# Patient Record
Sex: MALE | Race: ASIAN | NOT HISPANIC OR LATINO | URBAN - METROPOLITAN AREA
[De-identification: names, ages, dates, MRNs, and addresses within clinical notes are randomized per-mention and may not be internally consistent; named-entity substitution may affect disease eponyms.]

---

## 2017-08-12 ENCOUNTER — INPATIENT (INPATIENT)
Facility: HOSPITAL | Age: 72
LOS: 3 days | Discharge: HOME | End: 2017-08-16
Attending: INTERNAL MEDICINE | Admitting: INTERNAL MEDICINE
Payer: MEDICAID

## 2017-08-12 VITALS
SYSTOLIC BLOOD PRESSURE: 160 MMHG | DIASTOLIC BLOOD PRESSURE: 72 MMHG | HEART RATE: 86 BPM | RESPIRATION RATE: 16 BRPM | OXYGEN SATURATION: 100 % | TEMPERATURE: 98 F

## 2017-08-12 DIAGNOSIS — R94.31 ABNORMAL ELECTROCARDIOGRAM [ECG] [EKG]: ICD-10-CM

## 2017-08-12 DIAGNOSIS — E87.1 HYPO-OSMOLALITY AND HYPONATREMIA: ICD-10-CM

## 2017-08-12 DIAGNOSIS — I10 ESSENTIAL (PRIMARY) HYPERTENSION: ICD-10-CM

## 2017-08-12 LAB
ALBUMIN SERPL ELPH-MCNC: 4.3 G/DL — SIGNIFICANT CHANGE UP (ref 3.3–5)
ALP SERPL-CCNC: 77 U/L — SIGNIFICANT CHANGE UP (ref 40–120)
ALT FLD-CCNC: 39 U/L — SIGNIFICANT CHANGE UP (ref 4–41)
APPEARANCE UR: CLEAR — SIGNIFICANT CHANGE UP
AST SERPL-CCNC: 40 U/L — SIGNIFICANT CHANGE UP (ref 4–40)
BASE EXCESS BLDV CALC-SCNC: 4.4 MMOL/L — SIGNIFICANT CHANGE UP
BASOPHILS # BLD AUTO: 0.02 K/UL — SIGNIFICANT CHANGE UP (ref 0–0.2)
BASOPHILS NFR BLD AUTO: 0.2 % — SIGNIFICANT CHANGE UP (ref 0–2)
BILIRUB SERPL-MCNC: 0.8 MG/DL — SIGNIFICANT CHANGE UP (ref 0.2–1.2)
BILIRUB UR-MCNC: NEGATIVE — SIGNIFICANT CHANGE UP
BLOOD GAS VENOUS - CREATININE: 0.96 MG/DL — SIGNIFICANT CHANGE UP (ref 0.5–1.3)
BLOOD UR QL VISUAL: NEGATIVE — SIGNIFICANT CHANGE UP
BUN SERPL-MCNC: 13 MG/DL — SIGNIFICANT CHANGE UP (ref 7–23)
BUN SERPL-MCNC: 9 MG/DL — SIGNIFICANT CHANGE UP (ref 7–23)
CALCIUM SERPL-MCNC: 9.3 MG/DL — SIGNIFICANT CHANGE UP (ref 8.4–10.5)
CALCIUM SERPL-MCNC: 9.6 MG/DL — SIGNIFICANT CHANGE UP (ref 8.4–10.5)
CHLORIDE BLDV-SCNC: 82 MMOL/L — LOW (ref 96–108)
CHLORIDE SERPL-SCNC: 82 MMOL/L — LOW (ref 98–107)
CHLORIDE SERPL-SCNC: 86 MMOL/L — LOW (ref 98–107)
CHLORIDE UR-SCNC: 19 MMOL/L — SIGNIFICANT CHANGE UP
CK MB BLD-MCNC: 14.94 NG/ML — HIGH (ref 1–6.6)
CK MB BLD-MCNC: 15.2 NG/ML — HIGH (ref 1–6.6)
CK MB BLD-MCNC: 2 — SIGNIFICANT CHANGE UP (ref 0–2.5)
CK MB BLD-MCNC: 2.3 — SIGNIFICANT CHANGE UP (ref 0–2.5)
CK SERPL-CCNC: 655 U/L — HIGH (ref 30–200)
CK SERPL-CCNC: 743 U/L — HIGH (ref 30–200)
CO2 SERPL-SCNC: 26 MMOL/L — SIGNIFICANT CHANGE UP (ref 22–31)
CO2 SERPL-SCNC: 26 MMOL/L — SIGNIFICANT CHANGE UP (ref 22–31)
COLOR SPEC: SIGNIFICANT CHANGE UP
CREAT SERPL-MCNC: 1.01 MG/DL — SIGNIFICANT CHANGE UP (ref 0.5–1.3)
CREAT SERPL-MCNC: 1.04 MG/DL — SIGNIFICANT CHANGE UP (ref 0.5–1.3)
EOSINOPHIL # BLD AUTO: 0.38 K/UL — SIGNIFICANT CHANGE UP (ref 0–0.5)
EOSINOPHIL NFR BLD AUTO: 4.3 % — SIGNIFICANT CHANGE UP (ref 0–6)
GAS PNL BLDV: 118 MMOL/L — CRITICAL LOW (ref 136–146)
GLUCOSE BLDV-MCNC: 127 — HIGH (ref 70–99)
GLUCOSE SERPL-MCNC: 117 MG/DL — HIGH (ref 70–99)
GLUCOSE SERPL-MCNC: 133 MG/DL — HIGH (ref 70–99)
GLUCOSE UR-MCNC: NEGATIVE — SIGNIFICANT CHANGE UP
HCO3 BLDV-SCNC: 27 MMOL/L — SIGNIFICANT CHANGE UP (ref 20–27)
HCT VFR BLD CALC: 37 % — LOW (ref 39–50)
HCT VFR BLDV CALC: 41.8 % — SIGNIFICANT CHANGE UP (ref 39–51)
HGB BLD-MCNC: 13.4 G/DL — SIGNIFICANT CHANGE UP (ref 13–17)
HGB BLDV-MCNC: 13.6 G/DL — SIGNIFICANT CHANGE UP (ref 13–17)
IMM GRANULOCYTES # BLD AUTO: 0.05 # — SIGNIFICANT CHANGE UP
IMM GRANULOCYTES NFR BLD AUTO: 0.6 % — SIGNIFICANT CHANGE UP (ref 0–1.5)
KETONES UR-MCNC: NEGATIVE — SIGNIFICANT CHANGE UP
LACTATE BLDV-MCNC: 1.8 MMOL/L — SIGNIFICANT CHANGE UP (ref 0.5–2)
LEUKOCYTE ESTERASE UR-ACNC: NEGATIVE — SIGNIFICANT CHANGE UP
LIDOCAIN IGE QN: 22.4 U/L — SIGNIFICANT CHANGE UP (ref 7–60)
LYMPHOCYTES # BLD AUTO: 1.04 K/UL — SIGNIFICANT CHANGE UP (ref 1–3.3)
LYMPHOCYTES # BLD AUTO: 11.6 % — LOW (ref 13–44)
MCHC RBC-ENTMCNC: 32.4 PG — SIGNIFICANT CHANGE UP (ref 27–34)
MCHC RBC-ENTMCNC: 36.2 % — HIGH (ref 32–36)
MCV RBC AUTO: 89.4 FL — SIGNIFICANT CHANGE UP (ref 80–100)
MONOCYTES # BLD AUTO: 0.64 K/UL — SIGNIFICANT CHANGE UP (ref 0–0.9)
MONOCYTES NFR BLD AUTO: 7.2 % — SIGNIFICANT CHANGE UP (ref 2–14)
MUCOUS THREADS # UR AUTO: SIGNIFICANT CHANGE UP
NEUTROPHILS # BLD AUTO: 6.81 K/UL — SIGNIFICANT CHANGE UP (ref 1.8–7.4)
NEUTROPHILS NFR BLD AUTO: 76.1 % — SIGNIFICANT CHANGE UP (ref 43–77)
NITRITE UR-MCNC: NEGATIVE — SIGNIFICANT CHANGE UP
NRBC # FLD: 0 — SIGNIFICANT CHANGE UP
OSMOLALITY UR: 129 MOSMO/KG — SIGNIFICANT CHANGE UP (ref 50–1200)
PCO2 BLDV: 44 MMHG — SIGNIFICANT CHANGE UP (ref 41–51)
PH BLDV: 7.43 PH — SIGNIFICANT CHANGE UP (ref 7.32–7.43)
PH UR: 7 — SIGNIFICANT CHANGE UP (ref 4.6–8)
PLATELET # BLD AUTO: 233 K/UL — SIGNIFICANT CHANGE UP (ref 150–400)
PMV BLD: 10.1 FL — SIGNIFICANT CHANGE UP (ref 7–13)
PO2 BLDV: 31 MMHG — LOW (ref 35–40)
POTASSIUM BLDV-SCNC: 3.4 MMOL/L — SIGNIFICANT CHANGE UP (ref 3.4–4.5)
POTASSIUM SERPL-MCNC: 3.7 MMOL/L — SIGNIFICANT CHANGE UP (ref 3.5–5.3)
POTASSIUM SERPL-MCNC: 3.8 MMOL/L — SIGNIFICANT CHANGE UP (ref 3.5–5.3)
POTASSIUM SERPL-SCNC: 3.7 MMOL/L — SIGNIFICANT CHANGE UP (ref 3.5–5.3)
POTASSIUM SERPL-SCNC: 3.8 MMOL/L — SIGNIFICANT CHANGE UP (ref 3.5–5.3)
POTASSIUM UR-SCNC: 6.8 MEQ/L — SIGNIFICANT CHANGE UP
PROT SERPL-MCNC: 7.2 G/DL — SIGNIFICANT CHANGE UP (ref 6–8.3)
PROT UR-MCNC: NEGATIVE — SIGNIFICANT CHANGE UP
RBC # BLD: 4.14 M/UL — LOW (ref 4.2–5.8)
RBC # FLD: 12.1 % — SIGNIFICANT CHANGE UP (ref 10.3–14.5)
SAO2 % BLDV: 59.2 % — LOW (ref 60–85)
SODIUM SERPL-SCNC: 122 MMOL/L — LOW (ref 135–145)
SODIUM SERPL-SCNC: 125 MMOL/L — LOW (ref 135–145)
SODIUM UR-SCNC: 23 MEQ/L — SIGNIFICANT CHANGE UP
SP GR SPEC: 1.01 — SIGNIFICANT CHANGE UP (ref 1–1.03)
TROPONIN T SERPL-MCNC: < 0.06 NG/ML — SIGNIFICANT CHANGE UP (ref 0–0.06)
TROPONIN T SERPL-MCNC: < 0.06 NG/ML — SIGNIFICANT CHANGE UP (ref 0–0.06)
URATE UR-MCNC: 7.8 MG/DL — SIGNIFICANT CHANGE UP
UROBILINOGEN FLD QL: NORMAL E.U. — SIGNIFICANT CHANGE UP (ref 0.1–0.2)
WBC # BLD: 8.94 K/UL — SIGNIFICANT CHANGE UP (ref 3.8–10.5)
WBC # FLD AUTO: 8.94 K/UL — SIGNIFICANT CHANGE UP (ref 3.8–10.5)
WBC UR QL: SIGNIFICANT CHANGE UP (ref 0–?)

## 2017-08-12 PROCEDURE — 71010: CPT | Mod: 26

## 2017-08-12 PROCEDURE — 74010: CPT | Mod: 26

## 2017-08-12 PROCEDURE — 74177 CT ABD & PELVIS W/CONTRAST: CPT | Mod: 26

## 2017-08-12 RX ORDER — METOPROLOL TARTRATE 50 MG
25 TABLET ORAL
Qty: 0 | Refills: 0 | Status: DISCONTINUED | OUTPATIENT
Start: 2017-08-12 | End: 2017-08-16

## 2017-08-12 RX ORDER — FLUTICASONE PROPIONATE 50 MCG
2 SPRAY, SUSPENSION NASAL DAILY
Qty: 0 | Refills: 0 | Status: DISCONTINUED | OUTPATIENT
Start: 2017-08-12 | End: 2017-08-16

## 2017-08-12 RX ORDER — SODIUM CHLORIDE 9 MG/ML
1000 INJECTION INTRAMUSCULAR; INTRAVENOUS; SUBCUTANEOUS ONCE
Qty: 0 | Refills: 0 | Status: COMPLETED | OUTPATIENT
Start: 2017-08-12 | End: 2017-08-12

## 2017-08-12 RX ORDER — ONDANSETRON 8 MG/1
4 TABLET, FILM COATED ORAL ONCE
Qty: 0 | Refills: 0 | Status: COMPLETED | OUTPATIENT
Start: 2017-08-12 | End: 2017-08-12

## 2017-08-12 RX ORDER — LOSARTAN POTASSIUM 100 MG/1
50 TABLET, FILM COATED ORAL DAILY
Qty: 0 | Refills: 0 | Status: DISCONTINUED | OUTPATIENT
Start: 2017-08-12 | End: 2017-08-16

## 2017-08-12 RX ORDER — ATORVASTATIN CALCIUM 80 MG/1
10 TABLET, FILM COATED ORAL AT BEDTIME
Qty: 0 | Refills: 0 | Status: DISCONTINUED | OUTPATIENT
Start: 2017-08-12 | End: 2017-08-16

## 2017-08-12 RX ORDER — SODIUM CHLORIDE 9 MG/ML
1000 INJECTION INTRAMUSCULAR; INTRAVENOUS; SUBCUTANEOUS
Qty: 0 | Refills: 0 | Status: DISCONTINUED | OUTPATIENT
Start: 2017-08-12 | End: 2017-08-13

## 2017-08-12 RX ADMIN — ONDANSETRON 4 MILLIGRAM(S): 8 TABLET, FILM COATED ORAL at 06:43

## 2017-08-12 RX ADMIN — Medication 25 MILLIGRAM(S): at 17:14

## 2017-08-12 RX ADMIN — SODIUM CHLORIDE 65 MILLILITER(S): 9 INJECTION INTRAMUSCULAR; INTRAVENOUS; SUBCUTANEOUS at 21:24

## 2017-08-12 RX ADMIN — ATORVASTATIN CALCIUM 10 MILLIGRAM(S): 80 TABLET, FILM COATED ORAL at 21:24

## 2017-08-12 RX ADMIN — SODIUM CHLORIDE 1000 MILLILITER(S): 9 INJECTION INTRAMUSCULAR; INTRAVENOUS; SUBCUTANEOUS at 06:43

## 2017-08-12 RX ADMIN — Medication 2 SPRAY(S): at 18:34

## 2017-08-12 NOTE — ED PROVIDER NOTE - PROGRESS NOTE DETAILS
MD Khan:  triage ECG performed, which is grossly abnormal:  deep TWI in V3-V6.  However, patient has no CP, SOB, back pain, or any other pain, for that matter; only c/o nausea/vomiting.  ECG does not meet STEMI criteria.  No prior for comparison Discussed with tele doc of day. Admit for mgmt of EKG abnormalities and hyponatremia. Admit to Boaz Gallegos.

## 2017-08-12 NOTE — ED ADULT NURSE NOTE - OBJECTIVE STATEMENT
pt. a&ox3, Irish speaking, refuse phone  and wants son to translate. as per son, pt. came in c/o n/v and diarrhea since yesterday. pt. had routine colonoscopy yesterday, then he got home started vomiting. pt. had approx vomited 10x (nbnb) and 2x watery stools. denies any fever nor any pain. pt. currently c/o feeling dizzy, no h/a. pt. seen by MD. labs were drawn. meds given as ordered. Gastroview given. awaits CT. will continue to monitor

## 2017-08-12 NOTE — CONSULT NOTE ADULT - SUBJECTIVE AND OBJECTIVE BOX
CHIEF COMPLAINT: nausea/vomiting    HPI:  70 yo male PMH of HTN p/w one day of nausea, vomiting, and diarrhea following a screening colonoscopy two days ago. Pt denied any chest pain, dyspnea, orthopnea, PND, or palpitations. No prior history of cardiac disease.    PAST MEDICAL & SURGICAL HISTORY:  Essential hypertension  No significant past surgical history          PREVIOUS DIAGNOSTIC TESTING:    [ ] Echocardiogram:  [ ]  Catheterization:  [ ] Stress Test:  	    MEDICATIONS:  MEDICATIONS  (STANDING): unknown      FAMILY HISTORY:denied      SOCIAL HISTORY:    [x ] Non-smoker  [ ] Smoker  [ ] Alcohol    Allergies    flour (Rash)  No Known Drug Allergies  Nuts (Rash)    Intolerances    	    REVIEW OF SYSTEMS:  CONSTITUTIONAL: No fever, weight loss, or fatigue  EYES: No eye pain, visual disturbances, or discharge  ENMT:  No difficulty hearing, tinnitus, vertigo; No sinus or throat pain  NECK: No pain or stiffness  RESPIRATORY: No cough, wheezing, chills or hemoptysis; No Shortness of Breath  CARDIOVASCULAR: No chest pain, palpitations, passing out, dizziness, or leg swelling  GASTROINTESTINAL: see HPI  GENITOURINARY: No dysuria, frequency, hematuria, or incontinence  NEUROLOGICAL: No headaches, memory loss, loss of strength, numbness, or tremors  SKIN: No itching, burning, rashes, or lesions   	    [ ] All others negative	  [ ] Unable to obtain    PHYSICAL EXAM:  T(C): 36.8 (08-12-17 @ 09:12), Max: 36.8 (08-12-17 @ 09:12)  HR: 69 (08-12-17 @ 13:22) (69 - 87)  BP: 138/62 (08-12-17 @ 13:22) (138/62 - 178/73)  RR: 16 (08-12-17 @ 13:22) (14 - 17)  SpO2: 99% (08-12-17 @ 13:22) (99% - 100%)  Wt(kg): --  I&O's Summary      Appearance: Normal	  Psychiatry: A & O x 3, Mood & affect appropriate  HEENT:   Normal oral mucosa, PERRL, EOMI	  Lymphatic: No lymphadenopathy  Cardiovascular: Normal S1 S2,RRR, No JVD, No murmurs  Respiratory: Lungs clear to auscultation	  Gastrointestinal:  diffuse mild tender to palptaion	  Skin: No rashes, No ecchymoses, No cyanosis	  Neurologic: Non-focal  Extremities: Normal range of motion, No clubbing, cyanosis or edema  Vascular: Peripheral pulses palpable 2+ bilaterally    TELEMETRY: 	    ECG:  	NSR, IRBB, antlat TWI  RADIOLOGY: CT abdomen: no perforation  OTHER: 	  	  LABS:	 	    CARDIAC MARKERS:      CKMB: 14.94 ng/mL (08-12 @ 06:40)    CKMB Relative Index: 2.3 (08-12 @ 06:40)                            13.4   8.94  )-----------( 233      ( 12 Aug 2017 06:40 )             37.0     08-12    122<L>  |  82<L>  |  13  ----------------------------<  133<H>  3.8   |  26  |  1.01    Ca    9.6      12 Aug 2017 06:40    TPro  7.2  /  Alb  4.3  /  TBili  0.8  /  DBili  x   /  AST  40  /  ALT  39  /  AlkPhos  77  08-12      proBNP:   Lipid Profile:   HgA1c:   TSH:     ASSESSMENT/PLAN:

## 2017-08-12 NOTE — ED ADULT NURSE REASSESSMENT NOTE - NS ED NURSE REASSESS COMMENT FT1
Report received from night ED RN Barbara BAILON. Patient A&Ox3 and ambulatory at baseline. Reports improvement in symptoms. Denies abdominal pain or N//V/D at present. OOB and ambulatory to bathroom. Awaiting CT results. PE and VS as noted. NAD at time. Will continue to monitor. Report received from night ED RN Barbara BAILON. Patient A&Ox3 and ambulatory at baseline. Primarily Indonesian speaking. Translation received from brother in law as per patient request. Reports improvement in symptoms. Denies abdominal pain or N//V/D at present. OOB and ambulatory to bathroom. Awaiting CT results. PE and VS as noted. NAD at time. Will continue to monitor.

## 2017-08-12 NOTE — CONSULT NOTE ADULT - ASSESSMENT
70 yo male with PMH as above presenting with nausea, vomiting, diarrhea, dehydration and hyponatremia.    -ECG reviewed, there is evidence of TWI across precordium unclear baseline. Pt has no symptoms to suggest angina or ACS.  -recommend obtaining any ECHO  -hydrate, correct hyponatremia, pain control  -repeat ECG  -GI eval

## 2017-08-12 NOTE — H&P ADULT - NSHPLABSRESULTS_GEN_ALL_CORE
13.4   8.94  )-----------( 233      ( 12 Aug 2017 06:40 )             37.0  08-12    122<L>  |  82<L>  |  13  ----------------------------<  133<H>  3.8   |  26  |  1.01    Ca    9.6      12 Aug 2017 06:40    TPro  7.2  /  Alb  4.3  /  TBili  0.8  /  DBili  x   /  AST  40  /  ALT  39  /  AlkPhos  77  08-12    CARDIAC MARKERS ( 12 Aug 2017 12:45 )  x     / < 0.06 ng/mL / 743 u/L / 15.20 ng/mL / x      CARDIAC MARKERS ( 12 Aug 2017 06:40 )  x     / < 0.06 ng/mL / 655 u/L / 14.94 ng/mL / x

## 2017-08-12 NOTE — ED PROVIDER NOTE - MEDICAL DECISION MAKING DETAILS
MD Khan:  72 yo M, c/o n/v ever since screening colonoscopy 18 hrs ago.  Zero abd TTP on exam.  Unremarkable VS.  ECG is abnormal, but the patient has no CP/SOB/back pain or abd pain.  Impression:  possible NSTEMI vs complication from colonoscopy vs gastritis.  Plan:  cardiac enzymes, abdominal xrays to evaluate for free air, basic labs, blood gas, CT abd, reassess. MD Khan:  72 yo M, c/o n/v ever since screening colonoscopy 18 hrs ago.  Zero abd TTP on exam.  Unremarkable VS.  ECG is abnormal, but the patient has no CP/SOB/back pain or abd pain.  Impression:  possible NSTEMI vs complication from colonoscopy vs gastritis.  Plan:  cardiac enzymes, abdominal xrays to evaluate for free air, basic labs, blood gas, CT abd, reassess.  Dym: 72 yo M with N/V/D since colonoscopy yesterday. No CP, SOB. No abd pain on exam. Concern for colon perf given recent colonoscopy and symptom onset. Will get labs, xray, CT, EKG. F/u and re-assess.   Nitish Ardon MD, PGY1

## 2017-08-12 NOTE — ED PROVIDER NOTE - CONSTITUTIONAL, MLM
normal... Well appearing, well nourished, awake, alert, oriented to person, place, time/situation and in mild distress (emesis on front of gown)

## 2017-08-12 NOTE — ED ADULT NURSE NOTE - CHIEF COMPLAINT QUOTE
Pt c/o nausea/vomiting & loose watery stools since yesterday.  Pt had routine Colonoscopy yesterday, took Miralax & Dulcolax as prep, began having nbnv prior to procedure, symptoms returned at home so came to ED.  Denies blood in emesis or stool. PMHx HTN.  Pt reports feeling dizzy now.  Denies abd pain. EKG obtained.

## 2017-08-12 NOTE — ED ADULT NURSE REASSESSMENT NOTE - NS ED NURSE REASSESS COMMENT FT1
Patient resting comfortably in stretcher. C/O  chest pressure earlier today but is resolved at present. Also reports resolution of abdominal pain, N/V/D. Call bell within reach and family remain at bedside. NAD at time.

## 2017-08-12 NOTE — ED ADULT TRIAGE NOTE - CHIEF COMPLAINT QUOTE
Pt c/o nausea/vomiting & loose watery stools since yesterday.  Pt had Colonoscopy per routine yesterday took Miralax & Dulcolax as prep, began having nbnv prior to procedure-  felt better so was dc'd, symptoms returned so came to ED.  Denies blood in emesis or stool. PMHx HTN.  Pt reports feeling dizzy now.  Denies abd pain. EKG obtained. Pt c/o nausea/vomiting & loose watery stools since yesterday.  Pt had routine Colonoscopy yesterday, took Miralax & Dulcolax as prep, began having nbnv prior to procedure, symptoms returned at home so came to ED.  Denies blood in emesis or stool. PMHx HTN.  Pt reports feeling dizzy now.  Denies abd pain. EKG obtained.

## 2017-08-12 NOTE — ED PROVIDER NOTE - OBJECTIVE STATEMENT
Duration ~ 16 hrs.  Intensity:  5/10.  Quality:  nausea.  Context:  screening colonoscopy by Dr. Kaelyn Pierre ([phone]) at 12pm yesterday.  Associated Sx:  no abdominal pain, no chest pain, no SOB, no back pain. Duration ~ 16 hrs.  Intensity:  5/10.  Quality:  nausea.  Context:  screening colonoscopy by Dr. Kaelyn Pierre ([phone]) at 12pm yesterday.  Associated Sx:  no abdominal pain, no chest pain, no SOB, no back pain.    Dym: 71 yr old male with hx of asthma, HTN, HLD who presents with CC of nausea and vomiting since Friday (yesterday). pt states that he had screening colonoscopy done ~2pm, and began to experience nausea and vomiting soon after, with 10-11 episodes of non bloody vomiting until presentation to ED. Also reports multiple episodes of nonbloody watery diarrhea over course of the day. Denies chest pain, SOB, abd pain, fevers, chills, headache. Denies sick contacts, recent illness, travel.   Of note: Pt has limited english, was offered  and refused, and preferred family member as .   Nitish Ardon MD, PGY1

## 2017-08-12 NOTE — ED PROVIDER NOTE - ATTENDING CONTRIBUTION TO CARE
MD Khan:  patient seen and evaluated with the resident.  I was present for key portions of the History & Physical, and I agree with the Impression & Plan.  MD Khan:  70 yo M, c/o n/v ever since screening colonoscopy 18 hrs ago.  Zero abd TTP on exam.  Unremarkable VS.  ECG is abnormal, but the patient has no CP/SOB/back pain or abd pain.  Impression:  possible NSTEMI vs complication from colonoscopy vs gastritis.  Plan:  cardiac enzymes, abdominal xrays to evaluate for free air, basic labs, blood gas, CT abd, reassess. See initial contribution to care note.

## 2017-08-12 NOTE — ED ADULT NURSE REASSESSMENT NOTE - NS ED NURSE REASSESS COMMENT FT1
received report on pt from DEZ Hutton. pt presents awake a&ox4, non-english speaking, with family at bedside for translation. denies dizziness or ha. skin warm, dry, appropriate for race. respirations even unlabored. denies cp or sob at this time. abdomen soft nontender nondistended. denies n/v/d at present. denies fever or chills. ivl site clean, dry, intact, patent. cardiac monitor in place in nsr. family at bedside. safety maintained. family at bedside. awaiting bed assignment.

## 2017-08-12 NOTE — H&P ADULT - HISTORY OF PRESENT ILLNESS
71 year old male pmh of HTN p/w nausea and vomiting. Had colonoscopy one day ago, after the scope started feeling sick, vomited more than 10 times, NBNB.  Has had pressure in chest  that happens when eats, has had this for few years. He saw a gastroenterologist for this and in past had an endoscopy at [facility]. Occasional cough. No SOB, fever, chills, abd pain, back pain, flank pain, dysuria, no recent travel or illness. 71 year old male pmh of HTN p/w nausea and vomiting. Had colonoscopy one day ago, after the scope started feeling sick, vomited more than 10 times, NBNB.  Has had pressure in chest  that happens when eats, has had this for few years. He saw a gastroenterologist for this and in past had an endoscopy at [facility]. Occasional cough. No SOB, fever, chills, abd pain, back pain, flank pain, dysuria, no recent travel or illness.     Grandson at bedside performed translation at request of patient.

## 2017-08-13 DIAGNOSIS — R11.2 NAUSEA WITH VOMITING, UNSPECIFIED: ICD-10-CM

## 2017-08-13 LAB
BUN SERPL-MCNC: 12 MG/DL — SIGNIFICANT CHANGE UP (ref 7–23)
BUN SERPL-MCNC: 12 MG/DL — SIGNIFICANT CHANGE UP (ref 7–23)
CALCIUM SERPL-MCNC: 9 MG/DL — SIGNIFICANT CHANGE UP (ref 8.4–10.5)
CALCIUM SERPL-MCNC: 9.3 MG/DL — SIGNIFICANT CHANGE UP (ref 8.4–10.5)
CHLORIDE SERPL-SCNC: 92 MMOL/L — LOW (ref 98–107)
CHLORIDE SERPL-SCNC: 92 MMOL/L — LOW (ref 98–107)
CHOLEST SERPL-MCNC: 151 MG/DL — SIGNIFICANT CHANGE UP (ref 120–199)
CO2 SERPL-SCNC: 27 MMOL/L — SIGNIFICANT CHANGE UP (ref 22–31)
CO2 SERPL-SCNC: 29 MMOL/L — SIGNIFICANT CHANGE UP (ref 22–31)
CORTIS SERPL-MCNC: 13.3 UG/DL — SIGNIFICANT CHANGE UP (ref 2.7–18.4)
CREAT SERPL-MCNC: 0.98 MG/DL — SIGNIFICANT CHANGE UP (ref 0.5–1.3)
CREAT SERPL-MCNC: 1.06 MG/DL — SIGNIFICANT CHANGE UP (ref 0.5–1.3)
GLUCOSE SERPL-MCNC: 100 MG/DL — HIGH (ref 70–99)
GLUCOSE SERPL-MCNC: 103 MG/DL — HIGH (ref 70–99)
HBA1C BLD-MCNC: 6.1 % — HIGH (ref 4–5.6)
HCT VFR BLD CALC: 35.6 % — LOW (ref 39–50)
HDLC SERPL-MCNC: 46 MG/DL — SIGNIFICANT CHANGE UP (ref 35–55)
HGB BLD-MCNC: 12.9 G/DL — LOW (ref 13–17)
LIPID PNL WITH DIRECT LDL SERPL: 96 MG/DL — SIGNIFICANT CHANGE UP
MAGNESIUM SERPL-MCNC: 2 MG/DL — SIGNIFICANT CHANGE UP (ref 1.6–2.6)
MAGNESIUM SERPL-MCNC: 2.2 MG/DL — SIGNIFICANT CHANGE UP (ref 1.6–2.6)
MCHC RBC-ENTMCNC: 33.2 PG — SIGNIFICANT CHANGE UP (ref 27–34)
MCHC RBC-ENTMCNC: 36.2 % — HIGH (ref 32–36)
MCV RBC AUTO: 91.5 FL — SIGNIFICANT CHANGE UP (ref 80–100)
NRBC # FLD: 0 — SIGNIFICANT CHANGE UP
OSMOLALITY SERPL: 265 MOSMO/KG — LOW (ref 275–295)
OSMOLALITY UR: 186 MOSMO/KG — SIGNIFICANT CHANGE UP (ref 50–1200)
PLATELET # BLD AUTO: 213 K/UL — SIGNIFICANT CHANGE UP (ref 150–400)
PMV BLD: 10.1 FL — SIGNIFICANT CHANGE UP (ref 7–13)
POTASSIUM SERPL-MCNC: 3.8 MMOL/L — SIGNIFICANT CHANGE UP (ref 3.5–5.3)
POTASSIUM SERPL-MCNC: 4 MMOL/L — SIGNIFICANT CHANGE UP (ref 3.5–5.3)
POTASSIUM SERPL-SCNC: 3.8 MMOL/L — SIGNIFICANT CHANGE UP (ref 3.5–5.3)
POTASSIUM SERPL-SCNC: 4 MMOL/L — SIGNIFICANT CHANGE UP (ref 3.5–5.3)
RBC # BLD: 3.89 M/UL — LOW (ref 4.2–5.8)
RBC # FLD: 12.4 % — SIGNIFICANT CHANGE UP (ref 10.3–14.5)
SODIUM SERPL-SCNC: 131 MMOL/L — LOW (ref 135–145)
SODIUM SERPL-SCNC: 131 MMOL/L — LOW (ref 135–145)
SODIUM UR-SCNC: 23 MEQ/L — SIGNIFICANT CHANGE UP
TRIGL SERPL-MCNC: 89 MG/DL — SIGNIFICANT CHANGE UP (ref 10–149)
TSH SERPL-MCNC: 3.86 UIU/ML — SIGNIFICANT CHANGE UP (ref 0.27–4.2)
URATE SERPL-MCNC: 3.8 MG/DL — SIGNIFICANT CHANGE UP (ref 3.4–8.8)
WBC # BLD: 7.52 K/UL — SIGNIFICANT CHANGE UP (ref 3.8–10.5)
WBC # FLD AUTO: 7.52 K/UL — SIGNIFICANT CHANGE UP (ref 3.8–10.5)

## 2017-08-13 PROCEDURE — 93010 ELECTROCARDIOGRAM REPORT: CPT

## 2017-08-13 RX ORDER — LANOLIN ALCOHOL/MO/W.PET/CERES
3 CREAM (GRAM) TOPICAL AT BEDTIME
Qty: 0 | Refills: 0 | Status: DISCONTINUED | OUTPATIENT
Start: 2017-08-13 | End: 2017-08-16

## 2017-08-13 RX ORDER — SIMETHICONE 80 MG/1
80 TABLET, CHEWABLE ORAL
Qty: 0 | Refills: 0 | Status: DISCONTINUED | OUTPATIENT
Start: 2017-08-13 | End: 2017-08-16

## 2017-08-13 RX ADMIN — Medication 25 MILLIGRAM(S): at 17:17

## 2017-08-13 RX ADMIN — Medication 25 MILLIGRAM(S): at 07:18

## 2017-08-13 RX ADMIN — LOSARTAN POTASSIUM 50 MILLIGRAM(S): 100 TABLET, FILM COATED ORAL at 07:18

## 2017-08-13 RX ADMIN — ATORVASTATIN CALCIUM 10 MILLIGRAM(S): 80 TABLET, FILM COATED ORAL at 21:29

## 2017-08-13 RX ADMIN — SIMETHICONE 80 MILLIGRAM(S): 80 TABLET, CHEWABLE ORAL at 17:17

## 2017-08-13 RX ADMIN — SIMETHICONE 80 MILLIGRAM(S): 80 TABLET, CHEWABLE ORAL at 21:33

## 2017-08-13 RX ADMIN — Medication 2 SPRAY(S): at 11:57

## 2017-08-13 NOTE — PROGRESS NOTE ADULT - SUBJECTIVE AND OBJECTIVE BOX
[facility] NEPHROLOGY- CONSULTATION NOTE    Son-in law at bedside for translation: Leonel Ali  71 year old male with HTN, HLD, GERD p/w nausea and vomiting. Had colonoscopy on  (GI: Dr Garibay) after the scope started feeling sick, vomited more than 10 times, NBNB.  Has had pressure in chest  that happens when eats, has had this for few years. Pt found to be hyponatremic to 122.  Pt denies any history of hyponatremia, diuretic use or NSAID use.   Pt denies any  SOB, fever, chills, abd pain, back pain, flank pain, dysuria, no recent travel or illness or decreased in the quantity of urine.  Pt states n/v has resolved.  Pt c/o gas      PAST MEDICAL & SURGICAL HISTORY:  Essential hypertension  No significant past surgical history    flour (Rash)  No Known Drug Allergies  Nuts (Rash)    Home Medications Reviewed  Hospital Medications:   MEDICATIONS  (STANDING):  losartan 50 milliGRAM(s) Oral daily  atorvastatin 10 milliGRAM(s) Oral at bedtime  metoprolol 25 milliGRAM(s) Oral two times a day  fluticasone propionate 50 MICROgram(s)/spray Nasal Spray 2 Spray(s) Both Nostrils daily    SOCIAL HISTORY:  Denies ETOH or drug use. Ex-Smoker   FAMILY HISTORY:  No pertinent family history in first degree relatives      REVIEW OF SYSTEMS:  Gen: mild dizziness  HEENT: no rhinorrhea  Neck: no sore throat  Cards: no chest pain  Resp: no dyspnea  GI: no nausea or vomiting or diarrhea- resolved now  : no dysuria or hematuria  Vascular: no LE edema  Derm: no rashes  Neuro: no numbness/tingling  All other review of systems is negative unless indicated above.    VITALS:  T(F): 97.8 (17 @ 07:17), Max: 98.1 (17 @ 15:11)  HR: 62 (17 @ 07:17)  BP: 145/74 (17 @ 07:17)  RR: 18 (17 @ 07:17)  SpO2: 98% (17 @ 07:17)  Wt(kg): --     @ 07:01  -   @ 07:00  --------------------------------------------------------  IN: 410 mL / OUT: 500 mL / NET: -90 mL      Height (cm): 153.67 ( @ 15:11)  Weight (kg): 68 ( @ 21:22)  BMI (kg/m2): 28.8 ( @ 21:22)  BSA (m2): 1.66 ( @ 21:22)    PHYSICAL EXAM:  Gen: NAD, calm  HEENT: MMM  Neck: no JVD  Cards: RRR, +S1/S2, no M/G/R  Resp: CTA B/L  GI: soft, NT/ND, NABS  : no CVA tenderness  Extremities: no LE edema B/L  Derm: no rashes  Neuro: non-focal, AOx3    LABS:    131 <--, 125 <--, 122 <--  131<L>  |  92<L>  |  12  ----------------------------<  103<H>  4.0   |  27  |  1.06    Ca    9.0      13 Aug 2017 04:00  Mg     2.0         TPro  7.2  /  Alb  4.3  /  TBili  0.8  /  DBili      /  AST  40  /  ALT  39  /  AlkPhos  77      Creatinine Trend: 1.06 <--, 1.04 <--, 1.01 <--                        12.9   7.52  )-----------( 213      ( 13 Aug 2017 04:00 )             35.6     Urine Studies:  Urinalysis Basic - ( 12 Aug 2017 18:45 )    Color: COLORL / Appearance: CLEAR / S.007 / pH: 7.0  Gluc: NEGATIVE / Ketone: NEGATIVE  / Bili: NEGATIVE / Urobili: NORMAL E.U.   Blood: NEGATIVE / Protein: NEGATIVE / Nitrite: NEGATIVE   Leuk Esterase: NEGATIVE / RBC:  / WBC 0-2   Sq Epi:  / Non Sq Epi:  / Bacteria:     Osmolality, Serum: 265 mosmo/kg (17 @ 04:00)    Osmolality, Random Urine: 129 mosmo/kg ( @ 18:45)  Sodium, Random Urine: 23 meq/L (08-12 @ 18:45)  Potassium, Random Urine: 6.8 meq/L (08-12 @ 18:45)  Chloride, Random Urine: 19 mmol/L (08-12 @ 18:45)    RADIOLOGY & ADDITIONAL STUDIES:

## 2017-08-13 NOTE — PROGRESS NOTE ADULT - ASSESSMENT
71 year old male pmh of HTN p/w nausea and vomiting found to have hyponatremia and EKG changes    Problem/Plan - 1:  ·  Problem: Hyponatremia.  Plan: admitted to [facility] sec to decreased PO intake  renal fu    Problem/Plan - 2:  ·  Problem: EKG abnormalities.  Plan:  cardiology recs appreciated  TTE.     Problem/Plan - 3:  ·  Problem: Essential hypertension.  Plan: continue home bp meds.   monitor

## 2017-08-13 NOTE — PROGRESS NOTE ADULT - SUBJECTIVE AND OBJECTIVE BOX
CARDIOLOGY FOLLOW UP - Dr. Gallegos    CC no chest pain or sob       PHYSICAL EXAM:  T(C): 36.6 (08-13-17 @ 07:17), Max: 36.7 (08-12-17 @ 15:11)  HR: 62 (08-13-17 @ 07:17) (62 - 83)  BP: 145/74 (08-13-17 @ 07:17) (128/65 - 158/71)  RR: 18 (08-13-17 @ 07:17) (14 - 18)  SpO2: 98% (08-13-17 @ 07:17) (94% - 100%)  Wt(kg): --  I&O's Summary    12 Aug 2017 07:01  -  13 Aug 2017 07:00  --------------------------------------------------------  IN: 410 mL / OUT: 500 mL / NET: -90 mL        Appearance: Normal	  Cardiovascular: Normal S1 S2,RRR, No JVD, No murmurs  Respiratory: Lungs clear to auscultation	  Gastrointestinal:  Soft, Non-tender, + BS	  Extremities: Normal range of motion, No clubbing, cyanosis or edema        MEDICATIONS  (STANDING):  losartan 50 milliGRAM(s) Oral daily  atorvastatin 10 milliGRAM(s) Oral at bedtime  metoprolol 25 milliGRAM(s) Oral two times a day  fluticasone propionate 50 MICROgram(s)/spray Nasal Spray 2 Spray(s) Both Nostrils daily      TELEMETRY: 	  NSR HR 62   pvc    ECG:  	  RADIOLOGY:   DIAGNOSTIC TESTING:  [ ] Echocardiogram:  [ ]  Catheterization:  [ ] Stress Test:    OTHER: 	    LABS:	 	        CKMB: 15.20 ng/mL (08-12 @ 12:45)                          12.9   7.52  )-----------( 213      ( 13 Aug 2017 04:00 )             35.6     08-13    131<L>  |  92<L>  |  12  ----------------------------<  103<H>  4.0   |  27  |  1.06    Ca    9.0      13 Aug 2017 04:00  Mg     2.0     08-13    TPro  7.2  /  Alb  4.3  /  TBili  0.8  /  DBili  x   /  AST  40  /  ALT  39  /  AlkPhos  77  08-12 CARDIOLOGY FOLLOW UP - Dr. Gallegos    CC no chest pain or sob       PHYSICAL EXAM:  T(C): 36.6 (08-13-17 @ 07:17), Max: 36.7 (08-12-17 @ 15:11)  HR: 62 (08-13-17 @ 07:17) (62 - 83)  BP: 145/74 (08-13-17 @ 07:17) (128/65 - 158/71)  RR: 18 (08-13-17 @ 07:17) (14 - 18)  SpO2: 98% (08-13-17 @ 07:17) (94% - 100%)  Wt(kg): --  I&O's Summary    12 Aug 2017 07:01  -  13 Aug 2017 07:00  --------------------------------------------------------  IN: 410 mL / OUT: 500 mL / NET: -90 mL        Appearance: Normal	  Cardiovascular: Normal S1 S2,RRR, No JVD, No murmurs  Respiratory: Lungs clear to auscultation	  Gastrointestinal:  Soft, Non-tender, + BS	  Extremities: Normal range of motion, No clubbing, cyanosis or edema        MEDICATIONS  (STANDING):  losartan 50 milliGRAM(s) Oral daily  atorvastatin 10 milliGRAM(s) Oral at bedtime  metoprolol 25 milliGRAM(s) Oral two times a day  fluticasone propionate 50 MICROgram(s)/spray Nasal Spray 2 Spray(s) Both Nostrils daily      TELEMETRY: 	  NSR HR 62   pvc    ECG:  	  RADIOLOGY:   DIAGNOSTIC TESTING:  [ ] Echocardiogram: pending   [ ]  Catheterization:  [ ] Stress Test:    OTHER: 	    LABS:	 	        CKMB: 15.20 ng/mL (08-12 @ 12:45)                          12.9   7.52  )-----------( 213      ( 13 Aug 2017 04:00 )             35.6     08-13    131<L>  |  92<L>  |  12  ----------------------------<  103<H>  4.0   |  27  |  1.06    Ca    9.0      13 Aug 2017 04:00  Mg     2.0     08-13    TPro  7.2  /  Alb  4.3  /  TBili  0.8  /  DBili  x   /  AST  40  /  ALT  39  /  AlkPhos  77  08-12

## 2017-08-13 NOTE — PROGRESS NOTE ADULT - ASSESSMENT
70 yo male with HTN admitted with nausea, vomiting, diarrhea, dehydration and hyponatremia.      1. cv stable   no chest pain or sob.  no evidence of acute ischemia/ACS   ECG reviewed, there is evidence of TWI across precordium unclear baseline   f/u ECHO  repeat EKG      2. Hyponatremia  hydrate  renal f/u     3. HTN   bp acceptable   continue with current antihtn meds     -GI eval

## 2017-08-13 NOTE — PROGRESS NOTE ADULT - SUBJECTIVE AND OBJECTIVE BOX
Patient is a 71y old  Male who presents with a chief complaint of Vomiting (12 Aug 2017 15:25)  NAD    INTERVAL HPI/OVERNIGHT EVENTS:  T(C): 36.7 (17 @ 10:00), Max: 36.7 (17 @ 15:11)  HR: 60 (17 @ 10:00) (60 - 80)  BP: 134/78 (17 @ 10:00) (128/65 - 157/70)  RR: 18 (17 @ 10:00) (16 - 18)  SpO2: 98% (17 @ 10:00) (94% - 99%)  Wt(kg): --  I&O's Summary    12 Aug 2017 07:01  -  13 Aug 2017 07:00  --------------------------------------------------------  IN: 410 mL / OUT: 500 mL / NET: -90 mL        LABS:                        12.9   7.52  )-----------( 213      ( 13 Aug 2017 04:00 )             35.6     08-13    131<L>  |  92<L>  |  12  ----------------------------<  103<H>  4.0   |  27  |  1.06    Ca    9.0      13 Aug 2017 04:00  Mg     2.0         TPro  7.2  /  Alb  4.3  /  TBili  0.8  /  DBili  x   /  AST  40  /  ALT  39  /  AlkPhos  77  0812      Urinalysis Basic - ( 12 Aug 2017 18:45 )    Color: COLORL / Appearance: CLEAR / S.007 / pH: 7.0  Gluc: NEGATIVE / Ketone: NEGATIVE  / Bili: NEGATIVE / Urobili: NORMAL E.U.   Blood: NEGATIVE / Protein: NEGATIVE / Nitrite: NEGATIVE   Leuk Esterase: NEGATIVE / RBC: x / WBC 0-2   Sq Epi: x / Non Sq Epi: x / Bacteria: x      CAPILLARY BLOOD GLUCOSE            Urinalysis Basic - ( 12 Aug 2017 18:45 )    Color: COLORL / Appearance: CLEAR / S.007 / pH: 7.0  Gluc: NEGATIVE / Ketone: NEGATIVE  / Bili: NEGATIVE / Urobili: NORMAL E.U.   Blood: NEGATIVE / Protein: NEGATIVE / Nitrite: NEGATIVE   Leuk Esterase: NEGATIVE / RBC: x / WBC 0-2   Sq Epi: x / Non Sq Epi: x / Bacteria: x        MEDICATIONS  (STANDING):  losartan 50 milliGRAM(s) Oral daily  atorvastatin 10 milliGRAM(s) Oral at bedtime  metoprolol 25 milliGRAM(s) Oral two times a day  fluticasone propionate 50 MICROgram(s)/spray Nasal Spray 2 Spray(s) Both Nostrils daily    MEDICATIONS  (PRN):  simethicone 80 milliGRAM(s) Chew four times a day PRN Gas          PHYSICAL EXAM:  GENERAL: NAD, well-groomed, well-developed  HEAD:  Atraumatic, Normocephalic  CHEST/LUNG: Clear to percussion bilaterally; No rales, rhonchi, wheezing, or rubs  HEART: Regular rate and rhythm; No murmurs, rubs, or gallops  ABDOMEN: Soft, Nontender, Nondistended; Bowel sounds present  EXTREMITIES:  2+ Peripheral Pulses, No clubbing, cyanosis, or edema  LYMPH: No lymphadenopathy noted  SKIN: No rashes or lesions    Care Discussed with Consultants/Other Providers [+ ] YES  [ ] NO

## 2017-08-14 LAB
BUN SERPL-MCNC: 13 MG/DL — SIGNIFICANT CHANGE UP (ref 7–23)
CALCIUM SERPL-MCNC: 9.2 MG/DL — SIGNIFICANT CHANGE UP (ref 8.4–10.5)
CHLORIDE SERPL-SCNC: 93 MMOL/L — LOW (ref 98–107)
CO2 SERPL-SCNC: 26 MMOL/L — SIGNIFICANT CHANGE UP (ref 22–31)
CREAT SERPL-MCNC: 1 MG/DL — SIGNIFICANT CHANGE UP (ref 0.5–1.3)
GLUCOSE SERPL-MCNC: 102 MG/DL — HIGH (ref 70–99)
HCT VFR BLD CALC: 35.6 % — LOW (ref 39–50)
HGB BLD-MCNC: 12.7 G/DL — LOW (ref 13–17)
MAGNESIUM SERPL-MCNC: 2.1 MG/DL — SIGNIFICANT CHANGE UP (ref 1.6–2.6)
MCHC RBC-ENTMCNC: 32.9 PG — SIGNIFICANT CHANGE UP (ref 27–34)
MCHC RBC-ENTMCNC: 35.7 % — SIGNIFICANT CHANGE UP (ref 32–36)
MCV RBC AUTO: 92.2 FL — SIGNIFICANT CHANGE UP (ref 80–100)
NRBC # FLD: 0 — SIGNIFICANT CHANGE UP
PLATELET # BLD AUTO: 220 K/UL — SIGNIFICANT CHANGE UP (ref 150–400)
PMV BLD: 10.2 FL — SIGNIFICANT CHANGE UP (ref 7–13)
POTASSIUM SERPL-MCNC: 3.5 MMOL/L — SIGNIFICANT CHANGE UP (ref 3.5–5.3)
POTASSIUM SERPL-SCNC: 3.5 MMOL/L — SIGNIFICANT CHANGE UP (ref 3.5–5.3)
RBC # BLD: 3.86 M/UL — LOW (ref 4.2–5.8)
RBC # FLD: 12.7 % — SIGNIFICANT CHANGE UP (ref 10.3–14.5)
SODIUM SERPL-SCNC: 130 MMOL/L — LOW (ref 135–145)
WBC # BLD: 7.82 K/UL — SIGNIFICANT CHANGE UP (ref 3.8–10.5)
WBC # FLD AUTO: 7.82 K/UL — SIGNIFICANT CHANGE UP (ref 3.8–10.5)

## 2017-08-14 PROCEDURE — 93010 ELECTROCARDIOGRAM REPORT: CPT

## 2017-08-14 RX ORDER — SODIUM CHLORIDE 9 MG/ML
1000 INJECTION INTRAMUSCULAR; INTRAVENOUS; SUBCUTANEOUS
Qty: 0 | Refills: 0 | Status: DISCONTINUED | OUTPATIENT
Start: 2017-08-14 | End: 2017-08-15

## 2017-08-14 RX ORDER — POTASSIUM CHLORIDE 20 MEQ
40 PACKET (EA) ORAL ONCE
Qty: 0 | Refills: 0 | Status: COMPLETED | OUTPATIENT
Start: 2017-08-14 | End: 2017-08-14

## 2017-08-14 RX ADMIN — SIMETHICONE 80 MILLIGRAM(S): 80 TABLET, CHEWABLE ORAL at 23:31

## 2017-08-14 RX ADMIN — LOSARTAN POTASSIUM 50 MILLIGRAM(S): 100 TABLET, FILM COATED ORAL at 05:31

## 2017-08-14 RX ADMIN — Medication 25 MILLIGRAM(S): at 05:31

## 2017-08-14 RX ADMIN — Medication 2 SPRAY(S): at 11:13

## 2017-08-14 RX ADMIN — SIMETHICONE 80 MILLIGRAM(S): 80 TABLET, CHEWABLE ORAL at 18:30

## 2017-08-14 RX ADMIN — SODIUM CHLORIDE 75 MILLILITER(S): 9 INJECTION INTRAMUSCULAR; INTRAVENOUS; SUBCUTANEOUS at 10:16

## 2017-08-14 RX ADMIN — Medication 40 MILLIEQUIVALENT(S): at 09:31

## 2017-08-14 RX ADMIN — Medication 25 MILLIGRAM(S): at 17:31

## 2017-08-14 RX ADMIN — SIMETHICONE 80 MILLIGRAM(S): 80 TABLET, CHEWABLE ORAL at 20:24

## 2017-08-14 RX ADMIN — ATORVASTATIN CALCIUM 10 MILLIGRAM(S): 80 TABLET, FILM COATED ORAL at 22:30

## 2017-08-14 RX ADMIN — SODIUM CHLORIDE 75 MILLILITER(S): 9 INJECTION INTRAMUSCULAR; INTRAVENOUS; SUBCUTANEOUS at 22:29

## 2017-08-14 NOTE — PROGRESS NOTE ADULT - ASSESSMENT
71 year old male pmh of HTN p/w nausea and vomiting found to have hyponatremia and EKG changes    Problem/Plan - 1:  ·  Problem: Hyponatremia.  Plan: admitted to [facility] sec to decreased PO intake  renal fu    Problem/Plan - 2:  ·  Problem: EKG abnormalities.  Plan:  cardiology recs appreciated  TTE.   stress test once sodium improvesa    Problem/Plan - 3:  ·  Problem: Essential hypertension.  Plan: continue home bp meds.   monitor

## 2017-08-14 NOTE — PROGRESS NOTE ADULT - ASSESSMENT
72 yo male with HTN admitted with nausea, vomiting, diarrhea, dehydration and hyponatremia.      1. cv stable   ECG reviewed,  evidence of TWI across precordium unclear baseline , unchanged /  TWI noted in lead III noted   no chest pain or sob.  no evidence of acute ischemia/ACS   repeat EKG   f/u ECHO  will likely need Stress test once Na levels improve        2. Hyponatremia  Na levels improving   hydrate  renal f/u     3. HTN   bp acceptable   continue with current antihtn meds 72 yo male with HTN admitted with nausea, vomiting, diarrhea, dehydration and hyponatremia.      1. cv stable   ECG reviewed,  evidence of TWI across precordium unclear baseline , unchanged /  TWI noted in lead III   no chest pain or sob.  no evidence of acute ischemia/ACS   repeat EKG   f/u ECHO  will likely need Stress test once Na levels improve        2. Hyponatremia  Na levels improving   hydrate  renal f/u     3. HTN   bp acceptable   continue with current antihtn meds

## 2017-08-14 NOTE — PROGRESS NOTE ADULT - SUBJECTIVE AND OBJECTIVE BOX
Patient is a 71y old  Male who presents with a chief complaint of Vomiting (12 Aug 2017 15:25)  NAD      INTERVAL HPI/OVERNIGHT EVENTS:  T(C): 36.9 (17 @ 13:10), Max: 36.9 (17 @ 13:10)  HR: 57 (17 @ 13:10) (57 - 65)  BP: 141/68 (17 @ 13:10) (141/68 - 151/70)  RR: 18 (17 @ 13:10) (18 - 18)  SpO2: 98% (17 @ 13:10) (98% - 98%)  Wt(kg): --  I&O's Summary    13 Aug 2017 07:01  -  14 Aug 2017 07:00  --------------------------------------------------------  IN: 380 mL / OUT: 0 mL / NET: 380 mL    14 Aug 2017 07:01  -  14 Aug 2017 16:48  --------------------------------------------------------  IN: 240 mL / OUT: 0 mL / NET: 240 mL        LABS:                        12.7   7.82  )-----------( 220      ( 14 Aug 2017 06:00 )             35.6     08-14    130<L>  |  93<L>  |  13  ----------------------------<  102<H>  3.5   |  26  |  1.00    Ca    9.2      14 Aug 2017 06:00  Mg     2.1     08-14        Urinalysis Basic - ( 12 Aug 2017 18:45 )    Color: COLORL / Appearance: CLEAR / S.007 / pH: 7.0  Gluc: NEGATIVE / Ketone: NEGATIVE  / Bili: NEGATIVE / Urobili: NORMAL E.U.   Blood: NEGATIVE / Protein: NEGATIVE / Nitrite: NEGATIVE   Leuk Esterase: NEGATIVE / RBC: x / WBC 0-2   Sq Epi: x / Non Sq Epi: x / Bacteria: x      CAPILLARY BLOOD GLUCOSE            Urinalysis Basic - ( 12 Aug 2017 18:45 )    Color: COLORL / Appearance: CLEAR / S.007 / pH: 7.0  Gluc: NEGATIVE / Ketone: NEGATIVE  / Bili: NEGATIVE / Urobili: NORMAL E.U.   Blood: NEGATIVE / Protein: NEGATIVE / Nitrite: NEGATIVE   Leuk Esterase: NEGATIVE / RBC: x / WBC 0-2   Sq Epi: x / Non Sq Epi: x / Bacteria: x        MEDICATIONS  (STANDING):  losartan 50 milliGRAM(s) Oral daily  atorvastatin 10 milliGRAM(s) Oral at bedtime  metoprolol 25 milliGRAM(s) Oral two times a day  fluticasone propionate 50 MICROgram(s)/spray Nasal Spray 2 Spray(s) Both Nostrils daily  sodium chloride 0.9%. 1000 milliLiter(s) (75 mL/Hr) IV Continuous <Continuous>    MEDICATIONS  (PRN):  simethicone 80 milliGRAM(s) Chew four times a day PRN Gas  melatonin 3 milliGRAM(s) Oral at bedtime PRN Insomnia          PHYSICAL EXAM:  GENERAL: NAD, well-groomed, well-developed  HEAD:  Atraumatic, Normocephalic  CHEST/LUNG: Clear to percussion bilaterally; No rales, rhonchi, wheezing, or rubs  HEART: Regular rate and rhythm; No murmurs, rubs, or gallops  ABDOMEN: Soft, Nontender, Nondistended; Bowel sounds present  EXTREMITIES:  2+ Peripheral Pulses, No clubbing, cyanosis, or edema  LYMPH: No lymphadenopathy noted  SKIN: No rashes or lesions    Care Discussed with Consultants/Other Providers [ ] YES  [ ] NO

## 2017-08-14 NOTE — PROGRESS NOTE ADULT - SUBJECTIVE AND OBJECTIVE BOX
[facility] NEPHROLOGY- PROGRESS NOTE    71 year old male with HTN, HLD, GERD p/w nausea and vomiting a/w Hyponatremia    Hospital Medications: Medications reviewed.  REVIEW OF SYSTEMS:  CONSTITUTIONAL: No fevers or chills  RESPIRATORY: No shortness of breath  CARDIOVASCULAR: No chest pain.  GASTROINTESTINAL: No nausea, vomiting, diarrhea or abdominal pain. +flatulence  VASCULAR: No bilateral lower extremity edema.     VITALS:  T(F): 97.4 (17 @ 05:30), Max: 98.1 (17 @ 13:34)  HR: 65 (17 @ 05:30)  BP: 150/71 (17 @ 05:30)  RR: 18 (17 @ 05:30)  SpO2: 98% (17 05:30)  Wt(kg): --  Height (cm): 153.67 ( 15:11)  Weight (kg): 68 ( 21:22)  BMI (kg/m2): 28.8 ( 21:22)  BSA (m2): 1.66 ( 21:22)     @ 07:01  -   @ 07:00  --------------------------------------------------------  IN: 380 mL / OUT: 0 mL / NET: 380 mL     @ 07:01  -   @ 12:15  --------------------------------------------------------  IN: 120 mL / OUT: 0 mL / NET: 120 mL      PHYSICAL EXAM:  Constitutional: NAD  Neurological: Awake and Alert  HEENT: anicteric sclera,   Respiratory: CTAB, no wheezes, rales or rhonchi  Cardiovascular: S1, S2, RRR  Gastrointestinal: BS+, soft, NT/ND  : No pineda.  Extremities: No peripheral edema    LABS:    130 <--, 131 <--, 131 <--, 125 <--, 122 <--  130<L>  |  93<L>  |  13  ----------------------------<  102<H>  3.5   |  26  |  1.00    Ca    9.2      14 Aug 2017 06:00  Mg     2.1           Creatinine Trend: 1.00 <--, 0.98 <--, 1.06 <--, 1.04 <--, 1.01 <--                        12.7   7.82  )-----------( 220      ( 14 Aug 2017 06:00 )             35.6     Urine Studies:  Urinalysis Basic - ( 12 Aug 2017 18:45 )    Color: COLORL / Appearance: CLEAR / S.007 / pH: 7.0  Gluc: NEGATIVE / Ketone: NEGATIVE  / Bili: NEGATIVE / Urobili: NORMAL E.U.   Blood: NEGATIVE / Protein: NEGATIVE / Nitrite: NEGATIVE   Leuk Esterase: NEGATIVE / RBC:  / WBC 0-2   Sq Epi:  / Non Sq Epi:  / Bacteria:       Osmolality, Random Urine: 186 mosmo/kg ( @ 17:50)  Sodium, Random Urine: 23 meq/L ( @ 17:50)  Osmolality, Random Urine: 129 mosmo/kg ( @ 18:45)  Sodium, Random Urine: 23 meq/L ( @ 18:45)  Potassium, Random Urine: 6.8 meq/L ( @ 18:45)  Chloride, Random Urine: 19 mmol/L ( @ 18:45)    RADIOLOGY & ADDITIONAL STUDIES:

## 2017-08-14 NOTE — PROGRESS NOTE ADULT - SUBJECTIVE AND OBJECTIVE BOX
CARDIOLOGY FOLLOW UP - Dr. Gallegos    CC no chest pain or sob       PHYSICAL EXAM:  T(C): 36.9 (08-14-17 @ 13:10), Max: 36.9 (08-14-17 @ 13:10)  HR: 57 (08-14-17 @ 13:10) (57 - 65)  BP: 141/68 (08-14-17 @ 13:10) (141/68 - 151/70)  RR: 18 (08-14-17 @ 13:10) (18 - 18)  SpO2: 98% (08-14-17 @ 13:10) (98% - 98%)  Wt(kg): --  I&O's Summary    13 Aug 2017 07:01  -  14 Aug 2017 07:00  --------------------------------------------------------  IN: 380 mL / OUT: 0 mL / NET: 380 mL    14 Aug 2017 07:01  -  14 Aug 2017 15:47  --------------------------------------------------------  IN: 240 mL / OUT: 0 mL / NET: 240 mL        Appearance: Normal	  Cardiovascular: Normal S1 S2,RRR, No JVD, No murmurs  Respiratory: Lungs clear to auscultation	  Gastrointestinal:  Soft, Non-tender, + BS	  Extremities: Normal range of motion, No clubbing, cyanosis or edema        MEDICATIONS  (STANDING):  losartan 50 milliGRAM(s) Oral daily  atorvastatin 10 milliGRAM(s) Oral at bedtime  metoprolol 25 milliGRAM(s) Oral two times a day  fluticasone propionate 50 MICROgram(s)/spray Nasal Spray 2 Spray(s) Both Nostrils daily  sodium chloride 0.9%. 1000 milliLiter(s) (75 mL/Hr) IV Continuous <Continuous>      TELEMETRY: 	NSR HR 55-70s     ECG:  	NSR HR 69   TWI across precordium unclear baseline , unchanged /  TWI noted in lead III noted   RADIOLOGY:   DIAGNOSTIC TESTING:  [ ] Echocardiogram: pending   [ ]  Catheterization:  [ ] Stress Test:    OTHER: 	    LABS:	 	                                12.7   7.82  )-----------( 220      ( 14 Aug 2017 06:00 )             35.6     08-14    130<L>  |  93<L>  |  13  ----------------------------<  102<H>  3.5   |  26  |  1.00    Ca    9.2      14 Aug 2017 06:00  Mg     2.1     08-14

## 2017-08-15 LAB
BUN SERPL-MCNC: 13 MG/DL — SIGNIFICANT CHANGE UP (ref 7–23)
CALCIUM SERPL-MCNC: 9.2 MG/DL — SIGNIFICANT CHANGE UP (ref 8.4–10.5)
CHLORIDE SERPL-SCNC: 97 MMOL/L — LOW (ref 98–107)
CO2 SERPL-SCNC: 22 MMOL/L — SIGNIFICANT CHANGE UP (ref 22–31)
CREAT SERPL-MCNC: 1.02 MG/DL — SIGNIFICANT CHANGE UP (ref 0.5–1.3)
GLUCOSE SERPL-MCNC: 102 MG/DL — HIGH (ref 70–99)
HCT VFR BLD CALC: 37 % — LOW (ref 39–50)
HGB BLD-MCNC: 13.2 G/DL — SIGNIFICANT CHANGE UP (ref 13–17)
MAGNESIUM SERPL-MCNC: 2 MG/DL — SIGNIFICANT CHANGE UP (ref 1.6–2.6)
MCHC RBC-ENTMCNC: 33.2 PG — SIGNIFICANT CHANGE UP (ref 27–34)
MCHC RBC-ENTMCNC: 35.7 % — SIGNIFICANT CHANGE UP (ref 32–36)
MCV RBC AUTO: 93.2 FL — SIGNIFICANT CHANGE UP (ref 80–100)
NRBC # FLD: 0 — SIGNIFICANT CHANGE UP
PLATELET # BLD AUTO: 233 K/UL — SIGNIFICANT CHANGE UP (ref 150–400)
PMV BLD: 10.1 FL — SIGNIFICANT CHANGE UP (ref 7–13)
POTASSIUM SERPL-MCNC: 4.1 MMOL/L — SIGNIFICANT CHANGE UP (ref 3.5–5.3)
POTASSIUM SERPL-SCNC: 4.1 MMOL/L — SIGNIFICANT CHANGE UP (ref 3.5–5.3)
RBC # BLD: 3.97 M/UL — LOW (ref 4.2–5.8)
RBC # FLD: 12.9 % — SIGNIFICANT CHANGE UP (ref 10.3–14.5)
SODIUM SERPL-SCNC: 133 MMOL/L — LOW (ref 135–145)
WBC # BLD: 8.36 K/UL — SIGNIFICANT CHANGE UP (ref 3.8–10.5)
WBC # FLD AUTO: 8.36 K/UL — SIGNIFICANT CHANGE UP (ref 3.8–10.5)

## 2017-08-15 RX ORDER — SENNA PLUS 8.6 MG/1
2 TABLET ORAL AT BEDTIME
Qty: 0 | Refills: 0 | Status: DISCONTINUED | OUTPATIENT
Start: 2017-08-15 | End: 2017-08-16

## 2017-08-15 RX ORDER — SODIUM CHLORIDE 9 MG/ML
1000 INJECTION INTRAMUSCULAR; INTRAVENOUS; SUBCUTANEOUS
Qty: 0 | Refills: 0 | Status: DISCONTINUED | OUTPATIENT
Start: 2017-08-15 | End: 2017-08-16

## 2017-08-15 RX ADMIN — SODIUM CHLORIDE 75 MILLILITER(S): 9 INJECTION INTRAMUSCULAR; INTRAVENOUS; SUBCUTANEOUS at 10:33

## 2017-08-15 RX ADMIN — Medication 25 MILLIGRAM(S): at 17:20

## 2017-08-15 RX ADMIN — LOSARTAN POTASSIUM 50 MILLIGRAM(S): 100 TABLET, FILM COATED ORAL at 05:25

## 2017-08-15 RX ADMIN — SENNA PLUS 2 TABLET(S): 8.6 TABLET ORAL at 21:00

## 2017-08-15 RX ADMIN — SODIUM CHLORIDE 75 MILLILITER(S): 9 INJECTION INTRAMUSCULAR; INTRAVENOUS; SUBCUTANEOUS at 05:26

## 2017-08-15 RX ADMIN — ATORVASTATIN CALCIUM 10 MILLIGRAM(S): 80 TABLET, FILM COATED ORAL at 21:00

## 2017-08-15 RX ADMIN — SIMETHICONE 80 MILLIGRAM(S): 80 TABLET, CHEWABLE ORAL at 21:00

## 2017-08-15 RX ADMIN — Medication 3 MILLIGRAM(S): at 21:52

## 2017-08-15 RX ADMIN — Medication 25 MILLIGRAM(S): at 05:25

## 2017-08-15 RX ADMIN — SIMETHICONE 80 MILLIGRAM(S): 80 TABLET, CHEWABLE ORAL at 17:20

## 2017-08-15 RX ADMIN — SIMETHICONE 80 MILLIGRAM(S): 80 TABLET, CHEWABLE ORAL at 10:38

## 2017-08-15 RX ADMIN — Medication 2 SPRAY(S): at 11:03

## 2017-08-15 NOTE — PROGRESS NOTE ADULT - SUBJECTIVE AND OBJECTIVE BOX
Patient is a 71y old  Male who presents with a chief complaint of Vomiting (12 Aug 2017 15:25)  NAD      INTERVAL HPI/OVERNIGHT EVENTS:  T(C): 36.3 (08-15-17 @ 13:45), Max: 37 (08-14-17 @ 22:28)  HR: 80 (08-15-17 @ 13:45) (62 - 80)  BP: 166/75 (08-15-17 @ 13:45) (140/78 - 166/75)  RR: 18 (08-15-17 @ 13:45) (17 - 18)  SpO2: 99% (08-15-17 @ 13:45) (98% - 99%)  Wt(kg): --  I&O's Summary    14 Aug 2017 07:01  -  15 Aug 2017 07:00  --------------------------------------------------------  IN: 615 mL / OUT: 0 mL / NET: 615 mL    15 Aug 2017 07:01  -  15 Aug 2017 16:05  --------------------------------------------------------  IN: 440 mL / OUT: 0 mL / NET: 440 mL        LABS:                        13.2   8.36  )-----------( 233      ( 15 Aug 2017 07:00 )             37.0     08-15    133<L>  |  97<L>  |  13  ----------------------------<  102<H>  4.1   |  22  |  1.02    Ca    9.2      15 Aug 2017 07:00  Mg     2.0     08-15          CAPILLARY BLOOD GLUCOSE                MEDICATIONS  (STANDING):  losartan 50 milliGRAM(s) Oral daily  atorvastatin 10 milliGRAM(s) Oral at bedtime  metoprolol 25 milliGRAM(s) Oral two times a day  fluticasone propionate 50 MICROgram(s)/spray Nasal Spray 2 Spray(s) Both Nostrils daily  sodium chloride 0.9%. 1000 milliLiter(s) (75 mL/Hr) IV Continuous <Continuous>    MEDICATIONS  (PRN):  simethicone 80 milliGRAM(s) Chew four times a day PRN Gas  melatonin 3 milliGRAM(s) Oral at bedtime PRN Insomnia          PHYSICAL EXAM:  GENERAL: NAD, well-groomed, well-developed  HEAD:  Atraumatic, Normocephalic  CHEST/LUNG: Clear to percussion bilaterally; No rales, rhonchi, wheezing, or rubs  HEART: Regular rate and rhythm; No murmurs, rubs, or gallops  ABDOMEN: Soft, Nontender, Nondistended; Bowel sounds present  EXTREMITIES:  2+ Peripheral Pulses, No clubbing, cyanosis, or edema  LYMPH: No lymphadenopathy noted  SKIN: No rashes or lesions    Care Discussed with Consultants/Other Providers [+ ] YES  [ ] NO

## 2017-08-15 NOTE — PROGRESS NOTE ADULT - SUBJECTIVE AND OBJECTIVE BOX
CC: No CP/SOB    TELEMETRY:     PHYSICAL EXAM:    T(C): 36.3 (08-15-17 @ 05:23), Max: 37 (08-14-17 @ 22:28)  HR: 64 (08-15-17 @ 05:23) (57 - 70)  BP: 154/79 (08-15-17 @ 05:23) (140/78 - 154/79)  RR: 17 (08-15-17 @ 05:23) (17 - 18)  SpO2: 99% (08-15-17 @ 05:23) (98% - 99%)  Wt(kg): --  I&O's Summary    14 Aug 2017 07:01  -  15 Aug 2017 07:00  --------------------------------------------------------  IN: 615 mL / OUT: 0 mL / NET: 615 mL    15 Aug 2017 07:01  -  15 Aug 2017 12:47  --------------------------------------------------------  IN: 240 mL / OUT: 0 mL / NET: 240 mL        Appearance: Normal	  Cardiovascular: Normal S1 S2,RRR, No JVD, No murmurs  Respiratory: Lungs clear to auscultation	  Gastrointestinal:  Soft, Non-tender, + BS	  Extremities: Normal range of motion, No clubbing, cyanosis or edema  Vascular: Peripheral pulses palpable 2+ bilaterally     LABS:	 	                          13.2   8.36  )-----------( 233      ( 15 Aug 2017 07:00 )             37.0     08-15    133<L>  |  97<L>  |  13  ----------------------------<  102<H>  4.1   |  22  |  1.02    Ca    9.2      15 Aug 2017 07:00  Mg     2.0     08-15            CARDIAC MARKERS:

## 2017-08-15 NOTE — PROGRESS NOTE ADULT - ASSESSMENT
71 year old male pmh of HTN p/w nausea and vomiting found to have hyponatremia and EKG changes    Problem/Plan - 1:  ·  Problem: Hyponatremia.  Plan: admitted to [facility] sec to decreased PO intake  renal fu    Problem/Plan - 2:  ·  Problem: EKG abnormalities.  Plan:  cardiology recs appreciated  TTE.   stress test once sodium improvesa    Problem/Plan - 3:  ·  Problem: Essential hypertension.  Plan: continue home bp meds.   monitor

## 2017-08-15 NOTE — PROGRESS NOTE ADULT - SUBJECTIVE AND OBJECTIVE BOX
[facility] NEPHROLOGY- PROGRESS NOTE    71 year old male with HTN, HLD, GERD p/w nausea and vomiting a/w Hyponatremia    Hospital Medications: Medications reviewed.  REVIEW OF SYSTEMS:  CONSTITUTIONAL: No fevers or chills  RESPIRATORY: No shortness of breath  CARDIOVASCULAR: No chest pain.  GASTROINTESTINAL: No nausea, vomiting, diarrhea or abdominal pain. +flatulence  VASCULAR: No bilateral lower extremity edema.     VITALS:  T(F): 97.4 (08-15-17 @ 13:45), Max: 98.6 (17 @ 22:28)  HR: 80 (08-15-17 @ 13:45)  BP: 166/75 (08-15-17 @ 13:45)  RR: 18 (08-15-17 @ 13:45)  SpO2: 99% (08-15-17 @ 13:45)  Wt(kg): --     @ 07:01  -  08-15 @ 07:00  --------------------------------------------------------  IN: 615 mL / OUT: 0 mL / NET: 615 mL    08-15 @ 07:01  -  08-15 @ 16:39  --------------------------------------------------------  IN: 440 mL / OUT: 0 mL / NET: 440 mL      PHYSICAL EXAM:  Constitutional: NAD  Neurological: Awake and Alert  HEENT: anicteric sclera,   Respiratory: CTAB, no wheezes, rales or rhonchi  Cardiovascular: S1, S2, RRR  Gastrointestinal: BS+, soft, NT/ND  : No pineda.  Extremities: No peripheral edema    LABS:  08-15  133 <--, 130 <--, 131 <--, 131 <--, 125 <--, 122 <--  133<L>  |  97<L>  |  13  ----------------------------<  102<H>  4.1   |  22  |  1.02    Ca    9.2      15 Aug 2017 07:00  Mg     2.0     08-15      Creatinine Trend: 1.02 <--, 1.00 <--, 0.98 <--, 1.06 <--, 1.04 <--, 1.01 <--                        13.2   8.36  )-----------( 233      ( 15 Aug 2017 07:00 )             37.0     Urine Studies:  Urinalysis Basic - ( 12 Aug 2017 18:45 )    Color: COLORL / Appearance: CLEAR / S.007 / pH: 7.0  Gluc: NEGATIVE / Ketone: NEGATIVE  / Bili: NEGATIVE / Urobili: NORMAL E.U.   Blood: NEGATIVE / Protein: NEGATIVE / Nitrite: NEGATIVE   Leuk Esterase: NEGATIVE / RBC:  / WBC 0-2   Sq Epi:  / Non Sq Epi:  / Bacteria:       Osmolality, Random Urine: 186 mosmo/kg ( @ 17:50)  Sodium, Random Urine: 23 meq/L ( @ 17:50)  Osmolality, Random Urine: 129 mosmo/kg ( @ 18:45)  Sodium, Random Urine: 23 meq/L ( @ 18:45)  Potassium, Random Urine: 6.8 meq/L ( @ 18:45)  Chloride, Random Urine: 19 mmol/L ( @ 18:45)

## 2017-08-15 NOTE — PROGRESS NOTE ADULT - ASSESSMENT
70 yo male with HTN admitted with nausea, vomiting, diarrhea, dehydration and hyponatremia.      1. cv stable   ECG reviewed,  evidence of TWI across precordium unclear baseline , unchanged /  TWI noted in lead III   no chest pain or sob.  no evidence of acute ischemia/ACS   repeat EKG   f/u ECHO  will likely need Stress test once Na levels improve        2. Hyponatremia  Na levels improving   hydrate  renal f/u     3. HTN   bp acceptable   continue with current antihtn meds

## 2017-08-16 ENCOUNTER — TRANSCRIPTION ENCOUNTER (OUTPATIENT)
Age: 72
End: 2017-08-16

## 2017-08-16 VITALS
SYSTOLIC BLOOD PRESSURE: 149 MMHG | OXYGEN SATURATION: 98 % | RESPIRATION RATE: 18 BRPM | DIASTOLIC BLOOD PRESSURE: 76 MMHG | TEMPERATURE: 98 F | HEART RATE: 60 BPM

## 2017-08-16 LAB
BUN SERPL-MCNC: 17 MG/DL — SIGNIFICANT CHANGE UP (ref 7–23)
CALCIUM SERPL-MCNC: 9.5 MG/DL — SIGNIFICANT CHANGE UP (ref 8.4–10.5)
CHLORIDE SERPL-SCNC: 101 MMOL/L — SIGNIFICANT CHANGE UP (ref 98–107)
CO2 SERPL-SCNC: 22 MMOL/L — SIGNIFICANT CHANGE UP (ref 22–31)
CREAT SERPL-MCNC: 1.08 MG/DL — SIGNIFICANT CHANGE UP (ref 0.5–1.3)
GLUCOSE SERPL-MCNC: 96 MG/DL — SIGNIFICANT CHANGE UP (ref 70–99)
HCT VFR BLD CALC: 37.4 % — LOW (ref 39–50)
HGB BLD-MCNC: 13 G/DL — SIGNIFICANT CHANGE UP (ref 13–17)
MAGNESIUM SERPL-MCNC: 2 MG/DL — SIGNIFICANT CHANGE UP (ref 1.6–2.6)
MCHC RBC-ENTMCNC: 32.3 PG — SIGNIFICANT CHANGE UP (ref 27–34)
MCHC RBC-ENTMCNC: 34.8 % — SIGNIFICANT CHANGE UP (ref 32–36)
MCV RBC AUTO: 93 FL — SIGNIFICANT CHANGE UP (ref 80–100)
NRBC # FLD: 0 — SIGNIFICANT CHANGE UP
PLATELET # BLD AUTO: 241 K/UL — SIGNIFICANT CHANGE UP (ref 150–400)
PMV BLD: 10 FL — SIGNIFICANT CHANGE UP (ref 7–13)
POTASSIUM SERPL-MCNC: 4 MMOL/L — SIGNIFICANT CHANGE UP (ref 3.5–5.3)
POTASSIUM SERPL-SCNC: 4 MMOL/L — SIGNIFICANT CHANGE UP (ref 3.5–5.3)
RBC # BLD: 4.02 M/UL — LOW (ref 4.2–5.8)
RBC # FLD: 12.9 % — SIGNIFICANT CHANGE UP (ref 10.3–14.5)
SODIUM SERPL-SCNC: 136 MMOL/L — SIGNIFICANT CHANGE UP (ref 135–145)
WBC # BLD: 8.03 K/UL — SIGNIFICANT CHANGE UP (ref 3.8–10.5)
WBC # FLD AUTO: 8.03 K/UL — SIGNIFICANT CHANGE UP (ref 3.8–10.5)

## 2017-08-16 PROCEDURE — 93306 TTE W/DOPPLER COMPLETE: CPT | Mod: 26

## 2017-08-16 RX ORDER — SIMETHICONE 80 MG/1
1 TABLET, CHEWABLE ORAL
Qty: 0 | Refills: 0 | DISCHARGE
Start: 2017-08-16

## 2017-08-16 RX ORDER — SENNA PLUS 8.6 MG/1
2 TABLET ORAL
Qty: 0 | Refills: 0 | DISCHARGE
Start: 2017-08-16

## 2017-08-16 RX ADMIN — Medication 2 SPRAY(S): at 12:05

## 2017-08-16 RX ADMIN — SIMETHICONE 80 MILLIGRAM(S): 80 TABLET, CHEWABLE ORAL at 13:00

## 2017-08-16 RX ADMIN — Medication 25 MILLIGRAM(S): at 05:35

## 2017-08-16 RX ADMIN — LOSARTAN POTASSIUM 50 MILLIGRAM(S): 100 TABLET, FILM COATED ORAL at 05:35

## 2017-08-16 NOTE — PROGRESS NOTE ADULT - SUBJECTIVE AND OBJECTIVE BOX
CC: no CP/SOB      PHYSICAL EXAM:    T(C): 36.8 (08-16-17 @ 05:32), Max: 36.9 (08-15-17 @ 20:57)  HR: 75 (08-16-17 @ 05:32) (65 - 80)  BP: 150/81 (08-16-17 @ 05:32) (150/70 - 166/81)  RR: 18 (08-16-17 @ 05:32) (18 - 18)  SpO2: 100% (08-16-17 @ 05:32) (98% - 100%)  Wt(kg): --  I&O's Summary    15 Aug 2017 07:01  -  16 Aug 2017 07:00  --------------------------------------------------------  IN: 1290 mL / OUT: 475 mL / NET: 815 mL    16 Aug 2017 07:01  -  16 Aug 2017 13:01  --------------------------------------------------------  IN: 0 mL / OUT: 500 mL / NET: -500 mL        Appearance: Normal	  Cardiovascular: Normal S1 S2,RRR, No JVD, No murmurs  Respiratory: Lungs clear to auscultation	  Gastrointestinal:  Soft, Non-tender, + BS	  Extremities: Normal range of motion, No clubbing, cyanosis or edema  Vascular: Peripheral pulses palpable 2+ bilaterally     LABS:	 	                          13.0   8.03  )-----------( 241      ( 16 Aug 2017 06:30 )             37.4     08-16    136  |  101  |  17  ----------------------------<  96  4.0   |  22  |  1.08    Ca    9.5      16 Aug 2017 06:30  Mg     2.0     08-16            CARDIAC MARKERS:

## 2017-08-16 NOTE — DISCHARGE NOTE ADULT - HOSPITAL COURSE
70 y/o male with a PMHx of HTN presents to ED with nausea and vomiting S/P colonoscopy 8/11, found to be hyponatremic with EKG changes.  + Nausea and vomiting S/P colonoscopy- now resolved, CT abdomen/pelvis normal  + Hypovolemic hyponatremia- renal following (Dr. Cancino), in the setting of nausea, vomiting and decreased PO intake, on NS at 75 cc/hr, encourage PO intake  + EKG changes- cards following (Rosy), pt without anginal symptoms, pending echo and stress test  EKG: NSR at 72 bpm, TWI V2-V6, LVH  CE x2: Trop negative, -->743  CXR: No focal consolidation  AXR: Nonobstructive bowel gas pattern.  CT abdomen/pelvis: No intraperitoneal free air.  8/12 Cards consult (Dr. Gallegos): ECG reviewed. There is evidence of TWI across precordium unclear baseline. Pt has no symptoms to suggest angina or ACS. Recommend obtaining echo. Hydrate. Correct hyponatremia. Pain control. Repeat ECG. GI eval.  8/13 Cards note: CV stable. Na improving. Follow up echo. Renal follow up.   8/13 Renal consult (Dr. Cancino): Hypovolemic hyponatremia in the setting of N/V and decreased PO intake. Urine studies reviewed. D/C IVF and encourage PO intake. Pt with mild dizziness but no overt neurological symptoms. Check BMP at 12. Monitor serum sodium. N/V resolved. CT abdomen and pelvis within normal limits.   8/13 Medicine note: Hyponatremia likely secondary to decreased PO intake. Renal follow up. EKG abnormalities. Cards recs appreciated. Echo pending.  8/14 Renal note: Hypovolemic hyponatremia in the setting of N/V and decreased PO intake. Pt with stable serum sodium with no improvement off IVF. Will restart NS at 75 cc/hr. Encourage PO intake. Monitor serum sodium. Nausea and vomiting resolved. CT abdomen and pelvis normal. Management as per primary team.   8/14 Cards note: CV stable. No chest pain or shortness of breath. Na improved. Abnormal ECG. Await echo. Stress test tomorrow if Na improved.  8/14 Medicine note: Hyponatremia likely secondary to decreased PO intake. Renal follow up. EKG abnormalities. Cardiology recs appreciated. Echo. Stress test once sodium improves. Continue home BP meds.  8/15 Cards note: Repeat EKG. Follow up echo. Will likely need stress test once Na levels improve. Hyponatremia. Na levels improving. Hydrate. Renal follow up.  8/15 Medicine note: Hyponatremia likely secondary to decreased PO intake, improving. EKG abnormalities. Pending echo and NST. Continue other medications.  8/15 Renal note: Hypovolemic hyponatremia in the setting of N/V and decreased PO intake. Pt with improving serum sodium on IVF. Continue IVF. Encourage PO intake. Monitor serum sodium. BP mildly elevated but acceptable in the setting of IVF. Will D/C IVF once hyponatremia resolves. Continue with current anti-hypertensive medications.   8/16- Echo EF 64%, Mitral annular calcification, otherwise normal mitral valve. Minimal mitral regurgitation. Normal left ventricular internal dimensions and wall thicknesses. Endocardium not well visualized; grossly normal left ventricular systolic function.  Endocardial visualization enhanced with intravenous injection of echo contrast (Definity). The right ventricle is not well visualized; grossly normal right ventricular systolic function.  8/16- As per attending, patient stable for discharge.

## 2017-08-16 NOTE — DISCHARGE NOTE ADULT - PATIENT PORTAL LINK FT
“You can access the [employer] Patient Portal, offered by [facility], by registering with the following website: [email or web address]”

## 2017-08-16 NOTE — PROGRESS NOTE ADULT - PROBLEM SELECTOR PLAN 1
Hypovolemic hyponatremia in the setting of n/v and decreased PO intake. Hypernatremia resolved with IVF. Can d/c IVF. Encourage PO intake. Monitor serum sodium.
Hypovolemic hyponatremia in the setting of n/v and decreased PO intake. Pt with improving serum sodium on IVF. c/w IVF. Encourage PO intake. Monitor serum sodium.
Hypovolemic hyponatremia in the setting of n/v and decreased PO intake. Pt with stable serum sodium with no improvement off IVF. Will restart NS @75 ml/hr. Encourage PO intake. Monitor serum sodium.
Hypovolemic hyponatremia in the setting of n/v and decreased PO intake. Urine studies reviewed. Serum sodium increased 9 meq over 24 hours on IVF. Will d/c IVF and encourage PO intake. Pt with mild dizziness but no overt neurological symptoms. Check BMP at 12. Monitor serum sodium.

## 2017-08-16 NOTE — PROGRESS NOTE ADULT - SUBJECTIVE AND OBJECTIVE BOX
[facility] NEPHROLOGY- PROGRESS NOTE    71 year old male with HTN, HLD, GERD p/w nausea and vomiting a/w Hyponatremia    Hospital Medications: Medications reviewed.  REVIEW OF SYSTEMS:  CONSTITUTIONAL: No fevers or chills  RESPIRATORY: No shortness of breath  CARDIOVASCULAR: No chest pain.  GASTROINTESTINAL: No nausea, vomiting, diarrhea or abdominal pain. +flatulence + constipation  VASCULAR: No bilateral lower extremity edema.     VITALS:  T(F): 98.2 (17 @ 05:32), Max: 98.4 (08-15-17 @ 20:57)  HR: 75 (17 @ 05:32)  BP: 150/81 (17 @ 05:32)  RR: 18 (17 @ 05:32)  SpO2: 100% (17 @ 05:32)  Wt(kg): --    08-15 @ 07:01  -   @ 07:00  --------------------------------------------------------  IN: 1290 mL / OUT: 475 mL / NET: 815 mL     @ 07:01  -   @ 11:51  --------------------------------------------------------  IN: 0 mL / OUT: 500 mL / NET: -500 mL      PHYSICAL EXAM:  Constitutional: NAD  Neurological: Awake and Alert  HEENT: anicteric sclera,   Respiratory: CTAB, no wheezes, rales or rhonchi  Cardiovascular: S1, S2, RRR  Gastrointestinal: BS+, soft, NT/ND  : No pineda.  Extremities: No peripheral edema    LABS:      136  |  101  |  17  ----------------------------<  96  4.0   |  22  |  1.08    Ca    9.5      16 Aug 2017 06:30  Mg     2.0           Creatinine Trend: 1.08 <--, 1.02 <--, 1.00 <--, 0.98 <--, 1.06 <--, 1.04 <--, 1.01 <--                        13.0   8.03  )-----------( 241      ( 16 Aug 2017 06:30 )             37.4     Urine Studies:  Urinalysis Basic - ( 12 Aug 2017 18:45 )    Color: COLORL / Appearance: CLEAR / S.007 / pH: 7.0  Gluc: NEGATIVE / Ketone: NEGATIVE  / Bili: NEGATIVE / Urobili: NORMAL E.U.   Blood: NEGATIVE / Protein: NEGATIVE / Nitrite: NEGATIVE   Leuk Esterase: NEGATIVE / RBC:  / WBC 0-2   Sq Epi:  / Non Sq Epi:  / Bacteria:       Osmolality, Random Urine: 186 mosmo/kg ( @ 17:50)  Sodium, Random Urine: 23 meq/L ( @ 17:50)  Osmolality, Random Urine: 129 mosmo/kg ( @ 18:45)  Sodium, Random Urine: 23 meq/L ( @ 18:45)  Potassium, Random Urine: 6.8 meq/L ( @ 18:45)  Chloride, Random Urine: 19 mmol/L ( @ 18:45)

## 2017-08-16 NOTE — PROGRESS NOTE ADULT - SUBJECTIVE AND OBJECTIVE BOX
Patient is a 71y old  Male who presents with a chief complaint of Vomiting (12 Aug 2017 15:25)  NAD    INTERVAL HPI/OVERNIGHT EVENTS:  T(C): 36.8 (08-16-17 @ 13:25), Max: 36.9 (08-15-17 @ 20:57)  HR: 60 (08-16-17 @ 13:25) (60 - 78)  BP: 149/76 (08-16-17 @ 13:25) (149/76 - 166/81)  RR: 18 (08-16-17 @ 13:25) (18 - 18)  SpO2: 98% (08-16-17 @ 13:25) (98% - 100%)  Wt(kg): --  I&O's Summary    15 Aug 2017 07:01  -  16 Aug 2017 07:00  --------------------------------------------------------  IN: 1290 mL / OUT: 475 mL / NET: 815 mL    16 Aug 2017 07:01  -  16 Aug 2017 14:37  --------------------------------------------------------  IN: 100 mL / OUT: 500 mL / NET: -400 mL        LABS:                        13.0   8.03  )-----------( 241      ( 16 Aug 2017 06:30 )             37.4     08-16    136  |  101  |  17  ----------------------------<  96  4.0   |  22  |  1.08    Ca    9.5      16 Aug 2017 06:30  Mg     2.0     08-16          CAPILLARY BLOOD GLUCOSE                MEDICATIONS  (STANDING):  losartan 50 milliGRAM(s) Oral daily  atorvastatin 10 milliGRAM(s) Oral at bedtime  metoprolol 25 milliGRAM(s) Oral two times a day  fluticasone propionate 50 MICROgram(s)/spray Nasal Spray 2 Spray(s) Both Nostrils daily  senna 2 Tablet(s) Oral at bedtime    MEDICATIONS  (PRN):  simethicone 80 milliGRAM(s) Chew four times a day PRN Gas  melatonin 3 milliGRAM(s) Oral at bedtime PRN Insomnia          PHYSICAL EXAM:  GENERAL: NAD, well-groomed, well-developed  HEAD:  Atraumatic, Normocephalic  CHEST/LUNG: Clear to percussion bilaterally; No rales, rhonchi, wheezing, or rubs  HEART: Regular rate and rhythm; No murmurs, rubs, or gallops  ABDOMEN: Soft, Nontender, Nondistended; Bowel sounds present  EXTREMITIES:  2+ Peripheral Pulses, No clubbing, cyanosis, or edema  LYMPH: No lymphadenopathy noted  SKIN: No rashes or lesions    Care Discussed with Consultants/Other Providers [ ++] YES  [ ] NO

## 2017-08-16 NOTE — PROGRESS NOTE ADULT - PROBLEM SELECTOR PROBLEM 3
Nausea and vomiting, intractability of vomiting not specified, unspecified vomiting type

## 2017-08-16 NOTE — PROGRESS NOTE ADULT - ASSESSMENT
71 year old male pmh of HTN p/w nausea and vomiting found to have hyponatremia and EKG changes    Problem/Plan - 1:  ·  Problem: Hyponatremia.  Plan: admitted to [facility] sec to decreased PO intake  renal fu    Problem/Plan - 2:  ·  Problem: EKG abnormalities.  Plan:  cardiology recs appreciated  TTE.   stress test as per cards    Problem/Plan - 3:  ·  Problem: Essential hypertension.  Plan: continue home bp meds.   monitor

## 2017-08-16 NOTE — DISCHARGE NOTE ADULT - CARE PLAN
Principal Discharge DX:	Hyponatremia  Goal:	Followup with PMD and take all medications prescribed.  Instructions for follow-up, activity and diet:	Followup with PMD and take all medications prescribed. Low salt, low fat, low cholesterol diet  Secondary Diagnosis:	Essential hypertension  Goal:	Followup with PMD and take all medications prescribed.  Instructions for follow-up, activity and diet:	Followup with PMD and take all medications prescribed. Low salt, low fat, low cholesterol diet  Secondary Diagnosis:	EKG abnormalities  Goal:	Followup with PMD and take all medications prescribed.  Instructions for follow-up, activity and diet:	Followup with PMD and take all medications prescribed. Low salt, low fat, low cholesterol diet

## 2017-08-16 NOTE — PROGRESS NOTE ADULT - ATTENDING COMMENTS
Patient seen and examined.  Agree with above NP note.    cv stable  no chest pain sob  na improved  abnormal ECG  await echo   stress arvin if na improved
Patient seen and examined, agree with the above assessment and plan by PEYTON Quezada.  CV stable  Na+ improving  F/U TTE  Repeat ECG  renal followup
[facility] NEPHROLOGY  Lio Yen M.D.  Fazal Grossman D.O.  Maura Golden M.D.  Fidelia Stokes, MSN, ANP-C  [phone]    [address] [place] [address]
[facility] NEPHROLOGY  Lio Yen M.D.  Fazal Grossman D.O.  Maura Golden M.D.  Fidelia Stokes, MSN, ANP-C  [phone]    [address] [place] [address]
[facility] NEPHROLOGY  Lio Yen M.D.  Fazal Grossman D.O.  Maura Golden M.D.  Fidelia Stokes, MSN, ANP-C  [phone]    [address] [place] [address]
[facility] NEPHROLOGY  Lio Yen M.D.  Fazal Grossman D.O.  Maura Golden M.D.  Fidelia Stokes, MSN, ANP-C  [phone]    [address] [place] [address]

## 2017-08-16 NOTE — DISCHARGE NOTE ADULT - MEDICATION SUMMARY - MEDICATIONS TO TAKE
I will START or STAY ON the medications listed below when I get home from the hospital:    losartan 50 mg oral tablet  -- 1 tab(s) by mouth once a day  -- Indication: For Essential hypertension    pravastatin 10 mg oral tablet  -- 1 tab(s) by mouth once a day  -- Indication: For Hyperlipidemia    Lopressor 50 mg oral tablet  -- 0.5 tab(s) by mouth 2 times a day  -- Indication: For HTN    senna oral tablet  -- 2 tab(s) by mouth once a day (at bedtime)  -- Indication: For Laxative    simethicone 80 mg oral tablet, chewable  -- 1 tab(s) by mouth 4 times a day, As needed, Gas  -- Indication: For stomach    Nasonex 50 mcg/inh nasal spray  -- 2 spray(s) into nose once a day  -- Indication: For Nasal spray

## 2017-08-16 NOTE — PROGRESS NOTE ADULT - ASSESSMENT
72 yo male with HTN admitted with nausea, vomiting, diarrhea, dehydration and hyponatremia.      1. cv stable   ECG reviewed,  evidence of TWI across precordium unclear baseline , unchanged /  TWI noted in lead III   no chest pain or sob.  no evidence of acute ischemia/ACS   ECHO normal  F/U Stress       2. Hyponatremia  Na levels improving   hydrate  renal f/u     3. HTN   bp acceptable   continue with current antihtn meds

## 2017-08-16 NOTE — PROGRESS NOTE ADULT - PROVIDER SPECIALTY LIST ADULT
Cardiology
Hospitalist
Nephrology

## 2017-09-13 ENCOUNTER — EMERGENCY (EMERGENCY)
Facility: HOSPITAL | Age: 72
LOS: 1 days | Discharge: HOME | End: 2017-09-13
Attending: EMERGENCY MEDICINE | Admitting: EMERGENCY MEDICINE
Payer: MEDICAID

## 2017-09-13 VITALS
OXYGEN SATURATION: 100 % | SYSTOLIC BLOOD PRESSURE: 157 MMHG | HEART RATE: 84 BPM | RESPIRATION RATE: 16 BRPM | TEMPERATURE: 98 F | DIASTOLIC BLOOD PRESSURE: 78 MMHG

## 2017-09-13 PROBLEM — I10 ESSENTIAL (PRIMARY) HYPERTENSION: Chronic | Status: ACTIVE | Noted: 2017-08-12

## 2017-09-13 LAB
ALBUMIN SERPL ELPH-MCNC: 4.2 G/DL — SIGNIFICANT CHANGE UP (ref 3.3–5)
ALP SERPL-CCNC: 86 U/L — SIGNIFICANT CHANGE UP (ref 40–120)
ALT FLD-CCNC: 23 U/L — SIGNIFICANT CHANGE UP (ref 4–41)
AST SERPL-CCNC: 24 U/L — SIGNIFICANT CHANGE UP (ref 4–40)
BASOPHILS # BLD AUTO: 0.03 K/UL — SIGNIFICANT CHANGE UP (ref 0–0.2)
BASOPHILS NFR BLD AUTO: 0.3 % — SIGNIFICANT CHANGE UP (ref 0–2)
BILIRUB SERPL-MCNC: 0.5 MG/DL — SIGNIFICANT CHANGE UP (ref 0.2–1.2)
BUN SERPL-MCNC: 12 MG/DL — SIGNIFICANT CHANGE UP (ref 7–23)
BUN SERPL-MCNC: 12 MG/DL — SIGNIFICANT CHANGE UP (ref 7–23)
CALCIUM SERPL-MCNC: 10 MG/DL — SIGNIFICANT CHANGE UP (ref 8.4–10.5)
CALCIUM SERPL-MCNC: 9.1 MG/DL — SIGNIFICANT CHANGE UP (ref 8.4–10.5)
CHLORIDE SERPL-SCNC: 90 MMOL/L — LOW (ref 98–107)
CHLORIDE SERPL-SCNC: 90 MMOL/L — LOW (ref 98–107)
CO2 SERPL-SCNC: 22 MMOL/L — SIGNIFICANT CHANGE UP (ref 22–31)
CO2 SERPL-SCNC: 27 MMOL/L — SIGNIFICANT CHANGE UP (ref 22–31)
CREAT SERPL-MCNC: 1.08 MG/DL — SIGNIFICANT CHANGE UP (ref 0.5–1.3)
CREAT SERPL-MCNC: 1.2 MG/DL — SIGNIFICANT CHANGE UP (ref 0.5–1.3)
EOSINOPHIL # BLD AUTO: 0.49 K/UL — SIGNIFICANT CHANGE UP (ref 0–0.5)
EOSINOPHIL NFR BLD AUTO: 4.2 % — SIGNIFICANT CHANGE UP (ref 0–6)
GLUCOSE SERPL-MCNC: 123 MG/DL — HIGH (ref 70–99)
GLUCOSE SERPL-MCNC: 165 MG/DL — HIGH (ref 70–99)
HBA1C BLD-MCNC: 6.3 % — HIGH (ref 4–5.6)
HCT VFR BLD CALC: 38.6 % — LOW (ref 39–50)
HGB BLD-MCNC: 13.5 G/DL — SIGNIFICANT CHANGE UP (ref 13–17)
IMM GRANULOCYTES # BLD AUTO: 0.05 # — SIGNIFICANT CHANGE UP
IMM GRANULOCYTES NFR BLD AUTO: 0.4 % — SIGNIFICANT CHANGE UP (ref 0–1.5)
LYMPHOCYTES # BLD AUTO: 1.11 K/UL — SIGNIFICANT CHANGE UP (ref 1–3.3)
LYMPHOCYTES # BLD AUTO: 9.6 % — LOW (ref 13–44)
MCHC RBC-ENTMCNC: 32.5 PG — SIGNIFICANT CHANGE UP (ref 27–34)
MCHC RBC-ENTMCNC: 35 % — SIGNIFICANT CHANGE UP (ref 32–36)
MCV RBC AUTO: 93 FL — SIGNIFICANT CHANGE UP (ref 80–100)
MONOCYTES # BLD AUTO: 1.3 K/UL — HIGH (ref 0–0.9)
MONOCYTES NFR BLD AUTO: 11.2 % — SIGNIFICANT CHANGE UP (ref 2–14)
NEUTROPHILS # BLD AUTO: 8.61 K/UL — HIGH (ref 1.8–7.4)
NEUTROPHILS NFR BLD AUTO: 74.3 % — SIGNIFICANT CHANGE UP (ref 43–77)
NRBC # FLD: 0 — SIGNIFICANT CHANGE UP
PLATELET # BLD AUTO: 191 K/UL — SIGNIFICANT CHANGE UP (ref 150–400)
PMV BLD: 9.7 FL — SIGNIFICANT CHANGE UP (ref 7–13)
POTASSIUM SERPL-MCNC: 3.3 MMOL/L — LOW (ref 3.5–5.3)
POTASSIUM SERPL-MCNC: 3.5 MMOL/L — SIGNIFICANT CHANGE UP (ref 3.5–5.3)
POTASSIUM SERPL-SCNC: 3.3 MMOL/L — LOW (ref 3.5–5.3)
POTASSIUM SERPL-SCNC: 3.5 MMOL/L — SIGNIFICANT CHANGE UP (ref 3.5–5.3)
PROT SERPL-MCNC: 7.8 G/DL — SIGNIFICANT CHANGE UP (ref 6–8.3)
RBC # BLD: 4.15 M/UL — LOW (ref 4.2–5.8)
RBC # FLD: 12.6 % — SIGNIFICANT CHANGE UP (ref 10.3–14.5)
SODIUM SERPL-SCNC: 125 MMOL/L — LOW (ref 135–145)
SODIUM SERPL-SCNC: 128 MMOL/L — LOW (ref 135–145)
WBC # BLD: 11.59 K/UL — HIGH (ref 3.8–10.5)
WBC # FLD AUTO: 11.59 K/UL — HIGH (ref 3.8–10.5)

## 2017-09-13 PROCEDURE — 99220: CPT

## 2017-09-13 PROCEDURE — 70487 CT MAXILLOFACIAL W/DYE: CPT | Mod: 26

## 2017-09-13 PROCEDURE — 99284 EMERGENCY DEPT VISIT MOD MDM: CPT

## 2017-09-13 RX ORDER — PANTOPRAZOLE SODIUM 20 MG/1
40 TABLET, DELAYED RELEASE ORAL
Qty: 0 | Refills: 0 | Status: DISCONTINUED | OUTPATIENT
Start: 2017-09-13 | End: 2017-09-17

## 2017-09-13 RX ORDER — ATORVASTATIN CALCIUM 80 MG/1
10 TABLET, FILM COATED ORAL AT BEDTIME
Qty: 0 | Refills: 0 | Status: DISCONTINUED | OUTPATIENT
Start: 2017-09-13 | End: 2017-09-17

## 2017-09-13 RX ORDER — ACETAMINOPHEN 500 MG
650 TABLET ORAL ONCE
Qty: 0 | Refills: 0 | Status: COMPLETED | OUTPATIENT
Start: 2017-09-13 | End: 2017-09-13

## 2017-09-13 RX ORDER — KETOROLAC TROMETHAMINE 30 MG/ML
30 SYRINGE (ML) INJECTION ONCE
Qty: 0 | Refills: 0 | Status: DISCONTINUED | OUTPATIENT
Start: 2017-09-13 | End: 2017-09-13

## 2017-09-13 RX ORDER — SODIUM CHLORIDE 9 MG/ML
1000 INJECTION INTRAMUSCULAR; INTRAVENOUS; SUBCUTANEOUS
Qty: 0 | Refills: 0 | Status: DISCONTINUED | OUTPATIENT
Start: 2017-09-13 | End: 2017-09-17

## 2017-09-13 RX ORDER — FLUTICASONE PROPIONATE 50 MCG
1 SPRAY, SUSPENSION NASAL DAILY
Qty: 0 | Refills: 0 | Status: DISCONTINUED | OUTPATIENT
Start: 2017-09-13 | End: 2017-09-17

## 2017-09-13 RX ORDER — METOPROLOL TARTRATE 50 MG
25 TABLET ORAL DAILY
Qty: 0 | Refills: 0 | Status: DISCONTINUED | OUTPATIENT
Start: 2017-09-13 | End: 2017-09-17

## 2017-09-13 RX ORDER — POTASSIUM CHLORIDE 20 MEQ
40 PACKET (EA) ORAL ONCE
Qty: 0 | Refills: 0 | Status: COMPLETED | OUTPATIENT
Start: 2017-09-13 | End: 2017-09-13

## 2017-09-13 RX ORDER — LOSARTAN POTASSIUM 100 MG/1
50 TABLET, FILM COATED ORAL DAILY
Qty: 0 | Refills: 0 | Status: DISCONTINUED | OUTPATIENT
Start: 2017-09-13 | End: 2017-09-17

## 2017-09-13 RX ORDER — ASPIRIN/CALCIUM CARB/MAGNESIUM 324 MG
81 TABLET ORAL DAILY
Qty: 0 | Refills: 0 | Status: DISCONTINUED | OUTPATIENT
Start: 2017-09-13 | End: 2017-09-17

## 2017-09-13 RX ORDER — SODIUM CHLORIDE 9 MG/ML
1000 INJECTION INTRAMUSCULAR; INTRAVENOUS; SUBCUTANEOUS ONCE
Qty: 0 | Refills: 0 | Status: COMPLETED | OUTPATIENT
Start: 2017-09-13 | End: 2017-09-13

## 2017-09-13 RX ADMIN — ATORVASTATIN CALCIUM 10 MILLIGRAM(S): 80 TABLET, FILM COATED ORAL at 21:09

## 2017-09-13 RX ADMIN — Medication 40 MILLIEQUIVALENT(S): at 18:37

## 2017-09-13 RX ADMIN — Medication 30 MILLIGRAM(S): at 13:15

## 2017-09-13 RX ADMIN — Medication 81 MILLIGRAM(S): at 18:38

## 2017-09-13 RX ADMIN — Medication 650 MILLIGRAM(S): at 15:06

## 2017-09-13 RX ADMIN — SODIUM CHLORIDE 1000 MILLILITER(S): 9 INJECTION INTRAMUSCULAR; INTRAVENOUS; SUBCUTANEOUS at 08:35

## 2017-09-13 RX ADMIN — Medication 100 MILLIGRAM(S): at 23:59

## 2017-09-13 RX ADMIN — Medication 1 SPRAY(S): at 18:38

## 2017-09-13 RX ADMIN — Medication 30 MILLIGRAM(S): at 13:52

## 2017-09-13 RX ADMIN — SODIUM CHLORIDE 150 MILLILITER(S): 9 INJECTION INTRAMUSCULAR; INTRAVENOUS; SUBCUTANEOUS at 15:16

## 2017-09-13 RX ADMIN — Medication 100 MILLIGRAM(S): at 08:35

## 2017-09-13 RX ADMIN — Medication 100 MILLIGRAM(S): at 15:15

## 2017-09-13 NOTE — ED CDU PROVIDER NOTE - MEDICAL DECISION MAKING DETAILS
Pt is a 72 y/o M PMHx HTN, GERD, HLD, anxiety p/w right facial swelling x 4 days.  --- facial cellulitis -- antibiotics, pain control, reassess Pt is a 70 y/o M PMHx HTN, GERD, HLD, anxiety p/w right facial swelling x 4 days.  --- facial cellulitis -- antibiotics, pain control, reassess, recheck BMP

## 2017-09-13 NOTE — ED PROVIDER NOTE - NS_EDPROVIDERDISPOUSERTYPE_ED_A_ED
I have personally evaluated and examined the patient. The Attending was available to me as a supervising provider if needed. I have personally evaluated and examined the patient. The Attending was available to me as a supervising provider if needed./Attending Attestation (For Attendings USE Only)... Attending Attestation (For Attendings USE Only)...

## 2017-09-13 NOTE — ED ADULT NURSE NOTE - CHIEF COMPLAINT QUOTE
Pt. arrives with swelling( bump) below right eye. Pt. c/o intermittent upper tooth pain for 2 weeks, and states he woke up this morning with bump. Pt. states to have a hx. HTN. Pt. states tooth and right sided facial bump is a 4/ 10 pain level. Pt. denies any blurry vision.  Pt. appears comfortable in triage.

## 2017-09-13 NOTE — CONSULT NOTE ADULT - ATTENDING COMMENTS
I have interviewed and examined the patient and reviewed the residents note including the history, exam, assessment, and plan.  I agree with the residents assessment and plan.    72 yo M PMH HTN/HLD with dental pain for 2 weeks (per ED appears like loose tooth w/o infection), now presents with worsening infraorbital swelling and fever. No ocular or visual complaints. Eye exam is normal. No clinical signs of orbital cellulitis. Infraorbital swelling does not involve eyelid - no preseptal cellulitis, more likely facial cellulitis from possible dental infection.  CT maxillofacial with contrast  Consider Plastics consult  Dental consult  Abx per primary team  Please reconsult if patient develops any ocular symptoms e.g. red eye, blurry vision, eye pain, pain with EOM  Patient should follow up his ophthalmologist or in the [facility] within 3-5 days of discharge, sooner if symptoms worsen or change    Jacqueline Chanel MD

## 2017-09-13 NOTE — ED PROVIDER NOTE - MEDICAL DECISION MAKING DETAILS
70 y/o M with swelling below right eye, fever. facial cellulites, abscess. CBC, CMP, Iv ns, clindamycin, maxillofacial ct.

## 2017-09-13 NOTE — ED CDU PROVIDER NOTE - ATTENDING CONTRIBUTION TO CARE
71M presents with dental pain and facial swelling.  States pain started several days ago and facial swelling worsened over the last day.  + fever. In ED, treated for facial cellulitis, CT with + dental abscess, drained by dental. On exam well appearing, nad, conjunctiva clear, EOMI, infraorbital swelling on R with erythema and induration along R cheek, + lungs clear, rrr, abd soft, no edema, 2+ pulses, speech clear, awake and alert. Plan for IV abx.

## 2017-09-13 NOTE — ED CDU PROVIDER NOTE - PROGRESS NOTE DETAILS
PA CDU Gerasimou - pt was doing well overnight, pain well controlled. no fevers, awaiting AM labs. pt will be signed out to day PA. JOAO Zamudio: Spoke with Dental, informed pt has an out pt [facility] tomorrow JOAO Zamudio: Spoke with Dental, informed pt has an out pt [facility] tomorrow, will see pt there. JOAO Zamudio: Spoke with Dental, informed pt has an out pt [facility] tomorrow. Pt admits to feeling better, PO tolerated liquid. Pt denies any complaints. On exam hrt rrr, lungs cta b/l, abd NTND, right infra orbital swelling is resolving, no right sided facial induration palpated. Will d/c home with clindamycin. Discussed plan with Dr. Norton. DISCHARGE NOTE-JAMAL NORTON MD   Patient is alert and oriented to person, place and date.  Patient is appropriate.  Patient denies any new or worsening physical complaints.  Patient feeling better and wants to go home.  Patient is awake and alert and in no acute distress.  Normocephalic/atraumatic.  Auricles are normal.  Neck supple.  Lungs CTAB, no wheeze, no rhonchi,  no rales.  Heart is regular rate and rhythm.  Back is nontender.  Moving all 4 extremities.   Neurologically grossly intact.  Affect is appropriate.  No trismus.  No pharyngeal edema.  No stridor.  Results of testing discussed with patient at bedside.   Copies of results provided to patient for discharge.  Patient understands that they develop new or worsening symptoms, to return to the ER immediately or call their PMD.  Patient's vital signs remain stable.  Stable for discharge.  Patient tolerating PO well.  D/W patient follow up with dental clinic tomorrow.   Clindamycin for d/c.  Discussed with dental who will f/u with patient tomorrow.  Patient understands return instructions if unable to tolerate PO.  Discharge to home.    -Jamal Norton MD JAMAL GARCIA, MD: ACP note I performed the initial face to face bedside interview with this patient regarding history of present illness, review of symptoms and past medical, social and family history.  I completed an independent physical examination.  The history, review of symptoms and examination was documented by myself and I attest to the accuracy of the documentation.  I have signed out the follow up of any pending tests (i.e. labs, radiological studies) to the PA.  I have discussed the patient’s plan of care and disposition with the PA.

## 2017-09-13 NOTE — ED PROVIDER NOTE - OBJECTIVE STATEMENT
70 y/o M pt with pmhx of HTN, GERD, HLD presents with c/o fever swelling upper right cheek below right eye. Pt also admits having right upper tooth pain for 2 week, started having fever 3 days ago upto 101 F, and swelling started below his right eye, in the upper cheek area 2 days ago. Pt is accompanied by daughter, she states pt took tylenol for fevers with some relief. Pt denies n/v/c, sob, cp, abdominal pain, dysuria. 70 y/o M pt with pmhx of HTN, GERD, HLD presents with c/o fever, right facial swelling. Pt also admits having right upper tooth pain for 2 week, started having fever 3 days ago upto 101 F, and swelling started below his right eye, in the upper cheek area 2 days ago. Pt is accompanied by daughter, she states pt took tylenol for fevers with some relief. Pt denies blurry vision, pain with eye movements, n/v/c, sob, cp, abdominal pain, dysuria.

## 2017-09-13 NOTE — CONSULT NOTE ADULT - SUBJECTIVE AND OBJECTIVE BOX
[facility] Ophthalmology Consult Note    HPI: 72 yo M PMH HTN/HLD with dental pain for 2 weeks (per ED appears like loose tooth w/o infection), now presents with worsening infraorbital swelling x1 day and fever x2 days. no blurry vision, no eye pain, no pain with EOM, no other ocular or visual complaints.    PMH: HTN, HLD  Home Medications:   * Patient Currently Takes Medications as of 16-Aug-2017 15:58 documented in Structured Notes  · 	simethicone 80 mg oral tablet, chewable: 1 tab(s) orally 4 times a day, As needed, Gas  · 	senna oral tablet: 2 tab(s) orally once a day (at bedtime)  · 	Lopressor 50 mg oral tablet: 0.5 tab(s) orally 2 times a day  POcHx (including surgeries/lasers/trauma):  None  Drops: None  FamHx: None  Social Hx: None  Allergies: NKDA    ROS:  General (neg), Vision (per HPI), Head and Neck (neg), Pulm (neg), CV (neg), GI (neg),  (neg), Musculoskeletal (neg), Skin/Integ (neg), Neuro (neg), Endocrine (neg), Heme (neg), All/Immuno (neg)    Mood and Affect Appropriate ( x ),  Oriented to Time, Place, and Person x 3 ( x )    Ophthalmology Exam    Visual acuity (sc): 20/20 OU  Pupils: PERRL OU, no APD  Ttono: 16 OU  Extraocular movements (EOMs): Full OU, no pain, no diplopia   Confrontational Visual Field (CVF):  Full OU  Color Plates: 12/12 OU    Pen Light Exam (PLE)  External:  OD 2+ infraorbital edema and trace erythema, soft. OS flat  Lids/Lashes/Lacrimal Ducts: Flat OU    Sclera/Conjunctiva:  W+Q OU  Cornea: Cl OU  Anterior Chamber: D+F OU  Iris:  Flat OU  Lens:  NS OU    Fundus Exam: dilated with 1% tropicamide and 2.5% phenylephrine  Approval obtained from primary team for dilation  Patient aware that pupils can remained dilated for at least 4-6 hours  Exam performed with 20D lens    Vitreous: wnl OU  Disc, cup/disc: sharp and pink, 0.4 OU  Macula:  wnl OU  Vessels:  wnl OU  Periphery: wnl OU [facility] Ophthalmology Consult Note    HPI: 70 yo M PMH HTN/HLD with dental pain for 2 weeks (per ED appears like loose tooth w/o infection), now presents with worsening infraorbital swelling x1 day and fever x2 days. no blurry vision, no eye pain, no pain with EOM, no other ocular or visual complaints.    PMH: HTN, HLD  Home Medications:   * Patient Currently Takes Medications as of 16-Aug-2017 15:58 documented in Structured Notes  · 	simethicone 80 mg oral tablet, chewable: 1 tab(s) orally 4 times a day, As needed, Gas  · 	senna oral tablet: 2 tab(s) orally once a day (at bedtime)  · 	Lopressor 50 mg oral tablet: 0.5 tab(s) orally 2 times a day  POcHx (including surgeries/lasers/trauma):  None  Drops: None  FamHx: None  Social Hx: None  Allergies: NKDA    ROS:  General (neg), Vision (per HPI), Head and Neck (neg), Pulm (neg), CV (neg), GI (neg),  (neg), Musculoskeletal (neg), Skin/Integ (neg), Neuro (neg), Endocrine (neg), Heme (neg), All/Immuno (neg)    Mood and Affect Appropriate ( x ),  Oriented to Time, Place, and Person x 3 ( x )    Ophthalmology Exam    Visual acuity (sc): 20/20 OU  Pupils: PERRL OU, no APD  Ttono: 16 OU  Extraocular movements (EOMs): Full OU, no pain, no diplopia   Confrontational Visual Field (CVF):  Full OU  Color Plates: 12/12 OU    Pen Light Exam (PLE)  External:  OD 2+ infraorbital edema and erythema, soft. OS flat  Lids/Lashes/Lacrimal Ducts: Flat OU    Sclera/Conjunctiva:  W+Q OU  Cornea: Cl OU  Anterior Chamber: D+F OU  Iris:  Flat OU  Lens:  NS OU    Fundus Exam: dilated with 1% tropicamide and 2.5% phenylephrine  Approval obtained from primary team for dilation  Patient aware that pupils can remained dilated for at least 4-6 hours  Exam performed with 20D lens    Vitreous: wnl OU  Disc, cup/disc: sharp and pink, 0.4 OU  Macula:  wnl OU  Vessels:  wnl OU  Periphery: wnl OU

## 2017-09-13 NOTE — ED PROVIDER NOTE - PROGRESS NOTE DETAILS
JOAO Zamudio: Spoke to Ophthalmology, eye involvement ruled out, recommended consult dental. JOAO Zamudio: Dental consulted and drained right upper tooth abscess, will keep pt in CDU for IV abx. Pt admits to feeling better after I & D and pain medication JOAO Zamudio: PT PMD Azalia Garcia was contacted at [phone], discussed pt assessment and plan of keeping the pt in observation overnight for IV abx and pain control,  agrees with the plan. Will fo

## 2017-09-13 NOTE — ED CDU PROVIDER NOTE - OBJECTIVE STATEMENT
Pt is a 72 y/o M PMHx HTN, GERD, HLD, anxiety p/w right facial swelling x 4 days.  Pt speaks Divehi and electing to have daughter translate.  Pt notes right maxillary dental pain for past 2 weeks followed by subjective chills and worsening right facial swelling.  Pt denies any headache, double vision, pain with eye movement, blurry vision, numbness, weakness, trauma, allergies to medications.  In ED, pt had CT scan showing "Right facial cellulitis without orbital cellulitis."  Pt had drainage performed by Dental.  Pt sent to CDU for antibiotics, observation, and pain control. Pt is a 72 y/o M PMHx HTN, GERD, HLD, anxiety p/w right facial swelling x 4 days.  Pt speaks Albanian and electing to have daughter translate.  Pt notes right maxillary dental pain for past 2 weeks followed by subjective chills and worsening right facial swelling.  Pt denies any headache, double vision, pain with eye movement, blurry vision, numbness, weakness, trauma, allergies to medications.  In ED, pt had CT scan showing "Right facial cellulitis without orbital cellulitis."  Pt had drainage performed by Dental.  Pt sent to CDU for antibiotics, observation, and pain control.  Labs notable for hyponatremia 128.  Pt with known history of hypovolemic hyponatremia in last admission which improved with normal saline.

## 2017-09-13 NOTE — PROGRESS NOTE ADULT - SUBJECTIVE AND OBJECTIVE BOX
Patient is a 71y old  Male who presents with a chief complaint of stinging pain and swelling in upper right side.    HPI: Patient complains of upper right dental stinging pain for the past 2 weeks, now presents with worsening infraorbital swelling x1 day and fever x2 days.     PAST MEDICAL & SURGICAL HISTORY:  Essential hypertension  HTN (Hypertension)  Anxiety  Hypercholesterolemia  No significant past surgical history    MEDICATIONS  (STANDING):  sodium chloride 0.9%. 1000 milliLiter(s) (150 mL/Hr) IV Continuous <Continuous>  clindamycin IVPB 600 milliGRAM(s) IV Intermittent every 8 hours  metoprolol succinate ER 25 milliGRAM(s) Oral daily  atorvastatin 10 milliGRAM(s) Oral at bedtime  multivitamin 1 Tablet(s) Oral daily  aspirin  chewable 81 milliGRAM(s) Oral daily  fluticasone propionate 50 MICROgram(s)/spray Nasal Spray 1 Spray(s) Both Nostrils daily  pantoprazole    Tablet 40 milliGRAM(s) Oral before breakfast  losartan 50 milliGRAM(s) Oral daily    Allergies: flour (Rash), Nuts (Rash)  No Known Drug Allergies    FAMILY HISTORY:  No pertinent family history in first degree relatives    Vital Signs Last 24 Hrs  T(C): 37.4 (13 Sep 2017 18:17), Max: 37.9 (13 Sep 2017 14:05)  T(F): 99.4 (13 Sep 2017 18:17), Max: 100.2 (13 Sep 2017 14:05)  HR: 81 (13 Sep 2017 18:17) (81 - 94)  BP: 119/65 (13 Sep 2017 18:17) (119/65 - 167/72)  BP(mean): --  RR: 14 (13 Sep 2017 18:17) (14 - 18)  SpO2: 98% (13 Sep 2017 18:17) (96% - 100%)    EOE:  TMJ ( -  ) clicks                    (  -  ) pops                    (  -  ) crepitus             Mandible FROM             Facial bones and MOM grossly intact             ( -  ) trismus             ( -  ) LAD             (  + ) swelling: fluctuant extending superiorly from right lower eyelid to level of zygoma inferiorly. Indurated soft tissue extending laterally to the right, from the bridge of the nose and inferiorly to the right nasal ala.             ( +  ) asymmetry: due to right sided facial swelling             ( +  ) palpation             ( -  ) SOB             (  - ) dysphagia  * green drainage on inner canthus of right eye, no drainage expressed from tearduct when fluctuant swelling palpated infraorbitally.    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 between tooth #5 and #6    Caries: caries not clinically present    LABS:                        13.5   11.59 )-----------( 191      ( 13 Sep 2017 08:15 )             38.6     09-13    125<L>  |  90<L>  |  12  ----------------------------<  165<H>  3.3<L>   |  22  |  1.08    Ca    9.1      13 Sep 2017 14:40    TPro  7.8  /  Alb  4.2  /  TBili  0.5  /  DBili  x   /  AST  24  /  ALT  23  /  AlkPhos  86  09-13    WBC Count: 11.59 K/uL <H> [3.8 - 10.5] (09-13 @ 08:15)  Platelet Count - Automated: 191 K/uL [150 - 400] (09-13 @ 08:15)    *DENTAL RADIOGRAPHS: Periapical radiograph of tooth #5-6 taken revealing periapical pathology of tooth #5. Panoramic radiograph taken revealing multiple carious and multiple missing teeth     RADIOLOGY & ADDITIONAL STUDIES: CT scan with IV contrast taken, revealing right facial cellulitis without orbital cellulitis. Dental carious disease with areas of buccal cortical dehiscence along the maxillary and mandibular dentition, and in particular along the right maxillary first premolar tooth raise the possibility for odontogenic facial cellulitis.    ASSESSMENT: 71 year old male with root fracture of tooth #5, associated periapical pathology, and right buccal vestibular swelling.    PROCEDURE:   Verbal and written consent obtained for incision and drainage of area buccal to tooth #5 and post-op instructions reviewed using  (ID # [id number]). 20% benzocaine applied to dry buccal mucosa followed by 1 carpule 2% Lidocaine with 1:100,000 Epinephrine via buccal infiltration of tooth #5. #15 Blade used to make buccal vestibular incision, blunt dissection performed with periosteal elevator and hemostat to periosteum. Hemorrhagic drainage noted. No purulence noted. Penrose drain placed and sutured in place with single vicryl suture.    RECOMMENDATIONS:  1) Antibiotics and pain management as per ED.  2) F/U in [facility] dental clinic in 2 days for suture and drain removal.  3) Dental F/U with outpatient dentist for comprehensive dental care and definitive treatment of tooth #5.   4) If any difficulty swallowing/breathing, fever occur, page dental.     Kelsie Mercer, pager # [phone] Patient is a 71y old  Male who presents with a chief complaint of stinging pain and swelling in upper right side.    HPI: Patient complains of upper right dental stinging pain for the past 2 weeks, now presents with worsening infraorbital swelling x1 day and fever x2 days.     PAST MEDICAL & SURGICAL HISTORY:  Essential hypertension  HTN (Hypertension)  Anxiety  Hypercholesterolemia  No significant past surgical history    MEDICATIONS  (STANDING):  sodium chloride 0.9%. 1000 milliLiter(s) (150 mL/Hr) IV Continuous <Continuous>  clindamycin IVPB 600 milliGRAM(s) IV Intermittent every 8 hours  metoprolol succinate ER 25 milliGRAM(s) Oral daily  atorvastatin 10 milliGRAM(s) Oral at bedtime  multivitamin 1 Tablet(s) Oral daily  aspirin  chewable 81 milliGRAM(s) Oral daily  fluticasone propionate 50 MICROgram(s)/spray Nasal Spray 1 Spray(s) Both Nostrils daily  pantoprazole    Tablet 40 milliGRAM(s) Oral before breakfast  losartan 50 milliGRAM(s) Oral daily    Allergies: flour (Rash), Nuts (Rash)  No Known Drug Allergies    FAMILY HISTORY:  No pertinent family history in first degree relatives    Vital Signs Last 24 Hrs  T(C): 37.4 (13 Sep 2017 18:17), Max: 37.9 (13 Sep 2017 14:05)  T(F): 99.4 (13 Sep 2017 18:17), Max: 100.2 (13 Sep 2017 14:05)  HR: 81 (13 Sep 2017 18:17) (81 - 94)  BP: 119/65 (13 Sep 2017 18:17) (119/65 - 167/72)  BP(mean): --  RR: 14 (13 Sep 2017 18:17) (14 - 18)  SpO2: 98% (13 Sep 2017 18:17) (96% - 100%)    EOE:  TMJ ( -  ) clicks                    (  -  ) pops                    (  -  ) crepitus             Mandible FROM             Facial bones and MOM grossly intact             ( -  ) trismus             ( -  ) LAD             (  + ) swelling: fluctuant extending superiorly from right lower eyelid to level of zygoma inferiorly. Indurated soft tissue extending laterally to the right, from the bridge of the nose and inferiorly to the right nasal ala.             ( +  ) asymmetry: due to right sided facial swelling             ( +  ) palpation             ( -  ) SOB             (  - ) dysphagia  * green drainage on inner canthus of right eye, no drainage expressed from tearduct when fluctuant swelling palpated infraorbitally.    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 between tooth #5 and #6    Caries: caries not clinically present    LABS:                        13.5   11.59 )-----------( 191      ( 13 Sep 2017 08:15 )             38.6     09-13    125<L>  |  90<L>  |  12  ----------------------------<  165<H>  3.3<L>   |  22  |  1.08    Ca    9.1      13 Sep 2017 14:40    TPro  7.8  /  Alb  4.2  /  TBili  0.5  /  DBili  x   /  AST  24  /  ALT  23  /  AlkPhos  86  09-13    WBC Count: 11.59 K/uL <H> [3.8 - 10.5] (09-13 @ 08:15)  Platelet Count - Automated: 191 K/uL [150 - 400] (09-13 @ 08:15)    *DENTAL RADIOGRAPHS: Periapical radiograph of tooth #5-6 taken revealing periapical pathology of tooth #5. Panoramic radiograph taken revealing multiple carious and multiple missing teeth, possibly retained roots in upper left quadrant, tooth #28 missing clinical crown and possible periapical pathology, tooth #31 and tooth #32 gross clinical caries affecting pulp chamber.    RADIOLOGY & ADDITIONAL STUDIES: CT scan with IV contrast taken, revealing right facial cellulitis without orbital cellulitis. Dental carious disease with areas of buccal cortical dehiscence along the maxillary and mandibular dentition, and in particular along the right maxillary first premolar tooth raise the possibility for odontogenic facial cellulitis.    ASSESSMENT: 71 year old male with root fracture of tooth #5, associated periapical pathology, and right buccal vestibular swelling.    PROCEDURE:   Verbal and written consent obtained for incision and drainage of area buccal to tooth #5 and post-op instructions reviewed using  (ID # [id number]). 20% benzocaine applied to dry buccal mucosa followed by 1 carpule 2% Lidocaine with 1:100,000 Epinephrine via buccal infiltration of tooth #5. #15 Blade used to make buccal vestibular incision, blunt dissection performed with periosteal elevator and hemostat to periosteum. Hemorrhagic drainage noted. No purulence noted. Penrose drain placed and sutured in place with two single interrupted 3.0 vicryl sutures.    RECOMMENDATIONS:  1) Antibiotics and pain management as per ED.  2) F/U in [facility] dental clinic in 2 days for suture and drain removal.  3) Dental F/U with outpatient dentist for comprehensive dental care and definitive treatment of tooth #5.   4) If any difficulty swallowing/breathing, fever occur, page dental.     Kelsie Mercer, pager # [phone]

## 2017-09-13 NOTE — CONSULT NOTE ADULT - ASSESSMENT
Assessment: 70 yo M PMH HTN/HLD with dental pain for 2 weeks (per ED appears like loose tooth w/o infection), now presents with worsening infraorbital swelling and fever. No ocular or visual complaints. Eye exam is normal. No clinical signs of orbital cellulitis. Infraorbital swelling does not involve eyelid - no preseptal cellulitis.    Plan:  CT maxillofacial with contrast  Plastics consult  Consider dental consult  Abx per primary team  Please reconsult if patient develops any ocular symptoms e.g. red eye, blurry vision, eye pain, pain with EOM    Follow-Up:  Patient should follow up his ophthalmologist or in the [facility] within 1 week of discharge  [address]  [place] [address]  [phone] Assessment: 70 yo M PMH HTN/HLD with dental pain for 2 weeks (per ED appears like loose tooth w/o infection), now presents with worsening infraorbital swelling and fever. No ocular or visual complaints. Eye exam is normal. No clinical signs of orbital cellulitis. Infraorbital swelling does not involve eyelid - no preseptal cellulitis, more likely facial cellulitis from possible dental infection.    Plan:  CT maxillofacial with contrast  Consider Plastics consult  Dental consult  Abx per primary team  Please reconsult if patient develops any ocular symptoms e.g. red eye, blurry vision, eye pain, pain with EOM    Follow-Up:  Patient should follow up his ophthalmologist or in the [facility] within 3-5 days of discharge, sooner if symptoms worsen or change  [address]  [place], [place] [id number] [phone]

## 2017-09-13 NOTE — ED PROVIDER NOTE - ATTENDING CONTRIBUTION TO CARE
71M presents with dental pain and facial swelling.  States pain started several days ago and facial swelling worsened over the last day.  + fever. On exam well appearing, nad, conjunctiva clear, EOMI, infraorbital swelling on R with erythema and induration along R cheek, + lungs clear, rrr, abd soft, no edema, 2+ pulses, speech clear, awake and alert.  CT with + dental abscess, drained by dental and cellulitis with likely preseptal cellulitis, no evidence of orbital cellulitis.  Plan for IV abx and CDU.

## 2017-09-14 VITALS
SYSTOLIC BLOOD PRESSURE: 117 MMHG | RESPIRATION RATE: 16 BRPM | OXYGEN SATURATION: 100 % | TEMPERATURE: 99 F | DIASTOLIC BLOOD PRESSURE: 61 MMHG | HEART RATE: 75 BPM

## 2017-09-14 LAB
ALBUMIN SERPL ELPH-MCNC: 2.9 G/DL — LOW (ref 3.3–5)
ALP SERPL-CCNC: 69 U/L — SIGNIFICANT CHANGE UP (ref 40–120)
ALT FLD-CCNC: 14 U/L — SIGNIFICANT CHANGE UP (ref 4–41)
AST SERPL-CCNC: 15 U/L — SIGNIFICANT CHANGE UP (ref 4–40)
BASOPHILS # BLD AUTO: 0.02 K/UL — SIGNIFICANT CHANGE UP (ref 0–0.2)
BASOPHILS NFR BLD AUTO: 0.3 % — SIGNIFICANT CHANGE UP (ref 0–2)
BILIRUB SERPL-MCNC: 0.7 MG/DL — SIGNIFICANT CHANGE UP (ref 0.2–1.2)
BUN SERPL-MCNC: 13 MG/DL — SIGNIFICANT CHANGE UP (ref 7–23)
CALCIUM SERPL-MCNC: 8.3 MG/DL — LOW (ref 8.4–10.5)
CHLORIDE SERPL-SCNC: 93 MMOL/L — LOW (ref 98–107)
CO2 SERPL-SCNC: 24 MMOL/L — SIGNIFICANT CHANGE UP (ref 22–31)
CREAT SERPL-MCNC: 1.15 MG/DL — SIGNIFICANT CHANGE UP (ref 0.5–1.3)
EOSINOPHIL # BLD AUTO: 0.43 K/UL — SIGNIFICANT CHANGE UP (ref 0–0.5)
EOSINOPHIL NFR BLD AUTO: 5.4 % — SIGNIFICANT CHANGE UP (ref 0–6)
GLUCOSE SERPL-MCNC: 99 MG/DL — SIGNIFICANT CHANGE UP (ref 70–99)
HCT VFR BLD CALC: 29.6 % — LOW (ref 39–50)
HGB BLD-MCNC: 10.5 G/DL — LOW (ref 13–17)
IMM GRANULOCYTES # BLD AUTO: 0.02 # — SIGNIFICANT CHANGE UP
IMM GRANULOCYTES NFR BLD AUTO: 0.3 % — SIGNIFICANT CHANGE UP (ref 0–1.5)
LYMPHOCYTES # BLD AUTO: 0.95 K/UL — LOW (ref 1–3.3)
LYMPHOCYTES # BLD AUTO: 12 % — LOW (ref 13–44)
MCHC RBC-ENTMCNC: 32.7 PG — SIGNIFICANT CHANGE UP (ref 27–34)
MCHC RBC-ENTMCNC: 35.5 % — SIGNIFICANT CHANGE UP (ref 32–36)
MCV RBC AUTO: 92.2 FL — SIGNIFICANT CHANGE UP (ref 80–100)
MONOCYTES # BLD AUTO: 0.84 K/UL — SIGNIFICANT CHANGE UP (ref 0–0.9)
MONOCYTES NFR BLD AUTO: 10.6 % — SIGNIFICANT CHANGE UP (ref 2–14)
NEUTROPHILS # BLD AUTO: 5.66 K/UL — SIGNIFICANT CHANGE UP (ref 1.8–7.4)
NEUTROPHILS NFR BLD AUTO: 71.4 % — SIGNIFICANT CHANGE UP (ref 43–77)
NRBC # FLD: 0 — SIGNIFICANT CHANGE UP
PLATELET # BLD AUTO: 156 K/UL — SIGNIFICANT CHANGE UP (ref 150–400)
PMV BLD: 10.2 FL — SIGNIFICANT CHANGE UP (ref 7–13)
POTASSIUM SERPL-MCNC: 3.7 MMOL/L — SIGNIFICANT CHANGE UP (ref 3.5–5.3)
POTASSIUM SERPL-SCNC: 3.7 MMOL/L — SIGNIFICANT CHANGE UP (ref 3.5–5.3)
PROT SERPL-MCNC: 5.8 G/DL — LOW (ref 6–8.3)
RBC # BLD: 3.21 M/UL — LOW (ref 4.2–5.8)
RBC # FLD: 12.9 % — SIGNIFICANT CHANGE UP (ref 10.3–14.5)
SODIUM SERPL-SCNC: 128 MMOL/L — LOW (ref 135–145)
WBC # BLD: 7.92 K/UL — SIGNIFICANT CHANGE UP (ref 3.8–10.5)
WBC # FLD AUTO: 7.92 K/UL — SIGNIFICANT CHANGE UP (ref 3.8–10.5)

## 2017-09-14 PROCEDURE — 99217: CPT

## 2017-09-14 RX ADMIN — LOSARTAN POTASSIUM 50 MILLIGRAM(S): 100 TABLET, FILM COATED ORAL at 06:31

## 2017-09-14 RX ADMIN — Medication 25 MILLIGRAM(S): at 06:31

## 2017-09-14 RX ADMIN — PANTOPRAZOLE SODIUM 40 MILLIGRAM(S): 20 TABLET, DELAYED RELEASE ORAL at 06:31

## 2017-09-14 RX ADMIN — Medication 100 MILLIGRAM(S): at 07:50

## 2017-09-25 NOTE — ED PROVIDER NOTE - RATE
Date of admit to cath lab: 9/25/2017      Date of discharge from cath lab: 9/25/2017      Principal diagnosis: PH    Discharge attending physician: Alice Tim MD    Hospital Course/Outcome of the treatment, procedures or surgery: Pt admitted for RHC.   See CVIS/cath lab procedure report in EPIC  for full report of today's procedure.    Disposition of the case (d/c disposition):  home    Discharge Medication List: see med card    Plan for follow up care, diet, activity level: F/U as scheduled. Resume low Na diet.  Activity as tolerated    Condition on discharge from Cath lab: Stable.      
73
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
## 2019-02-19 ENCOUNTER — EMERGENCY (EMERGENCY)
Facility: HOSPITAL | Age: 74
LOS: 1 days | Discharge: HOME | End: 2019-02-19
Attending: EMERGENCY MEDICINE | Admitting: EMERGENCY MEDICINE
Payer: MEDICARE

## 2019-02-19 VITALS
TEMPERATURE: 98 F | RESPIRATION RATE: 18 BRPM | DIASTOLIC BLOOD PRESSURE: 58 MMHG | HEART RATE: 86 BPM | SYSTOLIC BLOOD PRESSURE: 147 MMHG | OXYGEN SATURATION: 98 %

## 2019-02-19 VITALS
HEART RATE: 85 BPM | DIASTOLIC BLOOD PRESSURE: 85 MMHG | TEMPERATURE: 98 F | SYSTOLIC BLOOD PRESSURE: 165 MMHG | OXYGEN SATURATION: 88 % | RESPIRATION RATE: 17 BRPM

## 2019-02-19 PROBLEM — Z00.00 ENCOUNTER FOR PREVENTIVE HEALTH EXAMINATION: Status: ACTIVE | Noted: 2019-02-19

## 2019-02-19 LAB
ANION GAP SERPL CALC-SCNC: 15 MMO/L — HIGH (ref 7–14)
APPEARANCE UR: CLEAR — SIGNIFICANT CHANGE UP
BACTERIA # UR AUTO: NEGATIVE — SIGNIFICANT CHANGE UP
BASOPHILS # BLD AUTO: 0.02 K/UL — SIGNIFICANT CHANGE UP (ref 0–0.2)
BASOPHILS NFR BLD AUTO: 0.2 % — SIGNIFICANT CHANGE UP (ref 0–2)
BILIRUB UR-MCNC: NEGATIVE — SIGNIFICANT CHANGE UP
BLOOD UR QL VISUAL: HIGH
BUN SERPL-MCNC: 17 MG/DL — SIGNIFICANT CHANGE UP (ref 7–23)
CALCIUM SERPL-MCNC: 9.8 MG/DL — SIGNIFICANT CHANGE UP (ref 8.4–10.5)
CHLORIDE SERPL-SCNC: 100 MMOL/L — SIGNIFICANT CHANGE UP (ref 98–107)
CO2 SERPL-SCNC: 22 MMOL/L — SIGNIFICANT CHANGE UP (ref 22–31)
COLOR SPEC: SIGNIFICANT CHANGE UP
CREAT SERPL-MCNC: 1.17 MG/DL — SIGNIFICANT CHANGE UP (ref 0.5–1.3)
EOSINOPHIL # BLD AUTO: 0.73 K/UL — HIGH (ref 0–0.5)
EOSINOPHIL NFR BLD AUTO: 8.3 % — HIGH (ref 0–6)
GLUCOSE SERPL-MCNC: 115 MG/DL — HIGH (ref 70–99)
GLUCOSE UR-MCNC: NEGATIVE — SIGNIFICANT CHANGE UP
HCT VFR BLD CALC: 36.7 % — LOW (ref 39–50)
HGB BLD-MCNC: 12.2 G/DL — LOW (ref 13–17)
HYALINE CASTS # UR AUTO: NEGATIVE — SIGNIFICANT CHANGE UP
IMM GRANULOCYTES NFR BLD AUTO: 0.2 % — SIGNIFICANT CHANGE UP (ref 0–1.5)
KETONES UR-MCNC: NEGATIVE — SIGNIFICANT CHANGE UP
LEUKOCYTE ESTERASE UR-ACNC: NEGATIVE — SIGNIFICANT CHANGE UP
LYMPHOCYTES # BLD AUTO: 1.31 K/UL — SIGNIFICANT CHANGE UP (ref 1–3.3)
LYMPHOCYTES # BLD AUTO: 15 % — SIGNIFICANT CHANGE UP (ref 13–44)
MCHC RBC-ENTMCNC: 32.3 PG — SIGNIFICANT CHANGE UP (ref 27–34)
MCHC RBC-ENTMCNC: 33.2 % — SIGNIFICANT CHANGE UP (ref 32–36)
MCV RBC AUTO: 97.1 FL — SIGNIFICANT CHANGE UP (ref 80–100)
MONOCYTES # BLD AUTO: 0.71 K/UL — SIGNIFICANT CHANGE UP (ref 0–0.9)
MONOCYTES NFR BLD AUTO: 8.1 % — SIGNIFICANT CHANGE UP (ref 2–14)
NEUTROPHILS # BLD AUTO: 5.97 K/UL — SIGNIFICANT CHANGE UP (ref 1.8–7.4)
NEUTROPHILS NFR BLD AUTO: 68.2 % — SIGNIFICANT CHANGE UP (ref 43–77)
NITRITE UR-MCNC: NEGATIVE — SIGNIFICANT CHANGE UP
NRBC # FLD: 0 K/UL — LOW (ref 25–125)
PH UR: 6.5 — SIGNIFICANT CHANGE UP (ref 5–8)
PLATELET # BLD AUTO: 244 K/UL — SIGNIFICANT CHANGE UP (ref 150–400)
PMV BLD: 10.8 FL — SIGNIFICANT CHANGE UP (ref 7–13)
POTASSIUM SERPL-MCNC: 4 MMOL/L — SIGNIFICANT CHANGE UP (ref 3.5–5.3)
POTASSIUM SERPL-SCNC: 4 MMOL/L — SIGNIFICANT CHANGE UP (ref 3.5–5.3)
PROT UR-MCNC: 20 — SIGNIFICANT CHANGE UP
RBC # BLD: 3.78 M/UL — LOW (ref 4.2–5.8)
RBC # FLD: 13 % — SIGNIFICANT CHANGE UP (ref 10.3–14.5)
RBC CASTS # UR COMP ASSIST: >50 — HIGH (ref 0–?)
SODIUM SERPL-SCNC: 137 MMOL/L — SIGNIFICANT CHANGE UP (ref 135–145)
SP GR SPEC: 1.01 — SIGNIFICANT CHANGE UP (ref 1–1.04)
SQUAMOUS # UR AUTO: SIGNIFICANT CHANGE UP
UROBILINOGEN FLD QL: NORMAL — SIGNIFICANT CHANGE UP
WBC # BLD: 8.76 K/UL — SIGNIFICANT CHANGE UP (ref 3.8–10.5)
WBC # FLD AUTO: 8.76 K/UL — SIGNIFICANT CHANGE UP (ref 3.8–10.5)
WBC UR QL: SIGNIFICANT CHANGE UP (ref 0–?)

## 2019-02-19 PROCEDURE — 99284 EMERGENCY DEPT VISIT MOD MDM: CPT | Mod: GC

## 2019-02-19 NOTE — ED PROVIDER NOTE - ATTENDING CONTRIBUTION TO CARE
I performed a face to face bedside interview with patient regarding history of present illness, review of symptoms and past medical history. I completed an independent physical exam.  I have discussed patient's plan of care.   I agree with note as stated above, having amended the EMR as needed to reflect my findings. I have discussed the assessment and plan of care.  This includes during the time I functioned as the attending physician for this patient.  Attending Contribution to Care: agree with plan of resident. p/w hematuria, blood tinged urine. hx of known kidney disease. [place] country. foul smelling, dysuria. suprapubic discomfort. pt with no cva ttp. stable for d/c. no imaging clinically indicated secondary to uti. vss. hd stable with no fluids.

## 2019-02-19 NOTE — ED PROVIDER NOTE - PHYSICAL EXAMINATION
Gen:  NAD, alert and oriented  HEENT:  Dry MM, sclera clear  Heart:  RRR, no M/R/G  Lung:  CTA b/l, no wheeze or rales  Abd:  Mild suprapubic tenderness, nondistended, soft, no suprapubic fullness  Back:  No CVA tenderness.  Ext:  No edema  Skin:  No rash or ecchymosis  Neuro:  Nonfocal

## 2019-02-19 NOTE — ED PROVIDER NOTE - OBJECTIVE STATEMENT
73 y.o. male PMH CKD, BPH, HTN p/w foul smelling urine and blood in urine.  He recently returned from a trip to [facility], and he reports an odor to his urine a couple days ago, and today he has blood in his urine.  He also had a fever at home up to 100F.  No nausea, vomiting, no back pain.  Some suprapubic pain, but no urethral dysuria.

## 2019-02-19 NOTE — ED ADULT NURSE NOTE - NSIMPLEMENTINTERV_GEN_ALL_ED
Implemented All Universal Safety Interventions:  [place] to call system. Call bell, personal items and telephone within reach. Instruct patient to call for assistance. Room bathroom lighting operational. Non-slip footwear when patient is off stretcher. Physically safe environment: no spills, clutter or unnecessary equipment. Stretcher in lowest position, wheels locked, appropriate side rails in place.

## 2019-02-19 NOTE — ED ADULT TRIAGE NOTE - CHIEF COMPLAINT QUOTE
p/t c/o of body aches, fever, and hematuria for past 2 days, denies any chest pain denies sob, no dysuria

## 2019-02-19 NOTE — ED ADULT NURSE NOTE - OBJECTIVE STATEMENT
pt received alert and oriented x3.pmhx htn.pt c.o having one day of lower back discomfort, subjective fevers, and hematuria. neg cva tenderness. abd soft and non tender. respirations equal and unlabored. skin intact. Call bell in reach, warm blanket provided, bed in lowest position, side rails up x2,safety maintained. will continue to monitor. pt waiting for md molina. family at bedside for eval.

## 2019-02-20 RX ORDER — CIPROFLOXACIN LACTATE 400MG/40ML
1 VIAL (ML) INTRAVENOUS
Qty: 20 | Refills: 0
Start: 2019-02-20 | End: 2019-03-01

## 2019-02-20 RX ORDER — CIPROFLOXACIN LACTATE 400MG/40ML
500 VIAL (ML) INTRAVENOUS ONCE
Qty: 0 | Refills: 0 | Status: COMPLETED | OUTPATIENT
Start: 2019-02-20 | End: 2019-02-20

## 2019-02-20 RX ADMIN — Medication 500 MILLIGRAM(S): at 00:22

## 2019-02-21 LAB
BACTERIA UR CULT: SIGNIFICANT CHANGE UP
SPECIMEN SOURCE: SIGNIFICANT CHANGE UP

## 2019-07-01 ENCOUNTER — OUTPATIENT (OUTPATIENT)
Dept: OUTPATIENT SERVICES | Facility: HOSPITAL | Age: 74
LOS: 1 days | End: 2019-07-01
Payer: MEDICARE

## 2019-07-01 PROCEDURE — G9001: CPT

## 2019-07-20 ENCOUNTER — INPATIENT (INPATIENT)
Facility: HOSPITAL | Age: 74
LOS: 4 days | Discharge: HOME | End: 2019-07-25
Attending: INTERNAL MEDICINE | Admitting: INTERNAL MEDICINE
Payer: MEDICAID

## 2019-07-20 VITALS
RESPIRATION RATE: 96 BRPM | DIASTOLIC BLOOD PRESSURE: 65 MMHG | SYSTOLIC BLOOD PRESSURE: 161 MMHG | OXYGEN SATURATION: 100 % | TEMPERATURE: 99 F | HEART RATE: 89 BPM

## 2019-07-20 DIAGNOSIS — E87.1 HYPO-OSMOLALITY AND HYPONATREMIA: ICD-10-CM

## 2019-07-20 DIAGNOSIS — Z87.438 PERSONAL HISTORY OF OTHER DISEASES OF MALE GENITAL ORGANS: ICD-10-CM

## 2019-07-20 DIAGNOSIS — I10 ESSENTIAL (PRIMARY) HYPERTENSION: ICD-10-CM

## 2019-07-20 DIAGNOSIS — R31.9 HEMATURIA, UNSPECIFIED: ICD-10-CM

## 2019-07-20 LAB
ALBUMIN SERPL ELPH-MCNC: 4.3 G/DL — SIGNIFICANT CHANGE UP (ref 3.3–5)
ALP SERPL-CCNC: 95 U/L — SIGNIFICANT CHANGE UP (ref 40–120)
ALT FLD-CCNC: 16 U/L — SIGNIFICANT CHANGE UP (ref 4–41)
ANION GAP SERPL CALC-SCNC: 12 MMO/L — SIGNIFICANT CHANGE UP (ref 7–14)
APPEARANCE UR: SIGNIFICANT CHANGE UP
AST SERPL-CCNC: 22 U/L — SIGNIFICANT CHANGE UP (ref 4–40)
BACTERIA # UR AUTO: SIGNIFICANT CHANGE UP
BASE EXCESS BLDV CALC-SCNC: 3 MMOL/L — SIGNIFICANT CHANGE UP
BASOPHILS # BLD AUTO: 0.03 K/UL — SIGNIFICANT CHANGE UP (ref 0–0.2)
BASOPHILS NFR BLD AUTO: 0.3 % — SIGNIFICANT CHANGE UP (ref 0–2)
BILIRUB SERPL-MCNC: 0.5 MG/DL — SIGNIFICANT CHANGE UP (ref 0.2–1.2)
BILIRUB UR-MCNC: NEGATIVE — SIGNIFICANT CHANGE UP
BLOOD GAS VENOUS - CREATININE: 1.24 MG/DL — SIGNIFICANT CHANGE UP (ref 0.5–1.3)
BLOOD GAS VENOUS - FIO2: 21 — SIGNIFICANT CHANGE UP
BLOOD UR QL VISUAL: SIGNIFICANT CHANGE UP
BUN SERPL-MCNC: 14 MG/DL — SIGNIFICANT CHANGE UP (ref 7–23)
BUN SERPL-MCNC: 14 MG/DL — SIGNIFICANT CHANGE UP (ref 7–23)
BUN SERPL-MCNC: 15 MG/DL — SIGNIFICANT CHANGE UP (ref 7–23)
CALCIUM SERPL-MCNC: 10.1 MG/DL — SIGNIFICANT CHANGE UP (ref 8.4–10.5)
CALCIUM SERPL-MCNC: 9.6 MG/DL — SIGNIFICANT CHANGE UP (ref 8.4–10.5)
CALCIUM SERPL-MCNC: 9.7 MG/DL — SIGNIFICANT CHANGE UP (ref 8.4–10.5)
CHLORIDE BLDV-SCNC: 90 MMOL/L — LOW (ref 96–108)
CHLORIDE SERPL-SCNC: 86 MMOL/L — LOW (ref 98–107)
CHLORIDE SERPL-SCNC: 91 MMOL/L — LOW (ref 98–107)
CHLORIDE SERPL-SCNC: 92 MMOL/L — LOW (ref 98–107)
CHLORIDE UR-SCNC: 21 MMOL/L — SIGNIFICANT CHANGE UP
CO2 SERPL-SCNC: 23 MMOL/L — SIGNIFICANT CHANGE UP (ref 22–31)
CO2 SERPL-SCNC: 24 MMOL/L — SIGNIFICANT CHANGE UP (ref 22–31)
CO2 SERPL-SCNC: 25 MMOL/L — SIGNIFICANT CHANGE UP (ref 22–31)
COLOR SPEC: SIGNIFICANT CHANGE UP
CREAT SERPL-MCNC: 1.16 MG/DL — SIGNIFICANT CHANGE UP (ref 0.5–1.3)
CREAT SERPL-MCNC: 1.2 MG/DL — SIGNIFICANT CHANGE UP (ref 0.5–1.3)
CREAT SERPL-MCNC: 1.21 MG/DL — SIGNIFICANT CHANGE UP (ref 0.5–1.3)
EOSINOPHIL # BLD AUTO: 1.02 K/UL — HIGH (ref 0–0.5)
EOSINOPHIL NFR BLD AUTO: 11.6 % — HIGH (ref 0–6)
GAS PNL BLDV: 114 MMOL/L — CRITICAL LOW (ref 136–146)
GLUCOSE BLDV-MCNC: 163 MG/DL — HIGH (ref 70–99)
GLUCOSE SERPL-MCNC: 116 MG/DL — HIGH (ref 70–99)
GLUCOSE SERPL-MCNC: 165 MG/DL — HIGH (ref 70–99)
GLUCOSE SERPL-MCNC: 185 MG/DL — HIGH (ref 70–99)
GLUCOSE UR-MCNC: NEGATIVE — SIGNIFICANT CHANGE UP
HCO3 BLDV-SCNC: 27 MMOL/L — SIGNIFICANT CHANGE UP (ref 20–27)
HCT VFR BLD CALC: 32 % — LOW (ref 39–50)
HCT VFR BLDV CALC: 36.2 % — LOW (ref 39–51)
HGB BLD-MCNC: 11.3 G/DL — LOW (ref 13–17)
HGB BLDV-MCNC: 11.7 G/DL — LOW (ref 13–17)
IMM GRANULOCYTES NFR BLD AUTO: 0.5 % — SIGNIFICANT CHANGE UP (ref 0–1.5)
KETONES UR-MCNC: NEGATIVE — SIGNIFICANT CHANGE UP
LACTATE BLDV-MCNC: 1.4 MMOL/L — SIGNIFICANT CHANGE UP (ref 0.5–2)
LEUKOCYTE ESTERASE UR-ACNC: NEGATIVE — SIGNIFICANT CHANGE UP
LYMPHOCYTES # BLD AUTO: 1.48 K/UL — SIGNIFICANT CHANGE UP (ref 1–3.3)
LYMPHOCYTES # BLD AUTO: 16.9 % — SIGNIFICANT CHANGE UP (ref 13–44)
MAGNESIUM SERPL-MCNC: 1.9 MG/DL — SIGNIFICANT CHANGE UP (ref 1.6–2.6)
MAGNESIUM SERPL-MCNC: 1.9 MG/DL — SIGNIFICANT CHANGE UP (ref 1.6–2.6)
MCHC RBC-ENTMCNC: 31.7 PG — SIGNIFICANT CHANGE UP (ref 27–34)
MCHC RBC-ENTMCNC: 35.3 % — SIGNIFICANT CHANGE UP (ref 32–36)
MCV RBC AUTO: 89.6 FL — SIGNIFICANT CHANGE UP (ref 80–100)
MONOCYTES # BLD AUTO: 0.64 K/UL — SIGNIFICANT CHANGE UP (ref 0–0.9)
MONOCYTES NFR BLD AUTO: 7.3 % — SIGNIFICANT CHANGE UP (ref 2–14)
NEUTROPHILS # BLD AUTO: 5.56 K/UL — SIGNIFICANT CHANGE UP (ref 1.8–7.4)
NEUTROPHILS NFR BLD AUTO: 63.4 % — SIGNIFICANT CHANGE UP (ref 43–77)
NITRITE UR-MCNC: NEGATIVE — SIGNIFICANT CHANGE UP
NRBC # FLD: 0 K/UL — SIGNIFICANT CHANGE UP (ref 0–0)
OSMOLALITY UR: 213 MOSMO/KG — SIGNIFICANT CHANGE UP (ref 50–1200)
PCO2 BLDV: 40 MMHG — LOW (ref 41–51)
PH BLDV: 7.44 PH — HIGH (ref 7.32–7.43)
PH UR: 6.5 — SIGNIFICANT CHANGE UP (ref 5–8)
PHOSPHATE SERPL-MCNC: 2.3 MG/DL — LOW (ref 2.5–4.5)
PHOSPHATE SERPL-MCNC: 2.7 MG/DL — SIGNIFICANT CHANGE UP (ref 2.5–4.5)
PLATELET # BLD AUTO: 224 K/UL — SIGNIFICANT CHANGE UP (ref 150–400)
PMV BLD: 9.6 FL — SIGNIFICANT CHANGE UP (ref 7–13)
PO2 BLDV: 38 MMHG — SIGNIFICANT CHANGE UP (ref 35–40)
POTASSIUM BLDV-SCNC: 3.4 MMOL/L — SIGNIFICANT CHANGE UP (ref 3.4–4.5)
POTASSIUM SERPL-MCNC: 3.7 MMOL/L — SIGNIFICANT CHANGE UP (ref 3.5–5.3)
POTASSIUM SERPL-MCNC: 3.7 MMOL/L — SIGNIFICANT CHANGE UP (ref 3.5–5.3)
POTASSIUM SERPL-MCNC: 4.2 MMOL/L — SIGNIFICANT CHANGE UP (ref 3.5–5.3)
POTASSIUM SERPL-SCNC: 3.7 MMOL/L — SIGNIFICANT CHANGE UP (ref 3.5–5.3)
POTASSIUM SERPL-SCNC: 3.7 MMOL/L — SIGNIFICANT CHANGE UP (ref 3.5–5.3)
POTASSIUM SERPL-SCNC: 4.2 MMOL/L — SIGNIFICANT CHANGE UP (ref 3.5–5.3)
POTASSIUM UR-SCNC: 5.8 MMOL/L — SIGNIFICANT CHANGE UP
PROT SERPL-MCNC: 7.6 G/DL — SIGNIFICANT CHANGE UP (ref 6–8.3)
PROT UR-MCNC: 100 — HIGH
RBC # BLD: 3.57 M/UL — LOW (ref 4.2–5.8)
RBC # FLD: 12.3 % — SIGNIFICANT CHANGE UP (ref 10.3–14.5)
RBC CASTS # UR COMP ASSIST: >50 — HIGH (ref 0–?)
SAO2 % BLDV: 70.3 % — SIGNIFICANT CHANGE UP (ref 60–85)
SODIUM SERPL-SCNC: 121 MMOL/L — LOW (ref 135–145)
SODIUM SERPL-SCNC: 127 MMOL/L — LOW (ref 135–145)
SODIUM SERPL-SCNC: 129 MMOL/L — LOW (ref 135–145)
SODIUM UR-SCNC: 22 MMOL/L — SIGNIFICANT CHANGE UP
SP GR SPEC: 1.01 — SIGNIFICANT CHANGE UP (ref 1–1.04)
SQUAMOUS # UR AUTO: SIGNIFICANT CHANGE UP
UROBILINOGEN FLD QL: NORMAL — SIGNIFICANT CHANGE UP
WBC # BLD: 8.77 K/UL — SIGNIFICANT CHANGE UP (ref 3.8–10.5)
WBC # FLD AUTO: 8.77 K/UL — SIGNIFICANT CHANGE UP (ref 3.8–10.5)
WBC UR QL: SIGNIFICANT CHANGE UP (ref 0–?)

## 2019-07-20 PROCEDURE — 99223 1ST HOSP IP/OBS HIGH 75: CPT

## 2019-07-20 PROCEDURE — 71045 X-RAY EXAM CHEST 1 VIEW: CPT | Mod: 26

## 2019-07-20 RX ORDER — AMLODIPINE BESYLATE 2.5 MG/1
10 TABLET ORAL DAILY
Refills: 0 | Status: DISCONTINUED | OUTPATIENT
Start: 2019-07-20 | End: 2019-07-25

## 2019-07-20 RX ORDER — METOPROLOL TARTRATE 50 MG
25 TABLET ORAL DAILY
Refills: 0 | Status: DISCONTINUED | OUTPATIENT
Start: 2019-07-20 | End: 2019-07-25

## 2019-07-20 RX ORDER — LOSARTAN POTASSIUM 100 MG/1
100 TABLET, FILM COATED ORAL DAILY
Refills: 0 | Status: DISCONTINUED | OUTPATIENT
Start: 2019-07-20 | End: 2019-07-25

## 2019-07-20 RX ORDER — LOSARTAN POTASSIUM 100 MG/1
50 TABLET, FILM COATED ORAL DAILY
Refills: 0 | Status: DISCONTINUED | OUTPATIENT
Start: 2019-07-20 | End: 2019-07-20

## 2019-07-20 RX ORDER — ATORVASTATIN CALCIUM 80 MG/1
10 TABLET, FILM COATED ORAL AT BEDTIME
Refills: 0 | Status: DISCONTINUED | OUTPATIENT
Start: 2019-07-20 | End: 2019-07-25

## 2019-07-20 RX ORDER — TAMSULOSIN HYDROCHLORIDE 0.4 MG/1
0.4 CAPSULE ORAL AT BEDTIME
Refills: 0 | Status: DISCONTINUED | OUTPATIENT
Start: 2019-07-20 | End: 2019-07-25

## 2019-07-20 RX ORDER — SODIUM CHLORIDE 9 MG/ML
1000 INJECTION, SOLUTION INTRAVENOUS ONCE
Refills: 0 | Status: COMPLETED | OUTPATIENT
Start: 2019-07-20 | End: 2019-07-20

## 2019-07-20 RX ORDER — METOPROLOL TARTRATE 50 MG
25 TABLET ORAL
Refills: 0 | Status: DISCONTINUED | OUTPATIENT
Start: 2019-07-20 | End: 2019-07-20

## 2019-07-20 RX ADMIN — SODIUM CHLORIDE 1000 MILLILITER(S): 9 INJECTION, SOLUTION INTRAVENOUS at 17:55

## 2019-07-20 RX ADMIN — ATORVASTATIN CALCIUM 10 MILLIGRAM(S): 80 TABLET, FILM COATED ORAL at 23:31

## 2019-07-20 RX ADMIN — Medication 25 MILLIGRAM(S): at 23:31

## 2019-07-20 RX ADMIN — TAMSULOSIN HYDROCHLORIDE 0.4 MILLIGRAM(S): 0.4 CAPSULE ORAL at 23:31

## 2019-07-20 NOTE — CONSULT NOTE ADULT - SUBJECTIVE AND OBJECTIVE BOX
Dr. Conde  Office [phone]  Cell [phone]  Radha SHEPHERD  Cell [phone]    HPI:  Patient is a 73M with a recent history of unknown prostate surgery or cystoscopy 4 months ago who comes in today complaining of hematuria and intermittent dysuria and suprapubic pain along with urinary hesitancy. In the work up found to have hyponatremia, so got admitted. Offers no complain at present     Allergies:  flour (Rash)  No Known Drug Allergies  Nuts (Rash)      PAST MEDICAL & SURGICAL HISTORY:  Essential hypertension  HTN (Hypertension)  Anxiety  Hypercholesterolemia      Home Medications Reviewed    Hospital Medications:   MEDICATIONS  (STANDING):      SOCIAL HISTORY:  Denies ETOh, Smoking,     FAMILY HISTORY:  Family history of stroke      REVIEW OF SYSTEMS:  CONSTITUTIONAL: No weakness, fevers or chills  EYES/ENT: No visual changes;  No vertigo or throat pain   NECK: No pain or stiffness  RESPIRATORY: No cough, wheezing, hemoptysis; No shortness of breath  CARDIOVASCULAR: No chest pain or palpitations.  GASTROINTESTINAL: No abdominal or epigastric pain. No nausea, vomiting, or hematemesis; No diarrhea or constipation. No melena or hematochezia.  GENITOURINARY: as per HPI  NEUROLOGICAL: No numbness or weakness  SKIN: No itching, burning, rashes, or lesions   VASCULAR: No bilateral lower extremity edema.   All other review of systems is negative unless indicated above.    VITALS:  T(F): 98.7 (19 @ 20:23), Max: 98.9 (19 @ 15:53)  HR: 78 (19 @ 20:23)  BP: 165/63 (19 @ 20:23)  RR: 17 (19 @ 20:23)  SpO2: 100% (19 @ 20:23)  Wt(kg): --        PHYSICAL EXAM:  Constitutional: NAD  HEENT: anicteric sclera, oropharynx clear, MMM  Neck: No JVD  Respiratory: CTAB, no wheezes, rales or rhonchi  Cardiovascular: S1, S2, RRR  Gastrointestinal: BS+, soft, NT/ND  Extremities: No cyanosis or clubbing. No peripheral edema  Neurological: A/O x 3, no focal deficits  Psychiatric: Normal mood, normal affect  : No CVA tenderness. No pineda.   Skin: No rashes       LABS:      127<L>  |  91<L>  |  14  ----------------------------<  116<H>  3.7   |  24  |  1.16    Ca    10.1      2019 18:54  Phos  2.3       Mg     1.9         TPro  7.6  /  Alb  4.3  /  TBili  0.5  /  DBili      /  AST  22  /  ALT  16  /  AlkPhos  95      Creatinine Trend: 1.16 <--, 1.21 <--                        11.3   8.77  )-----------( 224      ( 2019 15:47 )             32.0     Urine Studies:  Urinalysis Basic - ( 2019 15:47 )    Color: PINK / Appearance: TURBID / S.006 / pH: 6.5  Gluc: NEGATIVE / Ketone: NEGATIVE  / Bili: NEGATIVE / Urobili: NORMAL   Blood: LARGE / Protein: 100 / Nitrite: NEGATIVE   Leuk Esterase: NEGATIVE / RBC: >50 / WBC 0-2   Sq Epi: OCC / Non Sq Epi:  / Bacteria: OCC      Sodium, Random Urine: 22 mmol/L ( @ 17:53)  Potassium, Random Urine: 5.8 mmol/L ( @ 17:53)  Chloride, Random Urine: 21 mmol/L ( @ 17:53)      RADIOLOGY & ADDITIONAL STUDIES:

## 2019-07-20 NOTE — H&P ADULT - NSHPREVIEWOFSYSTEMS_GEN_ALL_CORE
REVIEW OF SYSTEMS:    CONSTITUTIONAL: No weakness, fevers or chills  EYES/ENT: No visual changes;  No dysphagia  NECK: No pain or stiffness  RESPIRATORY: No cough, wheezing, hemoptysis; No shortness of breath  CARDIOVASCULAR: No chest pain or palpitations; No lower extremity edema  GASTROINTESTINAL: No abdominal or epigastric pain. No nausea, vomiting, or hematemesis; No diarrhea or constipation. No melena or hematochezia.  GENITOURINARY: +dysuria and hematuria  NEUROLOGICAL: No numbness or weakness  SKIN: No itching, burning, rashes, or lesions   All other review of systems is negative unless indicated above.

## 2019-07-20 NOTE — H&P ADULT - NSHPPHYSICALEXAM_GEN_ALL_CORE
PHYSICAL EXAM:  GENERAL: NAD, well-developed, well-nourished  HEAD:  Atraumatic, Normocephalic  EYES: EOMI, PERRLA, conjunctiva and sclera clear  NECK: Supple, No JVD  CHEST/LUNG: Clear to auscultation bilaterally; No wheezes, rales or rhonchi  HEART: Regular rate and rhythm; No murmurs, rubs, or gallops, (+)S1, S2  ABDOMEN: Soft, Nontender, Nondistended; Normal Bowel sounds   EXTREMITIES:  trace peripheral edema b/l  PSYCH: normal mood and affect  NEUROLOGY: AAOx3, CN intact /bl, strength intact b/l UE and LE. Sensation intact to light touch b/l  SKIN: No rashes or lesions

## 2019-07-20 NOTE — ED ADULT NURSE REASSESSMENT NOTE - NS ED NURSE REASSESS COMMENT FT1
received report from kaycee GARCES. Pt is a/o x 3. no complaints of chest pain, headache, nausea, dizziness, vomiting, SOB, fever, chills verbalize. Pt has 20g iv placed to left AC with no redness or swelling noted. Awaiting further orders. Will continue to monitor.

## 2019-07-20 NOTE — ED PROVIDER NOTE - PROGRESS NOTE DETAILS
Spoke with patient's physician Dr. Knapp, who noted that patient had hyponatremia of 126 on office labs 4 days ago, he is following for SIADH and has previously had success with fluid restriction.  Agrees with plan of 1L fluid administration, admission under Dr. Mccormick. Spoke with patient's physician Dr. Knapp, who noted that patient had hyponatremia of 126 on office labs 4 days ago, he is following for SIADH and has previously had success with fluid restriction.  Agrees with plan of 1L fluid administration, admission under Dr. Mccormick.  Dr Mccormick was contacted and accepted the admission. Patient received 1L LR, urine no longer showing signs of hematuria.  Patient lab results of hyponatremia discussed, patient and son-in-law are agreeable with admission.

## 2019-07-20 NOTE — CONSULT NOTE ADULT - ASSESSMENT
Patient is a 73M with a recent history of unknown prostate surgery or cystoscopy 4 months ago who comes in today complaining of hematuria and intermittent dysuria and suprapubic pain along with urinary hesitancy. In the work up found to have hyponatremia, so got admitted. Offers no complain at present     A/P:    Hyponatremia:  Has h/o hyponatremia sec to SIADH  Na level has dropped acutely  Likely as he was drinking lot of water as advised by his Urologist  Work up in the ER Urine Na is 22 suggest polydipsia  Get Urine osmolality  Na level is improving with IVF  Monitor Na level  Fluid restriction 1.2L/day    Hematuria:  Repeat UA  Urology evaluation  Proteinuria likely sec to hematuria  Monitor at present Patient is a 73M with a recent history of unknown prostate surgery or cystoscopy 4 months ago who comes in today complaining of hematuria and intermittent dysuria and suprapubic pain along with urinary hesitancy. In the work up found to have h  hyponatremia, so got admitted. 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

## 2019-07-20 NOTE — ED ADULT NURSE NOTE - OBJECTIVE STATEMENT
Patient and family at bedside reports that patient has been having hematuria and dysuria for "many months." Patient has seen PMD for same complaint multiple times, currently on augmentin for UTI. Urine appears pink-tinged, with no blood clots, clear. Patient is warm to touch, afebrile rectal 98.9. Patient's labs drawn, #20g placed into left AC. Patient pending further evaluation by MD.

## 2019-07-20 NOTE — H&P ADULT - PROBLEM SELECTOR PLAN 2
unclear, possibly related to UTI/cystitis? no current symptoms and improved with clear urine at bedside  -UA with >50rbc but no nitrites or leuks. NO white count or fever. S/p course of augmentin  -Will order renal US for now and consult pt's urology group in the AM

## 2019-07-20 NOTE — H&P ADULT - ASSESSMENT
72 y/o male with hx of hyponatremia 2/2 SIADH, BPH, HTN, HLD, chronic hematuria and recurrent UTIs who presents today with worsening hematuria and dysuria for the past day found to be hyponatremic in the ED to be admitted for further workup

## 2019-07-20 NOTE — ED PROVIDER NOTE - ATTENDING CONTRIBUTION TO CARE
Seen and examined, states dysuria, hematuria, hesitancy, has had episodic similar c/o since prostate surgery. Pt. states tx for UTI and finished abx yest. No fever/chills, no N/V, no abd/flank pains. Pt. with hx of SIADH, cared for by Dr. Potts who states his typical Na runs 135-147 but on 7/16 was 126. Today in ED Na is 121. Pt. alert, speaking clearly, coherent, normal gait. HARINDER, neck supple, crackles L base > R, heart reg, abd soft, NT to palp, no CVAT, no edema.

## 2019-07-20 NOTE — H&P ADULT - HISTORY OF PRESENT ILLNESS
This is a 74 y/o male with hx of hyponatremia 2/2 SIADH, BPH, HTN, HLD, chronic hematuria and recurrent UTIs who presents today with worsening hematuria and dysuria for the past day. Pt this year chica has had repeat issues with intermittent hematuria and dysuria that has been worked up in the outpatient setting with cystoscopyx2 (unknown results - follows with [facility]) and recently finished a course of augmentin yesterday for a UTI. He says the symptoms come and go and yesterday his urine started having more blood than usual associated with dysuria. No fevers, chills, abdominal pain, flank pain reported. No weight loss reported and appetite is reported as good. Of note he also saw his nephrologist on 7/16 and was noted to be hyponatremic to 126, etiology thought to be SIADH. He reports he still has been drinking 1 glass of water q1h at home because his urologist told him to increased his fluid intake. In the ED he was noted to be hyponatremic to 121 with no symptoms of nausea/vomiting or headache. He was given 1L bolus of LR and repeat was 127. His hematuria also resolved while in the ED. To be admitted for further workup.

## 2019-07-20 NOTE — ED PROVIDER NOTE - OBJECTIVE STATEMENT
Patient is a 73M with a recent history of unknown prostate surgery or cystoscopy 4 months ago who comes in today complaining of hematuria and intermittent dysuria and suprapubic pain along with urinary hesitancy.  He has had approximately monthly UTIs treated by his urologist most recently with Augmentin x1 week, finished the course yesterday.      Denies fever, chills, flank pain, incontinence.

## 2019-07-20 NOTE — H&P ADULT - PROBLEM SELECTOR PLAN 1
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 Appears euvolemic on exam. Urine sodium of 22. S/p 1L LR bolus by ED with improvement to 127 in 6 hours. Of note pt's Sodium 4 days ago was 126 at neprhologists office.   -will fluid restrict 1.2 L per nephrologist Dr. Knapp   -trend bmp q6h, aiming no more than additional 4-6 meq increase in Na in 24 hours  -check urine osm. nephrology following  -no symptoms of nausea/vomiting/headache, neuro checks q6h

## 2019-07-20 NOTE — H&P ADULT - NSHPLABSRESULTS_GEN_ALL_CORE
-    127<L>  |  91<L>  |  14  ----------------------------<  116<H>  3.7   |  24  |  1.16    Ca    10.1      2019 18:54  Phos  2.3       Mg     1.9         TPro  7.6  /  Alb  4.3  /  TBili  0.5  /  DBili  x   /  AST  22  /  ALT  16  /  AlkPhos  95                              11.3   8.77  )-----------( 224      ( 2019 15:47 )             32.0             LIVER FUNCTIONS - ( 2019 15:47 )  Alb: 4.3 g/dL / Pro: 7.6 g/dL / ALK PHOS: 95 u/L / ALT: 16 u/L / AST: 22 u/L / GGT: x                 Urinalysis Basic - ( 2019 15:47 )    Color: PINK / Appearance: TURBID / S.006 / pH: 6.5  Gluc: NEGATIVE / Ketone: NEGATIVE  / Bili: NEGATIVE / Urobili: NORMAL   Blood: LARGE / Protein: 100 / Nitrite: NEGATIVE   Leuk Esterase: NEGATIVE / RBC: >50 / WBC 0-2   Sq Epi: OCC / Non Sq Epi: x / Bacteria: OCC        15:47 - VBG - pH: 7.44  | pCO2: 40    | pO2: 38    | Lactate: 1.4      CXR: clear lungs    EKG - sinus rhythm, nonspecific twi

## 2019-07-20 NOTE — ED PROVIDER NOTE - NS ED ROS FT
ROS:  GENERAL: No fever, no chills, no night sweats, no weight gain or loss  EYES: no change in vision, no blurred vision  HEENT: no trouble swallowing, no trouble speaking, no congestion, no sore throat  NECK: no pain or stiffness  CV: no chest pain or palpitations  RESP: no cough, no shortness of breath  GI: no abdominal pain, no nausea, no vomiting, no diarrhea, +constipation, no BRBPR or melena  : No dysuria, no frequency  NEURO: no headache, no weakness, no dizziness +confusion recently  SKIN: no rashes  HEME: No easy bruising or bleeding  MSK: No joint pain  Allan Alejandra M.D. -Resident

## 2019-07-20 NOTE — ED PROVIDER NOTE - CLINICAL SUMMARY MEDICAL DECISION MAKING FREE TEXT BOX
Patient presented with recurrent hematuria after unclear urologic procedure 4 months ago, and intermittent dysuria and suprapubic pain s/p 1 week Augmentin completed yesterday.  Patient was found to have significant hyponatremia of 121 on initial labwork, along with bilateral wheezes and right rales on chest exam.  Obtained EKG and CXR due to concerns for new CHF in the setting of smoking history and HTN history, along with SIADH due to unknown lung pathology.  We were contacted by the patient's nephrologist Dr. Knapp who noted that the patient had a sodium of 126 in his office 4 days ago, and that this relatively quick drop in sodium is somewhat concerning, and recommended admission under Dr. Mccormick's service and he will provide treatment recommendations.

## 2019-07-20 NOTE — ED PROVIDER NOTE - PHYSICAL EXAMINATION
PHYSICAL EXAM:  Vitals: Tmax 98.9 HR 83 /56 RR 18, SpO2 100 @ room air   GENERAL: in NAD, Sitting comfortable in bed, alert and cooperative, in no acute distress.  HEAD: Normocephalic/atraumatic, no landry's sign, no periorbital ecchymosis   EYES: PERRL, EOMs intact b/l w/out deficits, vision grossly intact.  ENMT: Moist membranes, no nasal discharge, no anterior, posterior, or supraclavicular LAD; no inflammation or exudates of pharynx, dentition in poor general condition.  CHEST/LUNG: Non-labored breathing, +wheezes bilaterally +rales on right, cough with deep inspiration, no diminished breath sounds, no accessory muscle use.  HEART: RRR no murmur/gallops/rubs, normal S1 and S2 with no extra heart sounds appreciated, no cyanosis, pallor, or peripheral edema.  Capillary refill <2 seconds, no carotid bruits.  ABDOMEN:  Soft, nontender, nondistended, no guarding or rebound, no masses, no hepatosplenomegaly.  EXTREMITIES: Warm and well-perfused with no clubbing or cyanosis, no LE edema, 2+ radial pulses b/l.  No significant joint deformity or abnormality.  No varicosities.  NERVOUS SYSTEM:  CN II-XII grossly intact, A&Ox4, No motor deficits or sensory deficits to b/l UEs.  MSK: Normal tone and bulk.  Heme/LYMPH: No ecchymosis or bruising or LAD.  SKIN:  No new rashes or DTIs, skin normal color, texture, and turgor with no lesions or eruptions.  PSYCH:  Normal mood, alert and oriented, shows appropriate judgement and insight.

## 2019-07-20 NOTE — ED ADULT TRIAGE NOTE - CHIEF COMPLAINT QUOTE
Pt c/o hematuria and dysuria, pt was getting treated for uti this past week but symptoms have been persistent. Pt does not endorse any flank or abdominal pain, no n/v/d.

## 2019-07-20 NOTE — ED PROVIDER NOTE - CARE PROVIDER_API CALL
Augustin Conde)  Internal Medicine; Nephrology  [address], [address] [place] [address]  Phone: [phone]  Fax: [phone]  Follow Up Time:

## 2019-07-21 LAB
ANION GAP SERPL CALC-SCNC: 10 MMO/L — SIGNIFICANT CHANGE UP (ref 7–14)
ANION GAP SERPL CALC-SCNC: 11 MMO/L — SIGNIFICANT CHANGE UP (ref 7–14)
ANION GAP SERPL CALC-SCNC: 11 MMO/L — SIGNIFICANT CHANGE UP (ref 7–14)
BASOPHILS # BLD AUTO: 0.04 K/UL — SIGNIFICANT CHANGE UP (ref 0–0.2)
BASOPHILS NFR BLD AUTO: 0.5 % — SIGNIFICANT CHANGE UP (ref 0–2)
BUN SERPL-MCNC: 13 MG/DL — SIGNIFICANT CHANGE UP (ref 7–23)
BUN SERPL-MCNC: 13 MG/DL — SIGNIFICANT CHANGE UP (ref 7–23)
BUN SERPL-MCNC: 18 MG/DL — SIGNIFICANT CHANGE UP (ref 7–23)
CALCIUM SERPL-MCNC: 9.7 MG/DL — SIGNIFICANT CHANGE UP (ref 8.4–10.5)
CALCIUM SERPL-MCNC: 9.7 MG/DL — SIGNIFICANT CHANGE UP (ref 8.4–10.5)
CALCIUM SERPL-MCNC: 9.8 MG/DL — SIGNIFICANT CHANGE UP (ref 8.4–10.5)
CHLORIDE SERPL-SCNC: 94 MMOL/L — LOW (ref 98–107)
CHLORIDE SERPL-SCNC: 97 MMOL/L — LOW (ref 98–107)
CHLORIDE SERPL-SCNC: 98 MMOL/L — SIGNIFICANT CHANGE UP (ref 98–107)
CO2 SERPL-SCNC: 24 MMOL/L — SIGNIFICANT CHANGE UP (ref 22–31)
CREAT SERPL-MCNC: 1.06 MG/DL — SIGNIFICANT CHANGE UP (ref 0.5–1.3)
CREAT SERPL-MCNC: 1.07 MG/DL — SIGNIFICANT CHANGE UP (ref 0.5–1.3)
CREAT SERPL-MCNC: 1.17 MG/DL — SIGNIFICANT CHANGE UP (ref 0.5–1.3)
EOSINOPHIL # BLD AUTO: 1.12 K/UL — HIGH (ref 0–0.5)
EOSINOPHIL NFR BLD AUTO: 15.3 % — HIGH (ref 0–6)
GLUCOSE SERPL-MCNC: 108 MG/DL — HIGH (ref 70–99)
GLUCOSE SERPL-MCNC: 165 MG/DL — HIGH (ref 70–99)
GLUCOSE SERPL-MCNC: 95 MG/DL — SIGNIFICANT CHANGE UP (ref 70–99)
HCT VFR BLD CALC: 31.6 % — LOW (ref 39–50)
HCV AB S/CO SERPL IA: 0.16 S/CO — SIGNIFICANT CHANGE UP (ref 0–0.99)
HCV AB SERPL-IMP: SIGNIFICANT CHANGE UP
HGB BLD-MCNC: 11.3 G/DL — LOW (ref 13–17)
IMM GRANULOCYTES NFR BLD AUTO: 0.3 % — SIGNIFICANT CHANGE UP (ref 0–1.5)
LYMPHOCYTES # BLD AUTO: 1.37 K/UL — SIGNIFICANT CHANGE UP (ref 1–3.3)
LYMPHOCYTES # BLD AUTO: 18.7 % — SIGNIFICANT CHANGE UP (ref 13–44)
MAGNESIUM SERPL-MCNC: 2.1 MG/DL — SIGNIFICANT CHANGE UP (ref 1.6–2.6)
MCHC RBC-ENTMCNC: 32.1 PG — SIGNIFICANT CHANGE UP (ref 27–34)
MCHC RBC-ENTMCNC: 35.8 % — SIGNIFICANT CHANGE UP (ref 32–36)
MCV RBC AUTO: 89.8 FL — SIGNIFICANT CHANGE UP (ref 80–100)
MONOCYTES # BLD AUTO: 0.59 K/UL — SIGNIFICANT CHANGE UP (ref 0–0.9)
MONOCYTES NFR BLD AUTO: 8.1 % — SIGNIFICANT CHANGE UP (ref 2–14)
NEUTROPHILS # BLD AUTO: 4.17 K/UL — SIGNIFICANT CHANGE UP (ref 1.8–7.4)
NEUTROPHILS NFR BLD AUTO: 57.1 % — SIGNIFICANT CHANGE UP (ref 43–77)
NRBC # FLD: 0 K/UL — SIGNIFICANT CHANGE UP (ref 0–0)
PLATELET # BLD AUTO: 205 K/UL — SIGNIFICANT CHANGE UP (ref 150–400)
PMV BLD: 9.5 FL — SIGNIFICANT CHANGE UP (ref 7–13)
POTASSIUM SERPL-MCNC: 3.8 MMOL/L — SIGNIFICANT CHANGE UP (ref 3.5–5.3)
POTASSIUM SERPL-MCNC: 3.9 MMOL/L — SIGNIFICANT CHANGE UP (ref 3.5–5.3)
POTASSIUM SERPL-MCNC: 4.1 MMOL/L — SIGNIFICANT CHANGE UP (ref 3.5–5.3)
POTASSIUM SERPL-SCNC: 3.8 MMOL/L — SIGNIFICANT CHANGE UP (ref 3.5–5.3)
POTASSIUM SERPL-SCNC: 3.9 MMOL/L — SIGNIFICANT CHANGE UP (ref 3.5–5.3)
POTASSIUM SERPL-SCNC: 4.1 MMOL/L — SIGNIFICANT CHANGE UP (ref 3.5–5.3)
RBC # BLD: 3.52 M/UL — LOW (ref 4.2–5.8)
RBC # FLD: 12.4 % — SIGNIFICANT CHANGE UP (ref 10.3–14.5)
SODIUM SERPL-SCNC: 129 MMOL/L — LOW (ref 135–145)
SODIUM SERPL-SCNC: 131 MMOL/L — LOW (ref 135–145)
SODIUM SERPL-SCNC: 133 MMOL/L — LOW (ref 135–145)
WBC # BLD: 7.31 K/UL — SIGNIFICANT CHANGE UP (ref 3.8–10.5)
WBC # FLD AUTO: 7.31 K/UL — SIGNIFICANT CHANGE UP (ref 3.8–10.5)

## 2019-07-21 PROCEDURE — 76770 US EXAM ABDO BACK WALL COMP: CPT | Mod: 26

## 2019-07-21 RX ORDER — SODIUM CHLORIDE 9 MG/ML
1000 INJECTION, SOLUTION INTRAVENOUS
Refills: 0 | Status: DISCONTINUED | OUTPATIENT
Start: 2019-07-21 | End: 2019-07-21

## 2019-07-21 RX ADMIN — ATORVASTATIN CALCIUM 10 MILLIGRAM(S): 80 TABLET, FILM COATED ORAL at 21:04

## 2019-07-21 RX ADMIN — TAMSULOSIN HYDROCHLORIDE 0.4 MILLIGRAM(S): 0.4 CAPSULE ORAL at 21:04

## 2019-07-21 RX ADMIN — SODIUM CHLORIDE 50 MILLILITER(S): 9 INJECTION, SOLUTION INTRAVENOUS at 06:10

## 2019-07-21 RX ADMIN — AMLODIPINE BESYLATE 10 MILLIGRAM(S): 2.5 TABLET ORAL at 06:21

## 2019-07-21 RX ADMIN — LOSARTAN POTASSIUM 100 MILLIGRAM(S): 100 TABLET, FILM COATED ORAL at 06:21

## 2019-07-21 NOTE — PROGRESS NOTE ADULT - ASSESSMENT
74 y/o male with hx of hyponatremia 2/2 SIADH, BPH, HTN, HLD, chronic hematuria and recurrent UTIs, recently completed augmentin course of abx prior to arrival, who presents today with worsening hematuria and dysuria for the past day found to be hyponatremic

## 2019-07-21 NOTE — CONSULT NOTE ADULT - SUBJECTIVE AND OBJECTIVE BOX
HPI:  Patient is a 73y Male who presented with hyponatremia and was admitted to the medical service. Patient has endorsed a history of intermittent gross hematuria and u/a upon admission demonstrates microscopic hematuria. Patient denies dysuria, passing of clots, fevers, or urinary retention. Patient follows with [facility] and has seen Dr. Perez and Dr. Booker. Patient has previously undergone workup with this group but is unsure of what was performed. Patient is not currently displaying gross hematuria.        PAST MEDICAL & SURGICAL HISTORY:  Urinary tract infection with hematuria  History of SIADH  History of BPH  Essential hypertension  HTN (Hypertension)  Anxiety  Hypercholesterolemia  No significant past surgical history    MEDICATIONS  (STANDING):  amLODIPine   Tablet 10 milliGRAM(s) Oral daily  atorvastatin 10 milliGRAM(s) Oral at bedtime  dextrose 5%. 1000 milliLiter(s) (50 mL/Hr) IV Continuous <Continuous>  losartan 100 milliGRAM(s) Oral daily  metoprolol succinate ER 25 milliGRAM(s) Oral daily  tamsulosin 0.4 milliGRAM(s) Oral at bedtime    MEDICATIONS  (PRN):    FAMILY HISTORY:  Family history of stroke    Allergies    flour (Rash)  No Known Drug Allergies  Nuts (Rash)    Intolerances      SOCIAL HISTORY:   Tobacco hx:    REVIEW OF SYSTEMS: Pertinent positives and negatives as stated in HPI, otherwise negative    Vital signs  T(C): 36.3, Max: 37.2 ( @ 15:53)  HR: 68  BP: 133/71  SpO2: 99%    Output    UOP    Physical Exam  Gen: NAD        LABS:           @ 04:24    WBC 7.31  / Hct 31.6  / SCr 1.07      @ 22:55    WBC --    / Hct --    / SCr 1.20         133<L>  |  98  |  13  ----------------------------<  95  3.8   |  24  |  1.07    Ca    9.8      2019 04:24  Phos  2.7       Mg     2.1         TPro  7.6  /  Alb  4.3  /  TBili  0.5  /  DBili  x   /  AST  22  /  ALT  16  /  AlkPhos  95        Urinalysis Basic - ( 2019 15:47 )    Color: PINK / Appearance: TURBID / S.006 / pH: 6.5  Gluc: NEGATIVE / Ketone: NEGATIVE  / Bili: NEGATIVE / Urobili: NORMAL   Blood: LARGE / Protein: 100 / Nitrite: NEGATIVE   Leuk Esterase: NEGATIVE / RBC: >50 / WBC 0-2   Sq Epi: OCC / Non Sq Epi: x / Bacteria: OCC

## 2019-07-21 NOTE — PROGRESS NOTE ADULT - ASSESSMENT
Patient is a 73M with a recent history of unknown prostate surgery or cystoscopy 4 months ago who comes in today complaining of hematuria and intermittent dysuria and suprapubic pain along with urinary hesitancy. In the work up found to have h  hyponatremia, so got admitted. Offers no complain at present     A/P:    Hyponatremia:  Has h/o hyponatremia sec to SIADH  Repeat Urine studies suggestive of polydipsia   Monitor Na level closely   Fluid restriction 1.2L/day- pt educated    Hematuria:  Urology on board   Monitor at present    Hypophosphatemia:  Improved serum PO4  Monitor PO4 level

## 2019-07-21 NOTE — CONSULT NOTE ADULT - ASSESSMENT
Pt is a 72 yo male with intermittent gross hematuria.  - No indication for additional urological intervention/evaluation given no current hematuria and stable hemoglobin/hematocrit.  - Patient to follow up with [facility] once discharged for evaluation.  - Follow up urine culture.  - Please contact urology for additional concerns/questions

## 2019-07-22 ENCOUNTER — TRANSCRIPTION ENCOUNTER (OUTPATIENT)
Age: 74
End: 2019-07-22

## 2019-07-22 LAB
ANION GAP SERPL CALC-SCNC: 10 MMO/L — SIGNIFICANT CHANGE UP (ref 7–14)
BACTERIA UR CULT: SIGNIFICANT CHANGE UP
BUN SERPL-MCNC: 16 MG/DL — SIGNIFICANT CHANGE UP (ref 7–23)
CALCIUM SERPL-MCNC: 9.7 MG/DL — SIGNIFICANT CHANGE UP (ref 8.4–10.5)
CHLORIDE SERPL-SCNC: 100 MMOL/L — SIGNIFICANT CHANGE UP (ref 98–107)
CO2 SERPL-SCNC: 24 MMOL/L — SIGNIFICANT CHANGE UP (ref 22–31)
CREAT SERPL-MCNC: 1.14 MG/DL — SIGNIFICANT CHANGE UP (ref 0.5–1.3)
GLUCOSE SERPL-MCNC: 97 MG/DL — SIGNIFICANT CHANGE UP (ref 70–99)
HCT VFR BLD CALC: 32.9 % — LOW (ref 39–50)
HGB BLD-MCNC: 11.3 G/DL — LOW (ref 13–17)
MAGNESIUM SERPL-MCNC: 2.2 MG/DL — SIGNIFICANT CHANGE UP (ref 1.6–2.6)
MCHC RBC-ENTMCNC: 31.6 PG — SIGNIFICANT CHANGE UP (ref 27–34)
MCHC RBC-ENTMCNC: 34.3 % — SIGNIFICANT CHANGE UP (ref 32–36)
MCV RBC AUTO: 91.9 FL — SIGNIFICANT CHANGE UP (ref 80–100)
NRBC # FLD: 0 K/UL — SIGNIFICANT CHANGE UP (ref 0–0)
PHOSPHATE SERPL-MCNC: 3 MG/DL — SIGNIFICANT CHANGE UP (ref 2.5–4.5)
PLATELET # BLD AUTO: 220 K/UL — SIGNIFICANT CHANGE UP (ref 150–400)
PMV BLD: 9.7 FL — SIGNIFICANT CHANGE UP (ref 7–13)
POTASSIUM SERPL-MCNC: 4.1 MMOL/L — SIGNIFICANT CHANGE UP (ref 3.5–5.3)
POTASSIUM SERPL-SCNC: 4.1 MMOL/L — SIGNIFICANT CHANGE UP (ref 3.5–5.3)
RBC # BLD: 3.58 M/UL — LOW (ref 4.2–5.8)
RBC # FLD: 12.7 % — SIGNIFICANT CHANGE UP (ref 10.3–14.5)
SODIUM SERPL-SCNC: 134 MMOL/L — LOW (ref 135–145)
SPECIMEN SOURCE: SIGNIFICANT CHANGE UP
WBC # BLD: 8.19 K/UL — SIGNIFICANT CHANGE UP (ref 3.8–10.5)
WBC # FLD AUTO: 8.19 K/UL — SIGNIFICANT CHANGE UP (ref 3.8–10.5)

## 2019-07-22 RX ADMIN — TAMSULOSIN HYDROCHLORIDE 0.4 MILLIGRAM(S): 0.4 CAPSULE ORAL at 22:23

## 2019-07-22 RX ADMIN — LOSARTAN POTASSIUM 100 MILLIGRAM(S): 100 TABLET, FILM COATED ORAL at 05:56

## 2019-07-22 RX ADMIN — ATORVASTATIN CALCIUM 10 MILLIGRAM(S): 80 TABLET, FILM COATED ORAL at 22:23

## 2019-07-22 RX ADMIN — AMLODIPINE BESYLATE 10 MILLIGRAM(S): 2.5 TABLET ORAL at 05:56

## 2019-07-22 RX ADMIN — Medication 25 MILLIGRAM(S): at 05:56

## 2019-07-22 NOTE — DISCHARGE NOTE PROVIDER - NSDCFUADDAPPT_GEN_ALL_CORE_FT
**You have a scheduled appointment at the [facility] with Dr. Vela. Please bring your Insurance Card and ID to your appointment.    Dr. Vela on July 25 @ 3PM   [facility] [address], Entrance D Room [address]  [place], [place]  [phone] **You have a scheduled appointment at the [facility] with Dr. Vela. Please bring your Insurance Card and ID to your appointment.    Dr. Vela on July 25 @ 3PM   [address] [place] [address], Entrance D Room [address]  [place], [place]  [phone]    **Please follow up with Dr. Cabello (Hematologist/Oncologist) at her office within 1 - 2 weeks of discharge. Call [phone] to make an appointment.

## 2019-07-22 NOTE — PROGRESS NOTE ADULT - ASSESSMENT
Patient is a 73M with a recent history of unknown prostate surgery or cystoscopy 4 months ago who comes in today complaining of hematuria and intermittent dysuria and suprapubic pain along with urinary hesitancy. In the work up found to have h  hyponatremia, so got admitted. Offers no complain at present     A/P:    Hyponatremia:  Has h/o hyponatremia sec to SIADH  Repeat Urine studies suggestive of polydipsia   Monitor Na level closely   Fluid restriction 1.2L/day- pt educated  Na improving, stable from renal stand point for discharge    Hematuria:  Urology on board   Monitor at present    Hypophosphatemia:  Improved serum PO4  Monitor PO4 level

## 2019-07-22 NOTE — DISCHARGE NOTE PROVIDER - NSDCCPCAREPLAN_GEN_ALL_CORE_FT
PRINCIPAL DISCHARGE DIAGNOSIS  Diagnosis: Hyponatremia  Assessment and Plan of Treatment: Your sodium level at discharge was 133. Please maintain a fluid restriction of 1.2L per day. You have a history of SIADH and you should continue to follow up with you primary care provider and or primary nephrologist for further care and management within 1 - 2 weeks of discharge.      SECONDARY DISCHARGE DIAGNOSES  Diagnosis: Renal mass, left  Assessment and Plan of Treatment: During your hospital admission, your workup showed a new mass on your left kidney. You have a scheduled appointment with Dr. Vela (urologist) at the [facility], [address] [place] [address] on July 25th at 3PM. Please follow up for further evaluation of this mass with ureteroscopy and possible biopsy.    Diagnosis: Hematuria of undiagnosed cause  Assessment and Plan of Treatment: It is believed that your persistent bloody urine is secondary to a newly found mass on your left kidney. Please follow up for further evaluation and management.  **You have a scheduled appointment with Dr. Vela (urologist) at the [facility], [address] [place] [address] on July 25th at 3PM. PRINCIPAL DISCHARGE DIAGNOSIS  Diagnosis: Hyponatremia  Assessment and Plan of Treatment: Your sodium level at discharge was 133. Please maintain a fluid restriction of 1.2L per day. You have a history of SIADH and you should continue to follow up with you primary care provider and or primary nephrologist for further care and management within 1 - 2 weeks of discharge.      SECONDARY DISCHARGE DIAGNOSES  Diagnosis: Renal mass, left  Assessment and Plan of Treatment: During your hospital admission, your workup showed a new mass on your left kidney. You have a scheduled appointment with Dr. Vela (urologist) at the [facility], [address], [place] on July 25th at 3PM. Please follow up for further evaluation of this mass with ureteroscopy and possible biopsy.  **Please also follow up with Dr. Cabello (Hematologist/Oncologist) at her office within 1 - 2 weeks of discharge. Call [phone] to make an appointment.    Diagnosis: Hematuria of undiagnosed cause  Assessment and Plan of Treatment: It is believed that your persistent bloody urine is secondary to a newly found mass on your left kidney. Please follow up for further evaluation and management.  **You have a scheduled appointment with Dr. Vela (urologist) at [facility] [address] [facility] [place] [place] on July 25th at 3PM.    Diagnosis: Essential hypertension  Assessment and Plan of Treatment: Please continue taking your blood pressure medication regimen as directed.  Losartan was increased to 100mg once per day  Amlodipine 10mg once per day  Metoprolol 50mg half tab once per day   Monitor for any visual changes, headaches or dizziness.  Monitor blood pressure regularly.  Follow up with your PCP for further management for high blood pressure.

## 2019-07-22 NOTE — DISCHARGE NOTE PROVIDER - HOSPITAL COURSE
74 y/o male with hx of hyponatremia 2/2 SIADH, BPH, HTN, HLD, chronic hematuria and recurrent UTIs who presents today with worsening hematuria and dysuria for the past day found to be hyponatremic in the ED to be admitted for further workup        HOSPITAL COURSE    Hyponatremia.      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     -Repeat Urine studies suggestive of polydipsia     -fluid restrict 1.2 L     -no symptoms of nausea/vomiting/headache, neuro checks q6h.     -nephrology following     -sodium improved        Hematuria of undiagnosed cause.      unclear, possibly related to UTI/cystitis? no current symptoms and improved with clear urine at bedside    -UA with >50rbc but no nitrites or leuks. NO white count or fever. S/p course of augmentin    -f/u renal US:     -urology c/s -  No indication for additional urological intervention/evaluation given no current hematuria and stable hemoglobin/hematocrit. F/U as OP with [facility]    -f/u UCx: NTD        History of BPH.      -c/w flomax         Essential hypertension.      -restart home metoprolol and losartan. 72 y/o male with hx of hyponatremia 2/2 SIADH, BPH, HTN, HLD, chronic hematuria and recurrent UTIs who presents today with worsening hematuria and dysuria for the past day found to be hyponatremic in the ED to be admitted for further workup        HOSPITAL COURSE        Hyponatremia.      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     -Repeat Urine studies suggestive of polydipsia     -fluid restrict 1.2 L     -no symptoms of nausea/vomiting/headache, neuro checks q6h.     -nephrology following     -sodium improved        Hematuria of undiagnosed cause.      -Likely secondary to new renal mass    -No current symptoms and improved with clear urine at bedside    -UA with >50rbc but no nitrites or leuks. NO white count or fever. S/p course of augmentin    -Renal US: New L lower pole caliectasis with probable hemorrhagic debris. Recommend CT or MR urogram.    -Urology c/s -  No indication for additional urological intervention/evaluation given no current hematuria and stable hemoglobin/hematocrit. F/U as OP    -UCx: NTD    -CT urogram: A 2.7 cm left lower pole renal mass concerning for transitional cell carcinoma. No additional sites of disease.    - Follow up appointment at [facility] with Dr. Vela, July 25th, 3PM        R/O Transitional cell carcinoma.    - Heme/Onc consult    - CT urogram: A 2.7 cm left lower pole renal mass concerning for transitional cell carcinoma. No additional sites of disease.    - Follow up appointment at [facility] with Dr. Vela, July 25th, 3PM for ureteroscopy and possible biopsy        History of BPH.      -c/w flomax         Essential hypertension.      -Metoprolol 25mg     -Losartan increased from 50mg to 100mg    -Amlodipine 10mg         On 7/25 this case was reviewed with Dr. Mccormick, the patient is medically stable and optimized for discharge with follow up at [facility] 74 y/o male with hx of hyponatremia 2/2 SIADH, BPH, HTN, HLD, chronic hematuria and recurrent UTIs who presents today with worsening hematuria and dysuria for the past day found to be hyponatremic in the ED to be admitted for further workup        HOSPITAL COURSE        Hyponatremia.      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     -Repeat Urine studies suggestive of polydipsia     -fluid restrict 1.2 L     -no symptoms of nausea/vomiting/headache, neuro checks q6h.     -nephrology following     -sodium improved        Hematuria of undiagnosed cause.      -Likely secondary to new renal mass    -No current symptoms and improved with clear urine at bedside    -UA with >50rbc but no nitrites or leuks. NO white count or fever. S/p course of augmentin    -Renal US: New L lower pole caliectasis with probable hemorrhagic debris. Recommend CT or MR urogram.    -Urology c/s -  No indication for additional urological intervention/evaluation given no current hematuria and stable hemoglobin/hematocrit. F/U as OP    -UCx: NTD    -CT urogram: A 2.7 cm left lower pole renal mass concerning for transitional cell carcinoma. No additional sites of disease.    - Follow up appointment at [facility] with Dr. Vela, July 25th, 3PM        R/O Transitional cell carcinoma.    - Heme/Onc consult    - CT urogram: A 2.7 cm left lower pole renal mass concerning for transitional cell carcinoma. No additional sites of disease.    - CT Chest: Nonspecific 4-5 mm nodules within the right apex (series 2 images 19, 24 and 33).    - Follow up appointment at [facility] with Dr. Vela, July 25th, 3PM for ureteroscopy and possible biopsy    - Follow up outpatient with Dr Cabello        History of BPH.      -c/w flomax         Essential hypertension.      -Metoprolol 25mg     -Losartan increased from 50mg to 100mg    -Amlodipine 10mg         On 7/25 this case was reviewed with Dr. Mccormick, the patient is medically stable and optimized for discharge with follow up at [facility]

## 2019-07-22 NOTE — DISCHARGE NOTE PROVIDER - CARE PROVIDERS DIRECT ADDRESSES
,DirectAddress_Unknown,[email or web address],DirectAddress_Unknown,DirectAddress_Unknown

## 2019-07-22 NOTE — DISCHARGE NOTE PROVIDER - PROVIDER TOKENS
PROVIDER:[TOKEN:[[id number]]],PROVIDER:[TOKEN:[[id number]]],PROVIDER:[TOKEN:[[id number]]],FREE:[LAST:[Your primary care provider],PHONE:[(   )    -],FAX:[(   )    -]]

## 2019-07-22 NOTE — DISCHARGE NOTE PROVIDER - CARE PROVIDER_API CALL
Khadra Cabello)  Hematology; Internal Medicine; Medical Oncology  [address] [place] [address], [address]  [place] [address]  Phone: [phone]  Fax: [phone]  Follow Up Time:     Albertina Vela)  Urology  [address], [address]  [place] [address]  Phone: [phone]  Fax: [phone]  Follow Up Time:     Natanael Mccormick)  Medicine  [address] [place] [address]  Phone: [phone]  Fax: [phone]  Follow Up Time:     Your primary care provider,   Phone: (   )    -  Fax: (   )    -  Follow Up Time:

## 2019-07-22 NOTE — CHART NOTE - NSCHARTNOTEFT_GEN_A_CORE
Called by team to evaluate left lower pole caliectasis.  Some internal debris concerning for hemorrhage in setting of microscopic hematuria  Patient's SCr normal.  Would obtain a CT Urogram (CT Abdomen and Pelvis w and w/o contrast protocoled as CT Urogram with radiology) to evaluate the collecting system for any etiology for the hemorrhage.

## 2019-07-23 DIAGNOSIS — Z71.89 OTHER SPECIFIED COUNSELING: ICD-10-CM

## 2019-07-23 LAB
ANION GAP SERPL CALC-SCNC: 12 MMO/L — SIGNIFICANT CHANGE UP (ref 7–14)
BUN SERPL-MCNC: 15 MG/DL — SIGNIFICANT CHANGE UP (ref 7–23)
CALCIUM SERPL-MCNC: 9.9 MG/DL — SIGNIFICANT CHANGE UP (ref 8.4–10.5)
CHLORIDE SERPL-SCNC: 97 MMOL/L — LOW (ref 98–107)
CO2 SERPL-SCNC: 23 MMOL/L — SIGNIFICANT CHANGE UP (ref 22–31)
CREAT SERPL-MCNC: 1.14 MG/DL — SIGNIFICANT CHANGE UP (ref 0.5–1.3)
GLUCOSE SERPL-MCNC: 98 MG/DL — SIGNIFICANT CHANGE UP (ref 70–99)
HCT VFR BLD CALC: 35 % — LOW (ref 39–50)
HGB BLD-MCNC: 12.1 G/DL — LOW (ref 13–17)
MAGNESIUM SERPL-MCNC: 2.1 MG/DL — SIGNIFICANT CHANGE UP (ref 1.6–2.6)
MCHC RBC-ENTMCNC: 32 PG — SIGNIFICANT CHANGE UP (ref 27–34)
MCHC RBC-ENTMCNC: 34.6 % — SIGNIFICANT CHANGE UP (ref 32–36)
MCV RBC AUTO: 92.6 FL — SIGNIFICANT CHANGE UP (ref 80–100)
NRBC # FLD: 0 K/UL — SIGNIFICANT CHANGE UP (ref 0–0)
OSMOLALITY UR: 326 MOSMO/KG — SIGNIFICANT CHANGE UP (ref 50–1200)
PHOSPHATE SERPL-MCNC: 2.9 MG/DL — SIGNIFICANT CHANGE UP (ref 2.5–4.5)
PLATELET # BLD AUTO: 248 K/UL — SIGNIFICANT CHANGE UP (ref 150–400)
PMV BLD: 10 FL — SIGNIFICANT CHANGE UP (ref 7–13)
POTASSIUM SERPL-MCNC: 4.1 MMOL/L — SIGNIFICANT CHANGE UP (ref 3.5–5.3)
POTASSIUM SERPL-SCNC: 4.1 MMOL/L — SIGNIFICANT CHANGE UP (ref 3.5–5.3)
RBC # BLD: 3.78 M/UL — LOW (ref 4.2–5.8)
RBC # FLD: 13 % — SIGNIFICANT CHANGE UP (ref 10.3–14.5)
SODIUM SERPL-SCNC: 132 MMOL/L — LOW (ref 135–145)
SODIUM UR-SCNC: 89 MMOL/L — SIGNIFICANT CHANGE UP
WBC # BLD: 8.9 K/UL — SIGNIFICANT CHANGE UP (ref 3.8–10.5)
WBC # FLD AUTO: 8.9 K/UL — SIGNIFICANT CHANGE UP (ref 3.8–10.5)

## 2019-07-23 PROCEDURE — 74178 CT ABD&PLV WO CNTR FLWD CNTR: CPT | Mod: 26

## 2019-07-23 RX ADMIN — Medication 25 MILLIGRAM(S): at 06:34

## 2019-07-23 RX ADMIN — LOSARTAN POTASSIUM 100 MILLIGRAM(S): 100 TABLET, FILM COATED ORAL at 06:34

## 2019-07-23 RX ADMIN — TAMSULOSIN HYDROCHLORIDE 0.4 MILLIGRAM(S): 0.4 CAPSULE ORAL at 22:23

## 2019-07-23 RX ADMIN — AMLODIPINE BESYLATE 10 MILLIGRAM(S): 2.5 TABLET ORAL at 06:34

## 2019-07-23 RX ADMIN — ATORVASTATIN CALCIUM 10 MILLIGRAM(S): 80 TABLET, FILM COATED ORAL at 22:23

## 2019-07-23 NOTE — PROGRESS NOTE ADULT - ASSESSMENT
Patient is a 73M with a recent history of unknown prostate surgery or cystoscopy 4 months ago who comes in today complaining of hematuria and intermittent dysuria and suprapubic pain along with urinary hesitancy. In the work up found to have h  hyponatremia, so got admitted. 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 repeat urine na, osmo      Hematuria:  Urology on board   pending CT   Monitor at present    Hypophosphatemia:  Improved serum PO4  Monitor PO4 level Patient is a 73M with a recent history of unknown prostate surgery or cystoscopy 4 months ago who comes in today complaining of hematuria and intermittent dysuria and suprapubic pain along with urinary hesitancy. In the work up found to have h  hyponatremia, so got admitted. 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 repeat urine na, osmo      Hematuria:  Urology on board   pending CT   Monitor at present    Hypophosphatemia:  Improved serum PO4  Monitor PO4 level

## 2019-07-24 ENCOUNTER — RESULT REVIEW (OUTPATIENT)
Age: 74
End: 2019-07-24

## 2019-07-24 PROBLEM — N39.0 URINARY TRACT INFECTION, SITE NOT SPECIFIED: Chronic | Status: ACTIVE | Noted: 2019-07-20

## 2019-07-24 PROBLEM — Z86.39 PERSONAL HISTORY OF OTHER ENDOCRINE, NUTRITIONAL AND METABOLIC DISEASE: Chronic | Status: ACTIVE | Noted: 2019-07-20

## 2019-07-24 PROBLEM — Z87.438 PERSONAL HISTORY OF OTHER DISEASES OF MALE GENITAL ORGANS: Chronic | Status: ACTIVE | Noted: 2019-07-20

## 2019-07-24 LAB
ANION GAP SERPL CALC-SCNC: 15 MMO/L — HIGH (ref 7–14)
BUN SERPL-MCNC: 22 MG/DL — SIGNIFICANT CHANGE UP (ref 7–23)
CALCIUM SERPL-MCNC: 10.3 MG/DL — SIGNIFICANT CHANGE UP (ref 8.4–10.5)
CHLORIDE SERPL-SCNC: 97 MMOL/L — LOW (ref 98–107)
CO2 SERPL-SCNC: 21 MMOL/L — LOW (ref 22–31)
CREAT SERPL-MCNC: 1.31 MG/DL — HIGH (ref 0.5–1.3)
GLUCOSE SERPL-MCNC: 115 MG/DL — HIGH (ref 70–99)
HCT VFR BLD CALC: 35.6 % — LOW (ref 39–50)
HGB BLD-MCNC: 12.3 G/DL — LOW (ref 13–17)
MAGNESIUM SERPL-MCNC: 2.1 MG/DL — SIGNIFICANT CHANGE UP (ref 1.6–2.6)
MCHC RBC-ENTMCNC: 31.9 PG — SIGNIFICANT CHANGE UP (ref 27–34)
MCHC RBC-ENTMCNC: 34.6 % — SIGNIFICANT CHANGE UP (ref 32–36)
MCV RBC AUTO: 92.5 FL — SIGNIFICANT CHANGE UP (ref 80–100)
NRBC # FLD: 0 K/UL — SIGNIFICANT CHANGE UP (ref 0–0)
PHOSPHATE SERPL-MCNC: 3.3 MG/DL — SIGNIFICANT CHANGE UP (ref 2.5–4.5)
PLATELET # BLD AUTO: 284 K/UL — SIGNIFICANT CHANGE UP (ref 150–400)
PMV BLD: 9.7 FL — SIGNIFICANT CHANGE UP (ref 7–13)
POTASSIUM SERPL-MCNC: 4.1 MMOL/L — SIGNIFICANT CHANGE UP (ref 3.5–5.3)
POTASSIUM SERPL-SCNC: 4.1 MMOL/L — SIGNIFICANT CHANGE UP (ref 3.5–5.3)
RBC # BLD: 3.85 M/UL — LOW (ref 4.2–5.8)
RBC # FLD: 12.9 % — SIGNIFICANT CHANGE UP (ref 10.3–14.5)
SODIUM SERPL-SCNC: 133 MMOL/L — LOW (ref 135–145)
WBC # BLD: 10.78 K/UL — HIGH (ref 3.8–10.5)
WBC # FLD AUTO: 10.78 K/UL — HIGH (ref 3.8–10.5)

## 2019-07-24 PROCEDURE — 71250 CT THORAX DX C-: CPT | Mod: 26

## 2019-07-24 PROCEDURE — 88112 CYTOPATH CELL ENHANCE TECH: CPT | Mod: 26

## 2019-07-24 RX ADMIN — AMLODIPINE BESYLATE 10 MILLIGRAM(S): 2.5 TABLET ORAL at 05:57

## 2019-07-24 RX ADMIN — TAMSULOSIN HYDROCHLORIDE 0.4 MILLIGRAM(S): 0.4 CAPSULE ORAL at 21:09

## 2019-07-24 RX ADMIN — LOSARTAN POTASSIUM 100 MILLIGRAM(S): 100 TABLET, FILM COATED ORAL at 05:57

## 2019-07-24 RX ADMIN — Medication 25 MILLIGRAM(S): at 05:57

## 2019-07-24 RX ADMIN — ATORVASTATIN CALCIUM 10 MILLIGRAM(S): 80 TABLET, FILM COATED ORAL at 21:09

## 2019-07-24 NOTE — CONSULT NOTE ADULT - ASSESSMENT
74 y/o male with hx of hyponatremia 2/2 SIADH, BPH, HTN, HLD, chronic hematuria and recurrent UTIs who presents with worsening hematuria and dysuria, found to have a 2.7cm L renal mass, concerning for transitional cell ca. Pt reports intermittent hematuria still.     1. renal mass -- concerning for malignancy  --  eval noted, will need further w/u but recommended to cont with pt's primary urologist. If no distant mets, may be surgical candidate  -- urine cytology per  recs  -- check CT chest to complete initial staging  -- check LDH  -- monitor hematuria    2. hematuria -- likely related to renal mass  -- hgb adequate and only mildly decreased  -- check Fe panel given chronic hematuria  -- monitor clinically and CBC    Plan and impression d/w pt and primary team. Pt should f/u with me in office 1-2 wks after d/c as well. Will follow, [phone]

## 2019-07-24 NOTE — CHART NOTE - NSCHARTNOTEFT_GEN_A_CORE
Spoke with medical attending, Dr. Mccormick. Patient medically stable and optimized for discharge pending CT chest. Urologic findings to be follow up with at [facility], patient with scheduled appointment.    Jack Ho PA-C

## 2019-07-24 NOTE — PROGRESS NOTE ADULT - ASSESSMENT
72 y/o male with hx of hyponatremia 2/2 SIADH, BPH, HTN, HLD, chronic hematuria and recurrent UTIs, recently completed augmentin course of abx prior to arrival, who presents today with worsening hematuria and dysuria for the past day found to be hyponatremic

## 2019-07-24 NOTE — PROGRESS NOTE ADULT - ASSESSMENT
Patient is a 73M with a recent history of unknown prostate surgery or cystoscopy 4 months ago who comes in today complaining of hematuria and intermittent dysuria and suprapubic pain along with urinary hesitancy. In the work up found to have h  hyponatremia, so got admitted. 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

## 2019-07-24 NOTE — CONSULT NOTE ADULT - SUBJECTIVE AND OBJECTIVE BOX
HPI:  This is a 74 y/o male with hx of hyponatremia 2/2 SIADH, BPH, HTN, HLD, chronic hematuria and recurrent UTIs who presents with worsening hematuria and dysuria for the past day. Pt this year reporteldkim has had repeat issues with intermittent hematuria and dysuria that has been worked up in the outpatient setting with cystoscopyx2 (unknown results - follows with [facility]) and recently finished a course of augmentin for a UTI. Of note he also saw his nephrologist on 7/16 and was noted to be hyponatremic to 126, etiology thought to be SIADH. He underwent further imaging and was found to have a 2.7cm L renal mass, concerning for transitional cell ca. Pt reports intermittent hematuria still.       PAST MEDICAL & SURGICAL HISTORY:  Urinary tract infection with hematuria  History of SIADH  History of BPH  Essential hypertension  HTN (Hypertension)  Anxiety  Hypercholesterolemia  No significant past surgical history      FAMILY HISTORY:  Family history of stroke      Alochol: Denied  Smoking: Nonsmoker  Drug Use: Denied  Marital Status:         Allergies    flour (Rash)  No Known Drug Allergies  Nuts (Rash)    Intolerances        MEDICATIONS  (STANDING):  amLODIPine   Tablet 10 milliGRAM(s) Oral daily  atorvastatin 10 milliGRAM(s) Oral at bedtime  losartan 100 milliGRAM(s) Oral daily  metoprolol succinate ER 25 milliGRAM(s) Oral daily  tamsulosin 0.4 milliGRAM(s) Oral at bedtime    MEDICATIONS  (PRN):      ROS  No fever, sweats, chills  No epistaxis, HA, sore throat  No CP, SOB, cough, sputum  No n/v/d, abd pain, melena, hematochezia  No edema  No rash  No anxiety  No back pain, joint pain  No bruising  No dysuria, + hematuria    T(C): 36.4 (07-24-19 @ 12:26), Max: 36.7 (07-24-19 @ 05:23)  HR: 72 (07-24-19 @ 12:26) (72 - 76)  BP: 130/55 (07-24-19 @ 12:26) (126/59 - 130/55)  RR: 18 (07-24-19 @ 12:26) (18 - 18)  SpO2: 100% (07-24-19 @ 12:26) (99% - 100%)  Wt(kg): --    PE  NAD  Awake, alert  Anicteric, MMM  RRR  CTAB  Abd soft, NT, ND  No edema  No rash grossly  FROM                          12.3   10.78 )-----------( 284      ( 24 Jul 2019 05:44 )             35.6       07-24    133<L>  |  97<L>  |  22  ----------------------------<  115<H>  4.1   |  21<L>  |  1.31<H>    Ca    10.3      24 Jul 2019 05:44  Phos  3.3     07-24  Mg     2.1     07-24

## 2019-07-25 ENCOUNTER — TRANSCRIPTION ENCOUNTER (OUTPATIENT)
Age: 74
End: 2019-07-25

## 2019-07-25 ENCOUNTER — LABORATORY RESULT (OUTPATIENT)
Age: 74
End: 2019-07-25

## 2019-07-25 ENCOUNTER — APPOINTMENT (OUTPATIENT)
Dept: UROLOGY | Facility: CLINIC | Age: 74
End: 2019-07-25
Payer: MEDICARE

## 2019-07-25 VITALS
TEMPERATURE: 98 F | RESPIRATION RATE: 18 BRPM | SYSTOLIC BLOOD PRESSURE: 137 MMHG | DIASTOLIC BLOOD PRESSURE: 58 MMHG | OXYGEN SATURATION: 99 % | HEART RATE: 74 BPM

## 2019-07-25 VITALS
SYSTOLIC BLOOD PRESSURE: 145 MMHG | WEIGHT: 155 LBS | TEMPERATURE: 98.1 F | HEART RATE: 85 BPM | RESPIRATION RATE: 17 BRPM | BODY MASS INDEX: 25.83 KG/M2 | DIASTOLIC BLOOD PRESSURE: 71 MMHG | HEIGHT: 65 IN

## 2019-07-25 DIAGNOSIS — C68.9 MALIGNANT NEOPLASM OF URINARY ORGAN, UNSPECIFIED: ICD-10-CM

## 2019-07-25 DIAGNOSIS — Z86.79 PERSONAL HISTORY OF OTHER DISEASES OF THE CIRCULATORY SYSTEM: ICD-10-CM

## 2019-07-25 LAB
ANION GAP SERPL CALC-SCNC: 12 MMO/L — SIGNIFICANT CHANGE UP (ref 7–14)
BUN SERPL-MCNC: 19 MG/DL — SIGNIFICANT CHANGE UP (ref 7–23)
CALCIUM SERPL-MCNC: 9.8 MG/DL — SIGNIFICANT CHANGE UP (ref 8.4–10.5)
CHLORIDE SERPL-SCNC: 97 MMOL/L — LOW (ref 98–107)
CO2 SERPL-SCNC: 24 MMOL/L — SIGNIFICANT CHANGE UP (ref 22–31)
CREAT SERPL-MCNC: 1.26 MG/DL — SIGNIFICANT CHANGE UP (ref 0.5–1.3)
FERRITIN SERPL-MCNC: 90.94 NG/ML — SIGNIFICANT CHANGE UP (ref 30–400)
GLUCOSE SERPL-MCNC: 100 MG/DL — HIGH (ref 70–99)
HCT VFR BLD CALC: 33.4 % — LOW (ref 39–50)
HGB BLD-MCNC: 11.5 G/DL — LOW (ref 13–17)
IRON SATN MFR SERPL: 303 UG/DL — SIGNIFICANT CHANGE UP (ref 155–535)
IRON SATN MFR SERPL: 76 UG/DL — SIGNIFICANT CHANGE UP (ref 45–165)
LDH SERPL L TO P-CCNC: 146 U/L — SIGNIFICANT CHANGE UP (ref 135–225)
MCHC RBC-ENTMCNC: 32.3 PG — SIGNIFICANT CHANGE UP (ref 27–34)
MCHC RBC-ENTMCNC: 34.4 % — SIGNIFICANT CHANGE UP (ref 32–36)
MCV RBC AUTO: 93.8 FL — SIGNIFICANT CHANGE UP (ref 80–100)
NRBC # FLD: 0 K/UL — SIGNIFICANT CHANGE UP (ref 0–0)
PLATELET # BLD AUTO: 265 K/UL — SIGNIFICANT CHANGE UP (ref 150–400)
PMV BLD: 9.7 FL — SIGNIFICANT CHANGE UP (ref 7–13)
POTASSIUM SERPL-MCNC: 4.1 MMOL/L — SIGNIFICANT CHANGE UP (ref 3.5–5.3)
POTASSIUM SERPL-SCNC: 4.1 MMOL/L — SIGNIFICANT CHANGE UP (ref 3.5–5.3)
RBC # BLD: 3.56 M/UL — LOW (ref 4.2–5.8)
RBC # FLD: 13 % — SIGNIFICANT CHANGE UP (ref 10.3–14.5)
SODIUM SERPL-SCNC: 133 MMOL/L — LOW (ref 135–145)
UIBC SERPL-MCNC: 227.4 UG/DL — SIGNIFICANT CHANGE UP (ref 110–370)
WBC # BLD: 8.28 K/UL — SIGNIFICANT CHANGE UP (ref 3.8–10.5)
WBC # FLD AUTO: 8.28 K/UL — SIGNIFICANT CHANGE UP (ref 3.8–10.5)

## 2019-07-25 PROCEDURE — 99204 OFFICE O/P NEW MOD 45 MIN: CPT

## 2019-07-25 RX ORDER — FUROSEMIDE 20 MG/1
20 TABLET ORAL
Qty: 60 | Refills: 0 | Status: ACTIVE | COMMUNITY
Start: 2019-02-06

## 2019-07-25 RX ORDER — DOXYCYCLINE HYCLATE 100 MG/1
100 CAPSULE ORAL
Qty: 10 | Refills: 0 | Status: COMPLETED | COMMUNITY
Start: 2019-04-11

## 2019-07-25 RX ORDER — OMEPRAZOLE 40 MG/1
40 CAPSULE, DELAYED RELEASE ORAL
Qty: 30 | Refills: 0 | Status: ACTIVE | COMMUNITY
Start: 2019-03-04

## 2019-07-25 RX ORDER — CEPHALEXIN 500 MG/1
500 CAPSULE ORAL
Qty: 20 | Refills: 0 | Status: COMPLETED | COMMUNITY
Start: 2019-06-21

## 2019-07-25 RX ORDER — TAMSULOSIN HYDROCHLORIDE 0.4 MG/1
1 CAPSULE ORAL
Qty: 30 | Refills: 0
Start: 2019-07-25 | End: 2019-08-23

## 2019-07-25 RX ORDER — AMLODIPINE BESYLATE 10 MG/1
10 TABLET ORAL
Qty: 30 | Refills: 0 | Status: ACTIVE | COMMUNITY
Start: 2019-02-06

## 2019-07-25 RX ORDER — FLUTICASONE PROPIONATE 50 UG/1
50 SPRAY, METERED NASAL
Qty: 16 | Refills: 0 | Status: ACTIVE | COMMUNITY
Start: 2019-03-04

## 2019-07-25 RX ORDER — AMLODIPINE BESYLATE 2.5 MG/1
1 TABLET ORAL
Qty: 30 | Refills: 0
Start: 2019-07-25 | End: 2019-08-23

## 2019-07-25 RX ORDER — AMOXICILLIN AND CLAVULANATE POTASSIUM 875; 125 MG/1; MG/1
875-125 TABLET, COATED ORAL
Qty: 14 | Refills: 0 | Status: COMPLETED | COMMUNITY
Start: 2019-07-12

## 2019-07-25 RX ORDER — LOSARTAN POTASSIUM 100 MG/1
1 TABLET, FILM COATED ORAL
Qty: 0 | Refills: 0 | DISCHARGE

## 2019-07-25 RX ORDER — CIPROFLOXACIN HYDROCHLORIDE 500 MG/1
500 TABLET, FILM COATED ORAL
Qty: 20 | Refills: 0 | Status: COMPLETED | COMMUNITY
Start: 2019-02-20

## 2019-07-25 RX ORDER — PRAVASTATIN SODIUM 40 MG/1
40 TABLET ORAL
Qty: 30 | Refills: 0 | Status: ACTIVE | COMMUNITY
Start: 2019-03-04

## 2019-07-25 RX ORDER — LOSARTAN POTASSIUM 100 MG/1
1 TABLET, FILM COATED ORAL
Qty: 30 | Refills: 0
Start: 2019-07-25 | End: 2019-08-23

## 2019-07-25 RX ORDER — FLUTICASONE PROPIONATE AND SALMETEROL 50; 250 UG/1; UG/1
250-50 POWDER RESPIRATORY (INHALATION)
Qty: 60 | Refills: 0 | Status: ACTIVE | COMMUNITY
Start: 2019-02-06

## 2019-07-25 RX ADMIN — LOSARTAN POTASSIUM 100 MILLIGRAM(S): 100 TABLET, FILM COATED ORAL at 06:25

## 2019-07-25 RX ADMIN — AMLODIPINE BESYLATE 10 MILLIGRAM(S): 2.5 TABLET ORAL at 06:25

## 2019-07-25 RX ADMIN — Medication 25 MILLIGRAM(S): at 06:25

## 2019-07-25 NOTE — PROGRESS NOTE ADULT - PROBLEM SELECTOR PLAN 1
abnormal CT Urogram findings  very likely transitional cell carcinoma. Pt agrees to f/u with outpt urologist for additional management and biopsy for further management. Pt fully understands to f/u with urology immediately. Appointment made for pt. Info provided.  -> pt is cleared for safe discharge today
abnormal CT Urogram findings  -> F/u Urology for biopsy or alternative procedure for definitive diagnosis
improving  nephro management appreciated  c/w rx and treatment
improving  nephro management appreciated  c/w rx and treatment
persistent gross hematuria   - F/u CT Renal  - F/u Urology recs/management  ucx negative growth (pt completed outpt augmentin) and pertinent uti symptoms  d/c planning pending the above

## 2019-07-25 NOTE — DISCHARGE NOTE NURSING/CASE MANAGEMENT/SOCIAL WORK - NSDCFUADDAPPT_GEN_ALL_CORE_FT
**You have a scheduled appointment at the [facility] with Dr. Vela. Please bring your Insurance Card and ID to your appointment.    Dr. Vela on July 25 @ 3PM   [address] [place] [address], Entrance D [address]  [place], [place]  [phone]    **Please follow up with Dr. Cabello (Hematologist/Oncologist) at her office within 1 - 2 weeks of discharge. Call [phone] to make an appointment.

## 2019-07-25 NOTE — DISCHARGE NOTE NURSING/CASE MANAGEMENT/SOCIAL WORK - NSDCDPATPORTLINK_GEN_ALL_CORE
You can access the [facility], offered by [facility], by registering with the following website: [email or web address]

## 2019-07-25 NOTE — PROGRESS NOTE ADULT - SUBJECTIVE AND OBJECTIVE BOX
Patient seen and examined at bedside  No acute events noted overnight  Case discussed with medical team    HPI:  This is a 74 y/o male with hx of hyponatremia 2/2 SIADH, BPH, HTN, HLD, chronic hematuria and recurrent UTIs who presents today with worsening hematuria and dysuria for the past day. Pt this year reporteldy has had repeat issues with intermittent hematuria and dysuria that has been worked up in the outpatient setting with cystoscopyx2 (unknown results - follows with [facility]) and recently finished a course of augmentin yesterday for a UTI. He says the symptoms come and go and yesterday his urine started having more blood than usual associated with dysuria. No fevers, chills, abdominal pain, flank pain reported. No weight loss reported and appetite is reported as good. Of note he also saw his nephrologist on 7/16 and was noted to be hyponatremic to 126, etiology thought to be SIADH. He reports he still has been drinking 1 glass of water q1h at home because his urologist told him to increased his fluid intake. In the ED he was noted to be hyponatremic to 121 with no symptoms of nausea/vomiting or headache. He was given 1L bolus of LR and repeat was 127. His hematuria also resolved while in the ED. To be admitted for further workup. (20 Jul 2019 21:27)      PAST MEDICAL & SURGICAL HISTORY:  Urinary tract infection with hematuria  History of SIADH  History of BPH  Essential hypertension  HTN (Hypertension)  Anxiety  Hypercholesterolemia  No significant past surgical history      flour (Rash)  No Known Drug Allergies  Nuts (Rash)       MEDICATIONS  (STANDING):  amLODIPine   Tablet 10 milliGRAM(s) Oral daily  atorvastatin 10 milliGRAM(s) Oral at bedtime  losartan 100 milliGRAM(s) Oral daily  metoprolol succinate ER 25 milliGRAM(s) Oral daily  tamsulosin 0.4 milliGRAM(s) Oral at bedtime    MEDICATIONS  (PRN):      REVIEW OF SYSTEMS:  CONSTITUTIONAL: (+) malaise.   EYES: No acute change in vision   ENT:  No tinnitus  NECK: No stiffness  RESPIRATORY: No hemoptysis  CARDIOVASCULAR: No chest pain, palpitations, syncope  GASTROINTESTINAL: No hematemesis, diarrhea, melena, or hematochezia.  GENITOURINARY: hematuria  NEUROLOGICAL: No headaches  LYMPH Nodes: No enlarged glands  ENDOCRINE: No heat or cold intolerance	    T(C): 36.8 (07-25-19 @ 12:10), Max: 36.8 (07-25-19 @ 06:17)  HR: 74 (07-25-19 @ 12:10) (70 - 77)  BP: 137/58 (07-25-19 @ 12:10) (137/58 - 137/66)  RR: 18 (07-25-19 @ 12:10) (17 - 18)  SpO2: 99% (07-25-19 @ 12:10) (99% - 100%)    PHYSICAL EXAMINATION:   Constitutional: WD, NAD  HEENT: NC, AT  Neck:  Supple  Respiratory:  Adequate airflow b/l. Not using accessory muscles of respiration.  Cardiovascular:  S1 & S2 intact, no R/G, 2+ radial pulses b/l  Gastrointestinal: Soft, NT, ND, normoactive b.s., no organomegaly/RT/rigidity  Extremities: WWP  Neurological:  Alert and awake.  No acute focal motor deficits. Crude sensation intact.     Labs and imaging reviewed    LABS:                        11.5   8.28  )-----------( 265      ( 25 Jul 2019 06:10 )             33.4     07-25    133<L>  |  97<L>  |  19  ----------------------------<  100<H>  4.1   |  24  |  1.26    Ca    9.8      25 Jul 2019 06:10  Phos  3.3     07-24  Mg     2.1     07-24              CAPILLARY BLOOD GLUCOSE                  RADIOLOGY & ADDITIONAL STUDIES:
[facility] [place] [facility]   MD NOLAN JULIO MD ANGELA WONG, PA    TEL:  OFFICE: [phone]  DR POST CELL: [phone]  DR. MARTINEZ CELL: [phone]  LESVIA WILLOUGHBY CELL: [phone]        Patient is a 73y old  Male who presents with a chief complaint of Hematuria (21 Jul 2019 12:53)      Patient seen and examined at bedside. No chest pain/sob. c/o hematuria, no dysuria    VITALS:  T(F): 97.5 (07-22-19 @ 05:52), Max: 97.9 (07-21-19 @ 14:47)  HR: 62 (07-22-19 @ 05:52)  BP: 131/72 (07-22-19 @ 05:52)  RR: 18 (07-22-19 @ 05:52)  SpO2: 100% (07-22-19 @ 05:52)  Wt(kg): --    07-21 @ 07:01  -  07-22 @ 07:00  --------------------------------------------------------  IN: 300 mL / OUT: 0 mL / NET: 300 mL          PHYSICAL EXAM:  Constitutional: NAD  Neck: No JVD  Respiratory: CTAB, no wheezes, rales or rhonchi  Cardiovascular: S1, S2, RRR  Gastrointestinal: BS+, soft, NT/ND  Extremities: No peripheral edema    Hospital Medications:   MEDICATIONS  (STANDING):  amLODIPine   Tablet 10 milliGRAM(s) Oral daily  atorvastatin 10 milliGRAM(s) Oral at bedtime  losartan 100 milliGRAM(s) Oral daily  metoprolol succinate ER 25 milliGRAM(s) Oral daily  tamsulosin 0.4 milliGRAM(s) Oral at bedtime      LABS:  07-22    134<L>  |  100  |  16  ----------------------------<  97  4.1   |  24  |  1.14    Ca    9.7      22 Jul 2019 04:30  Phos  3.0     07-22  Mg     2.2     07-22    TPro  7.6  /  Alb  4.3  /  TBili  0.5  /  DBili      /  AST  22  /  ALT  16  /  AlkPhos  95  07-20    Creatinine Trend: 1.14 <--, 1.17 <--, 1.06 <--, 1.07 <--, 1.20 <--, 1.16 <--, 1.21 <--    Phosphorus Level, Serum: 3.0 mg/dL (07-22 @ 04:30)                              11.3   8.19  )-----------( 220      ( 22 Jul 2019 04:30 )             32.9     Urine Studies:  Urinalysis - [07-20-19 @ 15:47]      Color PINK / Appearance TURBID / SG 1.006 / pH 6.5      Gluc NEGATIVE / Ketone NEGATIVE  / Bili NEGATIVE / Urobili NORMAL       Blood LARGE / Protein 100 / Leuk Est NEGATIVE / Nitrite NEGATIVE      RBC >50 / WBC 0-2 / Hyaline  / Gran  / Sq Epi OCC / Non Sq Epi  / Bacteria OCC    Urine Sodium 22      [07-20-19 @ 17:53]  Urine Potassium 5.8      [07-20-19 @ 17:53]  Urine Chloride 21      [07-20-19 @ 17:53]  Urine Osmolality 213      [07-20-19 @ 22:45]    HbA1c 6.3      [09-13-17 @ 08:15]    HCV 0.16, Nonreactive Hepatitis C AB  S/CO Ratio                        Interpretation  < 1.00                                   Non-Reactive  1.00 - 4.99                         Weakly-Reactive  >= 5.00                                Reactive  Non-Reactive: Aperson with a non-reactive HCV antibody  result is considered uninfected.  No further action is  needed unless recent infection is suspected.  In these  cases, consider repeat testing later to detect  seroconversion..  Weakly-Reactive: HCV antibody test is abnormal, HCV RNA  Qualitative test will follow.  Reactive: HCV antibody test is abnormal, HCV RNA  Qualitative test will follow.  Note: HCV antibody testing is performed on the [employer] system.      [07-21-19 @ 04:24]      RADIOLOGY & ADDITIONAL STUDIES:
[facility] [place] [facility]   MD NOLAN JULIO MD RUORU WONG, PA    TEL:  OFFICE: [phone]  DR POST CELL: [phone]  HARSH WILLOUGHBY CELL: [phone]  DR. MARTINEZ CELL: [phone]    RENAL FOLLOW UP NOTE  --------------------------------------------------------------------------------  HPI:      Pt seen and examined at bedside.   Yovani GARCIA, chest pain     PAST HISTORY  --------------------------------------------------------------------------------  No significant changes to PMH, PSH, FHx, SHx, unless otherwise noted    ALLERGIES & MEDICATIONS  --------------------------------------------------------------------------------  Allergies    flour (Rash)  No Known Drug Allergies  Nuts (Rash)    Intolerances      Standing Inpatient Medications  amLODIPine   Tablet 10 milliGRAM(s) Oral daily  atorvastatin 10 milliGRAM(s) Oral at bedtime  dextrose 5%. 1000 milliLiter(s) IV Continuous <Continuous>  losartan 100 milliGRAM(s) Oral daily  metoprolol succinate ER 25 milliGRAM(s) Oral daily  tamsulosin 0.4 milliGRAM(s) Oral at bedtime    PRN Inpatient Medications      REVIEW OF SYSTEMS  --------------------------------------------------------------------------------  General: no fever  CVS: no chest pain  RESP: no sob, no cough  ABD: no abdominal pain  : no dysuria,  MSK: no edema     VITALS/PHYSICAL EXAM  --------------------------------------------------------------------------------  T(C): 36.3 (07-21-19 @ 06:15), Max: 37.2 (07-20-19 @ 15:53)  HR: 68 (07-21-19 @ 06:15) (68 - 89)  BP: 133/71 (07-21-19 @ 06:15) (121/70 - 167/56)  RR: 17 (07-21-19 @ 06:15) (16 - 96)  SpO2: 99% (07-21-19 @ 06:15) (99% - 100%)  Wt(kg): --  Height (cm): 167.6 (07-20-19 @ 21:27)  Weight (kg): 64.6 (07-20-19 @ 21:27)  BMI (kg/m2): 23 (07-20-19 @ 21:27)  BSA (m2): 1.73 (07-20-19 @ 21:27)      Physical Exam:  	Gen: NAD  	HEENT: MMM  	Pulm: CTA B/L  	CV: S1S2  	Abd: Soft, +BS  	Ext: No LE edema B/L                      Neuro: Awake   	Skin: Warm and Dry   	 no edwin    LABS/STUDIES  --------------------------------------------------------------------------------              11.3   7.31  >-----------<  205      [07-21-19 @ 04:24]              31.6     133  |  98  |  13  ----------------------------<  95      [07-21-19 @ 04:24]  3.8   |  24  |  1.07        Ca     9.8     [07-21-19 @ 04:24]      Mg     2.1     [07-21-19 @ 04:24]      Phos  2.7     [07-20-19 @ 22:55]    TPro  7.6  /  Alb  4.3  /  TBili  0.5  /  DBili  x   /  AST  22  /  ALT  16  /  AlkPhos  95  [07-20-19 @ 15:47]          Creatinine Trend:  SCr 1.07 [07-21 @ 04:24]  SCr 1.20 [07-20 @ 22:55]  SCr 1.16 [07-20 @ 18:54]  SCr 1.21 [07-20 @ 15:47]    Urinalysis - [07-20-19 @ 15:47]      Color PINK / Appearance TURBID / SG 1.006 / pH 6.5      Gluc NEGATIVE / Ketone NEGATIVE  / Bili NEGATIVE / Urobili NORMAL       Blood LARGE / Protein 100 / Leuk Est NEGATIVE / Nitrite NEGATIVE      RBC >50 / WBC 0-2 / Hyaline  / Gran  / Sq Epi OCC / Non Sq Epi  / Bacteria OCC    Urine Sodium 22      [07-20-19 @ 17:53]  Urine Potassium 5.8      [07-20-19 @ 17:53]  Urine Chloride 21      [07-20-19 @ 17:53]  Urine Osmolality 213      [07-20-19 @ 22:45]    HbA1c 6.3      [09-13-17 @ 08:15]
HPI:  This is a 72 y/o male with hx of hyponatremia 2/2 SIADH, BPH, HTN, HLD, chronic hematuria and recurrent UTIs who presents today with worsening hematuria and dysuria for the past day. Pt this year chica has had repeat issues with intermittent hematuria and dysuria that has been worked up in the outpatient setting with cystoscopyx2 (unknown results - follows with [facility]) and recently finished a course of augmentin yesterday for a UTI. He says the symptoms come and go and yesterday his urine started having more blood than usual associated with dysuria. No fevers, chills, abdominal pain, flank pain reported. No weight loss reported and appetite is reported as good. Of note he also saw his nephrologist on  and was noted to be hyponatremic to 126, etiology thought to be SIADH. He reports he still has been drinking 1 glass of water q1h at home because his urologist told him to increased his fluid intake. In the ED he was noted to be hyponatremic to 121 with no symptoms of nausea/vomiting or headache. He was given 1L bolus of LR and repeat was 127. His hematuria also resolved while in the ED. To be admitted for further workup. (2019 21:27)      PAST MEDICAL & SURGICAL HISTORY:  Urinary tract infection with hematuria  History of SIADH  History of BPH  Essential hypertension  HTN (Hypertension)  Anxiety  Hypercholesterolemia  No significant past surgical history      flour (Rash)  No Known Drug Allergies  Nuts (Rash)       MEDICATIONS  (STANDING):  amLODIPine   Tablet 10 milliGRAM(s) Oral daily  atorvastatin 10 milliGRAM(s) Oral at bedtime  losartan 100 milliGRAM(s) Oral daily  metoprolol succinate ER 25 milliGRAM(s) Oral daily  tamsulosin 0.4 milliGRAM(s) Oral at bedtime    MEDICATIONS  (PRN):      REVIEW OF SYSTEMS:  CONSTITUTIONAL: (+) malaise.   EYES: No acute change in vision   ENT:  No tinnitus  NECK: No stiffness  RESPIRATORY: No hemoptysis  CARDIOVASCULAR: No chest pain, palpitations, syncope  GASTROINTESTINAL: No hematemesis, diarrhea, melena, or hematochezia.  GENITOURINARY: hematuria  NEUROLOGICAL: No headaches  LYMPH Nodes: No enlarged glands  ENDOCRINE: No heat or cold intolerance	    T(C): 36.4 (19 @ 05:52), Max: 36.6 (19 @ 14:47)  HR: 62 (19 @ 05:52) (62 - 72)  BP: 131/72 (19 @ 05:52) (117/69 - 131/72)  RR: 18 (19 @ 05:52) (17 - 18)  SpO2: 100% (19 @ 05:52) (99% - 100%)    PHYSICAL EXAMINATION:   Constitutional: WD, NAD  HEENT: NC, AT  Neck:  Supple  Respiratory:  Adequate airflow b/l. Not using accessory muscles of respiration.  Cardiovascular:  S1 & S2 intact, no R/G, 2+ radial pulses b/l  Gastrointestinal: Soft, NT, ND, normoactive b.s., no organomegaly/RT/rigidity  Extremities: WWP  Neurological:  Alert and awake.  No acute focal motor deficits. Crude sensation intact.     Labs and imaging reviewed    LABS:                        11.3   8.19  )-----------( 220      ( 2019 04:30 )             32.9     07-22    134<L>  |  100  |  16  ----------------------------<  97  4.1   |  24  |  1.14    Ca    9.7      2019 04:30  Phos  3.0     07-  Mg     2.2     -    TPro  7.6  /  Alb  4.3  /  TBili  0.5  /  DBili  x   /  AST  22  /  ALT  16  /  AlkPhos  95  07-20          Urinalysis Basic - ( 2019 15:47 )    Color: PINK / Appearance: TURBID / S.006 / pH: 6.5  Gluc: NEGATIVE / Ketone: NEGATIVE  / Bili: NEGATIVE / Urobili: NORMAL   Blood: LARGE / Protein: 100 / Nitrite: NEGATIVE   Leuk Esterase: NEGATIVE / RBC: >50 / WBC 0-2   Sq Epi: OCC / Non Sq Epi: x / Bacteria: OCC      CAPILLARY BLOOD GLUCOSE            LIVER FUNCTIONS - ( 2019 15:47 )  Alb: 4.3 g/dL / Pro: 7.6 g/dL / ALK PHOS: 95 u/L / ALT: 16 u/L / AST: 22 u/L / GGT: x               RADIOLOGY & ADDITIONAL STUDIES:
[facility] [place] [facility]   MD NOLAN JULIO MD ANGELA WONG, PA    TEL:  OFFICE: [phone]  DR POST CELL: [phone]  DR. MARTINEZ CELL: [phone]  LESVIA WILLOUGHBY CELL: [phone]        Patient is a 73y old  Male who presents with a chief complaint of Gross Hematuria (23 Jul 2019 12:18)      Patient seen and examined at bedside. No chest pain/sob. persistent hematuria      VITALS:  T(F): 98.6 (07-23-19 @ 06:02), Max: 98.6 (07-23-19 @ 06:02)  HR: 81 (07-23-19 @ 06:02)  BP: 142/77 (07-23-19 @ 06:02)  RR: 18 (07-23-19 @ 06:02)  SpO2: 99% (07-23-19 @ 06:02)  Wt(kg): --        PHYSICAL EXAM:  Constitutional: NAD  Neck: No JVD  Respiratory: CTAB, no wheezes, rales or rhonchi  Cardiovascular: S1, S2, RRR  Gastrointestinal: BS+, soft, NT/ND  Extremities: No peripheral edema    Hospital Medications:   MEDICATIONS  (STANDING):  amLODIPine   Tablet 10 milliGRAM(s) Oral daily  atorvastatin 10 milliGRAM(s) Oral at bedtime  losartan 100 milliGRAM(s) Oral daily  metoprolol succinate ER 25 milliGRAM(s) Oral daily  tamsulosin 0.4 milliGRAM(s) Oral at bedtime      LABS:  07-23    132<L>  |  97<L>  |  15  ----------------------------<  98  4.1   |  23  |  1.14    Ca    9.9      23 Jul 2019 06:05  Phos  2.9     07-23  Mg     2.1     07-23      Creatinine Trend: 1.14 <--, 1.14 <--, 1.17 <--, 1.06 <--, 1.07 <--, 1.20 <--, 1.16 <--, 1.21 <--    Phosphorus Level, Serum: 2.9 mg/dL (07-23 @ 06:05)                              12.1   8.90  )-----------( 248      ( 23 Jul 2019 06:05 )             35.0     Urine Studies:  Urinalysis - [07-20-19 @ 15:47]      Color PINK / Appearance TURBID / SG 1.006 / pH 6.5      Gluc NEGATIVE / Ketone NEGATIVE  / Bili NEGATIVE / Urobili NORMAL       Blood LARGE / Protein 100 / Leuk Est NEGATIVE / Nitrite NEGATIVE      RBC >50 / WBC 0-2 / Hyaline  / Gran  / Sq Epi OCC / Non Sq Epi  / Bacteria OCC    Urine Sodium 22      [07-20-19 @ 17:53]  Urine Potassium 5.8      [07-20-19 @ 17:53]  Urine Chloride 21      [07-20-19 @ 17:53]  Urine Osmolality 213      [07-20-19 @ 22:45]    HbA1c 6.3      [09-13-17 @ 08:15]    HCV 0.16, Nonreactive Hepatitis C AB  S/CO Ratio                        Interpretation  < 1.00                                   Non-Reactive  1.00 - 4.99                         Weakly-Reactive  >= 5.00                                Reactive  Non-Reactive: Aperson with a non-reactive HCV antibody  result is considered uninfected.  No further action is  needed unless recent infection is suspected.  In these  cases, consider repeat testing later to detect  seroconversion..  Weakly-Reactive: HCV antibody test is abnormal, HCV RNA  Qualitative test will follow.  Reactive: HCV antibody test is abnormal, HCV RNA  Qualitative test will follow.  Note: HCV antibody testing is performed on the [employer]   system.      [07-21-19 @ 04:24]      RADIOLOGY & ADDITIONAL STUDIES:
[facility] [place] [facility]   MD NOLAN JULIO MD ANGELA WONG, PA    TEL:  OFFICE: [phone]  DR POST CELL: [phone]  DR. MARTINEZ CELL: [phone]  LESVIA WILLOUGHBY CELL: [phone]        Patient is a 73y old  Male who presents with a chief complaint of Hematuria (24 Jul 2019 11:41)      Patient seen and examined at bedside. No chest pain/sob    VITALS:  T(F): 97.6 (07-24-19 @ 12:26), Max: 98.1 (07-24-19 @ 05:23)  HR: 72 (07-24-19 @ 12:26)  BP: 130/55 (07-24-19 @ 12:26)  RR: 18 (07-24-19 @ 12:26)  SpO2: 100% (07-24-19 @ 12:26)  Wt(kg): --        PHYSICAL EXAM:  Constitutional: NAD  Neck: No JVD  Respiratory: CTAB, no wheezes, rales or rhonchi  Cardiovascular: S1, S2, RRR  Gastrointestinal: BS+, soft, NT/ND  Extremities: No peripheral edema    Hospital Medications:   MEDICATIONS  (STANDING):  amLODIPine   Tablet 10 milliGRAM(s) Oral daily  atorvastatin 10 milliGRAM(s) Oral at bedtime  losartan 100 milliGRAM(s) Oral daily  metoprolol succinate ER 25 milliGRAM(s) Oral daily  tamsulosin 0.4 milliGRAM(s) Oral at bedtime      LABS:  07-24    133<L>  |  97<L>  |  22  ----------------------------<  115<H>  4.1   |  21<L>  |  1.31<H>    Ca    10.3      24 Jul 2019 05:44  Phos  3.3     07-24  Mg     2.1     07-24      Creatinine Trend: 1.31 <--, 1.14 <--, 1.14 <--, 1.17 <--, 1.06 <--, 1.07 <--, 1.20 <--, 1.16 <--, 1.21 <--    Phosphorus Level, Serum: 3.3 mg/dL (07-24 @ 05:44)                              12.3   10.78 )-----------( 284      ( 24 Jul 2019 05:44 )             35.6     Urine Studies:  Urinalysis - [07-20-19 @ 15:47]      Color PINK / Appearance TURBID / SG 1.006 / pH 6.5      Gluc NEGATIVE / Ketone NEGATIVE  / Bili NEGATIVE / Urobili NORMAL       Blood LARGE / Protein 100 / Leuk Est NEGATIVE / Nitrite NEGATIVE      RBC >50 / WBC 0-2 / Hyaline  / Gran  / Sq Epi OCC / Non Sq Epi  / Bacteria OCC    Urine Sodium 89      [07-23-19 @ 19:30]  Urine Potassium 5.8      [07-20-19 @ 17:53]  Urine Chloride 21      [07-20-19 @ 17:53]  Urine Osmolality 326      [07-23-19 @ 19:30]    HbA1c 6.3      [09-13-17 @ 08:15]    HCV 0.16, Nonreactive Hepatitis C AB  S/CO Ratio                        Interpretation  < 1.00                                   Non-Reactive  1.00 - 4.99                         Weakly-Reactive  >= 5.00                                Reactive  Non-Reactive: Aperson with a non-reactive HCV antibody  result is considered uninfected.  No further action is  needed unless recent infection is suspected.  In these  cases, consider repeat testing later to detect  seroconversion..  Weakly-Reactive: HCV antibody test is abnormal, HCV RNA  Qualitative test will follow.  Reactive: HCV antibody test is abnormal, HCV RNA  Qualitative test will follow.  Note: HCV antibody testing is performed on the [employer]   system.      [07-21-19 @ 04:24]      RADIOLOGY & ADDITIONAL STUDIES:
Patient seen and examined at bedside  abnormal ct urogram  otherwise no new acute events noted overnight  Case discussed with medical team    HPI:  This is a 74 y/o male with hx of hyponatremia 2/2 SIADH, BPH, HTN, HLD, chronic hematuria and recurrent UTIs who presents today with worsening hematuria and dysuria for the past day. Pt this year reporteldy has had repeat issues with intermittent hematuria and dysuria that has been worked up in the outpatient setting with cystoscopyx2 (unknown results - follows with [facility]) and recently finished a course of augmentin yesterday for a UTI. He says the symptoms come and go and yesterday his urine started having more blood than usual associated with dysuria. No fevers, chills, abdominal pain, flank pain reported. No weight loss reported and appetite is reported as good. Of note he also saw his nephrologist on 7/16 and was noted to be hyponatremic to 126, etiology thought to be SIADH. He reports he still has been drinking 1 glass of water q1h at home because his urologist told him to increased his fluid intake. In the ED he was noted to be hyponatremic to 121 with no symptoms of nausea/vomiting or headache. He was given 1L bolus of LR and repeat was 127. His hematuria also resolved while in the ED. To be admitted for further workup. (20 Jul 2019 21:27)      PAST MEDICAL & SURGICAL HISTORY:  Urinary tract infection with hematuria  History of SIADH  History of BPH  Essential hypertension  HTN (Hypertension)  Anxiety  Hypercholesterolemia  No significant past surgical history      flour (Rash)  No Known Drug Allergies  Nuts (Rash)       MEDICATIONS  (STANDING):  amLODIPine   Tablet 10 milliGRAM(s) Oral daily  atorvastatin 10 milliGRAM(s) Oral at bedtime  losartan 100 milliGRAM(s) Oral daily  metoprolol succinate ER 25 milliGRAM(s) Oral daily  tamsulosin 0.4 milliGRAM(s) Oral at bedtime    MEDICATIONS  (PRN):      REVIEW OF SYSTEMS:  CONSTITUTIONAL: (+) malaise.   EYES: No acute change in vision   ENT:  No tinnitus  NECK: No stiffness  RESPIRATORY: No hemoptysis  CARDIOVASCULAR: No chest pain, palpitations, syncope  GASTROINTESTINAL: No hematemesis, diarrhea, melena, or hematochezia.  GENITOURINARY:hematuria  NEUROLOGICAL: No headaches  LYMPH Nodes: No enlarged glands  ENDOCRINE: No heat or cold intolerance	    T(C): 36.7 (07-24-19 @ 05:23), Max: 36.7 (07-24-19 @ 05:23)  HR: 73 (07-24-19 @ 05:23) (73 - 78)  BP: 128/66 (07-24-19 @ 05:23) (126/59 - 131/57)  RR: 18 (07-24-19 @ 05:23) (16 - 18)  SpO2: 100% (07-24-19 @ 05:23) (99% - 100%)    PHYSICAL EXAMINATION:   Constitutional: NAD  HEENT: NC, AT  Neck:  Supple  Respiratory:  Adequate airflow b/l. Not using accessory muscles of respiration.  Cardiovascular:  S1 & S2 intact, no R/G, 2+ radial pulses b/l  Gastrointestinal: Soft, NT, ND, normoactive b.s., no organomegaly/RT/rigidity  Extremities: WWP  Neurological:  Alert and awake.  No acute focal motor deficits. Crude sensation intact.     Labs and imaging reviewed    LABS:                        12.3   10.78 )-----------( 284      ( 24 Jul 2019 05:44 )             35.6     07-24    133<L>  |  97<L>  |  22  ----------------------------<  115<H>  4.1   |  21<L>  |  1.31<H>    Ca    10.3      24 Jul 2019 05:44  Phos  3.3     07-24  Mg     2.1     07-24              CAPILLARY BLOOD GLUCOSE                  RADIOLOGY & ADDITIONAL STUDIES:
Patient seen and examined at bedside  c/o hematuria  of note, pt completed augmentin abx as outpt prior to arrival     HPI:  This is a 72 y/o male with hx of hyponatremia 2/2 SIADH, BPH, HTN, HLD, chronic hematuria and recurrent UTIs who presents today with worsening hematuria and dysuria for the past day. Pt this year reporteldy has had repeat issues with intermittent hematuria and dysuria that has been worked up in the outpatient setting with cystoscopyx2 (unknown results - follows with [facility]) and recently finished a course of augmentin yesterday for a UTI. He says the symptoms come and go and yesterday his urine started having more blood than usual associated with dysuria. No fevers, chills, abdominal pain, flank pain reported. No weight loss reported and appetite is reported as good. Of note he also saw his nephrologist on  and was noted to be hyponatremic to 126, etiology thought to be SIADH. He reports he still has been drinking 1 glass of water q1h at home because his urologist told him to increased his fluid intake. In the ED he was noted to be hyponatremic to 121 with no symptoms of nausea/vomiting or headache. He was given 1L bolus of LR and repeat was 127. His hematuria also resolved while in the ED. To be admitted for further workup. (2019 21:27)      PAST MEDICAL & SURGICAL HISTORY:  Urinary tract infection with hematuria  History of SIADH  History of BPH  Essential hypertension  HTN (Hypertension)  Anxiety  Hypercholesterolemia  No significant past surgical history      flour (Rash)  No Known Drug Allergies  Nuts (Rash)       MEDICATIONS  (STANDING):  amLODIPine   Tablet 10 milliGRAM(s) Oral daily  atorvastatin 10 milliGRAM(s) Oral at bedtime  losartan 100 milliGRAM(s) Oral daily  metoprolol succinate ER 25 milliGRAM(s) Oral daily  tamsulosin 0.4 milliGRAM(s) Oral at bedtime    MEDICATIONS  (PRN):      REVIEW OF SYSTEMS:  CONSTITUTIONAL: (+) malaise.   EYES: No acute change in vision   ENT:  No tinnitus  NECK: No stiffness  RESPIRATORY: No hemoptysis  CARDIOVASCULAR: No chest pain, palpitations, syncope  GASTROINTESTINAL: No hematemesis, diarrhea, melena, or hematochezia.  GENITOURINARY: hematuria  NEUROLOGICAL: No headaches  LYMPH Nodes: No enlarged glands  ENDOCRINE: No heat or cold intolerance	    T(C): 36.3 (07-21-19 @ 06:15), Max: 37.2 (19 @ 15:53)  HR: 68 (19 @ 06:15) (68 - 89)  BP: 133/71 (19 @ 06:15) (121/70 - 167/56)  RR: 17 (19 @ 06:15) (16 - 96)  SpO2: 99% (19 @ 06:15) (99% - 100%)    PHYSICAL EXAMINATION:   Constitutional: WD, NAD  HEENT: NC, AT  Neck:  Supple  Respiratory:  Adequate airflow b/l. Not using accessory muscles of respiration.  Cardiovascular:  S1 & S2 intact, no R/G, 2+ radial pulses b/l  Gastrointestinal: Soft, NT, ND, normoactive b.s., no organomegaly/RT/rigidity  Extremities: WWP  Neurological:  Alert and awake.  No acute focal motor deficits. Crude sensation intact.     Labs and imaging reviewed    LABS:                        11.3   7.31  )-----------( 205      ( 2019 04:24 )             31.6     07-21    129<L>  |  94<L>  |  13  ----------------------------<  108<H>  3.9   |  24  |  1.06    Ca    9.7      2019 11:25  Phos  2.7     07-20  Mg     2.1     -    TPro  7.6  /  Alb  4.3  /  TBili  0.5  /  DBili  x   /  AST  22  /  ALT  16  /  AlkPhos  95  07-20          Urinalysis Basic - ( 2019 15:47 )    Color: PINK / Appearance: TURBID / S.006 / pH: 6.5  Gluc: NEGATIVE / Ketone: NEGATIVE  / Bili: NEGATIVE / Urobili: NORMAL   Blood: LARGE / Protein: 100 / Nitrite: NEGATIVE   Leuk Esterase: NEGATIVE / RBC: >50 / WBC 0-2   Sq Epi: OCC / Non Sq Epi: x / Bacteria: OCC      CAPILLARY BLOOD GLUCOSE            LIVER FUNCTIONS - ( 2019 15:47 )  Alb: 4.3 g/dL / Pro: 7.6 g/dL / ALK PHOS: 95 u/L / ALT: 16 u/L / AST: 22 u/L / GGT: x               RADIOLOGY & ADDITIONAL STUDIES:
Patient seen and examined at bedside  gross hematuria persists  Case discussed with medical team    HPI:  This is a 72 y/o male with hx of hyponatremia 2/2 SIADH, BPH, HTN, HLD, chronic hematuria and recurrent UTIs who presents today with worsening hematuria and dysuria for the past day. Pt this year reporteldy has had repeat issues with intermittent hematuria and dysuria that has been worked up in the outpatient setting with cystoscopyx2 (unknown results - follows with [facility]) and recently finished a course of augmentin yesterday for a UTI. He says the symptoms come and go and yesterday his urine started having more blood than usual associated with dysuria. No fevers, chills, abdominal pain, flank pain reported. No weight loss reported and appetite is reported as good. Of note he also saw his nephrologist on 7/16 and was noted to be hyponatremic to 126, etiology thought to be SIADH. He reports he still has been drinking 1 glass of water q1h at home because his urologist told him to increased his fluid intake. In the ED he was noted to be hyponatremic to 121 with no symptoms of nausea/vomiting or headache. He was given 1L bolus of LR and repeat was 127. His hematuria also resolved while in the ED. To be admitted for further workup. (20 Jul 2019 21:27)      PAST MEDICAL & SURGICAL HISTORY:  Urinary tract infection with hematuria  History of SIADH  History of BPH  Essential hypertension  HTN (Hypertension)  Anxiety  Hypercholesterolemia  No significant past surgical history      flour (Rash)  No Known Drug Allergies  Nuts (Rash)       MEDICATIONS  (STANDING):  amLODIPine   Tablet 10 milliGRAM(s) Oral daily  atorvastatin 10 milliGRAM(s) Oral at bedtime  losartan 100 milliGRAM(s) Oral daily  metoprolol succinate ER 25 milliGRAM(s) Oral daily  tamsulosin 0.4 milliGRAM(s) Oral at bedtime    MEDICATIONS  (PRN):      REVIEW OF SYSTEMS:  CONSTITUTIONAL: (+) malaise.   EYES: No acute change in vision   ENT:  No tinnitus  NECK: No stiffness  RESPIRATORY: No hemoptysis  CARDIOVASCULAR: No chest pain, palpitations, syncope  GASTROINTESTINAL: No hematemesis, diarrhea, melena, or hematochezia.  GENITOURINARY: hematuria  NEUROLOGICAL: No headaches  LYMPH Nodes: No enlarged glands  ENDOCRINE: No heat or cold intolerance	    T(C): 37 (07-23-19 @ 06:02), Max: 37 (07-23-19 @ 06:02)  HR: 81 (07-23-19 @ 06:02) (69 - 81)  BP: 142/77 (07-23-19 @ 06:02) (142/77 - 146/70)  RR: 18 (07-23-19 @ 06:02) (18 - 18)  SpO2: 99% (07-23-19 @ 06:02) (99% - 100%)    PHYSICAL EXAMINATION:   Constitutional: WD, NAD  HEENT: NC, AT  Neck:  Supple  Respiratory:  Adequate airflow b/l. Not using accessory muscles of respiration.  Cardiovascular:  S1 & S2 intact, no R/G, 2+ radial pulses b/l  Gastrointestinal: Soft, NT, ND, normoactive b.s., no organomegaly/RT/rigidity  Extremities: WWP  Neurological:  Alert and awake.  No acute focal motor deficits. Crude sensation intact.     Labs and imaging reviewed    LABS:                        12.1   8.90  )-----------( 248      ( 23 Jul 2019 06:05 )             35.0     07-23    132<L>  |  97<L>  |  15  ----------------------------<  98  4.1   |  23  |  1.14    Ca    9.9      23 Jul 2019 06:05  Phos  2.9     07-23  Mg     2.1     07-23              CAPILLARY BLOOD GLUCOSE                  RADIOLOGY & ADDITIONAL STUDIES:
[facility]   MD NOLAN JULIO MD ANGELA WONG, [place]    TEL:  OFFICE: [phone]  DR POST CELL: [phone]  DR. MARTINEZ CELL: [phone]  LESVIA WILLOUGHBY CELL: [phone]        Patient is a 73y old  Male who presents with a chief complaint of Hematuria (25 Jul 2019 12:32)      Patient seen and examined at bedside. No chest pain/sob    VITALS:  T(F): 98.2 (07-25-19 @ 12:10), Max: 98.2 (07-25-19 @ 06:17)  HR: 74 (07-25-19 @ 12:10)  BP: 137/58 (07-25-19 @ 12:10)  RR: 18 (07-25-19 @ 12:10)  SpO2: 99% (07-25-19 @ 12:10)  Wt(kg): --        PHYSICAL EXAM:  Constitutional: NAD  Neck: No JVD  Respiratory: CTAB, no wheezes, rales or rhonchi  Cardiovascular: S1, S2, RRR  Gastrointestinal: BS+, soft, NT/ND  Extremities: No peripheral edema    Hospital Medications:   MEDICATIONS  (STANDING):  amLODIPine   Tablet 10 milliGRAM(s) Oral daily  atorvastatin 10 milliGRAM(s) Oral at bedtime  losartan 100 milliGRAM(s) Oral daily  metoprolol succinate ER 25 milliGRAM(s) Oral daily  tamsulosin 0.4 milliGRAM(s) Oral at bedtime      LABS:  07-25    133<L>  |  97<L>  |  19  ----------------------------<  100<H>  4.1   |  24  |  1.26    Ca    9.8      25 Jul 2019 06:10  Phos  3.3     07-24  Mg     2.1     07-24      Creatinine Trend: 1.26 <--, 1.31 <--, 1.14 <--, 1.14 <--, 1.17 <--, 1.06 <--, 1.07 <--, 1.20 <--, 1.16 <--, 1.21 <--    Ferritin, Serum: 90.94 ng/mL (07-25 @ 06:10)  Iron Total, Serum: 76 ug/dL (07-25 @ 06:10)                              11.5   8.28  )-----------( 265      ( 25 Jul 2019 06:10 )             33.4     Urine Studies:  Urinalysis - [07-20-19 @ 15:47]      Color PINK / Appearance TURBID / SG 1.006 / pH 6.5      Gluc NEGATIVE / Ketone NEGATIVE  / Bili NEGATIVE / Urobili NORMAL       Blood LARGE / Protein 100 / Leuk Est NEGATIVE / Nitrite NEGATIVE      RBC >50 / WBC 0-2 / Hyaline  / Gran  / Sq Epi OCC / Non Sq Epi  / Bacteria OCC    Urine Sodium 89      [07-23-19 @ 19:30]  Urine Potassium 5.8      [07-20-19 @ 17:53]  Urine Chloride 21      [07-20-19 @ 17:53]  Urine Osmolality 326      [07-23-19 @ 19:30]    Iron 76, TIBC 303, %sat --      [07-25-19 @ 06:10]  Ferritin 90.94      [07-25-19 @ 06:10]  HbA1c 6.3      [09-13-17 @ 08:15]    HCV 0.16, Nonreactive Hepatitis C AB  S/CO Ratio                        Interpretation  < 1.00                                   Non-Reactive  1.00 - 4.99                         Weakly-Reactive  >= 5.00                                Reactive  Non-Reactive: Aperson with a non-reactive HCV antibody  result is considered uninfected.  No further action is  needed unless recent infection is suspected.  In these  cases, consider repeat testing later to detect  seroconversion..  Weakly-Reactive: HCV antibody test is abnormal, HCV RNA  Qualitative test will follow.  Reactive: HCV antibody test is abnormal, HCV RNA  Qualitative test will follow.  Note: HCV antibody testing is performed on the [employer]   system.      [07-21-19 @ 04:24]      RADIOLOGY & ADDITIONAL STUDIES:
Subjective    Seen & examined at bedside  Continues to have ? intermittent hematuria  CT scan results discussed at length.  All questions answered.    Objective    Vital signs  T(F): , Max: 98.1 (07-24-19 @ 05:23)  HR: 73 (07-24-19 @ 05:23)  BP: 128/66 (07-24-19 @ 05:23)  SpO2: 100% (07-24-19 @ 05:23)  Wt(kg): --    Output     Physical Exam  Gen NAD  Abd soft, NT, ND   No CVAT b/l    Labs  07-24 @ 05:44  WBC 10.78 / Hct 35.6  / SCr 1.31     07-23 @ 06:05  WBC 8.90  / Hct 35.0  / SCr 1.14     Urine Cx: Culture - Urine (07.20.19 @ 17:27)    Culture - Urine:   NO GROWTH AT 24 HOURS    Specimen Source: URINE MIDSTREAM    Imaging    < from: CT Abdomen and Pelvis w/wo IV Cont (07.23.19 @ 18:36) >  FINDINGS:    LOWER CHEST: Mild bibasilar reticulation may represent mild fibrosis.   Coronary calcification.    LIVER: Within normal limits.  BILE DUCTS: Normal caliber.  GALLBLADDER: Within normal limits.  SPLEEN: Within normal limits.  PANCREAS: Within normal limits.  ADRENALS: Within normal limits.  KIDNEYS/URETERS: No renal stones or hydronephrosis. Delayed left renal   nephrogram with a 2.7 x 2.4 cm mass in the left lower pole which appears   centered in a left lower pole calyx concerning for a transitional cell   carcinoma. No additional sites of urothelial disease. A few subcentimeter   hypodense bilateral renal foci are too small to characterize.    BLADDER: Within normal limits.  REPRODUCTIVE ORGANS: Prostate withinnormal limits.    BOWEL: No bowel obstruction. Appendix is normal.  PERITONEUM: No ascites.  VESSELS: Atherosclerotic changes.  RETROPERITONEUM: No lymphadenopathy.    ABDOMINAL WALL: Within normal limits.  BONES: Degenerative changes.    IMPRESSION:    A 2.7 cm left lower pole renal mass concerning for transitional cell   carcinoma. No additional sites of disease.    < end of copied text >

## 2019-07-25 NOTE — DISCHARGE NOTE NURSING/CASE MANAGEMENT/SOCIAL WORK - NSDCPEEMAIL_GEN_ALL_CORE
[facility] for Tobacco Control email [email or web address]

## 2019-07-25 NOTE — PROGRESS NOTE ADULT - PROVIDER SPECIALTY LIST ADULT
Internal Medicine
Nephrology
Urology
Internal Medicine

## 2019-07-25 NOTE — PROGRESS NOTE ADULT - PROBLEM SELECTOR PLAN 2
likely 2/2 renal mass - cancer   h/h stable
abnormal renal u/s   - F/u CT Renal  - F/u Urology recs/management  ucx negative growth (pt completed outpt augmentin)  urology consulted
failed outpt po abx (augmentin)  u/a hematuria  - F/u UCx  urology consulted
overall improving  nephro management appreciated  c/w rx and treatment
persistent gross hematuria likely 2/2 renal mass   as above  h/h stable

## 2019-07-25 NOTE — PROGRESS NOTE ADULT - PROBLEM SELECTOR PROBLEM 1
Transitional cell carcinoma
Hematuria of undiagnosed cause
Hyponatremia
Hyponatremia
R/O Transitional cell carcinoma

## 2019-07-25 NOTE — PROGRESS NOTE ADULT - PROBLEM SELECTOR PLAN 3
improving overall, outpt f/u
flomax
stable   nephro management appreciated  c/w rx and treatment

## 2019-07-25 NOTE — PROGRESS NOTE ADULT - REASON FOR ADMISSION
Hematuria
Gross Hematuria
Hematuria

## 2019-07-25 NOTE — REVIEW OF SYSTEMS
[Heartburn] : heartburn [see HPI] : see HPI [Urine Infection (bladder/kidney)] : bladder/kidney infection [Blood in urine that you can see] : blood visible in urine [Wake up at night to urinate  How many times?  ___] : wakes up to urinate [unfilled] times during the night [Negative] : Heme/Lymph

## 2019-07-25 NOTE — DISCHARGE NOTE NURSING/CASE MANAGEMENT/SOCIAL WORK - NSDCPEWEB_GEN_ALL_CORE
[place] website --- [email or web address] for Tobacco Control website --- [email or web address]

## 2019-07-26 LAB
APPEARANCE: ABNORMAL
BILIRUBIN URINE: NEGATIVE
BLOOD URINE: ABNORMAL
COLOR: YELLOW
GLUCOSE QUALITATIVE U: NEGATIVE
KETONES URINE: NEGATIVE
LEUKOCYTE ESTERASE URINE: ABNORMAL
NITRITE URINE: NEGATIVE
PH URINE: 5.5
PROTEIN URINE: NORMAL
SPECIFIC GRAVITY URINE: 1.01
UROBILINOGEN URINE: NORMAL

## 2019-07-29 LAB — URINE CYTOLOGY: NORMAL

## 2019-07-31 NOTE — PHYSICAL EXAM
[General Appearance - Well Developed] : well developed [General Appearance - Well Nourished] : well nourished [Normal Appearance] : normal appearance [Well Groomed] : well groomed [General Appearance - In No Acute Distress] : no acute distress [Oriented To Time, Place, And Person] : oriented to person, place, and time [Abdomen Soft] : soft [Abdomen Tenderness] : non-tender [Costovertebral Angle Tenderness] : no ~M costovertebral angle tenderness [Normal Station and Gait] : the gait and station were normal for the patient's age [] : no rash [Sensation] : the sensory exam was normal to light touch and pinprick

## 2019-07-31 NOTE — HISTORY OF PRESENT ILLNESS
[FreeTextEntry1] : 73  y.o gentleman with gross hematuria , hospitalized for few days,discharged today, SIADH,BPH ,  and CT AP showed renal mass left lower pole- 2.7 cm and pending work up for same. He was seeing another urologist [facility] in the past .Reviewed labs in [facility] and neg urine cx , hyponatremia. Former smoker 40 yrs hx .Voids without obstructive urinary ss Q 2 H day time and nocturia X 2, denies incontinence. He is doing well with Flomax daily. Noted multiple antibiotics on reconciling meds? indication.Dr Moya consulted and saw pt and will do needed work up for RCC.

## 2019-07-31 NOTE — ASSESSMENT
[FreeTextEntry1] : \par Impression/plan: 73  y.o gentleman accompanied with his 2 grandsons, pt  with gross hematuria , hospitalized for few days,discharged today, diagnosed with SIADH,BPH ,  and CT AP showed renal mass left lower pole - 2.7 cm appears to involve the CS. Dr Moya consulted and saw pt and will do needed work up for likely upper tract TCC.  \par  \par 1. Refer to Dr Moya def care.\par 2.Urine send for UA reflex cx and cytology- call for results.

## 2019-08-01 ENCOUNTER — OTHER (OUTPATIENT)
Age: 74
End: 2019-08-01

## 2019-08-29 ENCOUNTER — OUTPATIENT (OUTPATIENT)
Dept: OUTPATIENT SERVICES | Facility: HOSPITAL | Age: 74
LOS: 1 days | End: 2019-08-29

## 2019-08-29 VITALS
HEIGHT: 64 IN | RESPIRATION RATE: 16 BRPM | OXYGEN SATURATION: 98 % | SYSTOLIC BLOOD PRESSURE: 134 MMHG | WEIGHT: 153 LBS | TEMPERATURE: 97 F | DIASTOLIC BLOOD PRESSURE: 72 MMHG | HEART RATE: 62 BPM

## 2019-08-29 DIAGNOSIS — R31.0 GROSS HEMATURIA: ICD-10-CM

## 2019-08-29 DIAGNOSIS — I10 ESSENTIAL (PRIMARY) HYPERTENSION: ICD-10-CM

## 2019-08-29 DIAGNOSIS — Z87.09 PERSONAL HISTORY OF OTHER DISEASES OF THE RESPIRATORY SYSTEM: ICD-10-CM

## 2019-08-29 LAB
ANION GAP SERPL CALC-SCNC: 12 MMO/L — SIGNIFICANT CHANGE UP (ref 7–14)
APPEARANCE UR: SIGNIFICANT CHANGE UP
BASOPHILS # BLD AUTO: 0.03 K/UL — SIGNIFICANT CHANGE UP (ref 0–0.2)
BASOPHILS NFR BLD AUTO: 0.4 % — SIGNIFICANT CHANGE UP (ref 0–2)
BILIRUB UR-MCNC: NEGATIVE — SIGNIFICANT CHANGE UP
BLOOD UR QL VISUAL: HIGH
BUN SERPL-MCNC: 23 MG/DL — SIGNIFICANT CHANGE UP (ref 7–23)
CALCIUM SERPL-MCNC: 10 MG/DL — SIGNIFICANT CHANGE UP (ref 8.4–10.5)
CHLORIDE SERPL-SCNC: 100 MMOL/L — SIGNIFICANT CHANGE UP (ref 98–107)
CO2 SERPL-SCNC: 26 MMOL/L — SIGNIFICANT CHANGE UP (ref 22–31)
COLOR SPEC: SIGNIFICANT CHANGE UP
CREAT SERPL-MCNC: 1.35 MG/DL — HIGH (ref 0.5–1.3)
EOSINOPHIL # BLD AUTO: 1.03 K/UL — HIGH (ref 0–0.5)
EOSINOPHIL NFR BLD AUTO: 14.4 % — HIGH (ref 0–6)
GLUCOSE SERPL-MCNC: 118 MG/DL — HIGH (ref 70–99)
GLUCOSE UR-MCNC: NEGATIVE — SIGNIFICANT CHANGE UP
HCT VFR BLD CALC: 37.4 % — LOW (ref 39–50)
HGB BLD-MCNC: 11.9 G/DL — LOW (ref 13–17)
IMM GRANULOCYTES NFR BLD AUTO: 0.3 % — SIGNIFICANT CHANGE UP (ref 0–1.5)
KETONES UR-MCNC: NEGATIVE — SIGNIFICANT CHANGE UP
LEUKOCYTE ESTERASE UR-ACNC: NEGATIVE — SIGNIFICANT CHANGE UP
LYMPHOCYTES # BLD AUTO: 1.44 K/UL — SIGNIFICANT CHANGE UP (ref 1–3.3)
LYMPHOCYTES # BLD AUTO: 20.2 % — SIGNIFICANT CHANGE UP (ref 13–44)
MCHC RBC-ENTMCNC: 30.7 PG — SIGNIFICANT CHANGE UP (ref 27–34)
MCHC RBC-ENTMCNC: 31.8 % — LOW (ref 32–36)
MCV RBC AUTO: 96.6 FL — SIGNIFICANT CHANGE UP (ref 80–100)
MONOCYTES # BLD AUTO: 0.49 K/UL — SIGNIFICANT CHANGE UP (ref 0–0.9)
MONOCYTES NFR BLD AUTO: 6.9 % — SIGNIFICANT CHANGE UP (ref 2–14)
NEUTROPHILS # BLD AUTO: 4.13 K/UL — SIGNIFICANT CHANGE UP (ref 1.8–7.4)
NEUTROPHILS NFR BLD AUTO: 57.8 % — SIGNIFICANT CHANGE UP (ref 43–77)
NITRITE UR-MCNC: NEGATIVE — SIGNIFICANT CHANGE UP
NRBC # FLD: 0 K/UL — SIGNIFICANT CHANGE UP (ref 0–0)
PH UR: 7 — SIGNIFICANT CHANGE UP (ref 5–8)
PLATELET # BLD AUTO: 191 K/UL — SIGNIFICANT CHANGE UP (ref 150–400)
PMV BLD: 11.5 FL — SIGNIFICANT CHANGE UP (ref 7–13)
POTASSIUM SERPL-MCNC: 3.9 MMOL/L — SIGNIFICANT CHANGE UP (ref 3.5–5.3)
POTASSIUM SERPL-SCNC: 3.9 MMOL/L — SIGNIFICANT CHANGE UP (ref 3.5–5.3)
PROT UR-MCNC: 10 — SIGNIFICANT CHANGE UP
RBC # BLD: 3.87 M/UL — LOW (ref 4.2–5.8)
RBC # FLD: 13.6 % — SIGNIFICANT CHANGE UP (ref 10.3–14.5)
RBC CASTS # UR COMP ASSIST: >50 — HIGH (ref 0–?)
SODIUM SERPL-SCNC: 138 MMOL/L — SIGNIFICANT CHANGE UP (ref 135–145)
SP GR SPEC: 1.01 — SIGNIFICANT CHANGE UP (ref 1–1.04)
UROBILINOGEN FLD QL: NORMAL — SIGNIFICANT CHANGE UP
WBC # BLD: 7.14 K/UL — SIGNIFICANT CHANGE UP (ref 3.8–10.5)
WBC # FLD AUTO: 7.14 K/UL — SIGNIFICANT CHANGE UP (ref 3.8–10.5)
WBC UR QL: SIGNIFICANT CHANGE UP (ref 0–?)

## 2019-08-29 RX ORDER — SODIUM CHLORIDE 9 MG/ML
1000 INJECTION, SOLUTION INTRAVENOUS
Refills: 0 | Status: DISCONTINUED | OUTPATIENT
Start: 2019-09-09 | End: 2019-10-04

## 2019-08-29 RX ORDER — SODIUM CHLORIDE 9 MG/ML
3 INJECTION INTRAMUSCULAR; INTRAVENOUS; SUBCUTANEOUS EVERY 8 HOURS
Refills: 0 | Status: DISCONTINUED | OUTPATIENT
Start: 2019-09-09 | End: 2019-10-04

## 2019-08-29 RX ORDER — MOMETASONE FUROATE 50 UG/1
2 SPRAY NASAL
Qty: 0 | Refills: 0 | DISCHARGE

## 2019-08-29 RX ORDER — METOPROLOL TARTRATE 50 MG
0.5 TABLET ORAL
Qty: 0 | Refills: 0 | DISCHARGE

## 2019-08-29 NOTE — H&P PST ADULT - RS GEN PE MLT RESP DETAILS PC
respirations non-labored/breath sounds equal/clear to auscultation bilaterally/good air movement/airway patent/no wheezes/no chest wall tenderness

## 2019-08-29 NOTE — H&P PST ADULT - NSICDXPROBLEM_GEN_ALL_CORE_FT
PROBLEM DIAGNOSES  Problem: Gross hematuria  Assessment and Plan: Scheduled for Cystoscopy. Left Ureteroscopy with Possible Biopsy, Possible Ureteral Stent on 9/9/2019.  Preop instructions given, pt verbalized understanding   Pt will take his prescribed Omeprazole AM of surgery for GI prophylaxis   Pt will take Tamsulosin on day of surgery   Medical clearance requested by surgeon- copy of MC requested in PST (pt recently diagnosed with hyponatremia)     Problem: HTN (hypertension)  Assessment and Plan: Pt will take Amlodipine, Metoprolol and Losartan on day of surgery with sips of water     Problem: History of asthma  Assessment and Plan: Pt will use his Incruse and Advair inhalers on day of surgery as prescribed

## 2019-08-29 NOTE — H&P PST ADULT - NSICDXPASTMEDICALHX_GEN_ALL_CORE_FT
PAST MEDICAL HISTORY:  Anxiety     Essential hypertension     Gross hematuria     History of BPH     History of SIADH     HTN (Hypertension)     Hypercholesterolemia     Left renal mass     Urinary tract infection with hematuria PAST MEDICAL HISTORY:  Anxiety     Asthma denies recent asthma exacerbation    Essential hypertension     Gross hematuria     History of BPH     History of SIADH     HTN (Hypertension)     Hypercholesterolemia     Hyponatremia     Left renal mass     Urinary tract infection with hematuria

## 2019-08-29 NOTE — H&P PST ADULT - HISTORY OF PRESENT ILLNESS
74 y/o Malay speaking male presents to [facility] for preoperative evaluation with dx of gross hematuria. Scheduled for Cystoscopy. Left Ureteroscopy with Possible Biopsy, Possible Ureteral Stent on 9/9/2019. Pt recently discharged from hospital after presenting with gross hematuria and dysuria. CT of abdomen and pelvis showed a left renal mass. 72 y/o Romansh speaking male who understands some English presents to PST for preoperative evaluation with dx of gross hematuria. Scheduled for Cystoscopy. Left Ureteroscopy with Possible Biopsy, Possible Ureteral Stent on 9/9/2019. Pt recently discharged from hospital after presenting with gross hematuria and dysuria. CT of abdomen and pelvis showed a left renal mass. Pt offered [facility]  services but refused asking we use his family for PST interview.

## 2019-08-29 NOTE — H&P PST ADULT - NSANTHOSAYNRD_GEN_A_CORE
No. DAYA screening performed.  STOP BANG Legend: 0-2 = LOW Risk; 3-4 = INTERMEDIATE Risk; 5-8 = HIGH Risk

## 2019-08-29 NOTE — H&P PST ADULT - NEGATIVE NEUROLOGICAL SYMPTOMS
no vertigo/no difficulty walking/no loss of consciousness/no loss of sensation/no headache/no generalized seizures/no tremors/no hemiparesis/no facial palsy/no focal seizures/no syncope/no transient paralysis/no weakness/no paresthesias

## 2019-08-29 NOTE — H&P PST ADULT - NEGATIVE GENERAL SYMPTOMS
no fever/no sweating/no weight loss/no chills/no polyphagia/no polyuria/no polydipsia/no weight gain

## 2019-08-30 LAB
BACTERIA UR CULT: SIGNIFICANT CHANGE UP
SPECIMEN SOURCE: SIGNIFICANT CHANGE UP

## 2019-09-06 NOTE — ASU PATIENT PROFILE, ADULT - ADDITIONAL COMMENTS
[place] # [id number] to get the pt's permission to have his daughter translate for him

## 2019-09-06 NOTE — ASU PATIENT PROFILE, ADULT - PMH
Anxiety    Asthma  denies recent asthma exacerbation  Essential hypertension    Gross hematuria    History of BPH    History of SIADH    HTN (Hypertension)    Hypercholesterolemia    Hyponatremia    Left renal mass    Urinary tract infection with hematuria

## 2019-09-08 ENCOUNTER — RESULT REVIEW (OUTPATIENT)
Age: 74
End: 2019-09-08

## 2019-09-08 ENCOUNTER — TRANSCRIPTION ENCOUNTER (OUTPATIENT)
Age: 74
End: 2019-09-08

## 2019-09-08 NOTE — ED ADULT NURSE NOTE - TEMPLATE LIST FOR HEAD TO TOE ASSESSMENT
General
AV fistula  b/l RUE, LUE  AV fistula  right lower extremity 2 yrs  Left upper extrmity with graft 7 years ago  CABG (Coronary Artery Bypass Graft)  2007  S/P CABG x 5    S/P coronary artery stent placement    S/P hemorrhoidectomy  and rectal polypectomy -2/3/2017.  Stented coronary artery  x2 cardiac stents in 2016, one cardiac stent in 2015

## 2019-09-09 ENCOUNTER — APPOINTMENT (OUTPATIENT)
Dept: UROLOGY | Facility: HOSPITAL | Age: 74
End: 2019-09-09

## 2019-09-09 ENCOUNTER — OUTPATIENT (OUTPATIENT)
Dept: OUTPATIENT SERVICES | Facility: HOSPITAL | Age: 74
LOS: 1 days | Discharge: HOME | End: 2019-09-09
Payer: MEDICARE

## 2019-09-09 VITALS
SYSTOLIC BLOOD PRESSURE: 130 MMHG | OXYGEN SATURATION: 95 % | DIASTOLIC BLOOD PRESSURE: 61 MMHG | HEART RATE: 82 BPM | RESPIRATION RATE: 14 BRPM

## 2019-09-09 VITALS
HEART RATE: 77 BPM | OXYGEN SATURATION: 99 % | DIASTOLIC BLOOD PRESSURE: 57 MMHG | TEMPERATURE: 98 F | RESPIRATION RATE: 16 BRPM | SYSTOLIC BLOOD PRESSURE: 151 MMHG | HEIGHT: 64 IN | WEIGHT: 153 LBS

## 2019-09-09 DIAGNOSIS — R31.0 GROSS HEMATURIA: ICD-10-CM

## 2019-09-09 PROCEDURE — 74420 UROGRAPHY RTRGR +-KUB: CPT | Mod: 26

## 2019-09-09 PROCEDURE — 88112 CYTOPATH CELL ENHANCE TECH: CPT | Mod: 26

## 2019-09-09 PROCEDURE — 52332 CYSTOSCOPY AND TREATMENT: CPT | Mod: LT

## 2019-09-09 PROCEDURE — 52354 CYSTOURETERO W/BIOPSY: CPT | Mod: LT

## 2019-09-09 RX ORDER — FENTANYL CITRATE 50 UG/ML
25 INJECTION INTRAVENOUS
Refills: 0 | Status: DISCONTINUED | OUTPATIENT
Start: 2019-09-09 | End: 2019-09-09

## 2019-09-09 RX ORDER — FENTANYL CITRATE 50 UG/ML
50 INJECTION INTRAVENOUS
Refills: 0 | Status: DISCONTINUED | OUTPATIENT
Start: 2019-09-09 | End: 2019-09-09

## 2019-09-09 RX ORDER — ONDANSETRON 8 MG/1
4 TABLET, FILM COATED ORAL ONCE
Refills: 0 | Status: DISCONTINUED | OUTPATIENT
Start: 2019-09-09 | End: 2019-10-04

## 2019-09-09 RX ORDER — OXYCODONE AND ACETAMINOPHEN 5; 325 MG/1; MG/1
2 TABLET ORAL ONCE
Refills: 0 | Status: DISCONTINUED | OUTPATIENT
Start: 2019-09-09 | End: 2019-09-09

## 2019-09-09 RX ORDER — OXYCODONE AND ACETAMINOPHEN 5; 325 MG/1; MG/1
1 TABLET ORAL ONCE
Refills: 0 | Status: DISCONTINUED | OUTPATIENT
Start: 2019-09-09 | End: 2019-09-09

## 2019-09-09 RX ADMIN — FENTANYL CITRATE 25 MICROGRAM(S): 50 INJECTION INTRAVENOUS at 21:15

## 2019-09-09 RX ADMIN — SODIUM CHLORIDE 3 MILLILITER(S): 9 INJECTION INTRAMUSCULAR; INTRAVENOUS; SUBCUTANEOUS at 20:27

## 2019-09-09 RX ADMIN — FENTANYL CITRATE 25 MICROGRAM(S): 50 INJECTION INTRAVENOUS at 21:00

## 2019-09-09 NOTE — BRIEF OPERATIVE NOTE - NSICDXBRIEFPROCEDURE_GEN_ALL_CORE_FT
PROCEDURES:  Cystourethroscopy, with diagnostic ureteroscopy and ureteral stent insertion 09-Sep-2019 19:52:26  Len Chowdhury

## 2019-09-09 NOTE — ASU DISCHARGE PLAN (ADULT/PEDIATRIC) - CARE PROVIDER_API CALL
Zachery Moya)  Urology  [address] [place] [address], [place] [address]  Phone: [phone]  Fax: [phone]  Follow Up Time:

## 2019-09-09 NOTE — ASU DISCHARGE PLAN (ADULT/PEDIATRIC) - ASU DC SPECIAL INSTRUCTIONSFT
Please follow up with Dr. Moya in 2-3 weeks, call the office to confirm your appointment [phone].  You may take tylenol and motrin as needed for pain.  You have a stent in place that may cause discomfort when urinating, this is normal.  Continue to take tamsulosin at night time as this will help with the pain.

## 2019-09-09 NOTE — ASU PREOP CHECKLIST - 1.
used [facility] , [place] # [phone] to get the pt's permission to use his daughter , Jose to translate for him

## 2019-09-10 ENCOUNTER — RESULT REVIEW (OUTPATIENT)
Age: 74
End: 2019-09-10

## 2019-09-10 PROBLEM — R31.0 GROSS HEMATURIA: Chronic | Status: ACTIVE | Noted: 2019-08-29

## 2019-09-10 PROBLEM — J45.909 UNSPECIFIED ASTHMA, UNCOMPLICATED: Chronic | Status: ACTIVE | Noted: 2019-08-29

## 2019-09-10 PROBLEM — N28.89 OTHER SPECIFIED DISORDERS OF KIDNEY AND URETER: Chronic | Status: ACTIVE | Noted: 2019-08-29

## 2019-09-10 PROCEDURE — 88305 TISSUE EXAM BY PATHOLOGIST: CPT | Mod: 26

## 2019-09-11 LAB — SPECIMEN SOURCE: SIGNIFICANT CHANGE UP

## 2019-09-12 LAB — BACTERIA UR CULT: SIGNIFICANT CHANGE UP

## 2019-09-17 LAB — NON-GYNECOLOGICAL CYTOLOGY STUDY: SIGNIFICANT CHANGE UP

## 2019-09-19 ENCOUNTER — APPOINTMENT (OUTPATIENT)
Dept: UROLOGY | Facility: CLINIC | Age: 74
End: 2019-09-19
Payer: MEDICARE

## 2019-09-19 DIAGNOSIS — N28.89 OTHER SPECIFIED DISORDERS OF KIDNEY AND URETER: ICD-10-CM

## 2019-09-19 PROCEDURE — 99215 OFFICE O/P EST HI 40 MIN: CPT

## 2019-09-26 NOTE — PHYSICAL EXAM
[General Appearance - Well Developed] : well developed [General Appearance - Well Nourished] : well nourished [Normal Appearance] : normal appearance [Well Groomed] : well groomed [General Appearance - In No Acute Distress] : no acute distress [Abdomen Soft] : soft [Abdomen Tenderness] : non-tender [Costovertebral Angle Tenderness] : no ~M costovertebral angle tenderness [Urinary Bladder Findings] : the bladder was normal on palpation [Edema] : no peripheral edema [] : no respiratory distress [Respiration, Rhythm And Depth] : normal respiratory rhythm and effort [Oriented To Time, Place, And Person] : oriented to person, place, and time [Exaggerated Use Of Accessory Muscles For Inspiration] : no accessory muscle use [Mood] : the mood was normal [Affect] : the affect was normal [Not Anxious] : not anxious [Normal Station and Gait] : the gait and station were normal for the patient's age [No Focal Deficits] : no focal deficits [No Palpable Adenopathy] : no palpable adenopathy

## 2019-09-26 NOTE — ASSESSMENT
[FreeTextEntry1] : Left renal pelvis TCC.\par Images reviewed and findings discussed with the patient and his son. Recommend to proceed with minimally invasive laparoscopic radical nephroureterectomy.  Reviewed operative procedure, hospital stay and recovery time.  Risks of surgery discussed including risks of bleeding (both immediate and delayed), wound/abdominal infection, adjacent organ injury (bowel/vascular), conversion to open surgery, DVT/PE, decrease in renal function, and anesthetic complications.  Will need post op Contreras catheter x 1 week.    \par \par All questions answered.  Patient agrees to proceed as recommended.\par

## 2019-09-26 NOTE — HISTORY OF PRESENT ILLNESS
[FreeTextEntry1] : Presented with gross hematuria.  CT showed soft tissue mass in the left kidney.\par Underwent cystoscopy, left ureteroscopy.  Found large nodular/papillary tumor in the left lower pole extending into the left renal pelvis.  Biopsy confirmed TCC.  Tumor is not amenable to endoscopic resection.  Given that bulk of tumor is intraluminal, then likely not invasive into renal parenchyma.\par \par Imaging did not show regional adenopathy.\par \par Ureteral stent in place.

## 2019-10-17 ENCOUNTER — OUTPATIENT (OUTPATIENT)
Dept: OUTPATIENT SERVICES | Facility: HOSPITAL | Age: 74
LOS: 1 days | End: 2019-10-17
Payer: MEDICARE

## 2019-10-17 VITALS
HEART RATE: 74 BPM | DIASTOLIC BLOOD PRESSURE: 60 MMHG | HEIGHT: 62 IN | RESPIRATION RATE: 16 BRPM | OXYGEN SATURATION: 98 % | TEMPERATURE: 98 F | WEIGHT: 149.03 LBS | SYSTOLIC BLOOD PRESSURE: 120 MMHG

## 2019-10-17 DIAGNOSIS — C65.2 MALIGNANT NEOPLASM OF LEFT RENAL PELVIS: ICD-10-CM

## 2019-10-17 DIAGNOSIS — Z98.890 OTHER SPECIFIED POSTPROCEDURAL STATES: Chronic | ICD-10-CM

## 2019-10-17 LAB
ALBUMIN SERPL ELPH-MCNC: 4.4 G/DL — SIGNIFICANT CHANGE UP (ref 3.3–5)
ALP SERPL-CCNC: 93 U/L — SIGNIFICANT CHANGE UP (ref 40–120)
ALT FLD-CCNC: 14 U/L — SIGNIFICANT CHANGE UP (ref 4–41)
ANION GAP SERPL CALC-SCNC: 12 MMO/L — SIGNIFICANT CHANGE UP (ref 7–14)
AST SERPL-CCNC: 21 U/L — SIGNIFICANT CHANGE UP (ref 4–40)
BILIRUB SERPL-MCNC: 0.4 MG/DL — SIGNIFICANT CHANGE UP (ref 0.2–1.2)
BLD GP AB SCN SERPL QL: NEGATIVE — SIGNIFICANT CHANGE UP
BUN SERPL-MCNC: 20 MG/DL — SIGNIFICANT CHANGE UP (ref 7–23)
CALCIUM SERPL-MCNC: 10.1 MG/DL — SIGNIFICANT CHANGE UP (ref 8.4–10.5)
CHLORIDE SERPL-SCNC: 97 MMOL/L — LOW (ref 98–107)
CO2 SERPL-SCNC: 25 MMOL/L — SIGNIFICANT CHANGE UP (ref 22–31)
CREAT SERPL-MCNC: 1.42 MG/DL — HIGH (ref 0.5–1.3)
GLUCOSE SERPL-MCNC: 95 MG/DL — SIGNIFICANT CHANGE UP (ref 70–99)
HCT VFR BLD CALC: 35 % — LOW (ref 39–50)
HGB BLD-MCNC: 11.2 G/DL — LOW (ref 13–17)
MCHC RBC-ENTMCNC: 30.6 PG — SIGNIFICANT CHANGE UP (ref 27–34)
MCHC RBC-ENTMCNC: 32 % — SIGNIFICANT CHANGE UP (ref 32–36)
MCV RBC AUTO: 95.6 FL — SIGNIFICANT CHANGE UP (ref 80–100)
NRBC # FLD: 0 K/UL — SIGNIFICANT CHANGE UP (ref 0–0)
PLATELET # BLD AUTO: 241 K/UL — SIGNIFICANT CHANGE UP (ref 150–400)
PMV BLD: 10.7 FL — SIGNIFICANT CHANGE UP (ref 7–13)
POTASSIUM SERPL-MCNC: 4.2 MMOL/L — SIGNIFICANT CHANGE UP (ref 3.5–5.3)
POTASSIUM SERPL-SCNC: 4.2 MMOL/L — SIGNIFICANT CHANGE UP (ref 3.5–5.3)
PROT SERPL-MCNC: 7.5 G/DL — SIGNIFICANT CHANGE UP (ref 6–8.3)
RBC # BLD: 3.66 M/UL — LOW (ref 4.2–5.8)
RBC # FLD: 13.2 % — SIGNIFICANT CHANGE UP (ref 10.3–14.5)
RH IG SCN BLD-IMP: POSITIVE — SIGNIFICANT CHANGE UP
SODIUM SERPL-SCNC: 134 MMOL/L — LOW (ref 135–145)
WBC # BLD: 6.59 K/UL — SIGNIFICANT CHANGE UP (ref 3.8–10.5)
WBC # FLD AUTO: 6.59 K/UL — SIGNIFICANT CHANGE UP (ref 3.8–10.5)

## 2019-10-17 PROCEDURE — 93010 ELECTROCARDIOGRAM REPORT: CPT

## 2019-10-17 RX ORDER — TAMSULOSIN HYDROCHLORIDE 0.4 MG/1
1 CAPSULE ORAL
Qty: 0 | Refills: 0 | DISCHARGE

## 2019-10-17 RX ORDER — OMEPRAZOLE 10 MG/1
1 CAPSULE, DELAYED RELEASE ORAL
Qty: 0 | Refills: 0 | DISCHARGE

## 2019-10-17 RX ORDER — UMECLIDINIUM 62.5 UG/1
0 AEROSOL, POWDER ORAL
Qty: 0 | Refills: 0 | DISCHARGE

## 2019-10-17 NOTE — H&P PST ADULT - NSICDXPROBLEM_GEN_ALL_CORE_FT
PROBLEM DIAGNOSES  Problem: Malignant neoplasm of renal pelvis, left  Assessment and Plan: PROBLEM DIAGNOSES  Problem: Malignant neoplasm of renal pelvis, left  Assessment and Plan: Cystoscopy Incision of ureteral Orifice , Left laparoscopic Nephrouterectomy Retroperitinal Lymphadenectomy PROBLEM DIAGNOSES  Problem: Malignant neoplasm of renal pelvis, left  Assessment and Plan: Cystoscopy Incision of Ureteral Orifice , Left Laparoscopic Nephroureterectomy Retroperitoneal Lymphadenectomy   Pre op instructions including Hibiclens with teach back reviewed with pt and flores with assistance of [place]   [id number]  Dr Conte to provide pre op medical evaluation  EKG withmultiple  comparisons  on chart   Pt denies cp, palpitations, sob ; vss ; pt in nad  Request Dr Conte address EKG on medical evaluation

## 2019-10-17 NOTE — H&P PST ADULT - NEGATIVE NEUROLOGICAL SYMPTOMS
no headache/no hemiparesis/no facial palsy/no difficulty walking/no paresthesias/no generalized seizures/no loss of sensation/no syncope/no focal seizures/no tremors/no vertigo/no weakness/no loss of consciousness/no transient paralysis

## 2019-10-17 NOTE — H&P PST ADULT - NSICDXPASTSURGICALHX_GEN_ALL_CORE_FT
Patient Education     External Ear Infection (Adult)    External otitis (also called “swimmer’s ear”) is an infection in the ear canal. It is often caused by bacteria or fungus. It can occur a few days after water gets trapped in the ear canal (from swimming or bathing). It can also occur after cleaning too deeply in the ear canal with a cotton swab or other object. Sometimes, hair care products get into the ear canal and cause this problem.  Symptoms can include pain, fever, itching, redness, drainage, or swelling of the ear canal. Temporary hearing loss may also occur.  Home care  · Do not try to clean the ear canal. This can push pus and bacteria deeper into the canal.  · Use prescribed ear drops as directed. These help reduce swelling and fight the infection. If an ear wick was placed in the ear canal, apply drops right onto the end of the wick. The wick will draw the medication into the ear canal even if it is swollen closed.  · A cotton ball may be loosely placed in the outer ear to absorb any drainage.  · You may use acetaminophen or ibuprofen to control pain, unless another medication was prescribed. Note: If you have chronic liver or kidney disease or ever had a stomach ulcer or GI bleeding, talk to your health care provider before taking any of these medications.  · Do not allow water to get into your ear when bathing. Also, avoid swimming until the infection has cleared.  Prevention  · Keep your ears dry. This helps lower the risk of infection. Dry your ears with a towel or hair dryer after getting wet. Also, use ear plugs when swimming.  · Do not stick any objects in the ear to remove wax.  · If you feel water trapped in your ear, use ear drops right away. You can get these drops over the counter at most drugstores.  They work by removing water from the ear canal.  Follow-up care  Follow up with your health care provider in one week, or as advised.   When to seek medical advice  Call your health care  provider right away if any of these occur:  · Ear pain becomes worse or doesn’t improve after 3 days of treatment  · Redness or swelling of the outer ear occurs or gets worse  · Headache  · Painful or stiff neck  · Drowsiness or confusion  · Fever of 100.4ºF (38ºC) or higher, or as directed by your health care provider  · Seizure  © 8186-5002 [employer]. [address] [place] [address], [place] [address]. All rights reserved. This information is not intended as a substitute for professional medical care. Always follow your healthcare professional's instructions.           Patient Education     Fluid in the Middle Ear, No Infection (Adult)  Earaches can happen without an infection. This occurs when air and fluid build up behind the eardrum causing a feeling of fullness and discomfort and reduced hearing. This is called otitis media with effusion (OME) or serous otitis media. It means there is fluid in the middle ear. It is not the same as acute otitis media, which is typically from infection.  OME can happen when you have a cold if congestion blocks the passage that drains the middle ear (eustachian tube). It may also occur with nasal allergies or after a bacterial middle ear infection.    The pain/discomfort may come and go. You may hear clicking or popping sounds when you chew or swallow. You may feel that your balance is off. Or you may hear ringing in the ear.  It often takes from several weeks up to 3 months for the fluid to clear on its own. Oral pain relievers and ear drops help if there is pain. Decongestants and antihistamines sometimes help. Antibiotics don't help since there is no infection. Your doctor may prescribe a nasal spray to help reduce swelling in the nose and eustachian tube. This can allow the ear to drain.  If it doesn't improve after 3 months, surgery may be used to drain the fluid and insert a small tube in the eardrum to allow continued drainage.  Because the middle ear fluid can  become infected, it is important to watch for signs of an ear infection which may develop later. These signs include increased ear pain, fever, or drainage from the ear.  Home care  The following guidelines will help you care for yourself at home:  · You may use acetaminophen or ibuprofen to control pain, unless another medicine was prescribed. [NOTE: If you have chronic liver or kidney disease or ever had a stomach ulcer or GI bleeding, talk with your doctor before using these medicines.] (Aspirin should never be used in anyone under 18 years of age who is ill with a fever. It may cause severe liver damage.)  · While not always helpful, you may use over-the-counter decongestants such as phenylephrine or pseudoephedrine. Don't use nasal spray decongestants more than 3 days. Longer use can make congestion worse. (Prescription nasal sprays from your doctor don't typically have those restrictions.)  · Antihistamines may help if you are also having allergy symptoms.  · You may use medicines such as guaifenesin to thin mucus and promote drainage.  Follow-up care  Follow up with your doctor or as advised if you are not feeling better after 3 days.  When to seek medical care  Get prompt medical attention if any of the following occur:  · Ear pain gets worse or does not start to improve   · Fever of 100.4°F (38°C) or higher, or as directed by your health care provider  · Fluid or blood draining from the ear  · Headache or sinus pain  · Stiff neck  · Unusual drowsiness or confusion  © 0097-1399 [employer]. [address] [place] [address], [place] [address]. All rights reserved. This information is not intended as a substitute for professional medical care. Always follow your healthcare professional's instructions.            PAST SURGICAL HISTORY:  History of prostate surgery ? exact procedure 3/19    S/P cystoscopy Insertion left ureteral stent ; biopsy 8/19

## 2019-10-17 NOTE — H&P PST ADULT - MUSCULOSKELETAL
No joint pain, swelling or deformity; no limitation of movement details… detailed exam pt ambulates slowly with cane

## 2019-10-17 NOTE — H&P PST ADULT - RS GEN PE MLT RESP DETAILS PC
clear to auscultation bilaterally/respirations non-labored/no wheezes/good air movement/airway patent/no chest wall tenderness/breath sounds equal

## 2019-10-17 NOTE — H&P PST ADULT - NSICDXPASTMEDICALHX_GEN_ALL_CORE_FT
PAST MEDICAL HISTORY:  Anxiety     Asthma denies recent asthma exacerbation    Cancer of kidney     Essential hypertension     Gross hematuria     History of BPH     History of SIADH     History of stomach ulcers denies recent endoscopy    HTN (Hypertension)     Hypercholesterolemia     Hyponatremia admission x 2 last 2017    Left renal mass     Urinary tract infection with hematuria

## 2019-10-17 NOTE — H&P PST ADULT - LYMPHATIC
anterior cervical L/posterior cervical L/posterior cervical R/anterior cervical R/supraclavicular L/supraclavicular R

## 2019-10-17 NOTE — H&P PST ADULT - HISTORY OF PRESENT ILLNESS
74 y/o German speaking male . Information obtained from pt with assistance of [employer]  [id number] and Micha Hanley . Pt s/p  Cystoscopy. Left Ureteroscopy with  Biopsy,  Ureteral Stent on 9/9/2019. Pt f/u with surgeon ; pt now presents for Cystoscopy, Incision  of Ureteral Orifice  Left laparoscopic Nephroureterectomy Retroperitoneal Lymphadenectomy Pt recently discharged from hospital after presenting with gross hematuria and dysuria. CT of abdomen and pelvis showed a left renal mass. 72 y/o Kyrgyz speaking male . Information obtained from pt with assistance of [employer]  [id number] and Micha Hanley . Pt s/p  Cystoscopy. Left Ureteroscopy with  Biopsy,  Ureteral Stent on 9/9/2019. Pt f/u with surgeon ; pt now presents for Cystoscopy, Incision  of Ureteral Orifice  Left laparoscopic Nephroureterectomy Retroperitoneal Lymphadenectomy .

## 2019-10-19 LAB
BACTERIA UR CULT: SIGNIFICANT CHANGE UP
SPECIMEN SOURCE: SIGNIFICANT CHANGE UP

## 2019-10-25 RX ORDER — SODIUM CHLORIDE 9 MG/ML
3 INJECTION INTRAMUSCULAR; INTRAVENOUS; SUBCUTANEOUS EVERY 8 HOURS
Refills: 0 | Status: DISCONTINUED | OUTPATIENT
Start: 2019-10-28 | End: 2019-10-30

## 2019-10-25 RX ORDER — SODIUM CHLORIDE 9 MG/ML
1000 INJECTION, SOLUTION INTRAVENOUS
Refills: 0 | Status: DISCONTINUED | OUTPATIENT
Start: 2019-10-28 | End: 2019-10-28

## 2019-10-25 NOTE — ASU PATIENT PROFILE, ADULT - PSH
History of prostate surgery  ? exact procedure 3/19  S/P cystoscopy  Insertion left ureteral stent ; biopsy 8/19

## 2019-10-27 ENCOUNTER — TRANSCRIPTION ENCOUNTER (OUTPATIENT)
Age: 74
End: 2019-10-27

## 2019-10-28 ENCOUNTER — RESULT REVIEW (OUTPATIENT)
Age: 74
End: 2019-10-28

## 2019-10-28 ENCOUNTER — APPOINTMENT (OUTPATIENT)
Dept: UROLOGY | Facility: HOSPITAL | Age: 74
End: 2019-10-28

## 2019-10-28 ENCOUNTER — INPATIENT (INPATIENT)
Facility: HOSPITAL | Age: 74
LOS: 1 days | Discharge: HOME | End: 2019-10-30
Attending: UROLOGY | Admitting: UROLOGY
Payer: MEDICARE

## 2019-10-28 VITALS
RESPIRATION RATE: 16 BRPM | SYSTOLIC BLOOD PRESSURE: 149 MMHG | DIASTOLIC BLOOD PRESSURE: 57 MMHG | HEIGHT: 62 IN | TEMPERATURE: 98 F | OXYGEN SATURATION: 100 % | HEART RATE: 74 BPM | WEIGHT: 149.03 LBS

## 2019-10-28 DIAGNOSIS — I10 ESSENTIAL (PRIMARY) HYPERTENSION: ICD-10-CM

## 2019-10-28 DIAGNOSIS — Z98.890 OTHER SPECIFIED POSTPROCEDURAL STATES: Chronic | ICD-10-CM

## 2019-10-28 DIAGNOSIS — E78.00 PURE HYPERCHOLESTEROLEMIA, UNSPECIFIED: ICD-10-CM

## 2019-10-28 DIAGNOSIS — C65.2 MALIGNANT NEOPLASM OF LEFT RENAL PELVIS: ICD-10-CM

## 2019-10-28 DIAGNOSIS — E87.1 HYPO-OSMOLALITY AND HYPONATREMIA: ICD-10-CM

## 2019-10-28 DIAGNOSIS — J45.909 UNSPECIFIED ASTHMA, UNCOMPLICATED: ICD-10-CM

## 2019-10-28 DIAGNOSIS — Z29.9 ENCOUNTER FOR PROPHYLACTIC MEASURES, UNSPECIFIED: ICD-10-CM

## 2019-10-28 LAB
GAS PNL BLDV: 137 MMOL/L — SIGNIFICANT CHANGE UP (ref 136–146)
GLUCOSE BLDV-MCNC: 114 MG/DL — HIGH (ref 70–99)
POTASSIUM BLDV-SCNC: 3.9 MMOL/L — SIGNIFICANT CHANGE UP (ref 3.4–4.5)
RH IG SCN BLD-IMP: POSITIVE — SIGNIFICANT CHANGE UP

## 2019-10-28 PROCEDURE — 50548 LAPARO REMOVE W/URETER: CPT | Mod: LT

## 2019-10-28 PROCEDURE — 38564 REMOVAL ABDOMEN LYMPH NODES: CPT

## 2019-10-28 PROCEDURE — 88307 TISSUE EXAM BY PATHOLOGIST: CPT | Mod: 26

## 2019-10-28 PROCEDURE — 99223 1ST HOSP IP/OBS HIGH 75: CPT

## 2019-10-28 PROCEDURE — 88312 SPECIAL STAINS GROUP 1: CPT | Mod: 26

## 2019-10-28 RX ORDER — ONDANSETRON 8 MG/1
4 TABLET, FILM COATED ORAL ONCE
Refills: 0 | Status: COMPLETED | OUTPATIENT
Start: 2019-10-28 | End: 2019-10-28

## 2019-10-28 RX ORDER — DEXAMETHASONE 0.5 MG/5ML
6 ELIXIR ORAL ONCE
Refills: 0 | Status: COMPLETED | OUTPATIENT
Start: 2019-10-28 | End: 2019-10-28

## 2019-10-28 RX ORDER — METOPROLOL TARTRATE 50 MG
25 TABLET ORAL DAILY
Refills: 0 | Status: DISCONTINUED | OUTPATIENT
Start: 2019-10-28 | End: 2019-10-30

## 2019-10-28 RX ORDER — OXYCODONE HYDROCHLORIDE 5 MG/1
10 TABLET ORAL EVERY 6 HOURS
Refills: 0 | Status: DISCONTINUED | OUTPATIENT
Start: 2019-10-28 | End: 2019-10-30

## 2019-10-28 RX ORDER — ACETAMINOPHEN 500 MG
1000 TABLET ORAL ONCE
Refills: 0 | Status: COMPLETED | OUTPATIENT
Start: 2019-10-28 | End: 2019-10-28

## 2019-10-28 RX ORDER — HYDROMORPHONE HYDROCHLORIDE 2 MG/ML
0.5 INJECTION INTRAMUSCULAR; INTRAVENOUS; SUBCUTANEOUS
Refills: 0 | Status: DISCONTINUED | OUTPATIENT
Start: 2019-10-28 | End: 2019-10-30

## 2019-10-28 RX ORDER — FUROSEMIDE 40 MG
20 TABLET ORAL DAILY
Refills: 0 | Status: DISCONTINUED | OUTPATIENT
Start: 2019-10-28 | End: 2019-10-30

## 2019-10-28 RX ORDER — SODIUM CHLORIDE 9 MG/ML
1000 INJECTION, SOLUTION INTRAVENOUS
Refills: 0 | Status: DISCONTINUED | OUTPATIENT
Start: 2019-10-28 | End: 2019-10-28

## 2019-10-28 RX ORDER — SENNA PLUS 8.6 MG/1
2 TABLET ORAL AT BEDTIME
Refills: 0 | Status: DISCONTINUED | OUTPATIENT
Start: 2019-10-28 | End: 2019-10-30

## 2019-10-28 RX ORDER — ACETAMINOPHEN 500 MG
650 TABLET ORAL EVERY 6 HOURS
Refills: 0 | Status: DISCONTINUED | OUTPATIENT
Start: 2019-10-28 | End: 2019-10-30

## 2019-10-28 RX ORDER — ATORVASTATIN CALCIUM 80 MG/1
10 TABLET, FILM COATED ORAL AT BEDTIME
Refills: 0 | Status: DISCONTINUED | OUTPATIENT
Start: 2019-10-28 | End: 2019-10-30

## 2019-10-28 RX ORDER — TAMSULOSIN HYDROCHLORIDE 0.4 MG/1
0.4 CAPSULE ORAL AT BEDTIME
Refills: 0 | Status: DISCONTINUED | OUTPATIENT
Start: 2019-10-28 | End: 2019-10-30

## 2019-10-28 RX ORDER — AMLODIPINE BESYLATE 2.5 MG/1
10 TABLET ORAL DAILY
Refills: 0 | Status: DISCONTINUED | OUTPATIENT
Start: 2019-10-28 | End: 2019-10-30

## 2019-10-28 RX ORDER — ONDANSETRON 8 MG/1
4 TABLET, FILM COATED ORAL EVERY 6 HOURS
Refills: 0 | Status: DISCONTINUED | OUTPATIENT
Start: 2019-10-28 | End: 2019-10-30

## 2019-10-28 RX ORDER — OXYCODONE HYDROCHLORIDE 5 MG/1
5 TABLET ORAL EVERY 6 HOURS
Refills: 0 | Status: DISCONTINUED | OUTPATIENT
Start: 2019-10-28 | End: 2019-10-30

## 2019-10-28 RX ORDER — SODIUM CHLORIDE 9 MG/ML
1000 INJECTION INTRAMUSCULAR; INTRAVENOUS; SUBCUTANEOUS
Refills: 0 | Status: DISCONTINUED | OUTPATIENT
Start: 2019-10-28 | End: 2019-10-30

## 2019-10-28 RX ORDER — BUDESONIDE AND FORMOTEROL FUMARATE DIHYDRATE 160; 4.5 UG/1; UG/1
2 AEROSOL RESPIRATORY (INHALATION)
Refills: 0 | Status: DISCONTINUED | OUTPATIENT
Start: 2019-10-28 | End: 2019-10-30

## 2019-10-28 RX ORDER — HEPARIN SODIUM 5000 [USP'U]/ML
5000 INJECTION INTRAVENOUS; SUBCUTANEOUS EVERY 8 HOURS
Refills: 0 | Status: DISCONTINUED | OUTPATIENT
Start: 2019-10-28 | End: 2019-10-30

## 2019-10-28 RX ORDER — LOSARTAN POTASSIUM 100 MG/1
100 TABLET, FILM COATED ORAL DAILY
Refills: 0 | Status: DISCONTINUED | OUTPATIENT
Start: 2019-10-28 | End: 2019-10-30

## 2019-10-28 RX ADMIN — HEPARIN SODIUM 5000 UNIT(S): 5000 INJECTION INTRAVENOUS; SUBCUTANEOUS at 18:52

## 2019-10-28 RX ADMIN — Medication 6 MILLIGRAM(S): at 17:40

## 2019-10-28 RX ADMIN — ATORVASTATIN CALCIUM 10 MILLIGRAM(S): 80 TABLET, FILM COATED ORAL at 23:38

## 2019-10-28 RX ADMIN — TAMSULOSIN HYDROCHLORIDE 0.4 MILLIGRAM(S): 0.4 CAPSULE ORAL at 23:37

## 2019-10-28 RX ADMIN — HYDROMORPHONE HYDROCHLORIDE 0.5 MILLIGRAM(S): 2 INJECTION INTRAMUSCULAR; INTRAVENOUS; SUBCUTANEOUS at 15:55

## 2019-10-28 RX ADMIN — ONDANSETRON 4 MILLIGRAM(S): 8 TABLET, FILM COATED ORAL at 16:20

## 2019-10-28 RX ADMIN — HYDROMORPHONE HYDROCHLORIDE 0.5 MILLIGRAM(S): 2 INJECTION INTRAMUSCULAR; INTRAVENOUS; SUBCUTANEOUS at 15:13

## 2019-10-28 RX ADMIN — SODIUM CHLORIDE 125 MILLILITER(S): 9 INJECTION INTRAMUSCULAR; INTRAVENOUS; SUBCUTANEOUS at 15:00

## 2019-10-28 RX ADMIN — HYDROMORPHONE HYDROCHLORIDE 0.5 MILLIGRAM(S): 2 INJECTION INTRAMUSCULAR; INTRAVENOUS; SUBCUTANEOUS at 15:40

## 2019-10-28 RX ADMIN — ONDANSETRON 4 MILLIGRAM(S): 8 TABLET, FILM COATED ORAL at 17:15

## 2019-10-28 RX ADMIN — Medication 400 MILLIGRAM(S): at 18:25

## 2019-10-28 RX ADMIN — Medication 1000 MILLIGRAM(S): at 18:41

## 2019-10-28 RX ADMIN — HYDROMORPHONE HYDROCHLORIDE 0.5 MILLIGRAM(S): 2 INJECTION INTRAMUSCULAR; INTRAVENOUS; SUBCUTANEOUS at 15:30

## 2019-10-28 RX ADMIN — SODIUM CHLORIDE 3 MILLILITER(S): 9 INJECTION INTRAMUSCULAR; INTRAVENOUS; SUBCUTANEOUS at 22:36

## 2019-10-28 NOTE — CONSULT NOTE ADULT - PROBLEM SELECTOR RECOMMENDATION 9
S/p  Cystoscopy, Incision  of Ureteral Orifice  Left laparoscopic Nephroureterectomy and Retroperitoneal Lymphadenectomy. POD#0  -Management as per   -Pain control  -Bowel regimen  -Incentive spirometer  -OOB  -DVT prophylaxis

## 2019-10-28 NOTE — BRIEF OPERATIVE NOTE - NSICDXBRIEFPROCEDURE_GEN_ALL_CORE_FT
PROCEDURES:  Laparoscopic left nephroureterectomy 28-Oct-2019 09:30:47  Tristin Mercado  Cystoscopy 28-Oct-2019 09:30:33  Tristin Mercado

## 2019-10-28 NOTE — CONSULT NOTE ADULT - ASSESSMENT
74 y/o Bulgarian speaking male with h/o HTN, asthma, SIADH, bladder ca .admitted for Cystoscopy, Incision  of Ureteral Orifice  Left laparoscopic Nephroureterectomy and Retroperitoneal Lymphadenectomy. S/P OR, POD#0.

## 2019-10-28 NOTE — CONSULT NOTE ADULT - SUBJECTIVE AND OBJECTIVE BOX
Patient is a 74y old  Male who presents with a chief complaint of "I have cancer in left kidney " (17 Oct 2019 18:03)      HPI:  72 y/o Irish speaking male with h/o HTN, asthma, SIADH, bladder ca .admitted for Cystoscopy, Incision  of Ureteral Orifice  Left laparoscopic Nephroureterectomy and Retroperitoneal Lymphadenectomy. S/P OR, POD#0. Pt has no complaints at this time. Denies any CP or SOB.       History limited due to: [ ] Dementia  [ ] Delirium  [ ] Condition    Pain Symptoms if applicable:             	                         mild        Pain:	                          1-3	       Location:	  Modifying factors:	  Associated symptoms:	    Function: [x ] Independent  [ ] Assistance  [ ] Total care  [ ] Non-ambulatory    Allergies    chocolate (Pruritus)  flour (Rash)  No Known Drug Allergies  Nuts (Rash)  Soy (Unknown)    Intolerances        HOME MEDICATIONS: [x ] Reviewed    MEDICATIONS  (STANDING):  amLODIPine   Tablet 10 milliGRAM(s) Oral daily  atorvastatin 10 milliGRAM(s) Oral at bedtime  budesonide 160 MICROgram(s)/formoterol 4.5 MICROgram(s) Inhaler 2 Puff(s) Inhalation two times a day  furosemide    Tablet 20 milliGRAM(s) Oral daily  heparin  Injectable 5000 Unit(s) SubCutaneous every 8 hours  losartan 100 milliGRAM(s) Oral daily  metoprolol succinate ER 25 milliGRAM(s) Oral daily  multivitamin 1 Tablet(s) Oral daily  sodium chloride 0.9% lock flush 3 milliLiter(s) IV Push every 8 hours  sodium chloride 0.9%. 1000 milliLiter(s) (125 mL/Hr) IV Continuous <Continuous>  tamsulosin 0.4 milliGRAM(s) Oral at bedtime    MEDICATIONS  (PRN):  acetaminophen   Tablet .. 650 milliGRAM(s) Oral every 6 hours PRN Mild Pain (1 - 3)  HYDROmorphone  Injectable 0.5 milliGRAM(s) IV Push every 10 minutes PRN Severe Pain (7 - 10)  oxyCODONE    IR 10 milliGRAM(s) Oral every 6 hours PRN Severe Pain (7 - 10)  oxyCODONE    IR 5 milliGRAM(s) Oral every 6 hours PRN Moderate Pain (4 - 6)  senna 2 Tablet(s) Oral at bedtime PRN Constipation      PAST MEDICAL & SURGICAL HISTORY:  History of stomach ulcers: denies recent endoscopy  Cancer of kidney  Hyponatremia: admission x 2 last 2017  Asthma: denies recent asthma exacerbation  Left renal mass  Gross hematuria  Urinary tract infection with hematuria  History of SIADH  History of BPH  Essential hypertension  HTN (Hypertension)  Anxiety  Hypercholesterolemia  History of prostate surgery: ? exact procedure 3/19  S/P cystoscopy: Insertion left ureteral stent ; biopsy 8/19  [x ] Reviewed     SOCIAL HISTORY:  Residence: [ ] [facility]  [ ] SNF  [x ] Community  [ ] Substance abuse: denies  [ ] Tobacco: ex-smoker, 40 PY hx, quit 20 years ago  [ ] Alcohol use: denies    FAMILY HISTORY:  FH: lung cancer: brother  Family history of stroke  [ ] No pertinent family history in first degree relatives     REVIEW OF SYSTEMS:    CONSTITUTIONAL: No fever, weight loss, or fatigue  EYES: No eye pain, visual disturbances, or discharge  ENMT:  No difficulty hearing, tinnitus, vertigo; No sinus or throat pain  NECK: No pain or stiffness  BREASTS: No pain, masses, or nipple discharge  RESPIRATORY: No cough, wheezing, chills or hemoptysis; No shortness of breath  CARDIOVASCULAR: No chest pain, palpitations, dizziness, or leg swelling  GASTROINTESTINAL: (+) mild abdominal pain. No nausea, vomiting, or hematemesis; No diarrhea or constipation. No melena or hematochezia. No flatus post op  GENITOURINARY: (+) pineda  NEUROLOGICAL: No headaches, memory loss, loss of strength, numbness, or tremors  SKIN: No itching, burning, rashes, or lesions   LYMPH NODES: No enlarged glands  ENDOCRINE: No heat or cold intolerance; No hair loss  MUSCULOSKELETAL: No muscle or back pain  PSYCHIATRIC: No depression, anxiety, mood swings, or difficulty sleeping  HEME/LYMPH: No easy bruising, or bleeding gums  ALLERGY AND IMMUNOLOGIC: No hives or eczema    [  ] All other ROS negative  [  ] Unable to obtain due to poor mental status    Vital Signs Last 24 Hrs  T(C): 34.8 (28 Oct 2019 16:00), Max: 36.6 (28 Oct 2019 08:26)  T(F): 94.7 (28 Oct 2019 16:00), Max: 97.9 (28 Oct 2019 08:26)  HR: 63 (28 Oct 2019 16:00) (62 - 74)  BP: 117/55 (28 Oct 2019 16:00) (117/55 - 149/57)  BP(mean): 71 (28 Oct 2019 16:00) (68 - 76)  RR: 13 (28 Oct 2019 16:00) (12 - 19)  SpO2: 100% (28 Oct 2019 16:00) (97% - 100%)    PHYSICAL EXAM:    GENERAL: NAD, well-groomed, well-developed  HEAD:  Atraumatic, Normocephalic  EYES: EOMI, PERRLA, conjunctiva and sclera clear  ENMT: Moist mucous membranes  NECK: Supple, No JVD  RESPIRATORY: Clear to auscultation bilaterally; No rales, rhonchi, wheezing, or rubs  CARDIOVASCULAR: Regular rate and rhythm; No murmurs, rubs, or gallops  GASTROINTESTINAL: Soft, mildly tender, Nondistended; Bowel sounds hypoactive  GENITOURINARY: (+) pineda  EXTREMITIES:  2+ Peripheral Pulses, No clubbing, cyanosis, or edema  NERVOUS SYSTEM:  Alert & Oriented X3; Moving all 4 extremities; No gross sensory deficits  HEME/LYMPH: No lymphadenopathy noted  SKIN: No rashes or lesions; Incisions C/D/I    LABS: pre op lab reviewed.              CAPILLARY BLOOD GLUCOSE          RADIOLOGY & ADDITIONAL STUDIES:    EKG:   Personally Reviewed:  [x ] YES  NSR, (+) incomplete RBBB, (+) flapped TW at I, AVL, V3-V6    Imaging:   Personally Reviewed:  [ ] YES               Consultant(s) notes reviewed:    Care Discussed with Consultant(s)/Other Providers:    Advanced Directives: [ ] DNR  [ ] No feeding tube  [ ] MOLST in chart  [ ] MOLST completed today  [x ] Unknown

## 2019-10-28 NOTE — BRIEF OPERATIVE NOTE - ANTIBIOTIC PROTOCOL
Daily Note     Today's date: 2018  Patient name: Komal Ryan  :   MRN: [id number]  Referring provider: Sherin Pryor MD  Dx:   Encounter Diagnosis     ICD-10-CM    1  Right knee pain, unspecified chronicity [id number] [id number]               10:32- 11:15 1:1 CF     Subjective: Pt reports his right knee is feeling pretty good today but his left knee is really bothering him  Objective: See treatment diary below      Assessment: Tolerated treatment well  Pt demonstrates good passive ROM with tenderness along his patellar tendon  Pt demonstrates 50% restriction with right hamstring mobility seen with self stretching and manual therapy  Pt demonstrates good quad engagment as he has good from and minimal valgus/ varus stability with laterl side steps  Plan: Continue per plan of care  Progress treatment as tolerated  Precautions: HTN, CAD, DM    Daily Treatment Diary     Manual            Right gastroc, h/s, hip flexor str  GR LK CF          Right pat  Mobs: all planes with medial tilt GR LK  PROM CF PROM           IASTM to right patellar tendon GR LK CF                                         Exercise Diary            Bike 8' 8' 8'            VG             Slantboard gastroc str  30" x 3           Hamstring, hip flexor str  30" x 3 ea          SLR: supine, s/l   2 x10 ea          LSD 2x10 8" 3x10  4"  fatigue  6"   3 x10           PB wall slides             Prone quad str  Standing hip abd, ext in SLS with TB   YTB   2 x10 ea           Sidestepping 30'x6 YTB counter 2 laps  GTB YTB   5 laps @ bar           Slideboard lunges: backwards             Knee flexion mach               LAQ             Leg press             Slideboard lunges: lateral 3x10 3x10           Hip abduction clock 3x5 YTB No band  5x                                                                   Modalities               np
Followed protocol

## 2019-10-28 NOTE — BRIEF OPERATIVE NOTE - OPERATION/FINDINGS
cystoscopy, incision of Left ureteral orifice.  Laparoscopic nephroureterectomy left. cystoscopy, incision of Left ureteral orifice.  Laparoscopic nephroureterectomy left. LND

## 2019-10-28 NOTE — PROGRESS NOTE ADULT - ASSESSMENT
s/p L. lap nephro-U; hypothermic; nausea    Plan:  - anti- nausea meds as per PACU (maybe decadron)  - observe temp  - pain control  - monitor outputs

## 2019-10-29 PROCEDURE — 99233 SBSQ HOSP IP/OBS HIGH 50: CPT

## 2019-10-29 RX ORDER — INFLUENZA VIRUS VACCINE 15; 15; 15; 15 UG/.5ML; UG/.5ML; UG/.5ML; UG/.5ML
0.5 SUSPENSION INTRAMUSCULAR ONCE
Refills: 0 | Status: COMPLETED | OUTPATIENT
Start: 2019-10-29 | End: 2019-10-29

## 2019-10-29 RX ADMIN — AMLODIPINE BESYLATE 10 MILLIGRAM(S): 2.5 TABLET ORAL at 06:01

## 2019-10-29 RX ADMIN — Medication 10 MILLIGRAM(S): at 06:01

## 2019-10-29 RX ADMIN — LOSARTAN POTASSIUM 100 MILLIGRAM(S): 100 TABLET, FILM COATED ORAL at 06:01

## 2019-10-29 RX ADMIN — Medication 20 MILLIGRAM(S): at 06:01

## 2019-10-29 RX ADMIN — SODIUM CHLORIDE 3 MILLILITER(S): 9 INJECTION INTRAMUSCULAR; INTRAVENOUS; SUBCUTANEOUS at 13:01

## 2019-10-29 RX ADMIN — ATORVASTATIN CALCIUM 10 MILLIGRAM(S): 80 TABLET, FILM COATED ORAL at 21:18

## 2019-10-29 RX ADMIN — Medication 1 TABLET(S): at 11:13

## 2019-10-29 RX ADMIN — TAMSULOSIN HYDROCHLORIDE 0.4 MILLIGRAM(S): 0.4 CAPSULE ORAL at 21:18

## 2019-10-29 RX ADMIN — Medication 650 MILLIGRAM(S): at 11:30

## 2019-10-29 RX ADMIN — HEPARIN SODIUM 5000 UNIT(S): 5000 INJECTION INTRAVENOUS; SUBCUTANEOUS at 17:47

## 2019-10-29 RX ADMIN — ONDANSETRON 4 MILLIGRAM(S): 8 TABLET, FILM COATED ORAL at 00:33

## 2019-10-29 RX ADMIN — HEPARIN SODIUM 5000 UNIT(S): 5000 INJECTION INTRAVENOUS; SUBCUTANEOUS at 03:11

## 2019-10-29 RX ADMIN — Medication 25 MILLIGRAM(S): at 06:01

## 2019-10-29 RX ADMIN — SODIUM CHLORIDE 125 MILLILITER(S): 9 INJECTION INTRAMUSCULAR; INTRAVENOUS; SUBCUTANEOUS at 21:17

## 2019-10-29 RX ADMIN — Medication 650 MILLIGRAM(S): at 10:56

## 2019-10-29 RX ADMIN — SODIUM CHLORIDE 3 MILLILITER(S): 9 INJECTION INTRAMUSCULAR; INTRAVENOUS; SUBCUTANEOUS at 05:45

## 2019-10-29 RX ADMIN — SODIUM CHLORIDE 3 MILLILITER(S): 9 INJECTION INTRAMUSCULAR; INTRAVENOUS; SUBCUTANEOUS at 22:27

## 2019-10-29 RX ADMIN — HEPARIN SODIUM 5000 UNIT(S): 5000 INJECTION INTRAVENOUS; SUBCUTANEOUS at 11:12

## 2019-10-29 NOTE — PROGRESS NOTE ADULT - ASSESSMENT
74 y/o Greenlandic speaking male with h/o HTN, asthma, SIADH, bladder ca .admitted for Cystoscopy, Incision  of Ureteral Orifice  Left laparoscopic Nephroureterectomy and Retroperitoneal Lymphadenectomy. S/P OR, POD#1.

## 2019-10-29 NOTE — CONSULT NOTE ADULT - SUBJECTIVE AND OBJECTIVE BOX
[facility]   MD LEONELA JULIO DO MARYANNE SOURIAL, JOAO ORTIZ    TEL:  OFFICE: [phone]  DR HOWARD CELL: [phone]  DR. RAMOS CELL: [phone]  DR. AUSTIN CELL: [phone]  LESVIA WILLOUGHBY CELL: [phone]    HPI:  74 y/o M PMH HTN, CKD stage 3, BPH, hx of prostate surgery, hx of left renal mass and gross hematuria Pt s/p  Cystoscopy. Left Ureteroscopy with  Biopsy,  Ureteral Stent on 9/9/2019. Pt f/u with surgeon ; pt now presents for Cystoscopy, Incision  of Ureteral Orifice  Left laparoscopic Nephroureterectomy Retroperitoneal Lymphadenectomy .   patient follow up with dr. howard for ckd baseline ~ 1.1-1.3      Allergies:  chocolate (Pruritus)  flour (Rash)  No Known Drug Allergies  Nuts (Rash)  Soy (Unknown)      PAST MEDICAL & SURGICAL HISTORY:  History of stomach ulcers: denies recent endoscopy  Cancer of kidney  Hyponatremia: admission x 2 last 2017  Asthma: denies recent asthma exacerbation  Left renal mass  Gross hematuria  Urinary tract infection with hematuria  History of SIADH  History of BPH  Essential hypertension  HTN (Hypertension)  Anxiety  Hypercholesterolemia  History of prostate surgery: ? exact procedure 3/19  S/P cystoscopy: Insertion left ureteral stent ; biopsy 8/19      Home Medications Reviewed    Hospital Medications:   MEDICATIONS  (STANDING):  amLODIPine   Tablet 10 milliGRAM(s) Oral daily  atorvastatin 10 milliGRAM(s) Oral at bedtime  budesonide 160 MICROgram(s)/formoterol 4.5 MICROgram(s) Inhaler 2 Puff(s) Inhalation two times a day  furosemide    Tablet 20 milliGRAM(s) Oral daily  heparin  Injectable 5000 Unit(s) SubCutaneous every 8 hours  influenza   Vaccine 0.5 milliLiter(s) IntraMuscular once  losartan 100 milliGRAM(s) Oral daily  metoprolol succinate ER 25 milliGRAM(s) Oral daily  multivitamin 1 Tablet(s) Oral daily  sodium chloride 0.9% lock flush 3 milliLiter(s) IV Push every 8 hours  sodium chloride 0.9%. 1000 milliLiter(s) (125 mL/Hr) IV Continuous <Continuous>  tamsulosin 0.4 milliGRAM(s) Oral at bedtime      SOCIAL HISTORY:  Denies ETOh, Smoking,     FAMILY HISTORY:  FH: lung cancer: brother  Family history of stroke      REVIEW OF SYSTEMS:  CONSTITUTIONAL: No weakness, fevers or chills  EYES/ENT: No visual changes;  No vertigo or throat pain   NECK: No pain or stiffness  RESPIRATORY: No cough, wheezing, hemoptysis; No shortness of breath  CARDIOVASCULAR: No chest pain or palpitations.  GASTROINTESTINAL: s/p surgery, c/o tenderness  GENITOURINARY: No dysuria, frequency, foamy urine, urinary urgency, incontinence or hematuria  NEUROLOGICAL: No numbness or weakness  SKIN: No itching, burning, rashes, or lesions   VASCULAR: No bilateral lower extremity edema.   All other review of systems is negative unless indicated above.    VITALS:  T(F): 98 (10-29-19 @ 16:54), Max: 98.6 (10-29-19 @ 13:39)  HR: 90 (10-29-19 @ 16:54)  BP: 136/55 (10-29-19 @ 16:54)  RR: 16 (10-29-19 @ 16:54)  SpO2: 96% (10-29-19 @ 16:54)  Wt(kg): --    10-28 @ 07:01  -  10-29 @ 07:00  --------------------------------------------------------  IN: 1000 mL / OUT: 1960 mL / NET: -960 mL    10-29 @ 07:01  -  10-29 @ 17:13  --------------------------------------------------------  IN: 0 mL / OUT: 2210 mL / NET: -2210 mL          PHYSICAL EXAM:  Constitutional: NAD  HEENT: anicteric sclera, oropharynx clear, MMM  Neck: No JVD  Respiratory: CTAB, no wheezes, rales or rhonchi  Cardiovascular: S1, S2, RRR  Gastrointestinal: BS+, distended, tender, + drain in place  Extremities: No cyanosis or clubbing. No peripheral edema  Neurological: A/O x 3, no focal deficits  Psychiatric: Normal mood, normal affect  : + pineda.   Skin: No rashes    LABS:        Creatinine Trend:     Urine Studies:        RADIOLOGY & ADDITIONAL STUDIES:

## 2019-10-29 NOTE — CONSULT NOTE ADULT - ASSESSMENT
72 y/o Romansh speaking male with h/o HTN, asthma, SIADH, bladder ca .admitted for Cystoscopy, Incision  of Ureteral Orifice  Left laparoscopic Nephroureterectomy and Retroperitoneal Lymphadenectomy. S/P OR, POD#1.    CKD stage 3  baseline ~ 1.1-1.3  patient follow up with dr. howard last vist in april  CKD likely sec to HTN  monitor bmp  avoid nephrotoxic agents    Malignant neoplasm of renal pelvis, left.    S/P Cystoscopy, Incision  of Ureteral Orifice  Left laparoscopic Nephroureterectomy and Retroperitoneal Lymphadenectomy.   -Management as per   -Pain control       Essential hypertension.    BP well controlled.  -Cont. Metoprolol, Amlodipine, Lasix and Losartan  -Monitor BP. 74 y/o Kyrgyz speaking male with h/o HTN, asthma, SIADH, bladder ca .admitted for Cystoscopy, Incision  of Ureteral Orifice  Left laparoscopic Nephroureterectomy and Retroperitoneal Lymphadenectomy. S/P OR, POD#1.    CKD stage 3  baseline ~ 1.1-1.3  patient follow up with dr. howard last vist in april  CKD likely sec to HTN  monitor bmp  avoid nephrotoxic agents    Malignant neoplasm of renal pelvis, left.    S/P Cystoscopy, Incision  of Ureteral Orifice  Left laparoscopic Nephroureterectomy and Retroperitoneal Lymphadenectomy on 10/28.   -Management as per   -Pain control       Essential hypertension.    BP well controlled.  -Cont. Metoprolol, Amlodipine, Lasix and Losartan  -Monitor BP.

## 2019-10-30 ENCOUNTER — TRANSCRIPTION ENCOUNTER (OUTPATIENT)
Age: 74
End: 2019-10-30

## 2019-10-30 VITALS
DIASTOLIC BLOOD PRESSURE: 46 MMHG | HEART RATE: 81 BPM | OXYGEN SATURATION: 98 % | SYSTOLIC BLOOD PRESSURE: 113 MMHG | TEMPERATURE: 98 F | RESPIRATION RATE: 17 BRPM

## 2019-10-30 DIAGNOSIS — F41.9 ANXIETY DISORDER, UNSPECIFIED: ICD-10-CM

## 2019-10-30 LAB
ANION GAP SERPL CALC-SCNC: 11 MMO/L — SIGNIFICANT CHANGE UP (ref 7–14)
BUN SERPL-MCNC: 18 MG/DL — SIGNIFICANT CHANGE UP (ref 7–23)
CALCIUM SERPL-MCNC: 8.8 MG/DL — SIGNIFICANT CHANGE UP (ref 8.4–10.5)
CHLORIDE SERPL-SCNC: 102 MMOL/L — SIGNIFICANT CHANGE UP (ref 98–107)
CO2 SERPL-SCNC: 24 MMOL/L — SIGNIFICANT CHANGE UP (ref 22–31)
CREAT FLD-MCNC: 1.73 MG/DL — SIGNIFICANT CHANGE UP
CREAT SERPL-MCNC: 1.78 MG/DL — HIGH (ref 0.5–1.3)
GLUCOSE SERPL-MCNC: 104 MG/DL — HIGH (ref 70–99)
POTASSIUM SERPL-MCNC: 3.6 MMOL/L — SIGNIFICANT CHANGE UP (ref 3.5–5.3)
POTASSIUM SERPL-SCNC: 3.6 MMOL/L — SIGNIFICANT CHANGE UP (ref 3.5–5.3)
SODIUM SERPL-SCNC: 137 MMOL/L — SIGNIFICANT CHANGE UP (ref 135–145)

## 2019-10-30 PROCEDURE — 99233 SBSQ HOSP IP/OBS HIGH 50: CPT

## 2019-10-30 PROCEDURE — 99238 HOSP IP/OBS DSCHRG MGMT 30/<: CPT

## 2019-10-30 RX ORDER — ACETAMINOPHEN 500 MG
2 TABLET ORAL
Qty: 0 | Refills: 0 | DISCHARGE
Start: 2019-10-30

## 2019-10-30 RX ADMIN — HEPARIN SODIUM 5000 UNIT(S): 5000 INJECTION INTRAVENOUS; SUBCUTANEOUS at 01:59

## 2019-10-30 RX ADMIN — LOSARTAN POTASSIUM 100 MILLIGRAM(S): 100 TABLET, FILM COATED ORAL at 05:30

## 2019-10-30 RX ADMIN — Medication 20 MILLIGRAM(S): at 05:30

## 2019-10-30 RX ADMIN — BUDESONIDE AND FORMOTEROL FUMARATE DIHYDRATE 2 PUFF(S): 160; 4.5 AEROSOL RESPIRATORY (INHALATION) at 09:53

## 2019-10-30 RX ADMIN — Medication 650 MILLIGRAM(S): at 12:36

## 2019-10-30 RX ADMIN — SODIUM CHLORIDE 3 MILLILITER(S): 9 INJECTION INTRAMUSCULAR; INTRAVENOUS; SUBCUTANEOUS at 13:38

## 2019-10-30 RX ADMIN — AMLODIPINE BESYLATE 10 MILLIGRAM(S): 2.5 TABLET ORAL at 05:30

## 2019-10-30 RX ADMIN — SODIUM CHLORIDE 3 MILLILITER(S): 9 INJECTION INTRAMUSCULAR; INTRAVENOUS; SUBCUTANEOUS at 05:29

## 2019-10-30 RX ADMIN — Medication 1 TABLET(S): at 12:36

## 2019-10-30 RX ADMIN — Medication 25 MILLIGRAM(S): at 05:30

## 2019-10-30 RX ADMIN — HEPARIN SODIUM 5000 UNIT(S): 5000 INJECTION INTRAVENOUS; SUBCUTANEOUS at 09:50

## 2019-10-30 NOTE — DISCHARGE NOTE PROVIDER - NSDCCPTREATMENT_GEN_ALL_CORE_FT
PRINCIPAL PROCEDURE  Procedure: Laparoscopic left nephroureterectomy  Findings and Treatment:       SECONDARY PROCEDURE  Procedure: Cystoscopy  Findings and Treatment:

## 2019-10-30 NOTE — PROGRESS NOTE ADULT - ASSESSMENT
s/p L. lap nephro-U POD#2 doing well; passed flatus; would go home with pineda    Plan:  - reg diet  - OOB

## 2019-10-30 NOTE — PROGRESS NOTE ADULT - SUBJECTIVE AND OBJECTIVE BOX
[facility] [place] [facility]PHROLOGY [facility]ACT[facility]   MD LEONELA JULIO, DO JAIDA AUSTIN, JOAO ORTIZ    TEL:  OFFICE: [phone]  DR POST CELL: [phone]  DR. RAMOS CELL: [phone]  DR. AUSTIN CELL: [phone]  LESVIA WILLOUGHBY CELL: [phone]        Patient is a 74y old  Male who presents with a chief complaint of "I have cancer in left kidney " (17 Oct 2019 18:03)      Patient seen and examined at bedside. No chest pain/sob    VITALS:  T(F): 97.9 (10-30-19 @ 09:50), Max: 98.6 (10-29-19 @ 13:39)  HR: 81 (10-30-19 @ 09:50)  BP: 113/46 (10-30-19 @ 09:50)  RR: 17 (10-30-19 @ 09:50)  SpO2: 98% (10-30-19 @ 09:50)  Wt(kg): --    10-29 @ 07:01  -  10-30 @ 07:00  --------------------------------------------------------  IN: 0 mL / OUT: 4409 mL / NET: -4409 mL    10-30 @ 07:01  -  10-30 @ 10:03  --------------------------------------------------------  IN: 0 mL / OUT: 1360 mL / NET: -1360 mL          PHYSICAL EXAM:  Constitutional: NAD  Neck: No JVD  Respiratory: CTAB, no wheezes, rales or rhonchi  Cardiovascular: S1, S2, RRR  Gastrointestinal: BS+, soft, NT/ND  Extremities: No peripheral edema    Hospital Medications:   MEDICATIONS  (STANDING):  amLODIPine   Tablet 10 milliGRAM(s) Oral daily  atorvastatin 10 milliGRAM(s) Oral at bedtime  budesonide 160 MICROgram(s)/formoterol 4.5 MICROgram(s) Inhaler 2 Puff(s) Inhalation two times a day  furosemide    Tablet 20 milliGRAM(s) Oral daily  heparin  Injectable 5000 Unit(s) SubCutaneous every 8 hours  influenza   Vaccine 0.5 milliLiter(s) IntraMuscular once  losartan 100 milliGRAM(s) Oral daily  metoprolol succinate ER 25 milliGRAM(s) Oral daily  multivitamin 1 Tablet(s) Oral daily  sodium chloride 0.9% lock flush 3 milliLiter(s) IV Push every 8 hours  tamsulosin 0.4 milliGRAM(s) Oral at bedtime      LABS:  10-30    137  |  102  |  18  ----------------------------<  104<H>  3.6   |  24  |  1.78<H>    Ca    8.8      30 Oct 2019 06:15      Creatinine Trend: 1.78 <--            Urine Studies:      Iron 76, TIBC 303, %sat --      [07-25-19 @ 06:10]  Ferritin 90.94      [07-25-19 @ 06:10]  HbA1c 6.3      [09-13-17 @ 08:15]        RADIOLOGY & ADDITIONAL STUDIES:
POD #1  Afeb 137/53 91 97%RA  Pt has no c/o  Abd- soft NT ND; no flatus     wounds C&D  Ben 630  ARIAN 70
POD #2  Afeb 127/54 84 95%RA    Pt has no c/o  Abd- soft NT ND; + flatus     wounds C&D  Ben 1425  ARIAN 70  OOB
Patient is a 74y old  Male who presents with a chief complaint of "I have cancer in left kidney " (17 Oct 2019 18:03)      SUBJECTIVE / OVERNIGHT EVENTS: No complaints, passing flatus.     MEDICATIONS  (STANDING):  amLODIPine   Tablet 10 milliGRAM(s) Oral daily  atorvastatin 10 milliGRAM(s) Oral at bedtime  budesonide 160 MICROgram(s)/formoterol 4.5 MICROgram(s) Inhaler 2 Puff(s) Inhalation two times a day  furosemide    Tablet 20 milliGRAM(s) Oral daily  heparin  Injectable 5000 Unit(s) SubCutaneous every 8 hours  influenza   Vaccine 0.5 milliLiter(s) IntraMuscular once  losartan 100 milliGRAM(s) Oral daily  metoprolol succinate ER 25 milliGRAM(s) Oral daily  multivitamin 1 Tablet(s) Oral daily  sodium chloride 0.9% lock flush 3 milliLiter(s) IV Push every 8 hours  sodium chloride 0.9%. 1000 milliLiter(s) (125 mL/Hr) IV Continuous <Continuous>  tamsulosin 0.4 milliGRAM(s) Oral at bedtime    MEDICATIONS  (PRN):  acetaminophen   Tablet .. 650 milliGRAM(s) Oral every 6 hours PRN Mild Pain (1 - 3)  HYDROmorphone  Injectable 0.5 milliGRAM(s) IV Push every 10 minutes PRN Severe Pain (7 - 10)  ondansetron Injectable 4 milliGRAM(s) IV Push every 6 hours PRN Nausea and/or Vomiting  oxyCODONE    IR 10 milliGRAM(s) Oral every 6 hours PRN Severe Pain (7 - 10)  oxyCODONE    IR 5 milliGRAM(s) Oral every 6 hours PRN Moderate Pain (4 - 6)  senna 2 Tablet(s) Oral at bedtime PRN Constipation      Vital Signs Last 24 Hrs  T(C): 36.6 (29 Oct 2019 05:44), Max: 36.8 (28 Oct 2019 20:00)  T(F): 97.9 (29 Oct 2019 05:44), Max: 98.2 (28 Oct 2019 20:00)  HR: 94 (29 Oct 2019 05:44) (62 - 94)  BP: 138/51 (29 Oct 2019 05:44) (117/55 - 146/54)  BP(mean): 80 (28 Oct 2019 21:00) (67 - 80)  RR: 16 (29 Oct 2019 05:44) (12 - 114)  SpO2: 98% (29 Oct 2019 05:44) (93% - 100%)  CAPILLARY BLOOD GLUCOSE        I&O's Summary    28 Oct 2019 07:01  -  29 Oct 2019 07:00  --------------------------------------------------------  IN: 1000 mL / OUT: 1510 mL / NET: -510 mL        PHYSICAL EXAM:  GENERAL: NAD, well-developed  HEAD:  Atraumatic, Normocephalic  EYES: EOMI, PERRLA, conjunctiva and sclera clear  NECK: Supple, No JVD  CHEST/LUNG: Clear to auscultation bilaterally; No wheeze  HEART: Regular rate and rhythm; No murmurs, rubs, or gallops  ABDOMEN: Soft, mildly tender, Nondistended; Bowel sounds present  EXTREMITIES:  2+ Peripheral Pulses, No clubbing, cyanosis, or edema  PSYCH: AAOx3  NEUROLOGY: non-focal  SKIN: No rashes or lesions    LABS:                    RADIOLOGY & ADDITIONAL TESTS:    Imaging Personally Reviewed:    Consultant(s) Notes Reviewed:      Care Discussed with Consultants/Other Providers:
Post op check    This 73 yo M is s/p L. lap nephro-ureterectomy/LND  PMH: asthma; HTN; SIADH; chol    Pt is awake and alert  Has c/o nausea  96.7  128/53 72 95%RA  Abd- soft; appropriately tender             midline inc with mod drainage  Contreras 475  ARIAN 30
Patient is a 74y old  Male who presents with a chief complaint of "I have cancer in left kidney " (17 Oct 2019 18:03)      SUBJECTIVE / OVERNIGHT EVENTS: No complaints, passing flatus, (+) BM    MEDICATIONS  (STANDING):  amLODIPine   Tablet 10 milliGRAM(s) Oral daily  atorvastatin 10 milliGRAM(s) Oral at bedtime  budesonide 160 MICROgram(s)/formoterol 4.5 MICROgram(s) Inhaler 2 Puff(s) Inhalation two times a day  furosemide    Tablet 20 milliGRAM(s) Oral daily  heparin  Injectable 5000 Unit(s) SubCutaneous every 8 hours  influenza   Vaccine 0.5 milliLiter(s) IntraMuscular once  losartan 100 milliGRAM(s) Oral daily  metoprolol succinate ER 25 milliGRAM(s) Oral daily  multivitamin 1 Tablet(s) Oral daily  sodium chloride 0.9% lock flush 3 milliLiter(s) IV Push every 8 hours  tamsulosin 0.4 milliGRAM(s) Oral at bedtime    MEDICATIONS  (PRN):  acetaminophen   Tablet .. 650 milliGRAM(s) Oral every 6 hours PRN Mild Pain (1 - 3)  HYDROmorphone  Injectable 0.5 milliGRAM(s) IV Push every 10 minutes PRN Severe Pain (7 - 10)  ondansetron Injectable 4 milliGRAM(s) IV Push every 6 hours PRN Nausea and/or Vomiting  oxyCODONE    IR 10 milliGRAM(s) Oral every 6 hours PRN Severe Pain (7 - 10)  oxyCODONE    IR 5 milliGRAM(s) Oral every 6 hours PRN Moderate Pain (4 - 6)  senna 2 Tablet(s) Oral at bedtime PRN Constipation      Vital Signs Last 24 Hrs  T(C): 36.9 (30 Oct 2019 05:25), Max: 37 (29 Oct 2019 13:39)  T(F): 98.4 (30 Oct 2019 05:25), Max: 98.6 (29 Oct 2019 13:39)  HR: 84 (30 Oct 2019 05:25) (83 - 90)  BP: 127/54 (30 Oct 2019 05:25) (116/49 - 136/55)  BP(mean): --  RR: 18 (30 Oct 2019 05:25) (15 - 18)  SpO2: 95% (30 Oct 2019 05:25) (95% - 99%)  CAPILLARY BLOOD GLUCOSE        I&O's Summary    29 Oct 2019 07:01  -  30 Oct 2019 07:00  --------------------------------------------------------  IN: 0 mL / OUT: 4409 mL / NET: -4409 mL    30 Oct 2019 07:01  -  30 Oct 2019 09:18  --------------------------------------------------------  IN: 0 mL / OUT: 725 mL / NET: -725 mL        PHYSICAL EXAM:  GENERAL: NAD, well-developed  HEAD:  Atraumatic, Normocephalic  EYES: EOMI, PERRLA, conjunctiva and sclera clear  NECK: Supple, No JVD  CHEST/LUNG: Clear to auscultation bilaterally; No wheeze  HEART: Regular rate and rhythm; No murmurs, rubs, or gallops  ABDOMEN: Soft, Nontender, Nondistended; Bowel sounds present  : (+) pineda  EXTREMITIES:  2+ Peripheral Pulses, No clubbing, cyanosis, or edema  PSYCH: AAOx3  NEUROLOGY: non-focal  SKIN: No rashes or lesions    LABS:    10-30    137  |  102  |  18  ----------------------------<  104<H>  3.6   |  24  |  1.78<H>    Ca    8.8      30 Oct 2019 06:15                RADIOLOGY & ADDITIONAL TESTS:    Imaging Personally Reviewed:    Consultant(s) Notes Reviewed:      Care Discussed with Consultants/Other Providers:

## 2019-10-30 NOTE — DISCHARGE NOTE PROVIDER - CARE PROVIDER_API CALL
Zachery Moya)  Urology  [address] [place] [address], [place] [address]  Phone: [phone]  Fax: [phone]  Follow Up Time:

## 2019-10-30 NOTE — PROGRESS NOTE ADULT - PROBLEM SELECTOR PLAN 1
S/P Cystoscopy, Incision  of Ureteral Orifice  Left laparoscopic Nephroureterectomy and Retroperitoneal Lymphadenectomy. POD#2.  -Management as per   -Pain control  -Bowel regimen  -Incentive spirometer  -OOB and ambulation  -DVT prophylaxis
S/P Cystoscopy, Incision  of Ureteral Orifice  Left laparoscopic Nephroureterectomy and Retroperitoneal Lymphadenectomy. POD#1.  -Management as per   -Pain control  -Bowel regimen  -Incentive spirometer  -OOB and ambulation  -DVT prophylaxis

## 2019-10-30 NOTE — DISCHARGE NOTE PROVIDER - NSDCACTIVITY_GEN_ALL_CORE
Showering allowed/Walking - Indoors allowed/Walking - Outdoors allowed/Stairs allowed/No heavy lifting/straining

## 2019-10-30 NOTE — DISCHARGE NOTE NURSING/CASE MANAGEMENT/SOCIAL WORK - NSDPDISTO_GEN_ALL_CORE
Home/Pt. is afebrile and offers no complaints. In no acute distress. Abdominal scope sites open to air: clean, dry and intact. Pt is ambulating, tolerating diet well, and pineda draining adequate amounts of urine.

## 2019-10-30 NOTE — DISCHARGE NOTE NURSING/CASE MANAGEMENT/SOCIAL WORK - NSDCPEWEB_GEN_ALL_CORE
[place] website --- [email or web address] for Tobacco Control website --- [email or web address]

## 2019-10-30 NOTE — DISCHARGE NOTE NURSING/CASE MANAGEMENT/SOCIAL WORK - PATIENT PORTAL LINK FT
You can access the FollowMyHealth Patient Portal offered by [facility] by registering at the following website: [email or web address]. By joining [employer]’s FollowMyHealth portal, you will also be able to view your health information using other applications (apps) compatible with our system.

## 2019-10-30 NOTE — DISCHARGE NOTE PROVIDER - NSDCMRMEDTOKEN_GEN_ALL_CORE_FT
acetaminophen 325 mg oral tablet: 2 tab(s) orally every 6 hours, As needed, Mild Pain (1 - 3)  Advair Diskus 250 mcg-50 mcg inhalation powder: inhaled 2 times a day  amLODIPine 10 mg oral tablet: 1 tab(s) orally once a day  Lasix 20 mg oral tablet: 1 tab(s) orally once a day  losartan 100 mg oral tablet: 1 tab(s) orally once a day  Metoprolol Succinate ER 25 mg oral tablet, extended release: 1 tab(s) orally once a day  Multiple Vitamins oral tablet: 1 tab(s) orally once a day LD 10/21/19  pravastatin 40 mg oral tablet: 1 tab(s) orally once a day  tamsulosin 0.4 mg oral capsule: 1 cap(s) orally once a day

## 2019-10-30 NOTE — DISCHARGE NOTE NURSING/CASE MANAGEMENT/SOCIAL WORK - NSDCPEEMAIL_GEN_ALL_CORE
[facility] for Tobacco Control email [email or web address]

## 2019-10-30 NOTE — DISCHARGE NOTE PROVIDER - HOSPITAL COURSE
Pt had L. lap nephroureterectomy 2 days ago; did well; OOB;  passed gas yesterday and more today, and martha reg diet; ARIAN was able to be removed today; home with blair pineda/mandy in office Nov 5

## 2019-10-30 NOTE — PROGRESS NOTE ADULT - ASSESSMENT
74 y/o Macedonian speaking male with h/o HTN, asthma, SIADH, bladder ca .admitted for Cystoscopy, Incision  of Ureteral Orifice  Left laparoscopic Nephroureterectomy and Retroperitoneal Lymphadenectomy. S/P OR, POD#2.

## 2019-10-30 NOTE — DISCHARGE NOTE PROVIDER - NSDCCPCAREPLAN_GEN_ALL_CORE_FT
PRINCIPAL DISCHARGE DIAGNOSIS  Diagnosis: Malignant neoplasm of renal pelvis, left  Assessment and Plan of Treatment: Drink plenty of fluids.  No heavy lifting (greater than 10 pounds) or straining for 4 to 6 weeks.  You may shower, just pat white strips dry, they will fall off in a few weeks. Change dressing at drain site daily or as needed until dry. Contreras care as instructed  Call Dr. Moya's office  to schedule a follow up appointment for Tues Nov 5  Call the office if you have fever greater than 101, difficulty urinating, pain not relieved with pain medication, nausea/vomiting..

## 2019-10-30 NOTE — DISCHARGE NOTE NURSING/CASE MANAGEMENT/SOCIAL WORK - NSDCPNINST_GEN_ALL_CORE
Make a follow up appointment with Dr. Moya. Call MD if you develop a fever, or if there is redness, swelling, drainage or pain not relieved by pain medication. No heavy lifting, bending, or straining to move your bowels. Take over the counter stool softeners as needed to prevent constipation which may be caused by pain medication. Drink plenty of liquids. Contreras care and leg bag care as instructed.

## 2019-10-30 NOTE — PROGRESS NOTE ADULT - ASSESSMENT
74 y/o Malay speaking male with h/o HTN, asthma, SIADH, bladder ca .admitted for Cystoscopy, Incision  of Ureteral Orifice  Left laparoscopic Nephroureterectomy and Retroperitoneal Lymphadenectomy. S/P OR, POD#1.    EDU  Scr elevated 1.78 today  EDU possible ATN sec to hemodynamic changes during surgery  + pineda in place non oliguric   please check urine cr, urea, Ua  please hold lasix and losartan for now  monitor bmp closely as renal function could get worse    CKD stage 3  baseline ~ 1.1-1.3  patient follow up with dr. lee turner vist in april  CKD likely sec to HTN  monitor bmp  avoid nephrotoxic agents    Malignant neoplasm of renal pelvis, left.    S/P Cystoscopy, Incision  of Ureteral Orifice  Left laparoscopic Nephroureterectomy and Retroperitoneal Lymphadenectomy on 10/28.   -Management as per   -Pain control       Essential hypertension.    BP well controlled.  -on Metoprolol, Amlodipine  - hold Lasix and Losartan in veiw of edu  -Monitor BP. 74 y/o Sami speaking male with h/o HTN, asthma, SIADH, bladder ca .admitted for Cystoscopy, Incision  of Ureteral Orifice  Left laparoscopic Nephroureterectomy and Retroperitoneal Lymphadenectomy. S/P OR, POD#1.    EDU  Scr elevated 1.78 today  EDU possible ATN sec to hemodynamic changes during surgery  + pineda in place non oliguric   please check urine cr, urea, Ua  please hold lasix and losartan for now  monitor bmp closely as renal function could get worse    CKD stage 3  baseline ~ 1.1-1.3  patient follow up with dr. lee turner vist in april  CKD likely sec to HTN  monitor bmp  avoid nephrotoxic agents    Malignant neoplasm of renal pelvis, left.    S/P Cystoscopy, Incision  of Ureteral Orifice  Left laparoscopic Nephroureterectomy and Retroperitoneal Lymphadenectomy on 10/28.   -Management as per   -Pain control       Essential hypertension.    BP well controlled.  -on Metoprolol, Amlodipine  - hold Lasix and Losartan in veiw of edu  -Monitor BP.

## 2019-10-30 NOTE — PROGRESS NOTE ADULT - PROBLEM SELECTOR PLAN 4
BP well controlled.  -Cont. Metoprolol, Amlodipine, Lasix and Losartan  -Monitor BP
BP well controlled.  -Cont. Metoprolol, Amlodipine, Lasix and Losartan  -Monitor BP

## 2019-10-30 NOTE — PROGRESS NOTE ADULT - PROBLEM SELECTOR PLAN 6
Creat 1.78, likely due to surgery, asymptomatic  -Monitor Renal function closely  -Consider adjust medication based on renal function
Asymptomatic.  Consider PRN Ativan 0.5mg if pt becomes anxious

## 2019-10-30 NOTE — PROGRESS NOTE ADULT - PROBLEM SELECTOR PLAN 3
Na 137   -Avoid hypotonic IVF  -Free water restriction 1L/day  -Monitor serum sodium
Na 137 on blood gas  -Avoid hypotonic IVF  -Free water restriction 1L/day  -Monitor serum sodium

## 2019-10-31 PROBLEM — Z87.19 PERSONAL HISTORY OF OTHER DISEASES OF THE DIGESTIVE SYSTEM: Chronic | Status: ACTIVE | Noted: 2019-10-17

## 2019-10-31 PROBLEM — C64.9 MALIGNANT NEOPLASM OF UNSPECIFIED KIDNEY, EXCEPT RENAL PELVIS: Chronic | Status: ACTIVE | Noted: 2019-10-17

## 2019-11-07 ENCOUNTER — APPOINTMENT (OUTPATIENT)
Dept: UROLOGY | Facility: CLINIC | Age: 74
End: 2019-11-07
Payer: MEDICARE

## 2019-11-07 PROCEDURE — 99024 POSTOP FOLLOW-UP VISIT: CPT

## 2019-11-07 RX ORDER — OXYBUTYNIN CHLORIDE 10 MG/1
10 TABLET, EXTENDED RELEASE ORAL DAILY
Qty: 30 | Refills: 3 | Status: COMPLETED | COMMUNITY
Start: 2019-09-19 | End: 2019-11-07

## 2019-11-08 LAB
ANION GAP SERPL CALC-SCNC: 12 MMOL/L
BASOPHILS # BLD AUTO: 0.06 K/UL
BASOPHILS NFR BLD AUTO: 0.6 %
BUN SERPL-MCNC: 25 MG/DL
CALCIUM SERPL-MCNC: 9.8 MG/DL
CHLORIDE SERPL-SCNC: 96 MMOL/L
CO2 SERPL-SCNC: 23 MMOL/L
CREAT SERPL-MCNC: 2.1 MG/DL
EOSINOPHIL # BLD AUTO: 0.77 K/UL
EOSINOPHIL NFR BLD AUTO: 8.2 %
GLUCOSE SERPL-MCNC: 100 MG/DL
HCT VFR BLD CALC: 35.6 %
HGB BLD-MCNC: 11.5 G/DL
IMM GRANULOCYTES NFR BLD AUTO: 1 %
LYMPHOCYTES # BLD AUTO: 1.46 K/UL
LYMPHOCYTES NFR BLD AUTO: 15.5 %
MAN DIFF?: NORMAL
MCHC RBC-ENTMCNC: 31.4 PG
MCHC RBC-ENTMCNC: 32.3 GM/DL
MCV RBC AUTO: 97.3 FL
MONOCYTES # BLD AUTO: 0.77 K/UL
MONOCYTES NFR BLD AUTO: 8.2 %
NEUTROPHILS # BLD AUTO: 6.28 K/UL
NEUTROPHILS NFR BLD AUTO: 66.5 %
PLATELET # BLD AUTO: 378 K/UL
POTASSIUM SERPL-SCNC: 5.4 MMOL/L
RBC # BLD: 3.66 M/UL
RBC # FLD: 13.2 %
SODIUM SERPL-SCNC: 131 MMOL/L
WBC # FLD AUTO: 9.43 K/UL

## 2019-11-17 NOTE — PHYSICAL EXAM
[General Appearance - Well Developed] : well developed [General Appearance - Well Nourished] : well nourished [Well Groomed] : well groomed [Normal Appearance] : normal appearance [General Appearance - In No Acute Distress] : no acute distress [Abdomen Soft] : soft [Abdomen Tenderness] : non-tender [Costovertebral Angle Tenderness] : no ~M costovertebral angle tenderness [Urinary Bladder Findings] : the bladder was normal on palpation

## 2019-11-17 NOTE — ASSESSMENT
[FreeTextEntry1] : Bladder filled, Contreras catheter removed.  Able to void to completion without difficulty.\par Pathology results reviewed.  Would not recommend adjuvant chemotherapy at this time.  Follow-up in 3 months for office cystoscopy to rule out bladder tumor recurrence.\par \par CBC, BMP sent today.

## 2019-11-17 NOTE — HISTORY OF PRESENT ILLNESS
[FreeTextEntry1] : s/p laparoscopic left nephroureterectomy with periaortic lymph node dissection 10/28/2019\par Path: High-grade TCC, pT1N0, negative margins.\par \par No postop complications.  Discharge home with Contreras catheter to gravity drainage.  Tolerating regular diet, ambulating without difficulty.  Incisions are healing well.  Urine has been clear.

## 2020-02-13 ENCOUNTER — OUTPATIENT (OUTPATIENT)
Dept: OUTPATIENT SERVICES | Facility: HOSPITAL | Age: 75
LOS: 1 days | End: 2020-02-13
Payer: COMMERCIAL

## 2020-02-13 ENCOUNTER — APPOINTMENT (OUTPATIENT)
Dept: UROLOGY | Facility: CLINIC | Age: 75
End: 2020-02-13
Payer: MEDICARE

## 2020-02-13 ENCOUNTER — APPOINTMENT (OUTPATIENT)
Dept: UROLOGY | Facility: CLINIC | Age: 75
End: 2020-02-13

## 2020-02-13 VITALS
HEART RATE: 82 BPM | BODY MASS INDEX: 24.99 KG/M2 | WEIGHT: 150 LBS | HEIGHT: 65 IN | DIASTOLIC BLOOD PRESSURE: 63 MMHG | TEMPERATURE: 97.6 F | SYSTOLIC BLOOD PRESSURE: 164 MMHG

## 2020-02-13 DIAGNOSIS — Z98.890 OTHER SPECIFIED POSTPROCEDURAL STATES: Chronic | ICD-10-CM

## 2020-02-13 DIAGNOSIS — R35.0 FREQUENCY OF MICTURITION: ICD-10-CM

## 2020-02-13 DIAGNOSIS — N40.0 BENIGN PROSTATIC HYPERPLASIA WITHOUT LOWER URINARY TRACT SYMPMS: ICD-10-CM

## 2020-02-13 PROCEDURE — 52000 CYSTOURETHROSCOPY: CPT

## 2020-02-15 LAB — URINE CYTOLOGY: NORMAL

## 2020-02-26 DIAGNOSIS — N40.0 BENIGN PROSTATIC HYPERPLASIA WITHOUT LOWER URINARY TRACT SYMPTOMS: ICD-10-CM

## 2020-02-26 DIAGNOSIS — C65.2 MALIGNANT NEOPLASM OF LEFT RENAL PELVIS: ICD-10-CM

## 2020-05-21 ENCOUNTER — OUTPATIENT (OUTPATIENT)
Dept: OUTPATIENT SERVICES | Facility: HOSPITAL | Age: 75
LOS: 1 days | End: 2020-05-21
Payer: MEDICARE

## 2020-05-21 ENCOUNTER — RESULT REVIEW (OUTPATIENT)
Age: 75
End: 2020-05-21

## 2020-05-21 ENCOUNTER — APPOINTMENT (OUTPATIENT)
Dept: UROLOGY | Facility: CLINIC | Age: 75
End: 2020-05-21
Payer: MEDICARE

## 2020-05-21 ENCOUNTER — OUTPATIENT (OUTPATIENT)
Dept: OUTPATIENT SERVICES | Facility: HOSPITAL | Age: 75
LOS: 1 days | End: 2020-05-21

## 2020-05-21 ENCOUNTER — APPOINTMENT (OUTPATIENT)
Dept: CT IMAGING | Facility: IMAGING CENTER | Age: 75
End: 2020-05-21
Payer: MEDICARE

## 2020-05-21 VITALS
TEMPERATURE: 97.5 F | RESPIRATION RATE: 16 BRPM | DIASTOLIC BLOOD PRESSURE: 66 MMHG | HEART RATE: 64 BPM | SYSTOLIC BLOOD PRESSURE: 145 MMHG

## 2020-05-21 DIAGNOSIS — Z98.890 OTHER SPECIFIED POSTPROCEDURAL STATES: Chronic | ICD-10-CM

## 2020-05-21 DIAGNOSIS — R35.0 FREQUENCY OF MICTURITION: ICD-10-CM

## 2020-05-21 DIAGNOSIS — C65.2 MALIGNANT NEOPLASM OF LEFT RENAL PELVIS: ICD-10-CM

## 2020-05-21 PROCEDURE — 99214 OFFICE O/P EST MOD 30 MIN: CPT | Mod: 25

## 2020-05-21 PROCEDURE — 52000 CYSTOURETHROSCOPY: CPT

## 2020-05-21 PROCEDURE — 74176 CT ABD & PELVIS W/O CONTRAST: CPT | Mod: 26

## 2020-05-21 PROCEDURE — 74176 CT ABD & PELVIS W/O CONTRAST: CPT

## 2020-05-21 NOTE — PHYSICAL EXAM
[General Appearance - Well Developed] : well developed [General Appearance - Well Nourished] : well nourished [Normal Appearance] : normal appearance [Well Groomed] : well groomed [General Appearance - In No Acute Distress] : no acute distress [Abdomen Soft] : soft [Abdomen Tenderness] : non-tender [Costovertebral Angle Tenderness] : no ~M costovertebral angle tenderness [Urethral Meatus] : meatus normal [Penis Abnormality] : normal circumcised penis [Urinary Bladder Findings] : the bladder was normal on palpation [Heart Rate And Rhythm] : Heart rate and rhythm were normal [Edema] : no peripheral edema [] : no respiratory distress [Respiration, Rhythm And Depth] : normal respiratory rhythm and effort [Exaggerated Use Of Accessory Muscles For Inspiration] : no accessory muscle use

## 2020-05-21 NOTE — HISTORY OF PRESENT ILLNESS
[FreeTextEntry1] : s/p laparoscopic left nephroureterectomy with periaortic lymph node dissection 10/28/2019\par Path: High-grade TCC, pT1N0, negative margins.\par No postop complications.\par \par Here today for surveillance cystoscopy.  Since last visit has had episodes of intermittent gross hematuria with associated weak urinary stream.  Denies abdominal pain, flank pain.  No fever/chills, nausea/vomiting, or other signs symptoms of urinary tract infection.

## 2020-05-21 NOTE — ASSESSMENT
[FreeTextEntry1] :  Underwent CT scan A/P without IV contrast today.  Images reviewed with patient.  No evidence of hydronephrosis, adenopathy.  Bladder wall thickening nonspecific.\par \par On cystoscopy today found to have papillary tumor on the posterior wall as well as nodular tumor at the bladder neck.\par \par Reviewed findings with patient and his son.  Recommend he proceed with outpatient surgery for TURBT.  All questions answered.

## 2020-05-21 NOTE — LETTER BODY
[Dear  ___] : Dear  [unfilled], [Consult Letter:] : I had the pleasure of evaluating your patient, [unfilled]. [Consult Closing:] : Thank you very much for allowing me to participate in the care of this patient.  If you have any questions, please do not hesitate to contact me. [Please see my note below.] : Please see my note below. [Sincerely,] : Sincerely, [FreeTextEntry3] : Zachery Moya MD\par System Chief of Urology, [facility] [place]\par  of Urology\par [facility] [employer] [facility]\par [facility] \par [address] [place] [address], [address] \[place] [address]

## 2020-05-22 DIAGNOSIS — C65.2 MALIGNANT NEOPLASM OF LEFT RENAL PELVIS: ICD-10-CM

## 2020-06-10 ENCOUNTER — OUTPATIENT (OUTPATIENT)
Dept: OUTPATIENT SERVICES | Facility: HOSPITAL | Age: 75
LOS: 1 days | End: 2020-06-10
Payer: MEDICARE

## 2020-06-10 VITALS
TEMPERATURE: 98 F | SYSTOLIC BLOOD PRESSURE: 128 MMHG | HEIGHT: 65 IN | DIASTOLIC BLOOD PRESSURE: 64 MMHG | HEART RATE: 78 BPM | RESPIRATION RATE: 16 BRPM | OXYGEN SATURATION: 98 % | WEIGHT: 160.06 LBS

## 2020-06-10 DIAGNOSIS — D49.4 NEOPLASM OF UNSPECIFIED BEHAVIOR OF BLADDER: ICD-10-CM

## 2020-06-10 DIAGNOSIS — Z98.890 OTHER SPECIFIED POSTPROCEDURAL STATES: Chronic | ICD-10-CM

## 2020-06-10 DIAGNOSIS — Z90.5 ACQUIRED ABSENCE OF KIDNEY: Chronic | ICD-10-CM

## 2020-06-10 DIAGNOSIS — C65.2 MALIGNANT NEOPLASM OF LEFT RENAL PELVIS: ICD-10-CM

## 2020-06-10 DIAGNOSIS — J45.909 UNSPECIFIED ASTHMA, UNCOMPLICATED: ICD-10-CM

## 2020-06-10 DIAGNOSIS — K21.9 GASTRO-ESOPHAGEAL REFLUX DISEASE WITHOUT ESOPHAGITIS: ICD-10-CM

## 2020-06-10 DIAGNOSIS — I10 ESSENTIAL (PRIMARY) HYPERTENSION: ICD-10-CM

## 2020-06-10 LAB
ALBUMIN SERPL ELPH-MCNC: 4.5 G/DL — SIGNIFICANT CHANGE UP (ref 3.3–5)
ALP SERPL-CCNC: 75 U/L — SIGNIFICANT CHANGE UP (ref 40–120)
ALT FLD-CCNC: 15 U/L — SIGNIFICANT CHANGE UP (ref 4–41)
ANION GAP SERPL CALC-SCNC: 15 MMO/L — HIGH (ref 7–14)
APPEARANCE UR: CLEAR — SIGNIFICANT CHANGE UP
AST SERPL-CCNC: 16 U/L — SIGNIFICANT CHANGE UP (ref 4–40)
BACTERIA # UR AUTO: NEGATIVE — SIGNIFICANT CHANGE UP
BILIRUB SERPL-MCNC: 0.4 MG/DL — SIGNIFICANT CHANGE UP (ref 0.2–1.2)
BILIRUB UR-MCNC: NEGATIVE — SIGNIFICANT CHANGE UP
BLOOD UR QL VISUAL: HIGH
BUN SERPL-MCNC: 32 MG/DL — HIGH (ref 7–23)
CALCIUM SERPL-MCNC: 9.8 MG/DL — SIGNIFICANT CHANGE UP (ref 8.4–10.5)
CHLORIDE SERPL-SCNC: 99 MMOL/L — SIGNIFICANT CHANGE UP (ref 98–107)
CO2 SERPL-SCNC: 22 MMOL/L — SIGNIFICANT CHANGE UP (ref 22–31)
COLOR SPEC: YELLOW — SIGNIFICANT CHANGE UP
CREAT SERPL-MCNC: 2 MG/DL — HIGH (ref 0.5–1.3)
GLUCOSE SERPL-MCNC: 104 MG/DL — HIGH (ref 70–99)
GLUCOSE UR-MCNC: NEGATIVE — SIGNIFICANT CHANGE UP
HCT VFR BLD CALC: 34 % — LOW (ref 39–50)
HGB BLD-MCNC: 11.7 G/DL — LOW (ref 13–17)
HYALINE CASTS # UR AUTO: NEGATIVE — SIGNIFICANT CHANGE UP
KETONES UR-MCNC: NEGATIVE — SIGNIFICANT CHANGE UP
LEUKOCYTE ESTERASE UR-ACNC: NEGATIVE — SIGNIFICANT CHANGE UP
MCHC RBC-ENTMCNC: 31.2 PG — SIGNIFICANT CHANGE UP (ref 27–34)
MCHC RBC-ENTMCNC: 34.4 % — SIGNIFICANT CHANGE UP (ref 32–36)
MCV RBC AUTO: 90.7 FL — SIGNIFICANT CHANGE UP (ref 80–100)
NITRITE UR-MCNC: NEGATIVE — SIGNIFICANT CHANGE UP
NRBC # FLD: 0 K/UL — SIGNIFICANT CHANGE UP (ref 0–0)
PH UR: 6 — SIGNIFICANT CHANGE UP (ref 5–8)
PLATELET # BLD AUTO: 249 K/UL — SIGNIFICANT CHANGE UP (ref 150–400)
PMV BLD: 11 FL — SIGNIFICANT CHANGE UP (ref 7–13)
POTASSIUM SERPL-MCNC: 4.1 MMOL/L — SIGNIFICANT CHANGE UP (ref 3.5–5.3)
POTASSIUM SERPL-SCNC: 4.1 MMOL/L — SIGNIFICANT CHANGE UP (ref 3.5–5.3)
PROT SERPL-MCNC: 7.8 G/DL — SIGNIFICANT CHANGE UP (ref 6–8.3)
PROT UR-MCNC: 10 — SIGNIFICANT CHANGE UP
RBC # BLD: 3.75 M/UL — LOW (ref 4.2–5.8)
RBC # FLD: 14.1 % — SIGNIFICANT CHANGE UP (ref 10.3–14.5)
RBC CASTS # UR COMP ASSIST: >50 — HIGH (ref 0–?)
SODIUM SERPL-SCNC: 136 MMOL/L — SIGNIFICANT CHANGE UP (ref 135–145)
SP GR SPEC: 1.01 — SIGNIFICANT CHANGE UP (ref 1–1.04)
SQUAMOUS # UR AUTO: SIGNIFICANT CHANGE UP
UROBILINOGEN FLD QL: NORMAL — SIGNIFICANT CHANGE UP
WBC # BLD: 7.53 K/UL — SIGNIFICANT CHANGE UP (ref 3.8–10.5)
WBC # FLD AUTO: 7.53 K/UL — SIGNIFICANT CHANGE UP (ref 3.8–10.5)
WBC UR QL: SIGNIFICANT CHANGE UP (ref 0–?)

## 2020-06-10 PROCEDURE — 93010 ELECTROCARDIOGRAM REPORT: CPT

## 2020-06-10 RX ORDER — METOPROLOL TARTRATE 50 MG
1 TABLET ORAL
Qty: 0 | Refills: 0 | DISCHARGE

## 2020-06-10 RX ORDER — TAMSULOSIN HYDROCHLORIDE 0.4 MG/1
1 CAPSULE ORAL
Qty: 0 | Refills: 0 | DISCHARGE

## 2020-06-10 RX ORDER — LOSARTAN POTASSIUM 100 MG/1
1 TABLET, FILM COATED ORAL
Qty: 0 | Refills: 0 | DISCHARGE

## 2020-06-10 RX ORDER — FLUTICASONE PROPIONATE AND SALMETEROL 50; 250 UG/1; UG/1
0 POWDER ORAL; RESPIRATORY (INHALATION)
Qty: 0 | Refills: 0 | DISCHARGE

## 2020-06-10 RX ORDER — AMLODIPINE BESYLATE 2.5 MG/1
1 TABLET ORAL
Qty: 0 | Refills: 0 | DISCHARGE

## 2020-06-10 RX ORDER — FUROSEMIDE 40 MG
1 TABLET ORAL
Qty: 0 | Refills: 0 | DISCHARGE

## 2020-06-10 NOTE — H&P PST ADULT - LANGUAGE ASSISTANCE NEEDED
Yes-Patient/Caregiver accepts free interpretation services... grandson translated/No-Patient/Caregiver offered and refused free interpretation services.

## 2020-06-10 NOTE — H&P PST ADULT - NSICDXPASTMEDICALHX_GEN_ALL_CORE_FT
PAST MEDICAL HISTORY:  Anxiety     Asthma denies recent asthma exacerbation    Cancer of kidney     Essential hypertension     Gross hematuria     History of BPH     History of SIADH     History of stomach ulcers denies recent endoscopy    HTN (Hypertension)     Hypercholesterolemia     Hyponatremia admission x 2 last 2017    Left renal mass     Urinary tract infection with hematuria PAST MEDICAL HISTORY:  Anxiety     Asthma denies recent asthma exacerbation    Bladder tumor     Cancer of kidney     Essential hypertension     Gross hematuria     History of BPH     History of SIADH     History of stomach ulcers denies recent endoscopy    HTN (Hypertension)     Hypercholesterolemia     Hyponatremia admission x 2 last 2017    Left renal mass     Urinary tract infection with hematuria

## 2020-06-10 NOTE — H&P PST ADULT - GENITOURINARY COMMENTS
Hx of left kidney ca with nephrectomy 10/19 - pt now to have surveillance cysto - occasional hematuria but patient denies dysuria

## 2020-06-10 NOTE — H&P PST ADULT - HISTORY OF PRESENT ILLNESS
74 y/o Greenlandic speaking male . Information obtained from pt with assistance of [employer]  [id number] and Micha Hanley . Pt s/p  Cystoscopy. Left Ureteroscopy with  Biopsy,  Ureteral Stent on 9/9/2019. Pt f/u with surgeon ; pt now presents for Cystoscopy, Incision  of Ureteral Orifice  Left laparoscopic Nephroureterectomy Retroperitoneal Lymphadenectomy . Pt is a 74 yr old male scheduled for Cysto Transurethral Resection of Bladder Tumor wiht Dr Moya tentatively 6/15/20. Pt hx of left Nephrectomy 10/19 for left kidney ca and has noted hematuria since then and now to have surgery Pt is a 74 yr old male scheduled for Cysto Transurethral Resection of Bladder Tumor wiht Dr Moya tentatively 6/15/20. Pt hx of left Nephrectomy 10/19 for left kidney ca and has noted hematuria since then and now to have surgery - Pt denies abdominal  pain or dysuria - has retained ureteral stent as per patient

## 2020-06-10 NOTE — H&P PST ADULT - NSICDXPASTSURGICALHX_GEN_ALL_CORE_FT
PAST SURGICAL HISTORY:  History of prostate surgery ? exact procedure 3/19    S/P cystoscopy Insertion left ureteral stent ; biopsy 8/19 PAST SURGICAL HISTORY:  H/O left nephrectomy     History of prostate surgery ? exact procedure 3/19    S/P cystoscopy Insertion left ureteral stent ; biopsy 8/19

## 2020-06-10 NOTE — H&P PST ADULT - MALLAMPATI CLASS
Class III - visualization of the soft palate and the base of the uvula with phonation/Class III - visualization of the soft palate and the base of the uvula

## 2020-06-10 NOTE — H&P PST ADULT - NSICDXPROBLEM_GEN_ALL_CORE_FT
PROBLEM DIAGNOSES  Problem: Bladder tumor  Assessment and Plan: Pt scheduled for surgery and preop instructions including instructions for taking own GI protection  and for Chlorhexidine use in showering on the day of surgery, given verbally and with use of  written materials, and patient confirming understanding of such instructions using  teach back method.  OR Booking notfied of papi precautions   Pt to see PCP for MC - spoke with Dr sharma reviewing pt EKG - forms given       Problem: GE reflux  Assessment and Plan: Pt to take Omeprazole am DOS    Problem: HTN (hypertension)  Assessment and Plan: Pt to take Amlodipine and Metoprolol am DOS    Problem: Asthma  Assessment and Plan: Pt to use Wixela am DOS

## 2020-06-11 DIAGNOSIS — Z01.818 ENCOUNTER FOR OTHER PREPROCEDURAL EXAMINATION: ICD-10-CM

## 2020-06-12 ENCOUNTER — APPOINTMENT (OUTPATIENT)
Dept: DISASTER EMERGENCY | Facility: CLINIC | Age: 75
End: 2020-06-12

## 2020-06-12 LAB — SARS-COV-2 N GENE NPH QL NAA+PROBE: NOT DETECTED

## 2020-06-14 ENCOUNTER — TRANSCRIPTION ENCOUNTER (OUTPATIENT)
Age: 75
End: 2020-06-14

## 2020-06-15 ENCOUNTER — OUTPATIENT (OUTPATIENT)
Dept: OUTPATIENT SERVICES | Facility: HOSPITAL | Age: 75
LOS: 1 days | Discharge: HOME | End: 2020-06-15
Payer: MEDICARE

## 2020-06-15 ENCOUNTER — APPOINTMENT (OUTPATIENT)
Dept: UROLOGY | Facility: AMBULATORY SURGERY CENTER | Age: 75
End: 2020-06-15

## 2020-06-15 ENCOUNTER — RESULT REVIEW (OUTPATIENT)
Age: 75
End: 2020-06-15

## 2020-06-15 VITALS
RESPIRATION RATE: 18 BRPM | TEMPERATURE: 98 F | HEART RATE: 72 BPM | DIASTOLIC BLOOD PRESSURE: 62 MMHG | OXYGEN SATURATION: 98 % | HEIGHT: 65 IN | SYSTOLIC BLOOD PRESSURE: 157 MMHG | WEIGHT: 160.06 LBS

## 2020-06-15 VITALS
OXYGEN SATURATION: 98 % | RESPIRATION RATE: 14 BRPM | HEART RATE: 75 BPM | DIASTOLIC BLOOD PRESSURE: 68 MMHG | TEMPERATURE: 98 F | SYSTOLIC BLOOD PRESSURE: 141 MMHG

## 2020-06-15 DIAGNOSIS — Z98.890 OTHER SPECIFIED POSTPROCEDURAL STATES: Chronic | ICD-10-CM

## 2020-06-15 DIAGNOSIS — Z90.5 ACQUIRED ABSENCE OF KIDNEY: Chronic | ICD-10-CM

## 2020-06-15 DIAGNOSIS — C65.2 MALIGNANT NEOPLASM OF LEFT RENAL PELVIS: ICD-10-CM

## 2020-06-15 PROCEDURE — 88307 TISSUE EXAM BY PATHOLOGIST: CPT | Mod: 26

## 2020-06-15 PROCEDURE — 52240 CYSTOSCOPY AND TREATMENT: CPT

## 2020-06-15 RX ORDER — OXYCODONE HYDROCHLORIDE 5 MG/1
1 TABLET ORAL
Qty: 6 | Refills: 0
Start: 2020-06-15

## 2020-06-15 RX ORDER — CEPHALEXIN 500 MG
1 CAPSULE ORAL
Qty: 9 | Refills: 0
Start: 2020-06-15 | End: 2020-06-17

## 2020-06-15 RX ORDER — SODIUM CHLORIDE 9 MG/ML
1000 INJECTION, SOLUTION INTRAVENOUS
Refills: 0 | Status: DISCONTINUED | OUTPATIENT
Start: 2020-06-15 | End: 2020-06-30

## 2020-06-15 NOTE — ASU DISCHARGE PLAN (ADULT/PEDIATRIC) - CALL YOUR DOCTOR IF YOU HAVE ANY OF THE FOLLOWING:
Fever greater than (need to indicate Fahrenheit or Celsius) Inability to tolerate liquids or foods/Swelling that gets worse/Nausea and vomiting that does not stop/Bleeding that does not stop/Pain not relieved by Medications/Unable to urinate/Fever greater than (need to indicate Fahrenheit or Celsius)/Wound/Surgical Site with redness, or foul smelling discharge or pus

## 2020-06-15 NOTE — ASU DISCHARGE PLAN (ADULT/PEDIATRIC) - ASU DC SPECIAL INSTRUCTIONSFT
You may take over the counter pain medicine as needed for pain. Oxycodone has been prescribed for pain unrelieved by these meds.    Please complete course of antibiotics as prescribed.     You may have intermittent blood tinged urine. This is normal and due to the procedure. If your urine becomes bright red or with clots, please call the office.    Please followup with Dr. Moya next Tuesday 6/23 for Contreras catheter removal. Please call to confirm appointment.

## 2020-06-15 NOTE — ASU DISCHARGE PLAN (ADULT/PEDIATRIC) - CARE PROVIDER_API CALL
Zachery Moya  UROLOGY  [address] [place] [id number]  Phone: [phone]  Fax: [phone]  Follow Up Time:

## 2020-06-15 NOTE — BRIEF OPERATIVE NOTE - OPERATION/FINDINGS
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

## 2020-06-19 LAB — SURGICAL PATHOLOGY STUDY: SIGNIFICANT CHANGE UP

## 2020-06-23 PROBLEM — D49.4 NEOPLASM OF UNSPECIFIED BEHAVIOR OF BLADDER: Chronic | Status: ACTIVE | Noted: 2020-06-10

## 2020-07-09 ENCOUNTER — APPOINTMENT (OUTPATIENT)
Dept: UROLOGY | Facility: CLINIC | Age: 75
End: 2020-07-09
Payer: MEDICARE

## 2020-07-09 VITALS
HEART RATE: 77 BPM | WEIGHT: 150 LBS | SYSTOLIC BLOOD PRESSURE: 152 MMHG | BODY MASS INDEX: 24.99 KG/M2 | RESPIRATION RATE: 17 BRPM | HEIGHT: 65 IN | DIASTOLIC BLOOD PRESSURE: 71 MMHG

## 2020-07-09 VITALS — TEMPERATURE: 98.3 F

## 2020-07-09 PROCEDURE — 99215 OFFICE O/P EST HI 40 MIN: CPT

## 2020-07-13 NOTE — ASSESSMENT
[FreeTextEntry1] : Here with his grandson today.  Reviewed pathology results from recent bladder tumor resection.\par \par Discussed treatment options including radical cystoprostatectomy, urinary diversion versus bladder preservation with chemoradiation.  We discussed the advantages and disadvantages of each approach.  We discussed the different forms of urinary diversion including ileal conduit.  We discussed potential risks of surgery.  We discussed the use of perioperative chemotherapy either prior to or after surgery.\par \par We will schedule for bone scan and repeat CT chest/abdomen/pelvis to complete metastatic work-up.  In the interim the patient will discuss the recommendations with his family and make a final decision regarding his treatment plan.

## 2020-07-13 NOTE — HISTORY OF PRESENT ILLNESS
[FreeTextEntry1] : s/p laparoscopic left nephroureterectomy with periaortic lymph node dissection 10/28/2019\par Path: High-grade TCC, pT1N0, negative margins.\par \par On surveillance cystoscopy 5/21/2020: Found to have bladder tumor recurrence.\par s/p TURBT 6/15/2020.  Path: Anterior tumor, high-grade TCC pT1; left posterior tumor, high-grade TCC pT2 with muscle invasion.  \par \par Following the procedure he was discharged with Contreras catheter in place.  Catheter was removed today.  Urine remains clear.\par \par Here to discuss biopsy results and treatment recommendations.

## 2020-07-13 NOTE — LETTER BODY
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  \par \par Please see my office note below.\par \par Thank you for allowing me to participate in his care and please do not hesitate to contact me with any questions or concerns regarding his care.\par \par Sincerely,\par \par Zachery Moya MD\par Chief of Urology, [facility]\par  of Urology\par [address] [place] [address]\[place] [address]\par PH:      [phone]\par Email:  [email or web address]

## 2020-07-23 ENCOUNTER — APPOINTMENT (OUTPATIENT)
Dept: NUCLEAR MEDICINE | Facility: IMAGING CENTER | Age: 75
End: 2020-07-23
Payer: MEDICARE

## 2020-07-23 ENCOUNTER — OUTPATIENT (OUTPATIENT)
Dept: OUTPATIENT SERVICES | Facility: HOSPITAL | Age: 75
LOS: 1 days | End: 2020-07-23
Payer: COMMERCIAL

## 2020-07-23 ENCOUNTER — RESULT REVIEW (OUTPATIENT)
Age: 75
End: 2020-07-23

## 2020-07-23 ENCOUNTER — APPOINTMENT (OUTPATIENT)
Dept: CT IMAGING | Facility: IMAGING CENTER | Age: 75
End: 2020-07-23
Payer: MEDICARE

## 2020-07-23 DIAGNOSIS — Z98.890 OTHER SPECIFIED POSTPROCEDURAL STATES: Chronic | ICD-10-CM

## 2020-07-23 DIAGNOSIS — C67.8 MALIGNANT NEOPLASM OF OVERLAPPING SITES OF BLADDER: ICD-10-CM

## 2020-07-23 DIAGNOSIS — Z90.5 ACQUIRED ABSENCE OF KIDNEY: Chronic | ICD-10-CM

## 2020-07-23 PROCEDURE — 78830 RP LOCLZJ TUM SPECT W/CT 1: CPT | Mod: 26

## 2020-07-23 PROCEDURE — 71250 CT THORAX DX C-: CPT | Mod: 26

## 2020-07-23 PROCEDURE — 74176 CT ABD & PELVIS W/O CONTRAST: CPT | Mod: 26

## 2020-07-23 PROCEDURE — A9561: CPT

## 2020-07-23 PROCEDURE — 74176 CT ABD & PELVIS W/O CONTRAST: CPT

## 2020-07-23 PROCEDURE — 78306 BONE IMAGING WHOLE BODY: CPT | Mod: 26

## 2020-07-23 PROCEDURE — 78306 BONE IMAGING WHOLE BODY: CPT

## 2020-07-23 PROCEDURE — 71250 CT THORAX DX C-: CPT

## 2020-07-23 PROCEDURE — 78830 RP LOCLZJ TUM SPECT W/CT 1: CPT

## 2020-08-17 ENCOUNTER — OUTPATIENT (OUTPATIENT)
Dept: OUTPATIENT SERVICES | Facility: HOSPITAL | Age: 75
LOS: 1 days | Discharge: HOME | End: 2020-08-17
Payer: MEDICARE

## 2020-08-17 DIAGNOSIS — Z98.890 OTHER SPECIFIED POSTPROCEDURAL STATES: Chronic | ICD-10-CM

## 2020-08-17 DIAGNOSIS — Z90.5 ACQUIRED ABSENCE OF KIDNEY: Chronic | ICD-10-CM

## 2020-08-18 ENCOUNTER — OUTPATIENT (OUTPATIENT)
Dept: OUTPATIENT SERVICES | Facility: HOSPITAL | Age: 75
LOS: 1 days | Discharge: HOME | End: 2020-08-18

## 2020-08-18 DIAGNOSIS — C67.9 MALIGNANT NEOPLASM OF BLADDER, UNSPECIFIED: ICD-10-CM

## 2020-08-18 DIAGNOSIS — Z98.890 OTHER SPECIFIED POSTPROCEDURAL STATES: Chronic | ICD-10-CM

## 2020-08-18 DIAGNOSIS — Z90.5 ACQUIRED ABSENCE OF KIDNEY: Chronic | ICD-10-CM

## 2020-08-20 ENCOUNTER — RESULT REVIEW (OUTPATIENT)
Age: 75
End: 2020-08-20

## 2020-08-20 ENCOUNTER — APPOINTMENT (OUTPATIENT)
Dept: RADIATION ONCOLOGY | Facility: CLINIC | Age: 75
End: 2020-08-20
Payer: MEDICARE

## 2020-08-20 ENCOUNTER — APPOINTMENT (OUTPATIENT)
Dept: HEMATOLOGY ONCOLOGY | Facility: CLINIC | Age: 75
End: 2020-08-20

## 2020-08-20 ENCOUNTER — APPOINTMENT (OUTPATIENT)
Dept: HEMATOLOGY ONCOLOGY | Facility: CLINIC | Age: 75
End: 2020-08-20
Payer: MEDICARE

## 2020-08-20 VITALS
BODY MASS INDEX: 26.03 KG/M2 | WEIGHT: 160.05 LBS | RESPIRATION RATE: 17 BRPM | HEIGHT: 65.75 IN | OXYGEN SATURATION: 97 % | TEMPERATURE: 96.8 F | HEART RATE: 78 BPM | DIASTOLIC BLOOD PRESSURE: 77 MMHG | SYSTOLIC BLOOD PRESSURE: 146 MMHG

## 2020-08-20 VITALS
RESPIRATION RATE: 18 BRPM | SYSTOLIC BLOOD PRESSURE: 145 MMHG | HEIGHT: 65.75 IN | HEART RATE: 80 BPM | TEMPERATURE: 97.9 F | DIASTOLIC BLOOD PRESSURE: 74 MMHG | WEIGHT: 159.17 LBS | BODY MASS INDEX: 25.89 KG/M2 | OXYGEN SATURATION: 98 %

## 2020-08-20 DIAGNOSIS — Z82.49 FAMILY HISTORY OF ISCHEMIC HEART DISEASE AND OTHER DISEASES OF THE CIRCULATORY SYSTEM: ICD-10-CM

## 2020-08-20 DIAGNOSIS — Z60.3 ACCULTURATION DIFFICULTY: ICD-10-CM

## 2020-08-20 DIAGNOSIS — Z87.898 PERSONAL HISTORY OF OTHER SPECIFIED CONDITIONS: ICD-10-CM

## 2020-08-20 DIAGNOSIS — Z87.891 PERSONAL HISTORY OF NICOTINE DEPENDENCE: ICD-10-CM

## 2020-08-20 DIAGNOSIS — Z86.69 PERSONAL HISTORY OF OTHER DISEASES OF THE NERVOUS SYSTEM AND SENSE ORGANS: ICD-10-CM

## 2020-08-20 DIAGNOSIS — Z80.1 FAMILY HISTORY OF MALIGNANT NEOPLASM OF TRACHEA, BRONCHUS AND LUNG: ICD-10-CM

## 2020-08-20 DIAGNOSIS — Z87.09 PERSONAL HISTORY OF OTHER DISEASES OF THE RESPIRATORY SYSTEM: ICD-10-CM

## 2020-08-20 DIAGNOSIS — Z82.3 FAMILY HISTORY OF STROKE: ICD-10-CM

## 2020-08-20 DIAGNOSIS — Z87.19 PERSONAL HISTORY OF OTHER DISEASES OF THE DIGESTIVE SYSTEM: ICD-10-CM

## 2020-08-20 DIAGNOSIS — N18.3 CHRONIC KIDNEY DISEASE, STAGE 3 (MODERATE): ICD-10-CM

## 2020-08-20 LAB
ALBUMIN SERPL ELPH-MCNC: 4.9 G/DL
ALP BLD-CCNC: 98 U/L
ALT SERPL-CCNC: 16 U/L
ANION GAP SERPL CALC-SCNC: 15 MMOL/L
AST SERPL-CCNC: 18 U/L
BASOPHILS # BLD AUTO: 0.03 K/UL — SIGNIFICANT CHANGE UP (ref 0–0.2)
BASOPHILS NFR BLD AUTO: 0.4 % — SIGNIFICANT CHANGE UP (ref 0–2)
BILIRUB SERPL-MCNC: 0.4 MG/DL
BUN SERPL-MCNC: 20 MG/DL
CALCIUM SERPL-MCNC: 10.1 MG/DL
CHLORIDE SERPL-SCNC: 101 MMOL/L
CO2 SERPL-SCNC: 24 MMOL/L
CREAT SERPL-MCNC: 1.82 MG/DL
EOSINOPHIL # BLD AUTO: 1.15 K/UL — HIGH (ref 0–0.5)
EOSINOPHIL NFR BLD AUTO: 15.3 % — HIGH (ref 0–6)
FERRITIN SERPL-MCNC: 60 NG/ML
GLUCOSE SERPL-MCNC: 119 MG/DL
HBV CORE IGG+IGM SER QL: NONREACTIVE
HBV SURFACE AG SER QL: NONREACTIVE
HCT VFR BLD CALC: 36.4 % — LOW (ref 39–50)
HCV AB SER QL: NONREACTIVE
HCV S/CO RATIO: 0.22 S/CO
HGB BLD-MCNC: 12.5 G/DL — LOW (ref 13–17)
IMM GRANULOCYTES NFR BLD AUTO: 0.4 % — SIGNIFICANT CHANGE UP (ref 0–1.5)
LYMPHOCYTES # BLD AUTO: 1.52 K/UL — SIGNIFICANT CHANGE UP (ref 1–3.3)
LYMPHOCYTES # BLD AUTO: 20.2 % — SIGNIFICANT CHANGE UP (ref 13–44)
MCHC RBC-ENTMCNC: 31.7 PG — SIGNIFICANT CHANGE UP (ref 27–34)
MCHC RBC-ENTMCNC: 34.3 GM/DL — SIGNIFICANT CHANGE UP (ref 32–36)
MCV RBC AUTO: 92.4 FL — SIGNIFICANT CHANGE UP (ref 80–100)
MONOCYTES # BLD AUTO: 0.58 K/UL — SIGNIFICANT CHANGE UP (ref 0–0.9)
MONOCYTES NFR BLD AUTO: 7.7 % — SIGNIFICANT CHANGE UP (ref 2–14)
NEUTROPHILS # BLD AUTO: 4.2 K/UL — SIGNIFICANT CHANGE UP (ref 1.8–7.4)
NEUTROPHILS NFR BLD AUTO: 56 % — SIGNIFICANT CHANGE UP (ref 43–77)
NRBC # BLD: 0 /100 WBCS — SIGNIFICANT CHANGE UP (ref 0–0)
PLATELET # BLD AUTO: 243 K/UL — SIGNIFICANT CHANGE UP (ref 150–400)
POTASSIUM SERPL-SCNC: 4.5 MMOL/L
PROT SERPL-MCNC: 7.9 G/DL
RBC # BLD: 3.94 M/UL — LOW (ref 4.2–5.8)
RBC # FLD: 14 % — SIGNIFICANT CHANGE UP (ref 10.3–14.5)
RETICS #: 53.6 K/UL — SIGNIFICANT CHANGE UP (ref 25–125)
RETICS/RBC NFR: 1.4 % — SIGNIFICANT CHANGE UP (ref 0.5–2.5)
SODIUM SERPL-SCNC: 140 MMOL/L
WBC # BLD: 7.51 K/UL — SIGNIFICANT CHANGE UP (ref 3.8–10.5)
WBC # FLD AUTO: 7.51 K/UL — SIGNIFICANT CHANGE UP (ref 3.8–10.5)

## 2020-08-20 PROCEDURE — 99205 OFFICE O/P NEW HI 60 MIN: CPT

## 2020-08-20 PROCEDURE — 77263 THER RADIOLOGY TX PLNG CPLX: CPT

## 2020-08-20 PROCEDURE — 99205 OFFICE O/P NEW HI 60 MIN: CPT | Mod: 25,GC

## 2020-08-20 PROCEDURE — 99204 OFFICE O/P NEW MOD 45 MIN: CPT | Mod: 25,GC

## 2020-08-20 PROCEDURE — 99204 OFFICE O/P NEW MOD 45 MIN: CPT

## 2020-08-20 RX ORDER — 70%ISOPROPYL ALCOHOL 0.7 ML/ML
70 SWAB TOPICAL
Refills: 0 | Status: ACTIVE | COMMUNITY

## 2020-08-20 RX ORDER — MOMETASONE FUROATE MONOHYDRATE 50 UG/1
50 SPRAY, METERED NASAL
Refills: 0 | Status: COMPLETED | COMMUNITY
End: 2020-08-20

## 2020-08-20 RX ORDER — UMECLIDINIUM 62.5 UG/1
62.5 AEROSOL, POWDER ORAL
Qty: 30 | Refills: 0 | Status: DISCONTINUED | COMMUNITY
Start: 2019-02-06 | End: 2020-08-20

## 2020-08-20 RX ORDER — MECLIZINE HYDROCHLORIDE 12.5 MG/1
12.5 TABLET ORAL
Qty: 90 | Refills: 0 | Status: DISCONTINUED | COMMUNITY
Start: 2019-02-14 | End: 2020-08-20

## 2020-08-20 RX ORDER — NORMAL SALT TABLETS 1 G/G
1 TABLET ORAL
Refills: 0 | Status: ACTIVE | COMMUNITY

## 2020-08-20 SDOH — SOCIAL STABILITY - SOCIAL INSECURITY: ACCULTURATION DIFFICULTY: Z60.3

## 2020-08-20 NOTE — PHYSICAL EXAM
[Abdomen Tenderness] : non-tender [Edema] : no peripheral edema present [] : no respiratory distress [Normal] : oriented to person, place and time, the affect was normal, the mood was normal and not anxious

## 2020-08-23 NOTE — HISTORY OF PRESENT ILLNESS
[FreeTextEntry1] : Mr. Harry is a 73yo male with a history of [id number] high grade urothelial carcinoma s/p laparoscopic left nephroureterectomy with periaortic lymph node dissection on 10/28/2019\par Path: High-grade TCC, pT1N0, negative margins.\par \par On surveillance cystoscopy 5/21/2020: Found to have bladder tumor recurrence.\par s/p TURBT 6/15/2020. Path: Anterior tumor, high-grade TCC pT1; left posterior tumor, high-grade TCC pT2 with muscle invasion.\par \par CT c/A/P 7/23/20: no evidence of metastatic disease in chest or pelvis\par \par Bone scan 7/31/20 showed nonspecific uptake in ribs but no definitive evidence of mets.\par \par He met with Dr. Moya and Dr. Holley prior to meeting with us and would prefer to avoid surgery. He denies any hematuria at this time. No pain. \par \par

## 2020-08-23 NOTE — END OF VISIT
[] : Resident [FreeTextEntry3] : Side effects discussed and include but not limited to necrosis, nonfunctional bladder, pain, bleeding, recatl injury, bowel injury, bladder damage.  [Time Spent: ___ minutes] : I have spent [unfilled] minutes of time on the encounter. [>50% of the face to face encounter time was spent on counseling and/or coordination of care for ___] : Greater than 50% of the face to face encounter time was spent on counseling and/or coordination of care for [unfilled]

## 2020-08-25 LAB — HBV SURFACE AB SER QL: NONREACTIVE

## 2020-09-06 NOTE — ASU DISCHARGE PLAN (ADULT/PEDIATRIC) - CONDITION AT DISCHARGE
Bill BALDEV Will was seen and treated in our emergency department on 9/6/2020.  He may return to work on 09/09/2020.       If you have any questions or concerns, please don't hesitate to call.      Griselda Navarro, DO
[facility]

## 2020-09-09 PROCEDURE — 77301 RADIOTHERAPY DOSE PLAN IMRT: CPT | Mod: 26

## 2020-09-09 PROCEDURE — 77338 DESIGN MLC DEVICE FOR IMRT: CPT | Mod: 26

## 2020-09-09 PROCEDURE — 77300 RADIATION THERAPY DOSE PLAN: CPT | Mod: 26

## 2020-09-11 ENCOUNTER — APPOINTMENT (OUTPATIENT)
Dept: INFUSION THERAPY | Facility: HOSPITAL | Age: 75
End: 2020-09-11

## 2020-09-11 ENCOUNTER — LABORATORY RESULT (OUTPATIENT)
Age: 75
End: 2020-09-11

## 2020-09-14 PROCEDURE — 77014: CPT | Mod: 26

## 2020-09-15 ENCOUNTER — RESULT REVIEW (OUTPATIENT)
Age: 75
End: 2020-09-15

## 2020-09-15 ENCOUNTER — APPOINTMENT (OUTPATIENT)
Dept: INFUSION THERAPY | Facility: HOSPITAL | Age: 75
End: 2020-09-15

## 2020-09-15 ENCOUNTER — APPOINTMENT (OUTPATIENT)
Dept: HEMATOLOGY ONCOLOGY | Facility: CLINIC | Age: 75
End: 2020-09-15
Payer: MEDICARE

## 2020-09-15 VITALS
OXYGEN SATURATION: 98 % | BODY MASS INDEX: 26.77 KG/M2 | SYSTOLIC BLOOD PRESSURE: 148 MMHG | TEMPERATURE: 97.6 F | HEIGHT: 64.76 IN | WEIGHT: 158.73 LBS | HEART RATE: 77 BPM | DIASTOLIC BLOOD PRESSURE: 78 MMHG | RESPIRATION RATE: 16 BRPM

## 2020-09-15 DIAGNOSIS — Z51.11 ENCOUNTER FOR ANTINEOPLASTIC CHEMOTHERAPY: ICD-10-CM

## 2020-09-15 LAB
BASOPHILS # BLD AUTO: 0.06 K/UL — SIGNIFICANT CHANGE UP (ref 0–0.2)
BASOPHILS NFR BLD AUTO: 0.8 % — SIGNIFICANT CHANGE UP (ref 0–2)
EOSINOPHIL # BLD AUTO: 1.54 K/UL — HIGH (ref 0–0.5)
EOSINOPHIL NFR BLD AUTO: 19.5 % — HIGH (ref 0–6)
HCT VFR BLD CALC: 36.4 % — LOW (ref 39–50)
HGB BLD-MCNC: 12.7 G/DL — LOW (ref 13–17)
IMM GRANULOCYTES NFR BLD AUTO: 0.8 % — SIGNIFICANT CHANGE UP (ref 0–1.5)
LYMPHOCYTES # BLD AUTO: 1.76 K/UL — SIGNIFICANT CHANGE UP (ref 1–3.3)
LYMPHOCYTES # BLD AUTO: 22.3 % — SIGNIFICANT CHANGE UP (ref 13–44)
MCHC RBC-ENTMCNC: 32 PG — SIGNIFICANT CHANGE UP (ref 27–34)
MCHC RBC-ENTMCNC: 34.9 G/DL — SIGNIFICANT CHANGE UP (ref 32–36)
MCV RBC AUTO: 91.7 FL — SIGNIFICANT CHANGE UP (ref 80–100)
MONOCYTES # BLD AUTO: 0.59 K/UL — SIGNIFICANT CHANGE UP (ref 0–0.9)
MONOCYTES NFR BLD AUTO: 7.5 % — SIGNIFICANT CHANGE UP (ref 2–14)
NEUTROPHILS # BLD AUTO: 3.87 K/UL — SIGNIFICANT CHANGE UP (ref 1.8–7.4)
NEUTROPHILS NFR BLD AUTO: 49.1 % — SIGNIFICANT CHANGE UP (ref 43–77)
NRBC # BLD: 0 /100 WBCS — SIGNIFICANT CHANGE UP (ref 0–0)
PLATELET # BLD AUTO: 185 K/UL — SIGNIFICANT CHANGE UP (ref 150–400)
RBC # BLD: 3.97 M/UL — LOW (ref 4.2–5.8)
RBC # FLD: 13.8 % — SIGNIFICANT CHANGE UP (ref 10.3–14.5)
WBC # BLD: 7.88 K/UL — SIGNIFICANT CHANGE UP (ref 3.8–10.5)
WBC # FLD AUTO: 7.88 K/UL — SIGNIFICANT CHANGE UP (ref 3.8–10.5)

## 2020-09-15 PROCEDURE — 77014: CPT | Mod: 26

## 2020-09-15 PROCEDURE — 99214 OFFICE O/P EST MOD 30 MIN: CPT

## 2020-09-15 NOTE — ASSESSMENT
[Palliative Care Plan] : not applicable at this time [FreeTextEntry1] : Mr. Harry is seen in the office today in followup. He is due to receive cycle 1 of gemcitabine today. He started with radiation therapy to the bladder/pelvis and has had 2 out of 20 fractions thus far. He states that he feels "okay." His appetite is stated to be "good." He denies any nausea or vomiting. There are no diarrheal stools. He reports that he has occasional constipation issues and states that he has taken a medication in the past but he couldn't remember the name. His last bowel movement was this morning. He denies any blood in his stools. He denies headaches. He states that he has had some dizziness after radiation therapy treatment but it does resolve spontaneously. He states that he has some balance issues. He has a but does not use it at home and only  uses it as needed when he's outside. There have been no falls. There is no cough. He reports that he has some shortness of breath that is secondary to his Asthma. He couldn't remember that last time he used his rescue inhaler. There are no complaints of pain other than occasional pain in the bladder but he has not taken any pain medications. He reports that he has some fatigue with increased exertion but it does not impair his daily activities. There are no skin rashes. He reports some pruritus on his right foot. He reports of rare occasions of paresthesias in the toes. His urinary flow is stated to be "okay," and he has nocturia occurring 3-4 times per night. There is no hematuria or dysuria. He has some incontinence with urgency. There are no fevers or chills. He requires some assistance in showering but independent in other activities of daily living.\par \par On physical examination, he appears in no acute distress. There is no jaundice. The oral mucous membrane is well-hydrated. There were no palpable lymph nodes in the examination of the neck. The lungs were clear to auscultation bilaterally. The heart examination was normal. There were no palpable abnormalities in the examination of the abdomen. There is no spinal column or chest wall tenderness to palpation or percussion. There is no edema in the extremities. \par \par He is on chemoradiation for muscle invasive bladder cancer. He has completed 2 out 20 planned fractions of radiation therapy to the bladder/pelvis. He is due to receive cycle 1 of weekly gemcitabine. \par \par Today's CBC showed a white blood cell count of 7.88 with a hemoglobin value of 12.7 g/dL and a platelet count of 185,000. He will be seen again in follow-up in 2 weeks' time. All questions were answered to the best of my ability and to his apparent satisfaction.

## 2020-09-15 NOTE — REASON FOR VISIT
[Follow-Up Visit] : a follow-up [ Service] : provided by  Service [TWNoteComboBox1] : Shelby [FreeTextEntry1] : [id number] [FreeTextEntry2] : Sarai

## 2020-09-15 NOTE — REVIEW OF SYSTEMS
[Fatigue] : fatigue [Lower Ext Edema] : lower extremity edema [SOB on Exertion] : shortness of breath during exertion [Constipation] : constipation [Incontinence] : incontinence [Joint Pain] : joint pain [Muscle Pain] : muscle pain [Anxiety] : anxiety [Depression] : depression [Negative] : ENT [Chills] : no chills [Recent Change In Weight] : ~T no recent weight change [Chest Pain] : no chest pain [Fever] : no fever [Palpitations] : no palpitations [Vomiting] : no vomiting [Cough] : no cough [Skin Rash] : no skin rash [Diarrhea] : no diarrhea [Dysuria] : no dysuria [Muscle Weakness] : no muscle weakness [Easy Bleeding] : no tendency for easy bleeding [Dizziness] : no dizziness [Easy Bruising] : no tendency for easy bruising [FreeTextEntry6] : SOB 2/2 Asthma [FreeTextEntry2] : appetite is good [FreeTextEntry8] : no hematuria, has urgency [FreeTextEntry7] : no nausea [FreeTextEntry9] : knees, calf, right leg [de-identified] : some pruritus on feet [de-identified] : no HA, no balance issues, some dizziness after RT [de-identified] : some depression

## 2020-09-15 NOTE — HISTORY OF PRESENT ILLNESS
[Disease: _____________________] : Disease: [unfilled] [T: ___] : T[unfilled] [N: ___] : N[unfilled] [M: ___] : M[unfilled] [AJCC Stage: ____] : AJCC Stage: [unfilled] [4 - Distress Level] : Distress Level: 4 [Date: ____________] : Patient's last distress assessment performed on [unfilled]. [Patient given social work contact information and resource sheet] : Patient was given social work contact information and resource sheet [de-identified] : Mr. Md Harry presented on 8/20/20 to medical oncology for initial consultation for evaluation and treatment of urothelial cancer found on surveillance cystoscopy; he was referred by his urologist, Dr Moya.\par \par Mr. Harry reports that he initially came to medical attention last year when he developed gross hematuria; he was treated for a presumed UTI by his previous urologist (at [facility]) for 3-4 weeks without improvement. Per the chart, he presented to [facility] on 7/20/19 with worsening gross hematuria and dysuria, and was admitted given marked hyponatremia to 127; per the ER note and H&P, he had had an outpatient cystoscopy x2 that was negative, though outside records were not available at the time of his hospitalization. Urology was consulted for evaluation hematuria; as part of his work up, on 7/23/20 he underwent a CT of the abdomen and pelvis w/wo IV contrast which showed a 2.7 cm L lower pole renal mass concerning for transitional cell carcinoma (negative for other sites of disease). A noncontrast chest CT on 7/24/20 showed nonspecific 4-5 mm nodules in the R apex and and emphysema. After his electrolyte abnormalities resolved, he was discharged on 7/25/20 to follow up with urology as an outpatientl; he was referred to Dr. Moya for further follow up for his newly found renal mass. \par \par On 9/9/19, he underwent a cystourethroscopy with diagnostic ureteroscopy and ureteral stent insertion; a bx of the left renal pelvis tumor showed clusters of urothelial cells suspicious for high grade urothelial carcinoma admixed in blood clot. On 10/28/19, he underwent a L laparoscopic nephroureterectomy, cystoscopy, and lymph node dissection; pathology showed a 3.5 cm tumor in the renal pelvis consistent with high grade urothelial carcinoma invading subepithelial connective tissue (lamina propia); margins uninvolved by invasive carcinoma; and carcinoma in situ / noninvasive urothelial carcinoma; 0 out of 15 lymph nodes dissected were positive; he staged [id number]. Non-neoplastic renal parenchyma showed global glomerulosclerosis (10% of glomeruli), tubular atrophy and interstitial fibrosis (20% of the cortex) and arterial and arteriolar obliterative sclerosis (severe).\par \par After his nephrectomy, he had no post-op complications and he followed up as an outpatient with urology for surveillance. Per the chart, he had intermittent episodes of gross hematuria between Nov 2019 and May 2020. At a follow up visit on 5/21/20 he underwent a surveillance cystoscopy which showed a papillary tumor on the posterior wall as well as a nodular tumor at the bladder neck. On 6/15/20, he underwent a cystourethroscopy with resection of the bladder tumor; he was noted to have tumor overlying the L trigone in area of the previous left UO, as well as a small papillary tumor at the anterior bladder neck. Bx of the anterior bladder wall tumor showed invasive high grade urothelial carcinoma with necrosis (muscularis propia present and was NOT involved; pT1) and bx of the L trigone of the bladder tumor showed invasive high grade urothelial carcinoma with necrosis (muscularis propia present and WAS involved; pT2). The results of his bx were discussed on follow up by his urologist; treatment options were discussed, including cystectomy,  he was referred to medical oncology.\par \par 8/20/20: Mr. Harry presents today with his son for initial consultation. He reports that over the past few weeks he has not had any visible gross hematuria but reports he had been told that he still has microscopic hematuria "about 3 months ago."  Reports that he feels weak when walking at baseline since he had his nephrectomy, has to use a cane; also has a hx of a childhood injury with some "nerve pain" of the R foot that became more symptomatic since he had his nephrectomy. Has a hx of duodenal ulcers diagnosed 7 years prior (via endoscopy; records not available on EHR) but denies any bleeding in his stool, and appetite is "good", has been gaining some weight, though reports some apprehension that by eating more that he will exacerbate his existing GERD. Denies any nausea, vomiting, diarrhea, constipation. Does endorse some nocturia (3x per night on average), makes "a lot of urine during the night" with a normal stream, makes less urine during the day but can't quantify; denies any associated dysuria or incontinence, though does have some urgency. Denies any joint pain or calf pain.  \par \ger Immig[place] from [facility] [place] the [place] to live with his children in 2009. He is independent in his ADLs, but he lives with his wife, son, and grandchildren at home. Previously worked doing field work, milling, and endorses some exposure to pesticides, but denies any exospore to dyes or solvents. He retired 30 years prior. He admits to hx of heavy tobacco use, 1 pack per day starting at age 14, quit 20 years prior (~40 pack years); denies alcohol use.  [de-identified] : prior Left renal pelvis HG urothelial, pT1N0 (15 nodes), November 2019, Left radical nephrectomy [de-identified] : Completed 2/20 fractions of RT to the bladder/pelvis. Cycle 1 of gemcitabine today. Feels "okay." Appetite is "good." No nausea or vomiting. No diarrheal stools. Some constipation issues, takes occasional med but couldn't remember the name. Last BM was this AM. No blood in stools. No headaches. States that he had some dizziness after RT but resolved spontaneously. No balance issues, has a cane but does not use it at home and uses it as needed when he's outside, no falls. No cough, some SOB related to asthma, couldn't remember last usage of rescue inhaler, states it's been a while. No complaints of pain other than occ pain in the bladder, no pain med taken. Has some fatigue with increased exertion, does not impair daily activities. No skin rashes or pruritus. Has rare occasions of paresthesias in the toes. Urine flow is "okay," nocturia x 3-4. No blood or dysuria. Some incontinence with urgency. No fevers or chills. Needs some assistance in showering but independent in other ADLs. [FreeTextEntry1] : Started gemcitabine on 09/15/2020, on chemoRT.

## 2020-09-15 NOTE — PHYSICAL EXAM
[Ambulatory and capable of all self care but unable to carry out any work activities] : Status 2- Ambulatory and capable of all self care but unable to carry out any work activities. Up and about more than 50% of waking hours [Normal] : affect appropriate [de-identified] : no gynecomastia

## 2020-09-16 PROCEDURE — 77014: CPT | Mod: 26

## 2020-09-17 ENCOUNTER — OUTPATIENT (OUTPATIENT)
Dept: OUTPATIENT SERVICES | Facility: HOSPITAL | Age: 75
LOS: 1 days | Discharge: HOME | End: 2020-09-17

## 2020-09-17 DIAGNOSIS — Z90.5 ACQUIRED ABSENCE OF KIDNEY: Chronic | ICD-10-CM

## 2020-09-17 DIAGNOSIS — C67.9 MALIGNANT NEOPLASM OF BLADDER, UNSPECIFIED: ICD-10-CM

## 2020-09-17 DIAGNOSIS — Z98.890 OTHER SPECIFIED POSTPROCEDURAL STATES: Chronic | ICD-10-CM

## 2020-09-17 PROCEDURE — 77014: CPT | Mod: 26

## 2020-09-17 NOTE — REVIEW OF SYSTEMS
[Constipation: Grade 1 - Occasional or intermittent symptoms; occasional use of stool softeners, laxatives, dietary modification, or enema] : Constipation: Grade 1 - Occasional or intermittent symptoms; occasional use of stool softeners, laxatives, dietary modification, or enema [Hematuria: Grade 0] : Hematuria: Grade 0 [Urinary Incontinence: Grade 0] : Urinary Incontinence: Grade 0  [Urinary Retention: Grade 0] : Urinary Retention: Grade 0 [Urinary Tract Pain: Grade 1 - Mild pain] : Urinary Tract Pain: Grade 1 - Mild pain [Urinary Urgency: Grade 0] : Urinary Urgency: Grade 0 [Urinary Frequency: Grade 0] : Urinary Frequency: Grade 0

## 2020-09-17 NOTE — VITALS
[Maximal Pain Intensity: 1/10] : 1/10 [Least Pain Intensity: 0/10] : 0/10 [80: Normal activity with effort; some signs or symptoms of disease.] : 80: Normal activity with effort; some signs or symptoms of disease.  [ECOG Performance Status: 2 - Ambulatory and capable of all self care but unable to carry out any work activities] : Performance Status: 2 - Ambulatory and capable of all self care but unable to carry out any work activities. Up and about more than 50% of waking hours

## 2020-09-18 PROCEDURE — 77014: CPT | Mod: 26

## 2020-09-18 PROCEDURE — 77427 RADIATION TX MANAGEMENT X5: CPT

## 2020-09-21 PROCEDURE — 77014: CPT | Mod: 26

## 2020-09-22 ENCOUNTER — APPOINTMENT (OUTPATIENT)
Dept: INFUSION THERAPY | Facility: HOSPITAL | Age: 75
End: 2020-09-22

## 2020-09-22 ENCOUNTER — RESULT REVIEW (OUTPATIENT)
Age: 75
End: 2020-09-22

## 2020-09-22 LAB
BASOPHILS # BLD AUTO: 0.03 K/UL — SIGNIFICANT CHANGE UP (ref 0–0.2)
BASOPHILS NFR BLD AUTO: 0.6 % — SIGNIFICANT CHANGE UP (ref 0–2)
EOSINOPHIL # BLD AUTO: 0.71 K/UL — HIGH (ref 0–0.5)
EOSINOPHIL NFR BLD AUTO: 14.7 % — HIGH (ref 0–6)
HCT VFR BLD CALC: 35.2 % — LOW (ref 39–50)
HGB BLD-MCNC: 12.1 G/DL — LOW (ref 13–17)
IMM GRANULOCYTES NFR BLD AUTO: 0.8 % — SIGNIFICANT CHANGE UP (ref 0–1.5)
LYMPHOCYTES # BLD AUTO: 1.14 K/UL — SIGNIFICANT CHANGE UP (ref 1–3.3)
LYMPHOCYTES # BLD AUTO: 23.6 % — SIGNIFICANT CHANGE UP (ref 13–44)
MCHC RBC-ENTMCNC: 31.5 PG — SIGNIFICANT CHANGE UP (ref 27–34)
MCHC RBC-ENTMCNC: 34.4 G/DL — SIGNIFICANT CHANGE UP (ref 32–36)
MCV RBC AUTO: 91.7 FL — SIGNIFICANT CHANGE UP (ref 80–100)
MONOCYTES # BLD AUTO: 0.42 K/UL — SIGNIFICANT CHANGE UP (ref 0–0.9)
MONOCYTES NFR BLD AUTO: 8.7 % — SIGNIFICANT CHANGE UP (ref 2–14)
NEUTROPHILS # BLD AUTO: 2.5 K/UL — SIGNIFICANT CHANGE UP (ref 1.8–7.4)
NEUTROPHILS NFR BLD AUTO: 51.6 % — SIGNIFICANT CHANGE UP (ref 43–77)
NRBC # BLD: 0 /100 WBCS — SIGNIFICANT CHANGE UP (ref 0–0)
PLATELET # BLD AUTO: 185 K/UL — SIGNIFICANT CHANGE UP (ref 150–400)
RBC # BLD: 3.84 M/UL — LOW (ref 4.2–5.8)
RBC # FLD: 13.6 % — SIGNIFICANT CHANGE UP (ref 10.3–14.5)
WBC # BLD: 4.84 K/UL — SIGNIFICANT CHANGE UP (ref 3.8–10.5)
WBC # FLD AUTO: 4.84 K/UL — SIGNIFICANT CHANGE UP (ref 3.8–10.5)

## 2020-09-22 PROCEDURE — 77014: CPT | Mod: 26

## 2020-09-23 DIAGNOSIS — R11.2 NAUSEA WITH VOMITING, UNSPECIFIED: ICD-10-CM

## 2020-09-23 DIAGNOSIS — Z51.11 ENCOUNTER FOR ANTINEOPLASTIC CHEMOTHERAPY: ICD-10-CM

## 2020-09-23 PROCEDURE — 77014: CPT | Mod: 26

## 2020-09-24 VITALS — BODY MASS INDEX: 26.82 KG/M2 | HEIGHT: 65 IN | WEIGHT: 161 LBS | RESPIRATION RATE: 16 BRPM

## 2020-09-24 PROCEDURE — 77014: CPT | Mod: 26

## 2020-09-24 NOTE — REVIEW OF SYSTEMS
[Constipation: Grade 1 - Occasional or intermittent symptoms; occasional use of stool softeners, laxatives, dietary modification, or enema] : Constipation: Grade 1 - Occasional or intermittent symptoms; occasional use of stool softeners, laxatives, dietary modification, or enema [Hematuria: Grade 0] : Hematuria: Grade 0 [Urinary Incontinence: Grade 0] : Urinary Incontinence: Grade 0  [Urinary Retention: Grade 0] : Urinary Retention: Grade 0 [Urinary Tract Pain: Grade 1 - Mild pain] : Urinary Tract Pain: Grade 1 - Mild pain [Urinary Urgency: Grade 0] : Urinary Urgency: Grade 0 [Urinary Frequency: Grade 1 - Present] : Urinary Frequency: Grade 1 - Present

## 2020-09-24 NOTE — VITALS
[Maximal Pain Intensity: 3/10] : 3/10 [Least Pain Intensity: 0/10] : 0/10 [Pain Description/Quality: ___] : Pain description/quality: [unfilled] [Pain Duration: ___] : Pain duration: [unfilled] [Pain Location: ___] : Pain Location: [unfilled] [80: Normal activity with effort; some signs or symptoms of disease.] : 80: Normal activity with effort; some signs or symptoms of disease.  [ECOG Performance Status: 2 - Ambulatory and capable of all self care but unable to carry out any work activities] : Performance Status: 2 - Ambulatory and capable of all self care but unable to carry out any work activities. Up and about more than 50% of waking hours

## 2020-09-25 PROCEDURE — 77014: CPT | Mod: 26

## 2020-09-25 PROCEDURE — 77427 RADIATION TX MANAGEMENT X5: CPT

## 2020-09-26 NOTE — HISTORY OF PRESENT ILLNESS
[FreeTextEntry1] : Mr. Harry is a 73yo male with a history of [id number] high grade urothelial carcinoma s/p laparoscopic left nephroureterectomy with periaortic lymph node dissection on 10/28/2019\par Path: High-grade TCC, pT1N0, negative margins.\par \par On surveillance cystoscopy 5/21/2020: Found to have bladder tumor recurrence.\par s/p TURBT 6/15/2020. Path: Anterior tumor, high-grade TCC pT1; left posterior tumor, high-grade TCC pT2 with muscle invasion.\par \par CT c/A/P 7/23/20: no evidence of metastatic disease in chest or pelvis\par \par Bone scan 7/31/20 showed nonspecific uptake in ribs but no definitive evidence of mets.\par \par 4/20 fractions of radiation today. c/o Burning sensation on urination, constipation. No abd pain, hematuria, fever. Advised to try OTC stool softner. On chemo once a week, blood test okay

## 2020-09-28 PROCEDURE — 77014: CPT | Mod: 26

## 2020-09-29 ENCOUNTER — APPOINTMENT (OUTPATIENT)
Dept: HEMATOLOGY ONCOLOGY | Facility: CLINIC | Age: 75
End: 2020-09-29
Payer: MEDICARE

## 2020-09-29 ENCOUNTER — RESULT REVIEW (OUTPATIENT)
Age: 75
End: 2020-09-29

## 2020-09-29 ENCOUNTER — APPOINTMENT (OUTPATIENT)
Dept: INFUSION THERAPY | Facility: HOSPITAL | Age: 75
End: 2020-09-29

## 2020-09-29 VITALS
BODY MASS INDEX: 27.1 KG/M2 | HEIGHT: 64.17 IN | HEART RATE: 74 BPM | TEMPERATURE: 97.9 F | OXYGEN SATURATION: 99 % | SYSTOLIC BLOOD PRESSURE: 146 MMHG | WEIGHT: 158.73 LBS | DIASTOLIC BLOOD PRESSURE: 77 MMHG | RESPIRATION RATE: 15 BRPM

## 2020-09-29 DIAGNOSIS — D72.819 DECREASED WHITE BLOOD CELL COUNT, UNSPECIFIED: ICD-10-CM

## 2020-09-29 LAB
BASOPHILS # BLD AUTO: 0.02 K/UL — SIGNIFICANT CHANGE UP (ref 0–0.2)
BASOPHILS NFR BLD AUTO: 0.6 % — SIGNIFICANT CHANGE UP (ref 0–2)
EOSINOPHIL # BLD AUTO: 0.39 K/UL — SIGNIFICANT CHANGE UP (ref 0–0.5)
EOSINOPHIL NFR BLD AUTO: 11.2 % — HIGH (ref 0–6)
HCT VFR BLD CALC: 32 % — LOW (ref 39–50)
HGB BLD-MCNC: 11.1 G/DL — LOW (ref 13–17)
IMM GRANULOCYTES NFR BLD AUTO: 1.4 % — SIGNIFICANT CHANGE UP (ref 0–1.5)
LYMPHOCYTES # BLD AUTO: 0.74 K/UL — LOW (ref 1–3.3)
LYMPHOCYTES # BLD AUTO: 21.2 % — SIGNIFICANT CHANGE UP (ref 13–44)
MCHC RBC-ENTMCNC: 31.8 PG — SIGNIFICANT CHANGE UP (ref 27–34)
MCHC RBC-ENTMCNC: 34.7 G/DL — SIGNIFICANT CHANGE UP (ref 32–36)
MCV RBC AUTO: 91.7 FL — SIGNIFICANT CHANGE UP (ref 80–100)
MONOCYTES # BLD AUTO: 0.35 K/UL — SIGNIFICANT CHANGE UP (ref 0–0.9)
MONOCYTES NFR BLD AUTO: 10 % — SIGNIFICANT CHANGE UP (ref 2–14)
NEUTROPHILS # BLD AUTO: 1.94 K/UL — SIGNIFICANT CHANGE UP (ref 1.8–7.4)
NEUTROPHILS NFR BLD AUTO: 55.6 % — SIGNIFICANT CHANGE UP (ref 43–77)
NRBC # BLD: 0 /100 WBCS — SIGNIFICANT CHANGE UP (ref 0–0)
PLATELET # BLD AUTO: 166 K/UL — SIGNIFICANT CHANGE UP (ref 150–400)
RBC # BLD: 3.49 M/UL — LOW (ref 4.2–5.8)
RBC # FLD: 13.6 % — SIGNIFICANT CHANGE UP (ref 10.3–14.5)
WBC # BLD: 3.49 K/UL — LOW (ref 3.8–10.5)
WBC # FLD AUTO: 3.49 K/UL — LOW (ref 3.8–10.5)

## 2020-09-29 PROCEDURE — 77014: CPT | Mod: 26

## 2020-09-29 PROCEDURE — 99213 OFFICE O/P EST LOW 20 MIN: CPT

## 2020-09-29 RX ORDER — ALBUTEROL SULFATE 90 UG/1
108 (90 BASE) AEROSOL, METERED RESPIRATORY (INHALATION)
Qty: 18 | Refills: 0 | Status: DISCONTINUED | COMMUNITY
Start: 2019-02-06 | End: 2020-09-29

## 2020-09-29 NOTE — HISTORY OF PRESENT ILLNESS
[Disease: _____________________] : Disease: [unfilled] [T: ___] : T[unfilled] [N: ___] : N[unfilled] [M: ___] : M[unfilled] [AJCC Stage: ____] : AJCC Stage: [unfilled] [de-identified] : Mr. Md Harry presented on 8/20/20 to medical oncology for initial consultation for evaluation and treatment of urothelial cancer found on surveillance cystoscopy; he was referred by his urologist, Dr Moya.\par \par Mr. Harry reports that he initially came to medical attention last year when he developed gross hematuria; he was treated for a presumed UTI by his previous urologist (at [facility]) for 3-4 weeks without improvement. Per the chart, he presented to [facility] on 7/20/19 with worsening gross hematuria and dysuria, and was admitted given marked hyponatremia to 127; per the ER note and H&P, he had had an outpatient cystoscopy x2 that was negative, though outside records were not available at the time of his hospitalization. Urology was consulted for evaluation hematuria; as part of his work up, on 7/23/20 he underwent a CT of the abdomen and pelvis w/wo IV contrast which showed a 2.7 cm L lower pole renal mass concerning for transitional cell carcinoma (negative for other sites of disease). A noncontrast chest CT on 7/24/20 showed nonspecific 4-5 mm nodules in the R apex and and emphysema. After his electrolyte abnormalities resolved, he was discharged on 7/25/20 to follow up with urology as an outpatientl; he was referred to Dr. Moya for further follow up for his newly found renal mass. \par \par On 9/9/19, he underwent a cystourethroscopy with diagnostic ureteroscopy and ureteral stent insertion; a bx of the left renal pelvis tumor showed clusters of urothelial cells suspicious for high grade urothelial carcinoma admixed in blood clot. On 10/28/19, he underwent a L laparoscopic nephroureterectomy, cystoscopy, and lymph node dissection; pathology showed a 3.5 cm tumor in the renal pelvis consistent with high grade urothelial carcinoma invading subepithelial connective tissue (lamina propia); margins uninvolved by invasive carcinoma; and carcinoma in situ / noninvasive urothelial carcinoma; 0 out of 15 lymph nodes dissected were positive; he staged [id number]. Non-neoplastic renal parenchyma showed global glomerulosclerosis (10% of glomeruli), tubular atrophy and interstitial fibrosis (20% of the cortex) and arterial and arteriolar obliterative sclerosis (severe).\par \par After his nephrectomy, he had no post-op complications and he followed up as an outpatient with urology for surveillance. Per the chart, he had intermittent episodes of gross hematuria between Nov 2019 and May 2020. At a follow up visit on 5/21/20 he underwent a surveillance cystoscopy which showed a papillary tumor on the posterior wall as well as a nodular tumor at the bladder neck. On 6/15/20, he underwent a cystourethroscopy with resection of the bladder tumor; he was noted to have tumor overlying the L trigone in area of the previous left UO, as well as a small papillary tumor at the anterior bladder neck. Bx of the anterior bladder wall tumor showed invasive high grade urothelial carcinoma with necrosis (muscularis propia present and was NOT involved; pT1) and bx of the L trigone of the bladder tumor showed invasive high grade urothelial carcinoma with necrosis (muscularis propia present and WAS involved; pT2). The results of his bx were discussed on follow up by his urologist; treatment options were discussed, including cystectomy,  he was referred to medical oncology.\par \par 8/20/20: Mr. Harry presents today with his son for initial consultation. He reports that over the past few weeks he has not had any visible gross hematuria but reports he had been told that he still has microscopic hematuria "about 3 months ago."  Reports that he feels weak when walking at baseline since he had his nephrectomy, has to use a cane; also has a hx of a childhood injury with some "nerve pain" of the R foot that became more symptomatic since he had his nephrectomy. Has a hx of duodenal ulcers diagnosed 7 years prior (via endoscopy; records not available on EHR) but denies any bleeding in his stool, and appetite is "good", has been gaining some weight, though reports some apprehension that by eating more that he will exacerbate his existing GERD. Denies any nausea, vomiting, diarrhea, constipation. Does endorse some nocturia (3x per night on average), makes "a lot of urine during the night" with a normal stream, makes less urine during the day but can't quantify; denies any associated dysuria or incontinence, though does have some urgency. Denies any joint pain or calf pain.  \par \ger Immigrated from [facility] to the [place] to live with his children in 2009. He is independent in his ADLs, but he lives with his wife, son, and grandchildren at home. Previously worked doing field work, milling, and endorses some exposure to pesticides, but denies any exospore to dyes or solvents. He retired 30 years prior. He admits to hx of heavy tobacco use, 1 pack per day starting at age 14, quit 20 years prior (~40 pack years); denies alcohol use. \par \par 9/9/20...Completed 2/20 fractions of RT to the bladder/pelvis. Cycle 1 of gemcitabine today. Feels "okay." Appetite is "good." No nausea or vomiting. No diarrheal stools. Some constipation issues, takes occasional med but couldn't remember the name. Last BM was this AM. No blood in stools. No headaches. States that he had some dizziness after RT but resolved spontaneously. No balance issues, has a cane but does not use it at home and uses it as needed when he's outside, no falls. No cough, some SOB related to asthma, couldn't remember last usage of rescue inhaler, states it's been a while. No complaints of pain other than occ pain in the bladder, no pain med taken. Has some fatigue with increased exertion, does not impair daily activities. No skin rashes or pruritus. Has rare occasions of paresthesias in the toes. Urine flow is "okay," nocturia x 3-4. No blood or dysuria. Some incontinence with urgency. No fevers or chills. Needs some assistance in showering but independent in other ADLs. [de-identified] : prior Left renal pelvis HG urothelial, pT1N0 (15 nodes), November 2019, Left radical nephrectomy [FreeTextEntry1] : Started gemcitabine on 09/15/2020, on chemoRT. [de-identified] : On gem/RT. Dose #3 due today. On First mouthwash for mucositis, started on topical. He says it started when the chemotherapy started. Today is fraction #12 of 20 treatments. NO nausea, no vomiting. Appetite is good, but is unable to eat due to the mouth irritation. feels dizzy at times, no falls. No falls. No fevers, notes some chills right after the chemo and feels warm, no temperature checked. He has some fatigue. Has edema, less than before. Occ cough, sometimes with dark green sputum. No TRAN. This is a long standing issue. No pains noted. Urine flow is frequent, more than before, nocturia x 4-5. He has some incontinence with urgency. No blood in the urine.

## 2020-09-29 NOTE — PHYSICAL EXAM
[Restricted in physically strenuous activity but ambulatory and able to carry out work of a light or sedentary nature] : Status 1- Restricted in physically strenuous activity but ambulatory and able to carry out work of a light or sedentary nature, e.g., light house work, office work [Mucositis] : mucositis [Normal] : affect appropriate [de-identified] : lateral aspects of tongue  [de-identified] : no edema

## 2020-09-29 NOTE — REVIEW OF SYSTEMS
[Chills] : chills [Fatigue] : fatigue [Odynophagia] : odynophagia [Mucosal Pain] : mucosal pain [Lower Ext Edema] : lower extremity edema [Cough] : cough [Constipation] : constipation [Incontinence] : incontinence [Skin Rash] : skin rash [Anxiety] : anxiety [Fever] : no fever [Recent Change In Weight] : ~T no recent weight change [Chest Pain] : no chest pain [Palpitations] : no palpitations [SOB on Exertion] : no shortness of breath during exertion [Abdominal Pain] : no abdominal pain [Vomiting] : no vomiting [Diarrhea] : no diarrhea [Dysuria] : no dysuria [Joint Pain] : no joint pain [Joint Stiffness] : no joint stiffness [Muscle Pain] : no muscle pain [Depression] : no depression [Muscle Weakness] : no muscle weakness [Easy Bleeding] : no tendency for easy bleeding [Easy Bruising] : no tendency for easy bruising [FreeTextEntry2] : fatigue at times [FreeTextEntry4] : mucositis with decreased intake [de-identified] : ambulates with cane, no headaches, no paresthesias. No falls

## 2020-09-29 NOTE — ASSESSMENT
[Palliative Care Plan] : not applicable at this time [FreeTextEntry1] : Mr Harry is seen in follow-up today.  He is on gemcitabine on a weekly basis along with radiation.  Dose #3 is due today.  He has been on first mouthwash for mucositis.  He says it started when the chemotherapy started.  Today is fraction #12 of 20 planned treatments.  He reports no nausea or vomiting.  His appetite is good, but he says he is unable to eat as a result of the mouth irritation.  He feels dizzy at times.  There have been no falls.  There is no fevers.  He does note some chills right after chemotherapy, and he says that he feels warm.  His temperature has not been checked at that time, and it can be an associated adverse effect shortly after the infusion of gemcitabine.  He does have some fatigue.  He says that he edema of his legs is much better than before.  He has occasional cough with some dark green sputum.  This is been present for many years and is unchanged.  He reports no bone pains.  Urinary flow is frequent, more so than before.  Nocturia occurs 4-5 times per night.  He has some urge incontinence.  There is no hematuria or dysuria.\par \par On physical examination, he appears well.  There is erythema and erosions of the lateral aspects of the tongue without superimposed infection.  There is no palpable adenopathy present.  The chest is clear and the heart examination is normal.  There were no palpable abnormalities upon examination of the abdomen.  There is no edema of the extremities.\par \par The mucositis is surprising, and would be equal to grade 3 since it does impair his oral intake to some degree.  For this reason, I am holding the gemcitabine dose this week.  He will continue on radiation treatment.  Dr. Braxton was notified of the hold on the chemotherapy by email.  He will be reevaluated next week before his scheduled dose of treatment.\par \par All questions were answered to the best of my ability and to his apparent satisfaction.  I told him that his kidney radiation ends next Wednesday, but in fact it runs probably through the next Monday upon recalculation of the days of treatment.\par \par A Hungarian  was utilized during his visit.

## 2020-09-29 NOTE — REASON FOR VISIT
[Follow-Up Visit] : a follow-up [ Service] : provided by  Service [FreeTextEntry1] : [id number] [FreeTextEntry2] : Sonya [TWNoteComboBox1] : Shelby

## 2020-09-30 PROCEDURE — 77014: CPT | Mod: 26

## 2020-10-01 VITALS — RESPIRATION RATE: 18 BRPM | HEIGHT: 64 IN | WEIGHT: 160 LBS | BODY MASS INDEX: 27.31 KG/M2

## 2020-10-01 PROCEDURE — 77014: CPT | Mod: 26

## 2020-10-02 PROCEDURE — 77427 RADIATION TX MANAGEMENT X5: CPT

## 2020-10-02 PROCEDURE — 77014: CPT | Mod: 26

## 2020-10-02 NOTE — ED ADULT TRIAGE NOTE - NS ED NOTE AC HIGH RISK COUNTRIES
Medication refill:    Medication Name: Potassium    Medication last refilled: 03/11/2020    Provider: Pia    Pharm[facility]    Last office visit: 07/02/2020     Follow up: Return in about 1 year (around 7/2/2021).      Last lab related to medication: 06/24/2020    Upcoming appointments: none    Refill outcome: Filled per protocol      
No

## 2020-10-05 PROCEDURE — 77014: CPT | Mod: 26

## 2020-10-06 ENCOUNTER — APPOINTMENT (OUTPATIENT)
Dept: HEMATOLOGY ONCOLOGY | Facility: CLINIC | Age: 75
End: 2020-10-06
Payer: MEDICARE

## 2020-10-06 ENCOUNTER — APPOINTMENT (OUTPATIENT)
Dept: INFUSION THERAPY | Facility: HOSPITAL | Age: 75
End: 2020-10-06

## 2020-10-06 ENCOUNTER — RESULT REVIEW (OUTPATIENT)
Age: 75
End: 2020-10-06

## 2020-10-06 VITALS
BODY MASS INDEX: 27.1 KG/M2 | HEIGHT: 64 IN | RESPIRATION RATE: 16 BRPM | WEIGHT: 158.73 LBS | SYSTOLIC BLOOD PRESSURE: 144 MMHG | DIASTOLIC BLOOD PRESSURE: 78 MMHG | HEART RATE: 73 BPM | OXYGEN SATURATION: 97 % | TEMPERATURE: 97.5 F

## 2020-10-06 LAB
BASOPHILS # BLD AUTO: 0.03 K/UL — SIGNIFICANT CHANGE UP (ref 0–0.2)
BASOPHILS NFR BLD AUTO: 0.6 % — SIGNIFICANT CHANGE UP (ref 0–2)
EOSINOPHIL # BLD AUTO: 0.47 K/UL — SIGNIFICANT CHANGE UP (ref 0–0.5)
EOSINOPHIL NFR BLD AUTO: 9.7 % — HIGH (ref 0–6)
HCT VFR BLD CALC: 32.9 % — LOW (ref 39–50)
HGB BLD-MCNC: 11.5 G/DL — LOW (ref 13–17)
IMM GRANULOCYTES NFR BLD AUTO: 1 % — SIGNIFICANT CHANGE UP (ref 0–1.5)
LYMPHOCYTES # BLD AUTO: 0.77 K/UL — LOW (ref 1–3.3)
LYMPHOCYTES # BLD AUTO: 15.9 % — SIGNIFICANT CHANGE UP (ref 13–44)
MCHC RBC-ENTMCNC: 32.2 PG — SIGNIFICANT CHANGE UP (ref 27–34)
MCHC RBC-ENTMCNC: 35 G/DL — SIGNIFICANT CHANGE UP (ref 32–36)
MCV RBC AUTO: 92.2 FL — SIGNIFICANT CHANGE UP (ref 80–100)
MONOCYTES # BLD AUTO: 0.57 K/UL — SIGNIFICANT CHANGE UP (ref 0–0.9)
MONOCYTES NFR BLD AUTO: 11.8 % — SIGNIFICANT CHANGE UP (ref 2–14)
NEUTROPHILS # BLD AUTO: 2.94 K/UL — SIGNIFICANT CHANGE UP (ref 1.8–7.4)
NEUTROPHILS NFR BLD AUTO: 61 % — SIGNIFICANT CHANGE UP (ref 43–77)
NRBC # BLD: 0 /100 WBCS — SIGNIFICANT CHANGE UP (ref 0–0)
PLATELET # BLD AUTO: 290 K/UL — SIGNIFICANT CHANGE UP (ref 150–400)
RBC # BLD: 3.57 M/UL — LOW (ref 4.2–5.8)
RBC # FLD: 14.6 % — HIGH (ref 10.3–14.5)
WBC # BLD: 4.83 K/UL — SIGNIFICANT CHANGE UP (ref 3.8–10.5)
WBC # FLD AUTO: 4.83 K/UL — SIGNIFICANT CHANGE UP (ref 3.8–10.5)

## 2020-10-06 PROCEDURE — 77014: CPT | Mod: 26

## 2020-10-06 PROCEDURE — 99214 OFFICE O/P EST MOD 30 MIN: CPT

## 2020-10-07 PROCEDURE — 77014: CPT | Mod: 26

## 2020-10-08 PROCEDURE — 77014: CPT | Mod: 26

## 2020-10-08 NOTE — VITALS
[Maximal Pain Intensity: 3/10] : 3/10 [Least Pain Intensity: 0/10] : 0/10 [Pain Description/Quality: ___] : Pain description/quality: [unfilled] [Pain Duration: ___] : Pain duration: [unfilled] [Pain Location: ___] : Pain Location: [unfilled] [OTC] : OTC [80: Normal activity with effort; some signs or symptoms of disease.] : 80: Normal activity with effort; some signs or symptoms of disease.  [ECOG Performance Status: 2 - Ambulatory and capable of all self care but unable to carry out any work activities] : Performance Status: 2 - Ambulatory and capable of all self care but unable to carry out any work activities. Up and about more than 50% of waking hours

## 2020-10-09 PROCEDURE — 77427 RADIATION TX MANAGEMENT X5: CPT

## 2020-10-09 PROCEDURE — 77014: CPT | Mod: 26

## 2020-10-11 NOTE — HISTORY OF PRESENT ILLNESS
[FreeTextEntry1] : Mr. Harry is a 73yo male with a history of [id number] high grade urothelial carcinoma s/p laparoscopic left nephroureterectomy with periaortic lymph node dissection on 10/28/2019\par Path: High-grade TCC, pT1N0, negative margins.\par \par On surveillance cystoscopy 5/21/2020: Found to have bladder tumor recurrence.\par s/p TURBT 6/15/2020. Path: Anterior tumor, high-grade TCC pT1; left posterior tumor, high-grade TCC pT2 with muscle invasion.\par \par CT c/A/P 7/23/20: no evidence of metastatic disease in chest or pelvis\par \par Bone scan 7/31/20 showed nonspecific uptake in ribs but no definitive evidence of mets.\par \par 9/20 fractions of radiation today. No Burning sensation on urination. c/o constipation at times. No abd pain, hematuria, fever. Advised to try OTC stool softner. On chemo once a week, blood test okay

## 2020-10-12 NOTE — HISTORY OF PRESENT ILLNESS
[FreeTextEntry1] : Mr. Harry is a 75yo male with a history of [id number] high grade urothelial carcinoma s/p laparoscopic left nephroureterectomy with periaortic lymph node dissection on 10/28/2019\par Path: High-grade TCC, pT1N0, negative margins.\par \par On surveillance cystoscopy 5/21/2020: Found to have bladder tumor recurrence.\par s/p TURBT 6/15/2020. Path: Anterior tumor, high-grade TCC pT1; left posterior tumor, high-grade TCC pT2 with muscle invasion.\par \par CT c/A/P 7/23/20: no evidence of metastatic disease in chest or pelvis\par \par Bone scan 7/31/20 showed nonspecific uptake in ribs but no definitive evidence of mets.\par \par 14/20 fractions of radiation today. Burning sensation on urination at times. c/o constipation at times. No abd pain, hematuria, fever. Advised to try OTC stool softner. Chemo stopped this week because of tongue infection, blood test okay

## 2020-10-12 NOTE — REVIEW OF SYSTEMS
[Constipation: Grade 1 - Occasional or intermittent symptoms; occasional use of stool softeners, laxatives, dietary modification, or enema] : Constipation: Grade 1 - Occasional or intermittent symptoms; occasional use of stool softeners, laxatives, dietary modification, or enema [Hematuria: Grade 0] : Hematuria: Grade 0 [Urinary Incontinence: Grade 0] : Urinary Incontinence: Grade 0  [Urinary Retention: Grade 0] : Urinary Retention: Grade 0 [Urinary Tract Pain: Grade 1 - Mild pain] : Urinary Tract Pain: Grade 1 - Mild pain [Urinary Urgency: Grade 0] : Urinary Urgency: Grade 0 [Urinary Frequency: Grade 1 - Present] : Urinary Frequency: Grade 1 - Present [Oral Pain: Grade 1 - Mild pain] : Oral Pain: Grade 1 - Mild pain [de-identified] : tongue pain

## 2020-10-21 NOTE — HISTORY OF PRESENT ILLNESS
[FreeTextEntry1] : Mr. Harry is a 73yo male with a history of [id number] high grade urothelial carcinoma s/p laparoscopic left nephroureterectomy with periaortic lymph node dissection on 10/28/2019\par Path: High-grade TCC, pT1N0, negative margins.\par \par On surveillance cystoscopy 5/21/2020: Found to have bladder tumor recurrence.\par s/p TURBT 6/15/2020. Path: Anterior tumor, high-grade TCC pT1; left posterior tumor, high-grade TCC pT2 with muscle invasion.\par \par CT c/A/P 7/23/20: no evidence of metastatic disease in chest or pelvis\par \par Bone scan 7/31/20 showed nonspecific uptake in ribs but no definitive evidence of mets.\par \par 19/20 fractions of radiation today. Burning sensation on urination at times. c/o constipation at times. No abd pain, hematuria, fever. Advised to try OTC stool softner. Chemo stopped last week because of tongue infection, blood test okay

## 2020-10-21 NOTE — REVIEW OF SYSTEMS
[Constipation: Grade 1 - Occasional or intermittent symptoms; occasional use of stool softeners, laxatives, dietary modification, or enema] : Constipation: Grade 1 - Occasional or intermittent symptoms; occasional use of stool softeners, laxatives, dietary modification, or enema [Hematuria: Grade 0] : Hematuria: Grade 0 [Urinary Incontinence: Grade 0] : Urinary Incontinence: Grade 0  [Urinary Retention: Grade 0] : Urinary Retention: Grade 0 [Urinary Tract Pain: Grade 1 - Mild pain] : Urinary Tract Pain: Grade 1 - Mild pain [Urinary Urgency: Grade 0] : Urinary Urgency: Grade 0 [Urinary Frequency: Grade 1 - Present] : Urinary Frequency: Grade 1 - Present [Oral Pain: Grade 1 - Mild pain] : Oral Pain: Grade 1 - Mild pain [de-identified] : tongue pain

## 2020-11-11 PROBLEM — Z92.21 HISTORY OF CANCER CHEMOTHERAPY: Status: RESOLVED | Noted: 2020-11-11 | Resolved: 2020-11-11

## 2020-11-16 ENCOUNTER — OUTPATIENT (OUTPATIENT)
Dept: OUTPATIENT SERVICES | Facility: HOSPITAL | Age: 75
LOS: 1 days | Discharge: HOME | End: 2020-11-16

## 2020-11-16 DIAGNOSIS — Z98.890 OTHER SPECIFIED POSTPROCEDURAL STATES: Chronic | ICD-10-CM

## 2020-11-16 DIAGNOSIS — Z90.5 ACQUIRED ABSENCE OF KIDNEY: Chronic | ICD-10-CM

## 2020-11-16 DIAGNOSIS — C67.9 MALIGNANT NEOPLASM OF BLADDER, UNSPECIFIED: ICD-10-CM

## 2020-11-18 ENCOUNTER — APPOINTMENT (OUTPATIENT)
Dept: HEMATOLOGY ONCOLOGY | Facility: CLINIC | Age: 75
End: 2020-11-18
Payer: MEDICARE

## 2020-11-18 ENCOUNTER — RESULT REVIEW (OUTPATIENT)
Age: 75
End: 2020-11-18

## 2020-11-18 VITALS
TEMPERATURE: 98.6 F | OXYGEN SATURATION: 97 % | WEIGHT: 160.06 LBS | BODY MASS INDEX: 27.33 KG/M2 | HEART RATE: 74 BPM | RESPIRATION RATE: 14 BRPM | SYSTOLIC BLOOD PRESSURE: 158 MMHG | DIASTOLIC BLOOD PRESSURE: 71 MMHG | HEIGHT: 64.17 IN

## 2020-11-18 DIAGNOSIS — Z92.3 PERSONAL HISTORY OF IRRADIATION: ICD-10-CM

## 2020-11-18 DIAGNOSIS — Z92.21 PERSONAL HISTORY OF ANTINEOPLASTIC CHEMOTHERAPY: ICD-10-CM

## 2020-11-18 LAB
ALBUMIN SERPL ELPH-MCNC: 4.7 G/DL
ALP BLD-CCNC: 94 U/L
ALT SERPL-CCNC: 12 U/L
ANION GAP SERPL CALC-SCNC: 12 MMOL/L
APPEARANCE: CLEAR
AST SERPL-CCNC: 16 U/L
BASOPHILS # BLD AUTO: 0.05 K/UL — SIGNIFICANT CHANGE UP (ref 0–0.2)
BASOPHILS NFR BLD AUTO: 0.6 % — SIGNIFICANT CHANGE UP (ref 0–2)
BILIRUB SERPL-MCNC: 0.4 MG/DL
BILIRUBIN URINE: NEGATIVE
BLOOD URINE: NEGATIVE
BUN SERPL-MCNC: 19 MG/DL
CALCIUM SERPL-MCNC: 9.8 MG/DL
CHLORIDE SERPL-SCNC: 98 MMOL/L
CO2 SERPL-SCNC: 23 MMOL/L
COLOR: COLORLESS
CREAT SERPL-MCNC: 1.72 MG/DL
EOSINOPHIL # BLD AUTO: 1.52 K/UL — HIGH (ref 0–0.5)
EOSINOPHIL NFR BLD AUTO: 17.9 % — HIGH (ref 0–6)
GLUCOSE QUALITATIVE U: NEGATIVE
GLUCOSE SERPL-MCNC: 110 MG/DL
HCT VFR BLD CALC: 36 % — LOW (ref 39–50)
HGB BLD-MCNC: 12 G/DL — LOW (ref 13–17)
IMM GRANULOCYTES NFR BLD AUTO: 0.7 % — SIGNIFICANT CHANGE UP (ref 0–1.5)
KETONES URINE: NEGATIVE
LEUKOCYTE ESTERASE URINE: NEGATIVE
LYMPHOCYTES # BLD AUTO: 1.03 K/UL — SIGNIFICANT CHANGE UP (ref 1–3.3)
LYMPHOCYTES # BLD AUTO: 12.1 % — LOW (ref 13–44)
MCHC RBC-ENTMCNC: 31.7 PG — SIGNIFICANT CHANGE UP (ref 27–34)
MCHC RBC-ENTMCNC: 33.3 G/DL — SIGNIFICANT CHANGE UP (ref 32–36)
MCV RBC AUTO: 95 FL — SIGNIFICANT CHANGE UP (ref 80–100)
MONOCYTES # BLD AUTO: 0.74 K/UL — SIGNIFICANT CHANGE UP (ref 0–0.9)
MONOCYTES NFR BLD AUTO: 8.7 % — SIGNIFICANT CHANGE UP (ref 2–14)
NEUTROPHILS # BLD AUTO: 5.09 K/UL — SIGNIFICANT CHANGE UP (ref 1.8–7.4)
NEUTROPHILS NFR BLD AUTO: 60 % — SIGNIFICANT CHANGE UP (ref 43–77)
NITRITE URINE: NEGATIVE
NRBC # BLD: 0 /100 WBCS — SIGNIFICANT CHANGE UP (ref 0–0)
PH URINE: 5
PLATELET # BLD AUTO: 218 K/UL — SIGNIFICANT CHANGE UP (ref 150–400)
POTASSIUM SERPL-SCNC: 4.2 MMOL/L
PROT SERPL-MCNC: 7.4 G/DL
PROTEIN URINE: NEGATIVE
RBC # BLD: 3.79 M/UL — LOW (ref 4.2–5.8)
RBC # FLD: 15.7 % — HIGH (ref 10.3–14.5)
SODIUM SERPL-SCNC: 134 MMOL/L
SPECIFIC GRAVITY URINE: 1.01
UROBILINOGEN URINE: NORMAL
WBC # BLD: 8.49 K/UL — SIGNIFICANT CHANGE UP (ref 3.8–10.5)
WBC # FLD AUTO: 8.49 K/UL — SIGNIFICANT CHANGE UP (ref 3.8–10.5)

## 2020-11-18 PROCEDURE — 99213 OFFICE O/P EST LOW 20 MIN: CPT

## 2020-11-18 RX ORDER — METOCLOPRAMIDE 5 MG/1
5 TABLET ORAL
Qty: 30 | Refills: 3 | Status: DISCONTINUED | COMMUNITY
Start: 2020-09-04 | End: 2020-11-18

## 2020-11-18 RX ORDER — LOSARTAN POTASSIUM 100 MG/1
100 TABLET, FILM COATED ORAL
Qty: 30 | Refills: 0 | Status: DISCONTINUED | COMMUNITY
Start: 2019-03-04 | End: 2020-11-18

## 2020-11-18 NOTE — PHYSICAL EXAM
[Ambulatory and capable of all self care but unable to carry out any work activities] : Status 2- Ambulatory and capable of all self care but unable to carry out any work activities. Up and about more than 50% of waking hours [Normal] : affect appropriate [de-identified] : no edema

## 2020-11-18 NOTE — REASON FOR VISIT
[Follow-Up Visit] : a follow-up [ Service] : provided by  Service [FreeTextEntry1] : [id number] [FreeTextEntry2] : Victor Manuel [TWNoteComboBox1] : Shelby

## 2020-11-18 NOTE — HISTORY OF PRESENT ILLNESS
[Disease: _____________________] : Disease: [unfilled] [T: ___] : T[unfilled] [N: ___] : N[unfilled] [M: ___] : M[unfilled] [AJCC Stage: ____] : AJCC Stage: [unfilled] [de-identified] : Mr. Md Harry presented on 8/20/20 to medical oncology for initial consultation for evaluation and treatment of urothelial cancer found on surveillance cystoscopy; he was referred by his urologist, Dr Moya.\par \par Mr. Harry reports that he initially came to medical attention last year when he developed gross hematuria; he was treated for a presumed UTI by his previous urologist (at [facility]) for 3-4 weeks without improvement. Per the chart, he presented to [facility] on 7/20/19 with worsening gross hematuria and dysuria, and was admitted given marked hyponatremia to 127; per the ER note and H&P, he had had an outpatient cystoscopy x2 that was negative, though outside records were not available at the time of his hospitalization. Urology was consulted for evaluation hematuria; as part of his work up, on 7/23/20 he underwent a CT of the abdomen and pelvis w/wo IV contrast which showed a 2.7 cm L lower pole renal mass concerning for transitional cell carcinoma (negative for other sites of disease). A noncontrast chest CT on 7/24/20 showed nonspecific 4-5 mm nodules in the R apex and and emphysema. After his electrolyte abnormalities resolved, he was discharged on 7/25/20 to follow up with urology as an outpatientl; he was referred to Dr. Moya for further follow up for his newly found renal mass. \par \par On 9/9/19, he underwent a cystourethroscopy with diagnostic ureteroscopy and ureteral stent insertion; a bx of the left renal pelvis tumor showed clusters of urothelial cells suspicious for high grade urothelial carcinoma admixed in blood clot. On 10/28/19, he underwent a L laparoscopic nephroureterectomy, cystoscopy, and lymph node dissection; pathology showed a 3.5 cm tumor in the renal pelvis consistent with high grade urothelial carcinoma invading subepithelial connective tissue (lamina propia); margins uninvolved by invasive carcinoma; and carcinoma in situ / noninvasive urothelial carcinoma; 0 out of 15 lymph nodes dissected were positive; he staged [id number]. Non-neoplastic renal parenchyma showed global glomerulosclerosis (10% of glomeruli), tubular atrophy and interstitial fibrosis (20% of the cortex) and arterial and arteriolar obliterative sclerosis (severe).\par \par After his nephrectomy, he had no post-op complications and he followed up as an outpatient with urology for surveillance. Per the chart, he had intermittent episodes of gross hematuria between Nov 2019 and May 2020. At a follow up visit on 5/21/20 he underwent a surveillance cystoscopy which showed a papillary tumor on the posterior wall as well as a nodular tumor at the bladder neck. On 6/15/20, he underwent a cystourethroscopy with resection of the bladder tumor; he was noted to have tumor overlying the L trigone in area of the previous left UO, as well as a small papillary tumor at the anterior bladder neck. Bx of the anterior bladder wall tumor showed invasive high grade urothelial carcinoma with necrosis (muscularis propia present and was NOT involved; pT1) and bx of the L trigone of the bladder tumor showed invasive high grade urothelial carcinoma with necrosis (muscularis propia present and WAS involved; pT2). The results of his bx were discussed on follow up by his urologist; treatment options were discussed, including cystectomy,  he was referred to medical oncology.\par \par 8/20/20: Mr. Harry presents today with his son for initial consultation. He reports that over the past few weeks he has not had any visible gross hematuria but reports he had been told that he still has microscopic hematuria "about 3 months ago."  Reports that he feels weak when walking at baseline since he had his nephrectomy, has to use a cane; also has a hx of a childhood injury with some "nerve pain" of the R foot that became more symptomatic since he had his nephrectomy. Has a hx of duodenal ulcers diagnosed 7 years prior (via endoscopy; records not available on EHR) but denies any bleeding in his stool, and appetite is "good", has been gaining some weight, though reports some apprehension that by eating more that he will exacerbate his existing GERD. Denies any nausea, vomiting, diarrhea, constipation. Does endorse some nocturia (3x per night on average), makes "a lot of urine during the night" with a normal stream, makes less urine during the day but can't quantify; denies any associated dysuria or incontinence, though does have some urgency. Denies any joint pain or calf pain.  \par \ger Immig[place] from [facility] [place] the [place] live with his children in 2009. He is independent in his ADLs, but he lives with his wife, son, and grandchildren at home. Previously worked doing field work, milling, and endorses some exposure to pesticides, but denies any exospore to dyes or solvents. He retired 30 years prior. He admits to hx of heavy tobacco use, 1 pack per day starting at age 14, quit 20 years prior (~40 pack years); denies alcohol use. \par \par 9/9/20...Completed 2/20 fractions of RT to the bladder/pelvis. Cycle 1 of gemcitabine today. Feels "okay." Appetite is "good." No nausea or vomiting. No diarrheal stools. Some constipation issues, takes occasional med but couldn't remember the name. Last BM was this AM. No blood in stools. No headaches. States that he had some dizziness after RT but resolved spontaneously. No balance issues, has a cane but does not use it at home and uses it as needed when he's outside, no falls. No cough, some SOB related to asthma, couldn't remember last usage of rescue inhaler, states it's been a while. No complaints of pain other than occ pain in the bladder, no pain med taken. Has some fatigue with increased exertion, does not impair daily activities. No skin rashes or pruritus. Has rare occasions of paresthesias in the toes. Urine flow is "okay," nocturia x 3-4. No blood or dysuria. Some incontinence with urgency. No fevers or chills. Needs some assistance in showering but independent in other ADLs.\par \par 9/29/20...On gem/RT. Dose #3 due today. On First mouthwash for mucositis, started on topical. He says it started when the chemotherapy started. Today is fraction #12 of 20 treatments. NO nausea, no vomiting. Appetite is good, but is unable to eat due to the mouth irritation. feels dizzy at times, no falls. No falls. No fevers, notes some chills right after the chemo and feels warm, no temperature checked. He has some fatigue. Has edema, less than before. Occ cough, sometimes with dark green sputum. No TRAN. This is a long standing issue. No pains noted. Urine flow is frequent, more than before, nocturia x 4-5. He has some incontinence with urgency. No blood in the urine. \par \par 10/6/20...On chemo/RT, completed fraction #17/20 RT and 2 doses of gemcitabine, dose # 3 was held last week 2/2 mucositis. Feels "good" overall. Appetite is "fair," no weight loss. No nausea or vomiting. Mucositis is improved, using magic mouthwash TID, able to eat better now compared to last week. No odynophagia or dysphagia. No diarrheal stools. Some constipation, less than before, taking medication given by his nephew. No headaches or dizziness. No TRAN. Occ productive cough, unsure of sputum color. No complaints of pain. Some fatigue, not daily, does not impair daily activities. Some balance issues, no falls, using cane. Urine flow is "moderate," nocturia x 5-6. No hematuria, occ dysuria. No spasms. Some incontinence with urgency. No skin rashes or pruritus. No paresthesias. Has edema, but less than before. No fevers or chills. Gets assistance from wife with showering, but independent in other ADLs.  [de-identified] : prior Left renal pelvis HG urothelial, pT1N0 (15 nodes), November 2019, Left radical nephrectomy [FreeTextEntry1] : Started gemcitabine on 09/15/2020, on chemoRT. Completed RT 10/9/20, 20 fractions.  [de-identified] : Completed RT, received only 3 doses of gemcitabine. feels "okay", feels weak somewhat. Ambulating with a cane, has used it for the last year. Had a fall in the past, none recently. Appetite is good, has some altered taste. Rx ended more than one month ago. No weight loss. NO mouth sores at this time. He can stop the mouth wash. Urine flow is good, occasional burning sensation with voiding. Occ incontinence. NO blood noted. he has follow up with Dr Valentin on the 24th, no appt with Dr Moya. Notes some leg edema at times, occ cough, mild TRAN. Fatigue is present  at times. He ambulates within the house, often seated, watching TV. He dresses himself, wife helps him to shower. No difficulty getting up from  chair. No headaches, no dizziness

## 2020-11-18 NOTE — REVIEW OF SYSTEMS
[Fatigue] : fatigue [Lower Ext Edema] : lower extremity edema [Cough] : cough [SOB on Exertion] : shortness of breath during exertion [Constipation] : constipation [Dysuria] : dysuria [Negative] : Eyes [Fever] : no fever [Chills] : no chills [Recent Change In Weight] : ~T no recent weight change [Dysphagia] : no dysphagia [Nosebleeds] : no nosebleeds [Odynophagia] : no odynophagia [Mucosal Pain] : no mucosal pain [Chest Pain] : no chest pain [Palpitations] : no palpitations [Abdominal Pain] : no abdominal pain [Vomiting] : no vomiting [Diarrhea] : no diarrhea [Incontinence] : no incontinence [Joint Pain] : no joint pain [Joint Stiffness] : no joint stiffness [Skin Rash] : no skin rash [Dizziness] : no dizziness [Anxiety] : no anxiety [Depression] : no depression [Muscle Weakness] : no muscle weakness [Easy Bleeding] : no tendency for easy bleeding [Easy Bruising] : no tendency for easy bruising [FreeTextEntry4] : altered taste [FreeTextEntry7] : occ gas [de-identified] : No HA, no paresthesias

## 2020-11-18 NOTE — ASSESSMENT
[Palliative Care Plan] : not applicable at this time [FreeTextEntry1] : Mr Harry was seen in follow-up today.  A [facility]  was utilized.  He completed his radiation therapy in early October.  He received weekly gemcitabine but only had 3 doses rather than 4.  He is ambulating with a cane and has used it for the past year.  He has not had any recent falls.  His appetite is good but he remains with some altered taste.  Treatment ended more than 1 month ago.  He has no weight loss.  There are no mouth sores at this time but apparently he had some oral irritation during his radiation.  He can stop utilizing the mouthwash at this point.  Urinary flow is stated to be good.  He has an occasional burning sensation along with voiding.  He does have occasional incontinence.  There is no hematuria.  He has a follow-up with Dr. Romero next week, but does not have an appointment with Dr. Moya.  He does note some leg edema at times, but there is none on exam today.  There is occasional cough and some mild shortness of breath.  He does have fatigue intermittently.  He ambulates within the house and is often seated watching TV.  He is able to dress himself, but he has his wife help him in the shower.  He has no difficulty in getting up from the chair.  There is no headaches or dizziness.\par \par On physical examination, he appears well.  He is in no acute distress.  There were no palpable lymph nodes present.  There is no spinal column or chest wall tenderness to palpation or percussion.  The lungs are clear and the heart examination is normal.  There is no palpable abnormality upon examination of the abdomen.  There are no lymph nodes in the groin area.  The extremities do not reveal any edema today.  And lower extremity motor power is normal the pelvic girdle.\par \par He continues to have some altered taste which is of concern.  This is not common with gemcitabine and it should have resolved at this point.  Nonetheless, he has not had any weight loss.\par \par I asked him to return to see me once again in 3 months time.  He will follow up with radiation oncology as well as urology.\par \par All questions were answered to the best of my ability and to his apparent satisfaction.

## 2020-11-24 ENCOUNTER — APPOINTMENT (OUTPATIENT)
Dept: RADIATION ONCOLOGY | Facility: CLINIC | Age: 75
End: 2020-11-24
Payer: MEDICARE

## 2020-11-24 PROCEDURE — 99024 POSTOP FOLLOW-UP VISIT: CPT

## 2020-11-29 NOTE — REVIEW OF SYSTEMS
[Negative] : Allergic/Immunologic [Anal Pain: Grade 0] : Anal Pain: Grade 0 [Constipation: Grade 1 - Occasional or intermittent symptoms; occasional use of stool softeners, laxatives, dietary modification, or enema] : Constipation: Grade 1 - Occasional or intermittent symptoms; occasional use of stool softeners, laxatives, dietary modification, or enema [Diarrhea: Grade 0] : Diarrhea: Grade 0 [Dyspepsia: Grade 0] : Dyspepsia: Grade 0 [Dysphagia: Grade 0] : Dysphagia: Grade 0 [Esophagitis: Grade 0] : Esophagitis: Grade 0 [Fecal Incontinence: Grade 0] : Fecal Incontinence: Grade 0 [Gastroparesis: Grade 0] : Gastroparesis: Grade 0 [Nausea: Grade 0] : Nausea: Grade 0 [Proctitis: Grade 0] : Proctitis: Grade 0 [Rectal Pain: Grade 0] : Rectal Pain: Grade 0 [Small Intestinal Obstruction: Grade 0] : Small Intestinal Obstruction: Grade 0 [Vomiting: Grade 0] : Vomiting: Grade 0 [Hematuria: Grade 0] : Hematuria: Grade 0 [Urinary Incontinence: Grade 0] : Urinary Incontinence: Grade 0  [Urinary Retention: Grade 0] : Urinary Retention: Grade 0 [Urinary Tract Pain: Grade 0] : Urinary Tract Pain: Grade 0 [Urinary Urgency: Grade 1 - Present] : Urinary Urgency: Grade 1 - Present [Urinary Frequency: Grade 1 - Present] : Urinary Frequency: Grade 1 - Present [FreeTextEntry8] : h/o chemo and radiation for bladder cancer

## 2020-11-29 NOTE — HISTORY OF PRESENT ILLNESS
[FreeTextEntry1] : Mr. Harry is a 75yo male with a history of [id number] high grade urothelial carcinoma s/p laparoscopic left nephroureterectomy with periaortic lymph node dissection on 10/28/2019, On surveillance cystoscopy 5/21/2020: Found to have bladder tumor recurrence. s/p TURBT 6/15/2020. Path: Anterior tumor, high-grade TCC pT1; left posterior tumor, high-grade TCC pT2 with muscle invasion.\par \par 55/20 finished 10/9/20 with CHT 3/4 doses gem\par saw Dr. collier last week. \par no new scans\par \par should fu with Dr. Moya

## 2020-11-30 NOTE — ASSESSMENT
[Palliative Care Plan] : not applicable at this time [FreeTextEntry1] : Mr. Md Harry is a 75 yo M former smoker w/ a previous hx of HTN, CKD3, asthma and emphysema who was originally diagnosed with urothelial cancer of the L renal pelvis, s/p L radical nephroureterectomy on 10/28/19 (AJCC Stage [id number]); he was found to have recurrence of high grade urothelial carcinoma on surveillance cystoscopy on 5/21/20, bx of the bladder tumor on 6/15/20 showing invasion of the muscularis propia without any evidence of metastatic disease on CT imaging or bone scan from 7/23/20; TNM stage T2, N0, M0, AJCC Stage cII. He presents for initial consultation to medical oncology today.\par \par Since his diagnosis, he reports that his gross hematuria that brought him to medical attention has resolved. He denies any systemic symptoms of weight loss, decreased appetite, fevers, chills, or night sweats. He is independent in all ADLs, but requires a cane to ambulate; he denies any falls. He endorses nocturia, some urge incontinence, and rare incontinence, but denies any dysuria. Though he is thin and his gait is slow, his physical exam is otherwise unremarkable, including for any palpable masses or lymphadenopathy, or any abdominal or tenderness. He had labs drawn today, CMP notable for a Cr of 1.82 (GFR 36); CBC notable for a mild anemia (Hgb 12.5), and eosinophilia (EOS# 1.15k).\par \par During this visit, we discussed his diagnosis of urothelial cancer in layman's terms, that his previous tobacco use most likely contributed to him developing his malignancy, and the natural course of the disease without treatment. He and his son report that after a discussion with Dr. Moya, that he does not want to pursue a cystectomy. Discussed that chemoradiation is an option and that would offer low dose gemcitabine as a radiosensitizing agent. Discussed that would not offer him cisplatin given his poor renal function. Discussed in detail the the possible side effects, including but not limited to fatigue, anemia, thrombocytopenia / bleeding, pneumonitis, infusion reaction, rash, and infection; clarified that given that he would be getting a lower dose than what is typically given that these side effects were less likely to occur but to communicate with our office in case he developed any of them. Written educational materials on the chemotherapy were provided to him and written consent was obtained and placed in his chart. Discussed that chemotherapy would need to be coordinated to be given prior to around the time he would get RT; he had a follow up appointment with radiation oncology today after our meeting.\par \ger Answered his questions to the best of my ability and to his apparent satisfaction. \par \par Case discussed with Dr. Holley.\par \par Vance Barrientos MD, MPH\par Hematology / Oncology Fellow, PGY6

## 2020-11-30 NOTE — RESULTS/DATA
[FreeTextEntry1] : Final Diagnosis\par 1. Renal pelvis tumor, left, biopsy\par    -Clusters of urothelial cells suspicious for high grade urothelial carcinoma admixed in blood clot\par This case was reviewed for  with staff pathologists who concur(s) with the diagnosis on 09/12/19.\par Verified by: Loida Spann MD\par (Electronic Signature)\par Reported on: 09/16/19\par Final Diagnosis\par URINE, LEFT KIDNEY\par SUSPICIOUS FOR MALIGNANCY.  \par Specimen consists of isolated and 3-dimensional groups of atypical urothelial cells with nuclear hyperchromasia. The possibility of an urothelial carcinoma cannot be ruled out. Recommend additional studies if clinically indicated for further evaluation. \par Please see concurrent biopsy specimen  report  [id number]\par Screened by: Patti BROWN(ASCP)\par Verified by: Pratik Recio M.D.\par (Electronic Signature)\par Reported on: 09/17/19\par Final Diagnosis  November 2019\par 1. Lymph nodes, left para aortic, dissection\par      -Fifteen lymph nodes are negative for malignancy (0/15).\par \par 2. Kidney and ureter, left, complete nephroureterectomy\par      -Invasive high grade urothelial carcinoma. \par      -See synoptic summary. \par \par SYNOPTIC SUMMARY\par RENAL PELVIS AND URETER (pTNM, AJCC 8th edition)\par Procedure: Complete nephroureterectomy\par Laterality: Left \par Tumor site: Renal pelvis\par Tumor size (Greatest dimension): 3.5 cm  \par Histologic type:  Invasive urothelial carcinoma \par Histologic grade: \par      Urothelial Carcinoma (WHO/ISUP, 1998):  High-grade \par Tumor Extension:  Tumor invades subepithelial connective tissue(lamina propria)\par Margins:  Uninvolved by invasive carcinoma and carcinoma in situ/noninvasive urothelial carcinoma \par Lymphovascular Invasion: Not identified   \par Regional lymph nodes (including all parts):   \par Number of Lymph Nodes Examined:  15\par Number of Lymph Nodes Involved:  0\par Pathologic Stage Classification (pTNM, AJCC 8th Edition):\par Primary Tumor (pT):  pT1  Tumor invades subepithelial connective tissue (lamina propria)\par      Regional Lymph Nodes (pN):  pN0  No regional lymph node metastasis\par      Distant Metastasis (pM):  pMX  Cannot be assessed\par Additional Pathologic Findings:  Inflammation/regenerative changes\par Pathologic Findings in Ipsilateral Non-Neoplastic Renal Tissue: Addendum report will follow \par \par \par Verified by: Amanda Calvin M.D., Chief of Genitourinary Pathology\par (Electronic Signature)\par Reported on: 11/04/19 16:29 EST, [address], [place] [address]\par Final Diagnosis\par 1. Anterior bladder wall tumor, biopsy\par      - Invasive high grade urothelial carcinoma with necrosis\par      - Muscularis propria is present and not involved\par \par 2. Left trigone of bladder tumor, biopsy\par      - Invasive high grade urothelial carcinoma with necrosis\par      - Muscularis propria is present and involved\par \par Verified by: Bijal Ding M.D.\par (Electronic Signature)\par Reported on: 06/19/20\par ***************************************************************\par EXAM: CT CHEST \par EXAM: CT ABDOMEN AND PELVIS OC \par PROCEDURE DATE: 07/23/2020 \par INTERPRETATION: CLINICAL INFORMATION: History of transitional cell cancer status post left nephroureterectomy in 2019, tumor recurrence in bladder \par status post TURBT in 2020. For follow-up. \par COMPARISON: CT chest dated 7/24/2019. CT abdomen pelvis dated 5/21/2020. \par PROCEDURE: \par CT of the Chest, Abdomen and Pelvis was performed without intravenous contrast. Intravenous contrast: None. \par Oral contrast: Positive contrast was administered. Sagittal and coronal reformats were performed. \par FINDINGS: \par CHEST: \par LUNGS AND LARGE AIRWAYS: Patent central airways. Centrilobular emphysema. \par Bibasilar reticular interstitial prominence. No pulmonary nodule. \par PLEURA: No pleural effusion. \par VESSELS: Calcifications of thoracic aorta and coronary arteries. \par HEART: Heart size is normal. Trace pericardial fluid. \par MEDIASTINUM AND BRIAN: No lymphadenopathy. \par CHEST WALL AND LOWER NECK: Within normal limits. \par ABDOMEN AND PELVIS: \par LIVER: Within normal limits. \par BILE DUCTS: Normal caliber. \par GALLBLADDER: Within normal limits. \par SPLEEN: Within normal limits. \par PANCREAS: Within normal limits. \par ADRENALS: Within normal limits. \par KIDNEYS/URETERS: Status post left nephrectomy. Unremarkable nonenhanced appearance of the right kidney. No hydronephrosis. \par BLADDER: Diffuse mural thickening. \par REPRODUCTIVE ORGANS: Prostate is not enlarged. \par BOWEL: No bowel obstruction. Appendix normal. \par PERITONEUM: No ascites. \par VESSELS: Atheromatous calcifications. \par RETROPERITONEUM/LYMPH NODES: No lymphadenopathy. Interval stability of two para-aortic fluid collections, the larger measuring 1.7 cm, adjacent to the \par left nephrectomy bed. \par ABDOMINAL WALL: Within normal limits. \par BONES: Within normal limits. \par IMPRESSION: \par Bladder wall thickening. \par No CT evidence of local tumor recurrence or metastatic disease in the chest, abdomen and pelvis. \par SARITA MONTGOMERY M.D., ATTENDING RADIOLOGIST \par This document has been electronically signed. Jul 23 2020 1:54PM\par *******************************************************\par EXAM: NM BONE IMG WHOLE BODY \par EXAM: NM BONE SPECT CT SA SD \par PROCEDURE DATE: 07/23/2020 \par INTERPRETATION: CLINICAL INFORMATION: 74 year-old male with transitional cell carcinoma, status post left nephroureterectomy, recurrent bladder \par tumor, referred for evaluation \par RADIOPHARMACEUTICAL: 19.1 mCi Tc-99m HDP, I.V. \par TECHNIQUE: Whole-body planar images were obtained in the anterior and posterior projections approximately 2-3 hours after tracer injection. \par SPECT/CT of the chest was also performed. The CT protocol was optimized for SPECT attenuation correction and to provide anatomic detail for localization \par of SPECT abnormalities. The CT protocol was not designed to produce and cannot replace state-of- the-art diagnostic CT images with specific imaging \par protocols for different body parts and indications. \par COMPARISON: No previous bone scan for comparison \par FINDINGS: There is mild, diffuse increased uptake in the right and left lower posterior ribs, prominent in the left 9th and 10th posterior ribs, \par without corresponding abnormality on the CT component of the study. There are degenerative changes in the major joints. There is physiologic tracer \par distribution in the remainder of the visualized skeleton. \par The right kidney is visualized. The patient is status post left nephroureterectomy. \par IMPRESSION: Bone scan demonstrates: \par No definite scan evidence of osseous metastasis. \par Mildly increased uptake in the lower posterior ribs bilaterally, non specific, maybe post traumatic. \par Degenerative disease in the major joints. \par AAMIR PENALOZA M.D., NUCLEAR MEDICINE ATTENDING \par This document has been electronically signed. Jul 23 2020 5:37PM

## 2020-11-30 NOTE — HISTORY OF PRESENT ILLNESS
[T: ___] : T[unfilled] [Disease: _____________________] : Disease: [unfilled] [M: ___] : M[unfilled] [N: ___] : N[unfilled] [AJCC Stage: ____] : AJCC Stage: [unfilled] [de-identified] : Mr. Md Harry presented on 8/20/20 to medical oncology for initial consultation for evaluation and treatment of urothelial cancer found on surveillance cystoscopy; he was referred by his urologist, Dr Moya.\par \par Mr. Harry reports that he initially came to medical attention last year when he developed gross hematuria; he was treated for a presumed UTI by his previous urologist (at [facility]) for 3-4 weeks without improvement. Per the chart, he presented to [facility] on 7/20/19 with worsening gross hematuria and dysuria, and was admitted given marked hyponatremia to 127; per the ER note and H&P, he had had an outpatient cystoscopy x2 that was negative, though outside records were not available at the time of his hospitalization. Urology was consulted for evaluation hematuria; as part of his work up, on 7/23/20 he underwent a CT of the abdomen and pelvis w/wo IV contrast which showed a 2.7 cm L lower pole renal mass concerning for transitional cell carcinoma (negative for other sites of disease). A noncontrast chest CT on 7/24/20 showed nonspecific 4-5 mm nodules in the R apex and and emphysema. After his electrolyte abnormalities resolved, he was discharged on 7/25/20 to follow up with urology as an outpatientl; he was referred to Dr. Moya for further follow up for his newly found renal mass. \par \par On 9/9/19, he underwent a cystourethroscopy with diagnostic ureteroscopy and ureteral stent insertion; a bx of the left renal pelvis tumor showed clusters of urothelial cells suspicious for high grade urothelial carcinoma admixed in blood clot. On 10/28/19, he underwent a L laparoscopic nephroureterectomy, cystoscopy, and lymph node dissection; pathology showed a 3.5 cm tumor in the renal pelvis consistent with high grade urothelial carcinoma invading subepithelial connective tissue (lamina propia); margins uninvolved by invasive carcinoma; and carcinoma in situ / noninvasive urothelial carcinoma; 0 out of 15 lymph nodes dissected were positive; he staged [id number]. Non-neoplastic renal parenchyma showed global glomerulosclerosis (10% of glomeruli), tubular atrophy and interstitial fibrosis (20% of the cortex) and arterial and arteriolar obliterative sclerosis (severe).\par \par After his nephrectomy, he had no post-op complications and he followed up as an outpatient with urology for surveillance. Per the chart, he had intermittent episodes of gross hematuria between Nov 2019 and May 2020. At a follow up visit on 5/21/20 he underwent a surveillance cystoscopy which showed a papillary tumor on the posterior wall as well as a nodular tumor at the bladder neck. On 6/15/20, he underwent a cystourethroscopy with resection of the bladder tumor; he was noted to have tumor overlying the L trigone in area of the previous left UO, as well as a small papillary tumor at the anterior bladder neck. Bx of the anterior bladder wall tumor showed invasive high grade urothelial carcinoma with necrosis (muscularis propia present and was NOT involved; pT1) and bx of the L trigone of the bladder tumor showed invasive high grade urothelial carcinoma with necrosis (muscularis propia present and WAS involved; pT2). The results of his bx were discussed on follow up by his urologist; treatment options were discussed, including cystectomy,  he was referred to medical oncology. [de-identified] : prior Left renal pelvis HG urothelial, pT1N0 (15 nodes), November 2019, Left radical nephrectomy [de-identified] : 8/20/20: Mr. Harry presents today with his son for initial consultation. He reports that over the past few weeks he has not had any visible gross hematuria but reports he had been told that he still has microscopic hematuria "about 3 months ago."  Reports that he feels weak when walking at baseline since he had his nephrectomy, has to use a cane; also has a hx of a childhood injury with some "nerve pain" of the R foot that became more symptomatic since he had his nephrectomy. Has a hx of duodenal ulcers diagnosed 7 years prior (via endoscopy; records not available on EHR) but denies any bleeding in his stool, and appetite is "good", has been gaining some weight, though reports some apprehension that by eating more that he will exacerbate his existing GERD. Denies any nausea, vomiting, diarrhea, constipation. Does endorse some nocturia (3x per night on average), makes "a lot of urine during the night" with a normal stream, makes less urine during the day but can't quantify; denies any associated dysuria or incontinence, though does have some urgency. Denies any joint pain or calf pain.  \par \par [place] from [facility] [place] the [place] to live with his children in 2009. He is independent in his ADLs, but he lives with his wife, son, and grandchildren at home. Previously worked doing field work, milling, and endorses some exposure to pesticides, but denies any exospore to dyes or solvents. He retired 30 years prior. He admits to hx of heavy tobacco use, 1 pack per day starting at age 14, quit 20 years prior (~40 pack years); denies alcohol use.

## 2020-11-30 NOTE — REASON FOR VISIT
[Initial Consultation] : an initial consultation [Family Member] : family member [Patient Declined  Services] : - None: Patient declined  services [FreeTextEntry2] : bladder cancer [FreeTextEntry3] : Son provided translation per patient's request [TWNoteComboBox1] : [place]

## 2020-11-30 NOTE — REVIEW OF SYSTEMS
[Incontinence] : incontinence [Lower Ext Edema] : lower extremity edema [SOB on Exertion] : shortness of breath during exertion [Constipation] : constipation [Joint Pain] : joint pain [Muscle Pain] : muscle pain [Anxiety] : anxiety [Negative] : Eyes [Fever] : no fever [Chills] : no chills [Night Sweats] : no night sweats [Recent Change In Weight] : ~T no recent weight change [Dysphagia] : no dysphagia [Hoarseness] : no hoarseness [Mucosal Pain] : no mucosal pain [Chest Pain] : no chest pain [Palpitations] : no palpitations [Abdominal Pain] : no abdominal pain [Vomiting] : no vomiting [Diarrhea] : no diarrhea [Dysuria] : no dysuria [Joint Stiffness] : no joint stiffness [Skin Rash] : no skin rash [Easy Bruising] : no tendency for easy bruising [FreeTextEntry2] : appetite is good, no weight loss [FreeTextEntry7] : gas pains.  nausea when gas is "bad" [FreeTextEntry8] : nocturia x 3, stream is variable, some dribbling, has no notable hematuria, has urgency [FreeTextEntry9] : knees, calf muscles, right leg [de-identified] : some pruritus [de-identified] : walks with a cane, no headaches, no balance issues, did trip getting out of the car recently with a fall, no injuries [de-identified] : sightly depressed with the diagnosis

## 2020-11-30 NOTE — PHYSICAL EXAM
[Ambulatory and capable of all self care but unable to carry out any work activities] : Status 2- Ambulatory and capable of all self care but unable to carry out any work activities. Up and about more than 50% of waking hours [Thin] : thin [Normal Male] : prostate smooth, symmetric with no modularity or induration [Normal] : affect appropriate [de-identified] : Slow gait, uses cane to ambulate but does not require assistance [de-identified] : Deferred due to risk for COVID-19 transmission, left procedure mask on  [de-identified] : Circumcised [de-identified] : No CVA tenderness; no tenderness to palpation of the spine

## 2020-12-15 ENCOUNTER — APPOINTMENT (OUTPATIENT)
Dept: UROLOGY | Facility: CLINIC | Age: 75
End: 2020-12-15
Payer: MEDICARE

## 2020-12-15 ENCOUNTER — OUTPATIENT (OUTPATIENT)
Dept: OUTPATIENT SERVICES | Facility: HOSPITAL | Age: 75
LOS: 1 days | End: 2020-12-15
Payer: COMMERCIAL

## 2020-12-15 DIAGNOSIS — Z98.890 OTHER SPECIFIED POSTPROCEDURAL STATES: Chronic | ICD-10-CM

## 2020-12-15 DIAGNOSIS — Z87.448 PERSONAL HISTORY OF OTHER DISEASES OF URINARY SYSTEM: ICD-10-CM

## 2020-12-15 DIAGNOSIS — Z90.5 ACQUIRED ABSENCE OF KIDNEY: Chronic | ICD-10-CM

## 2020-12-15 DIAGNOSIS — R35.0 FREQUENCY OF MICTURITION: ICD-10-CM

## 2020-12-15 PROCEDURE — 52000 CYSTOURETHROSCOPY: CPT

## 2020-12-15 PROCEDURE — 88112 CYTOPATH CELL ENHANCE TECH: CPT | Mod: 26

## 2020-12-16 LAB — URINE CYTOLOGY: NORMAL

## 2020-12-18 DIAGNOSIS — Z85.53 PERSONAL HISTORY OF MALIGNANT NEOPLASM OF RENAL PELVIS: ICD-10-CM

## 2020-12-18 DIAGNOSIS — C67.8 MALIGNANT NEOPLASM OF OVERLAPPING SITES OF BLADDER: ICD-10-CM

## 2020-12-21 ENCOUNTER — APPOINTMENT (OUTPATIENT)
Dept: CT IMAGING | Facility: IMAGING CENTER | Age: 75
End: 2020-12-21

## 2021-01-01 NOTE — PROGRESS NOTE ADULT - PROBLEM SELECTOR PROBLEM 2
Hematuria of undiagnosed cause
Hyponatremia
2021 08:47

## 2021-01-01 NOTE — PATIENT PROFILE ADULT - INSURANCE HELP

## 2021-01-04 ENCOUNTER — INPATIENT (INPATIENT)
Facility: HOSPITAL | Age: 76
LOS: 8 days | Discharge: HOME HEALTH | End: 2021-01-13
Attending: INTERNAL MEDICINE | Admitting: INTERNAL MEDICINE
Payer: MEDICARE

## 2021-01-04 VITALS
HEIGHT: 65 IN | HEART RATE: 102 BPM | RESPIRATION RATE: 18 BRPM | OXYGEN SATURATION: 94 % | SYSTOLIC BLOOD PRESSURE: 134 MMHG | TEMPERATURE: 99 F | DIASTOLIC BLOOD PRESSURE: 47 MMHG

## 2021-01-04 DIAGNOSIS — Z98.890 OTHER SPECIFIED POSTPROCEDURAL STATES: Chronic | ICD-10-CM

## 2021-01-04 DIAGNOSIS — Z90.5 ACQUIRED ABSENCE OF KIDNEY: Chronic | ICD-10-CM

## 2021-01-04 DIAGNOSIS — I10 ESSENTIAL (PRIMARY) HYPERTENSION: ICD-10-CM

## 2021-01-04 DIAGNOSIS — C67.9 MALIGNANT NEOPLASM OF BLADDER, UNSPECIFIED: ICD-10-CM

## 2021-01-04 DIAGNOSIS — R09.02 HYPOXEMIA: ICD-10-CM

## 2021-01-04 DIAGNOSIS — E87.1 HYPO-OSMOLALITY AND HYPONATREMIA: ICD-10-CM

## 2021-01-04 DIAGNOSIS — J96.01 ACUTE RESPIRATORY FAILURE WITH HYPOXIA: ICD-10-CM

## 2021-01-04 DIAGNOSIS — N18.30 CHRONIC KIDNEY DISEASE, STAGE 3 UNSPECIFIED: ICD-10-CM

## 2021-01-04 DIAGNOSIS — Z29.9 ENCOUNTER FOR PROPHYLACTIC MEASURES, UNSPECIFIED: ICD-10-CM

## 2021-01-04 LAB
ALBUMIN SERPL ELPH-MCNC: 3.5 G/DL — SIGNIFICANT CHANGE UP (ref 3.3–5)
ALP SERPL-CCNC: 202 U/L — HIGH (ref 40–120)
ALT FLD-CCNC: 53 U/L — HIGH (ref 4–41)
ANION GAP SERPL CALC-SCNC: 13 MMOL/L — SIGNIFICANT CHANGE UP (ref 7–14)
AST SERPL-CCNC: 41 U/L — HIGH (ref 4–40)
B PERT DNA SPEC QL NAA+PROBE: SIGNIFICANT CHANGE UP
BASOPHILS # BLD AUTO: 0.01 K/UL — SIGNIFICANT CHANGE UP (ref 0–0.2)
BASOPHILS NFR BLD AUTO: 0.1 % — SIGNIFICANT CHANGE UP (ref 0–2)
BILIRUB SERPL-MCNC: 1.1 MG/DL — SIGNIFICANT CHANGE UP (ref 0.2–1.2)
BLOOD GAS VENOUS COMPREHENSIVE RESULT: SIGNIFICANT CHANGE UP
BUN SERPL-MCNC: 16 MG/DL — SIGNIFICANT CHANGE UP (ref 7–23)
C PNEUM DNA SPEC QL NAA+PROBE: SIGNIFICANT CHANGE UP
CALCIUM SERPL-MCNC: 9.4 MG/DL — SIGNIFICANT CHANGE UP (ref 8.4–10.5)
CHLORIDE SERPL-SCNC: 96 MMOL/L — LOW (ref 98–107)
CO2 SERPL-SCNC: 24 MMOL/L — SIGNIFICANT CHANGE UP (ref 22–31)
CREAT SERPL-MCNC: 1.71 MG/DL — HIGH (ref 0.5–1.3)
CRP SERPL-MCNC: 271.7 MG/L — HIGH
D DIMER BLD IA.RAPID-MCNC: 1412 NG/ML DDU — HIGH
EOSINOPHIL # BLD AUTO: 0.04 K/UL — SIGNIFICANT CHANGE UP (ref 0–0.5)
EOSINOPHIL NFR BLD AUTO: 0.4 % — SIGNIFICANT CHANGE UP (ref 0–6)
FERRITIN SERPL-MCNC: 759 NG/ML — HIGH (ref 30–400)
FLUAV H1 2009 PAND RNA SPEC QL NAA+PROBE: SIGNIFICANT CHANGE UP
FLUAV H1 RNA SPEC QL NAA+PROBE: SIGNIFICANT CHANGE UP
FLUAV H3 RNA SPEC QL NAA+PROBE: SIGNIFICANT CHANGE UP
FLUAV SUBTYP SPEC NAA+PROBE: SIGNIFICANT CHANGE UP
FLUBV RNA SPEC QL NAA+PROBE: SIGNIFICANT CHANGE UP
GLUCOSE SERPL-MCNC: 124 MG/DL — HIGH (ref 70–99)
HADV DNA SPEC QL NAA+PROBE: SIGNIFICANT CHANGE UP
HCOV PNL SPEC NAA+PROBE: SIGNIFICANT CHANGE UP
HCT VFR BLD CALC: 31.8 % — LOW (ref 39–50)
HGB BLD-MCNC: 11.2 G/DL — LOW (ref 13–17)
HMPV RNA SPEC QL NAA+PROBE: SIGNIFICANT CHANGE UP
HPIV1 RNA SPEC QL NAA+PROBE: SIGNIFICANT CHANGE UP
HPIV2 RNA SPEC QL NAA+PROBE: SIGNIFICANT CHANGE UP
HPIV3 RNA SPEC QL NAA+PROBE: SIGNIFICANT CHANGE UP
HPIV4 RNA SPEC QL NAA+PROBE: SIGNIFICANT CHANGE UP
IANC: 8.62 K/UL — HIGH (ref 1.5–8.5)
IMM GRANULOCYTES NFR BLD AUTO: 0.8 % — SIGNIFICANT CHANGE UP (ref 0–1.5)
LYMPHOCYTES # BLD AUTO: 0.38 K/UL — LOW (ref 1–3.3)
LYMPHOCYTES # BLD AUTO: 3.9 % — LOW (ref 13–44)
MCHC RBC-ENTMCNC: 30.9 PG — SIGNIFICANT CHANGE UP (ref 27–34)
MCHC RBC-ENTMCNC: 35.2 GM/DL — SIGNIFICANT CHANGE UP (ref 32–36)
MCV RBC AUTO: 87.8 FL — SIGNIFICANT CHANGE UP (ref 80–100)
MONOCYTES # BLD AUTO: 0.55 K/UL — SIGNIFICANT CHANGE UP (ref 0–0.9)
MONOCYTES NFR BLD AUTO: 5.7 % — SIGNIFICANT CHANGE UP (ref 2–14)
NEUTROPHILS # BLD AUTO: 8.62 K/UL — HIGH (ref 1.8–7.4)
NEUTROPHILS NFR BLD AUTO: 89.1 % — HIGH (ref 43–77)
NRBC # BLD: 0 /100 WBCS — SIGNIFICANT CHANGE UP
NRBC # FLD: 0 K/UL — SIGNIFICANT CHANGE UP
PLATELET # BLD AUTO: 297 K/UL — SIGNIFICANT CHANGE UP (ref 150–400)
POTASSIUM SERPL-MCNC: 3.8 MMOL/L — SIGNIFICANT CHANGE UP (ref 3.5–5.3)
POTASSIUM SERPL-SCNC: 3.8 MMOL/L — SIGNIFICANT CHANGE UP (ref 3.5–5.3)
PROCALCITONIN SERPL-MCNC: 0.62 NG/ML — HIGH (ref 0.02–0.1)
PROT SERPL-MCNC: 7.5 G/DL — SIGNIFICANT CHANGE UP (ref 6–8.3)
RAPID RVP RESULT: DETECTED
RBC # BLD: 3.62 M/UL — LOW (ref 4.2–5.8)
RBC # FLD: 14.1 % — SIGNIFICANT CHANGE UP (ref 10.3–14.5)
RV+EV RNA SPEC QL NAA+PROBE: SIGNIFICANT CHANGE UP
SARS-COV-2 RNA SPEC QL NAA+PROBE: DETECTED
SODIUM SERPL-SCNC: 133 MMOL/L — LOW (ref 135–145)
WBC # BLD: 9.68 K/UL — SIGNIFICANT CHANGE UP (ref 3.8–10.5)
WBC # FLD AUTO: 9.68 K/UL — SIGNIFICANT CHANGE UP (ref 3.8–10.5)

## 2021-01-04 PROCEDURE — 99285 EMERGENCY DEPT VISIT HI MDM: CPT

## 2021-01-04 PROCEDURE — 71045 X-RAY EXAM CHEST 1 VIEW: CPT | Mod: 26

## 2021-01-04 PROCEDURE — 99223 1ST HOSP IP/OBS HIGH 75: CPT

## 2021-01-04 RX ORDER — HEPARIN SODIUM 5000 [USP'U]/ML
5000 INJECTION INTRAVENOUS; SUBCUTANEOUS EVERY 8 HOURS
Refills: 0 | Status: DISCONTINUED | OUTPATIENT
Start: 2021-01-04 | End: 2021-01-05

## 2021-01-04 RX ORDER — CHOLECALCIFEROL (VITAMIN D3) 125 MCG
1000 CAPSULE ORAL DAILY
Refills: 0 | Status: DISCONTINUED | OUTPATIENT
Start: 2021-01-04 | End: 2021-01-13

## 2021-01-04 RX ORDER — DEXAMETHASONE 0.5 MG/5ML
6 ELIXIR ORAL ONCE
Refills: 0 | Status: COMPLETED | OUTPATIENT
Start: 2021-01-04 | End: 2021-01-04

## 2021-01-04 RX ORDER — AMLODIPINE BESYLATE 2.5 MG/1
10 TABLET ORAL DAILY
Refills: 0 | Status: DISCONTINUED | OUTPATIENT
Start: 2021-01-04 | End: 2021-01-13

## 2021-01-04 RX ORDER — SODIUM CHLORIDE 0.65 %
1 AEROSOL, SPRAY (ML) NASAL THREE TIMES A DAY
Refills: 0 | Status: DISCONTINUED | OUTPATIENT
Start: 2021-01-04 | End: 2021-01-13

## 2021-01-04 RX ORDER — DEXAMETHASONE 0.5 MG/5ML
6 ELIXIR ORAL DAILY
Refills: 0 | Status: DISCONTINUED | OUTPATIENT
Start: 2021-01-04 | End: 2021-01-13

## 2021-01-04 RX ORDER — METOPROLOL TARTRATE 50 MG
25 TABLET ORAL DAILY
Refills: 0 | Status: DISCONTINUED | OUTPATIENT
Start: 2021-01-04 | End: 2021-01-13

## 2021-01-04 RX ORDER — ENOXAPARIN SODIUM 100 MG/ML
40 INJECTION SUBCUTANEOUS DAILY
Refills: 0 | Status: DISCONTINUED | OUTPATIENT
Start: 2021-01-04 | End: 2021-01-04

## 2021-01-04 RX ORDER — ATORVASTATIN CALCIUM 80 MG/1
10 TABLET, FILM COATED ORAL AT BEDTIME
Refills: 0 | Status: DISCONTINUED | OUTPATIENT
Start: 2021-01-04 | End: 2021-01-13

## 2021-01-04 RX ORDER — DEXAMETHASONE 0.5 MG/5ML
6 ELIXIR ORAL DAILY
Refills: 0 | Status: DISCONTINUED | OUTPATIENT
Start: 2021-01-04 | End: 2021-01-04

## 2021-01-04 RX ORDER — TAMSULOSIN HYDROCHLORIDE 0.4 MG/1
0.4 CAPSULE ORAL AT BEDTIME
Refills: 0 | Status: DISCONTINUED | OUTPATIENT
Start: 2021-01-04 | End: 2021-01-13

## 2021-01-04 RX ORDER — PANTOPRAZOLE SODIUM 20 MG/1
40 TABLET, DELAYED RELEASE ORAL
Refills: 0 | Status: DISCONTINUED | OUTPATIENT
Start: 2021-01-04 | End: 2021-01-13

## 2021-01-04 RX ORDER — AMLODIPINE BESYLATE 2.5 MG/1
10 TABLET ORAL DAILY
Refills: 0 | Status: DISCONTINUED | OUTPATIENT
Start: 2021-01-04 | End: 2021-01-04

## 2021-01-04 RX ORDER — SODIUM CHLORIDE 9 MG/ML
500 INJECTION INTRAMUSCULAR; INTRAVENOUS; SUBCUTANEOUS ONCE
Refills: 0 | Status: COMPLETED | OUTPATIENT
Start: 2021-01-04 | End: 2021-01-04

## 2021-01-04 RX ORDER — SODIUM CHLORIDE 9 MG/ML
1 INJECTION INTRAMUSCULAR; INTRAVENOUS; SUBCUTANEOUS
Refills: 0 | Status: DISCONTINUED | OUTPATIENT
Start: 2021-01-04 | End: 2021-01-13

## 2021-01-04 RX ORDER — BUDESONIDE AND FORMOTEROL FUMARATE DIHYDRATE 160; 4.5 UG/1; UG/1
2 AEROSOL RESPIRATORY (INHALATION)
Refills: 0 | Status: DISCONTINUED | OUTPATIENT
Start: 2021-01-04 | End: 2021-01-13

## 2021-01-04 RX ORDER — INFLUENZA VIRUS VACCINE 15; 15; 15; 15 UG/.5ML; UG/.5ML; UG/.5ML; UG/.5ML
0.5 SUSPENSION INTRAMUSCULAR ONCE
Refills: 0 | Status: DISCONTINUED | OUTPATIENT
Start: 2021-01-04 | End: 2021-01-04

## 2021-01-04 RX ORDER — PREGABALIN 225 MG/1
1000 CAPSULE ORAL DAILY
Refills: 0 | Status: DISCONTINUED | OUTPATIENT
Start: 2021-01-04 | End: 2021-01-13

## 2021-01-04 RX ORDER — ALBUTEROL 90 UG/1
2 AEROSOL, METERED ORAL EVERY 6 HOURS
Refills: 0 | Status: COMPLETED | OUTPATIENT
Start: 2021-01-04 | End: 2021-01-07

## 2021-01-04 RX ORDER — ACETAMINOPHEN 500 MG
650 TABLET ORAL ONCE
Refills: 0 | Status: COMPLETED | OUTPATIENT
Start: 2021-01-04 | End: 2021-01-04

## 2021-01-04 RX ORDER — METOPROLOL TARTRATE 50 MG
25 TABLET ORAL DAILY
Refills: 0 | Status: DISCONTINUED | OUTPATIENT
Start: 2021-01-04 | End: 2021-01-04

## 2021-01-04 RX ORDER — FUROSEMIDE 40 MG
20 TABLET ORAL DAILY
Refills: 0 | Status: DISCONTINUED | OUTPATIENT
Start: 2021-01-04 | End: 2021-01-13

## 2021-01-04 RX ORDER — SENNA PLUS 8.6 MG/1
2 TABLET ORAL AT BEDTIME
Refills: 0 | Status: DISCONTINUED | OUTPATIENT
Start: 2021-01-04 | End: 2021-01-07

## 2021-01-04 RX ADMIN — Medication 650 MILLIGRAM(S): at 14:55

## 2021-01-04 RX ADMIN — Medication 1000 UNIT(S): at 19:22

## 2021-01-04 RX ADMIN — Medication 1 TABLET(S): at 19:22

## 2021-01-04 RX ADMIN — SODIUM CHLORIDE 1 GRAM(S): 9 INJECTION INTRAMUSCULAR; INTRAVENOUS; SUBCUTANEOUS at 19:21

## 2021-01-04 RX ADMIN — ALBUTEROL 2 PUFF(S): 90 AEROSOL, METERED ORAL at 16:58

## 2021-01-04 RX ADMIN — SODIUM CHLORIDE 500 MILLILITER(S): 9 INJECTION INTRAMUSCULAR; INTRAVENOUS; SUBCUTANEOUS at 15:21

## 2021-01-04 RX ADMIN — Medication 20 MILLIGRAM(S): at 19:22

## 2021-01-04 RX ADMIN — Medication 6 MILLIGRAM(S): at 14:37

## 2021-01-04 NOTE — ED PROVIDER NOTE - CLINICAL SUMMARY MEDICAL DECISION MAKING FREE TEXT BOX
74 y/o M, PMH of HTN, HLD, and L Renal CA s/p L nephrectomy, presents to ED c/o fever x 10 days a/w cough+SOB x 5 days. Pt w/ COVID+ contacts at home. Pt hypoxic w/ sat of 88% on RA; currently on NRB sating 100%.  Otherwise VSS. Physical exam unremarkable.  Likely pt symptoms 2/2 covid.  Will check labs, CXR, and covid swab. Will also give dexamethasone. Will admit to medicine.

## 2021-01-04 NOTE — H&P ADULT - PROBLEM SELECTOR PLAN 1
due to COVID-19 viral pneumonia, currently on 6L NC  Tends to desaturate to low 90s with coughing. Placed on NRB as well  If worsening, can escalate to HFNC and obtain pulmonary consult  start decadron 6mg IVP qd x 10days  Holding off Remdesevir given borderline GFR and CKD stage 3  Mild LFT elevation likely due to COVID, will trend LFTs  Maintain Continuous pulse ox, start chest PT, incentive spirometry  Mucinex 600mg BID x 4 days  Trend lactate: 3.1 on admission, s/p IVF, encourage PO hydration  Trend ferritin, D-dimer, proBNP q48-72h  Monitor off Abx

## 2021-01-04 NOTE — CONSULT NOTE ADULT - SUBJECTIVE AND OBJECTIVE BOX
[facility]   MD NOLAN JULIO MD RUORU WONG, PA    TEL:  OFFICE: [phone]  DR CONDE CELL: [phone]  HARSH WILLOUGHBY CELL: [phone]  DR. MARTINEZ CELL: [phone]  DR. RAMOS CELl: [phone]    FROM 5 PM- 7 AM PLEASE CALL ANSWERING SERVICE AT [phone]    -- INITIAL RENAL CONSULT NOTE --- Date Of service 01-04-21 @ 13:51  --------------------------------------------------------------------------------  HPI:    74yo M w/ pmhx of HTN presents for sob, covid-like symptoms x 10 days, pt evaluated briefly to be hypoxic and in mild respiratory distress,  PT follows with Dr Conde in clinic     PAST HISTORY  --------------------------------------------------------------------------------  PAST MEDICAL & SURGICAL HISTORY:  Bladder tumor    History of stomach ulcers  denies recent endoscopy    Cancer of kidney    Hyponatremia  admission x 2 last 2017    Asthma  denies recent asthma exacerbation    Left renal mass    Gross hematuria    Urinary tract infection with hematuria    History of SIADH    History of BPH    Essential hypertension    HTN (Hypertension)    Anxiety    Hypercholesterolemia    H/O left nephrectomy    History of prostate surgery  ? exact procedure 3/19    S/P cystoscopy  Insertion left ureteral stent ; biopsy 8/19      FAMILY HISTORY:  FH: lung cancer  brother    Family history of stroke      PAST SOCIAL HISTORY:    ALLERGIES & MEDICATIONS  --------------------------------------------------------------------------------  Allergies    chocolate (Pruritus)  flour (Rash)  No Known Drug Allergies  Nuts (Rash)  Soy (Unknown)    Intolerances      Standing Inpatient Medications  dexAMETHasone  Injectable 6 milliGRAM(s) IV Push Once    PRN Inpatient Medications      REVIEW OF SYSTEMS  --------------------------------------------------------------------------------  Gen: No fevers/chills  Skin: No rashes  Head/Eyes/Ears: Normal hearing,  Normal vision   Respiratory: + dyspnea, cough  CV: No chest pain  GI: No abdominal pain, diarrhea, constipation, nausea, vomiting  : No dysuria, hematuria  MSK: No  edema  Heme: No easy bruising or bleeding  Psych: No significant depression    All other systems were reviewed and are negative, except as noted.    VITALS/PHYSICAL EXAM  --------------------------------------------------------------------------------  T(C): 37.2 (01-04-21 @ 12:20), Max: 37.2 (01-04-21 @ 12:20)  HR: 102 (01-04-21 @ 12:20) (102 - 102)  BP: 134/47 (01-04-21 @ 12:20) (134/47 - 134/47)  RR: 18 (01-04-21 @ 12:20) (18 - 18)  SpO2: 94% (01-04-21 @ 12:20) (94% - 94%)  Wt(kg): --  Height (cm): 165.1 (01-04-21 @ 12:20)      Physical Exam:  	Gen: NAD  	HEENT: MMM  	Pulm: CTA B/L  	CV: S1S2  	Abd: Soft, +BS   	Ext: No LE edema B/L  	Neuro: Awake, alert  	Skin: Warm and dry  	Vascular access:          : pineda  LABS/STUDIES  --------------------------------------------------------------------------------                Creatinine Trend:    Urinalysis - [06-10-20 @ 10:00]      Color YELLOW / Appearance CLEAR / SG 1.010 / pH 6.0      Gluc NEGATIVE / Ketone NEGATIVE  / Bili NEGATIVE / Urobili NORMAL       Blood MODERATE / Protein 10 / Leuk Est NEGATIVE / Nitrite NEGATIVE      RBC >50 / WBC 3-5 / Hyaline NEGATIVE / Gran  / Sq Epi OCC / Non Sq Epi  / Bacteria NEGATIVE      HbA1c 6.3      [09-13-17 @ 08:15]    HCV 0.16, Nonreactive Hepatitis C AB  S/CO Ratio                        Interpretation  < 1.00                                   Non-Reactive  1.00 - 4.99                         Weakly-Reactive  >= 5.00                                Reactive  Non-Reactive: Aperson with a non-reactive HCV antibody  result is considered uninfected.  No further action is  needed unless recent infection is suspected.  In these  cases, consider repeat testing later to detect  seroconversion..  Weakly-Reactive: HCV antibody test is abnormal, HCV RNA  Qualitative test will follow.  Reactive: HCV antibody test is abnormal, HCV RNA  Qualitative test will follow.  Note: HCV antibody testing is performed on the [employer] system.      [07-21-19 @ 04:24]     [facility]   MD NOLAN JULIO MD RUORU WONG, PA    TEL:  OFFICE: [phone]  DR CONDE CELL: [phone]  HARSH WILLOUGHBY CELL: [phone]  DR. MARTINEZ CELL: [phone]  DR. RAMOS CELl: [phone]    FROM 5 PM- 7 AM PLEASE CALL ANSWERING SERVICE AT [phone]    -- INITIAL RENAL CONSULT NOTE --- Date Of service 01-04-21 @ 13:51  --------------------------------------------------------------------------------  HPI:    74yo M w/ pmhx of HTN presents for sob, covid-like symptoms x 10 days, pt evaluated briefly to be hypoxic and in mild respiratory distress,  PT follows with Dr Conde in clinic , last seen on 11/6     PAST HISTORY  --------------------------------------------------------------------------------  PAST MEDICAL & SURGICAL HISTORY:  Bladder tumor    History of stomach ulcers  denies recent endoscopy    Cancer of kidney    Hyponatremia  admission x 2 last 2017    Asthma  denies recent asthma exacerbation    Left renal mass    Gross hematuria    Urinary tract infection with hematuria    History of SIADH    History of BPH    Essential hypertension    HTN (Hypertension)    Anxiety    Hypercholesterolemia    H/O left nephrectomy    History of prostate surgery  ? exact procedure 3/19    S/P cystoscopy  Insertion left ureteral stent ; biopsy 8/19      FAMILY HISTORY:  FH: lung cancer  brother    Family history of stroke      PAST SOCIAL HISTORY:    ALLERGIES & MEDICATIONS  --------------------------------------------------------------------------------  Allergies    chocolate (Pruritus)  flour (Rash)  No Known Drug Allergies  Nuts (Rash)  Soy (Unknown)    Intolerances      Standing Inpatient Medications  dexAMETHasone  Injectable 6 milliGRAM(s) IV Push Once    PRN Inpatient Medications      REVIEW OF SYSTEMS  --------------------------------------------------------------------------------  Gen: No fevers/chills  Skin: No rashes  Head/Eyes/Ears: Normal hearing,  Normal vision   Respiratory: + dyspnea, cough  CV: No chest pain  GI: No abdominal pain, diarrhea, constipation, nausea, vomiting  : No dysuria, hematuria  MSK: No  edema  Heme: No easy bruising or bleeding  Psych: No significant depression    All other systems were reviewed and are negative, except as noted.    VITALS/PHYSICAL EXAM  --------------------------------------------------------------------------------  T(C): 37.2 (01-04-21 @ 12:20), Max: 37.2 (01-04-21 @ 12:20)  HR: 102 (01-04-21 @ 12:20) (102 - 102)  BP: 134/47 (01-04-21 @ 12:20) (134/47 - 134/47)  RR: 18 (01-04-21 @ 12:20) (18 - 18)  SpO2: 94% (01-04-21 @ 12:20) (94% - 94%)  Wt(kg): --  Height (cm): 165.1 (01-04-21 @ 12:20)      Physical Exam:  	Gen: NAD  	HEENT: MMM  	Pulm: CTA B/L  	CV: S1S2  	Abd: Soft, +BS   	Ext: No LE edema B/L  	Neuro: Awake, alert  	Skin: Warm and dry  	Vascular access:          : pineda  LABS/STUDIES  --------------------------------------------------------------------------------                Creatinine Trend:    Urinalysis - [06-10-20 @ 10:00]      Color YELLOW / Appearance CLEAR / SG 1.010 / pH 6.0      Gluc NEGATIVE / Ketone NEGATIVE  / Bili NEGATIVE / Urobili NORMAL       Blood MODERATE / Protein 10 / Leuk Est NEGATIVE / Nitrite NEGATIVE      RBC >50 / WBC 3-5 / Hyaline NEGATIVE / Gran  / Sq Epi OCC / Non Sq Epi  / Bacteria NEGATIVE      HbA1c 6.3      [09-13-17 @ 08:15]    HCV 0.16, Nonreactive Hepatitis C AB  S/CO Ratio                        Interpretation  < 1.00                                   Non-Reactive  1.00 - 4.99                         Weakly-Reactive  >= 5.00                                Reactive  Non-Reactive: Aperson with a non-reactive HCV antibody  result is considered uninfected.  No further action is  needed unless recent infection is suspected.  In these  cases, consider repeat testing later to detect  seroconversion..  Weakly-Reactive: HCV antibody test is abnormal, HCV RNA  Qualitative test will follow.  Reactive: HCV antibody test is abnormal, HCV RNA  Qualitative test will follow.  Note: HCV antibody testing is performed on the [employer] system.      [07-21-19 @ 04:24]     [facility]   MD NOLAN JULIO MD RUORU WONG, PA    TEL:  OFFICE: [phone]  DR CONDE CELL: [phone]  HARSH WILLOUGHBY CELL: [phone]  DR. MARTINEZ CELL: [phone]  DR. RAMOS CELl: [phone]    FROM 5 PM- 7 AM PLEASE CALL ANSWERING SERVICE AT [phone]    -- INITIAL RENAL CONSULT NOTE --- Date Of service 01-04-21 @ 13:51  --------------------------------------------------------------------------------  HPI:    76yo M w/ pmhx of HTN presents for sob, covid-like symptoms x 10 days, pt evaluated briefly to be hypoxic and in mild respiratory distress,  PT follows with Dr Conde in clinic , last seen on 11/6     PAST HISTORY  --------------------------------------------------------------------------------  PAST MEDICAL & SURGICAL HISTORY:  Bladder tumor    History of stomach ulcers  denies recent endoscopy    Cancer of kidney    Hyponatremia  admission x 2 last 2017    Asthma  denies recent asthma exacerbation    Left renal mass    Gross hematuria    Urinary tract infection with hematuria    History of SIADH    History of BPH    Essential hypertension    HTN (Hypertension)    Anxiety    Hypercholesterolemia    H/O left nephrectomy    History of prostate surgery  ? exact procedure 3/19    S/P cystoscopy  Insertion left ureteral stent ; biopsy 8/19      FAMILY HISTORY:  FH: lung cancer  brother    Family history of stroke      PAST SOCIAL HISTORY:    ALLERGIES & MEDICATIONS  --------------------------------------------------------------------------------  Allergies    chocolate (Pruritus)  flour (Rash)  No Known Drug Allergies  Nuts (Rash)  Soy (Unknown)    Intolerances      Standing Inpatient Medications  dexAMETHasone  Injectable 6 milliGRAM(s) IV Push Once    PRN Inpatient Medications      REVIEW OF SYSTEMS  --------------------------------------------------------------------------------  Gen: No fevers/chills, + weakness  Skin: No rashes  Head/Eyes/Ears: Normal hearing,  Normal vision   Respiratory: + dyspnea, cough  CV: No chest pain  GI: No abdominal pain, diarrhea,   : No dysuria, hematuria  MSK: No  edema  Heme: No easy bruising or bleeding  Psych: No significant depression    All other systems were reviewed and are negative, except as noted.    VITALS/PHYSICAL EXAM  --------------------------------------------------------------------------------  T(C): 37.2 (01-04-21 @ 12:20), Max: 37.2 (01-04-21 @ 12:20)  HR: 102 (01-04-21 @ 12:20) (102 - 102)  BP: 134/47 (01-04-21 @ 12:20) (134/47 - 134/47)  RR: 18 (01-04-21 @ 12:20) (18 - 18)  SpO2: 94% (01-04-21 @ 12:20) (94% - 94%)  Wt(kg): --  Height (cm): 165.1 (01-04-21 @ 12:20)      Physical Exam:  	Gen: NAD  	HEENT: MMM  	Pulm: CTA B/L  	CV: S1S2  	Abd: Soft, +BS   	Ext: No LE edema B/L  	Neuro: Awake, alert  	Skin: Warm and dry  	Vascular access: no HD catheter          : no pineda  LABS/STUDIES  --------------------------------------------------------------------------------                Creatinine Trend:    Urinalysis - [06-10-20 @ 10:00]      Color YELLOW / Appearance CLEAR / SG 1.010 / pH 6.0      Gluc NEGATIVE / Ketone NEGATIVE  / Bili NEGATIVE / Urobili NORMAL       Blood MODERATE / Protein 10 / Leuk Est NEGATIVE / Nitrite NEGATIVE      RBC >50 / WBC 3-5 / Hyaline NEGATIVE / Gran  / Sq Epi OCC / Non Sq Epi  / Bacteria NEGATIVE      HbA1c 6.3      [09-13-17 @ 08:15]    HCV 0.16, Nonreactive Hepatitis C AB  S/CO Ratio                        Interpretation  < 1.00                                   Non-Reactive  1.00 - 4.99                         Weakly-Reactive  >= 5.00                                Reactive  Non-Reactive: Aperson with a non-reactive HCV antibody  result is considered uninfected.  No further action is  needed unless recent infection is suspected.  In these  cases, consider repeat testing later to detect  seroconversion..  Weakly-Reactive: HCV antibody test is abnormal, HCV RNA  Qualitative test will follow.  Reactive: HCV antibody test is abnormal, HCV RNA  Qualitative test will follow.  Note: HCV antibody testing is performed on the [employer] system.      [07-21-19 @ 04:24]

## 2021-01-04 NOTE — H&P ADULT - NSICDXPASTMEDICALHX_GEN_ALL_CORE_FT
PAST MEDICAL HISTORY:  Anxiety     Asthma denies recent asthma exacerbation    Bladder tumor     Cancer of kidney     Essential hypertension     Gross hematuria     History of BPH     History of SIADH     History of stomach ulcers denies recent endoscopy    HTN (Hypertension)     Hypercholesterolemia     Hyponatremia admission x 2 last 2017    Left renal mass     Urinary tract infection with hematuria

## 2021-01-04 NOTE — ED PROVIDER NOTE - OBJECTIVE STATEMENT
74yo M w/ pmhx of HTN presents for sob, covid-like symptoms x 10 days, pt evaluated briefly to be hypoxic and in mild respiratory distress, upon eval 82% while getting undressed, when resting in bed 88%, placed on 2L NC, signed out to main attg Dr. Mccallum. 76yo M w/ pmhx of HTN presents for sob, covid-like symptoms x 10 days, pt evaluated briefly to be hypoxic and in mild respiratory distress, upon eval 82% while getting undressed, when resting in bed 88%, placed on 2L NC, signed out to main attg Dr. Mccallum.    Rosaura Reilly PGY1 - 74 y/o M, PMH of HTN, HLD, and L Renal CA s/p L nephrectomy, presents to ED c/o fever x 10 days a/w cough+SOB x 5 days. Pt states several members in his home is ill and tested COVID+. Pt has not been tested himself. Pt denies CP, abd pain, n/v/d, urinary sx, or LE edema. 76yo M w/ pmhx of HTN presents for sob, covid-like symptoms x 10 days, pt evaluated briefly to be hypoxic and in mild respiratory distress, upon eval 82% while getting undressed, when resting in bed 88%, placed on 2L NC, signed out to main attg Dr. Mccallum.    Rosaura Reilly PGY1 - 74 y/o M, PMH of HTN, HLD, and L Renal CA s/p L nephrectomy, presents to ED c/o fever x 10 days a/w cough+SOB x 5 days. Pt states several members in his home is ill and tested COVID+. Pt has not been tested himself. Pt sating at 92-4% on 6LNC, switched to NRB and sating 100%. Pt denies CP, abd pain, n/v/d, urinary sx, or LE edema.

## 2021-01-04 NOTE — H&P ADULT - NSHPPHYSICALEXAM_GEN_ALL_CORE
Vital Signs Last 24 Hrs  T(C): 37.6 (04 Jan 2021 14:36), Max: 37.6 (04 Jan 2021 14:36)  T(F): 99.6 (04 Jan 2021 14:36), Max: 99.6 (04 Jan 2021 14:36)  HR: 100 (04 Jan 2021 14:36) (100 - 102)  BP: 167/58 (04 Jan 2021 14:36) (134/47 - 167/58)  BP(mean): --  RR: 23 (04 Jan 2021 14:36) (18 - 23)  SpO2: 99% (04 Jan 2021 14:36) (94% - 100%)    PHYSICAL EXAM:  GENERAL: no apparent distress, on 6L NC and NRB  HEENT: eyes tracking, sclera clear b/l  CHEST/LUNG: normal respiratory effort, speaking in full sentences  HEART: tachycardic, no peripheral edema in extremities  ABDOMEN: Soft, Nontender, Nondistended  EXTREMITIES: WWP, no edema  PSYCH: normal affect, calm demeanor  NEUROLOGY: awake, alert, no gross deficits, follows commands, answers Qs appropriately  MSK: no joint swelling or effusions  SKIN: No rashes or lesions

## 2021-01-04 NOTE — H&P ADULT - ASSESSMENT
72 y/o male (speaks English and Tamazight) PMH HTN, asthma, SIADH, left RCC s/p left nephrectomy, bladder ca s/p Incision of Ureteral Orifice, Left laparoscopic Nephroureterectomy and Retroperitoneal Lymphadenectomy presents with SOB x 10 days. Admitted with acute hypoxic respiratory failure 2/2 COVID-19 viral PNA.

## 2021-01-04 NOTE — ED PROVIDER NOTE - ATTENDING CONTRIBUTION TO CARE
76 yo M past medical history htn, hld, L renal ca s/p L nephrectomy with fever , cough sob. multiple family members covid + at home. patient hypoxic in ED improving on NRB/NC. exam as above. suspect covid, Ddx includes, but not limited to, acs, bacterial pna, exam not c/w chf. plan: suppl o2, symptom relief prn, labs, cxr, admit

## 2021-01-04 NOTE — CONSULT NOTE ADULT - ASSESSMENT
76yo M w/ pmhx of HTN presents for sob, covid-like symptoms x 10 days, pt evaluated briefly to be hypoxic and in mild respiratory distress,      CKD         HTN  BP acceptable   Resume outpt anit-hypertensives  Low salt diet  MOnitor BP   76yo M w/ pmhx of HTN presents for sob, covid-like symptoms x 10 days, pt evaluated briefly to be hypoxic and in mild respiratory distress,      CKD Stage 3  Baseline Scr 1.7         HTN  BP acceptable   Resume outpt anit-hypertensives  Low salt diet  MOnitor BP   76yo M w/ pmhx of HTN presents for sob, covid-like symptoms x 10 days, pt evaluated briefly to be hypoxic and in mild respiratory distress,      CKD Stage 3  Baseline Scr 1.7   Renal function stable  Monitor BMP   Avoid nephrotoxics, NSAIDS RCA        HTN  BP acceptable   Resume outpt anit-hypertensives  Low salt diet  MOnitor BP      Hyponatremia  Check URine Na, Urine Osmo  MOnitor serum NA    COVID 19+  MOnitor temp/ ox  Follow up Pulm

## 2021-01-04 NOTE — H&P ADULT - NSHPSOCIALHISTORY_GEN_ALL_CORE
lives with family, uses cane  former smoker: quit 25 years ago, denies ETOH or illicit drug use  No recent falls

## 2021-01-04 NOTE — H&P ADULT - NSICDXPASTSURGICALHX_GEN_ALL_CORE_FT
PAST SURGICAL HISTORY:  H/O left nephrectomy     History of prostate surgery ? exact procedure 3/19    S/P cystoscopy Insertion left ureteral stent ; biopsy 8/19

## 2021-01-04 NOTE — H&P ADULT - PROBLEM SELECTOR PLAN 3
continue Norvasc  SBP up to 140s ok in this elderly patient with COVID infection, would not aggressively treat

## 2021-01-04 NOTE — CHART NOTE - NSCHARTNOTEFT_GEN_A_CORE
D-Dimer noted to be 1412 in setting of COVID infection with CKD and patient hypoxic requiring NRB @ 100%. Unable to acquire a CTA chest at this time due to CKD. Reviewed with Dr. Molina recommending TTE to eval for evidence of RH strain and if present empirically treat for PE. TTE ordered and discussed with Echo techs at [phone] to expedite and expressed urgency of testing. Will monitor.

## 2021-01-04 NOTE — ED ADULT NURSE NOTE - OBJECTIVE STATEMENT
patient alert times four chief complaint shortness of breath x10 days, increasing labored breathing. patient states + exposure to COVID 19 with family member in home positive. patient received from Intake area, upon undressing patient Spo2 87% on room air. transferred to main ED area 10 and placed on O2 and cardiac monitor. Dr Bojorquez at bedside. patient dyspnea with exertion. at rest in stretcher speaking full sentences able to make needs known. Spo2 91% at rest on room air sitting in stretcher. Denies nausea vomiting headache. patient self assist with urinal, clear yellow urine. NSR on cardiac monitor.  patient update don isolation precautions and plan of care. Safety education reinforced with call bell within reach. Verbalizes understanding of use. Will continue to monitor.

## 2021-01-04 NOTE — H&P ADULT - HISTORY OF PRESENT ILLNESS
74 y/o male (speaks English and Pashto) PMH HTN, asthma, SIADH, left RCC s/p left nephrectomy, bladder ca s/p Incision of Ureteral Orifice, Left laparoscopic Nephroureterectomy and Retroperitoneal Lymphadenectomy (10/28/2019) and is still receiving chemo presents with SOB x 10 days.  Also having productive cough with black colored sputum. Pt states several members in his home is ill and tested COVID+. Admits to chest tightness and gas pain but denies fevers, chills, N/V/D.

## 2021-01-04 NOTE — H&P ADULT - PROBLEM SELECTOR PLAN 5
s/p cystoscopy, Incision of Ureteral Orifice, Left laparoscopic Nephroureterectomy and Retroperitoneal Lymphadenectomy  Outpatient followup with Oncology

## 2021-01-05 LAB
24R-OH-CALCIDIOL SERPL-MCNC: 26.2 NG/ML — LOW (ref 30–80)
ALBUMIN SERPL ELPH-MCNC: 3 G/DL — LOW (ref 3.3–5)
ALBUMIN SERPL ELPH-MCNC: 3 G/DL — LOW (ref 3.3–5)
ALP SERPL-CCNC: 188 U/L — HIGH (ref 40–120)
ALP SERPL-CCNC: 189 U/L — HIGH (ref 40–120)
ALT FLD-CCNC: 43 U/L — HIGH (ref 4–41)
ALT FLD-CCNC: 52 U/L — HIGH (ref 4–41)
ANION GAP SERPL CALC-SCNC: 15 MMOL/L — HIGH (ref 7–14)
APTT BLD: 26.3 SEC — LOW (ref 27–36.3)
AST SERPL-CCNC: 33 U/L — SIGNIFICANT CHANGE UP (ref 4–40)
AST SERPL-CCNC: 48 U/L — HIGH (ref 4–40)
BASOPHILS # BLD AUTO: 0 K/UL — SIGNIFICANT CHANGE UP (ref 0–0.2)
BASOPHILS NFR BLD AUTO: 0 % — SIGNIFICANT CHANGE UP (ref 0–2)
BILIRUB DIRECT SERPL-MCNC: 0.3 MG/DL — HIGH (ref 0–0.2)
BILIRUB INDIRECT FLD-MCNC: 0.4 MG/DL — SIGNIFICANT CHANGE UP (ref 0–1)
BILIRUB SERPL-MCNC: 0.7 MG/DL — SIGNIFICANT CHANGE UP (ref 0.2–1.2)
BILIRUB SERPL-MCNC: 0.7 MG/DL — SIGNIFICANT CHANGE UP (ref 0.2–1.2)
BUN SERPL-MCNC: 24 MG/DL — HIGH (ref 7–23)
CALCIUM SERPL-MCNC: 9.2 MG/DL — SIGNIFICANT CHANGE UP (ref 8.4–10.5)
CHLORIDE SERPL-SCNC: 98 MMOL/L — SIGNIFICANT CHANGE UP (ref 98–107)
CO2 SERPL-SCNC: 21 MMOL/L — LOW (ref 22–31)
CREAT SERPL-MCNC: 1.53 MG/DL — HIGH (ref 0.5–1.3)
CREAT SERPL-MCNC: 1.54 MG/DL — HIGH (ref 0.5–1.3)
D DIMER BLD IA.RAPID-MCNC: 4074 NG/ML DDU — HIGH
EOSINOPHIL # BLD AUTO: 0 K/UL — SIGNIFICANT CHANGE UP (ref 0–0.5)
EOSINOPHIL NFR BLD AUTO: 0 % — SIGNIFICANT CHANGE UP (ref 0–6)
FERRITIN SERPL-MCNC: 816 NG/ML — HIGH (ref 30–400)
GLUCOSE BLDC GLUCOMTR-MCNC: 130 MG/DL — HIGH (ref 70–99)
GLUCOSE BLDC GLUCOMTR-MCNC: 147 MG/DL — HIGH (ref 70–99)
GLUCOSE SERPL-MCNC: 139 MG/DL — HIGH (ref 70–99)
HCT VFR BLD CALC: 32.8 % — LOW (ref 39–50)
HGB BLD-MCNC: 11 G/DL — LOW (ref 13–17)
IANC: 5.85 K/UL — SIGNIFICANT CHANGE UP (ref 1.5–8.5)
IMM GRANULOCYTES NFR BLD AUTO: 0.9 % — SIGNIFICANT CHANGE UP (ref 0–1.5)
LACTATE BLDV-MCNC: 2.9 MMOL/L — HIGH (ref 0.5–2)
LYMPHOCYTES # BLD AUTO: 0.26 K/UL — LOW (ref 1–3.3)
LYMPHOCYTES # BLD AUTO: 4 % — LOW (ref 13–44)
MAGNESIUM SERPL-MCNC: 2.2 MG/DL — SIGNIFICANT CHANGE UP (ref 1.6–2.6)
MCHC RBC-ENTMCNC: 30.6 PG — SIGNIFICANT CHANGE UP (ref 27–34)
MCHC RBC-ENTMCNC: 33.5 GM/DL — SIGNIFICANT CHANGE UP (ref 32–36)
MCV RBC AUTO: 91.1 FL — SIGNIFICANT CHANGE UP (ref 80–100)
MONOCYTES # BLD AUTO: 0.31 K/UL — SIGNIFICANT CHANGE UP (ref 0–0.9)
MONOCYTES NFR BLD AUTO: 4.8 % — SIGNIFICANT CHANGE UP (ref 2–14)
NEUTROPHILS # BLD AUTO: 5.85 K/UL — SIGNIFICANT CHANGE UP (ref 1.8–7.4)
NEUTROPHILS NFR BLD AUTO: 90.3 % — HIGH (ref 43–77)
NRBC # BLD: 0 /100 WBCS — SIGNIFICANT CHANGE UP
NRBC # FLD: 0 K/UL — SIGNIFICANT CHANGE UP
NT-PROBNP SERPL-SCNC: 1439 PG/ML — HIGH
OSMOLALITY UR: 457 MOSM/KG — SIGNIFICANT CHANGE UP (ref 50–1200)
PHOSPHATE SERPL-MCNC: 3.4 MG/DL — SIGNIFICANT CHANGE UP (ref 2.5–4.5)
PLATELET # BLD AUTO: 345 K/UL — SIGNIFICANT CHANGE UP (ref 150–400)
POTASSIUM SERPL-MCNC: 4.3 MMOL/L — SIGNIFICANT CHANGE UP (ref 3.5–5.3)
POTASSIUM SERPL-SCNC: 4.3 MMOL/L — SIGNIFICANT CHANGE UP (ref 3.5–5.3)
PROT SERPL-MCNC: 6.7 G/DL — SIGNIFICANT CHANGE UP (ref 6–8.3)
PROT SERPL-MCNC: 6.8 G/DL — SIGNIFICANT CHANGE UP (ref 6–8.3)
RBC # BLD: 3.6 M/UL — LOW (ref 4.2–5.8)
RBC # FLD: 14.4 % — SIGNIFICANT CHANGE UP (ref 10.3–14.5)
SODIUM SERPL-SCNC: 134 MMOL/L — LOW (ref 135–145)
SODIUM UR-SCNC: 32 MMOL/L — SIGNIFICANT CHANGE UP
WBC # BLD: 6.48 K/UL — SIGNIFICANT CHANGE UP (ref 3.8–10.5)
WBC # FLD AUTO: 6.48 K/UL — SIGNIFICANT CHANGE UP (ref 3.8–10.5)

## 2021-01-05 PROCEDURE — 93970 EXTREMITY STUDY: CPT | Mod: 26

## 2021-01-05 PROCEDURE — 93306 TTE W/DOPPLER COMPLETE: CPT | Mod: 26

## 2021-01-05 RX ORDER — HEPARIN SODIUM 5000 [USP'U]/ML
INJECTION INTRAVENOUS; SUBCUTANEOUS
Qty: 25000 | Refills: 0 | Status: DISCONTINUED | OUTPATIENT
Start: 2021-01-05 | End: 2021-01-05

## 2021-01-05 RX ORDER — HEPARIN SODIUM 5000 [USP'U]/ML
5000 INJECTION INTRAVENOUS; SUBCUTANEOUS EVERY 8 HOURS
Refills: 0 | Status: DISCONTINUED | OUTPATIENT
Start: 2021-01-05 | End: 2021-01-13

## 2021-01-05 RX ORDER — HEPARIN SODIUM 5000 [USP'U]/ML
6000 INJECTION INTRAVENOUS; SUBCUTANEOUS EVERY 6 HOURS
Refills: 0 | Status: DISCONTINUED | OUTPATIENT
Start: 2021-01-05 | End: 2021-01-05

## 2021-01-05 RX ORDER — HEPARIN SODIUM 5000 [USP'U]/ML
3000 INJECTION INTRAVENOUS; SUBCUTANEOUS EVERY 6 HOURS
Refills: 0 | Status: DISCONTINUED | OUTPATIENT
Start: 2021-01-05 | End: 2021-01-05

## 2021-01-05 RX ORDER — REMDESIVIR 5 MG/ML
200 INJECTION INTRAVENOUS EVERY 24 HOURS
Refills: 0 | Status: COMPLETED | OUTPATIENT
Start: 2021-01-05 | End: 2021-01-05

## 2021-01-05 RX ORDER — REMDESIVIR 5 MG/ML
100 INJECTION INTRAVENOUS EVERY 24 HOURS
Refills: 0 | Status: COMPLETED | OUTPATIENT
Start: 2021-01-06 | End: 2021-01-09

## 2021-01-05 RX ORDER — HEPARIN SODIUM 5000 [USP'U]/ML
6000 INJECTION INTRAVENOUS; SUBCUTANEOUS ONCE
Refills: 0 | Status: DISCONTINUED | OUTPATIENT
Start: 2021-01-05 | End: 2021-01-05

## 2021-01-05 RX ORDER — REMDESIVIR 5 MG/ML
INJECTION INTRAVENOUS
Refills: 0 | Status: COMPLETED | OUTPATIENT
Start: 2021-01-05 | End: 2021-01-09

## 2021-01-05 RX ORDER — HEPARIN SODIUM 5000 [USP'U]/ML
5000 INJECTION INTRAVENOUS; SUBCUTANEOUS EVERY 12 HOURS
Refills: 0 | Status: DISCONTINUED | OUTPATIENT
Start: 2021-01-05 | End: 2021-01-05

## 2021-01-05 RX ORDER — POLYETHYLENE GLYCOL 3350 17 G/17G
17 POWDER, FOR SOLUTION ORAL DAILY
Refills: 0 | Status: DISCONTINUED | OUTPATIENT
Start: 2021-01-05 | End: 2021-01-07

## 2021-01-05 RX ADMIN — PREGABALIN 1000 MICROGRAM(S): 225 CAPSULE ORAL at 12:56

## 2021-01-05 RX ADMIN — TAMSULOSIN HYDROCHLORIDE 0.4 MILLIGRAM(S): 0.4 CAPSULE ORAL at 00:27

## 2021-01-05 RX ADMIN — ALBUTEROL 2 PUFF(S): 90 AEROSOL, METERED ORAL at 05:15

## 2021-01-05 RX ADMIN — Medication 25 MILLIGRAM(S): at 05:15

## 2021-01-05 RX ADMIN — PANTOPRAZOLE SODIUM 40 MILLIGRAM(S): 20 TABLET, DELAYED RELEASE ORAL at 07:13

## 2021-01-05 RX ADMIN — ALBUTEROL 2 PUFF(S): 90 AEROSOL, METERED ORAL at 00:27

## 2021-01-05 RX ADMIN — HEPARIN SODIUM 5000 UNIT(S): 5000 INJECTION INTRAVENOUS; SUBCUTANEOUS at 07:13

## 2021-01-05 RX ADMIN — Medication 20 MILLIGRAM(S): at 17:20

## 2021-01-05 RX ADMIN — HEPARIN SODIUM 5000 UNIT(S): 5000 INJECTION INTRAVENOUS; SUBCUTANEOUS at 17:20

## 2021-01-05 RX ADMIN — ATORVASTATIN CALCIUM 10 MILLIGRAM(S): 80 TABLET, FILM COATED ORAL at 22:22

## 2021-01-05 RX ADMIN — Medication 600 MILLIGRAM(S): at 12:55

## 2021-01-05 RX ADMIN — SODIUM CHLORIDE 1 GRAM(S): 9 INJECTION INTRAMUSCULAR; INTRAVENOUS; SUBCUTANEOUS at 17:20

## 2021-01-05 RX ADMIN — HEPARIN SODIUM 5000 UNIT(S): 5000 INJECTION INTRAVENOUS; SUBCUTANEOUS at 22:22

## 2021-01-05 RX ADMIN — Medication 600 MILLIGRAM(S): at 17:21

## 2021-01-05 RX ADMIN — Medication 6 MILLIGRAM(S): at 12:56

## 2021-01-05 RX ADMIN — ATORVASTATIN CALCIUM 10 MILLIGRAM(S): 80 TABLET, FILM COATED ORAL at 00:27

## 2021-01-05 RX ADMIN — BUDESONIDE AND FORMOTEROL FUMARATE DIHYDRATE 2 PUFF(S): 160; 4.5 AEROSOL RESPIRATORY (INHALATION) at 09:21

## 2021-01-05 RX ADMIN — ALBUTEROL 2 PUFF(S): 90 AEROSOL, METERED ORAL at 17:19

## 2021-01-05 RX ADMIN — Medication 1 TABLET(S): at 12:53

## 2021-01-05 RX ADMIN — SODIUM CHLORIDE 1 GRAM(S): 9 INJECTION INTRAMUSCULAR; INTRAVENOUS; SUBCUTANEOUS at 05:14

## 2021-01-05 RX ADMIN — BUDESONIDE AND FORMOTEROL FUMARATE DIHYDRATE 2 PUFF(S): 160; 4.5 AEROSOL RESPIRATORY (INHALATION) at 00:27

## 2021-01-05 RX ADMIN — ALBUTEROL 2 PUFF(S): 90 AEROSOL, METERED ORAL at 12:47

## 2021-01-05 RX ADMIN — BUDESONIDE AND FORMOTEROL FUMARATE DIHYDRATE 2 PUFF(S): 160; 4.5 AEROSOL RESPIRATORY (INHALATION) at 22:23

## 2021-01-05 RX ADMIN — REMDESIVIR 500 MILLIGRAM(S): 5 INJECTION INTRAVENOUS at 17:20

## 2021-01-05 RX ADMIN — TAMSULOSIN HYDROCHLORIDE 0.4 MILLIGRAM(S): 0.4 CAPSULE ORAL at 22:22

## 2021-01-05 RX ADMIN — Medication 1000 UNIT(S): at 12:57

## 2021-01-05 RX ADMIN — HEPARIN SODIUM 5000 UNIT(S): 5000 INJECTION INTRAVENOUS; SUBCUTANEOUS at 00:27

## 2021-01-05 RX ADMIN — AMLODIPINE BESYLATE 10 MILLIGRAM(S): 2.5 TABLET ORAL at 05:15

## 2021-01-05 RX ADMIN — ALBUTEROL 2 PUFF(S): 90 AEROSOL, METERED ORAL at 23:45

## 2021-01-05 NOTE — CONSULT NOTE ADULT - ASSESSMENT
72 y/o male (speaks English and Tamazight) PMH HTN, asthma, SIADH, left RCC s/p left nephrectomy, bladder ca s/p Incision of Ureteral Orifice, Left laparoscopic Nephroureterectomy and Retroperitoneal Lymphadenectomy presents with SOB x 10 days. Admitted with acute hypoxic respiratory failure 2/2 COVID-19 viral PNA.    Covid pneumonia   Ex smoker:   Asthma:   HTN   SIADH  left RCC: S/P Nephrectomy    1/5/21:    Covid pneumonia : cont dexa: add remdesivir: he has CKD: should be fine with remdesivir: naseem np : will talk to infection control: on 100% NRB : HIS D DIMER IS VERY HIGH : is d dimer has increased a lot within 24 hours: CTA can not be done secondary to CKD ? vq scan and dopplers: Likely because of vocid pneumonia: bu PE is a possibility: can start IV heparin full AC:   TRANSAMINITIS; LIKELY DUE TO COVID: MONITOR IN REMDESIVIR STARTED:   Ex smoker: on Symbicort and albuterol  Asthma: on symbicort  HTN: controlled  SIADH: na 134:   left RCC: S/P Nephrectomy     naseem np

## 2021-01-05 NOTE — CONSULT NOTE ADULT - SUBJECTIVE AND OBJECTIVE BOX
Patient is a 75y old  Male who presents with a chief complaint of shortness of breath (05 Jan 2021 11:54)      HPI:  74 y/o male (speaks English and Yi) PMH HTN, asthma, SIADH, left RCC s/p left nephrectomy, bladder ca s/p Incision of Ureteral Orifice, Left laparoscopic Nephroureterectomy and Retroperitoneal Lymphadenectomy (10/28/2019) and is still receiving chemo presents with SOB x 10 days.  Also having productive cough with black colored sputum. Pt states several members in his home is ill and tested COVID+. Admits to chest tightness and gas pain but denies fevers, chills, N/V/D.  (04 Jan 2021 16:00)  he is on 100% NRB at tis time/; he says his breathing is difficult : He has mild dry cough :  Has copd:  ex smoker: and takes inhalers at home      ?FOLLOWING PRESENT  [ x] Hx of PE/DVT, [? ] Hx COPD, [ x] Hx of Asthma, x[ ] Hx of Hospitalization, [x ]  Hx of BiPAP/CPAP use, [x ] Hx of DAYA    Allergies    chocolate (Pruritus)  flour (Rash)  No Known Drug Allergies  Nuts (Rash)  Soy (Unknown)    Intolerances        PAST MEDICAL & SURGICAL HISTORY:  Bladder tumor    History of stomach ulcers  denies recent endoscopy    Cancer of kidney    Hyponatremia  admission x 2 last 2017    Asthma  denies recent asthma exacerbation    Left renal mass    Gross hematuria    Urinary tract infection with hematuria    History of SIADH    History of BPH    Essential hypertension    HTN (Hypertension)    Anxiety    Hypercholesterolemia    H/O left nephrectomy    History of prostate surgery  ? exact procedure 3/19    S/P cystoscopy  Insertion left ureteral stent ; biopsy 8/19        FAMILY HISTORY:  FH: lung cancer  brother    Family history of stroke        Social History: [  ex smoker: used to smoke for many year s] TOBACCO                  [ x ] ETOH                                 [x  ] IVDA/DRUGS    REVIEW OF SYSTEMS      General:	x    Skin/Breast:x  	  Ophthalmologic:x  	  ENMT:	x    Respiratory and Thorax: sob, cough   	  Cardiovascular:	x    Gastrointestinal:	x    Genitourinary:	x    Musculoskeletal:	x    Neurological:	x    Psychiatric:	x    Hematology/Lymphatics:	x    Endocrine:	x    Allergic/Immunologic:	x    MEDICATIONS  (STANDING):  ALBUTerol    90 MICROgram(s) HFA Inhaler 2 Puff(s) Inhalation every 6 hours  amLODIPine   Tablet 10 milliGRAM(s) Oral daily  atorvastatin 10 milliGRAM(s) Oral at bedtime  budesonide 160 MICROgram(s)/formoterol 4.5 MICROgram(s) Inhaler 2 Puff(s) Inhalation two times a day  cholecalciferol 1000 Unit(s) Oral daily  cyanocobalamin 1000 MICROGram(s) Oral daily  dexAMETHasone  Injectable 6 milliGRAM(s) IV Push daily  furosemide    Tablet 20 milliGRAM(s) Oral daily  guaiFENesin  milliGRAM(s) Oral every 12 hours  heparin   Injectable 5000 Unit(s) SubCutaneous every 8 hours  metoprolol succinate ER 25 milliGRAM(s) Oral daily  multivitamin 1 Tablet(s) Oral daily  pantoprazole    Tablet 40 milliGRAM(s) Oral before breakfast  sodium chloride 1 Gram(s) Oral two times a day  tamsulosin 0.4 milliGRAM(s) Oral at bedtime    MEDICATIONS  (PRN):  senna 2 Tablet(s) Oral at bedtime PRN Constipation  sodium chloride 0.65% Nasal 1 Spray(s) Both Nostrils three times a day PRN Nasal Congestion       Vital Signs Last 24 Hrs  T(C): 36.4 (05 Jan 2021 05:03), Max: 37.6 (04 Jan 2021 14:36)  T(F): 97.5 (05 Jan 2021 05:03), Max: 99.6 (04 Jan 2021 14:36)  HR: 74 (05 Jan 2021 05:03) (74 - 100)  BP: 120/61 (05 Jan 2021 05:03) (120/61 - 167/58)  BP(mean): --  RR: 17 (05 Jan 2021 05:03) (17 - 23)  SpO2: 98% (05 Jan 2021 05:03) (98% - 100%)        I&O's Summary    04 Jan 2021 07:01  -  05 Jan 2021 07:00  --------------------------------------------------------  IN: 0 mL / OUT: 650 mL / NET: -650 mL        Physical Exam:   GENERAL: NAD, well-groomed, well-developed  HEENT: KATARZYNA/   Atraumatic, Normocephalic  ENMT: No tonsillar erythema, exudates, or enlargement; Moist mucous membranes, Good dentition, No lesions  NECK: Supple, No JVD, Normal thyroid  CHEST/LUNG: Clear to auscultation bilaterally; No rales, rhonchi, wheezing, or rubs  CVS: Regular rate and rhythm; No murmurs, rubs, or gallops  GI: : Soft, Nontender, Nondistended; Bowel sounds present  NERVOUS SYSTEM:  Alert & Oriented X3, Good concentration; Motor Strength 5/5 B/L upper and lower extremities; DTRs 2+ intact and symmetric  EXTREMITIES:  2+ Peripheral Pulses, No clubbing, cyanosis, or edema  LYMPH: No lymphadenopathy noted  SKIN: No rashes or lesions  ENDOCRINOLOGY: No Thyromegaly  PSYCH: Appropriate    Labs:  0.5<37<4>><24<<7.435>>0.5<<3><<4><<5<<<249>>                            11.0   6.48  )-----------( 345      ( 05 Jan 2021 07:46 )             32.8                         11.2   9.68  )-----------( 297      ( 04 Jan 2021 13:58 )             31.8     01-05    134<L>  |  98  |  24<H>  ----------------------------<  139<H>  4.3   |  21<L>  |  1.54<H>  01-04    133<L>  |  96<L>  |  16  ----------------------------<  124<H>  3.8   |  24  |  1.71<H>    Ca    9.2      05 Jan 2021 07:46  Ca    9.4      04 Jan 2021 13:58  Phos  3.4     01-05  Mg     2.2     01-05    TPro  6.8  /  Alb  3.0<L>  /  TBili  0.7  /  DBili  x   /  AST  33  /  ALT  43<H>  /  AlkPhos  188<H>  01-05  TPro  7.5  /  Alb  3.5  /  TBili  1.1  /  DBili  x   /  AST  41<H>  /  ALT  53<H>  /  AlkPhos  202<H>  01-04    CAPILLARY BLOOD GLUCOSE        LIVER FUNCTIONS - ( 05 Jan 2021 07:46 )  Alb: 3.0 g/dL / Pro: 6.8 g/dL / ALK PHOS: 188 U/L / ALT: 43 U/L / AST: 33 U/L / GGT: x               D DImer  Procalcitonin, Serum: 0.62 ng/mL (01-04 @ 13:58)  D-Dimer Assay, Quantitative: 4074 ng/mL DDU (01-05 @ 07:46)  D-Dimer Assay, Quantitative: 1412 ng/mL DDU (01-04 @ 13:58)  Serum Pro-Brain Natriuretic Peptide: 1439 pg/mL (01-05 @ 07:46)      Studies  Chest X-RAY  CT SCAN Chest   CT Abdomen  Venous Dopplers: LE:   Others        r< from: Xray Chest 1 View-PORTABLE IMMEDIATE (Xray Chest 1 View-PORTABLE IMMEDIATE .) (01.04.21 @ 12:52) >    EXAM:  XR CHEST PORTABLE IMMED 1V        PROCEDURE DATE:  Jan 4 2021         INTERPRETATION:  A single chest x-ray was obtained on January 4, 2021.    Indication: Cough. Shortness of breath.    Impression:    The heart is enlarged. Diffuse airspace opacity is seen in both lungs especially peripherally changes compatible with Covid 19 pneumonia. No pleural effusion. No pneumothorax.              JAYME WRAY MD; Attending Radiologist  This document has been electronically signed. Jan 4 2021  2:04PM    < end of copied text >  < from: NM SPECT/CT Bone, Single Area Single Day (07.23.20 @ 16:00) >  RADIOPHARMACEUTICAL: 19.1 mCi Tc-99m HDP, I.V.     TECHNIQUE:  Whole-body planar images were obtained in the anterior and posterior projections approximately 2-3 hours after tracer injection.  SPECT/CT of thechest was also performed. The CT protocol was optimized for SPECT attenuation correction and to provide anatomic detail for localization of SPECT abnormalities. The CT protocol was not designed to produce and cannot replace state-of- the-art diagnostic CT images with specific imaging protocols for different body parts and indications.      COMPARISON: No previous bone scan for comparison    FINDINGS: There is mild, diffuse increased uptake in the right and left lower posterior ribs, prominent in the left 9th and 10th posterior ribs, without corresponding abnormality on the CT component of the study. There are degenerative changes in the major joints. There is physiologic tracer distribution in the remainder of the visualized skeleton.    The right kidney is visualized. The patient is status post left nephroureterectomy.    IMPRESSION: Bone scan demonstrates:    No definite scan evidence of osseous metastasis.    Mildly increased uptake in the lower posterior ribs bilaterally, non specific, maybe post traumatic.    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                   AAMIR PENALOZA M.D., NUCLEAR MEDICINE ATTENDING   This document has been electronically signed. Jul 23 2020  5:37PM              < end of copied text >  < from: CT Chest No Cont (07.23.20 @ 13:07) >  SPLEEN: Within normal limits.  PANCREAS: Within normal limits.  ADRENALS: Within normal limits.  KIDNEYS/URETERS: Status post left nephrectomy. Unremarkable nonenhanced appearance of the right kidney. No hydronephrosis.    BLADDER: Diffuse mural thickening.  REPRODUCTIVE ORGANS: Prostate is not enlarged.    BOWEL: No bowel obstruction. Appendix normal.  PERITONEUM: No ascites.  VESSELS: Atheromatous calcifications.  RETROPERITONEUM/LYMPH NODES: No lymphadenopathy. Interval stability of two para-aortic fluid collections, the larger measuring 1.7 cm, adjacent to the left nephrectomy bed.    ABDOMINAL WALL: Within normal limits.  BONES: Within normal limits.    IMPRESSION:   Bladder wall thickening.    No CT evidence of local tumor recurrence or metastatic disease in the chest, abdomen and pelvis.    < end of copied text >

## 2021-01-05 NOTE — CHART NOTE - NSCHARTNOTEFT_GEN_A_CORE
Pt with elevated d-dimer on am labs. As per pulmonary Dr. Sarabia start hep drip at this time. Will mointor h/h closely for any signs of bleed. Attending notified. Pt with elevated d-dimer on am labs. As per pulmonary Dr. Sarabia start hep drip at this time. Will mointor h/h closely for any signs of bleed. Pulm recommending starting Remdesivir at this time as CKD not a CI, discussed with ID Dr. Ryan who is in agreement at this time.  Attending notified. Pt with elevated d-dimer on am labs. As per pulmonary Dr. Sarabia start hep drip at this time. Will monitor h/h closely for any signs of bleed. Pulm recommending starting Remdesivir at this time as CKD not a CI, discussed with ID Dr. Ryan who is in agreement at this time.  Attending notified.    Addendum- patient states he has new hemoptysis so heparin gtt not ordered. Will continue ppx heparin. Will trend D-dimer and f.u echo and LE doppler.  Dr. Sarabia agreeable.

## 2021-01-05 NOTE — PHYSICAL THERAPY INITIAL EVALUATION ADULT - PERTINENT HX OF CURRENT PROBLEM, REHAB EVAL
patient presents with (+) COVID 19. PMH includes HTN, asthma, SIADH, left RCC s/p left nephrectomy, bladder ca s/p Incision of Ureteral Orifice, Left laparoscopic Nephroureterectomy and Retroperitoneal Lymphadenectomy

## 2021-01-06 DIAGNOSIS — R74.01 ELEVATION OF LEVELS OF LIVER TRANSAMINASE LEVELS: ICD-10-CM

## 2021-01-06 DIAGNOSIS — Z51.81 ENCOUNTER FOR THERAPEUTIC DRUG LEVEL MONITORING: ICD-10-CM

## 2021-01-06 DIAGNOSIS — U07.1 COVID-19: ICD-10-CM

## 2021-01-06 DIAGNOSIS — R09.02 HYPOXEMIA: ICD-10-CM

## 2021-01-06 LAB
ALBUMIN SERPL ELPH-MCNC: 3.1 G/DL — LOW (ref 3.3–5)
ALP SERPL-CCNC: 220 U/L — HIGH (ref 40–120)
ALT FLD-CCNC: 97 U/L — HIGH (ref 4–41)
AST SERPL-CCNC: 82 U/L — HIGH (ref 4–40)
BILIRUB DIRECT SERPL-MCNC: 0.3 MG/DL — HIGH (ref 0–0.2)
BILIRUB INDIRECT FLD-MCNC: 0.4 MG/DL — SIGNIFICANT CHANGE UP (ref 0–1)
BILIRUB SERPL-MCNC: 0.7 MG/DL — SIGNIFICANT CHANGE UP (ref 0.2–1.2)
CREAT SERPL-MCNC: 1.47 MG/DL — HIGH (ref 0.5–1.3)
D DIMER BLD IA.RAPID-MCNC: 1953 NG/ML DDU — HIGH
GLUCOSE BLDC GLUCOMTR-MCNC: 132 MG/DL — HIGH (ref 70–99)
HCT VFR BLD CALC: 30.8 % — LOW (ref 39–50)
HGB BLD-MCNC: 10.7 G/DL — LOW (ref 13–17)
MCHC RBC-ENTMCNC: 30.7 PG — SIGNIFICANT CHANGE UP (ref 27–34)
MCHC RBC-ENTMCNC: 34.7 GM/DL — SIGNIFICANT CHANGE UP (ref 32–36)
MCV RBC AUTO: 88.5 FL — SIGNIFICANT CHANGE UP (ref 80–100)
NRBC # BLD: 0 /100 WBCS — SIGNIFICANT CHANGE UP
NRBC # FLD: 0 K/UL — SIGNIFICANT CHANGE UP
PLATELET # BLD AUTO: 424 K/UL — HIGH (ref 150–400)
PROT SERPL-MCNC: 7 G/DL — SIGNIFICANT CHANGE UP (ref 6–8.3)
RBC # BLD: 3.48 M/UL — LOW (ref 4.2–5.8)
RBC # FLD: 14.3 % — SIGNIFICANT CHANGE UP (ref 10.3–14.5)
WBC # BLD: 7.6 K/UL — SIGNIFICANT CHANGE UP (ref 3.8–10.5)
WBC # FLD AUTO: 7.6 K/UL — SIGNIFICANT CHANGE UP (ref 3.8–10.5)

## 2021-01-06 PROCEDURE — 99223 1ST HOSP IP/OBS HIGH 75: CPT

## 2021-01-06 RX ORDER — ALBUTEROL 90 UG/1
1.25 AEROSOL, METERED ORAL EVERY 6 HOURS
Refills: 0 | Status: DISCONTINUED | OUTPATIENT
Start: 2021-01-06 | End: 2021-01-06

## 2021-01-06 RX ORDER — SIMETHICONE 80 MG/1
80 TABLET, CHEWABLE ORAL ONCE
Refills: 0 | Status: COMPLETED | OUTPATIENT
Start: 2021-01-06 | End: 2021-01-06

## 2021-01-06 RX ADMIN — REMDESIVIR 500 MILLIGRAM(S): 5 INJECTION INTRAVENOUS at 17:45

## 2021-01-06 RX ADMIN — TAMSULOSIN HYDROCHLORIDE 0.4 MILLIGRAM(S): 0.4 CAPSULE ORAL at 22:38

## 2021-01-06 RX ADMIN — SODIUM CHLORIDE 1 GRAM(S): 9 INJECTION INTRAMUSCULAR; INTRAVENOUS; SUBCUTANEOUS at 06:34

## 2021-01-06 RX ADMIN — HEPARIN SODIUM 5000 UNIT(S): 5000 INJECTION INTRAVENOUS; SUBCUTANEOUS at 14:23

## 2021-01-06 RX ADMIN — PREGABALIN 1000 MICROGRAM(S): 225 CAPSULE ORAL at 14:24

## 2021-01-06 RX ADMIN — Medication 25 MILLIGRAM(S): at 06:28

## 2021-01-06 RX ADMIN — BUDESONIDE AND FORMOTEROL FUMARATE DIHYDRATE 2 PUFF(S): 160; 4.5 AEROSOL RESPIRATORY (INHALATION) at 22:38

## 2021-01-06 RX ADMIN — Medication 600 MILLIGRAM(S): at 17:59

## 2021-01-06 RX ADMIN — SIMETHICONE 80 MILLIGRAM(S): 80 TABLET, CHEWABLE ORAL at 17:59

## 2021-01-06 RX ADMIN — PANTOPRAZOLE SODIUM 40 MILLIGRAM(S): 20 TABLET, DELAYED RELEASE ORAL at 06:35

## 2021-01-06 RX ADMIN — ALBUTEROL 2 PUFF(S): 90 AEROSOL, METERED ORAL at 11:31

## 2021-01-06 RX ADMIN — Medication 600 MILLIGRAM(S): at 06:35

## 2021-01-06 RX ADMIN — Medication 1000 UNIT(S): at 14:35

## 2021-01-06 RX ADMIN — ALBUTEROL 2 PUFF(S): 90 AEROSOL, METERED ORAL at 17:45

## 2021-01-06 RX ADMIN — Medication 1 TABLET(S): at 11:31

## 2021-01-06 RX ADMIN — HEPARIN SODIUM 5000 UNIT(S): 5000 INJECTION INTRAVENOUS; SUBCUTANEOUS at 22:38

## 2021-01-06 RX ADMIN — Medication 6 MILLIGRAM(S): at 06:35

## 2021-01-06 RX ADMIN — ALBUTEROL 2 PUFF(S): 90 AEROSOL, METERED ORAL at 22:39

## 2021-01-06 RX ADMIN — ALBUTEROL 2 PUFF(S): 90 AEROSOL, METERED ORAL at 06:27

## 2021-01-06 RX ADMIN — Medication 20 MILLIGRAM(S): at 06:35

## 2021-01-06 RX ADMIN — Medication 1 SPRAY(S): at 22:40

## 2021-01-06 RX ADMIN — AMLODIPINE BESYLATE 10 MILLIGRAM(S): 2.5 TABLET ORAL at 06:30

## 2021-01-06 RX ADMIN — POLYETHYLENE GLYCOL 3350 17 GRAM(S): 17 POWDER, FOR SOLUTION ORAL at 19:24

## 2021-01-06 RX ADMIN — HEPARIN SODIUM 5000 UNIT(S): 5000 INJECTION INTRAVENOUS; SUBCUTANEOUS at 06:31

## 2021-01-06 RX ADMIN — SODIUM CHLORIDE 1 GRAM(S): 9 INJECTION INTRAMUSCULAR; INTRAVENOUS; SUBCUTANEOUS at 17:44

## 2021-01-06 RX ADMIN — BUDESONIDE AND FORMOTEROL FUMARATE DIHYDRATE 2 PUFF(S): 160; 4.5 AEROSOL RESPIRATORY (INHALATION) at 11:31

## 2021-01-06 RX ADMIN — ATORVASTATIN CALCIUM 10 MILLIGRAM(S): 80 TABLET, FILM COATED ORAL at 22:39

## 2021-01-06 NOTE — CONSULT NOTE ADULT - SUBJECTIVE AND OBJECTIVE BOX
MD TITUS 75y Male  MRN-[id number]    Patient is a 75y old  Male who presents with a chief complaint of shortness of breath (06 Jan 2021 15:22)      HPI:  74 y/o male (speaks English and French) PMH HTN, asthma, SIADH, left RCC s/p left nephrectomy, bladder ca s/p Incision of Ureteral Orifice, Left laparoscopic Nephroureterectomy and Retroperitoneal Lymphadenectomy (10/28/2019) and is still receiving chemo presents with SOB x 10 days.  Also having productive cough with black colored sputum. Pt states several members in his home is ill and tested COVID+. Admits to chest tightness and gas pain but denies fevers, chills, N/V/D.  (04 Jan 2021 16:00)    Started on steroids/remdesevir yesterday    Was on 100% NRM yesterday but on NC today    PAST MEDICAL & SURGICAL HISTORY:  Bladder tumor    History of stomach ulcers  denies recent endoscopy    Cancer of kidney    Hyponatremia  admission x 2 last 2017    Asthma  denies recent asthma exacerbation    Left renal mass    Gross hematuria    Urinary tract infection with hematuria    History of SIADH    History of BPH    Essential hypertension    HTN (Hypertension)    Anxiety    Hypercholesterolemia    H/O left nephrectomy    History of prostate surgery  ? exact procedure 3/19    S/P cystoscopy  Insertion left ureteral stent ; biopsy 8/19        Allergies    chocolate (Pruritus)  flour (Rash)  No Known Drug Allergies  Nuts (Rash)  Soy (Unknown)    Intolerances        ANTIMICROBIALS:  remdesivir  IVPB 100 every 24 hours  remdesivir  IVPB        MEDICATIONS  (STANDING):  ALBUTerol    90 MICROgram(s) HFA Inhaler 2 Puff(s) Inhalation every 6 hours  amLODIPine   Tablet 10 milliGRAM(s) Oral daily  atorvastatin 10 milliGRAM(s) Oral at bedtime  budesonide 160 MICROgram(s)/formoterol 4.5 MICROgram(s) Inhaler 2 Puff(s) Inhalation two times a day  cholecalciferol 1000 Unit(s) Oral daily  cyanocobalamin 1000 MICROGram(s) Oral daily  dexAMETHasone  Injectable 6 milliGRAM(s) IV Push daily  furosemide    Tablet 20 milliGRAM(s) Oral daily  guaiFENesin  milliGRAM(s) Oral every 12 hours  heparin   Injectable 5000 Unit(s) SubCutaneous every 8 hours  metoprolol succinate ER 25 milliGRAM(s) Oral daily  multivitamin 1 Tablet(s) Oral daily  pantoprazole    Tablet 40 milliGRAM(s) Oral before breakfast  remdesivir  IVPB 100 milliGRAM(s) IV Intermittent every 24 hours  remdesivir  IVPB   IV Intermittent   sodium chloride 1 Gram(s) Oral two times a day  tamsulosin 0.4 milliGRAM(s) Oral at bedtime      Social History  Smoking: exsmoker  Etoh: no  Drug use: no      FAMILY HISTORY:  FH: lung cancer  brother    Family history of stroke        Vital Signs Last 24 Hrs  T(C): 36.6 (06 Jan 2021 13:18), Max: 37.3 (05 Jan 2021 21:38)  T(F): 97.8 (06 Jan 2021 13:18), Max: 99.2 (05 Jan 2021 21:38)  HR: 91 (06 Jan 2021 13:18) (87 - 93)  BP: 146/60 (06 Jan 2021 13:18) (139/65 - 147/65)  BP(mean): --  RR: 18 (06 Jan 2021 13:18) (18 - 18)  SpO2: 95% (06 Jan 2021 13:18) (95% - 98%)    CBC Full  -  ( 06 Jan 2021 06:49 )  WBC Count : 7.60 K/uL  RBC Count : 3.48 M/uL  Hemoglobin : 10.7 g/dL  Hematocrit : 30.8 %  Platelet Count - Automated : 424 K/uL  Mean Cell Volume : 88.5 fL  Mean Cell Hemoglobin : 30.7 pg  Mean Cell Hemoglobin Concentration : 34.7 gm/dL  Auto Neutrophil # : x  Auto Lymphocyte # : x  Auto Monocyte # : x  Auto Eosinophil # : x  Auto Basophil # : x  Auto Neutrophil % : x  Auto Lymphocyte % : x  Auto Monocyte % : x  Auto Eosinophil % : x  Auto Basophil % : x    01-06    x   |  x   |  x   ----------------------------<  x   x    |  x   |  1.47<H>    Ca    9.2      05 Jan 2021 07:46  Phos  3.4     01-05  Mg     2.2     01-05    TPro  7.0  /  Alb  3.1<L>  /  TBili  0.7  /  DBili  0.3<H>  /  AST  82<H>  /  ALT  97<H>  /  AlkPhos  220<H>  01-06    LIVER FUNCTIONS - ( 06 Jan 2021 06:49 )  Alb: 3.1 g/dL / Pro: 7.0 g/dL / ALK PHOS: 220 U/L / ALT: 97 U/L / AST: 82 U/L / GGT: x           Procalcitonin, Serum: 0.62 (01-04)    C-Reactive Protein, Serum: 271.7 (01-04)    Ferritin, Serum: 816 (01-05)  Ferritin, Serum: 759 (01-04)      D-Dimer Assay, Quantitative: 1953 (01-06)  D-Dimer Assay, Quantitative: 4074 (01-05)  D-Dimer Assay, Quantitative: 1412 (01-04)        MICROBIOLOGY:        Rapid RVP Result: Detected (01-04 @ 13:59)      RADIOLOGY  < from: US Duplex Venous Lower Ext Complete, Bilateral (01.05.21 @ 14:33) >  No evidence of deep venous thrombosis in either lower extremity.    < from: Xray Chest 1 View-PORTABLE IMMEDIATE (Xray Chest 1 View-PORTABLE IMMEDIATE .) (01.04.21 @ 12:52) >  The heart is enlarged. Diffuse airspace opacity is seen in both lungs especially peripherally changes compatible with Covid 19 pneumonia. No pleural effusion. No pneumothorax.    < end of copied text >

## 2021-01-06 NOTE — CONSULT NOTE ADULT - ASSESSMENT
72 y/o male (speaks English and Ukrainian) PMH HTN, asthma, SIADH, left RCC s/p left nephrectomy, bladder ca s/p Incision of Ureteral Orifice, Left laparoscopic Nephroureterectomy and Retroperitoneal Lymphadenectomy (10/28/2019) and is still receiving chemo presents with SOB x 10 days with COVID PNA, hypoxia, transaminitis, EDU,

## 2021-01-06 NOTE — CONSULT NOTE ADULT - PROBLEM SELECTOR RECOMMENDATION 4
-monitor LFTs and creatinine  -creatinine better and stable - cont remdesevir  -if creatinine worsens, hold remdesevir

## 2021-01-06 NOTE — CONSULT NOTE ADULT - PROBLEM SELECTOR RECOMMENDATION 9
-supportive care  -maintain oxygenation saturation  -monitor fever curve  -continue airborne/contact isolation  -cont dexamethasone 6 mg daily  -cont remdesevir 200 mg iv x 1 then 100 mg iv q24 x 4 days   -monitor pulse ox  -contact/airborne precautions   -wean O2 as tolerated  -trend inflammatory markers

## 2021-01-07 LAB
ALBUMIN SERPL ELPH-MCNC: 3.1 G/DL — LOW (ref 3.3–5)
ALP SERPL-CCNC: 218 U/L — HIGH (ref 40–120)
ALT FLD-CCNC: 124 U/L — HIGH (ref 4–41)
ANION GAP SERPL CALC-SCNC: 13 MMOL/L — SIGNIFICANT CHANGE UP (ref 7–14)
AST SERPL-CCNC: 80 U/L — HIGH (ref 4–40)
BILIRUB SERPL-MCNC: 1 MG/DL — SIGNIFICANT CHANGE UP (ref 0.2–1.2)
BUN SERPL-MCNC: 28 MG/DL — HIGH (ref 7–23)
CALCIUM SERPL-MCNC: 8.8 MG/DL — SIGNIFICANT CHANGE UP (ref 8.4–10.5)
CHLORIDE SERPL-SCNC: 99 MMOL/L — SIGNIFICANT CHANGE UP (ref 98–107)
CO2 SERPL-SCNC: 22 MMOL/L — SIGNIFICANT CHANGE UP (ref 22–31)
CREAT SERPL-MCNC: 1.37 MG/DL — HIGH (ref 0.5–1.3)
CRP SERPL-MCNC: 66.5 MG/L — HIGH
FERRITIN SERPL-MCNC: 703 NG/ML — HIGH (ref 30–400)
GLUCOSE SERPL-MCNC: 96 MG/DL — SIGNIFICANT CHANGE UP (ref 70–99)
HCT VFR BLD CALC: 32.2 % — LOW (ref 39–50)
HGB BLD-MCNC: 10.9 G/DL — LOW (ref 13–17)
MAGNESIUM SERPL-MCNC: 2 MG/DL — SIGNIFICANT CHANGE UP (ref 1.6–2.6)
MCHC RBC-ENTMCNC: 30.5 PG — SIGNIFICANT CHANGE UP (ref 27–34)
MCHC RBC-ENTMCNC: 33.9 GM/DL — SIGNIFICANT CHANGE UP (ref 32–36)
MCV RBC AUTO: 90.2 FL — SIGNIFICANT CHANGE UP (ref 80–100)
NRBC # BLD: 0 /100 WBCS — SIGNIFICANT CHANGE UP
NRBC # FLD: 0.02 K/UL — HIGH
PHOSPHATE SERPL-MCNC: 2.9 MG/DL — SIGNIFICANT CHANGE UP (ref 2.5–4.5)
PLATELET # BLD AUTO: 441 K/UL — HIGH (ref 150–400)
POTASSIUM SERPL-MCNC: 3.3 MMOL/L — LOW (ref 3.5–5.3)
POTASSIUM SERPL-SCNC: 3.3 MMOL/L — LOW (ref 3.5–5.3)
PROT SERPL-MCNC: 6.6 G/DL — SIGNIFICANT CHANGE UP (ref 6–8.3)
RBC # BLD: 3.57 M/UL — LOW (ref 4.2–5.8)
RBC # FLD: 14.3 % — SIGNIFICANT CHANGE UP (ref 10.3–14.5)
SODIUM SERPL-SCNC: 134 MMOL/L — LOW (ref 135–145)
WBC # BLD: 10.11 K/UL — SIGNIFICANT CHANGE UP (ref 3.8–10.5)
WBC # FLD AUTO: 10.11 K/UL — SIGNIFICANT CHANGE UP (ref 3.8–10.5)

## 2021-01-07 RX ORDER — SIMETHICONE 80 MG/1
80 TABLET, CHEWABLE ORAL ONCE
Refills: 0 | Status: COMPLETED | OUTPATIENT
Start: 2021-01-07 | End: 2021-01-07

## 2021-01-07 RX ORDER — POTASSIUM CHLORIDE 20 MEQ
20 PACKET (EA) ORAL ONCE
Refills: 0 | Status: COMPLETED | OUTPATIENT
Start: 2021-01-07 | End: 2021-01-07

## 2021-01-07 RX ORDER — POLYETHYLENE GLYCOL 3350 17 G/17G
17 POWDER, FOR SOLUTION ORAL DAILY
Refills: 0 | Status: DISCONTINUED | OUTPATIENT
Start: 2021-01-07 | End: 2021-01-13

## 2021-01-07 RX ORDER — ACETAMINOPHEN 500 MG
650 TABLET ORAL ONCE
Refills: 0 | Status: COMPLETED | OUTPATIENT
Start: 2021-01-07 | End: 2021-01-07

## 2021-01-07 RX ORDER — SENNA PLUS 8.6 MG/1
2 TABLET ORAL AT BEDTIME
Refills: 0 | Status: DISCONTINUED | OUTPATIENT
Start: 2021-01-07 | End: 2021-01-13

## 2021-01-07 RX ADMIN — HEPARIN SODIUM 5000 UNIT(S): 5000 INJECTION INTRAVENOUS; SUBCUTANEOUS at 07:21

## 2021-01-07 RX ADMIN — AMLODIPINE BESYLATE 10 MILLIGRAM(S): 2.5 TABLET ORAL at 07:22

## 2021-01-07 RX ADMIN — REMDESIVIR 500 MILLIGRAM(S): 5 INJECTION INTRAVENOUS at 15:17

## 2021-01-07 RX ADMIN — ALBUTEROL 2 PUFF(S): 90 AEROSOL, METERED ORAL at 07:20

## 2021-01-07 RX ADMIN — POLYETHYLENE GLYCOL 3350 17 GRAM(S): 17 POWDER, FOR SOLUTION ORAL at 17:59

## 2021-01-07 RX ADMIN — Medication 600 MILLIGRAM(S): at 16:50

## 2021-01-07 RX ADMIN — SENNA PLUS 2 TABLET(S): 8.6 TABLET ORAL at 21:46

## 2021-01-07 RX ADMIN — SODIUM CHLORIDE 1 GRAM(S): 9 INJECTION INTRAMUSCULAR; INTRAVENOUS; SUBCUTANEOUS at 14:18

## 2021-01-07 RX ADMIN — SIMETHICONE 80 MILLIGRAM(S): 80 TABLET, CHEWABLE ORAL at 02:19

## 2021-01-07 RX ADMIN — TAMSULOSIN HYDROCHLORIDE 0.4 MILLIGRAM(S): 0.4 CAPSULE ORAL at 21:46

## 2021-01-07 RX ADMIN — Medication 6 MILLIGRAM(S): at 07:22

## 2021-01-07 RX ADMIN — SODIUM CHLORIDE 1 GRAM(S): 9 INJECTION INTRAMUSCULAR; INTRAVENOUS; SUBCUTANEOUS at 07:22

## 2021-01-07 RX ADMIN — Medication 1000 UNIT(S): at 14:18

## 2021-01-07 RX ADMIN — HEPARIN SODIUM 5000 UNIT(S): 5000 INJECTION INTRAVENOUS; SUBCUTANEOUS at 12:45

## 2021-01-07 RX ADMIN — Medication 1 TABLET(S): at 12:45

## 2021-01-07 RX ADMIN — Medication 650 MILLIGRAM(S): at 02:03

## 2021-01-07 RX ADMIN — PREGABALIN 1000 MICROGRAM(S): 225 CAPSULE ORAL at 14:18

## 2021-01-07 RX ADMIN — PANTOPRAZOLE SODIUM 40 MILLIGRAM(S): 20 TABLET, DELAYED RELEASE ORAL at 07:20

## 2021-01-07 RX ADMIN — ATORVASTATIN CALCIUM 10 MILLIGRAM(S): 80 TABLET, FILM COATED ORAL at 21:46

## 2021-01-07 RX ADMIN — Medication 600 MILLIGRAM(S): at 07:20

## 2021-01-07 RX ADMIN — Medication 25 MILLIGRAM(S): at 07:20

## 2021-01-07 RX ADMIN — HEPARIN SODIUM 5000 UNIT(S): 5000 INJECTION INTRAVENOUS; SUBCUTANEOUS at 21:45

## 2021-01-07 RX ADMIN — Medication 20 MILLIGRAM(S): at 07:23

## 2021-01-07 RX ADMIN — ALBUTEROL 2 PUFF(S): 90 AEROSOL, METERED ORAL at 12:45

## 2021-01-07 RX ADMIN — BUDESONIDE AND FORMOTEROL FUMARATE DIHYDRATE 2 PUFF(S): 160; 4.5 AEROSOL RESPIRATORY (INHALATION) at 12:44

## 2021-01-07 RX ADMIN — Medication 20 MILLIEQUIVALENT(S): at 14:18

## 2021-01-07 RX ADMIN — BUDESONIDE AND FORMOTEROL FUMARATE DIHYDRATE 2 PUFF(S): 160; 4.5 AEROSOL RESPIRATORY (INHALATION) at 21:47

## 2021-01-08 LAB
ALBUMIN SERPL ELPH-MCNC: 2.9 G/DL — LOW (ref 3.3–5)
ALP SERPL-CCNC: 193 U/L — HIGH (ref 40–120)
ALT FLD-CCNC: 102 U/L — HIGH (ref 4–41)
ANION GAP SERPL CALC-SCNC: 11 MMOL/L — SIGNIFICANT CHANGE UP (ref 7–14)
AST SERPL-CCNC: 41 U/L — HIGH (ref 4–40)
BILIRUB SERPL-MCNC: 0.8 MG/DL — SIGNIFICANT CHANGE UP (ref 0.2–1.2)
BUN SERPL-MCNC: 30 MG/DL — HIGH (ref 7–23)
CALCIUM SERPL-MCNC: 8.7 MG/DL — SIGNIFICANT CHANGE UP (ref 8.4–10.5)
CHLORIDE SERPL-SCNC: 99 MMOL/L — SIGNIFICANT CHANGE UP (ref 98–107)
CO2 SERPL-SCNC: 21 MMOL/L — LOW (ref 22–31)
CREAT SERPL-MCNC: 1.44 MG/DL — HIGH (ref 0.5–1.3)
D DIMER BLD IA.RAPID-MCNC: 2379 NG/ML DDU — HIGH
GLUCOSE SERPL-MCNC: 114 MG/DL — HIGH (ref 70–99)
HCT VFR BLD CALC: 28.8 % — LOW (ref 39–50)
HGB BLD-MCNC: 10.1 G/DL — LOW (ref 13–17)
MAGNESIUM SERPL-MCNC: 2.1 MG/DL — SIGNIFICANT CHANGE UP (ref 1.6–2.6)
MCHC RBC-ENTMCNC: 31.2 PG — SIGNIFICANT CHANGE UP (ref 27–34)
MCHC RBC-ENTMCNC: 35.1 GM/DL — SIGNIFICANT CHANGE UP (ref 32–36)
MCV RBC AUTO: 88.9 FL — SIGNIFICANT CHANGE UP (ref 80–100)
NRBC # BLD: 0 /100 WBCS — SIGNIFICANT CHANGE UP
NRBC # FLD: 0 K/UL — SIGNIFICANT CHANGE UP
PHOSPHATE SERPL-MCNC: 3.3 MG/DL — SIGNIFICANT CHANGE UP (ref 2.5–4.5)
PLATELET # BLD AUTO: 419 K/UL — HIGH (ref 150–400)
POTASSIUM SERPL-MCNC: 3.8 MMOL/L — SIGNIFICANT CHANGE UP (ref 3.5–5.3)
POTASSIUM SERPL-SCNC: 3.8 MMOL/L — SIGNIFICANT CHANGE UP (ref 3.5–5.3)
PROT SERPL-MCNC: 6.1 G/DL — SIGNIFICANT CHANGE UP (ref 6–8.3)
RBC # BLD: 3.24 M/UL — LOW (ref 4.2–5.8)
RBC # FLD: 14.4 % — SIGNIFICANT CHANGE UP (ref 10.3–14.5)
SODIUM SERPL-SCNC: 131 MMOL/L — LOW (ref 135–145)
WBC # BLD: 7.43 K/UL — SIGNIFICANT CHANGE UP (ref 3.8–10.5)
WBC # FLD AUTO: 7.43 K/UL — SIGNIFICANT CHANGE UP (ref 3.8–10.5)

## 2021-01-08 PROCEDURE — 99232 SBSQ HOSP IP/OBS MODERATE 35: CPT

## 2021-01-08 RX ADMIN — Medication 25 MILLIGRAM(S): at 05:14

## 2021-01-08 RX ADMIN — Medication 1000 UNIT(S): at 14:11

## 2021-01-08 RX ADMIN — PANTOPRAZOLE SODIUM 40 MILLIGRAM(S): 20 TABLET, DELAYED RELEASE ORAL at 05:14

## 2021-01-08 RX ADMIN — Medication 20 MILLIGRAM(S): at 05:14

## 2021-01-08 RX ADMIN — PREGABALIN 1000 MICROGRAM(S): 225 CAPSULE ORAL at 14:12

## 2021-01-08 RX ADMIN — TAMSULOSIN HYDROCHLORIDE 0.4 MILLIGRAM(S): 0.4 CAPSULE ORAL at 21:22

## 2021-01-08 RX ADMIN — ATORVASTATIN CALCIUM 10 MILLIGRAM(S): 80 TABLET, FILM COATED ORAL at 21:21

## 2021-01-08 RX ADMIN — HEPARIN SODIUM 5000 UNIT(S): 5000 INJECTION INTRAVENOUS; SUBCUTANEOUS at 21:22

## 2021-01-08 RX ADMIN — Medication 1 TABLET(S): at 14:12

## 2021-01-08 RX ADMIN — HEPARIN SODIUM 5000 UNIT(S): 5000 INJECTION INTRAVENOUS; SUBCUTANEOUS at 05:14

## 2021-01-08 RX ADMIN — AMLODIPINE BESYLATE 10 MILLIGRAM(S): 2.5 TABLET ORAL at 05:14

## 2021-01-08 RX ADMIN — BUDESONIDE AND FORMOTEROL FUMARATE DIHYDRATE 2 PUFF(S): 160; 4.5 AEROSOL RESPIRATORY (INHALATION) at 10:12

## 2021-01-08 RX ADMIN — HEPARIN SODIUM 5000 UNIT(S): 5000 INJECTION INTRAVENOUS; SUBCUTANEOUS at 14:12

## 2021-01-08 RX ADMIN — REMDESIVIR 500 MILLIGRAM(S): 5 INJECTION INTRAVENOUS at 17:47

## 2021-01-08 RX ADMIN — BUDESONIDE AND FORMOTEROL FUMARATE DIHYDRATE 2 PUFF(S): 160; 4.5 AEROSOL RESPIRATORY (INHALATION) at 21:24

## 2021-01-08 RX ADMIN — Medication 6 MILLIGRAM(S): at 05:15

## 2021-01-08 RX ADMIN — SODIUM CHLORIDE 1 GRAM(S): 9 INJECTION INTRAMUSCULAR; INTRAVENOUS; SUBCUTANEOUS at 17:47

## 2021-01-08 RX ADMIN — Medication 600 MILLIGRAM(S): at 14:12

## 2021-01-08 RX ADMIN — SODIUM CHLORIDE 1 GRAM(S): 9 INJECTION INTRAMUSCULAR; INTRAVENOUS; SUBCUTANEOUS at 05:14

## 2021-01-09 LAB
ALBUMIN SERPL ELPH-MCNC: 2.7 G/DL — LOW (ref 3.3–5)
ALBUMIN SERPL ELPH-MCNC: 2.8 G/DL — LOW (ref 3.3–5)
ALP SERPL-CCNC: 176 U/L — HIGH (ref 40–120)
ALP SERPL-CCNC: 179 U/L — HIGH (ref 40–120)
ALT FLD-CCNC: 81 U/L — HIGH (ref 4–41)
ALT FLD-CCNC: 84 U/L — HIGH (ref 4–41)
ANION GAP SERPL CALC-SCNC: 13 MMOL/L — SIGNIFICANT CHANGE UP (ref 7–14)
AST SERPL-CCNC: 30 U/L — SIGNIFICANT CHANGE UP (ref 4–40)
AST SERPL-CCNC: 31 U/L — SIGNIFICANT CHANGE UP (ref 4–40)
BILIRUB DIRECT SERPL-MCNC: 0.2 MG/DL — SIGNIFICANT CHANGE UP (ref 0–0.2)
BILIRUB INDIRECT FLD-MCNC: 0.5 MG/DL — SIGNIFICANT CHANGE UP (ref 0–1)
BILIRUB SERPL-MCNC: 0.7 MG/DL — SIGNIFICANT CHANGE UP (ref 0.2–1.2)
BILIRUB SERPL-MCNC: 0.7 MG/DL — SIGNIFICANT CHANGE UP (ref 0.2–1.2)
BUN SERPL-MCNC: 30 MG/DL — HIGH (ref 7–23)
CALCIUM SERPL-MCNC: 9 MG/DL — SIGNIFICANT CHANGE UP (ref 8.4–10.5)
CHLORIDE SERPL-SCNC: 97 MMOL/L — LOW (ref 98–107)
CO2 SERPL-SCNC: 20 MMOL/L — LOW (ref 22–31)
CREAT SERPL-MCNC: 1.37 MG/DL — HIGH (ref 0.5–1.3)
CREAT SERPL-MCNC: 1.38 MG/DL — HIGH (ref 0.5–1.3)
GLUCOSE SERPL-MCNC: 95 MG/DL — SIGNIFICANT CHANGE UP (ref 70–99)
HCT VFR BLD CALC: 30 % — LOW (ref 39–50)
HGB BLD-MCNC: 10.5 G/DL — LOW (ref 13–17)
MCHC RBC-ENTMCNC: 31.3 PG — SIGNIFICANT CHANGE UP (ref 27–34)
MCHC RBC-ENTMCNC: 35 GM/DL — SIGNIFICANT CHANGE UP (ref 32–36)
MCV RBC AUTO: 89.6 FL — SIGNIFICANT CHANGE UP (ref 80–100)
NRBC # BLD: 0 /100 WBCS — SIGNIFICANT CHANGE UP
NRBC # FLD: 0 K/UL — SIGNIFICANT CHANGE UP
PLATELET # BLD AUTO: 518 K/UL — HIGH (ref 150–400)
POTASSIUM SERPL-MCNC: 3.9 MMOL/L — SIGNIFICANT CHANGE UP (ref 3.5–5.3)
POTASSIUM SERPL-SCNC: 3.9 MMOL/L — SIGNIFICANT CHANGE UP (ref 3.5–5.3)
PROT SERPL-MCNC: 6.2 G/DL — SIGNIFICANT CHANGE UP (ref 6–8.3)
PROT SERPL-MCNC: 6.3 G/DL — SIGNIFICANT CHANGE UP (ref 6–8.3)
RBC # BLD: 3.35 M/UL — LOW (ref 4.2–5.8)
RBC # FLD: 14.6 % — HIGH (ref 10.3–14.5)
SODIUM SERPL-SCNC: 130 MMOL/L — LOW (ref 135–145)
WBC # BLD: 8.2 K/UL — SIGNIFICANT CHANGE UP (ref 3.8–10.5)
WBC # FLD AUTO: 8.2 K/UL — SIGNIFICANT CHANGE UP (ref 3.8–10.5)

## 2021-01-09 RX ORDER — FLUTICASONE PROPIONATE 50 MCG
1 SPRAY, SUSPENSION NASAL
Refills: 0 | Status: DISCONTINUED | OUTPATIENT
Start: 2021-01-09 | End: 2021-01-13

## 2021-01-09 RX ADMIN — SODIUM CHLORIDE 1 GRAM(S): 9 INJECTION INTRAMUSCULAR; INTRAVENOUS; SUBCUTANEOUS at 06:47

## 2021-01-09 RX ADMIN — Medication 100 MILLIGRAM(S): at 01:52

## 2021-01-09 RX ADMIN — AMLODIPINE BESYLATE 10 MILLIGRAM(S): 2.5 TABLET ORAL at 06:47

## 2021-01-09 RX ADMIN — BUDESONIDE AND FORMOTEROL FUMARATE DIHYDRATE 2 PUFF(S): 160; 4.5 AEROSOL RESPIRATORY (INHALATION) at 08:53

## 2021-01-09 RX ADMIN — Medication 1 TABLET(S): at 12:12

## 2021-01-09 RX ADMIN — Medication 6 MILLIGRAM(S): at 06:46

## 2021-01-09 RX ADMIN — Medication 1000 UNIT(S): at 12:13

## 2021-01-09 RX ADMIN — HEPARIN SODIUM 5000 UNIT(S): 5000 INJECTION INTRAVENOUS; SUBCUTANEOUS at 21:54

## 2021-01-09 RX ADMIN — HEPARIN SODIUM 5000 UNIT(S): 5000 INJECTION INTRAVENOUS; SUBCUTANEOUS at 06:45

## 2021-01-09 RX ADMIN — TAMSULOSIN HYDROCHLORIDE 0.4 MILLIGRAM(S): 0.4 CAPSULE ORAL at 21:43

## 2021-01-09 RX ADMIN — PREGABALIN 1000 MICROGRAM(S): 225 CAPSULE ORAL at 12:12

## 2021-01-09 RX ADMIN — ATORVASTATIN CALCIUM 10 MILLIGRAM(S): 80 TABLET, FILM COATED ORAL at 21:43

## 2021-01-09 RX ADMIN — REMDESIVIR 500 MILLIGRAM(S): 5 INJECTION INTRAVENOUS at 15:08

## 2021-01-09 RX ADMIN — HEPARIN SODIUM 5000 UNIT(S): 5000 INJECTION INTRAVENOUS; SUBCUTANEOUS at 15:08

## 2021-01-09 RX ADMIN — PANTOPRAZOLE SODIUM 40 MILLIGRAM(S): 20 TABLET, DELAYED RELEASE ORAL at 06:47

## 2021-01-09 RX ADMIN — SENNA PLUS 2 TABLET(S): 8.6 TABLET ORAL at 23:23

## 2021-01-09 RX ADMIN — BUDESONIDE AND FORMOTEROL FUMARATE DIHYDRATE 2 PUFF(S): 160; 4.5 AEROSOL RESPIRATORY (INHALATION) at 21:54

## 2021-01-09 RX ADMIN — Medication 25 MILLIGRAM(S): at 06:47

## 2021-01-09 RX ADMIN — POLYETHYLENE GLYCOL 3350 17 GRAM(S): 17 POWDER, FOR SOLUTION ORAL at 12:12

## 2021-01-09 RX ADMIN — Medication 20 MILLIGRAM(S): at 06:47

## 2021-01-09 RX ADMIN — SODIUM CHLORIDE 1 GRAM(S): 9 INJECTION INTRAMUSCULAR; INTRAVENOUS; SUBCUTANEOUS at 16:57

## 2021-01-10 LAB
ALBUMIN SERPL ELPH-MCNC: 2.8 G/DL — LOW (ref 3.3–5)
ALBUMIN SERPL ELPH-MCNC: 2.9 G/DL — LOW (ref 3.3–5)
ALP SERPL-CCNC: 164 U/L — HIGH (ref 40–120)
ALP SERPL-CCNC: 165 U/L — HIGH (ref 40–120)
ALT FLD-CCNC: 64 U/L — HIGH (ref 4–41)
ALT FLD-CCNC: 65 U/L — HIGH (ref 4–41)
ANION GAP SERPL CALC-SCNC: 12 MMOL/L — SIGNIFICANT CHANGE UP (ref 7–14)
AST SERPL-CCNC: 22 U/L — SIGNIFICANT CHANGE UP (ref 4–40)
AST SERPL-CCNC: 24 U/L — SIGNIFICANT CHANGE UP (ref 4–40)
BILIRUB DIRECT SERPL-MCNC: 0.2 MG/DL — SIGNIFICANT CHANGE UP (ref 0–0.2)
BILIRUB INDIRECT FLD-MCNC: 0.4 MG/DL — SIGNIFICANT CHANGE UP (ref 0–1)
BILIRUB SERPL-MCNC: 0.6 MG/DL — SIGNIFICANT CHANGE UP (ref 0.2–1.2)
BILIRUB SERPL-MCNC: 0.7 MG/DL — SIGNIFICANT CHANGE UP (ref 0.2–1.2)
BUN SERPL-MCNC: 26 MG/DL — HIGH (ref 7–23)
CALCIUM SERPL-MCNC: 9 MG/DL — SIGNIFICANT CHANGE UP (ref 8.4–10.5)
CHLORIDE SERPL-SCNC: 96 MMOL/L — LOW (ref 98–107)
CO2 SERPL-SCNC: 22 MMOL/L — SIGNIFICANT CHANGE UP (ref 22–31)
CREAT SERPL-MCNC: 1.32 MG/DL — HIGH (ref 0.5–1.3)
CREAT SERPL-MCNC: 1.35 MG/DL — HIGH (ref 0.5–1.3)
CRP SERPL-MCNC: 27 MG/L — HIGH
D DIMER BLD IA.RAPID-MCNC: 2194 NG/ML DDU — HIGH
FERRITIN SERPL-MCNC: 487 NG/ML — HIGH (ref 30–400)
GLUCOSE SERPL-MCNC: 84 MG/DL — SIGNIFICANT CHANGE UP (ref 70–99)
HCT VFR BLD CALC: 31.8 % — LOW (ref 39–50)
HGB BLD-MCNC: 10.5 G/DL — LOW (ref 13–17)
MCHC RBC-ENTMCNC: 30.3 PG — SIGNIFICANT CHANGE UP (ref 27–34)
MCHC RBC-ENTMCNC: 33 GM/DL — SIGNIFICANT CHANGE UP (ref 32–36)
MCV RBC AUTO: 91.6 FL — SIGNIFICANT CHANGE UP (ref 80–100)
NRBC # BLD: 0 /100 WBCS — SIGNIFICANT CHANGE UP
NRBC # FLD: 0 K/UL — SIGNIFICANT CHANGE UP
PLATELET # BLD AUTO: 546 K/UL — HIGH (ref 150–400)
POTASSIUM SERPL-MCNC: 4.3 MMOL/L — SIGNIFICANT CHANGE UP (ref 3.5–5.3)
POTASSIUM SERPL-SCNC: 4.3 MMOL/L — SIGNIFICANT CHANGE UP (ref 3.5–5.3)
PROT SERPL-MCNC: 6.2 G/DL — SIGNIFICANT CHANGE UP (ref 6–8.3)
PROT SERPL-MCNC: 6.3 G/DL — SIGNIFICANT CHANGE UP (ref 6–8.3)
RBC # BLD: 3.47 M/UL — LOW (ref 4.2–5.8)
RBC # FLD: 14.7 % — HIGH (ref 10.3–14.5)
SODIUM SERPL-SCNC: 130 MMOL/L — LOW (ref 135–145)
WBC # BLD: 7.76 K/UL — SIGNIFICANT CHANGE UP (ref 3.8–10.5)
WBC # FLD AUTO: 7.76 K/UL — SIGNIFICANT CHANGE UP (ref 3.8–10.5)

## 2021-01-10 RX ADMIN — TAMSULOSIN HYDROCHLORIDE 0.4 MILLIGRAM(S): 0.4 CAPSULE ORAL at 21:52

## 2021-01-10 RX ADMIN — Medication 1 TABLET(S): at 16:23

## 2021-01-10 RX ADMIN — HEPARIN SODIUM 5000 UNIT(S): 5000 INJECTION INTRAVENOUS; SUBCUTANEOUS at 06:07

## 2021-01-10 RX ADMIN — POLYETHYLENE GLYCOL 3350 17 GRAM(S): 17 POWDER, FOR SOLUTION ORAL at 16:25

## 2021-01-10 RX ADMIN — HEPARIN SODIUM 5000 UNIT(S): 5000 INJECTION INTRAVENOUS; SUBCUTANEOUS at 21:52

## 2021-01-10 RX ADMIN — BUDESONIDE AND FORMOTEROL FUMARATE DIHYDRATE 2 PUFF(S): 160; 4.5 AEROSOL RESPIRATORY (INHALATION) at 21:52

## 2021-01-10 RX ADMIN — SODIUM CHLORIDE 1 GRAM(S): 9 INJECTION INTRAMUSCULAR; INTRAVENOUS; SUBCUTANEOUS at 06:08

## 2021-01-10 RX ADMIN — SODIUM CHLORIDE 1 GRAM(S): 9 INJECTION INTRAMUSCULAR; INTRAVENOUS; SUBCUTANEOUS at 16:24

## 2021-01-10 RX ADMIN — PREGABALIN 1000 MICROGRAM(S): 225 CAPSULE ORAL at 16:24

## 2021-01-10 RX ADMIN — BUDESONIDE AND FORMOTEROL FUMARATE DIHYDRATE 2 PUFF(S): 160; 4.5 AEROSOL RESPIRATORY (INHALATION) at 08:21

## 2021-01-10 RX ADMIN — Medication 6 MILLIGRAM(S): at 06:08

## 2021-01-10 RX ADMIN — PANTOPRAZOLE SODIUM 40 MILLIGRAM(S): 20 TABLET, DELAYED RELEASE ORAL at 06:08

## 2021-01-10 RX ADMIN — Medication 25 MILLIGRAM(S): at 06:08

## 2021-01-10 RX ADMIN — ATORVASTATIN CALCIUM 10 MILLIGRAM(S): 80 TABLET, FILM COATED ORAL at 21:53

## 2021-01-10 RX ADMIN — Medication 1000 UNIT(S): at 16:24

## 2021-01-10 RX ADMIN — AMLODIPINE BESYLATE 10 MILLIGRAM(S): 2.5 TABLET ORAL at 06:08

## 2021-01-10 RX ADMIN — Medication 20 MILLIGRAM(S): at 06:08

## 2021-01-10 RX ADMIN — HEPARIN SODIUM 5000 UNIT(S): 5000 INJECTION INTRAVENOUS; SUBCUTANEOUS at 16:23

## 2021-01-11 ENCOUNTER — TRANSCRIPTION ENCOUNTER (OUTPATIENT)
Age: 76
End: 2021-01-11

## 2021-01-11 LAB
ALBUMIN SERPL ELPH-MCNC: 3.1 G/DL — LOW (ref 3.3–5)
ALP SERPL-CCNC: 154 U/L — HIGH (ref 40–120)
ALT FLD-CCNC: 48 U/L — HIGH (ref 4–41)
ANION GAP SERPL CALC-SCNC: 11 MMOL/L — SIGNIFICANT CHANGE UP (ref 7–14)
AST SERPL-CCNC: 17 U/L — SIGNIFICANT CHANGE UP (ref 4–40)
BILIRUB SERPL-MCNC: 0.7 MG/DL — SIGNIFICANT CHANGE UP (ref 0.2–1.2)
BUN SERPL-MCNC: 27 MG/DL — HIGH (ref 7–23)
CALCIUM SERPL-MCNC: 8.8 MG/DL — SIGNIFICANT CHANGE UP (ref 8.4–10.5)
CHLORIDE SERPL-SCNC: 98 MMOL/L — SIGNIFICANT CHANGE UP (ref 98–107)
CO2 SERPL-SCNC: 25 MMOL/L — SIGNIFICANT CHANGE UP (ref 22–31)
CREAT SERPL-MCNC: 1.39 MG/DL — HIGH (ref 0.5–1.3)
GLUCOSE SERPL-MCNC: 97 MG/DL — SIGNIFICANT CHANGE UP (ref 70–99)
HCT VFR BLD CALC: 30.9 % — LOW (ref 39–50)
HGB BLD-MCNC: 10.5 G/DL — LOW (ref 13–17)
MCHC RBC-ENTMCNC: 30.5 PG — SIGNIFICANT CHANGE UP (ref 27–34)
MCHC RBC-ENTMCNC: 34 GM/DL — SIGNIFICANT CHANGE UP (ref 32–36)
MCV RBC AUTO: 89.8 FL — SIGNIFICANT CHANGE UP (ref 80–100)
NRBC # BLD: 0 /100 WBCS — SIGNIFICANT CHANGE UP
NRBC # FLD: 0 K/UL — SIGNIFICANT CHANGE UP
PLATELET # BLD AUTO: 532 K/UL — HIGH (ref 150–400)
POTASSIUM SERPL-MCNC: 3.8 MMOL/L — SIGNIFICANT CHANGE UP (ref 3.5–5.3)
POTASSIUM SERPL-SCNC: 3.8 MMOL/L — SIGNIFICANT CHANGE UP (ref 3.5–5.3)
PROT SERPL-MCNC: 6.2 G/DL — SIGNIFICANT CHANGE UP (ref 6–8.3)
RBC # BLD: 3.44 M/UL — LOW (ref 4.2–5.8)
RBC # FLD: 14.6 % — HIGH (ref 10.3–14.5)
SODIUM SERPL-SCNC: 134 MMOL/L — LOW (ref 135–145)
WBC # BLD: 8.61 K/UL — SIGNIFICANT CHANGE UP (ref 3.8–10.5)
WBC # FLD AUTO: 8.61 K/UL — SIGNIFICANT CHANGE UP (ref 3.8–10.5)

## 2021-01-11 PROCEDURE — 99232 SBSQ HOSP IP/OBS MODERATE 35: CPT

## 2021-01-11 RX ADMIN — Medication 1 TABLET(S): at 11:01

## 2021-01-11 RX ADMIN — Medication 1000 UNIT(S): at 11:04

## 2021-01-11 RX ADMIN — SENNA PLUS 2 TABLET(S): 8.6 TABLET ORAL at 21:46

## 2021-01-11 RX ADMIN — TAMSULOSIN HYDROCHLORIDE 0.4 MILLIGRAM(S): 0.4 CAPSULE ORAL at 21:46

## 2021-01-11 RX ADMIN — ATORVASTATIN CALCIUM 10 MILLIGRAM(S): 80 TABLET, FILM COATED ORAL at 21:46

## 2021-01-11 RX ADMIN — HEPARIN SODIUM 5000 UNIT(S): 5000 INJECTION INTRAVENOUS; SUBCUTANEOUS at 06:22

## 2021-01-11 RX ADMIN — Medication 6 MILLIGRAM(S): at 06:23

## 2021-01-11 RX ADMIN — Medication 25 MILLIGRAM(S): at 06:23

## 2021-01-11 RX ADMIN — PANTOPRAZOLE SODIUM 40 MILLIGRAM(S): 20 TABLET, DELAYED RELEASE ORAL at 06:23

## 2021-01-11 RX ADMIN — PREGABALIN 1000 MICROGRAM(S): 225 CAPSULE ORAL at 11:02

## 2021-01-11 RX ADMIN — BUDESONIDE AND FORMOTEROL FUMARATE DIHYDRATE 2 PUFF(S): 160; 4.5 AEROSOL RESPIRATORY (INHALATION) at 09:10

## 2021-01-11 RX ADMIN — HEPARIN SODIUM 5000 UNIT(S): 5000 INJECTION INTRAVENOUS; SUBCUTANEOUS at 14:21

## 2021-01-11 RX ADMIN — HEPARIN SODIUM 5000 UNIT(S): 5000 INJECTION INTRAVENOUS; SUBCUTANEOUS at 21:46

## 2021-01-11 RX ADMIN — AMLODIPINE BESYLATE 10 MILLIGRAM(S): 2.5 TABLET ORAL at 06:22

## 2021-01-11 RX ADMIN — BUDESONIDE AND FORMOTEROL FUMARATE DIHYDRATE 2 PUFF(S): 160; 4.5 AEROSOL RESPIRATORY (INHALATION) at 21:46

## 2021-01-11 RX ADMIN — SODIUM CHLORIDE 1 GRAM(S): 9 INJECTION INTRAMUSCULAR; INTRAVENOUS; SUBCUTANEOUS at 06:22

## 2021-01-11 RX ADMIN — Medication 20 MILLIGRAM(S): at 06:23

## 2021-01-11 RX ADMIN — SODIUM CHLORIDE 1 GRAM(S): 9 INJECTION INTRAMUSCULAR; INTRAVENOUS; SUBCUTANEOUS at 17:40

## 2021-01-11 NOTE — DISCHARGE NOTE PROVIDER - CARE PROVIDER_API CALL
YOUR PRIMARY, CARE PROVIDER  Phone: (   )    -  Fax: (   )    -  Follow Up Time: 1 week    Holden Hernandez)  Internal Medicine  [address] [place] [address]  Phone: [phone]  Fax: [phone]  Follow Up Time: 1 week    Boaz Gallegos)  Cardiology  [address], [address]  [place] [address]  Phone: [phone]  Fax: [phone]  Follow Up Time: 1 week

## 2021-01-11 NOTE — DISCHARGE NOTE PROVIDER - HOSPITAL COURSE
72 YO M with PMHx of HTN, Asthma, SIADH, L RCC s/p L Nephrectomy and Bladder CA s/p Incision of Ureteral Orifice, L Laparoscopic Nephroureterectomy and Retroperitoneal Lymphadenectomy p/w SOB in setting of AHRF second to COVID.     During admission, patient noted with rising DDIMER and empirically started on full AC. Completed Remdesivir (1/5-1/9) and Decadron (1/5 - __________). Course complicated by EDU on CKD and CTA CHEST held. US LE DOPPLER negative for DVT and ECHO WNL. Given little evidence of DVT vs PE, full AC transitioned back to PPX dose. Course also complicated by Hyponatremia seen second to SIADH. Fluid restriction continued and Sodium and EDU improved. Weaned off NC and noted to be stable on RA. Ambulation O2 test _________.     Seen by PT and recommended home with no skilled PT needs.    74 YO M with PMHx of HTN, Asthma, SIADH, L RCC s/p L Nephrectomy and Bladder CA s/p Incision of Ureteral Orifice, L Laparoscopic Nephroureterectomy and Retroperitoneal Lymphadenectomy p/w SOB in setting of AHRF second to COVID.     During admission, patient noted with rising DDIMER and empirically started on full AC. Completed Remdesivir (1/5-1/9) and Decadron course started. Course complicated by EDU on CKD and CTA CHEST held. US LE DOPPLER negative for DVT and ECHO WNL. Given little evidence of DVT vs PE, full AC transitioned back to PPX dose. Course also complicated by Hyponatremia seen second to SIADH. Fluid restriction continued and Sodium and EDU improved. Pt. noted with some atypical Chest Pain with inspiration -likely in setting of Covid. No active cp. EKG TWI lead II, V4,V5,V6 - similar to EKG in June - no acute ischemic changes 	HsT 20 -> 18, likely demand ischemia in setting of EDU on CKD. Echo with normal lv sys fx, no significant valvular disease. Low dose aspirin added. Weaned off NC and noted to be stable on RA. Ambulation O2 test _________.     Seen by PT and recommended home with no skilled PT needs.    72 YO M with PMHx of HTN, Asthma, SIADH, L RCC s/p L Nephrectomy and Bladder CA s/p Incision of Ureteral Orifice, L Laparoscopic Nephroureterectomy and Retroperitoneal Lymphadenectomy p/w SOB in setting of AHRF second to COVID.     During admission, patient noted with rising DDIMER and empirically started on full AC. Completed Remdesivir (1/5-1/9) and Decadron course started. Course complicated by EDU on CKD and CTA CHEST held. US LE DOPPLER negative for DVT and ECHO WNL. Given little evidence of DVT vs PE, full AC transitioned back to PPX dose. Will discharge pt on Xarelto 10mg 1 PO qd x 30 days for DVT prophylaxis per discussion with pulmonology and Dr. Tello. Course also complicated by Hyponatremia seen second to SIADH. Fluid restriction continued and Sodium and EDU improved. Pt. noted with some atypical Chest Pain with inspiration. Cardiology consulted. likely in setting of Covid. No active cp. EKG TWI lead II, V4,V5,V6 - similar to EKG in June - no acute ischemic changes. HsT 20 -> 18, likely demand ischemia in setting of EDU on CKD. Echo with normal lv sys fx, no significant valvular disease. Low dose aspirin added per cardiology recommendations and discussion with Dr. Tello. Weaned off NC and noted to be stable on RA. Pt. does not require oxygen on discharge.     Seen by PT and recommended home with no skilled PT needs.     On 1/13/21 this case was reviewed with Dr. Tello, the patient is medically stable and optimized for discharge. All medications were reviewed and prescriptions were sent to mutually agreed upon pharmacy.

## 2021-01-11 NOTE — DISCHARGE NOTE PROVIDER - PROVIDER TOKENS
FREE:[LAST:[YOUR PRIMARY],FIRST:[CARE PROVIDER],PHONE:[(   )    -],FAX:[(   )    -],FOLLOWUP:[1 week]],PROVIDER:[TOKEN:[[id number]],FOLLOWUP:[1 week]],PROVIDER:[TOKEN:[[id number]],FOLLOWUP:[1 week]]

## 2021-01-11 NOTE — DISCHARGE NOTE PROVIDER - NSDCMRMEDTOKEN_GEN_ALL_CORE_FT
amLODIPine 10 mg oral tablet: 1 tab(s) orally once a day  fluticasone 0.5 mg/2 mL inhalation suspension:   furosemide 20 mg oral tablet: 1 tab(s) orally 2 times a day  metoprolol succinate 25 mg oral tablet, extended release: 1 tab(s) orally once a day  Multiple Vitamins oral capsule: 1 cap(s) orally once a day  omeprazole 40 mg oral delayed release capsule: 1 cap(s) orally once a day  pravastatin 40 mg oral tablet: 1 tab(s) orally once a day  sodium chloride 1 g oral tablet: 1 tab(s) orally 2 times a day  tamsulosin 0.4 mg oral capsule: 1 cap(s) orally once a day  Vitamin B12 1000 mcg oral tablet: 1 tab(s) orally once a day  Wixela Inhub 250 mcg-50 mcg inhalation powder: 1 puff(s) inhaled once a day   amLODIPine 10 mg oral tablet: 1 tab(s) orally once a day  fluticasone 0.5 mg/2 mL inhalation suspension:   furosemide 20 mg oral tablet: 1 tab(s) orally once a day  metoprolol succinate 25 mg oral tablet, extended release: 1 tab(s) orally once a day  Multiple Vitamins oral capsule: 1 cap(s) orally once a day  omeprazole 40 mg oral delayed release capsule: 1 cap(s) orally once a day  pravastatin 40 mg oral tablet: 1 tab(s) orally once a day  sodium chloride 1 g oral tablet: 1 tab(s) orally 2 times a day  tamsulosin 0.4 mg oral capsule: 1 cap(s) orally once a day  Vitamin B12 1000 mcg oral tablet: 1 tab(s) orally once a day  Wixela Inhub 250 mcg-50 mcg inhalation powder: 1 puff(s) inhaled once a day  Xarelto 10 mg oral tablet: 1 tab(s) orally once a day    amLODIPine 10 mg oral tablet: 1 tab(s) orally once a day  aspirin 81 mg oral tablet, chewable: 1 tab(s) orally once a day  cholecalciferol 1000 intl units (25 mcg) oral tablet: 25 microgram(s) orally once a day   fluticasone 0.5 mg/2 mL inhalation suspension:   furosemide 20 mg oral tablet: 1 tab(s) orally once a day  metoprolol succinate 25 mg oral tablet, extended release: 1 tab(s) orally once a day  Multiple Vitamins oral capsule: 1 cap(s) orally once a day  omeprazole 40 mg oral delayed release capsule: 1 cap(s) orally once a day  pravastatin 40 mg oral tablet: 1 tab(s) orally once a day  sodium chloride 1 g oral tablet: 1 tab(s) orally 2 times a day  tamsulosin 0.4 mg oral capsule: 1 cap(s) orally once a day  Vitamin B12 1000 mcg oral tablet: 1 tab(s) orally once a day  Wixela Inhub 250 mcg-50 mcg inhalation powder: 1 puff(s) inhaled once a day  Xarelto 10 mg oral tablet: 1 tab(s) orally once a day

## 2021-01-11 NOTE — DISCHARGE NOTE PROVIDER - NSDCCPCAREPLAN_GEN_ALL_CORE_FT
PRINCIPAL DISCHARGE DIAGNOSIS  Diagnosis: Pneumonia due to COVID-19 virus  Assessment and Plan of Treatment: You have been diagnosed with the COVID-19 virus during your hospital stay. You must self quarantine to complete a 14 day time period.  Monitor for fevers, shortness of breath and cough primarily.  Monitor your temperature daily to not any changes and increases.    It has been determined that you no longer need hospitalization and can recover while remaining in self-quarantine at home. You should follow the prevention steps below until a healthcare provider or local or state health department says you can return to your normal activities.  1. You should restrict activities outside your home, except for getting medical care.  2. Do not go to work, school, or public areas.  3. Avoid using public transportation, ride-sharing, or taxis.  4. Separate yourself from other people and animals in your home.  5. Call ahead before visiting your doctor.  6. Wear a facemask.  7. Cover your coughs and sneezes.  8. Clean your hands often.  9. Avoid sharing personal household items.  10. Clean all “high-touch” surfaces everyday.  11. Monitor your symptoms.  If you have a medical emergency and need to call 911, notify the dispatch personnel that you have COVID-19 If possible, put on a facemask before emergency medical services arrive.  12. Stopping home isolation.  Patients with confirmed COVID-19 should remain under home isolation precautions for 14 days since the positive COVID-19 test and until the risk of secondary transmission to others is thought to be low. The decision to discontinue home isolation precautions should be made on a case-by-case basis, in consultation with healthcare providers and state and local health departments. Your [facility] Department of Health can be reached at [phone] for further information about COVID-19.      SECONDARY DISCHARGE DIAGNOSES  Diagnosis: SOB (shortness of breath)  Assessment and Plan of Treatment:      PRINCIPAL DISCHARGE DIAGNOSIS  Diagnosis: Pneumonia due to COVID-19 virus  Assessment and Plan of Treatment: You have been diagnosed with the COVID-19 virus during your hospital stay. You must self quarantine to complete a 14 day time period.  Monitor for fevers, shortness of breath and cough primarily.  Monitor your temperature daily to not any changes and increases.    It has been determined that you no longer need hospitalization and can recover while remaining in self-quarantine at home. You should follow the prevention steps below until a healthcare provider or local or state health department says you can return to your normal activities.  1. You should restrict activities outside your home, except for getting medical care.  2. Do not go to work, school, or public areas.  3. Avoid using public transportation, ride-sharing, or taxis.  4. Separate yourself from other people and animals in your home.  5. Call ahead before visiting your doctor.  6. Wear a facemask.  7. Cover your coughs and sneezes.  8. Clean your hands often.  9. Avoid sharing personal household items.  10. Clean all “high-touch” surfaces everyday.  11. Monitor your symptoms.  If you have a medical emergency and need to call 911, notify the dispatch personnel that you have COVID-19 If possible, put on a facemask before emergency medical services arrive.  12. Stopping home isolation.  Patients with confirmed COVID-19 should remain under home isolation precautions for 14 days since the positive COVID-19 test and until the risk of secondary transmission to others is thought to be low. The decision to discontinue home isolation precautions should be made on a case-by-case basis, in consultation with healthcare providers and state and local health departments. Your [facility] can be reached at [phone] for further information about COVID-19.      SECONDARY DISCHARGE DIAGNOSES  Diagnosis: Asthma  Assessment and Plan of Treatment: Please continue to use your inhalers as prescribed and follow-up with your primary care provider/pulmonologist for further care and annual pulmonary function testings. Avoid smoking or being exposed to second-hand smoke, as well as, other potenital exacerbating triggers (Dust/pollen/pets). Monitor for signs/symptoms of asthma exacerbation, such as, shortness of breath, increased sputum production, increased cough, wheezing, difficulty breathing, or fever - Report to the emergency room if symptoms are not relieved by usual regimen.    Diagnosis: Hypertension  Assessment and Plan of Treatment: Continue blood pressure medication regimen as prescribed. Please note your lasix dose was decreased to 20mg oral once a day due to your low sodium. Continue this current dose until evaluated by your outpatient provider. Monitor for any visual changes, headachesz dizziness, chest pain or shortness of breath and return to ER if any such symptoms arise. Monitor blood pressure regularly. Please follow up with your primary care provider in 2 weeks    Diagnosis: History of chest pain  Assessment and Plan of Treatment: You came in with chest pain. Your ECG and cardiac enzymes were not indicative of any acute ischemic changes. You had an echo done which showed normal heart function. You were seen by cardiology who recommended outpatient follow up.   Continue your aspirin as directed.  Please follow up with your cardiologist within 1 to 2 weeks for further management. Return to ED if any new or worsening symptoms such as worsenign headache, dizziness, chest pain, palpitations, shortness of breath, neck pain, jaw pain, arm pain, abdominal pain, nausea, or vomiting.    Diagnosis: Hyponatremia  Assessment and Plan of Treatment: You were found to have low salt during your admission and were seen by nephrology. Please keep your fluid intake to less than 1000ml a day which is approximately 4 cups. Please follow up with your primary care provider in 1 to 2 weeks for further care and monitoring of your sodium levels.  Return to ER if any new or worsening symptoms develop such as headache, dizziness, blurry vision, weakness, dizziness, swelling, shortness of breath, chest pain, nausea, vomiting or abdominal pain.    Diagnosis: Stage 3 chronic kidney disease, unspecified whether stage 3a or 3b CKD  Assessment and Plan of Treatment: In order to prevent further disease progression, continue to follow recommendations made by your primary provider/nephrologist. Continue a diet that is low in sodium and avoid foods that are concentrated in potassium and phosphorus. Continue your medications/supplementations as directed and avoid over-the-counter drugs that are harmful to kidneys, such as, Non-Steroidal Anti-Inflammatory Drugs (NSAIDs). Follow-up as outpatient to monitor your kidney function, as well as, vitamin D, Calcium, potassium, and phosphorus levels.    Diagnosis: DVT prophylaxis  Assessment and Plan of Treatment: During your admission you were noted to be high risk for developing blood clots. Please continue your xarelto as directed. Monitor for signs/symptoms of bleeding such as easy bruising, shortness of breath, blood in stool or vomit. If bleeding occurs please notify your primary care provider immediately or return to hospital ED.  **Follow up with your primary care provider within 1 month for further care and monitoring.    Diagnosis: Transaminitis  Assessment and Plan of Treatment: Follow up with your primary care provider to recheck your liver enzymes in 1 to 2 weeks. This was slightly elevated likely due to a medication called remdesivir to treat your covid 19. Return to ER if weakness, abdominal pain, nausea or vomiting develop.    Diagnosis: Malignant neoplasm of urinary bladder, unspecified site  Assessment and Plan of Treatment: Follow up with your oncologist in 1 to 2 weeks for further care.

## 2021-01-11 NOTE — DISCHARGE NOTE PROVIDER - NSDCFUSCHEDAPPT_GEN_ALL_CORE_FT
[facility] S [facility]  [facility] S ; 02/26/2021 ; NP [facility] [address] [place] [address]  MD TITUS S ; 03/22/2021 ; [facility] [address] [place] [address]  MD TITUS S ; 03/22/2021 ; [facility] [address] [place] [address]

## 2021-01-12 LAB
ANION GAP SERPL CALC-SCNC: 10 MMOL/L — SIGNIFICANT CHANGE UP (ref 7–14)
BUN SERPL-MCNC: 28 MG/DL — HIGH (ref 7–23)
CALCIUM SERPL-MCNC: 9.2 MG/DL — SIGNIFICANT CHANGE UP (ref 8.4–10.5)
CHLORIDE SERPL-SCNC: 94 MMOL/L — LOW (ref 98–107)
CO2 SERPL-SCNC: 27 MMOL/L — SIGNIFICANT CHANGE UP (ref 22–31)
CREAT SERPL-MCNC: 1.51 MG/DL — HIGH (ref 0.5–1.3)
GLUCOSE BLDC GLUCOMTR-MCNC: 98 MG/DL — SIGNIFICANT CHANGE UP (ref 70–99)
GLUCOSE SERPL-MCNC: 89 MG/DL — SIGNIFICANT CHANGE UP (ref 70–99)
HCT VFR BLD CALC: 32.4 % — LOW (ref 39–50)
HGB BLD-MCNC: 11.2 G/DL — LOW (ref 13–17)
MAGNESIUM SERPL-MCNC: 2.3 MG/DL — SIGNIFICANT CHANGE UP (ref 1.6–2.6)
MCHC RBC-ENTMCNC: 30.7 PG — SIGNIFICANT CHANGE UP (ref 27–34)
MCHC RBC-ENTMCNC: 34.6 GM/DL — SIGNIFICANT CHANGE UP (ref 32–36)
MCV RBC AUTO: 88.8 FL — SIGNIFICANT CHANGE UP (ref 80–100)
NRBC # BLD: 0 /100 WBCS — SIGNIFICANT CHANGE UP
NRBC # FLD: 0 K/UL — SIGNIFICANT CHANGE UP
PHOSPHATE SERPL-MCNC: 3.3 MG/DL — SIGNIFICANT CHANGE UP (ref 2.5–4.5)
PLATELET # BLD AUTO: 598 K/UL — HIGH (ref 150–400)
POTASSIUM SERPL-MCNC: 4.1 MMOL/L — SIGNIFICANT CHANGE UP (ref 3.5–5.3)
POTASSIUM SERPL-SCNC: 4.1 MMOL/L — SIGNIFICANT CHANGE UP (ref 3.5–5.3)
RBC # BLD: 3.65 M/UL — LOW (ref 4.2–5.8)
RBC # FLD: 14.5 % — SIGNIFICANT CHANGE UP (ref 10.3–14.5)
SODIUM SERPL-SCNC: 131 MMOL/L — LOW (ref 135–145)
WBC # BLD: 8.75 K/UL — SIGNIFICANT CHANGE UP (ref 3.8–10.5)
WBC # FLD AUTO: 8.75 K/UL — SIGNIFICANT CHANGE UP (ref 3.8–10.5)

## 2021-01-12 PROCEDURE — 99232 SBSQ HOSP IP/OBS MODERATE 35: CPT

## 2021-01-12 RX ADMIN — HEPARIN SODIUM 5000 UNIT(S): 5000 INJECTION INTRAVENOUS; SUBCUTANEOUS at 23:17

## 2021-01-12 RX ADMIN — SODIUM CHLORIDE 1 GRAM(S): 9 INJECTION INTRAMUSCULAR; INTRAVENOUS; SUBCUTANEOUS at 16:53

## 2021-01-12 RX ADMIN — HEPARIN SODIUM 5000 UNIT(S): 5000 INJECTION INTRAVENOUS; SUBCUTANEOUS at 12:58

## 2021-01-12 RX ADMIN — SODIUM CHLORIDE 1 GRAM(S): 9 INJECTION INTRAMUSCULAR; INTRAVENOUS; SUBCUTANEOUS at 06:03

## 2021-01-12 RX ADMIN — HEPARIN SODIUM 5000 UNIT(S): 5000 INJECTION INTRAVENOUS; SUBCUTANEOUS at 06:02

## 2021-01-12 RX ADMIN — AMLODIPINE BESYLATE 10 MILLIGRAM(S): 2.5 TABLET ORAL at 06:03

## 2021-01-12 RX ADMIN — PREGABALIN 1000 MICROGRAM(S): 225 CAPSULE ORAL at 11:42

## 2021-01-12 RX ADMIN — POLYETHYLENE GLYCOL 3350 17 GRAM(S): 17 POWDER, FOR SOLUTION ORAL at 11:41

## 2021-01-12 RX ADMIN — PANTOPRAZOLE SODIUM 40 MILLIGRAM(S): 20 TABLET, DELAYED RELEASE ORAL at 06:02

## 2021-01-12 RX ADMIN — BUDESONIDE AND FORMOTEROL FUMARATE DIHYDRATE 2 PUFF(S): 160; 4.5 AEROSOL RESPIRATORY (INHALATION) at 09:34

## 2021-01-12 RX ADMIN — Medication 1 TABLET(S): at 11:41

## 2021-01-12 RX ADMIN — Medication 25 MILLIGRAM(S): at 06:02

## 2021-01-12 RX ADMIN — Medication 6 MILLIGRAM(S): at 06:03

## 2021-01-12 RX ADMIN — Medication 1000 UNIT(S): at 11:43

## 2021-01-12 RX ADMIN — TAMSULOSIN HYDROCHLORIDE 0.4 MILLIGRAM(S): 0.4 CAPSULE ORAL at 23:17

## 2021-01-12 RX ADMIN — Medication 20 MILLIGRAM(S): at 06:02

## 2021-01-12 RX ADMIN — ATORVASTATIN CALCIUM 10 MILLIGRAM(S): 80 TABLET, FILM COATED ORAL at 23:17

## 2021-01-12 RX ADMIN — SENNA PLUS 2 TABLET(S): 8.6 TABLET ORAL at 23:18

## 2021-01-12 RX ADMIN — BUDESONIDE AND FORMOTEROL FUMARATE DIHYDRATE 2 PUFF(S): 160; 4.5 AEROSOL RESPIRATORY (INHALATION) at 23:19

## 2021-01-12 NOTE — PROGRESS NOTE ADULT - PROBLEM SELECTOR PLAN 3
-from COVID   -stable   -trend LFTs

## 2021-01-12 NOTE — PROGRESS NOTE ADULT - RS GEN PE MLT RESP DETAILS PC
clear to auscultation bilaterally/no wheezes

## 2021-01-12 NOTE — PROGRESS NOTE ADULT - NEGATIVE ENMT SYMPTOMS
no ear pain/no nasal congestion/no throat pain

## 2021-01-12 NOTE — PROGRESS NOTE ADULT - PROBLEM SELECTOR PLAN 1
-better  -supportive care  -maintain oxygenation saturation  -monitor fever curve  -continue airborne/contact isolation  -cont dexamethasone 6 mg daily  -s/p remdesevir   -monitor pulse ox  -contact/airborne precautions   -wean O2 as tolerated  -trend inflammatory markers
-supportive care  -maintain oxygenation saturation  -monitor fever curve  -continue airborne/contact isolation  -cont dexamethasone 6 mg daily  -cont remdesevir 200 mg iv x 1 then 100 mg iv q24 x 4 days   -monitor pulse ox  -contact/airborne precautions   -wean O2 as tolerated  -trend inflammatory markers
-better  -supportive care  -maintain oxygenation saturation  -monitor fever curve  -continue airborne/contact isolation  -cont dexamethasone 6 mg daily  -s/p remdesevir   -monitor pulse ox  -contact/airborne precautions   -wean O2 as tolerated  -trend inflammatory markers  -dc planning

## 2021-01-12 NOTE — PROGRESS NOTE ADULT - NEGATIVE GASTROINTESTINAL SYMPTOMS
no vomiting/no diarrhea/no abdominal pain

## 2021-01-12 NOTE — PROGRESS NOTE ADULT - GASTROINTESTINAL DETAILS
soft/nontender/no distention/no guarding/no rigidity
soft/nontender/no guarding/no rigidity
soft/nontender/no distention/no rebound tenderness/no rigidity

## 2021-01-12 NOTE — PROGRESS NOTE ADULT - PROBLEM SELECTOR PLAN 2
-better  -monitor pulse ox
-better  -monitor pulse ox  -off O2
-cont O2 support  -wean O2 as tolerated  -monitor pulse ox

## 2021-01-12 NOTE — PROGRESS NOTE ADULT - ATTENDING COMMENTS
Khoi Ryan  Attending Physician   Division of Infectious Disease  Pager #[phone]  After 5pm/weekend or no response, call #[phone]
Khoi Ryan  Attending Physician   Division of Infectious Disease  Pager #[phone]  After 5pm/weekend or no response, call #[phone]    Will sign off, recall ID if needed #[phone].
Khoi Ryan  Attending Physician   Division of Infectious Disease  Pager #[phone]  After 5pm/weekend or no response, call #[phone]    Please call the ID service [phone] with questions or concerns over the weekend.

## 2021-01-13 ENCOUNTER — TRANSCRIPTION ENCOUNTER (OUTPATIENT)
Age: 76
End: 2021-01-13

## 2021-01-13 VITALS — RESPIRATION RATE: 18 BRPM | OXYGEN SATURATION: 96 % | HEART RATE: 86 BPM

## 2021-01-13 LAB
ANION GAP SERPL CALC-SCNC: 9 MMOL/L — SIGNIFICANT CHANGE UP (ref 7–14)
BUN SERPL-MCNC: 28 MG/DL — HIGH (ref 7–23)
CALCIUM SERPL-MCNC: 9.1 MG/DL — SIGNIFICANT CHANGE UP (ref 8.4–10.5)
CHLORIDE SERPL-SCNC: 95 MMOL/L — LOW (ref 98–107)
CK MB BLD-MCNC: 6 % — HIGH (ref 0–2.5)
CK MB CFR SERPL CALC: 2.4 NG/ML — SIGNIFICANT CHANGE UP
CK SERPL-CCNC: 40 U/L — SIGNIFICANT CHANGE UP (ref 30–200)
CO2 SERPL-SCNC: 27 MMOL/L — SIGNIFICANT CHANGE UP (ref 22–31)
CREAT SERPL-MCNC: 1.58 MG/DL — HIGH (ref 0.5–1.3)
CRP SERPL-MCNC: 7.1 MG/L — HIGH
D DIMER BLD IA.RAPID-MCNC: 2320 NG/ML DDU — HIGH
FERRITIN SERPL-MCNC: 469 NG/ML — HIGH (ref 30–400)
GLUCOSE SERPL-MCNC: 92 MG/DL — SIGNIFICANT CHANGE UP (ref 70–99)
HCT VFR BLD CALC: 33.3 % — LOW (ref 39–50)
HGB BLD-MCNC: 11.2 G/DL — LOW (ref 13–17)
MAGNESIUM SERPL-MCNC: 2.3 MG/DL — SIGNIFICANT CHANGE UP (ref 1.6–2.6)
MCHC RBC-ENTMCNC: 30.5 PG — SIGNIFICANT CHANGE UP (ref 27–34)
MCHC RBC-ENTMCNC: 33.6 GM/DL — SIGNIFICANT CHANGE UP (ref 32–36)
MCV RBC AUTO: 90.7 FL — SIGNIFICANT CHANGE UP (ref 80–100)
NRBC # BLD: 0 /100 WBCS — SIGNIFICANT CHANGE UP
NRBC # FLD: 0 K/UL — SIGNIFICANT CHANGE UP
PHOSPHATE SERPL-MCNC: 3.2 MG/DL — SIGNIFICANT CHANGE UP (ref 2.5–4.5)
PLATELET # BLD AUTO: 562 K/UL — HIGH (ref 150–400)
POTASSIUM SERPL-MCNC: 4.2 MMOL/L — SIGNIFICANT CHANGE UP (ref 3.5–5.3)
POTASSIUM SERPL-SCNC: 4.2 MMOL/L — SIGNIFICANT CHANGE UP (ref 3.5–5.3)
RBC # BLD: 3.67 M/UL — LOW (ref 4.2–5.8)
RBC # FLD: 14.5 % — SIGNIFICANT CHANGE UP (ref 10.3–14.5)
SODIUM SERPL-SCNC: 131 MMOL/L — LOW (ref 135–145)
TROPONIN T, HIGH SENSITIVITY RESULT: 18 NG/L — SIGNIFICANT CHANGE UP
TROPONIN T, HIGH SENSITIVITY RESULT: 20 NG/L — SIGNIFICANT CHANGE UP
WBC # BLD: 7.59 K/UL — SIGNIFICANT CHANGE UP (ref 3.8–10.5)
WBC # FLD AUTO: 7.59 K/UL — SIGNIFICANT CHANGE UP (ref 3.8–10.5)

## 2021-01-13 PROCEDURE — 71045 X-RAY EXAM CHEST 1 VIEW: CPT | Mod: 26

## 2021-01-13 RX ORDER — RIVAROXABAN 15 MG-20MG
1 KIT ORAL
Qty: 30 | Refills: 0
Start: 2021-01-13 | End: 2021-02-11

## 2021-01-13 RX ORDER — FUROSEMIDE 40 MG
1 TABLET ORAL
Qty: 0 | Refills: 0 | DISCHARGE

## 2021-01-13 RX ORDER — CHOLECALCIFEROL (VITAMIN D3) 125 MCG
25 CAPSULE ORAL
Qty: 30 | Refills: 0
Start: 2021-01-13 | End: 2021-02-11

## 2021-01-13 RX ORDER — FUROSEMIDE 40 MG
1 TABLET ORAL
Qty: 0 | Refills: 0 | DISCHARGE
Start: 2021-01-13

## 2021-01-13 RX ORDER — ASPIRIN/CALCIUM CARB/MAGNESIUM 324 MG
81 TABLET ORAL DAILY
Refills: 0 | Status: DISCONTINUED | OUTPATIENT
Start: 2021-01-13 | End: 2021-01-13

## 2021-01-13 RX ORDER — ASPIRIN/CALCIUM CARB/MAGNESIUM 324 MG
1 TABLET ORAL
Qty: 30 | Refills: 0
Start: 2021-01-13 | End: 2021-02-11

## 2021-01-13 RX ADMIN — HEPARIN SODIUM 5000 UNIT(S): 5000 INJECTION INTRAVENOUS; SUBCUTANEOUS at 13:46

## 2021-01-13 RX ADMIN — SODIUM CHLORIDE 1 GRAM(S): 9 INJECTION INTRAMUSCULAR; INTRAVENOUS; SUBCUTANEOUS at 18:58

## 2021-01-13 RX ADMIN — Medication 20 MILLIGRAM(S): at 05:44

## 2021-01-13 RX ADMIN — Medication 25 MILLIGRAM(S): at 05:45

## 2021-01-13 RX ADMIN — HEPARIN SODIUM 5000 UNIT(S): 5000 INJECTION INTRAVENOUS; SUBCUTANEOUS at 05:45

## 2021-01-13 RX ADMIN — SODIUM CHLORIDE 1 GRAM(S): 9 INJECTION INTRAMUSCULAR; INTRAVENOUS; SUBCUTANEOUS at 05:45

## 2021-01-13 RX ADMIN — POLYETHYLENE GLYCOL 3350 17 GRAM(S): 17 POWDER, FOR SOLUTION ORAL at 11:18

## 2021-01-13 RX ADMIN — PREGABALIN 1000 MICROGRAM(S): 225 CAPSULE ORAL at 11:18

## 2021-01-13 RX ADMIN — BUDESONIDE AND FORMOTEROL FUMARATE DIHYDRATE 2 PUFF(S): 160; 4.5 AEROSOL RESPIRATORY (INHALATION) at 09:54

## 2021-01-13 RX ADMIN — PANTOPRAZOLE SODIUM 40 MILLIGRAM(S): 20 TABLET, DELAYED RELEASE ORAL at 05:45

## 2021-01-13 RX ADMIN — AMLODIPINE BESYLATE 10 MILLIGRAM(S): 2.5 TABLET ORAL at 05:44

## 2021-01-13 RX ADMIN — Medication 1 TABLET(S): at 11:18

## 2021-01-13 RX ADMIN — Medication 1000 UNIT(S): at 13:46

## 2021-01-13 RX ADMIN — Medication 6 MILLIGRAM(S): at 05:44

## 2021-01-13 NOTE — CONSULT NOTE ADULT - ATTENDING COMMENTS
agree with the above assessment and plan by PEYTON David.  74 y/o male (speaks English and Luxembourgish) PMH HTN, asthma, SIADH, left RCC s/p left nephrectomy, bladder ca s/p Incision of Ureteral Orifice, Left laparoscopic Nephroureterectomy and Retroperitoneal Lymphadenectomy (10/28/2019) and is still receiving chemo presents with SOB x 10 days.      1. Atypical Chest Pain  -Pain with inspiration -likely in setting of Covid  -no active cp   -EKG TWI lead II, V4,V5,V6 - similar to EKG in June - no acute ischemic changes 	  -HsT indeterminate, likely demand ischemia in setting of EDU on CKD  -repeat Trop,ck,ckmb pending  -Echo with normal lv sys fx, no significant valvular disease   -can add low dose asa    2. Covid-19/SOB  -CXR noted   -on room air  -c/w decadron  -mgmt per ID/pulm/med     3. HTN  -bp stable  -cont current meds    4. EDU on CKD  -renal f/u
Khoi Ryan  Attending Physician   Division of Infectious Disease  Pager #[phone]  After 5pm/weekend or no response, call #[phone]    I am away on 1/7 and will return 1/8. ID coverage available. Call ID office @ [phone] with questions.

## 2021-01-13 NOTE — PROGRESS NOTE ADULT - SUBJECTIVE AND OBJECTIVE BOX
Date of Service: 01-09-21 @ 13:22    Patient is a 75y old  Male who presents with a chief complaint of shortness of breath (08 Jan 2021 15:56)      Any change in ROS: He collected his sputum no blood to me:  gets SOB on exertion  on 4 L     MEDICATIONS  (STANDING):  amLODIPine   Tablet 10 milliGRAM(s) Oral daily  atorvastatin 10 milliGRAM(s) Oral at bedtime  budesonide 160 MICROgram(s)/formoterol 4.5 MICROgram(s) Inhaler 2 Puff(s) Inhalation two times a day  cholecalciferol 1000 Unit(s) Oral daily  cyanocobalamin 1000 MICROGram(s) Oral daily  dexAMETHasone  Injectable 6 milliGRAM(s) IV Push daily  furosemide    Tablet 20 milliGRAM(s) Oral daily  heparin   Injectable 5000 Unit(s) SubCutaneous every 8 hours  metoprolol succinate ER 25 milliGRAM(s) Oral daily  multivitamin 1 Tablet(s) Oral daily  pantoprazole    Tablet 40 milliGRAM(s) Oral before breakfast  polyethylene glycol 3350 17 Gram(s) Oral daily  remdesivir  IVPB 100 milliGRAM(s) IV Intermittent every 24 hours  remdesivir  IVPB   IV Intermittent   senna 2 Tablet(s) Oral at bedtime  sodium chloride 1 Gram(s) Oral two times a day  tamsulosin 0.4 milliGRAM(s) Oral at bedtime    MEDICATIONS  (PRN):  aluminum hydroxide/magnesium hydroxide/simethicone Suspension 30 milliLiter(s) Oral once PRN Dyspepsia  sodium chloride 0.65% Nasal 1 Spray(s) Both Nostrils three times a day PRN Nasal Congestion    Vital Signs Last 24 Hrs  T(C): 36.3 (09 Jan 2021 12:39), Max: 37 (08 Jan 2021 14:10)  T(F): 97.3 (09 Jan 2021 12:39), Max: 98.6 (08 Jan 2021 14:10)  HR: 81 (09 Jan 2021 12:39) (81 - 86)  BP: 136/53 (09 Jan 2021 12:39) (132/65 - 150/72)  BP(mean): --  RR: 20 (09 Jan 2021 12:39) (16 - 20)  SpO2: 99% (09 Jan 2021 12:39) (94% - 99%)    I&O's Summary        Physical Exam:   GENERAL: NAD, well-groomed, well-developed  HEENT: KATARZYNA/   Atraumatic, Normocephalic  ENMT: No tonsillar erythema, exudates, or enlargement; Moist mucous membranes, Good dentition, No lesions  NECK: Supple, No JVD, Normal thyroid  CHEST/LUNG: :ppor air entry   CVS: Regular rate and rhythm; No murmurs, rubs, or gallops  GI: : Soft, Nontender, Nondistended; Bowel sounds present  NERVOUS SYSTEM:  Alert & Oriented X3  EXTREMITIES:  -edema  LYMPH: No lymphadenopathy noted  SKIN: No rashes or lesions  ENDOCRINOLOGY: No Thyromegaly  PSYCH: Appropriate    Labs:  24                            10.5   8.20  )-----------( 518      ( 09 Jan 2021 07:57 )             30.0                         10.1   7.43  )-----------( 419      ( 08 Jan 2021 07:25 )             28.8                         10.9   10.11 )-----------( 441      ( 07 Jan 2021 07:45 )             32.2                         10.7   7.60  )-----------( 424      ( 06 Jan 2021 06:49 )             30.8     01-09    130<L>  |  97<L>  |  30<H>  ----------------------------<  95  3.9   |  20<L>  |  1.37<H>  01-08    131<L>  |  99  |  30<H>  ----------------------------<  114<H>  3.8   |  21<L>  |  1.44<H>  01-07    134<L>  |  99  |  28<H>  ----------------------------<  96  3.3<L>   |  22  |  1.37<H>  01-06    x   |  x   |  x   ----------------------------<  x   x    |  x   |  1.47<H>  01-05    x   |  x   |  x   ----------------------------<  x   x    |  x   |  1.53<H>    Ca    9.0      09 Jan 2021 07:57  Ca    8.7      08 Jan 2021 06:37  Phos  3.3     01-08  Mg     2.1     01-08    TPro  6.2  /  Alb  2.7<L>  /  TBili  0.7  /  DBili  0.2  /  AST  31  /  ALT  84<H>  /  AlkPhos  179<H>  01-09  TPro  6.1  /  Alb  2.9<L>  /  TBili  0.8  /  DBili  x   /  AST  41<H>  /  ALT  102<H>  /  AlkPhos  193<H>  01-08  TPro  6.6  /  Alb  3.1<L>  /  TBili  1.0  /  DBili  x   /  AST  80<H>  /  ALT  124<H>  /  AlkPhos  218<H>  01-07  TPro  7.0  /  Alb  3.1<L>  /  TBili  0.7  /  DBili  0.3<H>  /  AST  82<H>  /  ALT  97<H>  /  AlkPhos  220<H>  01-06  TPro  6.7  /  Alb  3.0<L>  /  TBili  0.7  /  DBili  0.3<H>  /  AST  48<H>  /  ALT  52<H>  /  AlkPhos  189<H>  01-05    CAPILLARY BLOOD GLUCOSE          LIVER FUNCTIONS - ( 09 Jan 2021 07:57 )  Alb: 2.7 g/dL / Pro: 6.2 g/dL / ALK PHOS: 179 U/L / ALT: 84 U/L / AST: 31 U/L / GGT: x               D-Dimer Assay, Quantitative: 2379 ng/mL DDU (01-08 @ 06:34)  D-Dimer Assay, Quantitative: 1953 ng/mL DDU (01-06 @ 06:49)  D-Dimer Assay, Quantitative: 4074 ng/mL DDU (01-05 @ 07:46)  D-Dimer Assay, Quantitative: 1412 ng/mL DDU (01-04 @ 13:58)        RECENT CULTURES:      r< from: US Duplex Venous Lower Ext Complete, Bilateral (01.05.21 @ 14:33) >    EXAM:  US DPLX LWR EXT VEINS COMPL BI        PROCEDURE DATE:  Jan 5 2021         INTERPRETATION:  CLINICAL INFORMATION: Elevated d-dimer in setting of Covid 19. Evaluate for deep venous thrombosis    COMPARISON: None available.    TECHNIQUE: Duplex sonography of the BILATERAL LOWER extremity veins with color and spectral Doppler, with and without compression.    FINDINGS:    There is normal compressibility of the bilateral common femoral, femoral, popliteal, posterior tibial and peroneal veins.  Doppler examination shows normal spontaneous and phasic flow.    No calf vein thrombosis is detected.    IMPRESSION:  No evidence of deep venous thrombosis in either lower extremity.                PIERRE GIORDANO MD; Resident Radiology  This documenthas been electronically signed.  LISANDRA MEDEIROS MD; Attending Radiologist  This document has been electronically signed. Jan 5 2021  4:15PM    < end of copied text >  < from: Xray Chest 1 View-PORTABLE IMMEDIATE (Xray Chest 1 View-PORTABLE IMMEDIATE .) (01.04.21 @ 12:52) >    EXAM:  XR CHEST PORTABLE IMMED 1V        PROCEDURE DATE:  Jan 4 2021         INTERPRETATION:  A single chest x-ray was obtained on January 4, 2021.    Indication: Cough. Shortness of breath.    Impression:    The heart is enlarged. Diffuse airspace opacity is seen in both lungs especially peripherally changes compatible with Covid 19 pneumonia. No pleural effusion. No pneumothorax.              JAYME WRAY MD; Attending Radiologist  This document has been electronically signed. Jan 4 2021  2:04PM    < end of copied text >    RESPIRATORY CULTURES:          Studies  Chest X-RAY  CT SCAN Chest   Venous Dopplers: LE:   CT Abdomen  Others              
[facility]   MD LEONELA JULIO DO ANAM SIDDIQUI ANGELA WONG, PA    TEL:  OFFICE: [phone]  DR POST CELL: [phone]  DR. RAMOS CELL: [phone]  DR. MARTINEZ CELL: [phone]  LESVIA WILLOUGHBY CELL: [phone]    From 5pm-7am Answering Service [phone]    -- RENAL FOLLOW UP NOTE ---Date of Service 01-07-21 @ 12:46    Patient is a 75y old  Male who presents with a chief complaint of shortness of breath (07 Jan 2021 10:38)      Patient seen and examined at bedside. No chest pain/sob    VITALS:  T(F): 98 (01-07-21 @ 11:55), Max: 98.1 (01-06-21 @ 22:35)  HR: 86 (01-07-21 @ 11:55)  BP: 150/70 (01-07-21 @ 11:55)  RR: 18 (01-07-21 @ 11:55)  SpO2: 96% (01-07-21 @ 11:55)  Wt(kg): --    01-06 @ 07:01  -  01-07 @ 07:00  --------------------------------------------------------  IN: 0 mL / OUT: 400 mL / NET: -400 mL    01-07 @ 07:01  -  01-07 @ 12:46  --------------------------------------------------------  IN: 0 mL / OUT: 350 mL / NET: -350 mL          PHYSICAL EXAM:  Constitutional: NAD  Neck: No JVD  Respiratory: CTAB, no wheezes, rales or rhonchi  Cardiovascular: S1, S2, RRR  Gastrointestinal: BS+, soft, NT/ND  Extremities: No peripheral edema    Hospital Medications:   MEDICATIONS  (STANDING):  ALBUTerol    90 MICROgram(s) HFA Inhaler 2 Puff(s) Inhalation every 6 hours  amLODIPine   Tablet 10 milliGRAM(s) Oral daily  atorvastatin 10 milliGRAM(s) Oral at bedtime  budesonide 160 MICROgram(s)/formoterol 4.5 MICROgram(s) Inhaler 2 Puff(s) Inhalation two times a day  cholecalciferol 1000 Unit(s) Oral daily  cyanocobalamin 1000 MICROGram(s) Oral daily  dexAMETHasone  Injectable 6 milliGRAM(s) IV Push daily  furosemide    Tablet 20 milliGRAM(s) Oral daily  guaiFENesin  milliGRAM(s) Oral every 12 hours  heparin   Injectable 5000 Unit(s) SubCutaneous every 8 hours  metoprolol succinate ER 25 milliGRAM(s) Oral daily  multivitamin 1 Tablet(s) Oral daily  pantoprazole    Tablet 40 milliGRAM(s) Oral before breakfast  potassium chloride   Powder 20 milliEquivalent(s) Oral once  remdesivir  IVPB 100 milliGRAM(s) IV Intermittent every 24 hours  remdesivir  IVPB   IV Intermittent   sodium chloride 1 Gram(s) Oral two times a day  tamsulosin 0.4 milliGRAM(s) Oral at bedtime      LABS:  01-07    134<L>  |  99  |  28<H>  ----------------------------<  96  3.3<L>   |  22  |  1.37<H>    Ca    8.8      07 Jan 2021 07:45  Phos  2.9     01-07  Mg     2.0     01-07    TPro  6.6  /  Alb  3.1<L>  /  TBili  1.0  /  DBili      /  AST  80<H>  /  ALT  124<H>  /  AlkPhos  218<H>  01-07    Creatinine Trend: 1.37 <--, 1.47 <--, 1.53 <--, 1.54 <--, 1.71 <--    Albumin, Serum: 3.1 g/dL (01-07 @ 07:45)  Phosphorus Level, Serum: 2.9 mg/dL (01-07 @ 07:45)  Ferritin, Serum: 703 ng/mL (01-07 @ 07:45)                              10.9   10.11 )-----------( 441      ( 07 Jan 2021 07:45 )             32.2     Urine Studies:    Urine Sodium 32      [01-05-21 @ 08:38]  Urine Osmolality 457      [01-05-21 @ 08:38]    Ferritin 703      [01-07-21 @ 07:45]  Vitamin D (25OH) 26.2      [01-05-21 @ 11:00]  HbA1c 6.3      [09-13-17 @ 08:15]        RADIOLOGY & ADDITIONAL STUDIES:  
Patient is a 75y old  Male who presents with a chief complaint of shortness of breath (12 Jan 2021 15:09)    Date of servie : 01-12-21 @ 17:45  INTERVAL HPI/OVERNIGHT EVENTS:  T(C): 36.8 (01-12-21 @ 13:34), Max: 37.2 (01-11-21 @ 21:44)  HR: 78 (01-12-21 @ 13:34) (63 - 78)  BP: 122/54 (01-12-21 @ 13:34) (112/61 - 129/62)  RR: 18 (01-12-21 @ 13:34) (18 - 18)  SpO2: 99% (01-12-21 @ 13:34) (96% - 99%)  Wt(kg): --  I&O's Summary    11 Jan 2021 07:01  -  12 Jan 2021 07:00  --------------------------------------------------------  IN: 450 mL / OUT: 1000 mL / NET: -550 mL    12 Jan 2021 07:01  -  12 Jan 2021 17:45  --------------------------------------------------------  IN: 0 mL / OUT: 300 mL / NET: -300 mL        LABS:                        11.2   8.75  )-----------( 598      ( 12 Jan 2021 07:38 )             32.4     01-12    131<L>  |  94<L>  |  28<H>  ----------------------------<  89  4.1   |  27  |  1.51<H>    Ca    9.2      12 Jan 2021 07:38  Phos  3.3     01-12  Mg     2.3     01-12    TPro  6.2  /  Alb  3.1<L>  /  TBili  0.7  /  DBili  x   /  AST  17  /  ALT  48<H>  /  AlkPhos  154<H>  01-11        CAPILLARY BLOOD GLUCOSE      POCT Blood Glucose.: 98 mg/dL (12 Jan 2021 07:33)            MEDICATIONS  (STANDING):  amLODIPine   Tablet 10 milliGRAM(s) Oral daily  atorvastatin 10 milliGRAM(s) Oral at bedtime  budesonide 160 MICROgram(s)/formoterol 4.5 MICROgram(s) Inhaler 2 Puff(s) Inhalation two times a day  cholecalciferol 1000 Unit(s) Oral daily  cyanocobalamin 1000 MICROGram(s) Oral daily  dexAMETHasone  Injectable 6 milliGRAM(s) IV Push daily  furosemide    Tablet 20 milliGRAM(s) Oral daily  heparin   Injectable 5000 Unit(s) SubCutaneous every 8 hours  metoprolol succinate ER 25 milliGRAM(s) Oral daily  multivitamin 1 Tablet(s) Oral daily  pantoprazole    Tablet 40 milliGRAM(s) Oral before breakfast  polyethylene glycol 3350 17 Gram(s) Oral daily  senna 2 Tablet(s) Oral at bedtime  sodium chloride 1 Gram(s) Oral two times a day  tamsulosin 0.4 milliGRAM(s) Oral at bedtime    MEDICATIONS  (PRN):  aluminum hydroxide/magnesium hydroxide/simethicone Suspension 30 milliLiter(s) Oral once PRN Dyspepsia  fluticasone propionate 50 MICROgram(s)/spray Nasal Spray 1 Spray(s) Both Nostrils two times a day PRN Nasal Congestion  sodium chloride 0.65% Nasal 1 Spray(s) Both Nostrils three times a day PRN Nasal Congestion          PHYSICAL EXAM:  GENERAL: NAD, well-groomed, well-developed  HEAD:  Atraumatic, Normocephalic  CHEST/LUNG: Clear to percussion bilaterally; No rales, rhonchi, wheezing, or rubs  HEART: Regular rate and rhythm; No murmurs, rubs, or gallops  ABDOMEN: Soft, Nontender, Nondistended; Bowel sounds present  EXTREMITIES:  2+ Peripheral Pulses, No clubbing, cyanosis, or edema  LYMPH: No lymphadenopathy noted  SKIN: No rashes or lesions    Care Discussed with Consultants/Other Providers [ ] YES  [ ] NO
Problem/Plan - 1:  ·  Problem: Acute respiratory failure with hypoxia.  Plan: due to COVID-19 viral pneumonia,   finihsed remdesivir   Trend ferritin, D-dimer, proBNP q48-72h  Monitor off Abx.     Problem/Plan - 2:  ·  Problem: Stage 3 chronic kidney disease, unspecified whether stage 3a or 3b CKD.  Plan: Baseline Cr: 1.7. Currently at baselineAvoid nephrotoxins, NSAIDs  renally dose all meds.     Problem/Plan - 3:  ·  Problem: Hypertension, unspecified type.  Plan: continue Norvasc  SBP up to 140s ok in this elderly patient with COVID infection, would not aggressively treat.     Problem/Plan - 4:  ·  Problem: Hyponatremia.  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  Plan: s/p cystoscopy, Incision of Ureteral Orifice, Left laparoscopic Nephroureterectomy and Retroperitoneal Lymphadenectomy  Outpatient followup with Oncology. 
Date of Service: 01-10-21 @ 12:38    Patient is a 75y old  Male who presents with a chief complaint of shortness of breath (09 Jan 2021 20:20)      Any change in ROS: Seems to be doing better: black color phelmg:  still feels SOB on exertion;  No hemptysis    MEDICATIONS  (STANDING):  amLODIPine   Tablet 10 milliGRAM(s) Oral daily  atorvastatin 10 milliGRAM(s) Oral at bedtime  budesonide 160 MICROgram(s)/formoterol 4.5 MICROgram(s) Inhaler 2 Puff(s) Inhalation two times a day  cholecalciferol 1000 Unit(s) Oral daily  cyanocobalamin 1000 MICROGram(s) Oral daily  dexAMETHasone  Injectable 6 milliGRAM(s) IV Push daily  furosemide    Tablet 20 milliGRAM(s) Oral daily  heparin   Injectable 5000 Unit(s) SubCutaneous every 8 hours  metoprolol succinate ER 25 milliGRAM(s) Oral daily  multivitamin 1 Tablet(s) Oral daily  pantoprazole    Tablet 40 milliGRAM(s) Oral before breakfast  polyethylene glycol 3350 17 Gram(s) Oral daily  senna 2 Tablet(s) Oral at bedtime  sodium chloride 1 Gram(s) Oral two times a day  tamsulosin 0.4 milliGRAM(s) Oral at bedtime    MEDICATIONS  (PRN):  aluminum hydroxide/magnesium hydroxide/simethicone Suspension 30 milliLiter(s) Oral once PRN Dyspepsia  fluticasone propionate 50 MICROgram(s)/spray Nasal Spray 1 Spray(s) Both Nostrils two times a day PRN Nasal Congestion  sodium chloride 0.65% Nasal 1 Spray(s) Both Nostrils three times a day PRN Nasal Congestion    Vital Signs Last 24 Hrs  T(C): 36.9 (10 Lan 2021 06:03), Max: 36.9 (09 Jan 2021 20:26)  T(F): 98.4 (10 Lan 2021 06:03), Max: 98.5 (09 Jan 2021 20:26)  HR: 73 (10 Lan 2021 06:03) (73 - 81)  BP: 134/66 (10 Lan 2021 06:03) (134/66 - 136/69)  BP(mean): --  RR: 19 (10 Lan 2021 06:03) (19 - 20)  SpO2: 97% (10 Lan 2021 06:03) (97% - 99%)    I&O's Summary    09 Jan 2021 07:01  -  10 Lan 2021 07:00  --------------------------------------------------------  IN: 500 mL / OUT: 1000 mL / NET: -500 mL          Physical Exam:   GENERAL: NAD, well-groomed, well-developed  HEENT: KATARZYNA/   Atraumatic, Normocephalic  ENMT: No tonsillar erythema, exudates, or enlargement; Moist mucous membranes, Good dentition, No lesions  NECK: Supple, No JVD, Normal thyroid  CHEST/LUNG: CPoor air entry bilateally:   CVS: Regular rate and rhythm; No murmurs, rubs, or gallops  GI: : Soft, Nontender, Nondistended; Bowel sounds present  NERVOUS SYSTEM:  Alert & Oriented X3  EXTREMITIES:  -edema  LYMPH: No lymphadenopathy noted  SKIN: No rashes or lesions  ENDOCRINOLOGY: No Thyromegaly  PSYCH: Appropriate    Labs:  24                            10.5   7.76  )-----------( 546      ( 10 Lan 2021 08:46 )             31.8                         10.5   8.20  )-----------( 518      ( 09 Jan 2021 07:57 )             30.0                         10.1   7.43  )-----------( 419      ( 08 Jan 2021 07:25 )             28.8                         10.9   10.11 )-----------( 441      ( 07 Jan 2021 07:45 )             32.2     01-10    130<L>  |  96<L>  |  26<H>  ----------------------------<  84  4.3   |  22  |  1.32<H>  01-09    130<L>  |  97<L>  |  30<H>  ----------------------------<  95  3.9   |  20<L>  |  1.37<H>  01-08    131<L>  |  99  |  30<H>  ----------------------------<  114<H>  3.8   |  21<L>  |  1.44<H>  01-07    134<L>  |  99  |  28<H>  ----------------------------<  96  3.3<L>   |  22  |  1.37<H>    Ca    9.0      10 Lan 2021 08:46  Ca    9.0      09 Jan 2021 07:57    TPro  6.3  /  Alb  2.8<L>  /  TBili  0.7  /  DBili  0.2  /  AST  24  /  ALT  65<H>  /  AlkPhos  165<H>  01-10  TPro  6.2  /  Alb  2.7<L>  /  TBili  0.7  /  DBili  0.2  /  AST  31  /  ALT  84<H>  /  AlkPhos  179<H>  01-09  TPro  6.1  /  Alb  2.9<L>  /  TBili  0.8  /  DBili  x   /  AST  41<H>  /  ALT  102<H>  /  AlkPhos  193<H>  01-08  TPro  6.6  /  Alb  3.1<L>  /  TBili  1.0  /  DBili  x   /  AST  80<H>  /  ALT  124<H>  /  AlkPhos  218<H>  01-07    CAPILLARY BLOOD GLUCOSE          LIVER FUNCTIONS - ( 10 Lan 2021 08:46 )  Alb: 2.8 g/dL / Pro: 6.3 g/dL / ALK PHOS: 165 U/L / ALT: 65 U/L / AST: 24 U/L / GGT: x               D-Dimer Assay, Quantitative: 2194 ng/mL DDU (01-10 @ 08:46)  D-Dimer Assay, Quantitative: 2379 ng/mL DDU (01-08 @ 06:34)  D-Dimer Assay, Quantitative: 1953 ng/mL DDU (01-06 @ 06:49)  D-Dimer Assay, Quantitative: 4074 ng/mL DDU (01-05 @ 07:46)  D-Dimer Assay, Quantitative: 1412 ng/mL DDU (01-04 @ 13:58)        RECENT CULTURES:    r< from: US Duplex Venous Lower Ext Complete, Bilateral (01.05.21 @ 14:33) >    EXAM:  US DPLX LWR EXT VEINS COMPL BI        PROCEDURE DATE:  Jan 5 2021         INTERPRETATION:  CLINICAL INFORMATION: Elevated d-dimer in setting of Covid 19. Evaluate for deep venous thrombosis    COMPARISON: None available.    TECHNIQUE: Duplex sonography of the BILATERAL LOWER extremity veins with color and spectral Doppler, with and without compression.    FINDINGS:    There is normal compressibility of the bilateral common femoral, femoral, popliteal, posterior tibial and peroneal veins.  Doppler examination shows normal spontaneous and phasic flow.    No calf vein thrombosis is detected.    IMPRESSION:  No evidence of deep venous thrombosis in either lower extremity.                PIERRE GIORDANO MD; Resident Radiology  This documenthas been electronically signed.  LISANDRA MEDEIROS MD; Attending Radiologist  This document has been electronically signed. Jan 5 2021  4:15PM    < end of copied text >  < from: Xray Chest 1 View-PORTABLE IMMEDIATE (Xray Chest 1 View-PORTABLE IMMEDIATE .) (01.04.21 @ 12:52) >    EXAM:  XR CHEST PORTABLE IMMED 1V        PROCEDURE DATE:  Jan 4 2021         INTERPRETATION:  A single chest x-ray was obtained on January 4, 2021.    Indication: Cough. Shortness of breath.    Impression:    The heart is enlarged. Diffuse airspace opacity is seen in both lungs especially peripherally changes compatible with Covid 19 pneumonia. No pleural effusion. No pneumothorax.              JAYME WRAY MD; Attending Radiologist  This document has been electronically signed. Jan 4 2021  2:04PM    < end of copied text >      RESPIRATORY CULTURES:          Studies  Chest X-RAY  CT SCAN Chest   Venous Dopplers: LE:   CT Abdomen  Others              
Date of Service: 01-11-21 @ 10:54    Patient is a 75y old  Male who presents with a chief complaint of shortness of breath (10 Lan 2021 16:52)      Any change in ROS: He is doing great:  he is on 1 L of oxygen   gets SOB on exertion    MEDICATIONS  (STANDING):  amLODIPine   Tablet 10 milliGRAM(s) Oral daily  atorvastatin 10 milliGRAM(s) Oral at bedtime  budesonide 160 MICROgram(s)/formoterol 4.5 MICROgram(s) Inhaler 2 Puff(s) Inhalation two times a day  cholecalciferol 1000 Unit(s) Oral daily  cyanocobalamin 1000 MICROGram(s) Oral daily  dexAMETHasone  Injectable 6 milliGRAM(s) IV Push daily  furosemide    Tablet 20 milliGRAM(s) Oral daily  heparin   Injectable 5000 Unit(s) SubCutaneous every 8 hours  metoprolol succinate ER 25 milliGRAM(s) Oral daily  multivitamin 1 Tablet(s) Oral daily  pantoprazole    Tablet 40 milliGRAM(s) Oral before breakfast  polyethylene glycol 3350 17 Gram(s) Oral daily  senna 2 Tablet(s) Oral at bedtime  sodium chloride 1 Gram(s) Oral two times a day  tamsulosin 0.4 milliGRAM(s) Oral at bedtime    MEDICATIONS  (PRN):  aluminum hydroxide/magnesium hydroxide/simethicone Suspension 30 milliLiter(s) Oral once PRN Dyspepsia  fluticasone propionate 50 MICROgram(s)/spray Nasal Spray 1 Spray(s) Both Nostrils two times a day PRN Nasal Congestion  sodium chloride 0.65% Nasal 1 Spray(s) Both Nostrils three times a day PRN Nasal Congestion    Vital Signs Last 24 Hrs  T(C): 36.9 (11 Jan 2021 06:21), Max: 36.9 (11 Jan 2021 06:21)  T(F): 98.5 (11 Jan 2021 06:21), Max: 98.5 (11 Jan 2021 06:21)  HR: 81 (11 Jan 2021 06:21) (70 - 81)  BP: 134/61 (11 Jan 2021 06:26) (109/63 - 150/65)  BP(mean): --  RR: 18 (11 Jan 2021 06:21) (18 - 18)  SpO2: 95% (11 Jan 2021 06:21) (95% - 100%)    I&O's Summary    10 Lan 2021 07:01  -  11 Jan 2021 07:00  --------------------------------------------------------  IN: 1200 mL / OUT: 1050 mL / NET: 150 mL          Physical Exam:   GENERAL: NAD, well-groomed, well-developed  HEENT: KATARZYNA/   Atraumatic, Normocephalic  ENMT: No tonsillar erythema, exudates, or enlargement; Moist mucous membranes, Good dentition, No lesions  NECK: Supple, No JVD, Normal thyroid  CHEST/LUNGno wheezing-  CVS: Regular rate and rhythm; No murmurs, rubs, or gallops  GI: : Soft, Nontender, Nondistended; Bowel sounds present  NERVOUS SYSTEM:  Alert & Oriented X3  EXTREMITIES: - edema  LYMPH: No lymphadenopathy noted  SKIN: No rashes or lesions  ENDOCRINOLOGY: No Thyromegaly  PSYCH: Appropriate    Labs:  24                            10.5   8.61  )-----------( 532      ( 11 Jan 2021 08:18 )             30.9                         10.5   7.76  )-----------( 546      ( 10 Lan 2021 08:46 )             31.8                         10.5   8.20  )-----------( 518      ( 09 Jan 2021 07:57 )             30.0                         10.1   7.43  )-----------( 419      ( 08 Jan 2021 07:25 )             28.8     01-11    134<L>  |  98  |  27<H>  ----------------------------<  97  3.8   |  25  |  1.39<H>  01-10    130<L>  |  96<L>  |  26<H>  ----------------------------<  84  4.3   |  22  |  1.32<H>  01-09    130<L>  |  97<L>  |  30<H>  ----------------------------<  95  3.9   |  20<L>  |  1.37<H>  01-08    131<L>  |  99  |  30<H>  ----------------------------<  114<H>  3.8   |  21<L>  |  1.44<H>    Ca    8.8      11 Jan 2021 08:18  Ca    9.0      10 Lan 2021 08:46    TPro  6.2  /  Alb  3.1<L>  /  TBili  0.7  /  DBili  x   /  AST  17  /  ALT  48<H>  /  AlkPhos  154<H>  01-11  TPro  6.3  /  Alb  2.8<L>  /  TBili  0.7  /  DBili  0.2  /  AST  24  /  ALT  65<H>  /  AlkPhos  165<H>  01-10  TPro  6.2  /  Alb  2.7<L>  /  TBili  0.7  /  DBili  0.2  /  AST  31  /  ALT  84<H>  /  AlkPhos  179<H>  01-09  TPro  6.1  /  Alb  2.9<L>  /  TBili  0.8  /  DBili  x   /  AST  41<H>  /  ALT  102<H>  /  AlkPhos  193<H>  01-08    CAPILLARY BLOOD GLUCOSE          LIVER FUNCTIONS - ( 11 Jan 2021 08:18 )  Alb: 3.1 g/dL / Pro: 6.2 g/dL / ALK PHOS: 154 U/L / ALT: 48 U/L / AST: 17 U/L / GGT: x               D-Dimer Assay, Quantitative: 2194 ng/mL DDU (01-10 @ 08:46)  D-Dimer Assay, Quantitative: 2379 ng/mL DDU (01-08 @ 06:34)  D-Dimer Assay, Quantitative: 1953 ng/mL DDU (01-06 @ 06:49)  D-Dimer Assay, Quantitative: 4074 ng/mL DDU (01-05 @ 07:46)  D-Dimer Assay, Quantitative: 1412 ng/mL DDU (01-04 @ 13:58)        RECENT CULTURES:        RESPIRATORY CULTURES:    < from: US Duplex Venous Lower Ext Complete, Bilateral (01.05.21 @ 14:33) >  PROCEDURE DATE:  Jan 5 2021         INTERPRETATION:  CLINICAL INFORMATION: Elevated d-dimer in setting of Covid 19. Evaluate for deep venous thrombosis    COMPARISON: None available.    TECHNIQUE: Duplex sonography of the BILATERAL LOWER extremity veins with color and spectral Doppler, with and without compression.    FINDINGS:    There is normal compressibility of the bilateral common femoral, femoral, popliteal, posterior tibial and peroneal veins.  Doppler examination shows normal spontaneous and phasic flow.    No calf vein thrombosis is detected.    IMPRESSION:  No evidence of deep venous thrombosis in either lower extremity.                PIERRE GIORDANO MD; Resident Radiology  This documenthas been electronically signed.  LISANDRA MEDEIROS MD; Attending Radiologist  This document has been electronically signed. Jan 5 2021  4:15PM    < end of copied text >  < from: Xray Chest 1 View-PORTABLE IMMEDIATE (Xray Chest 1 View-PORTABLE IMMEDIATE .) (01.04.21 @ 12:52) >    EXAM:  XR CHEST PORTABLE IMMED 1V        PROCEDURE DATE:  Jan 4 2021         INTERPRETATION:  A single chest x-ray was obtained on January 4, 2021.    Indication: Cough. Shortness of breath.    Impression:    The heart is enlarged. Diffuse airspace opacity is seen in both lungs especially peripherally changes compatible with Covid 19 pneumonia. No pleural effusion. No pneumothorax.              JAYME WRAY MD; Attending Radiologist  This document has been electronically signed. Jan 4 2021  2:04PM    < end of copied text >        Studies  Chest X-RAY  CT SCAN Chest   Venous Dopplers: LE:   CT Abdomen  Others              
[facility]   MD LEONELA JULIO DO ANAM SIDDIQUI ANGELA WONG, [place]    TEL:  OFFICE: [phone]  DR POST CELL: [phone]  DR. RAMOS CELL: [phone]  DR. MARTINEZ CELL: [phone]  LESVIA WILLOUGHBY CELL: [phone]    From 5pm-7am Answering Service [phone]    -- RENAL FOLLOW UP NOTE ---Date of Service 01-13-21 @ 14:41    Patient is a 75y old  Male who presents with a chief complaint of shortness of breath (13 Jan 2021 14:16)      Patient seen and examined at bedside. No chest pain/sob    VITALS:  T(F): 98.1 (01-13-21 @ 11:30), Max: 98.1 (01-13-21 @ 11:30)  HR: 86 (01-13-21 @ 13:30)  BP: 137/54 (01-13-21 @ 11:30)  RR: 18 (01-13-21 @ 13:30)  SpO2: 96% (01-13-21 @ 13:30)  Wt(kg): --    01-12 @ 07:01  -  01-13 @ 07:00  --------------------------------------------------------  IN: 0 mL / OUT: 950 mL / NET: -950 mL    01-13 @ 07:01  -  01-13 @ 14:41  --------------------------------------------------------  IN: 0 mL / OUT: 1100 mL / NET: -1100 mL          PHYSICAL EXAM:  Constitutional: NAD  Neck: No JVD  Respiratory: CTAB, no wheezes, rales or rhonchi  Cardiovascular: S1, S2, RRR  Gastrointestinal: BS+, soft, NT/ND  Extremities: No peripheral edema    Hospital Medications:   MEDICATIONS  (STANDING):  amLODIPine   Tablet 10 milliGRAM(s) Oral daily  atorvastatin 10 milliGRAM(s) Oral at bedtime  budesonide 160 MICROgram(s)/formoterol 4.5 MICROgram(s) Inhaler 2 Puff(s) Inhalation two times a day  cholecalciferol 1000 Unit(s) Oral daily  cyanocobalamin 1000 MICROGram(s) Oral daily  dexAMETHasone  Injectable 6 milliGRAM(s) IV Push daily  furosemide    Tablet 20 milliGRAM(s) Oral daily  heparin   Injectable 5000 Unit(s) SubCutaneous every 8 hours  metoprolol succinate ER 25 milliGRAM(s) Oral daily  multivitamin 1 Tablet(s) Oral daily  pantoprazole    Tablet 40 milliGRAM(s) Oral before breakfast  polyethylene glycol 3350 17 Gram(s) Oral daily  senna 2 Tablet(s) Oral at bedtime  sodium chloride 1 Gram(s) Oral two times a day  tamsulosin 0.4 milliGRAM(s) Oral at bedtime      LABS:  01-13    131<L>  |  95<L>  |  28<H>  ----------------------------<  92  4.2   |  27  |  1.58<H>    Ca    9.1      13 Jan 2021 07:27  Phos  3.2     01-13  Mg     2.3     01-13      Creatinine Trend: 1.58 <--, 1.51 <--, 1.39 <--, 1.32 <--, 1.37 <--, 1.44 <--, 1.37 <--    Phosphorus Level, Serum: 3.2 mg/dL (01-13 @ 07:27)  Ferritin, Serum: 469 ng/mL (01-13 @ 07:27)                              11.2   7.59  )-----------( 562      ( 13 Jan 2021 07:27 )             33.3     Urine Studies:      Ferritin 469      [01-13-21 @ 07:27]  Vitamin D (25OH) 26.2      [01-05-21 @ 11:00]  HbA1c 6.3      [09-13-17 @ 08:15]        RADIOLOGY & ADDITIONAL STUDIES:  
Patient is a 75y old  Male who presents with a chief complaint of shortness of breath (08 Jan 2021 15:00)    Date of servie : 01-08-21 @ 15:56  INTERVAL HPI/OVERNIGHT EVENTS:  T(C): 37 (01-08-21 @ 14:10), Max: 37.1 (01-08-21 @ 05:09)  HR: 86 (01-08-21 @ 14:10) (80 - 90)  BP: 150/72 (01-08-21 @ 14:10) (134/61 - 150/72)  RR: 20 (01-08-21 @ 14:10) (18 - 20)  SpO2: 95% (01-08-21 @ 14:10) (95% - 98%)  Wt(kg): --  I&O's Summary    07 Jan 2021 07:01  -  08 Jan 2021 07:00  --------------------------------------------------------  IN: 0 mL / OUT: 350 mL / NET: -350 mL        LABS:                        10.1   7.43  )-----------( 419      ( 08 Jan 2021 07:25 )             28.8     01-08    131<L>  |  99  |  30<H>  ----------------------------<  114<H>  3.8   |  21<L>  |  1.44<H>    Ca    8.7      08 Jan 2021 06:37  Phos  3.3     01-08  Mg     2.1     01-08    TPro  6.1  /  Alb  2.9<L>  /  TBili  0.8  /  DBili  x   /  AST  41<H>  /  ALT  102<H>  /  AlkPhos  193<H>  01-08        CAPILLARY BLOOD GLUCOSE                MEDICATIONS  (STANDING):  amLODIPine   Tablet 10 milliGRAM(s) Oral daily  atorvastatin 10 milliGRAM(s) Oral at bedtime  budesonide 160 MICROgram(s)/formoterol 4.5 MICROgram(s) Inhaler 2 Puff(s) Inhalation two times a day  cholecalciferol 1000 Unit(s) Oral daily  cyanocobalamin 1000 MICROGram(s) Oral daily  dexAMETHasone  Injectable 6 milliGRAM(s) IV Push daily  furosemide    Tablet 20 milliGRAM(s) Oral daily  heparin   Injectable 5000 Unit(s) SubCutaneous every 8 hours  metoprolol succinate ER 25 milliGRAM(s) Oral daily  multivitamin 1 Tablet(s) Oral daily  pantoprazole    Tablet 40 milliGRAM(s) Oral before breakfast  polyethylene glycol 3350 17 Gram(s) Oral daily  remdesivir  IVPB 100 milliGRAM(s) IV Intermittent every 24 hours  remdesivir  IVPB   IV Intermittent   senna 2 Tablet(s) Oral at bedtime  sodium chloride 1 Gram(s) Oral two times a day  tamsulosin 0.4 milliGRAM(s) Oral at bedtime    MEDICATIONS  (PRN):  sodium chloride 0.65% Nasal 1 Spray(s) Both Nostrils three times a day PRN Nasal Congestion          PHYSICAL EXAM:  GENERAL: NAD, well-groomed, well-developed  HEAD:  Atraumatic, Normocephalic  CHEST/LUNG: Clear to percussion bilaterally; No rales, rhonchi, wheezing, or rubs  HEART: Regular rate and rhythm; No murmurs, rubs, or gallops  ABDOMEN: Soft, Nontender, Nondistended; Bowel sounds present  EXTREMITIES:  2+ Peripheral Pulses, No clubbing, cyanosis, or edema  LYMPH: No lymphadenopathy noted  SKIN: No rashes or lesions    Care Discussed with Consultants/Other Providers [ ] YES  [ ] NO
MD TITUS  75y  Patient is a 75y old  Male who presents with a chief complaint of shortness of breath (09 Jan 2021 17:03)    HPI:  This is a patient with COVID and EDU on CKD.    HEALTH ISSUES - PROBLEM Dx:  Medication monitoring encounter  Medication monitoring encounter    Transaminitis  Transaminitis    Hypoxia  Hypoxia    Pneumonia due to COVID-19 virus  Pneumonia due to COVID-19 virus    Prophylactic measure  Prophylactic measure    Malignant neoplasm of urinary bladder, unspecified site  Malignant neoplasm of urinary bladder, unspecified site    Hyponatremia  Hyponatremia    Hypertension, unspecified type  Hypertension, unspecified type    Stage 3 chronic kidney disease, unspecified whether stage 3a or 3b CKD  Stage 3 chronic kidney disease, unspecified whether stage 3a or 3b CKD    Acute respiratory failure with hypoxia  Acute respiratory failure with hypoxia          MEDICATIONS  (STANDING):  amLODIPine   Tablet 10 milliGRAM(s) Oral daily  atorvastatin 10 milliGRAM(s) Oral at bedtime  budesonide 160 MICROgram(s)/formoterol 4.5 MICROgram(s) Inhaler 2 Puff(s) Inhalation two times a day  cholecalciferol 1000 Unit(s) Oral daily  cyanocobalamin 1000 MICROGram(s) Oral daily  dexAMETHasone  Injectable 6 milliGRAM(s) IV Push daily  furosemide    Tablet 20 milliGRAM(s) Oral daily  heparin   Injectable 5000 Unit(s) SubCutaneous every 8 hours  metoprolol succinate ER 25 milliGRAM(s) Oral daily  multivitamin 1 Tablet(s) Oral daily  pantoprazole    Tablet 40 milliGRAM(s) Oral before breakfast  polyethylene glycol 3350 17 Gram(s) Oral daily  senna 2 Tablet(s) Oral at bedtime  sodium chloride 1 Gram(s) Oral two times a day  tamsulosin 0.4 milliGRAM(s) Oral at bedtime    MEDICATIONS  (PRN):  aluminum hydroxide/magnesium hydroxide/simethicone Suspension 30 milliLiter(s) Oral once PRN Dyspepsia  sodium chloride 0.65% Nasal 1 Spray(s) Both Nostrils three times a day PRN Nasal Congestion    Vital Signs Last 24 Hrs  T(C): 36.3 (09 Jan 2021 12:39), Max: 36.7 (08 Jan 2021 21:19)  T(F): 97.3 (09 Jan 2021 12:39), Max: 98 (08 Jan 2021 21:19)  HR: 81 (09 Jan 2021 12:39) (81 - 82)  BP: 136/53 (09 Jan 2021 12:39) (132/65 - 139/60)  BP(mean): --  RR: 20 (09 Jan 2021 12:39) (16 - 20)  SpO2: 99% (09 Jan 2021 12:39) (94% - 99%)  Daily     Daily     PHYSICAL EXAM:  Constitutional:  He appears comfortable and not distressed. Not diaphoretic.    Neck:  The thyroid is normal. Trachea is midline.     Cardiovascular: S1 and S2 are normal. No mummurs, rubs or gallops are present.    Gastrointestinal: The abdomen is soft. No tenderness is present. No masses are present. Bowel sounds are normal.    Genitourinary: The bladder is not distended. No CVA tenderness is present.    Extremities: No edema is noted. No deformities are present.    Neurological: Cognition is normal. Tone, power and sensation are normal.     Skin: No lesions are seen  or palpated.                              10.5   8.20  )-----------( 518      ( 09 Jan 2021 07:57 )             30.0     01-09    130<L>  |  97<L>  |  30<H>  ----------------------------<  95  3.9   |  20<L>  |  1.37<H>    Ca    9.0      09 Jan 2021 07:57  Phos  3.3     01-08  Mg     2.1     01-08    TPro  6.2  /  Alb  2.7<L>  /  TBili  0.7  /  DBili  0.2  /  AST  31  /  ALT  84<H>  /  AlkPhos  179<H>  01-09    Osmolality, Random Urine (01.05.21 @ 08:38)   Osmolality, Random Urine: 457 mosm/Kg   Osmolality, Random Urine (07.23.19 @ 19:30)   Osmolality, Random Urine: 326 mosmo/kg Osmolality,   
Patient is a 75y old  Male who presents with a chief complaint of shortness of breath (05 Jan 2021 12:22)      INTERVAL HPI/OVERNIGHT EVENTS:  T(C): 36.6 (01-05-21 @ 12:44), Max: 36.9 (01-04-21 @ 17:15)  HR: 97 (01-05-21 @ 12:44) (74 - 97)  BP: 144/63 (01-05-21 @ 12:44) (120/61 - 146/81)  RR: 19 (01-05-21 @ 12:44) (17 - 19)  SpO2: 96% (01-05-21 @ 12:44) (96% - 100%)  Wt(kg): --  I&O's Summary    04 Jan 2021 07:01  -  05 Jan 2021 07:00  --------------------------------------------------------  IN: 0 mL / OUT: 650 mL / NET: -650 mL        LABS:                        11.0   6.48  )-----------( 345      ( 05 Jan 2021 07:46 )             32.8     01-05    x   |  x   |  x   ----------------------------<  x   x    |  x   |  1.53<H>    Ca    9.2      05 Jan 2021 07:46  Phos  3.4     01-05  Mg     2.2     01-05    TPro  6.7  /  Alb  3.0<L>  /  TBili  0.7  /  DBili  0.3<H>  /  AST  48<H>  /  ALT  52<H>  /  AlkPhos  189<H>  01-05    PTT - ( 05 Jan 2021 15:03 )  PTT:26.3 sec    CAPILLARY BLOOD GLUCOSE      POCT Blood Glucose.: 130 mg/dL (05 Jan 2021 16:43)            MEDICATIONS  (STANDING):  ALBUTerol    90 MICROgram(s) HFA Inhaler 2 Puff(s) Inhalation every 6 hours  amLODIPine   Tablet 10 milliGRAM(s) Oral daily  atorvastatin 10 milliGRAM(s) Oral at bedtime  budesonide 160 MICROgram(s)/formoterol 4.5 MICROgram(s) Inhaler 2 Puff(s) Inhalation two times a day  cholecalciferol 1000 Unit(s) Oral daily  cyanocobalamin 1000 MICROGram(s) Oral daily  dexAMETHasone  Injectable 6 milliGRAM(s) IV Push daily  furosemide    Tablet 20 milliGRAM(s) Oral daily  guaiFENesin  milliGRAM(s) Oral every 12 hours  heparin   Injectable 5000 Unit(s) SubCutaneous every 8 hours  metoprolol succinate ER 25 milliGRAM(s) Oral daily  multivitamin 1 Tablet(s) Oral daily  pantoprazole    Tablet 40 milliGRAM(s) Oral before breakfast  remdesivir  IVPB   IV Intermittent   remdesivir  IVPB 200 milliGRAM(s) IV Intermittent every 24 hours  sodium chloride 1 Gram(s) Oral two times a day  tamsulosin 0.4 milliGRAM(s) Oral at bedtime    MEDICATIONS  (PRN):  senna 2 Tablet(s) Oral at bedtime PRN Constipation  sodium chloride 0.65% Nasal 1 Spray(s) Both Nostrils three times a day PRN Nasal Congestion          PHYSICAL EXAM:  GENERAL: NAD, well-groomed, well-developed  HEAD:  Atraumatic, Normocephalic  CHEST/LUNG: Clear to percussion bilaterally; No rales, rhonchi, wheezing, or rubs  HEART: Regular rate and rhythm; No murmurs, rubs, or gallops  ABDOMEN: Soft, Nontender, Nondistended; Bowel sounds present  EXTREMITIES:  2+ Peripheral Pulses, No clubbing, cyanosis, or edema  LYMPH: No lymphadenopathy noted  SKIN: No rashes or lesions    Care Discussed with Consultants/Other Providers [x ] YES  [ ] NO
Patient is a 75y old  Male who presents with a chief complaint of shortness of breath (06 Jan 2021 15:22)      INTERVAL HPI/OVERNIGHT EVENTS:  T(C): 36.6 (01-06-21 @ 13:18), Max: 37.3 (01-05-21 @ 21:38)  HR: 91 (01-06-21 @ 13:18) (87 - 93)  BP: 146/60 (01-06-21 @ 13:18) (139/65 - 147/65)  RR: 18 (01-06-21 @ 13:18) (18 - 18)  SpO2: 95% (01-06-21 @ 13:18) (95% - 98%)  Wt(kg): --  I&O's Summary      LABS:                        10.7   7.60  )-----------( 424      ( 06 Jan 2021 06:49 )             30.8     01-06    x   |  x   |  x   ----------------------------<  x   x    |  x   |  1.47<H>    Ca    9.2      05 Jan 2021 07:46  Phos  3.4     01-05  Mg     2.2     01-05    TPro  7.0  /  Alb  3.1<L>  /  TBili  0.7  /  DBili  0.3<H>  /  AST  82<H>  /  ALT  97<H>  /  AlkPhos  220<H>  01-06    PTT - ( 05 Jan 2021 15:03 )  PTT:26.3 sec    CAPILLARY BLOOD GLUCOSE      POCT Blood Glucose.: 132 mg/dL (06 Jan 2021 07:28)  POCT Blood Glucose.: 147 mg/dL (05 Jan 2021 23:08)            MEDICATIONS  (STANDING):  ALBUTerol    90 MICROgram(s) HFA Inhaler 2 Puff(s) Inhalation every 6 hours  amLODIPine   Tablet 10 milliGRAM(s) Oral daily  atorvastatin 10 milliGRAM(s) Oral at bedtime  budesonide 160 MICROgram(s)/formoterol 4.5 MICROgram(s) Inhaler 2 Puff(s) Inhalation two times a day  cholecalciferol 1000 Unit(s) Oral daily  cyanocobalamin 1000 MICROGram(s) Oral daily  dexAMETHasone  Injectable 6 milliGRAM(s) IV Push daily  furosemide    Tablet 20 milliGRAM(s) Oral daily  guaiFENesin  milliGRAM(s) Oral every 12 hours  heparin   Injectable 5000 Unit(s) SubCutaneous every 8 hours  metoprolol succinate ER 25 milliGRAM(s) Oral daily  multivitamin 1 Tablet(s) Oral daily  pantoprazole    Tablet 40 milliGRAM(s) Oral before breakfast  remdesivir  IVPB 100 milliGRAM(s) IV Intermittent every 24 hours  remdesivir  IVPB   IV Intermittent   sodium chloride 1 Gram(s) Oral two times a day  tamsulosin 0.4 milliGRAM(s) Oral at bedtime    MEDICATIONS  (PRN):  polyethylene glycol 3350 17 Gram(s) Oral daily PRN Constipation  senna 2 Tablet(s) Oral at bedtime PRN Constipation  sodium chloride 0.65% Nasal 1 Spray(s) Both Nostrils three times a day PRN Nasal Congestion          PHYSICAL EXAM:  GENERAL: frail  CHEST/LUNG: Clear to percussion bilaterally; No rales, rhonchi, wheezing, or rubs  HEART: Regular rate and rhythm; No murmurs, rubs, or gallops  ABDOMEN: Soft, Nontender, Nondistended; Bowel sounds present  EXTREMITIES:  no edema     Care Discussed with Consultants/Other Providers [ x] YES  [ ] NO
Patient is a 75y old  Male who presents with a chief complaint of shortness of breath (07 Jan 2021 12:45)    Date of servie : 01-07-21 @ 17:15  INTERVAL HPI/OVERNIGHT EVENTS:  T(C): 36.8 (01-07-21 @ 16:45), Max: 36.8 (01-07-21 @ 16:45)  HR: 90 (01-07-21 @ 16:45) (86 - 92)  BP: 139/59 (01-07-21 @ 16:45) (139/59 - 152/70)  RR: 18 (01-07-21 @ 16:45) (18 - 19)  SpO2: 97% (01-07-21 @ 16:45) (96% - 97%)  Wt(kg): --  I&O's Summary    06 Jan 2021 07:01  -  07 Jan 2021 07:00  --------------------------------------------------------  IN: 0 mL / OUT: 400 mL / NET: -400 mL    07 Jan 2021 07:01  -  07 Jan 2021 17:15  --------------------------------------------------------  IN: 0 mL / OUT: 350 mL / NET: -350 mL        LABS:                        10.9   10.11 )-----------( 441      ( 07 Jan 2021 07:45 )             32.2     01-07    134<L>  |  99  |  28<H>  ----------------------------<  96  3.3<L>   |  22  |  1.37<H>    Ca    8.8      07 Jan 2021 07:45  Phos  2.9     01-07  Mg     2.0     01-07    TPro  6.6  /  Alb  3.1<L>  /  TBili  1.0  /  DBili  x   /  AST  80<H>  /  ALT  124<H>  /  AlkPhos  218<H>  01-07        CAPILLARY BLOOD GLUCOSE                MEDICATIONS  (STANDING):  amLODIPine   Tablet 10 milliGRAM(s) Oral daily  atorvastatin 10 milliGRAM(s) Oral at bedtime  budesonide 160 MICROgram(s)/formoterol 4.5 MICROgram(s) Inhaler 2 Puff(s) Inhalation two times a day  cholecalciferol 1000 Unit(s) Oral daily  cyanocobalamin 1000 MICROGram(s) Oral daily  dexAMETHasone  Injectable 6 milliGRAM(s) IV Push daily  furosemide    Tablet 20 milliGRAM(s) Oral daily  guaiFENesin  milliGRAM(s) Oral every 12 hours  heparin   Injectable 5000 Unit(s) SubCutaneous every 8 hours  metoprolol succinate ER 25 milliGRAM(s) Oral daily  multivitamin 1 Tablet(s) Oral daily  pantoprazole    Tablet 40 milliGRAM(s) Oral before breakfast  polyethylene glycol 3350 17 Gram(s) Oral daily  remdesivir  IVPB 100 milliGRAM(s) IV Intermittent every 24 hours  remdesivir  IVPB   IV Intermittent   senna 2 Tablet(s) Oral at bedtime  sodium chloride 1 Gram(s) Oral two times a day  tamsulosin 0.4 milliGRAM(s) Oral at bedtime    MEDICATIONS  (PRN):  sodium chloride 0.65% Nasal 1 Spray(s) Both Nostrils three times a day PRN Nasal Congestion          PHYSICAL EXAM:  GENERAL: frail  CHEST/LUNG: Clear to percussion bilaterally; No rales, rhonchi, wheezing, or rubs  HEART: Regular rate and rhythm; No murmurs, rubs, or gallops  ABDOMEN: Soft, Nontender, Nondistended; Bowel sounds present  EXTREMITIES:  2+ Peripheral Pulses, No clubbing, cyanosis, or edema  LYMPH: No lymphadenopathy noted  SKIN: No rashes or lesions    Care Discussed with Consultants/Other Providers [x ] YES  [ ] NO
Patient is a 75y old  Male who presents with a chief complaint of shortness of breath (10 Lan 2021 12:37)    Date of servie : 01-10-21 @ 16:52  INTERVAL HPI/OVERNIGHT EVENTS:  T(C): 36.5 (01-10-21 @ 16:39), Max: 36.9 (01-09-21 @ 20:26)  HR: 76 (01-10-21 @ 16:39) (73 - 76)  BP: 150/65 (01-10-21 @ 16:39) (134/66 - 150/65)  RR: 18 (01-10-21 @ 16:39) (18 - 20)  SpO2: 100% (01-10-21 @ 16:39) (97% - 100%)  Wt(kg): --  I&O's Summary    09 Jan 2021 07:01  -  10 Lan 2021 07:00  --------------------------------------------------------  IN: 500 mL / OUT: 1000 mL / NET: -500 mL    10 Lan 2021 07:01  -  10 Lan 2021 16:52  --------------------------------------------------------  IN: 1200 mL / OUT: 1050 mL / NET: 150 mL        LABS:                        10.5   7.76  )-----------( 546      ( 10 Lan 2021 08:46 )             31.8     01-10    130<L>  |  96<L>  |  26<H>  ----------------------------<  84  4.3   |  22  |  1.32<H>    Ca    9.0      10 Lan 2021 08:46    TPro  6.3  /  Alb  2.8<L>  /  TBili  0.7  /  DBili  0.2  /  AST  24  /  ALT  65<H>  /  AlkPhos  165<H>  01-10        CAPILLARY BLOOD GLUCOSE                MEDICATIONS  (STANDING):  amLODIPine   Tablet 10 milliGRAM(s) Oral daily  atorvastatin 10 milliGRAM(s) Oral at bedtime  budesonide 160 MICROgram(s)/formoterol 4.5 MICROgram(s) Inhaler 2 Puff(s) Inhalation two times a day  cholecalciferol 1000 Unit(s) Oral daily  cyanocobalamin 1000 MICROGram(s) Oral daily  dexAMETHasone  Injectable 6 milliGRAM(s) IV Push daily  furosemide    Tablet 20 milliGRAM(s) Oral daily  heparin   Injectable 5000 Unit(s) SubCutaneous every 8 hours  metoprolol succinate ER 25 milliGRAM(s) Oral daily  multivitamin 1 Tablet(s) Oral daily  pantoprazole    Tablet 40 milliGRAM(s) Oral before breakfast  polyethylene glycol 3350 17 Gram(s) Oral daily  senna 2 Tablet(s) Oral at bedtime  sodium chloride 1 Gram(s) Oral two times a day  tamsulosin 0.4 milliGRAM(s) Oral at bedtime    MEDICATIONS  (PRN):  aluminum hydroxide/magnesium hydroxide/simethicone Suspension 30 milliLiter(s) Oral once PRN Dyspepsia  fluticasone propionate 50 MICROgram(s)/spray Nasal Spray 1 Spray(s) Both Nostrils two times a day PRN Nasal Congestion  sodium chloride 0.65% Nasal 1 Spray(s) Both Nostrils three times a day PRN Nasal Congestion          PHYSICAL EXAM:  GENERAL: NAD, well-groomed, well-developed  HEAD:  Atraumatic, Normocephalic  CHEST/LUNG: Clear to percussion bilaterally; No rales, rhonchi, wheezing, or rubs  HEART: Regular rate and rhythm; No murmurs, rubs, or gallops  ABDOMEN: Soft, Nontender, Nondistended; Bowel sounds present  EXTREMITIES:  2+ Peripheral Pulses, No clubbing, cyanosis, or edema  LYMPH: No lymphadenopathy noted  SKIN: No rashes or lesions    Care Discussed with Consultants/Other Providers [ x] YES  [ ] NO
[facility]   MD LEONELA JULIO DO ANAM SIDDIQUI ANGELA WONG, [place]    TEL:  OFFICE: [phone]  DR POST CELL: [phone]  DR. RAMOS CELL: [phone]  DR. MARTINEZ CELL: [phone]  LESVIA WILLOUGHBY CELL: [phone]    From 5pm-7am Answering Service [phone]    -- RENAL FOLLOW UP NOTE ---Date of Service 01-08-21 @ 15:00    Patient is a 75y old  Male who presents with a chief complaint of shortness of breath (08 Jan 2021 12:22)      Patient seen and examined at bedside. No chest pain/sob    VITALS:  T(F): 98.6 (01-08-21 @ 14:10), Max: 98.7 (01-08-21 @ 05:09)  HR: 86 (01-08-21 @ 14:10)  BP: 150/72 (01-08-21 @ 14:10)  RR: 20 (01-08-21 @ 14:10)  SpO2: 95% (01-08-21 @ 14:10)  Wt(kg): --    01-07 @ 07:01  -  01-08 @ 07:00  --------------------------------------------------------  IN: 0 mL / OUT: 350 mL / NET: -350 mL          PHYSICAL EXAM:  Constitutional: NAD  Neck: No JVD  Respiratory: CTAB, no wheezes, rales or rhonchi  Cardiovascular: S1, S2, RRR  Gastrointestinal: BS+, soft, NT/ND  Extremities: No peripheral edema    Hospital Medications:   MEDICATIONS  (STANDING):  amLODIPine   Tablet 10 milliGRAM(s) Oral daily  atorvastatin 10 milliGRAM(s) Oral at bedtime  budesonide 160 MICROgram(s)/formoterol 4.5 MICROgram(s) Inhaler 2 Puff(s) Inhalation two times a day  cholecalciferol 1000 Unit(s) Oral daily  cyanocobalamin 1000 MICROGram(s) Oral daily  dexAMETHasone  Injectable 6 milliGRAM(s) IV Push daily  furosemide    Tablet 20 milliGRAM(s) Oral daily  heparin   Injectable 5000 Unit(s) SubCutaneous every 8 hours  metoprolol succinate ER 25 milliGRAM(s) Oral daily  multivitamin 1 Tablet(s) Oral daily  pantoprazole    Tablet 40 milliGRAM(s) Oral before breakfast  polyethylene glycol 3350 17 Gram(s) Oral daily  remdesivir  IVPB 100 milliGRAM(s) IV Intermittent every 24 hours  remdesivir  IVPB   IV Intermittent   senna 2 Tablet(s) Oral at bedtime  sodium chloride 1 Gram(s) Oral two times a day  tamsulosin 0.4 milliGRAM(s) Oral at bedtime      LABS:  01-08    131<L>  |  99  |  30<H>  ----------------------------<  114<H>  3.8   |  21<L>  |  1.44<H>    Ca    8.7      08 Jan 2021 06:37  Phos  3.3     01-08  Mg     2.1     01-08    TPro  6.1  /  Alb  2.9<L>  /  TBili  0.8  /  DBili      /  AST  41<H>  /  ALT  102<H>  /  AlkPhos  193<H>  01-08    Creatinine Trend: 1.44 <--, 1.37 <--, 1.47 <--, 1.53 <--, 1.54 <--, 1.71 <--    Albumin, Serum: 2.9 g/dL (01-08 @ 06:37)  Phosphorus Level, Serum: 3.3 mg/dL (01-08 @ 06:37)                              10.1   7.43  )-----------( 419      ( 08 Jan 2021 07:25 )             28.8     Urine Studies:    Urine Sodium 32      [01-05-21 @ 08:38]  Urine Osmolality 457      [01-05-21 @ 08:38]    Ferritin 703      [01-07-21 @ 07:45]  Vitamin D (25OH) 26.2      [01-05-21 @ 11:00]  HbA1c 6.3      [09-13-17 @ 08:15]        RADIOLOGY & ADDITIONAL STUDIES:  
[facility] [place] [facility]   MD LEONELA JULIO DO ANAM SIDDIQUI ANGELA WONG, PA    TEL:  OFFICE: [phone]  DR POST CELL: [phone]  DR. RAMOS CELL: [phone]  DR. MARTINEZ CELL: [phone]  LESVIA WILLOUGHBY CELL: [phone]    From 5pm-7am Answering Service [phone]    -- RENAL FOLLOW UP NOTE ---Date of Service 01-06-21 @ 15:22    Patient is a 75y old  Male who presents with a chief complaint of shortness of breath (06 Jan 2021 10:48)      Patient seen and examined at bedside. No chest pain/sob    VITALS:  T(F): 97.8 (01-06-21 @ 13:18), Max: 99.2 (01-05-21 @ 21:38)  HR: 91 (01-06-21 @ 13:18)  BP: 146/60 (01-06-21 @ 13:18)  RR: 18 (01-06-21 @ 13:18)  SpO2: 95% (01-06-21 @ 13:18)  Wt(kg): --        PHYSICAL EXAM:  Constitutional: NAD  Neck: No JVD  Respiratory: CTAB, no wheezes, rales or rhonchi  Cardiovascular: S1, S2, RRR  Gastrointestinal: BS+, soft, NT/ND  Extremities: No peripheral edema    Hospital Medications:   MEDICATIONS  (STANDING):  ALBUTerol    90 MICROgram(s) HFA Inhaler 2 Puff(s) Inhalation every 6 hours  amLODIPine   Tablet 10 milliGRAM(s) Oral daily  atorvastatin 10 milliGRAM(s) Oral at bedtime  budesonide 160 MICROgram(s)/formoterol 4.5 MICROgram(s) Inhaler 2 Puff(s) Inhalation two times a day  cholecalciferol 1000 Unit(s) Oral daily  cyanocobalamin 1000 MICROGram(s) Oral daily  dexAMETHasone  Injectable 6 milliGRAM(s) IV Push daily  furosemide    Tablet 20 milliGRAM(s) Oral daily  guaiFENesin  milliGRAM(s) Oral every 12 hours  heparin   Injectable 5000 Unit(s) SubCutaneous every 8 hours  metoprolol succinate ER 25 milliGRAM(s) Oral daily  multivitamin 1 Tablet(s) Oral daily  pantoprazole    Tablet 40 milliGRAM(s) Oral before breakfast  remdesivir  IVPB 100 milliGRAM(s) IV Intermittent every 24 hours  remdesivir  IVPB   IV Intermittent   sodium chloride 1 Gram(s) Oral two times a day  tamsulosin 0.4 milliGRAM(s) Oral at bedtime      LABS:  01-06    x   |  x   |  x   ----------------------------<  x   x    |  x   |  1.47<H>    Ca    9.2      05 Jan 2021 07:46  Phos  3.4     01-05  Mg     2.2     01-05    TPro  7.0  /  Alb  3.1<L>  /  TBili  0.7  /  DBili  0.3<H>  /  AST  82<H>  /  ALT  97<H>  /  AlkPhos  220<H>  01-06    Creatinine Trend: 1.47 <--, 1.53 <--, 1.54 <--, 1.71 <--    Albumin, Serum: 3.1 g/dL (01-06 @ 06:49)                              10.7   7.60  )-----------( 424      ( 06 Jan 2021 06:49 )             30.8     Urine Studies:    Urine Sodium 32      [01-05-21 @ 08:38]  Urine Osmolality 457      [01-05-21 @ 08:38]    Ferritin 816      [01-05-21 @ 07:46]  Vitamin D (25OH) 26.2      [01-05-21 @ 11:00]  HbA1c 6.3      [09-13-17 @ 08:15]        RADIOLOGY & ADDITIONAL STUDIES:  
[facility] [place] [facility]   MD LEONELA JULIO DO ANAM SIDDIQUI ANGELA WONG, PA    TEL:  OFFICE: [phone]  DR POST CELL: [phone]  DR. RAMOS CELL: [phone]  DR. MARTINEZ CELL: [phone]  LESVIA WILLOUGHBY CELL: [phone]    From 5pm-7am Answering Service [phone]    -- RENAL FOLLOW UP NOTE ---Date of Service 01-05-21 @ 11:54    Patient is a 75y old  Male who presents with a chief complaint of shortness of breath (04 Jan 2021 16:00)      Patient seen and examined at bedside.  +sob    VITALS:  T(F): 97.5 (01-05-21 @ 05:03), Max: 99.6 (01-04-21 @ 14:36)  HR: 74 (01-05-21 @ 05:03)  BP: 120/61 (01-05-21 @ 05:03)  RR: 17 (01-05-21 @ 05:03)  SpO2: 98% (01-05-21 @ 05:03)  Wt(kg): --    01-04 @ 07:01  -  01-05 @ 07:00  --------------------------------------------------------  IN: 0 mL / OUT: 650 mL / NET: -650 mL      Height (cm): 165.1 (01-04 @ 12:20)    PHYSICAL EXAM:  Constitutional: NAD  Neck: No JVD  Respiratory: CTAB, no wheezes, rales or rhonchi  Cardiovascular: S1, S2, RRR  Gastrointestinal: BS+, soft, NT/ND  Extremities: No peripheral edema    Hospital Medications:   MEDICATIONS  (STANDING):  ALBUTerol    90 MICROgram(s) HFA Inhaler 2 Puff(s) Inhalation every 6 hours  amLODIPine   Tablet 10 milliGRAM(s) Oral daily  atorvastatin 10 milliGRAM(s) Oral at bedtime  budesonide 160 MICROgram(s)/formoterol 4.5 MICROgram(s) Inhaler 2 Puff(s) Inhalation two times a day  cholecalciferol 1000 Unit(s) Oral daily  cyanocobalamin 1000 MICROGram(s) Oral daily  dexAMETHasone  Injectable 6 milliGRAM(s) IV Push daily  furosemide    Tablet 20 milliGRAM(s) Oral daily  guaiFENesin  milliGRAM(s) Oral every 12 hours  heparin   Injectable 5000 Unit(s) SubCutaneous every 8 hours  metoprolol succinate ER 25 milliGRAM(s) Oral daily  multivitamin 1 Tablet(s) Oral daily  pantoprazole    Tablet 40 milliGRAM(s) Oral before breakfast  sodium chloride 1 Gram(s) Oral two times a day  tamsulosin 0.4 milliGRAM(s) Oral at bedtime      LABS:  01-05    134<L>  |  98  |  24<H>  ----------------------------<  139<H>  4.3   |  21<L>  |  1.54<H>    Ca    9.2      05 Jan 2021 07:46  Phos  3.4     01-05  Mg     2.2     01-05    TPro  6.8  /  Alb  3.0<L>  /  TBili  0.7  /  DBili      /  AST  33  /  ALT  43<H>  /  AlkPhos  188<H>  01-05    Creatinine Trend: 1.54 <--, 1.71 <--    Vitamin D, 25-Hydroxy: 26.2 ng/mL (01-05 @ 11:00)  Albumin, Serum: 3.0 g/dL (01-05 @ 07:46)  Phosphorus Level, Serum: 3.4 mg/dL (01-05 @ 07:46)  Ferritin, Serum: 816 ng/mL (01-05 @ 07:46)  Albumin, Serum: 3.5 g/dL (01-04 @ 13:58)  Ferritin, Serum: 759 ng/mL (01-04 @ 13:58)                              11.0   6.48  )-----------( 345      ( 05 Jan 2021 07:46 )             32.8     Urine Studies:    Urine Sodium 32      [01-05-21 @ 08:38]    Ferritin 816      [01-05-21 @ 07:46]  Vitamin D (25OH) 26.2      [01-05-21 @ 11:00]  HbA1c 6.3      [09-13-17 @ 08:15]        RADIOLOGY & ADDITIONAL STUDIES:  
[facility] [place] [facility]   MD LEONELA JULIO DO ANAM SIDDIQUI ANGELA WONG, PA    TEL:  OFFICE: [phone]  DR POST CELL: [phone]  DR. RAMOS CELL: [phone]  DR. MARTINEZ CELL: [phone]  LESVIA WILLOUGHBY CELL: [phone]    From 5pm-7am Answering Service [phone]    -- RENAL FOLLOW UP NOTE ---Date of Service 01-11-21 @ 13:46    Patient is a 75y old  Male who presents with a chief complaint of shortness of breath (11 Jan 2021 10:54)      Patient seen and examined at bedside. No chest pain/sob    VITALS:  T(F): 98.2 (01-11-21 @ 11:00), Max: 98.5 (01-11-21 @ 06:21)  HR: 74 (01-11-21 @ 11:00)  BP: 122/52 (01-11-21 @ 11:00)  RR: 18 (01-11-21 @ 11:00)  SpO2: 99% (01-11-21 @ 11:00)  Wt(kg): --    01-10 @ 07:01  -  01-11 @ 07:00  --------------------------------------------------------  IN: 1200 mL / OUT: 1050 mL / NET: 150 mL          PHYSICAL EXAM:  Constitutional: NAD  Neck: No JVD  Respiratory: CTAB, no wheezes, rales or rhonchi  Cardiovascular: S1, S2, RRR  Gastrointestinal: BS+, soft, NT/ND  Extremities: No peripheral edema    Hospital Medications:   MEDICATIONS  (STANDING):  amLODIPine   Tablet 10 milliGRAM(s) Oral daily  atorvastatin 10 milliGRAM(s) Oral at bedtime  budesonide 160 MICROgram(s)/formoterol 4.5 MICROgram(s) Inhaler 2 Puff(s) Inhalation two times a day  cholecalciferol 1000 Unit(s) Oral daily  cyanocobalamin 1000 MICROGram(s) Oral daily  dexAMETHasone  Injectable 6 milliGRAM(s) IV Push daily  furosemide    Tablet 20 milliGRAM(s) Oral daily  heparin   Injectable 5000 Unit(s) SubCutaneous every 8 hours  metoprolol succinate ER 25 milliGRAM(s) Oral daily  multivitamin 1 Tablet(s) Oral daily  pantoprazole    Tablet 40 milliGRAM(s) Oral before breakfast  polyethylene glycol 3350 17 Gram(s) Oral daily  senna 2 Tablet(s) Oral at bedtime  sodium chloride 1 Gram(s) Oral two times a day  tamsulosin 0.4 milliGRAM(s) Oral at bedtime      LABS:  01-11    134<L>  |  98  |  27<H>  ----------------------------<  97  3.8   |  25  |  1.39<H>    Ca    8.8      11 Jan 2021 08:18    TPro  6.2  /  Alb  3.1<L>  /  TBili  0.7  /  DBili      /  AST  17  /  ALT  48<H>  /  AlkPhos  154<H>  01-11    Creatinine Trend: 1.39 <--, 1.32 <--, 1.37 <--, 1.44 <--, 1.37 <--, 1.47 <--, 1.53 <--, 1.54 <--, 1.71 <--    Albumin, Serum: 3.1 g/dL (01-11 @ 08:18)                              10.5   8.61  )-----------( 532      ( 11 Jan 2021 08:18 )             30.9     Urine Studies:    Urine Sodium 32      [01-05-21 @ 08:38]  Urine Osmolality 457      [01-05-21 @ 08:38]    Ferritin 487      [01-10-21 @ 08:46]  Vitamin D (25OH) 26.2      [01-05-21 @ 11:00]  HbA1c 6.3      [09-13-17 @ 08:15]        RADIOLOGY & ADDITIONAL STUDIES:  
Patient is a 75y old  Male who presents with a chief complaint of shortness of breath (11 Jan 2021 13:45)    Date of servie : 01-11-21 @ 15:33  INTERVAL HPI/OVERNIGHT EVENTS:  T(C): 36.8 (01-11-21 @ 11:00), Max: 36.9 (01-11-21 @ 06:21)  HR: 74 (01-11-21 @ 11:00) (70 - 81)  BP: 122/52 (01-11-21 @ 11:00) (109/63 - 150/65)  RR: 18 (01-11-21 @ 11:00) (18 - 18)  SpO2: 99% (01-11-21 @ 11:00) (95% - 100%)  Wt(kg): --  I&O's Summary    10 Lan 2021 07:01  -  11 Jan 2021 07:00  --------------------------------------------------------  IN: 1200 mL / OUT: 1050 mL / NET: 150 mL        LABS:                        10.5   8.61  )-----------( 532      ( 11 Jan 2021 08:18 )             30.9     01-11    134<L>  |  98  |  27<H>  ----------------------------<  97  3.8   |  25  |  1.39<H>    Ca    8.8      11 Jan 2021 08:18    TPro  6.2  /  Alb  3.1<L>  /  TBili  0.7  /  DBili  x   /  AST  17  /  ALT  48<H>  /  AlkPhos  154<H>  01-11        CAPILLARY BLOOD GLUCOSE                MEDICATIONS  (STANDING):  amLODIPine   Tablet 10 milliGRAM(s) Oral daily  atorvastatin 10 milliGRAM(s) Oral at bedtime  budesonide 160 MICROgram(s)/formoterol 4.5 MICROgram(s) Inhaler 2 Puff(s) Inhalation two times a day  cholecalciferol 1000 Unit(s) Oral daily  cyanocobalamin 1000 MICROGram(s) Oral daily  dexAMETHasone  Injectable 6 milliGRAM(s) IV Push daily  furosemide    Tablet 20 milliGRAM(s) Oral daily  heparin   Injectable 5000 Unit(s) SubCutaneous every 8 hours  metoprolol succinate ER 25 milliGRAM(s) Oral daily  multivitamin 1 Tablet(s) Oral daily  pantoprazole    Tablet 40 milliGRAM(s) Oral before breakfast  polyethylene glycol 3350 17 Gram(s) Oral daily  senna 2 Tablet(s) Oral at bedtime  sodium chloride 1 Gram(s) Oral two times a day  tamsulosin 0.4 milliGRAM(s) Oral at bedtime    MEDICATIONS  (PRN):  aluminum hydroxide/magnesium hydroxide/simethicone Suspension 30 milliLiter(s) Oral once PRN Dyspepsia  fluticasone propionate 50 MICROgram(s)/spray Nasal Spray 1 Spray(s) Both Nostrils two times a day PRN Nasal Congestion  sodium chloride 0.65% Nasal 1 Spray(s) Both Nostrils three times a day PRN Nasal Congestion          PHYSICAL EXAM:  GENERAL: NAD, well-groomed, well-developed  HEAD:  Atraumatic, Normocephalic  CHEST/LUNG: Clear to percussion bilaterally; No rales, rhonchi, wheezing, or rubs  HEART: Regular rate and rhythm; No murmurs, rubs, or gallops  ABDOMEN: Soft, Nontender, Nondistended; Bowel sounds present  EXTREMITIES:  2+ Peripheral Pulses, No clubbing, cyanosis, or edema  LYMPH: No lymphadenopathy noted  SKIN: No rashes or lesions    Care Discussed with Consultants/Other Providers [ ] YES  [ ] NO
Patient is a 75y old  Male who presents with a chief complaint of shortness of breath (13 Jan 2021 14:41)    Date of servie : 01-13-21 @ 17:56  INTERVAL HPI/OVERNIGHT EVENTS:  T(C): 36.7 (01-13-21 @ 11:30), Max: 36.7 (01-13-21 @ 11:30)  HR: 86 (01-13-21 @ 13:30) (69 - 86)  BP: 137/54 (01-13-21 @ 11:30) (129/63 - 138/63)  RR: 18 (01-13-21 @ 13:30) (18 - 20)  SpO2: 96% (01-13-21 @ 13:30) (91% - 98%)  Wt(kg): --  I&O's Summary    12 Jan 2021 07:01  -  13 Jan 2021 07:00  --------------------------------------------------------  IN: 0 mL / OUT: 950 mL / NET: -950 mL    13 Jan 2021 07:01  -  13 Jan 2021 17:56  --------------------------------------------------------  IN: 0 mL / OUT: 1100 mL / NET: -1100 mL        LABS:                        11.2   7.59  )-----------( 562      ( 13 Jan 2021 07:27 )             33.3     01-13    131<L>  |  95<L>  |  28<H>  ----------------------------<  92  4.2   |  27  |  1.58<H>    Ca    9.1      13 Jan 2021 07:27  Phos  3.2     01-13  Mg     2.3     01-13          CAPILLARY BLOOD GLUCOSE                MEDICATIONS  (STANDING):  amLODIPine   Tablet 10 milliGRAM(s) Oral daily  aspirin  chewable 81 milliGRAM(s) Oral daily  atorvastatin 10 milliGRAM(s) Oral at bedtime  budesonide 160 MICROgram(s)/formoterol 4.5 MICROgram(s) Inhaler 2 Puff(s) Inhalation two times a day  cholecalciferol 1000 Unit(s) Oral daily  cyanocobalamin 1000 MICROGram(s) Oral daily  dexAMETHasone  Injectable 6 milliGRAM(s) IV Push daily  furosemide    Tablet 20 milliGRAM(s) Oral daily  heparin   Injectable 5000 Unit(s) SubCutaneous every 8 hours  metoprolol succinate ER 25 milliGRAM(s) Oral daily  multivitamin 1 Tablet(s) Oral daily  pantoprazole    Tablet 40 milliGRAM(s) Oral before breakfast  polyethylene glycol 3350 17 Gram(s) Oral daily  senna 2 Tablet(s) Oral at bedtime  sodium chloride 1 Gram(s) Oral two times a day  tamsulosin 0.4 milliGRAM(s) Oral at bedtime    MEDICATIONS  (PRN):  aluminum hydroxide/magnesium hydroxide/simethicone Suspension 30 milliLiter(s) Oral once PRN Dyspepsia  fluticasone propionate 50 MICROgram(s)/spray Nasal Spray 1 Spray(s) Both Nostrils two times a day PRN Nasal Congestion  sodium chloride 0.65% Nasal 1 Spray(s) Both Nostrils three times a day PRN Nasal Congestion          PHYSICAL EXAM:  GENERAL: NAD, well-groomed, well-developed  HEAD:  Atraumatic, Normocephalic  CHEST/LUNG: Clear to percussion bilaterally; No rales, rhonchi, wheezing, or rubs  HEART: Regular rate and rhythm; No murmurs, rubs, or gallops  ABDOMEN: Soft, Nontender, Nondistended; Bowel sounds present  EXTREMITIES:  2+ Peripheral Pulses, No clubbing, cyanosis, or edema  LYMPH: No lymphadenopathy noted  SKIN: No rashes or lesions    Care Discussed with Consultants/Other Providers [ ] YES  [ ] NO
[facility] [place]   MD LEONELA JULIO DO ANAM SIDDIQUI ANGELA WONG, PA    TEL:  OFFICE: [phone]  DR POST CELL: [phone]  DR. RAMOS CELL: [phone]  DR. MARTINEZ CELL: [phone]  LESVIA WILLOUGHBY CELL: [phone]    From 5pm-7am Answering Service [phone]    -- RENAL FOLLOW UP NOTE ---Date of Service 01-12-21 @ 15:09    Patient is a 75y old  Male who presents with a chief complaint of shortness of breath (12 Jan 2021 11:59)      Patient seen and examined at bedside. No chest pain/sob    VITALS:  T(F): 98.2 (01-12-21 @ 13:34), Max: 99 (01-11-21 @ 21:44)  HR: 78 (01-12-21 @ 13:34)  BP: 122/54 (01-12-21 @ 13:34)  RR: 18 (01-12-21 @ 13:34)  SpO2: 99% (01-12-21 @ 13:34)  Wt(kg): --    01-11 @ 07:01  -  01-12 @ 07:00  --------------------------------------------------------  IN: 450 mL / OUT: 1000 mL / NET: -550 mL    01-12 @ 07:01  -  01-12 @ 15:09  --------------------------------------------------------  IN: 0 mL / OUT: 300 mL / NET: -300 mL          PHYSICAL EXAM:  Constitutional: NAD  Neck: No JVD  Respiratory: CTAB, no wheezes, rales or rhonchi  Cardiovascular: S1, S2, RRR  Gastrointestinal: BS+, soft, NT/ND  Extremities: No peripheral edema    Hospital Medications:   MEDICATIONS  (STANDING):  amLODIPine   Tablet 10 milliGRAM(s) Oral daily  atorvastatin 10 milliGRAM(s) Oral at bedtime  budesonide 160 MICROgram(s)/formoterol 4.5 MICROgram(s) Inhaler 2 Puff(s) Inhalation two times a day  cholecalciferol 1000 Unit(s) Oral daily  cyanocobalamin 1000 MICROGram(s) Oral daily  dexAMETHasone  Injectable 6 milliGRAM(s) IV Push daily  furosemide    Tablet 20 milliGRAM(s) Oral daily  heparin   Injectable 5000 Unit(s) SubCutaneous every 8 hours  metoprolol succinate ER 25 milliGRAM(s) Oral daily  multivitamin 1 Tablet(s) Oral daily  pantoprazole    Tablet 40 milliGRAM(s) Oral before breakfast  polyethylene glycol 3350 17 Gram(s) Oral daily  senna 2 Tablet(s) Oral at bedtime  sodium chloride 1 Gram(s) Oral two times a day  tamsulosin 0.4 milliGRAM(s) Oral at bedtime      LABS:  01-12    131<L>  |  94<L>  |  28<H>  ----------------------------<  89  4.1   |  27  |  1.51<H>    Ca    9.2      12 Jan 2021 07:38  Phos  3.3     01-12  Mg     2.3     01-12    TPro  6.2  /  Alb  3.1<L>  /  TBili  0.7  /  DBili      /  AST  17  /  ALT  48<H>  /  AlkPhos  154<H>  01-11    Creatinine Trend: 1.51 <--, 1.39 <--, 1.32 <--, 1.37 <--, 1.44 <--, 1.37 <--, 1.47 <--    Phosphorus Level, Serum: 3.3 mg/dL (01-12 @ 07:38)                              11.2   8.75  )-----------( 598      ( 12 Jan 2021 07:38 )             32.4     Urine Studies:      Ferritin 487      [01-10-21 @ 08:46]  Vitamin D (25OH) 26.2      [01-05-21 @ 11:00]  HbA1c 6.3      [09-13-17 @ 08:15]        RADIOLOGY & ADDITIONAL STUDIES:  
Date of Service: 01-06-21 @ 10:50    Patient is a 75y old  Male who presents with a chief complaint of shortness of breath (05 Jan 2021 17:01)      Any change in ROS: doing ok : no hemoptysis to me     MEDICATIONS  (STANDING):  ALBUTerol    90 MICROgram(s) HFA Inhaler 2 Puff(s) Inhalation every 6 hours  amLODIPine   Tablet 10 milliGRAM(s) Oral daily  atorvastatin 10 milliGRAM(s) Oral at bedtime  budesonide 160 MICROgram(s)/formoterol 4.5 MICROgram(s) Inhaler 2 Puff(s) Inhalation two times a day  cholecalciferol 1000 Unit(s) Oral daily  cyanocobalamin 1000 MICROGram(s) Oral daily  dexAMETHasone  Injectable 6 milliGRAM(s) IV Push daily  furosemide    Tablet 20 milliGRAM(s) Oral daily  guaiFENesin  milliGRAM(s) Oral every 12 hours  heparin   Injectable 5000 Unit(s) SubCutaneous every 8 hours  metoprolol succinate ER 25 milliGRAM(s) Oral daily  multivitamin 1 Tablet(s) Oral daily  pantoprazole    Tablet 40 milliGRAM(s) Oral before breakfast  remdesivir  IVPB 100 milliGRAM(s) IV Intermittent every 24 hours  remdesivir  IVPB   IV Intermittent   sodium chloride 1 Gram(s) Oral two times a day  tamsulosin 0.4 milliGRAM(s) Oral at bedtime    MEDICATIONS  (PRN):  polyethylene glycol 3350 17 Gram(s) Oral daily PRN Constipation  senna 2 Tablet(s) Oral at bedtime PRN Constipation  sodium chloride 0.65% Nasal 1 Spray(s) Both Nostrils three times a day PRN Nasal Congestion    Vital Signs Last 24 Hrs  T(C): 36.9 (06 Jan 2021 06:39), Max: 37.3 (05 Jan 2021 21:38)  T(F): 98.4 (06 Jan 2021 06:39), Max: 99.2 (05 Jan 2021 21:38)  HR: 93 (06 Jan 2021 06:39) (87 - 97)  BP: 147/65 (06 Jan 2021 06:39) (139/65 - 147/65)  BP(mean): --  RR: 18 (06 Jan 2021 06:39) (18 - 19)  SpO2: 95% (06 Jan 2021 06:39) (95% - 98%)    I&O's Summary        Physical Exam:   GENERAL: NAD, well-groomed, well-developed  HEENT: KATARZYNA/   Atraumatic, Normocephalic  ENMT: No tonsillar erythema, exudates, or enlargement; Moist mucous membranes, Good dentition, No lesions  NECK: Supple, No JVD, Normal thyroid  CHEST/LUNG: no wheezing  CVS: Regular rate and rhythm; No murmurs, rubs, or gallops  GI: : Soft, Nontender, Nondistended; Bowel sounds present  NERVOUS SYSTEM:  Alert & Oriented X3  EXTREMITIES:  2+ Peripheral Pulses, No clubbing, cyanosis, or edema  LYMPH: No lymphadenopathy noted  SKIN: No rashes or lesions  ENDOCRINOLOGY: No Thyromegaly  PSYCH: Appropriate    Labs:  24                            10.7   7.60  )-----------( 424      ( 06 Jan 2021 06:49 )             30.8                         11.0   6.48  )-----------( 345      ( 05 Jan 2021 07:46 )             32.8                         11.2   9.68  )-----------( 297      ( 04 Jan 2021 13:58 )             31.8     01-06    x   |  x   |  x   ----------------------------<  x   x    |  x   |  1.47<H>  01-05    x   |  x   |  x   ----------------------------<  x   x    |  x   |  1.53<H>  01-05    134<L>  |  98  |  24<H>  ----------------------------<  139<H>  4.3   |  21<L>  |  1.54<H>  01-04    133<L>  |  96<L>  |  16  ----------------------------<  124<H>  3.8   |  24  |  1.71<H>    Ca    9.2      05 Jan 2021 07:46  Ca    9.4      04 Jan 2021 13:58  Phos  3.4     01-05  Mg     2.2     01-05    TPro  7.0  /  Alb  3.1<L>  /  TBili  0.7  /  DBili  0.3<H>  /  AST  82<H>  /  ALT  97<H>  /  AlkPhos  220<H>  01-06  TPro  6.7  /  Alb  3.0<L>  /  TBili  0.7  /  DBili  0.3<H>  /  AST  48<H>  /  ALT  52<H>  /  AlkPhos  189<H>  01-05  TPro  6.8  /  Alb  3.0<L>  /  TBili  0.7  /  DBili  x   /  AST  33  /  ALT  43<H>  /  AlkPhos  188<H>  01-05  TPro  7.5  /  Alb  3.5  /  TBili  1.1  /  DBili  x   /  AST  41<H>  /  ALT  53<H>  /  AlkPhos  202<H>  01-04    CAPILLARY BLOOD GLUCOSE      POCT Blood Glucose.: 132 mg/dL (06 Jan 2021 07:28)  POCT Blood Glucose.: 147 mg/dL (05 Jan 2021 23:08)  POCT Blood Glucose.: 130 mg/dL (05 Jan 2021 16:43)      LIVER FUNCTIONS - ( 06 Jan 2021 06:49 )  Alb: 3.1 g/dL / Pro: 7.0 g/dL / ALK PHOS: 220 U/L / ALT: 97 U/L / AST: 82 U/L / GGT: x           PTT - ( 05 Jan 2021 15:03 )  PTT:26.3 sec    D-Dimer Assay, Quantitative: 1953 ng/mL DDU (01-06 @ 06:49)  D-Dimer Assay, Quantitative: 4074 ng/mL DDU (01-05 @ 07:46)  D-Dimer Assay, Quantitative: 1412 ng/mL DDU (01-04 @ 13:58)  Procalcitonin, Serum: 0.62 ng/mL (01-04 @ 13:58)  Serum Pro-Brain Natriuretic Peptide: 1439 pg/mL (01-05 @ 07:46)        RECENT CULTURES:        RESPIRATORY CULTURES:      r< from: US Duplex Venous Lower Ext Complete, Bilateral (01.05.21 @ 14:33) >    PROCEDURE DATE:  Jan 5 2021         INTERPRETATION:  CLINICAL INFORMATION: Elevated d-dimer in setting of Covid 19. Evaluate for deep venous thrombosis    COMPARISON: None available.    TECHNIQUE: Duplex sonography of the BILATERAL LOWER extremity veins with color and spectral Doppler, with and without compression.    FINDINGS:    There is normal compressibility of the bilateral common femoral, femoral, popliteal, posterior tibial and peroneal veins.  Doppler examination shows normal spontaneous and phasic flow.    No calf vein thrombosis is detected.    IMPRESSION:  No evidence of deep venous thrombosis in either lower extremity.                PIERRE GIORDANO MD; Resident Radiology  This documenthas been electronically signed.  LISANDRA MEDEIROS MD; Attending Radiologist  This document has been electronically signed. Jan 5 2021  4:15PM    < end of copied text >      Studies  Chest X-RAY  CT SCAN Chest   Venous Dopplers: LE:   CT Abdomen  Others              
Date of Service: 01-08-21 @ 12:23    Patient is a 75y old  Male who presents with a chief complaint of shortness of breath (08 Jan 2021 11:16)      Any change in ROS: he is doing ok  ;his SOB is still there o n exertion:  or movement: but remains on 4 L of oxygen     MEDICATIONS  (STANDING):  amLODIPine   Tablet 10 milliGRAM(s) Oral daily  atorvastatin 10 milliGRAM(s) Oral at bedtime  budesonide 160 MICROgram(s)/formoterol 4.5 MICROgram(s) Inhaler 2 Puff(s) Inhalation two times a day  cholecalciferol 1000 Unit(s) Oral daily  cyanocobalamin 1000 MICROGram(s) Oral daily  dexAMETHasone  Injectable 6 milliGRAM(s) IV Push daily  furosemide    Tablet 20 milliGRAM(s) Oral daily  guaiFENesin  milliGRAM(s) Oral every 12 hours  heparin   Injectable 5000 Unit(s) SubCutaneous every 8 hours  metoprolol succinate ER 25 milliGRAM(s) Oral daily  multivitamin 1 Tablet(s) Oral daily  pantoprazole    Tablet 40 milliGRAM(s) Oral before breakfast  polyethylene glycol 3350 17 Gram(s) Oral daily  remdesivir  IVPB 100 milliGRAM(s) IV Intermittent every 24 hours  remdesivir  IVPB   IV Intermittent   senna 2 Tablet(s) Oral at bedtime  sodium chloride 1 Gram(s) Oral two times a day  tamsulosin 0.4 milliGRAM(s) Oral at bedtime    MEDICATIONS  (PRN):  sodium chloride 0.65% Nasal 1 Spray(s) Both Nostrils three times a day PRN Nasal Congestion    Vital Signs Last 24 Hrs  T(C): 37.1 (08 Jan 2021 05:09), Max: 37.1 (08 Jan 2021 05:09)  T(F): 98.7 (08 Jan 2021 05:09), Max: 98.7 (08 Jan 2021 05:09)  HR: 80 (08 Jan 2021 05:09) (80 - 90)  BP: 137/56 (08 Jan 2021 05:09) (134/61 - 139/59)  BP(mean): --  RR: 18 (08 Jan 2021 05:09) (18 - 18)  SpO2: 96% (08 Jan 2021 05:09) (96% - 98%)    I&O's Summary    07 Jan 2021 07:01  -  08 Jan 2021 07:00  --------------------------------------------------------  IN: 0 mL / OUT: 350 mL / NET: -350 mL          Physical Exam:   GENERAL: NAD, well-groomed, well-developed  HEENT: KATARZYNA/   Atraumatic, Normocephalic  ENMT: No tonsillar erythema, exudates, or enlargement; Moist mucous membranes, Good dentition, No lesions  NECK: Supple, No JVD, Normal thyroid  CHEST/LUNG: bibasilar crackles+  CVS: Regular rate and rhythm; No murmurs, rubs, or gallops  GI: : Soft, Nontender, Nondistended; Bowel sounds present  NERVOUS SYSTEM:  Alert & Oriented X3  EXTREMITIES:  - edema  LYMPH: No lymphadenopathy noted  SKIN: No rashes or lesions  ENDOCRINOLOGY: No Thyromegaly  PSYCH: Appropriate    Labs:  24                            10.1   7.43  )-----------( 419      ( 08 Jan 2021 07:25 )             28.8                         10.9   10.11 )-----------( 441      ( 07 Jan 2021 07:45 )             32.2                         10.7   7.60  )-----------( 424      ( 06 Jan 2021 06:49 )             30.8                         11.0   6.48  )-----------( 345      ( 05 Jan 2021 07:46 )             32.8                         11.2   9.68  )-----------( 297      ( 04 Jan 2021 13:58 )             31.8     01-08    131<L>  |  99  |  30<H>  ----------------------------<  114<H>  3.8   |  21<L>  |  1.44<H>  01-07    134<L>  |  99  |  28<H>  ----------------------------<  96  3.3<L>   |  22  |  1.37<H>  01-06    x   |  x   |  x   ----------------------------<  x   x    |  x   |  1.47<H>  01-05    x   |  x   |  x   ----------------------------<  x   x    |  x   |  1.53<H>  01-05    134<L>  |  98  |  24<H>  ----------------------------<  139<H>  4.3   |  21<L>  |  1.54<H>  01-04    133<L>  |  96<L>  |  16  ----------------------------<  124<H>  3.8   |  24  |  1.71<H>    Ca    8.7      08 Jan 2021 06:37  Ca    8.8      07 Jan 2021 07:45  Phos  3.3     01-08  Phos  2.9     01-07  Mg     2.1     01-08  Mg     2.0     01-07    TPro  6.1  /  Alb  2.9<L>  /  TBili  0.8  /  DBili  x   /  AST  41<H>  /  ALT  102<H>  /  AlkPhos  193<H>  01-08  TPro  6.6  /  Alb  3.1<L>  /  TBili  1.0  /  DBili  x   /  AST  80<H>  /  ALT  124<H>  /  AlkPhos  218<H>  01-07  TPro  7.0  /  Alb  3.1<L>  /  TBili  0.7  /  DBili  0.3<H>  /  AST  82<H>  /  ALT  97<H>  /  AlkPhos  220<H>  01-06  TPro  6.7  /  Alb  3.0<L>  /  TBili  0.7  /  DBili  0.3<H>  /  AST  48<H>  /  ALT  52<H>  /  AlkPhos  189<H>  01-05  TPro  6.8  /  Alb  3.0<L>  /  TBili  0.7  /  DBili  x   /  AST  33  /  ALT  43<H>  /  AlkPhos  188<H>  01-05  TPro  7.5  /  Alb  3.5  /  TBili  1.1  /  DBili  x   /  AST  41<H>  /  ALT  53<H>  /  AlkPhos  202<H>  01-04    CAPILLARY BLOOD GLUCOSE          LIVER FUNCTIONS - ( 08 Jan 2021 06:37 )  Alb: 2.9 g/dL / Pro: 6.1 g/dL / ALK PHOS: 193 U/L / ALT: 102 U/L / AST: 41 U/L / GGT: x               D-Dimer Assay, Quantitative: 2379 ng/mL DDU (01-08 @ 06:34)  D-Dimer Assay, Quantitative: 1953 ng/mL DDU (01-06 @ 06:49)  D-Dimer Assay, Quantitative: 4074 ng/mL DDU (01-05 @ 07:46)  D-Dimer Assay, Quantitative: 1412 ng/mL DDU (01-04 @ 13:58)  Procalcitonin, Serum: 0.62 ng/mL (01-04 @ 13:58)        RECENT CULTURES:  r< from: US Duplex Venous Lower Ext Complete, Bilateral (01.05.21 @ 14:33) >      PROCEDURE DATE:  Jan 5 2021         INTERPRETATION:  CLINICAL INFORMATION: Elevated d-dimer in setting of Covid 19. Evaluate for deep venous thrombosis    COMPARISON: None available.    TECHNIQUE: Duplex sonography of the BILATERAL LOWER extremity veins with color and spectral Doppler, with and without compression.    FINDINGS:    There is normal compressibility of the bilateral common femoral, femoral, popliteal, posterior tibial and peroneal veins.  Doppler examination shows normal spontaneous and phasic flow.    No calf vein thrombosis is detected.    IMPRESSION:  No evidence of deep venous thrombosis in either lower extremity.                PIERRE GIORDANO MD; Resident Radiology  This documenthas been electronically signed.  LISANDRA MEDEIROS MD; Attending Radiologist  This document has been electronically signed. Jan 5 2021  4:15PM    < end of copied text >        RESPIRATORY CULTURES:          Studies  Chest X-RAY  CT SCAN Chest   Venous Dopplers: LE:   CT Abdomen  Others              
Date of Service: 01-12-21 @ 12:00    Patient is a 75y old  Male who presents with a chief complaint of shortness of breath (12 Jan 2021 10:53)      Any change in ROS: on 1 L of oxygen     MEDICATIONS  (STANDING):  amLODIPine   Tablet 10 milliGRAM(s) Oral daily  atorvastatin 10 milliGRAM(s) Oral at bedtime  budesonide 160 MICROgram(s)/formoterol 4.5 MICROgram(s) Inhaler 2 Puff(s) Inhalation two times a day  cholecalciferol 1000 Unit(s) Oral daily  cyanocobalamin 1000 MICROGram(s) Oral daily  dexAMETHasone  Injectable 6 milliGRAM(s) IV Push daily  furosemide    Tablet 20 milliGRAM(s) Oral daily  heparin   Injectable 5000 Unit(s) SubCutaneous every 8 hours  metoprolol succinate ER 25 milliGRAM(s) Oral daily  multivitamin 1 Tablet(s) Oral daily  pantoprazole    Tablet 40 milliGRAM(s) Oral before breakfast  polyethylene glycol 3350 17 Gram(s) Oral daily  senna 2 Tablet(s) Oral at bedtime  sodium chloride 1 Gram(s) Oral two times a day  tamsulosin 0.4 milliGRAM(s) Oral at bedtime    MEDICATIONS  (PRN):  aluminum hydroxide/magnesium hydroxide/simethicone Suspension 30 milliLiter(s) Oral once PRN Dyspepsia  fluticasone propionate 50 MICROgram(s)/spray Nasal Spray 1 Spray(s) Both Nostrils two times a day PRN Nasal Congestion  sodium chloride 0.65% Nasal 1 Spray(s) Both Nostrils three times a day PRN Nasal Congestion    Vital Signs Last 24 Hrs  T(C): 36.7 (12 Jan 2021 05:58), Max: 37.2 (11 Jan 2021 21:44)  T(F): 98 (12 Jan 2021 05:58), Max: 99 (11 Jan 2021 21:44)  HR: 63 (12 Jan 2021 05:58) (63 - 79)  BP: 129/62 (12 Jan 2021 05:58) (112/61 - 129/62)  BP(mean): --  RR: 18 (12 Jan 2021 05:58) (18 - 18)  SpO2: 96% (12 Jan 2021 05:58) (96% - 99%)    I&O's Summary    11 Jan 2021 07:01  -  12 Jan 2021 07:00  --------------------------------------------------------  IN: 450 mL / OUT: 1000 mL / NET: -550 mL          Physical Exam:   GENERAL: NAD, well-groomed, well-developed  HEENT: KATARZYNA/   Atraumatic, Normocephalic  ENMT: No tonsillar erythema, exudates, or enlargement; Moist mucous membranes, Good dentition, No lesions  NECK: Supple, No JVD, Normal thyroid  CHEST/LUNG: Clear to auscultaion  CVS: Regular rate and rhythm; No murmurs, rubs, or gallops  GI: : Soft, Nontender, Nondistended; Bowel sounds present  NERVOUS SYSTEM:  Alert & Oriented X3  EXTREMITIES:  2+ Peripheral Pulses, No clubbing, cyanosis, or edema  LYMPH: No lymphadenopathy noted  SKIN: No rashes or lesions  ENDOCRINOLOGY: No Thyromegaly  PSYCH: Appropriate    Labs:                              11.2   8.75  )-----------( 598      ( 12 Jan 2021 07:38 )             32.4                         10.5   8.61  )-----------( 532      ( 11 Jan 2021 08:18 )             30.9                         10.5   7.76  )-----------( 546      ( 10 Lan 2021 08:46 )             31.8                         10.5   8.20  )-----------( 518      ( 09 Jan 2021 07:57 )             30.0     01-12    131<L>  |  94<L>  |  28<H>  ----------------------------<  89  4.1   |  27  |  1.51<H>  01-11    134<L>  |  98  |  27<H>  ----------------------------<  97  3.8   |  25  |  1.39<H>  01-10    130<L>  |  96<L>  |  26<H>  ----------------------------<  84  4.3   |  22  |  1.32<H>  01-09    130<L>  |  97<L>  |  30<H>  ----------------------------<  95  3.9   |  20<L>  |  1.37<H>    Ca    9.2      12 Jan 2021 07:38  Ca    8.8      11 Jan 2021 08:18  Phos  3.3     01-12  Mg     2.3     01-12    TPro  6.2  /  Alb  3.1<L>  /  TBili  0.7  /  DBili  x   /  AST  17  /  ALT  48<H>  /  AlkPhos  154<H>  01-11  TPro  6.3  /  Alb  2.8<L>  /  TBili  0.7  /  DBili  0.2  /  AST  24  /  ALT  65<H>  /  AlkPhos  165<H>  01-10  TPro  6.2  /  Alb  2.7<L>  /  TBili  0.7  /  DBili  0.2  /  AST  31  /  ALT  84<H>  /  AlkPhos  179<H>  01-09    CAPILLARY BLOOD GLUCOSE      POCT Blood Glucose.: 98 mg/dL (12 Jan 2021 07:33)      LIVER FUNCTIONS - ( 11 Jan 2021 08:18 )  Alb: 3.1 g/dL / Pro: 6.2 g/dL / ALK PHOS: 154 U/L / ALT: 48 U/L / AST: 17 U/L / GGT: x               D-Dimer Assay, Quantitative: 2194 ng/mL DDU (01-10 @ 08:46)  D-Dimer Assay, Quantitative: 2379 ng/mL DDU (01-08 @ 06:34)  D-Dimer Assay, Quantitative: 1953 ng/mL DDU (01-06 @ 06:49)        RECENT CULTURES:    < from: US Duplex Venous Lower Ext Complete, Bilateral (01.05.21 @ 14:33) >    PROCEDURE DATE:  Jan 5 2021         INTERPRETATION:  CLINICAL INFORMATION: Elevated d-dimer in setting of Covid 19. Evaluate for deep venous thrombosis    COMPARISON: None available.    TECHNIQUE: Duplex sonography of the BILATERAL LOWER extremity veins with color and spectral Doppler, with and without compression.    FINDINGS:    There is normal compressibility of the bilateral common femoral, femoral, popliteal, posterior tibial and peroneal veins.  Doppler examination shows normal spontaneous and phasic flow.    No calf vein thrombosis is detected.    IMPRESSION:  No evidence of deep venous thrombosis in either lower extremity.                PIERRE GIORDANO MD; Resident Radiology  This documenthas been electronically signed.  LISANDRA MEDEIROS MD; Attending Radiologist  This document has been electronically signed. Jan 5 2021  4:15PM    < end of copied text >  < from: Xray Chest 1 View-PORTABLE IMMEDIATE (Xray Chest 1 View-PORTABLE IMMEDIATE .) (01.04.21 @ 12:52) >    EXAM:  XR CHEST PORTABLE IMMED 1V        PROCEDURE DATE:  Jan 4 2021         INTERPRETATION:  A single chest x-ray was obtained on January 4, 2021.    Indication: Cough. Shortness of breath.    Impression:    The heart is enlarged. Diffuse airspace opacity is seen in both lungs especially peripherally changes compatible with Covid 19 pneumonia. No pleural effusion. No pneumothorax.              JAYME WRAY MD; Attending Radiologist  This document has been electronically signed. Jan 4 2021  2:04PM    < end of copied text >  < from: NM SPECT/CT Bone, Single Area Single Day (07.23.20 @ 16:00) >    TECHNIQUE:  Whole-body planar images were obtained in the anterior and posterior projections approximately 2-3 hours after tracer injection.  [facility] The CT protocol was optimized for SPECT attenuation correction and to provide anatomic detail for localization of SPECT abnormalities. The CT protocol was not designed to produce and cannot replace state-of- the-art diagnostic CT images with specific imaging protocols for different body parts and indications.      COMPARISON: No previous bone scan for comparison    FINDINGS: There is mild, diffuse increased uptake in the right and left lower posterior ribs, prominent in the left 9th and 10th posterior ribs, without corresponding abnormality on the CT component of the study. There are degenerative changes in the major joints. There is physiologic tracer distribution in the remainder of the visualized skeleton.    The right kidney is visualized. The patient is status post left nephroureterectomy.    IMPRESSION: Bone scan demonstrates:    No definite scan evidence of osseous metastasis.    Mildly increased uptake in the lower posterior ribs bilaterally, non specific, maybe post traumatic.    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    < end of copied text >      RESPIRATORY CULTURES:          Studies  Chest X-RAY  CT SCAN Chest   Venous Dopplers: LE:   CT Abdomen  Others              
Date of Service: 01-13-21 @ 11:19    Patient is a 75y old  Male who presents with a chief complaint of shortness of breath (12 Jan 2021 17:43)      Any change in ROS: Doing great:  he iso n room air:   still feels littel weak: but overall he is much better       MEDICATIONS  (STANDING):  amLODIPine   Tablet 10 milliGRAM(s) Oral daily  atorvastatin 10 milliGRAM(s) Oral at bedtime  budesonide 160 MICROgram(s)/formoterol 4.5 MICROgram(s) Inhaler 2 Puff(s) Inhalation two times a day  cholecalciferol 1000 Unit(s) Oral daily  cyanocobalamin 1000 MICROGram(s) Oral daily  dexAMETHasone  Injectable 6 milliGRAM(s) IV Push daily  furosemide    Tablet 20 milliGRAM(s) Oral daily  heparin   Injectable 5000 Unit(s) SubCutaneous every 8 hours  metoprolol succinate ER 25 milliGRAM(s) Oral daily  multivitamin 1 Tablet(s) Oral daily  pantoprazole    Tablet 40 milliGRAM(s) Oral before breakfast  polyethylene glycol 3350 17 Gram(s) Oral daily  senna 2 Tablet(s) Oral at bedtime  sodium chloride 1 Gram(s) Oral two times a day  tamsulosin 0.4 milliGRAM(s) Oral at bedtime    MEDICATIONS  (PRN):  aluminum hydroxide/magnesium hydroxide/simethicone Suspension 30 milliLiter(s) Oral once PRN Dyspepsia  fluticasone propionate 50 MICROgram(s)/spray Nasal Spray 1 Spray(s) Both Nostrils two times a day PRN Nasal Congestion  sodium chloride 0.65% Nasal 1 Spray(s) Both Nostrils three times a day PRN Nasal Congestion    Vital Signs Last 24 Hrs  T(C): 36.4 (13 Jan 2021 05:42), Max: 36.8 (12 Jan 2021 13:34)  T(F): 97.6 (13 Jan 2021 05:42), Max: 98.2 (12 Jan 2021 13:34)  HR: 81 (13 Jan 2021 05:42) (69 - 81)  BP: 138/63 (13 Jan 2021 05:42) (122/54 - 138/63)  BP(mean): 81 (13 Jan 2021 05:42) (76 - 81)  RR: 18 (13 Jan 2021 05:42) (18 - 18)  SpO2: 98% (13 Jan 2021 05:42) (98% - 99%)    I&O's Summary    12 Jan 2021 07:01  -  13 Jan 2021 07:00  --------------------------------------------------------  IN: 0 mL / OUT: 950 mL / NET: -950 mL    13 Jan 2021 07:01  -  13 Jan 2021 11:19  --------------------------------------------------------  IN: 0 mL / OUT: 500 mL / NET: -500 mL          Physical Exam:   GENERAL: NAD, well-groomed, well-developed  HEENT: KATARZYNA/   Atraumatic, Normocephalic  ENMT: No tonsillar erythema, exudates, or enlargement; Moist mucous membranes, Good dentition, No lesions  NECK: Supple, No JVD, Normal thyroid  CHEST/LUNG: poor air entery   CVS: Regular rate and rhythm; No murmurs, rubs, or gallops  GI: : Soft, Nontender, Nondistended; Bowel sounds present  NERVOUS SYSTEM:  Alert & Oriented X3  EXTREMITIES:  2+ Peripheral Pulses, No clubbing, cyanosis, or edema  LYMPH: No lymphadenopathy noted  SKIN: No rashes or lesions  ENDOCRINOLOGY: No Thyromegaly  PSYCH: Appropriate    Labs:                              11.2   7.59  )-----------( 562      ( 13 Jan 2021 07:27 )             33.3                         11.2   8.75  )-----------( 598      ( 12 Jan 2021 07:38 )             32.4                         10.5   8.61  )-----------( 532      ( 11 Jan 2021 08:18 )             30.9                         10.5   7.76  )-----------( 546      ( 10 Lan 2021 08:46 )             31.8     01-13    131<L>  |  95<L>  |  28<H>  ----------------------------<  92  4.2   |  27  |  1.58<H>  01-12    131<L>  |  94<L>  |  28<H>  ----------------------------<  89  4.1   |  27  |  1.51<H>  01-11    134<L>  |  98  |  27<H>  ----------------------------<  97  3.8   |  25  |  1.39<H>  01-10    130<L>  |  96<L>  |  26<H>  ----------------------------<  84  4.3   |  22  |  1.32<H>    Ca    9.1      13 Jan 2021 07:27  Ca    9.2      12 Jan 2021 07:38  Phos  3.2     01-13  Phos  3.3     01-12  Mg     2.3     01-13  Mg     2.3     01-12    TPro  6.2  /  Alb  3.1<L>  /  TBili  0.7  /  DBili  x   /  AST  17  /  ALT  48<H>  /  AlkPhos  154<H>  01-11  TPro  6.3  /  Alb  2.8<L>  /  TBili  0.7  /  DBili  0.2  /  AST  24  /  ALT  65<H>  /  AlkPhos  165<H>  01-10    CAPILLARY BLOOD GLUCOSE        rd< from: US Duplex Venous Lower Ext Complete, Bilateral (01.05.21 @ 14:33) >  EXAM:  US DPLX LWR EXT VEINS COMPL BI        PROCEDURE DATE:  Jan 5 2021         INTERPRETATION:  CLINICAL INFORMATION: Elevated d-dimer in setting of Covid 19. Evaluate for deep venous thrombosis    COMPARISON: None available.    TECHNIQUE: Duplex sonography of the BILATERAL LOWER extremity veins with color and spectral Doppler, with and without compression.    FINDINGS:    There is normal compressibility of the bilateral common femoral, femoral, popliteal, posterior tibial and peroneal veins.  Doppler examination shows normal spontaneous and phasic flow.    No calf vein thrombosis is detected.    IMPRESSION:  No evidence of deep venous thrombosis in either lower extremity.                PIERRE GIORDANO MD; Resident Radiology  This documenthas been electronically signed.  LISANDRA MEDEIROS MD; Attending Radiologist  This document has been electronically signed. Jan 5 2021  4:15PM    < end of copied text >  < from: Xray Chest 1 View-PORTABLE IMMEDIATE (Xray Chest 1 View-PORTABLE IMMEDIATE .) (01.04.21 @ 12:52) >    EXAM:  XR CHEST PORTABLE IMMED 1V        PROCEDURE DATE:  Jan 4 2021         INTERPRETATION:  A single chest x-ray was obtained on January 4, 2021.    Indication: Cough. Shortness of breath.    Impression:    The heart is enlarged. Diffuse airspace opacity is seen in both lungs especially peripherally changes compatible with Covid 19 pneumonia. No pleural effusion. No pneumothorax.              JAYME WRAY MD; Attending Radiologist  This document has been electronically signed. Jan 4 2021  2:04PM    < end of copied text >          D-Dimer Assay, Quantitative: 2320 ng/mL DDU (01-13 @ 07:27)  D-Dimer Assay, Quantitative: 2194 ng/mL DDU (01-10 @ 08:46)  D-Dimer Assay, Quantitative: 2379 ng/mL DDU (01-08 @ 06:34)        RECENT CULTURES:        RESPIRATORY CULTURES:          Studies  Chest X-RAY  CT SCAN Chest   Venous Dopplers: LE:   CT Abdomen  Others              
Date of Service: 01-07-21 @ 10:39    Patient is a 75y old  Male who presents with a chief complaint of shortness of breath (06 Jan 2021 16:58)      Any change in ROS: Doing ok : but overnight he had headache as well as "gas" in his stomach:  felt uncomfortable      MEDICATIONS  (STANDING):  ALBUTerol    90 MICROgram(s) HFA Inhaler 2 Puff(s) Inhalation every 6 hours  amLODIPine   Tablet 10 milliGRAM(s) Oral daily  atorvastatin 10 milliGRAM(s) Oral at bedtime  budesonide 160 MICROgram(s)/formoterol 4.5 MICROgram(s) Inhaler 2 Puff(s) Inhalation two times a day  cholecalciferol 1000 Unit(s) Oral daily  cyanocobalamin 1000 MICROGram(s) Oral daily  dexAMETHasone  Injectable 6 milliGRAM(s) IV Push daily  furosemide    Tablet 20 milliGRAM(s) Oral daily  guaiFENesin  milliGRAM(s) Oral every 12 hours  heparin   Injectable 5000 Unit(s) SubCutaneous every 8 hours  metoprolol succinate ER 25 milliGRAM(s) Oral daily  multivitamin 1 Tablet(s) Oral daily  pantoprazole    Tablet 40 milliGRAM(s) Oral before breakfast  potassium chloride   Powder 20 milliEquivalent(s) Oral once  remdesivir  IVPB 100 milliGRAM(s) IV Intermittent every 24 hours  remdesivir  IVPB   IV Intermittent   sodium chloride 1 Gram(s) Oral two times a day  tamsulosin 0.4 milliGRAM(s) Oral at bedtime    MEDICATIONS  (PRN):  polyethylene glycol 3350 17 Gram(s) Oral daily PRN Constipation  senna 2 Tablet(s) Oral at bedtime PRN Constipation  sodium chloride 0.65% Nasal 1 Spray(s) Both Nostrils three times a day PRN Nasal Congestion    Vital Signs Last 24 Hrs  T(C): 36.6 (07 Jan 2021 07:18), Max: 36.7 (06 Jan 2021 22:35)  T(F): 97.8 (07 Jan 2021 07:18), Max: 98.1 (06 Jan 2021 22:35)  HR: 91 (07 Jan 2021 07:18) (91 - 92)  BP: 152/70 (07 Jan 2021 07:18) (140/60 - 152/70)  BP(mean): --  RR: 18 (07 Jan 2021 07:18) (18 - 19)  SpO2: 96% (07 Jan 2021 07:18) (95% - 96%)    I&O's Summary    06 Jan 2021 07:01  -  07 Jan 2021 07:00  --------------------------------------------------------  IN: 0 mL / OUT: 400 mL / NET: -400 mL    07 Jan 2021 07:01  -  07 Jan 2021 10:39  --------------------------------------------------------  IN: 0 mL / OUT: 350 mL / NET: -350 mL          Physical Exam:   GENERAL: NAD, well-groomed, well-developed  HEENT: KATARZYNA/   Atraumatic, Normocephalic  ENMT: No tonsillar erythema, exudates, or enlargement; Moist mucous membranes, Good dentition, No lesions  NECK: Supple, No JVD, Normal thyroid  CHEST/LUNG: Clear to auscultaion  CVS: Regular rate and rhythm; No murmurs, rubs, or gallops  GI: : Soft, Nontender, Nondistended; Bowel sounds present  NERVOUS SYSTEM:  Alert & Oriented X3  EXTREMITIES: - edema  LYMPH: No lymphadenopathy noted  SKIN: No rashes or lesions  ENDOCRINOLOGY: No Thyromegaly  PSYCH: Appropriate    Labs:  24                            10.9   10.11 )-----------( 441      ( 07 Jan 2021 07:45 )             32.2                         10.7   7.60  )-----------( 424      ( 06 Jan 2021 06:49 )             30.8                         11.0   6.48  )-----------( 345      ( 05 Jan 2021 07:46 )             32.8                         11.2   9.68  )-----------( 297      ( 04 Jan 2021 13:58 )             31.8     01-07    134<L>  |  99  |  28<H>  ----------------------------<  96  3.3<L>   |  22  |  1.37<H>  01-06    x   |  x   |  x   ----------------------------<  x   x    |  x   |  1.47<H>  01-05    x   |  x   |  x   ----------------------------<  x   x    |  x   |  1.53<H>  01-05    134<L>  |  98  |  24<H>  ----------------------------<  139<H>  4.3   |  21<L>  |  1.54<H>  01-04    133<L>  |  96<L>  |  16  ----------------------------<  124<H>  3.8   |  24  |  1.71<H>    Ca    8.8      07 Jan 2021 07:45  Phos  2.9     01-07  Mg     2.0     01-07    TPro  6.6  /  Alb  3.1<L>  /  TBili  1.0  /  DBili  x   /  AST  80<H>  /  ALT  124<H>  /  AlkPhos  218<H>  01-07  TPro  7.0  /  Alb  3.1<L>  /  TBili  0.7  /  DBili  0.3<H>  /  AST  82<H>  /  ALT  97<H>  /  AlkPhos  220<H>  01-06  TPro  6.7  /  Alb  3.0<L>  /  TBili  0.7  /  DBili  0.3<H>  /  AST  48<H>  /  ALT  52<H>  /  AlkPhos  189<H>  01-05  TPro  6.8  /  Alb  3.0<L>  /  TBili  0.7  /  DBili  x   /  AST  33  /  ALT  43<H>  /  AlkPhos  188<H>  01-05  TPro  7.5  /  Alb  3.5  /  TBili  1.1  /  DBili  x   /  AST  41<H>  /  ALT  53<H>  /  AlkPhos  202<H>  01-04    CAPILLARY BLOOD GLUCOSE          LIVER FUNCTIONS - ( 07 Jan 2021 07:45 )  Alb: 3.1 g/dL / Pro: 6.6 g/dL / ALK PHOS: 218 U/L / ALT: 124 U/L / AST: 80 U/L / GGT: x           PTT - ( 05 Jan 2021 15:03 )  PTT:26.3 sec    D-Dimer Assay, Quantitative: 1953 ng/mL DDU (01-06 @ 06:49)  D-Dimer Assay, Quantitative: 4074 ng/mL DDU (01-05 @ 07:46)  D-Dimer Assay, Quantitative: 1412 ng/mL DDU (01-04 @ 13:58)  Procalcitonin, Serum: 0.62 ng/mL (01-04 @ 13:58)  Serum Pro-Brain Natriuretic Peptide: 1439 pg/mL (01-05 @ 07:46)      r< from: US Duplex Venous Lower Ext Complete, Bilateral (01.05.21 @ 14:33) >  EXAM:  US DPLX LWR EXT VEINS COMPL BI        PROCEDURE DATE:  Jan 5 2021         INTERPRETATION:  CLINICAL INFORMATION: Elevated d-dimer in setting of Covid 19. Evaluate for deep venous thrombosis    COMPARISON: None available.    TECHNIQUE: Duplex sonography of the BILATERAL LOWER extremity veins with color and spectral Doppler, with and without compression.    FINDINGS:    There is normal compressibility of the bilateral common femoral, femoral, popliteal, posterior tibial and peroneal veins.  Doppler examination shows normal spontaneous and phasic flow.    No calf vein thrombosis is detected.    IMPRESSION:  No evidence of deep venous thrombosis in either lower extremity.                PIERRE GIORDANO MD; Resident Radiology  This documenthas been electronically signed.  LISANDRA MEDEIROS MD; Attending Radiologist  This document has been electronically signed. Jan 5 2021  4:15PM    < end of copied text >  < from: Xray Chest 1 View-PORTABLE IMMEDIATE (Xray Chest 1 View-PORTABLE IMMEDIATE .) (01.04.21 @ 12:52) >    EXAM:  XR CHEST PORTABLE IMMED 1V        PROCEDURE DATE:  Jan 4 2021         INTERPRETATION:  A single chest x-ray was obtained on January 4, 2021.    Indication: Cough. Shortness of breath.    Impression:    The heart is enlarged. Diffuse airspace opacity is seen in both lungs especially peripherally changes compatible with Covid 19 pneumonia. No pleural effusion. No pneumothorax.              JAYME WRAY MD; Attending Radiologist  This document has been electronically signed. Jan 4 2021  2:04PM    < end of copied text >    RECENT CULTURES:        RESPIRATORY CULTURES:          Studies  Chest X-RAY  CT SCAN Chest   Venous Dopplers: LE:   CT Abdomen  Others              
MD TITUS 75y [id number]    Patient is a 75y old  Male who presents with a chief complaint of shortness of breath (07 Jan 2021 17:14)      Follow Up/CC:  ID following for COVID    Interval History/ROS: no fever, feels better    Allergies    chocolate (Pruritus)  flour (Rash)  No Known Drug Allergies  Nuts (Rash)  Soy (Unknown)    Intolerances        ANTIMICROBIALS:  remdesivir  IVPB 100 every 24 hours  remdesivir  IVPB        MEDICATIONS  (STANDING):  amLODIPine   Tablet 10 milliGRAM(s) Oral daily  atorvastatin 10 milliGRAM(s) Oral at bedtime  budesonide 160 MICROgram(s)/formoterol 4.5 MICROgram(s) Inhaler 2 Puff(s) Inhalation two times a day  cholecalciferol 1000 Unit(s) Oral daily  cyanocobalamin 1000 MICROGram(s) Oral daily  dexAMETHasone  Injectable 6 milliGRAM(s) IV Push daily  furosemide    Tablet 20 milliGRAM(s) Oral daily  guaiFENesin  milliGRAM(s) Oral every 12 hours  heparin   Injectable 5000 Unit(s) SubCutaneous every 8 hours  metoprolol succinate ER 25 milliGRAM(s) Oral daily  multivitamin 1 Tablet(s) Oral daily  pantoprazole    Tablet 40 milliGRAM(s) Oral before breakfast  polyethylene glycol 3350 17 Gram(s) Oral daily  remdesivir  IVPB 100 milliGRAM(s) IV Intermittent every 24 hours  remdesivir  IVPB   IV Intermittent   senna 2 Tablet(s) Oral at bedtime  sodium chloride 1 Gram(s) Oral two times a day  tamsulosin 0.4 milliGRAM(s) Oral at bedtime    MEDICATIONS  (PRN):  sodium chloride 0.65% Nasal 1 Spray(s) Both Nostrils three times a day PRN Nasal Congestion        Vital Signs Last 24 Hrs  T(C): 37.1 (08 Jan 2021 05:09), Max: 37.1 (08 Jan 2021 05:09)  T(F): 98.7 (08 Jan 2021 05:09), Max: 98.7 (08 Jan 2021 05:09)  HR: 80 (08 Jan 2021 05:09) (80 - 90)  BP: 137/56 (08 Jan 2021 05:09) (134/61 - 139/59)  BP(mean): --  RR: 18 (08 Jan 2021 05:09) (18 - 18)  SpO2: 96% (08 Jan 2021 05:09) (96% - 98%)    CBC Full  -  ( 08 Jan 2021 07:25 )  WBC Count : 7.43 K/uL  RBC Count : 3.24 M/uL  Hemoglobin : 10.1 g/dL  Hematocrit : 28.8 %  Platelet Count - Automated : 419 K/uL  Mean Cell Volume : 88.9 fL  Mean Cell Hemoglobin : 31.2 pg  Mean Cell Hemoglobin Concentration : 35.1 gm/dL  Auto Neutrophil # : x  Auto Lymphocyte # : x  Auto Monocyte # : x  Auto Eosinophil # : x  Auto Basophil # : x  Auto Neutrophil % : x  Auto Lymphocyte % : x  Auto Monocyte % : x  Auto Eosinophil % : x  Auto Basophil % : x    01-08    131<L>  |  99  |  30<H>  ----------------------------<  114<H>  3.8   |  21<L>  |  1.44<H>    Ca    8.7      08 Jan 2021 06:37  Phos  3.3     01-08  Mg     2.1     01-08    TPro  6.1  /  Alb  2.9<L>  /  TBili  0.8  /  DBili  x   /  AST  41<H>  /  ALT  102<H>  /  AlkPhos  193<H>  01-08    LIVER FUNCTIONS - ( 08 Jan 2021 06:37 )  Alb: 2.9 g/dL / Pro: 6.1 g/dL / ALK PHOS: 193 U/L / ALT: 102 U/L / AST: 41 U/L / GGT: x           Procalcitonin, Serum: 0.62 (01-04)    C-Reactive Protein, Serum: 66.5 (01-07)  C-Reactive Protein, Serum: 271.7 (01-04)    Ferritin, Serum: 703 (01-07)  Ferritin, Serum: 816 (01-05)  Ferritin, Serum: 759 (01-04)      D-Dimer Assay, Quantitative: 2379 (01-08)  D-Dimer Assay, Quantitative: 1953 (01-06)  D-Dimer Assay, Quantitative: 4074 (01-05)  D-Dimer Assay, Quantitative: 1412 (01-04)        MICROBIOLOGY:        Rapid RVP Result: Detected (01-04 @ 13:59)          RADIOLOGY    < from: US Duplex Venous Lower Ext Complete, Bilateral (01.05.21 @ 14:33) >  No evidence of deep venous thrombosis in either lower extremity.    < end of copied text >  
MD TITUS 75y [id number]    Patient is a 75y old  Male who presents with a chief complaint of shortness of breath (11 Jan 2021 17:37)      Follow Up/CC:  ID following for covid    Interval History/ROS: no fever, comfortable on RA    Allergies    chocolate (Pruritus)  flour (Rash)  No Known Drug Allergies  Nuts (Rash)  Soy (Unknown)    Intolerances        ANTIMICROBIALS:      MEDICATIONS  (STANDING):  amLODIPine   Tablet 10 milliGRAM(s) Oral daily  atorvastatin 10 milliGRAM(s) Oral at bedtime  budesonide 160 MICROgram(s)/formoterol 4.5 MICROgram(s) Inhaler 2 Puff(s) Inhalation two times a day  cholecalciferol 1000 Unit(s) Oral daily  cyanocobalamin 1000 MICROGram(s) Oral daily  dexAMETHasone  Injectable 6 milliGRAM(s) IV Push daily  furosemide    Tablet 20 milliGRAM(s) Oral daily  heparin   Injectable 5000 Unit(s) SubCutaneous every 8 hours  metoprolol succinate ER 25 milliGRAM(s) Oral daily  multivitamin 1 Tablet(s) Oral daily  pantoprazole    Tablet 40 milliGRAM(s) Oral before breakfast  polyethylene glycol 3350 17 Gram(s) Oral daily  senna 2 Tablet(s) Oral at bedtime  sodium chloride 1 Gram(s) Oral two times a day  tamsulosin 0.4 milliGRAM(s) Oral at bedtime    MEDICATIONS  (PRN):  aluminum hydroxide/magnesium hydroxide/simethicone Suspension 30 milliLiter(s) Oral once PRN Dyspepsia  fluticasone propionate 50 MICROgram(s)/spray Nasal Spray 1 Spray(s) Both Nostrils two times a day PRN Nasal Congestion  sodium chloride 0.65% Nasal 1 Spray(s) Both Nostrils three times a day PRN Nasal Congestion        Vital Signs Last 24 Hrs  T(C): 36.7 (12 Jan 2021 05:58), Max: 37.2 (11 Jan 2021 21:44)  T(F): 98 (12 Jan 2021 05:58), Max: 99 (11 Jan 2021 21:44)  HR: 63 (12 Jan 2021 05:58) (63 - 79)  BP: 129/62 (12 Jan 2021 05:58) (112/61 - 129/62)  BP(mean): --  RR: 18 (12 Jan 2021 05:58) (18 - 18)  SpO2: 96% (12 Jan 2021 05:58) (96% - 99%)    CBC Full  -  ( 12 Jan 2021 07:38 )  WBC Count : 8.75 K/uL  RBC Count : 3.65 M/uL  Hemoglobin : 11.2 g/dL  Hematocrit : 32.4 %  Platelet Count - Automated : 598 K/uL  Mean Cell Volume : 88.8 fL  Mean Cell Hemoglobin : 30.7 pg  Mean Cell Hemoglobin Concentration : 34.6 gm/dL  Auto Neutrophil # : x  Auto Lymphocyte # : x  Auto Monocyte # : x  Auto Eosinophil # : x  Auto Basophil # : x  Auto Neutrophil % : x  Auto Lymphocyte % : x  Auto Monocyte % : x  Auto Eosinophil % : x  Auto Basophil % : x    01-12    131<L>  |  94<L>  |  28<H>  ----------------------------<  89  4.1   |  27  |  1.51<H>    Ca    9.2      12 Jan 2021 07:38  Phos  3.3     01-12  Mg     2.3     01-12    TPro  6.2  /  Alb  3.1<L>  /  TBili  0.7  /  DBili  x   /  AST  17  /  ALT  48<H>  /  AlkPhos  154<H>  01-11    LIVER FUNCTIONS - ( 11 Jan 2021 08:18 )  Alb: 3.1 g/dL / Pro: 6.2 g/dL / ALK PHOS: 154 U/L / ALT: 48 U/L / AST: 17 U/L / GGT: x               MICROBIOLOGY:        RADIOLOGY    < from: US Duplex Venous Lower Ext Complete, Bilateral (01.05.21 @ 14:33) >  No evidence of deep venous thrombosis in either lower extremity.    < end of copied text >  < from: Xray Chest 1 View-PORTABLE IMMEDIATE (Xray Chest 1 View-PORTABLE IMMEDIATE .) (01.04.21 @ 12:52) >  The heart is enlarged. Diffuse airspace opacity is seen in both lungs especially peripherally changes compatible with Covid 19 pneumonia. No pleural effusion. No pneumothorax.    < end of copied text >  
MD TITUS 75y [id number]    Patient is a 75y old  Male who presents with a chief complaint of shortness of breath (11 Jan 2021 15:33)      Follow Up/CC:  ID following for COVID    Interval History/ROS: no fever, off O2    Allergies    chocolate (Pruritus)  flour (Rash)  No Known Drug Allergies  Nuts (Rash)  Soy (Unknown)    Intolerances        ANTIMICROBIALS:      MEDICATIONS  (STANDING):  amLODIPine   Tablet 10 milliGRAM(s) Oral daily  atorvastatin 10 milliGRAM(s) Oral at bedtime  budesonide 160 MICROgram(s)/formoterol 4.5 MICROgram(s) Inhaler 2 Puff(s) Inhalation two times a day  cholecalciferol 1000 Unit(s) Oral daily  cyanocobalamin 1000 MICROGram(s) Oral daily  dexAMETHasone  Injectable 6 milliGRAM(s) IV Push daily  furosemide    Tablet 20 milliGRAM(s) Oral daily  heparin   Injectable 5000 Unit(s) SubCutaneous every 8 hours  metoprolol succinate ER 25 milliGRAM(s) Oral daily  multivitamin 1 Tablet(s) Oral daily  pantoprazole    Tablet 40 milliGRAM(s) Oral before breakfast  polyethylene glycol 3350 17 Gram(s) Oral daily  senna 2 Tablet(s) Oral at bedtime  sodium chloride 1 Gram(s) Oral two times a day  tamsulosin 0.4 milliGRAM(s) Oral at bedtime    MEDICATIONS  (PRN):  aluminum hydroxide/magnesium hydroxide/simethicone Suspension 30 milliLiter(s) Oral once PRN Dyspepsia  fluticasone propionate 50 MICROgram(s)/spray Nasal Spray 1 Spray(s) Both Nostrils two times a day PRN Nasal Congestion  sodium chloride 0.65% Nasal 1 Spray(s) Both Nostrils three times a day PRN Nasal Congestion        Vital Signs Last 24 Hrs  T(C): 36.8 (11 Jan 2021 11:00), Max: 36.9 (11 Jan 2021 06:21)  T(F): 98.2 (11 Jan 2021 11:00), Max: 98.5 (11 Jan 2021 06:21)  HR: 74 (11 Jan 2021 11:00) (70 - 81)  BP: 122/52 (11 Jan 2021 11:00) (109/63 - 150/65)  BP(mean): --  RR: 18 (11 Jan 2021 11:00) (18 - 18)  SpO2: 99% (11 Jan 2021 11:00) (95% - 100%)    CBC Full  -  ( 11 Jan 2021 08:18 )  WBC Count : 8.61 K/uL  RBC Count : 3.44 M/uL  Hemoglobin : 10.5 g/dL  Hematocrit : 30.9 %  Platelet Count - Automated : 532 K/uL  Mean Cell Volume : 89.8 fL  Mean Cell Hemoglobin : 30.5 pg  Mean Cell Hemoglobin Concentration : 34.0 gm/dL  Auto Neutrophil # : x  Auto Lymphocyte # : x  Auto Monocyte # : x  Auto Eosinophil # : x  Auto Basophil # : x  Auto Neutrophil % : x  Auto Lymphocyte % : x  Auto Monocyte % : x  Auto Eosinophil % : x  Auto Basophil % : x    01-11    134<L>  |  98  |  27<H>  ----------------------------<  97  3.8   |  25  |  1.39<H>    Ca    8.8      11 Jan 2021 08:18    TPro  6.2  /  Alb  3.1<L>  /  TBili  0.7  /  DBili  x   /  AST  17  /  ALT  48<H>  /  AlkPhos  154<H>  01-11    LIVER FUNCTIONS - ( 11 Jan 2021 08:18 )  Alb: 3.1 g/dL / Pro: 6.2 g/dL / ALK PHOS: 154 U/L / ALT: 48 U/L / AST: 17 U/L / GGT: x             C-Reactive Protein, Serum: 27.0 (01-10)  C-Reactive Protein, Serum: 66.5 (01-07)    Ferritin, Serum: 487 (01-10)  Ferritin, Serum: 703 (01-07)  Ferritin, Serum: 816 (01-05)      D-Dimer Assay, Quantitative: 2194 (01-10)  D-Dimer Assay, Quantitative: 2379 (01-08)  D-Dimer Assay, Quantitative: 1953 (01-06)  D-Dimer Assay, Quantitative: 4074 (01-05)        MICROBIOLOGY:        RADIOLOGY    < from: US Duplex Venous Lower Ext Complete, Bilateral (01.05.21 @ 14:33) >  No evidence of deep venous thrombosis in either lower extremity.    < end of copied text >

## 2021-01-13 NOTE — PROGRESS NOTE ADULT - PROVIDER SPECIALTY LIST ADULT
Hospitalist
Nephrology
Pulmonology
Hospitalist
Hospitalist
Nephrology
Nephrology
Hospitalist
Nephrology
Pulmonology
Infectious Disease

## 2021-01-13 NOTE — DIETITIAN INITIAL EVALUATION ADULT. - PERTINENT LABORATORY DATA
01-13 Na131 mmol/L<L> Glu 92 mg/dL K+ 4.2 mmol/L Cr  1.58 mg/dL<H> BUN 28 mg/dL<H> 01-13 Phos 3.2 mg/dL 01-11 Alb 3.1 g/dL<L>

## 2021-01-13 NOTE — PROGRESS NOTE ADULT - ASSESSMENT
72 y/o male (speaks English and Yoruba) PMH HTN, asthma, SIADH, left RCC s/p left nephrectomy, bladder ca s/p Incision of Ureteral Orifice, Left laparoscopic Nephroureterectomy and Retroperitoneal Lymphadenectomy presents with SOB x 10 days. Admitted with acute hypoxic respiratory failure 2/2 COVID-19 viral PNA.    Covid pneumonia   Ex smoker:   Asthma:   HTN   SIADH  left RCC: S/P Nephrectomy    1/5/21:    Covid pneumonia : cont dexa: add remdesivir: he has CKD: should be fine with remdesivir: dw np : will talk to infection control: on 100% NRB : HIS D DIMER IS VERY HIGH : is d dimer has increased a lot within 24 hours: CTA can not be done secondary to CKD ? vq scan and dopplers: Likely because of vocid pneumonia: bu PE is a possibility: can start IV heparin full AC:   TRANSAMINITIS; LIKELY DUE TO COVID: MONITOR IN REMDESIVIR STARTED:   Ex smoker: on Symbicort and albuterol  Asthma: on symbicort  HTN: controlled  SIADH: na 134:   left RCC: S/P Nephrectomy     dw np    1/6:    Covid pneumonia : cont dexa: add remdesivir: seen by ID  : will talk to infection control: on6 L of oxygen :  is d dimer had increased a lot before but today has downtrended significantly: no full dose AC  Dopers are negative : he had some hemoptysis before hence full AC was not started and plan was to follow d dimer and if it goes further up then do full time AC: but today it has downtrended:   TRANSAMINITIS; LIKELY DUE TO COVID: MONITOR IN REMDESIVIR STARTED:   Ex smoker: on Symbicort and albuterol  Asthma: on symbicort  HTN: controlled  SIADH: na pending today   left RCC: S/P Nephrectomy     dw np: Lilia
74 y/o male (speaks English and Greenlandic) PMH HTN, asthma, SIADH, left RCC s/p left nephrectomy, bladder ca s/p Incision of Ureteral Orifice, Left laparoscopic Nephroureterectomy and Retroperitoneal Lymphadenectomy presents with SOB x 10 days. Admitted with acute hypoxic respiratory failure 2/2 COVID-19 viral PNA.    Covid pneumonia   Ex smoker:   Asthma:   HTN   SIADH  left RCC: S/P Nephrectomy    1/5/21:    Covid pneumonia : cont dexa: add remdesivir: he has CKD: should be fine with remdesivir: dw np : will talk to infection control: on 100% NRB : HIS D DIMER IS VERY HIGH : is d dimer has increased a lot within 24 hours: CTA can not be done secondary to CKD ? vq scan and dopplers: Likely because of vocid pneumonia: bu PE is a possibility: can start IV heparin full AC:   TRANSAMINITIS; LIKELY DUE TO COVID: MONITOR IN REMDESIVIR STARTED:   Ex smoker: on Symbicort and albuterol  Asthma: on symbicort  HTN: controlled  SIADH: na 134:   left RCC: S/P Nephrectomy     dw np    1/6:    Covid pneumonia : cont dexa: add remdesivir: seen by ID  : will talk to infection control: on6 L of oxygen :  is d dimer had increased a lot before but today has downtrended significantly: no full dose AC  Dopers are negative : he had some hemoptysis before hence full AC was not started and plan was to follow d dimer and if it goes further up then do full time AC: but today it has downtrended:   TRANSAMINITIS; LIKELY DUE TO COVID: MONITOR IN REMDESIVIR STARTED:   Ex smoker: on Symbicort and albuterol  Asthma: on symbicort  HTN: controlled  SIADH: na pending today   left RCC: S/P Nephrectomy     dw np: Lilia    1/7/21"    Covid pneumonia : cont dexa: add remdesivir: seen by ID  : will talk to infection control: on 6 L of oxygen again today : overnight needed 1005 :  is d dimer had increased a lot before but yesterday  downtrended significantly: no full dose AC  Dopers are negative :no hemotysis  TRANSAMINITIS; LIKELY DUE TO COVID: MONITOR IN REMDESIVIR STARTED:  finish 5 d of remdesivir and 10 days of dexa  Ex smoker: on Symbicort and albuterol  Asthma: on symbicort  HTN: controlled  SIADH: na pending today   left RCC: S/P Nephrectomy : creat is getting better     dw np: Kelsea    1/8/21:    Covid pneumonia : cont dexa for 10 : and remdesivir: for 5 days  : will talk to infection control: on 4 of oxygen again today : minimal hemoptysis:  DD travis increased but still low from beforeP  TRANSAMINITIS; LIKELY DUE TO COVID: MONITOR IN REMDESIVIR STARTED:  finish 5 d of remdesivir and 10 days of dexa: LFTS downtrended  Ex smoker: on Symbicort and albuterol  Asthma: on symbicort  HTN: controlled  SIADH: na pending today   left RCC: S/P Nephrectomy : creat is getting better     dw np: Kelsea    1/9/21:  Covid pneumonia : cont dexa for 6/10 : and remdesivir: for 5 days-last day today  : on 4 of oxygen again today : minimal hemoptysis:  has some west: but no hemoptysis: cont tomonitor: try to wean down oxygen   TRANSAMINITIS; LIKELY DUE TO COVID: MONITOR IN REMDESIVIR STARTED:  finish 5 d of remdesivir and 10 days of dexa: LFTS downtrended  Ex smoker: on Symbicort and albuterol  Asthma: on symbicort  HTN: controlled  SIADH: na pending today   left RCC: S/P Nephrectomy : cont to folow    dw np: Mat    1/10:    Covid pneumonia : cont dexa for 7/10 : and finihsed remdesivir  : on 4 of oxygen again today : no hemoptysis: no wheezing: on 2 L of oxygen  TRANSAMINITIS; LIKELY DUE TO COVID: MONITOR IN REMDESIVIR STARTED:  finished 5 d of remdesivir and 6/10 days of dexa: LFTS downtrended  Ex smoker: on Symbicort and albuterol  Asthma: on symbicort  HTN: controlled  SIADH: na pending today   left RCC: S/P Nephrectomy : cont to folow    DW pt  DC planning to rehab    1/11:    Covid pneumonia : cont dexa for 8/10 : and finished remdesivir  : on 1L of oxygen again today : no hemoptysis: no wheezing: on 1 L of oxygen  TRANSAMINITIS; LIKELY DUE TO COVID: MONITOR IN REMDESIVIR STARTED:  finished 5 d of remdesivir and 8/10 days of dexa: LFTS downtrended  Ex smoker: on Symbicort and albuterol  Asthma: on symbicort  HTN: controlled  SIADH: na pending today   left RCC: S/P Nephrectomy : cont to follow    DW pt  DC planning to rehab    1/12:    Covid pneumonia : cont dexa for 9/10 : and finished remdesivir  : on 1L of oxygen again today : no hemoptysis: no wheezing: on 1 L of oxygen  TRANSAMINITIS; LIKELY DUE TO COVID: MONITOR IN REMDESIVIR STARTED:  finished 5 d of remdesivir and 9/10 days of dexa: LFTS downtrended  Ex smoker: on Symbicort and albuterol  Asthma: on symbicort  HTN: controlled  SIADH: na pending today   left RCC: S/P Nephrectomy : cont to follow  H EIS DOING MUCH BETTER: NO HEMOPTYSIS: HE IS READY OT BE DCED ON OXYGEN   DC planning     DW NP    
74 y/o male (speaks English and Spanish) PMH HTN, asthma, SIADH, left RCC s/p left nephrectomy, bladder ca s/p Incision of Ureteral Orifice, Left laparoscopic Nephroureterectomy and Retroperitoneal Lymphadenectomy presents with SOB x 10 days. Admitted with acute hypoxic respiratory failure 2/2 COVID-19 viral PNA.    Covid pneumonia   Ex smoker:   Asthma:   HTN   SIADH  left RCC: S/P Nephrectomy    1/5/21:    Covid pneumonia : cont dexa: add remdesivir: he has CKD: should be fine with remdesivir: dw np : will talk to infection control: on 100% NRB : HIS D DIMER IS VERY HIGH : is d dimer has increased a lot within 24 hours: CTA can not be done secondary to CKD ? vq scan and dopplers: Likely because of vocid pneumonia: bu PE is a possibility: can start IV heparin full AC:   TRANSAMINITIS; LIKELY DUE TO COVID: MONITOR IN REMDESIVIR STARTED:   Ex smoker: on Symbicort and albuterol  Asthma: on symbicort  HTN: controlled  SIADH: na 134:   left RCC: S/P Nephrectomy     dw np    1/6:    Covid pneumonia : cont dexa: add remdesivir: seen by ID  : will talk to infection control: on6 L of oxygen :  is d dimer had increased a lot before but today has downtrended significantly: no full dose AC  Dopers are negative : he had some hemoptysis before hence full AC was not started and plan was to follow d dimer and if it goes further up then do full time AC: but today it has downtrended:   TRANSAMINITIS; LIKELY DUE TO COVID: MONITOR IN REMDESIVIR STARTED:   Ex smoker: on Symbicort and albuterol  Asthma: on symbicort  HTN: controlled  SIADH: na pending today   left RCC: S/P Nephrectomy     dw np: Lilia    1/7/21"    Covid pneumonia : cont dexa: add remdesivir: seen by ID  : will talk to infection control: on 6 L of oxygen again today : overnight needed 1005 :  is d dimer had increased a lot before but yesterday  downtrended significantly: no full dose AC  Dopers are negative :no hemotysis  TRANSAMINITIS; LIKELY DUE TO COVID: MONITOR IN REMDESIVIR STARTED:  finish 5 d of remdesivir and 10 days of dexa  Ex smoker: on Symbicort and albuterol  Asthma: on symbicort  HTN: controlled  SIADH: na pending today   left RCC: S/P Nephrectomy : creat is getting better     dw np: Kelsea    1/8/21:    Covid pneumonia : cont dexa for 10 : and remdesivir: for 5 days  : will talk to infection control: on 4 of oxygen again today : minimal hemoptysis:  DD traivs increased but still low from beforeP  TRANSAMINITIS; LIKELY DUE TO COVID: MONITOR IN REMDESIVIR STARTED:  finish 5 d of remdesivir and 10 days of dexa: LFTS downtrended  Ex smoker: on Symbicort and albuterol  Asthma: on symbicort  HTN: controlled  SIADH: na pending today   left RCC: S/P Nephrectomy : creat is getting better     dw np: Kelsea    1/9/21:  Covid pneumonia : cont dexa for 6/10 : and remdesivir: for 5 days-last day today  : on 4 of oxygen again today : minimal hemoptysis:  has some west: but no hemoptysis: cont tomonitor: try to wean down oxygen   TRANSAMINITIS; LIKELY DUE TO COVID: MONITOR IN REMDESIVIR STARTED:  finish 5 d of remdesivir and 10 days of dexa: LFTS downtrended  Ex smoker: on Symbicort and albuterol  Asthma: on symbicort  HTN: controlled  SIADH: na pending today   left RCC: S/P Nephrectomy : cont to folow    dw np: Mat    1/10:    Covid pneumonia : cont dexa for 7/10 : and finihsed remdesivir  : on 4 of oxygen again today : no hemoptysis: no wheezing: on 2 L of oxygen  TRANSAMINITIS; LIKELY DUE TO COVID: MONITOR IN REMDESIVIR STARTED:  finished 5 d of remdesivir and 6/10 days of dexa: LFTS downtrended  Ex smoker: on Symbicort and albuterol  Asthma: on symbicort  HTN: controlled  SIADH: na pending today   left RCC: S/P Nephrectomy : cont to folow    DW pt  DC planning to rehab    1/11:    Covid pneumonia : cont dexa for 8/10 : and finished remdesivir  : on 1L of oxygen again today : no hemoptysis: no wheezing: on 1 L of oxygen  TRANSAMINITIS; LIKELY DUE TO COVID: MONITOR IN REMDESIVIR STARTED:  finished 5 d of remdesivir and 8/10 days of dexa: LFTS downtrended  Ex smoker: on Symbicort and albuterol  Asthma: on symbicort  HTN: controlled  SIADH: na pending today   left RCC: S/P Nephrectomy : cont to follow    DW pt  DC planning to rehab    1/12:    Covid pneumonia : cont dexa for 9/10 : and finished remdesivir  : on 1L of oxygen again today : no hemoptysis: no wheezing: on 1 L of oxygen  TRANSAMINITIS; LIKELY DUE TO COVID: MONITOR IN REMDESIVIR STARTED:  finished 5 d of remdesivir and 9/10 days of dexa: LFTS downtrended  Ex smoker: on Symbicort and albuterol  Asthma: on symbicort  HTN: controlled  SIADH: na pending today   left RCC: S/P Nephrectomy : cont to follow  H EIS DOING MUCH BETTER: NO HEMOPTYSIS: HE IS READY OT BE DCED ON OXYGEN   DC planning     DW NP      1/13:    Covid pneumonia : cont dexa for 9/10 : and finished remdesivir  : on no of oxygen again today : no hemoptysis: check ambulatory o2 sao2  TRANSAMINITIS; LIKELY DUE TO COVID: MONITOR IN REMDESIVIR STARTED:  finished 5 d of remdesivir and 10/10 days of dexa: LFTS downtrended  Ex smoker: on Symbicort and albuterol  Asthma: on symbicort  HTN: controlled  SIADH: follow up   left RCC: S/P Nephrectomy : cont to follow  DC planning     DW NP    
74yo M w/ pmhx of HTN presents for sob, covid-like symptoms x 10 days, pt evaluated briefly to be hypoxic and in mild respiratory distress,      CKD Stage 3  Baseline Scr 1.5-1.7   Renal function stable  Monitor BMP   Avoid nephrotoxics, NSAIDS RCA    HTN  BP acceptable   MOnitor BP    Hyponatremia  Check URine Na, Urine Osmo  Na slightly worsen  free water resistriction <1L/day  on Na tab 1g bid  MOnitor serum NA    Hypokalemia  supplemented by team  monitor    COVID 19+  MOnitor temp/ ox  Follow up Pulm
76yo M w/ pmhx of HTN presents for sob, covid-like symptoms x 10 days, pt evaluated briefly to be hypoxic and in mild respiratory distress,      CKD Stage 3  Baseline Scr 1.5-1.7   Renal function better than baseline  Monitor BMP   Avoid nephrotoxics, NSAIDS RCA    HTN  BP acceptable   MOnitor BP    Hyponatremia  possible SIADH  Na better  free water resistriction <1L/day  on Na tab 1g bid  MOnitor serum NA    Hypokalemia  supplemented by team  monitor    COVID 19+  MOnitor temp/ ox  Follow up Pulm
76yo M w/ pmhx of HTN presents for sob, covid-like symptoms x 10 days, pt evaluated briefly to be hypoxic and in mild respiratory distress,      CKD Stage 3  Baseline Scr 1.5-1.7   Renal function stable  Monitor BMP   Avoid nephrotoxics, NSAIDS RCA      HTN  BP acceptable   MOnitor BP    Hyponatremia  Check URine Na, Urine Osmo  Na stable  on Na tab 1g bid  MOnitor serum NA    COVID 19+  MOnitor temp/ ox  Follow up Pulm
76yo M w/ pmhx of HTN presents for sob, covid-like symptoms x 10 days, pt evaluated briefly to be hypoxic and in mild respiratory distress,      CKD Stage 3  Baseline Scr 1.5-1.7   scr slightly worsen today but still within baseline  Monitor BMP   Avoid nephrotoxics, NSAIDS RCA    HTN  BP acceptable   MOnitor BP    Hyponatremia  likely SIADH  Na stable  free water resistriction <1L/day  on Na tab 1g bid  MOnitor serum NA    Hypokalemia  supplemented by team  monitor    COVID 19+  MOnitor temp/ ox  Follow up Pulm
Problem/Plan - 1:  ·  Problem: Acute respiratory failure with hypoxia.  Plan: due to COVID-19 viral pneumonia,   cw remdesivir and decadron  Trend ferritin, D-dimer, proBNP q48-72h  Monitor off Abx.     Problem/Plan - 2:  ·  Problem: Stage 3 chronic kidney disease, unspecified whether stage 3a or 3b CKD.  Plan: Baseline Cr: 1.7. Currently at baselineAvoid nephrotoxins, NSAIDs  renally dose all meds.     Problem/Plan - 3:  ·  Problem: Hypertension, unspecified type.  Plan: continue Norvasc  SBP up to 140s ok in this elderly patient with COVID infection, would not aggressively treat.     Problem/Plan - 4:  ·  Problem: Hyponatremia.  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  Plan: s/p cystoscopy, Incision of Ureteral Orifice, Left laparoscopic Nephroureterectomy and Retroperitoneal Lymphadenectomy  Outpatient followup with Oncology. 
72 y/o male (speaks English and Nepali) PMH HTN, asthma, SIADH, left RCC s/p left nephrectomy, bladder ca s/p Incision of Ureteral Orifice, Left laparoscopic Nephroureterectomy and Retroperitoneal Lymphadenectomy presents with SOB x 10 days. Admitted with acute hypoxic respiratory failure 2/2 COVID-19 viral PNA.    Problem/Plan - 1:  ·  Problem: Acute respiratory failure with hypoxia.  Plan: due to COVID-19 viral pneumonia, currently on 6L NC  Tends to desaturate to low 90s with coughing. Placed on NRB as well  If worsening, can escalate to HFNC and obtain pulmonary consult  start decadron 6mg IVP qd x 10days  Holding off Remdesevir given borderline GFR and CKD stage 3  Mild LFT elevation likely due to COVID, will trend LFTs  Maintain Continuous pulse ox, start chest PT, incentive spirometry  Mucinex 600mg BID x 4 days  Trend lactate: 3.1 on admission, s/p IVF, encourage PO hydration  Trend ferritin, D-dimer, proBNP q48-72h  Monitor off Abx.     Problem/Plan - 2:  ·  Problem: Stage 3 chronic kidney disease, unspecified whether stage 3a or 3b CKD.  Plan: Baseline Cr: 1.7. Currently at baseline  Avoid nephrotoxins, NSAIDs  renally dose all meds.     Problem/Plan - 3:  ·  Problem: Hypertension, unspecified type.  Plan: continue Norvasc  SBP up to 140s ok in this elderly patient with COVID infection, would not aggressively treat.     Problem/Plan - 4:  ·  Problem: Hyponatremia.  Plan: has history of SIADH on salt tabs  Trend Na  can check urine legionella but less likely, + COVID.     Problem/Plan - 5:  ·  Problem: Malignant neoplasm of urinary bladder, unspecified site.  Plan: s/p cystoscopy, Incision of Ureteral Orifice, Left laparoscopic Nephroureterectomy and Retroperitoneal Lymphadenectomy  Outpatient followup with Oncology. 
72 y/o male (speaks English and Romanian) PMH HTN, asthma, SIADH, left RCC s/p left nephrectomy, bladder ca s/p Incision of Ureteral Orifice, Left laparoscopic Nephroureterectomy and Retroperitoneal Lymphadenectomy presents with SOB x 10 days. Admitted with acute hypoxic respiratory failure 2/2 COVID-19 viral PNA.    Covid pneumonia   Ex smoker:   Asthma:   HTN   SIADH  left RCC: S/P Nephrectomy    1/5/21:    Covid pneumonia : cont dexa: add remdesivir: he has CKD: should be fine with remdesivir: dw np : will talk to infection control: on 100% NRB : HIS D DIMER IS VERY HIGH : is d dimer has increased a lot within 24 hours: CTA can not be done secondary to CKD ? vq scan and dopplers: Likely because of vocid pneumonia: bu PE is a possibility: can start IV heparin full AC:   TRANSAMINITIS; LIKELY DUE TO COVID: MONITOR IN REMDESIVIR STARTED:   Ex smoker: on Symbicort and albuterol  Asthma: on symbicort  HTN: controlled  SIADH: na 134:   left RCC: S/P Nephrectomy     dw np    1/6:    Covid pneumonia : cont dexa: add remdesivir: seen by ID  : will talk to infection control: on6 L of oxygen :  is d dimer had increased a lot before but today has downtrended significantly: no full dose AC  Dopers are negative : he had some hemoptysis before hence full AC was not started and plan was to follow d dimer and if it goes further up then do full time AC: but today it has downtrended:   TRANSAMINITIS; LIKELY DUE TO COVID: MONITOR IN REMDESIVIR STARTED:   Ex smoker: on Symbicort and albuterol  Asthma: on symbicort  HTN: controlled  SIADH: na pending today   left RCC: S/P Nephrectomy     dw np: Lilia    1/7/21"    Covid pneumonia : cont dexa: add remdesivir: seen by ID  : will talk to infection control: on 6 L of oxygen again today : overnight needed 1005 :  is d dimer had increased a lot before but yesterday  downtrended significantly: no full dose AC  Dopers are negative :no hemotysis  TRANSAMINITIS; LIKELY DUE TO COVID: MONITOR IN REMDESIVIR STARTED:  finish 5 d of remdesivir and 10 days of dexa  Ex smoker: on Symbicort and albuterol  Asthma: on symbicort  HTN: controlled  SIADH: na pending today   left RCC: S/P Nephrectomy : creat is getting better     dw np: Kelsea    1/8/21:    Covid pneumonia : cont dexa for 10 : and remdesivir: for 5 days  : will talk to infection control: on 4 of oxygen again today : minimal hemoptysis:  DD travis increased but still low from beforeP  TRANSAMINITIS; LIKELY DUE TO COVID: MONITOR IN REMDESIVIR STARTED:  finish 5 d of remdesivir and 10 days of dexa: LFTS downtrended  Ex smoker: on Symbicort and albuterol  Asthma: on symbicort  HTN: controlled  SIADH: na pending today   left RCC: S/P Nephrectomy : creat is getting better     dw np: Kelsea    1/9/21:  Covid pneumonia : cont dexa for 6/10 : and remdesivir: for 5 days-last day today  : on 4 of oxygen again today : minimal hemoptysis:  has some west: but no hemoptysis: cont tomonitor: try to wean down oxygen   TRANSAMINITIS; LIKELY DUE TO COVID: MONITOR IN REMDESIVIR STARTED:  finish 5 d of remdesivir and 10 days of dexa: LFTS downtrended  Ex smoker: on Symbicort and albuterol  Asthma: on symbicort  HTN: controlled  SIADH: na pending today   left RCC: S/P Nephrectomy : cont to folow    dw np: Elias  
74 y/o male (speaks English and Hebrew) PMH HTN, asthma, SIADH, left RCC s/p left nephrectomy, bladder ca s/p Incision of Ureteral Orifice, Left laparoscopic Nephroureterectomy and Retroperitoneal Lymphadenectomy (10/28/2019) and is still receiving chemo presents with SOB x 10 days with COVID PNA, hypoxia, transaminitis, EDU,  
Problem/Plan - 1:  ·  Problem: Acute respiratory failure with hypoxia.  Plan: due to COVID-19 viral pneumonia,   finihsed remdesivir   Trend ferritin, D-dimer, proBNP q48-72h  Monitor off Abx.     Problem/Plan - 2:  ·  Problem: Stage 3 chronic kidney disease, unspecified whether stage 3a or 3b CKD.  Plan: Baseline Cr: 1.7. Currently at baselineAvoid nephrotoxins, NSAIDs  renally dose all meds.     Problem/Plan - 3:  ·  Problem: Hypertension, unspecified type.  Plan: continue Norvasc  SBP up to 140s ok in this elderly patient with COVID infection, would not aggressively treat.     Problem/Plan - 4:  ·  Problem: Hyponatremia.  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  Plan: s/p cystoscopy, Incision of Ureteral Orifice, Left laparoscopic Nephroureterectomy and Retroperitoneal Lymphadenectomy  Outpatient followup with Oncology.   
Problem/Plan - 1:  ·  Problem: Acute respiratory failure with hypoxia.  Plan: due to COVID-19 viral pneumonia,   finihsed remdesivir   Trend ferritin, D-dimer, proBNP q48-72h  Monitor off Abx.     Problem/Plan - 2:  ·  Problem: Stage 3 chronic kidney disease, unspecified whether stage 3a or 3b CKD.  Plan: Baseline Cr: 1.7. Currently at baselineAvoid nephrotoxins, NSAIDs  renally dose all meds.     Problem/Plan - 3:  ·  Problem: Hypertension, unspecified type.  Plan: continue Norvasc  SBP up to 140s ok in this elderly patient with COVID infection, would not aggressively treat.     Problem/Plan - 4:  ·  Problem: Hyponatremia.  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  Plan: s/p cystoscopy, Incision of Ureteral Orifice, Left laparoscopic Nephroureterectomy and Retroperitoneal Lymphadenectomy  Outpatient followup with Oncology. 
Problem/Plan - 1:  ·  Problem: Acute respiratory failure with hypoxia.  Plan: due to COVID-19 viral pneumonia,   finihsed remdesivir   Trend ferritin, D-dimer, proBNP q48-72h  Monitor off Abx.     Problem/Plan - 2:  ·  Problem: Stage 3 chronic kidney disease, unspecified whether stage 3a or 3b CKD.  Plan: Baseline Cr: 1.7. Currently at baselineAvoid nephrotoxins, NSAIDs  renally dose all meds.     Problem/Plan - 3:  ·  Problem: Hypertension, unspecified type.  Plan: continue Norvasc  SBP up to 140s ok in this elderly patient with COVID infection, would not aggressively treat.     Problem/Plan - 4:·  Problem: Hyponatremia.  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  Plan: s/p cystoscopy, Incision of Ureteral Orifice, Left laparoscopic Nephroureterectomy and Retroperitoneal Lymphadenectomy  Outpatient followup with Oncology.       
74 y/o male (speaks English and Pashto) PMH HTN, asthma, SIADH, left RCC s/p left nephrectomy, bladder ca s/p Incision of Ureteral Orifice, Left laparoscopic Nephroureterectomy and Retroperitoneal Lymphadenectomy presents with SOB x 10 days. Admitted with acute hypoxic respiratory failure 2/2 COVID-19 viral PNA.    Covid pneumonia   Ex smoker:   Asthma:   HTN   SIADH  left RCC: S/P Nephrectomy    1/5/21:    Covid pneumonia : cont dexa: add remdesivir: he has CKD: should be fine with remdesivir: dw np : will talk to infection control: on 100% NRB : HIS D DIMER IS VERY HIGH : is d dimer has increased a lot within 24 hours: CTA can not be done secondary to CKD ? vq scan and dopplers: Likely because of vocid pneumonia: bu PE is a possibility: can start IV heparin full AC:   TRANSAMINITIS; LIKELY DUE TO COVID: MONITOR IN REMDESIVIR STARTED:   Ex smoker: on Symbicort and albuterol  Asthma: on symbicort  HTN: controlled  SIADH: na 134:   left RCC: S/P Nephrectomy     dw np    1/6:    Covid pneumonia : cont dexa: add remdesivir: seen by ID  : will talk to infection control: on6 L of oxygen :  is d dimer had increased a lot before but today has downtrended significantly: no full dose AC  Dopers are negative : he had some hemoptysis before hence full AC was not started and plan was to follow d dimer and if it goes further up then do full time AC: but today it has downtrended:   TRANSAMINITIS; LIKELY DUE TO COVID: MONITOR IN REMDESIVIR STARTED:   Ex smoker: on Symbicort and albuterol  Asthma: on symbicort  HTN: controlled  SIADH: na pending today   left RCC: S/P Nephrectomy     dw np: Lilia    1/7/21"    Covid pneumonia : cont dexa: add remdesivir: seen by ID  : will talk to infection control: on 6 L of oxygen again today : overnight needed 1005 :  is d dimer had increased a lot before but yesterday  downtrended significantly: no full dose AC  Dopers are negative :no hemotysis  TRANSAMINITIS; LIKELY DUE TO COVID: MONITOR IN REMDESIVIR STARTED:  finish 5 d of remdesivir and 10 days of dexa  Ex smoker: on Symbicort and albuterol  Asthma: on symbicort  HTN: controlled  SIADH: na pending today   left RCC: S/P Nephrectomy : creat is getting better     dw np: Kelsea
74 y/o male (speaks English and Spanish) PMH HTN, asthma, SIADH, left RCC s/p left nephrectomy, bladder ca s/p Incision of Ureteral Orifice, Left laparoscopic Nephroureterectomy and Retroperitoneal Lymphadenectomy presents with SOB x 10 days. Admitted with acute hypoxic respiratory failure 2/2 COVID-19 viral PNA.    Covid pneumonia   Ex smoker:   Asthma:   HTN   SIADH  left RCC: S/P Nephrectomy    1/5/21:    Covid pneumonia : cont dexa: add remdesivir: he has CKD: should be fine with remdesivir: dw np : will talk to infection control: on 100% NRB : HIS D DIMER IS VERY HIGH : is d dimer has increased a lot within 24 hours: CTA can not be done secondary to CKD ? vq scan and dopplers: Likely because of vocid pneumonia: bu PE is a possibility: can start IV heparin full AC:   TRANSAMINITIS; LIKELY DUE TO COVID: MONITOR IN REMDESIVIR STARTED:   Ex smoker: on Symbicort and albuterol  Asthma: on symbicort  HTN: controlled  SIADH: na 134:   left RCC: S/P Nephrectomy     dw np    1/6:    Covid pneumonia : cont dexa: add remdesivir: seen by ID  : will talk to infection control: on6 L of oxygen :  is d dimer had increased a lot before but today has downtrended significantly: no full dose AC  Dopers are negative : he had some hemoptysis before hence full AC was not started and plan was to follow d dimer and if it goes further up then do full time AC: but today it has downtrended:   TRANSAMINITIS; LIKELY DUE TO COVID: MONITOR IN REMDESIVIR STARTED:   Ex smoker: on Symbicort and albuterol  Asthma: on symbicort  HTN: controlled  SIADH: na pending today   left RCC: S/P Nephrectomy     dw np: Lilia    1/7/21"    Covid pneumonia : cont dexa: add remdesivir: seen by ID  : will talk to infection control: on 6 L of oxygen again today : overnight needed 1005 :  is d dimer had increased a lot before but yesterday  downtrended significantly: no full dose AC  Dopers are negative :no hemotysis  TRANSAMINITIS; LIKELY DUE TO COVID: MONITOR IN REMDESIVIR STARTED:  finish 5 d of remdesivir and 10 days of dexa  Ex smoker: on Symbicort and albuterol  Asthma: on symbicort  HTN: controlled  SIADH: na pending today   left RCC: S/P Nephrectomy : creat is getting better     dw np: Kelsea    1/8/21:    Covid pneumonia : cont dexa for 10 : and remdesivir: for 5 days  : will talk to infection control: on 4 of oxygen again today : minimal hemoptysis:  DD travis increased but still low from beforeP  TRANSAMINITIS; LIKELY DUE TO COVID: MONITOR IN REMDESIVIR STARTED:  finish 5 d of remdesivir and 10 days of dexa: LFTS downtrended  Ex smoker: on Symbicort and albuterol  Asthma: on symbicort  HTN: controlled  SIADH: na pending today   left RCC: S/P Nephrectomy : creat is getting better     dw np: Kelsea
74 y/o male (speaks English and Welsh) PMH HTN, asthma, SIADH, left RCC s/p left nephrectomy, bladder ca s/p Incision of Ureteral Orifice, Left laparoscopic Nephroureterectomy and Retroperitoneal Lymphadenectomy presents with SOB x 10 days. Admitted with acute hypoxic respiratory failure 2/2 COVID-19 viral PNA.    Covid pneumonia   Ex smoker:   Asthma:   HTN   SIADH  left RCC: S/P Nephrectomy    1/5/21:    Covid pneumonia : cont dexa: add remdesivir: he has CKD: should be fine with remdesivir: dw np : will talk to infection control: on 100% NRB : HIS D DIMER IS VERY HIGH : is d dimer has increased a lot within 24 hours: CTA can not be done secondary to CKD ? vq scan and dopplers: Likely because of vocid pneumonia: bu PE is a possibility: can start IV heparin full AC:   TRANSAMINITIS; LIKELY DUE TO COVID: MONITOR IN REMDESIVIR STARTED:   Ex smoker: on Symbicort and albuterol  Asthma: on symbicort  HTN: controlled  SIADH: na 134:   left RCC: S/P Nephrectomy     dw np    1/6:    Covid pneumonia : cont dexa: add remdesivir: seen by ID  : will talk to infection control: on6 L of oxygen :  is d dimer had increased a lot before but today has downtrended significantly: no full dose AC  Dopers are negative : he had some hemoptysis before hence full AC was not started and plan was to follow d dimer and if it goes further up then do full time AC: but today it has downtrended:   TRANSAMINITIS; LIKELY DUE TO COVID: MONITOR IN REMDESIVIR STARTED:   Ex smoker: on Symbicort and albuterol  Asthma: on symbicort  HTN: controlled  SIADH: na pending today   left RCC: S/P Nephrectomy     dw np: Lilia    1/7/21"    Covid pneumonia : cont dexa: add remdesivir: seen by ID  : will talk to infection control: on 6 L of oxygen again today : overnight needed 1005 :  is d dimer had increased a lot before but yesterday  downtrended significantly: no full dose AC  Dopers are negative :no hemotysis  TRANSAMINITIS; LIKELY DUE TO COVID: MONITOR IN REMDESIVIR STARTED:  finish 5 d of remdesivir and 10 days of dexa  Ex smoker: on Symbicort and albuterol  Asthma: on symbicort  HTN: controlled  SIADH: na pending today   left RCC: S/P Nephrectomy : creat is getting better     dw np: Kelsea    1/8/21:    Covid pneumonia : cont dexa for 10 : and remdesivir: for 5 days  : will talk to infection control: on 4 of oxygen again today : minimal hemoptysis:  DD travis increased but still low from beforeP  TRANSAMINITIS; LIKELY DUE TO COVID: MONITOR IN REMDESIVIR STARTED:  finish 5 d of remdesivir and 10 days of dexa: LFTS downtrended  Ex smoker: on Symbicort and albuterol  Asthma: on symbicort  HTN: controlled  SIADH: na pending today   left RCC: S/P Nephrectomy : creat is getting better     dw np: Kelsea    1/9/21:  Covid pneumonia : cont dexa for 6/10 : and remdesivir: for 5 days-last day today  : on 4 of oxygen again today : minimal hemoptysis:  has some west: but no hemoptysis: cont tomonitor: try to wean down oxygen   TRANSAMINITIS; LIKELY DUE TO COVID: MONITOR IN REMDESIVIR STARTED:  finish 5 d of remdesivir and 10 days of dexa: LFTS downtrended  Ex smoker: on Symbicort and albuterol  Asthma: on symbicort  HTN: controlled  SIADH: na pending today   left RCC: S/P Nephrectomy : cont to folow    dw np: Mat    1/10:    Covid pneumonia : cont dexa for 7/10 : and finihsed remdesivir  : on 4 of oxygen again today : no hemoptysis: no wheezing: on 2 L of oxygen  TRANSAMINITIS; LIKELY DUE TO COVID: MONITOR IN REMDESIVIR STARTED:  finished 5 d of remdesivir and 6/10 days of dexa: LFTS downtrended  Ex smoker: on Symbicort and albuterol  Asthma: on symbicort  HTN: controlled  SIADH: na pending today   left RCC: S/P Nephrectomy : cont to folow    DW pt  DC planning to rehab  
74yo M w/ pmhx of HTN presents for sob, covid-like symptoms x 10 days, pt evaluated briefly to be hypoxic and in mild respiratory distress,      CKD Stage 3  Baseline Scr 1.5-1.7   Renal function stable  Monitor BMP   Avoid nephrotoxics, NSAIDS RCA      HTN  BP acceptable   MOnitor BP    Hyponatremia  Check URine Na, Urine Osmo  Na stable  on Na tab 1g bid  MOnitor serum NA    COVID 19+  MOnitor temp/ ox  Follow up Pulm
74yo M w/ pmhx of HTN presents for sob, covid-like symptoms x 10 days, pt evaluated briefly to be hypoxic and in mild respiratory distress,      CKD Stage 3  Baseline Scr 1.5-1.7   Renal function stable  Monitor BMP   Avoid nephrotoxics, NSAIDS RCA    HTN  BP acceptable   MOnitor BP    Hyponatremia  Check URine Na, Urine Osmo  Na slightly worsen  free water resistriction <1L/day  on Na tab 1g bid  MOnitor serum NA    Hypokalemia  supplemented by team  monitor    COVID 19+  MOnitor temp/ ox  Follow up Pulm
76yo M w/ pmhx of HTN presents for sob, covid-like symptoms x 10 days, pt evaluated briefly to be hypoxic and in mild respiratory distress,      CKD Stage 3  Baseline Scr 1.5-1.7   Renal function stable  Monitor BMP   Avoid nephrotoxics, NSAIDS RCA      HTN  BP acceptable   MOnitor BP    Hyponatremia  Check URine Na, Urine Osmo  Na stable  on Na tab 1g bid  MOnitor serum NA    Hypokalemia  supplemented by team  monitor    COVID 19+  MOnitor temp/ ox  Follow up Pulm
76yo M w/ pmhx of HTN presents for sob, covid-like symptoms x 10 days, pt evaluated briefly to be hypoxic and in mild respiratory distress,      CKD Stage 3  Baseline Scr 1.5-1.7   scr slightly worsen today but still within baseline  Monitor BMP   Avoid nephrotoxics, NSAIDS RCA    HTN  BP acceptable   MOnitor BP    Hyponatremia  likely SIADH  Na stable  free water resistriction <1L/day  on Na tab 1g bid  MOnitor serum NA    Hypokalemia  supplemented by team  monitor    COVID 19+  MOnitor temp/ ox  Follow up Pulm
Problem/Plan - 1:  ·  Problem: Acute respiratory failure with hypoxia.  Plan: due to COVID-19 viral pneumonia,   cw remdesivir and decadron  Trend ferritin, D-dimer, proBNP q48-72h  Monitor off Abx.     Problem/Plan - 2:  ·  Problem: Stage 3 chronic kidney disease, unspecified whether stage 3a or 3b CKD.  Plan: Baseline Cr: 1.7. Currently at baselineAvoid nephrotoxins, NSAIDs  renally dose all meds.     Problem/Plan - 3:  ·  Problem: Hypertension, unspecified type.  Plan: continue Norvasc  SBP up to 140s ok in this elderly patient with COVID infection, would not aggressively treat.     Problem/Plan - 4:  ·  Problem: Hyponatremia.  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  Plan: s/p cystoscopy, Incision of Ureteral Orifice, Left laparoscopic Nephroureterectomy and Retroperitoneal Lymphadenectomy  Outpatient followup with Oncology. 
Problem/Plan - 1:  ·  Problem: Acute respiratory failure with hypoxia.  Plan: due to COVID-19 viral pneumonia,   finihsed remdesivir   Trend ferritin, D-dimer, proBNP q48-72h  Monitor off Abx.     Problem/Plan - 2:  ·  Problem: Stage 3 chronic kidney disease, unspecified whether stage 3a or 3b CKD.  Plan: Baseline Cr: 1.7. Currently at baselineAvoid nephrotoxins, NSAIDs  renally dose all meds.     Problem/Plan - 3:  ·  Problem: Hypertension, unspecified type.  Plan: continue Norvasc  SBP up to 140s ok in this elderly patient with COVID infection, would not aggressively treat.     Problem/Plan - 4:  ·  Problem: Hyponatremia.  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  Plan: s/p cystoscopy, Incision of Ureteral Orifice, Left laparoscopic Nephroureterectomy and Retroperitoneal Lymphadenectomy  Outpatient followup with Oncology.     
Problem/Plan - 1:  ·  Problem: Acute respiratory failure with hypoxia.  Plan: due to COVID-19 viral pneumonia,   cw remdesivir and decadron  Trend ferritin, D-dimer, proBNP q48-72h  Monitor off Abx.     Problem/Plan - 2:  ·  Problem: Stage 3 chronic kidney disease, unspecified whether stage 3a or 3b CKD.  Plan: Baseline Cr: 1.7. Currently at baselineAvoid nephrotoxins, NSAIDs  renally dose all meds.     Problem/Plan - 3:  ·  Problem: Hypertension, unspecified type.  Plan: continue Norvasc  SBP up to 140s ok in this elderly patient with COVID infection, would not aggressively treat.     Problem/Plan - 4:  ·  Problem: Hyponatremia.  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  Plan: s/p cystoscopy, Incision of Ureteral Orifice, Left laparoscopic Nephroureterectomy and Retroperitoneal Lymphadenectomy  Outpatient followup with Oncology.   
72 y/o male (speaks English and [place]) PMH HTN, asthma, SIADH, left RCC s/p left nephrectomy, bladder ca s/p Incision of Ureteral Orifice, Left laparoscopic Nephroureterectomy and Retroperitoneal Lymphadenectomy presents with SOB x 10 days. Admitted with acute hypoxic respiratory failure 2/2 COVID-19 viral PNA.    Covid pneumonia   Ex smoker:   Asthma:   HTN   SIADH  left RCC: S/P Nephrectomy    1/5/21:    Covid pneumonia : cont dexa: add remdesivir: he has CKD: should be fine with remdesivir: dw np : will talk to infection control: on 100% NRB : HIS D DIMER IS VERY HIGH : is d dimer has increased a lot within 24 hours: CTA can not be done secondary to CKD ? vq scan and dopplers: Likely because of vocid pneumonia: bu PE is a possibility: can start IV heparin full AC:   TRANSAMINITIS; LIKELY DUE TO COVID: MONITOR IN REMDESIVIR STARTED:   Ex smoker: on Symbicort and albuterol  Asthma: on symbicort  HTN: controlled  SIADH: na 134:   left RCC: S/P Nephrectomy     dw np    1/6:    Covid pneumonia : cont dexa: add remdesivir: seen by ID  : will talk to infection control: on6 L of oxygen :  is d dimer had increased a lot before but today has downtrended significantly: no full dose AC  Dopers are negative : he had some hemoptysis before hence full AC was not started and plan was to follow d dimer and if it goes further up then do full time AC: but today it has downtrended:   TRANSAMINITIS; LIKELY DUE TO COVID: MONITOR IN REMDESIVIR STARTED:   Ex smoker: on Symbicort and albuterol  Asthma: on symbicort  HTN: controlled  SIADH: na pending today   left RCC: S/P Nephrectomy     dw np: Lilia    1/7/21"    Covid pneumonia : cont dexa: add remdesivir: seen by ID  : will talk to infection control: on 6 L of oxygen again today : overnight needed 1005 :  is d dimer had increased a lot before but yesterday  downtrended significantly: no full dose AC  Dopers are negative :no hemotysis  TRANSAMINITIS; LIKELY DUE TO COVID: MONITOR IN REMDESIVIR STARTED:  finish 5 d of remdesivir and 10 days of dexa  Ex smoker: on Symbicort and albuterol  Asthma: on symbicort  HTN: controlled  SIADH: na pending today   left RCC: S/P Nephrectomy : creat is getting better     dw np: Kelsea    1/8/21:    Covid pneumonia : cont dexa for 10 : and remdesivir: for 5 days  : will talk to infection control: on 4 of oxygen again today : minimal hemoptysis:  DD travis increased but still low from beforeP  TRANSAMINITIS; LIKELY DUE TO COVID: MONITOR IN REMDESIVIR STARTED:  finish 5 d of remdesivir and 10 days of dexa: LFTS downtrended  Ex smoker: on Symbicort and albuterol  Asthma: on symbicort  HTN: controlled  SIADH: na pending today   left RCC: S/P Nephrectomy : creat is getting better     dw np: Kelsea    1/9/21:  Covid pneumonia : cont dexa for 6/10 : and remdesivir: for 5 days-last day today  : on 4 of oxygen again today : minimal hemoptysis:  has some west: but no hemoptysis: cont tomonitor: try to wean down oxygen   TRANSAMINITIS; LIKELY DUE TO COVID: MONITOR IN REMDESIVIR STARTED:  finish 5 d of remdesivir and 10 days of dexa: LFTS downtrended  Ex smoker: on Symbicort and albuterol  Asthma: on symbicort  HTN: controlled  SIADH: na pending today   left RCC: S/P Nephrectomy : cont to folow    dw np: Mat    1/10:    Covid pneumonia : cont dexa for 7/10 : and finihsed remdesivir  : on 4 of oxygen again today : no hemoptysis: no wheezing: on 2 L of oxygen  TRANSAMINITIS; LIKELY DUE TO COVID: MONITOR IN REMDESIVIR STARTED:  finished 5 d of remdesivir and 6/10 days of dexa: LFTS downtrended  Ex smoker: on Symbicort and albuterol  Asthma: on symbicort  HTN: controlled  SIADH: na pending today   left RCC: S/P Nephrectomy : cont to folow    DW pt  DC planning to rehab    1/11:    Covid pneumonia : cont dexa for 8/10 : and finished remdesivir  : on 1L of oxygen again today : no hemoptysis: no wheezing: on 1 L of oxygen  TRANSAMINITIS; LIKELY DUE TO COVID: MONITOR IN REMDESIVIR STARTED:  finished 5 d of remdesivir and 8/10 days of dexa: LFTS downtrended  Ex smoker: on Symbicort and albuterol  Asthma: on symbicort  HTN: controlled  SIADH: na pending today   left RCC: S/P Nephrectomy : cont to follow    DW pt  DC planning to rehab  
72 y/o male (speaks English and Indonesian) PMH HTN, asthma, SIADH, left RCC s/p left nephrectomy, bladder ca s/p Incision of Ureteral Orifice, Left laparoscopic Nephroureterectomy and Retroperitoneal Lymphadenectomy (10/28/2019) and is still receiving chemo presents with SOB x 10 days with COVID PNA, hypoxia, transaminitis, EDU,  
74 y/o male (speaks English and Polish) PMH HTN, asthma, SIADH, left RCC s/p left nephrectomy, bladder ca s/p Incision of Ureteral Orifice, Left laparoscopic Nephroureterectomy and Retroperitoneal Lymphadenectomy (10/28/2019) and is still receiving chemo presents with SOB x 10 days with COVID PNA, hypoxia, transaminitis, EDU,

## 2021-01-13 NOTE — DISCHARGE NOTE NURSING/CASE MANAGEMENT/SOCIAL WORK - NSSCTYPOFSERV_GEN_ALL_CORE
[facility] w/ [facility]   Home care/ [facility]PAP w/ [facility]

## 2021-01-13 NOTE — DIETITIAN INITIAL EVALUATION ADULT. - OTHER INFO
Initial Dietitian Evaluation 2/2 to extended length of stay. Unable to conduct a face to face interview or nutrition-focused physical exam due to limited contact restrictions related to patient's medical condition and isolation precautions. Obtained subjective information from extensive chart review. No reported difficulties chewing and swallowing foods and fluids, tolerating pureed diet.  No GI distress (nausea/vomiting/diarrhea/constipation) BM 1/8 per flowsheets, senna & miralax ordered. Food allergies to soy, nuts and chocolate noted and acknowledged by kitchen. Weight Hx per chart: 72.6kg 11/18/20, 72kg 10/7/20, 73kg 9/24/20.  Weight obtained on this admission 72.6kg 1/5. No new weights to assess. Please obtain current weight and document % PO intake as feasible to assess adequacy of PO. Monitor weights, labs, BM's, skin integrity, p.o. intake. K+/Phos & consider the provision of Ensure Enlive 240mls 1x daily (350 kcal, 20g protein) as PO intake may be suboptimal i/s/o viral illness.

## 2021-01-13 NOTE — CONSULT NOTE ADULT - SUBJECTIVE AND OBJECTIVE BOX
CARDIOLOGY CONSULT - Dr. Gallegos         HPI:  72 y/o male (speaks English and Georgian) PMH HTN, asthma, SIADH, left RCC s/p left nephrectomy, bladder ca s/p Incision of Ureteral Orifice, Left laparoscopic Nephroureterectomy and Retroperitoneal Lymphadenectomy (10/28/2019) and is still receiving chemo presents with SOB x 10 days.  Also having productive cough with black colored sputum. Pt states several members in his home is ill and tested COVID+. Admits to chest tightness and gas pain but denies fevers, chills, N/V/D.  Cardiac eval for twi on ekg and indeterminate trops       PAST MEDICAL & SURGICAL HISTORY:  Bladder tumor    History of stomach ulcers  denies recent endoscopy    Cancer of kidney    Hyponatremia  admission x 2 last 2017    Asthma  denies recent asthma exacerbation    Left renal mass    Gross hematuria    Urinary tract infection with hematuria    History of SIADH    History of BPH    Essential hypertension    HTN (Hypertension)    Anxiety    Hypercholesterolemia    H/O left nephrectomy    History of prostate surgery  ? exact procedure 3/19    S/P cystoscopy  Insertion left ureteral stent ; biopsy 8/19            PREVIOUS DIAGNOSTIC TESTING:    [x ] Echocardiogram:  Transthoracic Echocardiogram (01.05.21 @ 11:00)   Ejection Fraction (Teicholtz): 62 %  ------------------------------------------------------------------------  OBSERVATIONS:  Mitral Valve: Mitral annular calcification, otherwise  normal mitral valve. Mild mitral regurgitation.  Aortic Root: Normal aortic root.  Aortic Valve: Calcified trileaflet aortic valve with normal  opening. .  Left Atrium: Normal left atrium.  LA volume index = 27  cc/m2.  Left Ventricle: Normal left ventricular systolic function.  No segmental wall motion abnormalities. Normal left  ventricular internal dimensions and wall thicknesses.  Right Heart: Normal right atrium. Normal right ventricular  size and function. Normal tricuspid valve. Mild tricuspid  regurgitation. Normal pulmonic valve. Minimal pulmonic  regurgitation.  Pericardium/PleuraNormal pericardium with no pericardial  effusion.  ------------------------------------------------------------------------  CONCLUSIONS:  1. Calcified trileaflet aortic valve with normal opening.  2. Normal left ventricular internal dimensions and wall  thicknesses.  3. Normal left ventricularsystolic function. No segmental  wall motion abnormalities.  4. Normal right ventricular size and function.      [ ]  Catheterization:  [ ] Stress Test:  	    MEDICATIONS:  Home Medications:  amLODIPine 10 mg oral tablet: 1 tab(s) orally once a day (22 Sep 2020 11:16)  fluticasone 0.5 mg/2 mL inhalation suspension:  (22 Sep 2020 11:16)  furosemide 20 mg oral tablet: 1 tab(s) orally 2 times a day (22 Sep 2020 11:16)  metoprolol succinate 25 mg oral tablet, extended release: 1 tab(s) orally once a day (22 Sep 2020 11:15)  Multiple Vitamins oral capsule: 1 cap(s) orally once a day (22 Sep 2020 11:15)  omeprazole 40 mg oral delayed release capsule: 1 cap(s) orally once a day (22 Sep 2020 11:15)  pravastatin 40 mg oral tablet: 1 tab(s) orally once a day (22 Sep 2020 11:15)  sodium chloride 1 g oral tablet: 1 tab(s) orally 2 times a day (22 Sep 2020 11:15)  tamsulosin 0.4 mg oral capsule: 1 cap(s) orally once a day (22 Sep 2020 11:15)  Vitamin B12 1000 mcg oral tablet: 1 tab(s) orally once a day (22 Sep 2020 11:15)  Wixela Inhub 250 mcg-50 mcg inhalation powder: 1 puff(s) inhaled once a day (22 Sep 2020 11:15)      MEDICATIONS  (STANDING):  amLODIPine   Tablet 10 milliGRAM(s) Oral daily  atorvastatin 10 milliGRAM(s) Oral at bedtime  budesonide 160 MICROgram(s)/formoterol 4.5 MICROgram(s) Inhaler 2 Puff(s) Inhalation two times a day  cholecalciferol 1000 Unit(s) Oral daily  cyanocobalamin 1000 MICROGram(s) Oral daily  dexAMETHasone  Injectable 6 milliGRAM(s) IV Push daily  furosemide    Tablet 20 milliGRAM(s) Oral daily  heparin   Injectable 5000 Unit(s) SubCutaneous every 8 hours  metoprolol succinate ER 25 milliGRAM(s) Oral daily  multivitamin 1 Tablet(s) Oral daily  pantoprazole    Tablet 40 milliGRAM(s) Oral before breakfast  polyethylene glycol 3350 17 Gram(s) Oral daily  senna 2 Tablet(s) Oral at bedtime  sodium chloride 1 Gram(s) Oral two times a day  tamsulosin 0.4 milliGRAM(s) Oral at bedtime      FAMILY HISTORY:  FH: lung cancer  brother    Family history of stroke        SOCIAL HISTORY:    [ x] Non-smoker  [ ] Smoker  [ ] Alcohol    Allergies    chocolate (Pruritus)  flour (Rash)  No Known Drug Allergies  Nuts (Rash)  Soy (Unknown)    Intolerances    	    REVIEW OF SYSTEMS:  CONSTITUTIONAL: No fever, weight loss, or fatigue  EYES: No eye pain, visual disturbances, or discharge  ENMT:  No difficulty hearing, tinnitus, vertigo; No sinus or throat pain  NECK: No pain or stiffness  RESPIRATORY: No cough, wheezing, chills or hemoptysis;+  Shortness of Breath  CARDIOVASCULAR: No chest pain, palpitations, passing out, dizziness, or leg swelling  GASTROINTESTINAL: No abdominal or epigastric pain. No nausea, vomiting, or hematemesis; No diarrhea or constipation. No melena or hematochezia.  GENITOURINARY: No dysuria, frequency, hematuria, or incontinence  NEUROLOGICAL: No headaches, memory loss, loss of strength, numbness, or tremors  SKIN: No itching, burning, rashes, or lesions   	    [x ] All others negative	see hpi   [ ] Unable to obtain    PHYSICAL EXAM:  T(C): 36.7 (01-13-21 @ 11:30), Max: 36.7 (01-13-21 @ 11:30)  HR: 86 (01-13-21 @ 13:30) (69 - 86)  BP: 137/54 (01-13-21 @ 11:30) (129/63 - 138/63)  RR: 18 (01-13-21 @ 13:30) (18 - 20)  SpO2: 96% (01-13-21 @ 13:30) (91% - 98%)  Wt(kg): --  I&O's Summary    12 Jan 2021 07:01  -  13 Jan 2021 07:00  --------------------------------------------------------  IN: 0 mL / OUT: 950 mL / NET: -950 mL    13 Jan 2021 07:01  -  13 Jan 2021 14:17  --------------------------------------------------------  IN: 0 mL / OUT: 1100 mL / NET: -1100 mL        Appearance: Normal	  Psychiatry: A & O x 3, Mood & affect appropriate  HEENT:   Normal oral mucosa, PERRL, EOMI	  Lymphatic: No lymphadenopathy  Cardiovascular: Normal S1 S2,RRR, No JVD, No murmurs  Respiratory: Lungs clear to auscultation	  Gastrointestinal:  Soft, Non-tender, + BS	  Skin: No rashes, No ecchymoses, No cyanosis	  Neurologic: Non-focal  Extremities: Normal range of motion, No clubbing, cyanosis or edema  Vascular: Peripheral pulses palpable 2+ bilaterally    TELEMETRY: 	off tele    ECG:  NSR 75 - TWI lead II, V4,V5,V6 - similar to EKG in June - no acute ischemic changes 	  RADIOLOGY:  Xray Chest 1 View- PORTABLE-Urgent (01.13.21 @ 11:18)   Frontal expiratory view of the chest shows the heart to be similar in size. The lungs show patchy pulmonary infiltrates both lateral lungs and there is no evidence of pneumothorax nor pleural effusion.    IMPRESSION:  Patchy infiltrates. Differential diagnosis includes Covid-19 pneumonia among other possibilities.            OTHER: 	  	  LABS:	 	    CARDIAC MARKERS:  Troponin T, High Sensitivity Result: 20 ng/L (01-13 @ 10:14)                                  11.2   7.59  )-----------( 562      ( 13 Jan 2021 07:27 )             33.3     01-13    131<L>  |  95<L>  |  28<H>  ----------------------------<  92  4.2   |  27  |  1.58<H>    Ca    9.1      13 Jan 2021 07:27  Phos  3.2     01-13  Mg     2.3     01-13        proBNP:   Lipid Profile:   HgA1c:   TSH:

## 2021-01-13 NOTE — DIETITIAN INITIAL EVALUATION ADULT. - REASON FOR ADMISSION
Patient is a 75y  with Hx of HTN, asthma, SIADH, left RCC s/p left nephrectomy, bladder ca s/p Incision of Ureteral Orifice, Left laparoscopic Nephroureterectomy and Retroperitoneal Lymphadenectomy (10/28/2019) and is still receiving chemo presents with SOB x 10 days.

## 2021-01-13 NOTE — DIETITIAN INITIAL EVALUATION ADULT. - REASON
Unable to conduct a face to face interview or nutrition-focused physical exam due to limited contact restrictions related to patient's medical condition and isolation precautions.

## 2021-01-13 NOTE — CHART NOTE - NSCHARTNOTEFT_GEN_A_CORE
PA medicine note    Pt. stated in AM that he has some "chest pressure" when breathing in.   Pt. states pressure is in middle of chest, unsure on when symptoms began however pressure is worse with deep inspiration. Pt. denies radiation and states pressure is not worse with ambulation. Pt. denies shortness of breath, abdominal pain, nausea, vomiting, leg or arm pain.      Examined pt at bedside   General - Pt. in NAD. A&O x 3  Neuro - EOMI, PEERLA.   Heart - RRR w/o MRG   Lungs - CTA w/o WRR  Abdomen - flat, symmetrical. Soft, non tender.   Extremities - LE symmetrical without obvious redness, swelling or tenderness    Chest pressure  EKG done which shows inferolateral T wave inversion. Troponin was 20. Ordered repeat Trop and CK-MB at 1pm, awaiting results. CXR ordered, awaiting read. Cardiology consult placed (Dr. Gallegos), awaiting further recs. Will continue to monitor pt closely. Pulmonology following case, pt cleared for discharge from pulmonology perspective. Attending aware and in agreement with plan.    Vital Signs Last 24 Hrs  T(C): 36.7 (13 Jan 2021 11:30), Max: 36.7 (13 Jan 2021 11:30)  T(F): 98.1 (13 Jan 2021 11:30), Max: 98.1 (13 Jan 2021 11:30)  HR: 86 (13 Jan 2021 13:30) (69 - 86)  BP: 137/54 (13 Jan 2021 11:30) (129/63 - 138/63)  BP(mean): 81 (13 Jan 2021 05:42) (76 - 81)  RR: 18 (13 Jan 2021 13:30) (18 - 20)  SpO2: 96% (13 Jan 2021 13:30) (91% - 98%)    Chris Garcia PA-C  Pager [phone]

## 2021-01-13 NOTE — DISCHARGE NOTE NURSING/CASE MANAGEMENT/SOCIAL WORK - NSDCPEXARELTODIET_GEN_ALL_CORE
[facility] - Nephrology  Report of Consultation    Ravinder Mims Patient Status:  Inpatient    1946 MRN [id number]   [facility] 3NE-A Attending Kenyon Douglas, [address] [facility] [address] 12 PCP Jacquie Valiente MD     Reason for consult: A HYSTERECTOMY     • OTHER      Pacemaker   • VAGINAL HYSTERECTOMY WITH BILATERAL [address] N/A 3/4/2020    Performed by Erin Alexandra MD at [address]   • XI ROBOT-ASSISTED LAPAROSCOPIC HYSTERECTOMY Bilateral 2/4/2020    Performed by Erin Alexandra Ondansetron HCl (ZOFRAN) tab 4 mg, 4 mg, Oral, Q8H PRN  •  Prochlorperazine Maleate (COMPAZINE) tab 10 mg, 10 mg, Oral, Q12H PRN **OR** prochlorperazine (COMPAZINE) rectal suppository 25 mg, 25 mg, Rectal, Q12H PRN  •  diphenhydrAMINE HCl (BENADRYL) inject Take 5 mg by mouth nightly as needed for Sleep., Disp: , Rfl: , Past Week at Unknown time  Insulin Aspart Pen 100 UNIT/ML Subcutaneous Solution Pen-injector, Inject into the skin 3 (three) times daily before meals.  For blood sugar  <180: 0 units  180-199: tablet, Rfl: 0, 3/1/2020 at 0950  traMADol HCl 50 MG Oral Tab, Take 1 tablet (50 mg total) by mouth 3 (three) times daily. , Disp: 30 tablet, Rfl: 0, 3/1/2020 at 0949  gabapentin 400 MG Oral Cap, Take 1 capsule (400 mg total) by mouth nightly., Disp: 30 cap 03/13/2020    ANIONGAP 7 03/13/2020    GFRNAA 31 (L) 03/13/2020    GFRNAA 31 (L) 03/13/2020    GFRAA 36 (L) 03/13/2020    GFRAA 36 (L) 03/13/2020    CA 8.0 (L) 03/13/2020    OSMOCALC 288 03/13/2020    ALKPHO 200 (H) 03/10/2020    AST 19 03/10/2020    ALT 1 popliteal vein occlusion, she also has a history of type 2 diabetes and chronic kidney disease who presented to the emergency room on March 1 with anemia.     ROLANDO -baseline serum creatinine 1.0 -1.2 mg/dL  Acute rise in serum creatinine on 3/11  Carolann adams Eat healthy foods you enjoy. Rivaroxaban/Xarelto DOES NOT have a special diet. Limit your alcohol intake.

## 2021-01-13 NOTE — DISCHARGE NOTE NURSING/CASE MANAGEMENT/SOCIAL WORK - NSSCNAMETXT_GEN_ALL_CORE
Soniya nielsen [facility].  RN to visit day after discharge.  RN will call you to arrange visit time  [facility].  RN to visit day after discharge.  RN will call you to arrange visit time

## 2021-01-13 NOTE — DIETITIAN INITIAL EVALUATION ADULT. - PERTINENT MEDS FT
MEDICATIONS  (STANDING):  amLODIPine   Tablet 10 milliGRAM(s) Oral daily  atorvastatin 10 milliGRAM(s) Oral at bedtime  budesonide 160 MICROgram(s)/formoterol 4.5 MICROgram(s) Inhaler 2 Puff(s) Inhalation two times a day  cholecalciferol 1000 Unit(s) Oral daily  cyanocobalamin 1000 MICROGram(s) Oral daily  dexAMETHasone  Injectable 6 milliGRAM(s) IV Push daily  furosemide    Tablet 20 milliGRAM(s) Oral daily  heparin   Injectable 5000 Unit(s) SubCutaneous every 8 hours  metoprolol succinate ER 25 milliGRAM(s) Oral daily  multivitamin 1 Tablet(s) Oral daily  pantoprazole    Tablet 40 milliGRAM(s) Oral before breakfast  polyethylene glycol 3350 17 Gram(s) Oral daily  senna 2 Tablet(s) Oral at bedtime  sodium chloride 1 Gram(s) Oral two times a day  tamsulosin 0.4 milliGRAM(s) Oral at bedtime

## 2021-01-13 NOTE — DIETITIAN INITIAL EVALUATION ADULT. - ADD RECOMMEND
1- Monitor weights, labs, BM's, skin integrity, p.o. intake. 2- Please obtain current weight and document % PO intake as feasible to assess adequacy of PO. 3- Multivitamin/Mineral

## 2021-01-13 NOTE — CONSULT NOTE ADULT - ASSESSMENT
Echo 1/5/21: Ef 62%, normal lv sys fx    a/p  74 y/o male (speaks English and Yakut) PMH HTN, asthma, SIADH, left RCC s/p left nephrectomy, bladder ca s/p Incision of Ureteral Orifice, Left laparoscopic Nephroureterectomy and Retroperitoneal Lymphadenectomy (10/28/2019) and is still receiving chemo presents with SOB x 10 days.  Cardiac eval for twi on ekg and indeterminate trops     1. Atypical Chest Pain  -Pain with inspiration -likely in setting of Covid  -no active cp   -EKG TWI lead II, V4,V5,V6 - similar to EKG in June - no acute ischemic changes 	  -HsT indeterminate, likely demand ischemia in setting of EDU on CKD  -repeat Trop,ck,ckmb pending  -Echo with normal lv sys fx, no significant valvular disease   -can add low dose asa    2. Covid-19/SOB  -CXR noted   -on room air  -c/w decadron  -mgmt per ID/pulm/med     3. HTN  -bp stable  -cont current meds    4. EDU on CKD  -renal f/u      dvt ppx  d/w pa

## 2021-01-13 NOTE — DISCHARGE NOTE NURSING/CASE MANAGEMENT/SOCIAL WORK - PATIENT PORTAL LINK FT
You can access the FollowMyHealth Patient Portal offered by [facility] by registering at the following website: [email or web address]. By joining [facility]’s FollowMyHealth portal, you will also be able to view your health information using other applications (apps) compatible with our system.

## 2021-02-11 ENCOUNTER — RX RENEWAL (OUTPATIENT)
Age: 76
End: 2021-02-11

## 2021-02-13 ENCOUNTER — OUTPATIENT (OUTPATIENT)
Dept: OUTPATIENT SERVICES | Facility: HOSPITAL | Age: 76
LOS: 1 days | Discharge: HOME | End: 2021-02-13

## 2021-02-13 DIAGNOSIS — Z90.5 ACQUIRED ABSENCE OF KIDNEY: Chronic | ICD-10-CM

## 2021-02-13 DIAGNOSIS — Z98.890 OTHER SPECIFIED POSTPROCEDURAL STATES: Chronic | ICD-10-CM

## 2021-02-13 DIAGNOSIS — C67.9 MALIGNANT NEOPLASM OF BLADDER, UNSPECIFIED: ICD-10-CM

## 2021-02-17 ENCOUNTER — APPOINTMENT (OUTPATIENT)
Dept: HEMATOLOGY ONCOLOGY | Facility: CLINIC | Age: 76
End: 2021-02-17

## 2021-02-17 ENCOUNTER — APPOINTMENT (OUTPATIENT)
Dept: HEMATOLOGY ONCOLOGY | Facility: CLINIC | Age: 76
End: 2021-02-17
Payer: MEDICARE

## 2021-02-17 PROCEDURE — 99213 OFFICE O/P EST LOW 20 MIN: CPT | Mod: 95

## 2021-02-17 RX ORDER — DIPHENHYDRAMINE HYDROCHLORIDE AND LIDOCAINE HYDROCHLORIDE AND ALUMINUM HYDROXIDE AND MAGNESIUM HYDRO
KIT
Qty: 3 | Refills: 2 | Status: DISCONTINUED | COMMUNITY
Start: 2020-09-22 | End: 2021-02-17

## 2021-02-17 NOTE — HISTORY OF PRESENT ILLNESS
[Disease: _____________________] : Disease: [unfilled] [T: ___] : T[unfilled] [N: ___] : N[unfilled] [M: ___] : M[unfilled] [AJCC Stage: ____] : AJCC Stage: [unfilled] [Home] : at home, [unfilled] , at the time of the visit. [Medical Office: ([facility])___] : at the medical office located in  [Family Member] : family member [Verbal consent obtained from patient] : the patient, [unfilled] [de-identified] : Mr. Md Harry presented on 8/20/20 to medical oncology for initial consultation for evaluation and treatment of urothelial cancer found on surveillance cystoscopy; he was referred by his urologist, Dr Moya.\par \par Mr. Harry reports that he initially came to medical attention last year when he developed gross hematuria; he was treated for a presumed UTI by his previous urologist (at [facility]) for 3-4 weeks without improvement. Per the chart, he presented to [facility] on 7/20/19 with worsening gross hematuria and dysuria, and was admitted given marked hyponatremia to 127; per the ER note and H&P, he had had an outpatient cystoscopy x2 that was negative, though outside records were not available at the time of his hospitalization. Urology was consulted for evaluation hematuria; as part of his work up, on 7/23/20 he underwent a CT of the abdomen and pelvis w/wo IV contrast which showed a 2.7 cm L lower pole renal mass concerning for transitional cell carcinoma (negative for other sites of disease). A noncontrast chest CT on 7/24/20 showed nonspecific 4-5 mm nodules in the R apex and and emphysema. After his electrolyte abnormalities resolved, he was discharged on 7/25/20 to follow up with urology as an outpatientl; he was referred to Dr. Moya for further follow up for his newly found renal mass. \par \par On 9/9/19, he underwent a cystourethroscopy with diagnostic ureteroscopy and ureteral stent insertion; a bx of the left renal pelvis tumor showed clusters of urothelial cells suspicious for high grade urothelial carcinoma admixed in blood clot. On 10/28/19, he underwent a L laparoscopic nephroureterectomy, cystoscopy, and lymph node dissection; pathology showed a 3.5 cm tumor in the renal pelvis consistent with high grade urothelial carcinoma invading subepithelial connective tissue (lamina propia); margins uninvolved by invasive carcinoma; and carcinoma in situ / noninvasive urothelial carcinoma; 0 out of 15 lymph nodes dissected were positive; he staged [id number]. Non-neoplastic renal parenchyma showed global glomerulosclerosis (10% of glomeruli), tubular atrophy and interstitial fibrosis (20% of the cortex) and arterial and arteriolar obliterative sclerosis (severe).\par \par After his nephrectomy, he had no post-op complications and he followed up as an outpatient with urology for surveillance. Per the chart, he had intermittent episodes of gross hematuria between Nov 2019 and May 2020. At a follow up visit on 5/21/20 he underwent a surveillance cystoscopy which showed a papillary tumor on the posterior wall as well as a nodular tumor at the bladder neck. On 6/15/20, he underwent a cystourethroscopy with resection of the bladder tumor; he was noted to have tumor overlying the L trigone in area of the previous left UO, as well as a small papillary tumor at the anterior bladder neck. Bx of the anterior bladder wall tumor showed invasive high grade urothelial carcinoma with necrosis (muscularis propia present and was NOT involved; pT1) and bx of the L trigone of the bladder tumor showed invasive high grade urothelial carcinoma with necrosis (muscularis propia present and WAS involved; pT2). The results of his bx were discussed on follow up by his urologist; treatment options were discussed, including cystectomy,  he was referred to medical oncology.\par \par 8/20/20: Mr. Harry presents today with his son for initial consultation. He reports that over the past few weeks he has not had any visible gross hematuria but reports he had been told that he still has microscopic hematuria "about 3 months ago."  Reports that he feels weak when walking at baseline since he had his nephrectomy, has to use a cane; also has a hx of a childhood injury with some "nerve pain" of the R foot that became more symptomatic since he had his nephrectomy. Has a hx of duodenal ulcers diagnosed 7 years prior (via endoscopy; records not available on EHR) but denies any bleeding in his stool, and appetite is "good", has been gaining some weight, though reports some apprehension that by eating more that he will exacerbate his existing GERD. Denies any nausea, vomiting, diarrhea, constipation. Does endorse some nocturia (3x per night on average), makes "a lot of urine during the night" with a normal stream, makes less urine during the day but can't quantify; denies any associated dysuria or incontinence, though does have some urgency. Denies any joint pain or calf pain.  \par \ger Immigrated from [facility] [place] [facility] to the [place] to live with his children in 2009. He is independent in his ADLs, but he lives with his wife, son, and grandchildren at home. Previously worked doing field work, milling, and endorses some exposure to pesticides, but denies any exospore to dyes or solvents. He retired 30 years prior. He admits to hx of heavy tobacco use, 1 pack per day starting at age 14, quit 20 years prior (~40 pack years); denies alcohol use. \par \par 9/9/20...Completed 2/20 fractions of RT to the bladder/pelvis. Cycle 1 of gemcitabine today. Feels "okay." Appetite is "good." No nausea or vomiting. No diarrheal stools. Some constipation issues, takes occasional med but couldn't remember the name. Last BM was this AM. No blood in stools. No headaches. States that he had some dizziness after RT but resolved spontaneously. No balance issues, has a cane but does not use it at home and uses it as needed when he's outside, no falls. No cough, some SOB related to asthma, couldn't remember last usage of rescue inhaler, states it's been a while. No complaints of pain other than occ pain in the bladder, no pain med taken. Has some fatigue with increased exertion, does not impair daily activities. No skin rashes or pruritus. Has rare occasions of paresthesias in the toes. Urine flow is "okay," nocturia x 3-4. No blood or dysuria. Some incontinence with urgency. No fevers or chills. Needs some assistance in showering but independent in other ADLs.\par \par 9/29/20...On gem/RT. Dose #3 due today. On First mouthwash for mucositis, started on topical. He says it started when the chemotherapy started. Today is fraction #12 of 20 treatments. NO nausea, no vomiting. Appetite is good, but is unable to eat due to the mouth irritation. feels dizzy at times, no falls. No falls. No fevers, notes some chills right after the chemo and feels warm, no temperature checked. He has some fatigue. Has edema, less than before. Occ cough, sometimes with dark green sputum. No TRAN. This is a long standing issue. No pains noted. Urine flow is frequent, more than before, nocturia x 4-5. He has some incontinence with urgency. No blood in the urine. \par \par 10/6/20...On chemo/RT, completed fraction #17/20 RT and 2 doses of gemcitabine, dose # 3 was held last week 2/2 mucositis. Feels "good" overall. Appetite is "fair," no weight loss. No nausea or vomiting. Mucositis is improved, using magic mouthwash TID, able to eat better now compared to last week. No odynophagia or dysphagia. No diarrheal stools. Some constipation, less than before, taking medication given by his nephew. No headaches or dizziness. No TRAN. Occ productive cough, unsure of sputum color. No complaints of pain. Some fatigue, not daily, does not impair daily activities. Some balance issues, no falls, using cane. Urine flow is "moderate," nocturia x 5-6. No hematuria, occ dysuria. No spasms. Some incontinence with urgency. No skin rashes or pruritus. No paresthesias. Has edema, but less than before. No fevers or chills. Gets assistance from wife with showering, but independent in other ADLs. \par \par 11/18/20...Completed RT, received only 3 doses of gemcitabine. feels "okay", feels weak somewhat. Ambulating with a cane, has used it for the last year. Had a fall in the past, none recently. Appetite is good, has some altered taste. Rx ended more than one month ago. No weight loss. NO mouth sores at this time. He can stop the mouth wash. Urine flow is good, occasional burning sensation with voiding. Occ incontinence. NO blood noted. he has follow up with Dr Valentin on the 24th, no appt with Dr Moya. Notes some leg edema at times, occ cough, mild TRAN. Fatigue is present  at times. He ambulates within the house, often seated, watching TV. He dresses himself, wife helps him to shower. No difficulty getting up from  chair. No headaches, no dizziness [de-identified] : prior Left renal pelvis HG urothelial, pT1N0 (15 nodes), November 2019, Left radical nephrectomy [FreeTextEntry1] : Started gemcitabine on 09/15/2020, on chemoRT. Completed RT 10/9/20, 20 fractions.  [de-identified] : Verbal permission for telehealth services granted by the patient, MD Harry, on 2/17/21 at 10:02 AM\par Grandson as . He had COVID infection in January, for 13 days. He is recovered. No fevers. has some continued TRAN and cough, with small amounts of blood. Appetite is decreased, No weight loss. Urine flow is good, no blood, no dysuria. He has some incontinence. No edema. No fatigue. Requires some assist with ADLs from wife. He spends time in bed during the day, has PT once a week. walks around somewhat with walker or cane, no falls. \par

## 2021-02-17 NOTE — ASSESSMENT
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  His grandson served as an .  He was hospitalized for 13 days in January for Covid infection.  He is recovering at this time.  He has no fevers.  He does have some continued dyspnea on exertion as well as cough and he notes small amount of blood when he coughs.  His appetite is decreased.  There is been no weight loss of note.  Urinary flow is stated to be good.  There is no hematuria dysuria.  He does have some incontinence.  There is no edema.  He denies fatigue.  He requires some assistance with activities of daily living, which is provided by his wife.  He spends time in bed during the day.  He has physical therapy once a week.  He does walk around somewhat with a walker or a cane.  He has not experienced any falls.  On physical examination, he appears in no acute distress.  His performance status is 2.\par \par He has a scheduled CT scan for next week with a follow-up appointment with Dr. Valentin the following day.  He has a cystoscopy scheduled for March 22 with Dr. Moya.  He completed his therapy with gemcitabine and radiation, and it ended in October 2020.  He has no long-term complications from the chemotherapy he received.\par \par In the event the CT scan or cystoscopy shows any residual tumor, he will need to see me once again at that time.  All questions were answered to the best of my ability and to their apparent satisfaction.

## 2021-02-17 NOTE — REVIEW OF SYSTEMS
[Fever] : no fever [Chills] : no chills [Fatigue] : no fatigue [Recent Change In Weight] : ~T no recent weight change [Chest Pain] : no chest pain [Palpitations] : no palpitations [Lower Ext Edema] : no lower extremity edema [Cough] : cough [SOB on Exertion] : shortness of breath during exertion [Abdominal Pain] : no abdominal pain [Vomiting] : no vomiting [Constipation] : constipation [Diarrhea] : no diarrhea [Dysuria] : no dysuria [Incontinence] : incontinence [Joint Pain] : no joint pain [Joint Stiffness] : no joint stiffness [Muscle Pain] : no muscle pain [Muscle Weakness] : muscle weakness [Skin Rash] : no skin rash [Anxiety] : anxiety [Depression] : no depression [Easy Bruising] : no tendency for easy bruising [Negative] : ENT [FreeTextEntry9] : occ muscle aches [de-identified] : no HA, no dizziness, no paresthesias

## 2021-02-17 NOTE — REASON FOR VISIT
[Follow-Up Visit] : a follow-up [ Service] : provided by  Service [FreeTextEntry1] : [id number] [FreeTextEntry2] : Victor Manuel [TWNoteComboBox1] : [place]

## 2021-02-25 ENCOUNTER — OUTPATIENT (OUTPATIENT)
Dept: OUTPATIENT SERVICES | Facility: HOSPITAL | Age: 76
LOS: 1 days | End: 2021-02-25
Payer: COMMERCIAL

## 2021-02-25 ENCOUNTER — APPOINTMENT (OUTPATIENT)
Dept: CT IMAGING | Facility: IMAGING CENTER | Age: 76
End: 2021-02-25
Payer: MEDICARE

## 2021-02-25 DIAGNOSIS — C67.8 MALIGNANT NEOPLASM OF OVERLAPPING SITES OF BLADDER: ICD-10-CM

## 2021-02-25 DIAGNOSIS — Z98.890 OTHER SPECIFIED POSTPROCEDURAL STATES: Chronic | ICD-10-CM

## 2021-02-25 DIAGNOSIS — Z00.8 ENCOUNTER FOR OTHER GENERAL EXAMINATION: ICD-10-CM

## 2021-02-25 DIAGNOSIS — Z90.5 ACQUIRED ABSENCE OF KIDNEY: Chronic | ICD-10-CM

## 2021-02-25 PROCEDURE — 74176 CT ABD & PELVIS W/O CONTRAST: CPT

## 2021-02-25 PROCEDURE — 74176 CT ABD & PELVIS W/O CONTRAST: CPT | Mod: 26

## 2021-02-26 ENCOUNTER — APPOINTMENT (OUTPATIENT)
Dept: RADIATION ONCOLOGY | Facility: CLINIC | Age: 76
End: 2021-02-26
Payer: MEDICARE

## 2021-02-26 PROCEDURE — 99214 OFFICE O/P EST MOD 30 MIN: CPT

## 2021-02-26 PROCEDURE — 99072 ADDL SUPL MATRL&STAF TM PHE: CPT

## 2021-03-02 NOTE — HISTORY OF PRESENT ILLNESS
[FreeTextEntry1] : Mr. Harry is a 73yo male with a history of [id number] high grade urothelial carcinoma s/p laparoscopic left nephroureterectomy with periaortic lymph node dissection on 10/28/2019, On surveillance cystoscopy 5/21/2020: Found to have bladder tumor recurrence. s/p TURBT 6/15/2020. Path: Anterior tumor, high-grade TCC pT1; left posterior tumor, high-grade TCC pT2 with muscle invasion.\par \par Radiation 55 Gy / 20 Fx finished 10/9/20 with CHT 3/4 doses gem. saw Dr. collier last week. Will f/u with Dr Moya in 3/2021\par \par CT scan in 2/2021 showed MAIRA \par \par Today in clinic, c/o urinary bother even before radiation treatment.

## 2021-03-18 ENCOUNTER — OUTPATIENT (OUTPATIENT)
Dept: OUTPATIENT SERVICES | Facility: HOSPITAL | Age: 76
LOS: 1 days | End: 2021-03-18
Payer: COMMERCIAL

## 2021-03-18 ENCOUNTER — APPOINTMENT (OUTPATIENT)
Dept: CT IMAGING | Facility: IMAGING CENTER | Age: 76
End: 2021-03-18
Payer: MEDICARE

## 2021-03-18 DIAGNOSIS — Z00.8 ENCOUNTER FOR OTHER GENERAL EXAMINATION: ICD-10-CM

## 2021-03-18 DIAGNOSIS — Z90.5 ACQUIRED ABSENCE OF KIDNEY: Chronic | ICD-10-CM

## 2021-03-18 DIAGNOSIS — C06.0 MALIGNANT NEOPLASM OF CHEEK MUCOSA: ICD-10-CM

## 2021-03-18 DIAGNOSIS — Z98.890 OTHER SPECIFIED POSTPROCEDURAL STATES: Chronic | ICD-10-CM

## 2021-03-18 DIAGNOSIS — C77.9 SECONDARY AND UNSPECIFIED MALIGNANT NEOPLASM OF LYMPH NODE, UNSPECIFIED: ICD-10-CM

## 2021-03-18 PROCEDURE — 71250 CT THORAX DX C-: CPT | Mod: 26

## 2021-03-18 PROCEDURE — 71250 CT THORAX DX C-: CPT

## 2021-03-22 ENCOUNTER — APPOINTMENT (OUTPATIENT)
Dept: UROLOGY | Facility: CLINIC | Age: 76
End: 2021-03-22
Payer: MEDICARE

## 2021-03-22 ENCOUNTER — OUTPATIENT (OUTPATIENT)
Dept: OUTPATIENT SERVICES | Facility: HOSPITAL | Age: 76
LOS: 1 days | End: 2021-03-22
Payer: COMMERCIAL

## 2021-03-22 VITALS
TEMPERATURE: 97 F | SYSTOLIC BLOOD PRESSURE: 162 MMHG | RESPIRATION RATE: 16 BRPM | DIASTOLIC BLOOD PRESSURE: 69 MMHG | HEART RATE: 91 BPM

## 2021-03-22 DIAGNOSIS — Z90.5 ACQUIRED ABSENCE OF KIDNEY: Chronic | ICD-10-CM

## 2021-03-22 DIAGNOSIS — Z98.890 OTHER SPECIFIED POSTPROCEDURAL STATES: Chronic | ICD-10-CM

## 2021-03-22 PROCEDURE — 52000 CYSTOURETHROSCOPY: CPT

## 2021-03-22 PROCEDURE — 88112 CYTOPATH CELL ENHANCE TECH: CPT | Mod: 26

## 2021-03-22 RX ORDER — TAMSULOSIN HYDROCHLORIDE 0.4 MG/1
0.4 CAPSULE ORAL
Qty: 90 | Refills: 3 | Status: ACTIVE | COMMUNITY
Start: 2019-02-06 | End: 1900-01-01

## 2021-03-26 DIAGNOSIS — R35.0 FREQUENCY OF MICTURITION: ICD-10-CM

## 2021-03-27 DIAGNOSIS — C67.8 MALIGNANT NEOPLASM OF OVERLAPPING SITES OF BLADDER: ICD-10-CM

## 2021-04-01 LAB — URINE CYTOLOGY: NORMAL

## 2021-04-12 ENCOUNTER — APPOINTMENT (OUTPATIENT)
Dept: UROLOGY | Facility: CLINIC | Age: 76
End: 2021-04-12
Payer: MEDICARE

## 2021-04-12 VITALS — TEMPERATURE: 97.6 F

## 2021-04-12 DIAGNOSIS — R31.0 GROSS HEMATURIA: ICD-10-CM

## 2021-04-12 PROCEDURE — 99214 OFFICE O/P EST MOD 30 MIN: CPT

## 2021-04-12 PROCEDURE — 99072 ADDL SUPL MATRL&STAF TM PHE: CPT

## 2021-04-12 RX ORDER — OXYBUTYNIN CHLORIDE 10 MG/1
10 TABLET, EXTENDED RELEASE ORAL DAILY
Qty: 90 | Refills: 3 | Status: ACTIVE | COMMUNITY
Start: 2020-12-15 | End: 1900-01-01

## 2021-04-12 RX ORDER — FINASTERIDE 5 MG/1
5 TABLET, FILM COATED ORAL DAILY
Qty: 90 | Refills: 2 | Status: COMPLETED | COMMUNITY
Start: 2020-02-13 | End: 2021-04-12

## 2021-04-12 NOTE — HISTORY OF PRESENT ILLNESS
[FreeTextEntry1] : s/p laparoscopic left nephroureterectomy with periaortic lymph node dissection 10/28/2019\par Path: High-grade TCC, pT1N0, negative margins. \par \par Had bladder recurrence, s/p TURBT 6/15/2020\par Path: HG TCC pT2\par Completed radiation/chemotherapy October 2020 \par No postop complications.\par \par Surveillance cystoscopy 3/22/2021: No evidence of bladder recurrence.\par \par Patient states after his cystoscopy, he noticed some gross hematuria, last experienced 2 days ago,\par Has increased urgency and frequency.  Also has had intermittent dysuria, last experienced today. Waking up every hour at night to void. \par \par  Denies abdominal pain, flank pain.  No fever/chills, nausea/vomiting,

## 2021-04-12 NOTE — LETTER BODY
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  \par \par Please see my office note below.\par \par Thank you for allowing me to participate in his care and please do not hesitate to contact me with any questions or concerns regarding his care.\par \par Sincerely,\par \par Zachery Moya MD\par Chief of Urology, [facility]\par  of Urology\par [address] [place] [address]\[place] [address]\par PH:      [phone]\par Email:  [email or web address]

## 2021-04-12 NOTE — ASSESSMENT
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  Recent office cystoscopy showed no evidence of bladder recurrence.  Explained patient that this is a normal finding following pelvic radiation.  Recommend to increase his fluid intake.  Urine culture was sent today.\par \par Urinary frequency: Recommend start oxybutynin ER 10 mg once daily. Goals of medication reviewed.  Discussed the potential adverse effects of the medication.  Discussed the proper administration of the medication.\par \par Patient has scheduled follow-up visit in June for office cystoscopy.

## 2021-04-14 LAB — BACTERIA UR CULT: NORMAL

## 2021-05-13 ENCOUNTER — OUTPATIENT (OUTPATIENT)
Dept: OUTPATIENT SERVICES | Facility: HOSPITAL | Age: 76
LOS: 1 days | Discharge: HOME | End: 2021-05-13

## 2021-05-13 DIAGNOSIS — Z90.5 ACQUIRED ABSENCE OF KIDNEY: Chronic | ICD-10-CM

## 2021-05-13 DIAGNOSIS — Z98.890 OTHER SPECIFIED POSTPROCEDURAL STATES: Chronic | ICD-10-CM

## 2021-05-13 DIAGNOSIS — C67.9 MALIGNANT NEOPLASM OF BLADDER, UNSPECIFIED: ICD-10-CM

## 2021-05-14 ENCOUNTER — RESULT REVIEW (OUTPATIENT)
Age: 76
End: 2021-05-14

## 2021-05-14 ENCOUNTER — APPOINTMENT (OUTPATIENT)
Dept: HEMATOLOGY ONCOLOGY | Facility: CLINIC | Age: 76
End: 2021-05-14

## 2021-05-14 LAB
ALBUMIN SERPL ELPH-MCNC: 4.6 G/DL
ALP BLD-CCNC: 96 U/L
ALT SERPL-CCNC: 10 U/L
ANION GAP SERPL CALC-SCNC: 12 MMOL/L
AST SERPL-CCNC: 17 U/L
BASOPHILS # BLD AUTO: 0 K/UL — SIGNIFICANT CHANGE UP (ref 0–0.2)
BASOPHILS NFR BLD AUTO: 0 % — SIGNIFICANT CHANGE UP (ref 0–2)
BILIRUB SERPL-MCNC: 0.2 MG/DL
BUN SERPL-MCNC: 27 MG/DL
CALCIUM SERPL-MCNC: 10.3 MG/DL
CHLORIDE SERPL-SCNC: 99 MMOL/L
CO2 SERPL-SCNC: 27 MMOL/L
CREAT SERPL-MCNC: 1.81 MG/DL
EOSINOPHIL # BLD AUTO: 1.86 K/UL — HIGH (ref 0–0.5)
EOSINOPHIL NFR BLD AUTO: 24 % — HIGH (ref 0–6)
GLUCOSE SERPL-MCNC: 112 MG/DL
HCT VFR BLD CALC: 35.5 % — LOW (ref 39–50)
HGB BLD-MCNC: 12.1 G/DL — LOW (ref 13–17)
LYMPHOCYTES # BLD AUTO: 1.32 K/UL — SIGNIFICANT CHANGE UP (ref 1–3.3)
LYMPHOCYTES # BLD AUTO: 17 % — SIGNIFICANT CHANGE UP (ref 13–44)
MCHC RBC-ENTMCNC: 30.5 PG — SIGNIFICANT CHANGE UP (ref 27–34)
MCHC RBC-ENTMCNC: 34.1 G/DL — SIGNIFICANT CHANGE UP (ref 32–36)
MCV RBC AUTO: 89.4 FL — SIGNIFICANT CHANGE UP (ref 80–100)
MONOCYTES # BLD AUTO: 0.62 K/UL — SIGNIFICANT CHANGE UP (ref 0–0.9)
MONOCYTES NFR BLD AUTO: 8 % — SIGNIFICANT CHANGE UP (ref 2–14)
NEUTROPHILS # BLD AUTO: 3.95 K/UL — SIGNIFICANT CHANGE UP (ref 1.8–7.4)
NEUTROPHILS NFR BLD AUTO: 51 % — SIGNIFICANT CHANGE UP (ref 43–77)
NRBC # BLD: 0 /100 — SIGNIFICANT CHANGE UP (ref 0–0)
NRBC # BLD: SIGNIFICANT CHANGE UP /100 WBCS (ref 0–0)
PLAT MORPH BLD: NORMAL — SIGNIFICANT CHANGE UP
PLATELET # BLD AUTO: 251 K/UL — SIGNIFICANT CHANGE UP (ref 150–400)
POTASSIUM SERPL-SCNC: 4.6 MMOL/L
PROT SERPL-MCNC: 7.5 G/DL
RBC # BLD: 3.97 M/UL — LOW (ref 4.2–5.8)
RBC # FLD: 14.9 % — HIGH (ref 10.3–14.5)
RBC BLD AUTO: SIGNIFICANT CHANGE UP
SODIUM SERPL-SCNC: 137 MMOL/L
WBC # BLD: 7.74 K/UL — SIGNIFICANT CHANGE UP (ref 3.8–10.5)
WBC # FLD AUTO: 7.74 K/UL — SIGNIFICANT CHANGE UP (ref 3.8–10.5)

## 2021-05-18 ENCOUNTER — APPOINTMENT (OUTPATIENT)
Dept: HEMATOLOGY ONCOLOGY | Facility: CLINIC | Age: 76
End: 2021-05-18
Payer: MEDICARE

## 2021-05-18 DIAGNOSIS — D72.10 EOSINOPHILIA, UNSPECIFIED: ICD-10-CM

## 2021-05-18 PROCEDURE — 99213 OFFICE O/P EST LOW 20 MIN: CPT | Mod: 95

## 2021-05-18 RX ORDER — LIDOCAINE HYDROCHLORIDE 20 MG/ML
2 SOLUTION ORAL; TOPICAL
Qty: 3 | Refills: 1 | Status: DISCONTINUED | COMMUNITY
Start: 2020-09-22 | End: 2021-05-18

## 2021-05-18 NOTE — REVIEW OF SYSTEMS
[Fatigue] : fatigue [Lower Ext Edema] : lower extremity edema [SOB on Exertion] : shortness of breath during exertion [Dysuria] : dysuria [Incontinence] : incontinence [Joint Pain] : joint pain [Difficulty Walking] : difficulty walking [Muscle Weakness] : muscle weakness [Negative] : Gastrointestinal [Fever] : no fever [Chills] : no chills [Recent Change In Weight] : ~T no recent weight change [Chest Pain] : no chest pain [Palpitations] : no palpitations [Cough] : no cough [Joint Stiffness] : no joint stiffness [Muscle Pain] : no muscle pain [Skin Rash] : no skin rash [Dizziness] : no dizziness [Anxiety] : no anxiety [Depression] : no depression [Easy Bruising] : no tendency for easy bruising [FreeTextEntry4] : dry mouth [FreeTextEntry9] : some weakness, no falls [de-identified] : No HA, no dizziness, no paresthesias, uses a cane

## 2021-05-18 NOTE — HISTORY OF PRESENT ILLNESS
[Disease: _____________________] : Disease: [unfilled] [T: ___] : T[unfilled] [N: ___] : N[unfilled] [M: ___] : M[unfilled] [AJCC Stage: ____] : AJCC Stage: [unfilled] [Home] : at home, [unfilled] , at the time of the visit. 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 Harry reports that he initially came to medical attention last year when he developed gross hematuria; he was treated for a presumed UTI by his previous urologist (at [facility]) for 3-4 weeks without improvement. Per the chart, he presented to [facility] on 7/20/19 with worsening gross hematuria and dysuria, and was admitted given marked hyponatremia to 127; per the ER note and H&P, he had had an outpatient cystoscopy x2 that was negative, though outside records were not available at the time of his hospitalization. Urology was consulted for evaluation hematuria; as part of his work up, on 7/23/20 he underwent a CT of the abdomen and pelvis w/wo IV contrast which showed a 2.7 cm L lower pole renal mass concerning for transitional cell carcinoma (negative for other sites of disease). A noncontrast chest CT on 7/24/20 showed nonspecific 4-5 mm nodules in the R apex and and emphysema. After his electrolyte abnormalities resolved, he was discharged on 7/25/20 to follow up with urology as an outpatientl; he was referred to Dr. Moya for further follow up for his newly found renal mass. \par \par On 9/9/19, he underwent a cystourethroscopy with diagnostic ureteroscopy and ureteral stent insertion; a bx of the left renal pelvis tumor showed clusters of urothelial cells suspicious for high grade urothelial carcinoma admixed in blood clot. On 10/28/19, he underwent a L laparoscopic nephroureterectomy, cystoscopy, and lymph node dissection; pathology showed a 3.5 cm tumor in the renal pelvis consistent with high grade urothelial carcinoma invading subepithelial connective tissue (lamina propia); margins uninvolved by invasive carcinoma; and carcinoma in situ / noninvasive urothelial carcinoma; 0 out of 15 lymph nodes dissected were positive; he staged [id number]. Non-neoplastic renal parenchyma showed global glomerulosclerosis (10% of glomeruli), tubular atrophy and interstitial fibrosis (20% of the cortex) and arterial and arteriolar obliterative sclerosis (severe).\par \par After his nephrectomy, he had no post-op complications and he followed up as an outpatient with urology for surveillance. Per the chart, he had intermittent episodes of gross hematuria between Nov 2019 and May 2020. At a follow up visit on 5/21/20 he underwent a surveillance cystoscopy which showed a papillary tumor on the posterior wall as well as a nodular tumor at the bladder neck. On 6/15/20, he underwent a cystourethroscopy with resection of the bladder tumor; he was noted to have tumor overlying the L trigone in area of the previous left UO, as well as a small papillary tumor at the anterior bladder neck. Bx of the anterior bladder wall tumor showed invasive high grade urothelial carcinoma with necrosis (muscularis propia present and was NOT involved; pT1) and bx of the L trigone of the bladder tumor showed invasive high grade urothelial carcinoma with necrosis (muscularis propia present and WAS involved; pT2). The results of his bx were discussed on follow up by his urologist; treatment options were discussed, including cystectomy,  he was referred to medical oncology.\par \par 8/20/20: Mr. Harry presents today with his son for initial consultation. He reports that over the past few weeks he has not had any visible gross hematuria but reports he had been told that he still has microscopic hematuria "about 3 months ago."  Reports that he feels weak when walking at baseline since he had his nephrectomy, has to use a cane; also has a hx of a childhood injury with some "nerve pain" of the R foot that became more symptomatic since he had his nephrectomy. Has a hx of duodenal ulcers diagnosed 7 years prior (via endoscopy; records not available on EHR) but denies any bleeding in his stool, and appetite is "good", has been gaining some weight, though reports some apprehension that by eating more that he will exacerbate his existing GERD. Denies any nausea, vomiting, diarrhea, constipation. Does endorse some nocturia (3x per night on average), makes "a lot of urine during the night" with a normal stream, makes less urine during the day but can't quantify; denies any associated dysuria or incontinence, though does have some urgency. Denies any joint pain or calf pain.  \par \ger Immigrated from [facility] the [place] to live with his children in 2009. He is independent in his ADLs, but he lives with his wife, son, and grandchildren at home. Previously worked doing field work, milling, and endorses some exposure to pesticides, but denies any exospore to dyes or solvents. He retired 30 years prior. He admits to hx of heavy tobacco use, 1 pack per day starting at age 14, quit 20 years prior (~40 pack years); denies alcohol use. \par \par 9/9/20...Completed 2/20 fractions of RT to the bladder/pelvis. Cycle 1 of gemcitabine today. Feels "okay." Appetite is "good." No nausea or vomiting. No diarrheal stools. Some constipation issues, takes occasional med but couldn't remember the name. Last BM was this AM. No blood in stools. No headaches. States that he had some dizziness after RT but resolved spontaneously. No balance issues, has a cane but does not use it at home and uses it as needed when he's outside, no falls. No cough, some SOB related to asthma, couldn't remember last usage of rescue inhaler, states it's been a while. No complaints of pain other than occ pain in the bladder, no pain med taken. Has some fatigue with increased exertion, does not impair daily activities. No skin rashes or pruritus. Has rare occasions of paresthesias in the toes. Urine flow is "okay," nocturia x 3-4. No blood or dysuria. Some incontinence with urgency. No fevers or chills. Needs some assistance in showering but independent in other ADLs.\par \par 9/29/20...On gem/RT. Dose #3 due today. On First mouthwash for mucositis, started on topical. He says it started when the chemotherapy started. Today is fraction #12 of 20 treatments. NO nausea, no vomiting. Appetite is good, but is unable to eat due to the mouth irritation. feels dizzy at times, no falls. No falls. No fevers, notes some chills right after the chemo and feels warm, no temperature checked. He has some fatigue. Has edema, less than before. Occ cough, sometimes with dark green sputum. No TRAN. This is a long standing issue. No pains noted. Urine flow is frequent, more than before, nocturia x 4-5. He has some incontinence with urgency. No blood in the urine. \par \par 10/6/20...On chemo/RT, completed fraction #17/20 RT and 2 doses of gemcitabine, dose # 3 was held last week 2/2 mucositis. Feels "good" overall. Appetite is "fair," no weight loss. No nausea or vomiting. Mucositis is improved, using magic mouthwash TID, able to eat better now compared to last week. No odynophagia or dysphagia. No diarrheal stools. Some constipation, less than before, taking medication given by his nephew. No headaches or dizziness. No TRAN. Occ productive cough, unsure of sputum color. No complaints of pain. Some fatigue, not daily, does not impair daily activities. Some balance issues, no falls, using cane. Urine flow is "moderate," nocturia x 5-6. No hematuria, occ dysuria. No spasms. Some incontinence with urgency. No skin rashes or pruritus. No paresthesias. Has edema, but less than before. No fevers or chills. Gets assistance from wife with showering, but independent in other ADLs. \par \par 11/18/20...Completed RT, received only 3 doses of gemcitabine. feels "okay", feels weak somewhat. Ambulating with a cane, has used it for the last year. Had a fall in the past, none recently. Appetite is good, has some altered taste. Rx ended more than one month ago. No weight loss. NO mouth sores at this time. He can stop the mouth wash. Urine flow is good, occasional burning sensation with voiding. Occ incontinence. NO blood noted. he has follow up with Dr Valentin on the 24th, no appt with Dr Moya. Notes some leg edema at times, occ cough, mild TRAN. Fatigue is present  at times. He ambulates within the house, often seated, watching TV. He dresses himself, wife helps him to shower. No difficulty getting up from  chair. No headaches, no dizziness\par \par 2/17/21 - Verbal permission for telehealth services granted by the patient, MD Harry, on 2/17/21 at 10:02 AM\par Grandson as . He had COVID infection in January, for 13 days. He is recovered. No fevers. has some continued TRAN and cough, with small amounts of blood. Appetite is decreased, No weight loss. Urine flow is good, no blood, no dysuria. He has some incontinence. No edema. No fatigue. Requires some assist with ADLs from wife. He spends time in bed during the day, has PT once a week. walks around somewhat with walker or cane, no falls. \par  [de-identified] : prior Left renal pelvis HG urothelial, pT1N0 (15 nodes), November 2019, Left radical nephrectomy [FreeTextEntry1] : Started gemcitabine on 09/15/2020, on chemoRT. Completed RT 10/9/20, 20 fractions.  [de-identified] : 5/18/21 - permission for telehealth granted by the patient on May 18, 2021 at 9:41 AM. Grandson present, served as assistance in interpretation.\par Feels well. He sometimes has some pain when he passes urine. This does not occur every day. It has been present for a few months, lasts just a few seconds. It is a burning sensation. Flow is "medium". Occ incontinence, urgency on occasion. He has nocturia every 1.5 hours. He says he has noted some pink urine at times. No fevers, no chills. Had his CV vaccine. Appetite is normal.no weight loss. No  cough, has some TRAN, uses inhalers. No N/V/D/C. He has edema of the legs and continues on furosemide. He has some joint pains in the lower extremities. No falls. Wife assists him in dressing and showering. He walks about the house, no falls. He does have fatigue at times and rests. He has a dry mouth.

## 2021-05-18 NOTE — RESULTS/DATA
[FreeTextEntry1] : EXAM:  CT ABDOMEN AND PELVIS OC\par PROCEDURE DATE:  02/25/2021\par INTERPRETATION:  CLINICAL INFORMATION: Bladder cancer. Chemotherapy and radiation.\par COMPARISON: July 23, 2020.\par PROCEDURE:\par CT of the Abdomen and Pelvis was performed without intravenous contrast.\par Intravenous contrast: None.\par Oral contrast: positive contrast was administered.\par Sagittal and coronal reformats were performed.\par FINDINGS:\par LOWER CHEST: Bilateral reticular and patchy pulmonary opacities, possibly sequela of prior infection.\par LIVER: Within normal limits.\par BILE DUCTS: Normal caliber.\par GALLBLADDER: Within normal limits.\par SPLEEN: Within normal limits.\par PANCREAS: Within normal limits.\par ADRENALS: Within normal limits.\par KIDNEYS/URETERS: Left nephroureterostomy.\par BLADDER: Bladder wall thickening.\par REPRODUCTIVE ORGANS: Prostate is not enlarged.\par BOWEL: No bowel obstruction. Appendix is normal.\par PERITONEUM: No ascites.\par VESSELS: Atherosclerotic changes.\par RETROPERITONEUM/LYMPH NODES: Unchanged low density lesions along the left para-aortic space, measuring 1.6 superiorly (series 2, image 60) and 1.7 cm inferiorly (series 2, image 68).\par ABDOMINAL WALL: Within normal limits.\par BONES: Degenerative changes.\par IMPRESSION:\par Bladder wall thickening.\par No gross evidence of recurrent or metastatic disease.\par Bilateral reticular and patchy pulmonary opacities, possibly sequela of prior infection.\par

## 2021-05-18 NOTE — ASSESSMENT
[Palliative Care Plan] : not applicable at this time [FreeTextEntry1] : Mr. Harry is seen via telehealth services today.  His grandson was present and served as an .  Reports that he feels well.  He sometimes has some pain when he passes urine.  This does not occur on a daily basis.  It has been present for a few months, and last just a few seconds.  It is a burning sensation.  He says the flow is adequate.  There is occasional incontinence.  He has urgency on occasion.  He has nocturia multiple times per night.  Occurring every 1.5 hours.  He says he is noted some pink urine at times.  There is no fevers or chills.  His appetite is good and there is no weight loss.  There is no cough.  He does have some shortness of breath and he uses inhalers.  There are no GI complaints.  He has edema of his legs and he continues on furosemide.  He has had some joint pains in his lower extremities.  He has not fallen down.  He does require some assistance in dressing and showering from his wife.  He ambulates around the house.  He does have some fatigue at times and rest.  He notes a dry mouth which may be secondary to his oxybutynin.\par \par On physical examination, he appears in no acute distress.  His performance status is 2.\par \par His last cystoscopy was performed in March by Dr. Moya.  The report was reviewed, and there was no significant finding present.\par \par On May 14 he had chemistries performed revealing a creatinine value of 1.81, which is in line with his prior values.  He has some mild anemia with a hemoglobin of 12.1 which is consistent with his degree of renal insufficiency.\par \par He has a follow-up appointment with Dr. Kumari on June 24, and a cystoscopy will be done again at that time.\par \par All questions were answered to the best of my ability and to his apparent satisfaction.  He has not had any evidence of recurrence to date, but the pink urine he is complaining about requires further investigation.  I will see him once again by telehealth services in 3 months.  If any new symptoms are noted within the interval, he can always contact me for an earlier appointment.  He seemingly has no long-term at first effects from his radiation and chemotherapy other than urinary frequency.\par \par The eosinophil count has been elevated in the past, but it is more notably elevated at this time.  A stool for ova and parasites was ordered, but this has not been done as yet.  I asked him to take a stool and urine sample to the local laboratory for evaluation.  If the stool does not show any parasitic infestation, he may need to have further evaluation of his hypereosinophilia, such as a bone marrow.

## 2021-05-19 ENCOUNTER — LABORATORY RESULT (OUTPATIENT)
Age: 76
End: 2021-05-19

## 2021-05-24 ENCOUNTER — LABORATORY RESULT (OUTPATIENT)
Age: 76
End: 2021-05-24

## 2021-05-25 ENCOUNTER — NON-APPOINTMENT (OUTPATIENT)
Age: 76
End: 2021-05-25

## 2021-05-26 ENCOUNTER — NON-APPOINTMENT (OUTPATIENT)
Age: 76
End: 2021-05-26

## 2021-06-23 DIAGNOSIS — Z85.53 PERSONAL HISTORY OF MALIGNANT NEOPLASM OF RENAL PELVIS: ICD-10-CM

## 2021-06-24 ENCOUNTER — APPOINTMENT (OUTPATIENT)
Dept: UROLOGY | Facility: CLINIC | Age: 76
End: 2021-06-24
Payer: MEDICARE

## 2021-06-24 ENCOUNTER — OUTPATIENT (OUTPATIENT)
Dept: OUTPATIENT SERVICES | Facility: HOSPITAL | Age: 76
LOS: 1 days | End: 2021-06-24
Payer: COMMERCIAL

## 2021-06-24 VITALS
DIASTOLIC BLOOD PRESSURE: 82 MMHG | SYSTOLIC BLOOD PRESSURE: 165 MMHG | TEMPERATURE: 98 F | HEART RATE: 85 BPM | RESPIRATION RATE: 17 BRPM

## 2021-06-24 DIAGNOSIS — Z98.890 OTHER SPECIFIED POSTPROCEDURAL STATES: Chronic | ICD-10-CM

## 2021-06-24 DIAGNOSIS — C67.8 MALIGNANT NEOPLASM OF OVERLAPPING SITES OF BLADDER: ICD-10-CM

## 2021-06-24 DIAGNOSIS — Z90.5 ACQUIRED ABSENCE OF KIDNEY: Chronic | ICD-10-CM

## 2021-06-24 DIAGNOSIS — R35.0 FREQUENCY OF MICTURITION: ICD-10-CM

## 2021-06-24 PROCEDURE — 52000 CYSTOURETHROSCOPY: CPT

## 2021-06-28 ENCOUNTER — APPOINTMENT (OUTPATIENT)
Dept: UROLOGY | Facility: CLINIC | Age: 76
End: 2021-06-28

## 2021-07-05 LAB — URINE CYTOLOGY: NORMAL

## 2021-07-24 ENCOUNTER — INPATIENT (INPATIENT)
Facility: HOSPITAL | Age: 76
LOS: 5 days | Discharge: HOME | End: 2021-07-30
Attending: INTERNAL MEDICINE | Admitting: INTERNAL MEDICINE
Payer: MEDICARE

## 2021-07-24 VITALS
OXYGEN SATURATION: 100 % | RESPIRATION RATE: 24 BRPM | HEART RATE: 94 BPM | SYSTOLIC BLOOD PRESSURE: 165 MMHG | HEIGHT: 65 IN | TEMPERATURE: 98 F | DIASTOLIC BLOOD PRESSURE: 99 MMHG

## 2021-07-24 DIAGNOSIS — Z98.890 OTHER SPECIFIED POSTPROCEDURAL STATES: Chronic | ICD-10-CM

## 2021-07-24 DIAGNOSIS — J45.909 UNSPECIFIED ASTHMA, UNCOMPLICATED: ICD-10-CM

## 2021-07-24 DIAGNOSIS — I10 ESSENTIAL (PRIMARY) HYPERTENSION: ICD-10-CM

## 2021-07-24 DIAGNOSIS — R10.9 UNSPECIFIED ABDOMINAL PAIN: ICD-10-CM

## 2021-07-24 DIAGNOSIS — Z90.5 ACQUIRED ABSENCE OF KIDNEY: Chronic | ICD-10-CM

## 2021-07-24 DIAGNOSIS — E87.1 HYPO-OSMOLALITY AND HYPONATREMIA: ICD-10-CM

## 2021-07-24 DIAGNOSIS — C67.9 MALIGNANT NEOPLASM OF BLADDER, UNSPECIFIED: ICD-10-CM

## 2021-07-24 DIAGNOSIS — C64.9 MALIGNANT NEOPLASM OF UNSPECIFIED KIDNEY, EXCEPT RENAL PELVIS: ICD-10-CM

## 2021-07-24 DIAGNOSIS — R07.9 CHEST PAIN, UNSPECIFIED: ICD-10-CM

## 2021-07-24 DIAGNOSIS — Z29.9 ENCOUNTER FOR PROPHYLACTIC MEASURES, UNSPECIFIED: ICD-10-CM

## 2021-07-24 LAB
ALBUMIN SERPL ELPH-MCNC: 4.2 G/DL — SIGNIFICANT CHANGE UP (ref 3.3–5)
ALP SERPL-CCNC: 140 U/L — HIGH (ref 40–120)
ALT FLD-CCNC: 11 U/L — SIGNIFICANT CHANGE UP (ref 4–41)
ANION GAP SERPL CALC-SCNC: 14 MMOL/L — SIGNIFICANT CHANGE UP (ref 7–14)
ANION GAP SERPL CALC-SCNC: 15 MMOL/L — HIGH (ref 7–14)
ANION GAP SERPL CALC-SCNC: 16 MMOL/L — HIGH (ref 7–14)
APPEARANCE UR: ABNORMAL
AST SERPL-CCNC: 25 U/L — SIGNIFICANT CHANGE UP (ref 4–40)
BACTERIA # UR AUTO: NEGATIVE — SIGNIFICANT CHANGE UP
BASE EXCESS BLDV CALC-SCNC: 0.7 MMOL/L — SIGNIFICANT CHANGE UP (ref -3–2)
BASOPHILS # BLD AUTO: 0.02 K/UL — SIGNIFICANT CHANGE UP (ref 0–0.2)
BASOPHILS NFR BLD AUTO: 0.2 % — SIGNIFICANT CHANGE UP (ref 0–2)
BILIRUB SERPL-MCNC: 0.4 MG/DL — SIGNIFICANT CHANGE UP (ref 0.2–1.2)
BILIRUB UR-MCNC: NEGATIVE — SIGNIFICANT CHANGE UP
BLOOD GAS VENOUS - CREATININE: 1.4 MG/DL — HIGH (ref 0.5–1.3)
BLOOD GAS VENOUS COMPREHENSIVE RESULT: SIGNIFICANT CHANGE UP
BUN SERPL-MCNC: 12 MG/DL — SIGNIFICANT CHANGE UP (ref 7–23)
BUN SERPL-MCNC: 13 MG/DL — SIGNIFICANT CHANGE UP (ref 7–23)
BUN SERPL-MCNC: 16 MG/DL — SIGNIFICANT CHANGE UP (ref 7–23)
CALCIUM SERPL-MCNC: 9.5 MG/DL — SIGNIFICANT CHANGE UP (ref 8.4–10.5)
CALCIUM SERPL-MCNC: 9.6 MG/DL — SIGNIFICANT CHANGE UP (ref 8.4–10.5)
CALCIUM SERPL-MCNC: 9.9 MG/DL — SIGNIFICANT CHANGE UP (ref 8.4–10.5)
CHLORIDE BLDV-SCNC: 93 MMOL/L — LOW (ref 96–108)
CHLORIDE SERPL-SCNC: 87 MMOL/L — LOW (ref 98–107)
CHLORIDE SERPL-SCNC: 89 MMOL/L — LOW (ref 98–107)
CHLORIDE SERPL-SCNC: 90 MMOL/L — LOW (ref 98–107)
CO2 SERPL-SCNC: 21 MMOL/L — LOW (ref 22–31)
CO2 SERPL-SCNC: 21 MMOL/L — LOW (ref 22–31)
CO2 SERPL-SCNC: 23 MMOL/L — SIGNIFICANT CHANGE UP (ref 22–31)
COLOR SPEC: ABNORMAL
CREAT SERPL-MCNC: 1.12 MG/DL — SIGNIFICANT CHANGE UP (ref 0.5–1.3)
CREAT SERPL-MCNC: 1.2 MG/DL — SIGNIFICANT CHANGE UP (ref 0.5–1.3)
CREAT SERPL-MCNC: 1.29 MG/DL — SIGNIFICANT CHANGE UP (ref 0.5–1.3)
DIFF PNL FLD: ABNORMAL
EOSINOPHIL # BLD AUTO: 0.84 K/UL — HIGH (ref 0–0.5)
EOSINOPHIL NFR BLD AUTO: 9 % — HIGH (ref 0–6)
EPI CELLS # UR: 0 /HPF — SIGNIFICANT CHANGE UP (ref 0–5)
GAS PNL BLDV: 124 MMOL/L — LOW (ref 136–146)
GLUCOSE BLDV-MCNC: 132 MG/DL — HIGH (ref 70–99)
GLUCOSE SERPL-MCNC: 120 MG/DL — HIGH (ref 70–99)
GLUCOSE SERPL-MCNC: 121 MG/DL — HIGH (ref 70–99)
GLUCOSE SERPL-MCNC: 122 MG/DL — HIGH (ref 70–99)
GLUCOSE UR QL: NEGATIVE — SIGNIFICANT CHANGE UP
HCO3 BLDV-SCNC: 24 MMOL/L — SIGNIFICANT CHANGE UP (ref 20–27)
HCT VFR BLD CALC: 28.4 % — LOW (ref 39–50)
HCT VFR BLDA CALC: 31.5 % — LOW (ref 39–51)
HGB BLD CALC-MCNC: 10.2 G/DL — LOW (ref 13–17)
HGB BLD-MCNC: 9.7 G/DL — LOW (ref 13–17)
IANC: 6.99 K/UL — SIGNIFICANT CHANGE UP (ref 1.5–8.5)
IMM GRANULOCYTES NFR BLD AUTO: 0.5 % — SIGNIFICANT CHANGE UP (ref 0–1.5)
KETONES UR-MCNC: NEGATIVE — SIGNIFICANT CHANGE UP
LACTATE BLDV-MCNC: 1.5 MMOL/L — SIGNIFICANT CHANGE UP (ref 0.5–2)
LEUKOCYTE ESTERASE UR-ACNC: NEGATIVE — SIGNIFICANT CHANGE UP
LIDOCAIN IGE QN: 13 U/L — SIGNIFICANT CHANGE UP (ref 7–60)
LYMPHOCYTES # BLD AUTO: 0.71 K/UL — LOW (ref 1–3.3)
LYMPHOCYTES # BLD AUTO: 7.6 % — LOW (ref 13–44)
MAGNESIUM SERPL-MCNC: 1.8 MG/DL — SIGNIFICANT CHANGE UP (ref 1.6–2.6)
MAGNESIUM SERPL-MCNC: 1.9 MG/DL — SIGNIFICANT CHANGE UP (ref 1.6–2.6)
MCHC RBC-ENTMCNC: 29.6 PG — SIGNIFICANT CHANGE UP (ref 27–34)
MCHC RBC-ENTMCNC: 34.2 GM/DL — SIGNIFICANT CHANGE UP (ref 32–36)
MCV RBC AUTO: 86.6 FL — SIGNIFICANT CHANGE UP (ref 80–100)
MONOCYTES # BLD AUTO: 0.7 K/UL — SIGNIFICANT CHANGE UP (ref 0–0.9)
MONOCYTES NFR BLD AUTO: 7.5 % — SIGNIFICANT CHANGE UP (ref 2–14)
NEUTROPHILS # BLD AUTO: 6.99 K/UL — SIGNIFICANT CHANGE UP (ref 1.8–7.4)
NEUTROPHILS NFR BLD AUTO: 75.2 % — SIGNIFICANT CHANGE UP (ref 43–77)
NITRITE UR-MCNC: NEGATIVE — SIGNIFICANT CHANGE UP
NRBC # BLD: 0 /100 WBCS — SIGNIFICANT CHANGE UP
NRBC # FLD: 0 K/UL — SIGNIFICANT CHANGE UP
OSMOLALITY UR: 188 MOSM/KG — SIGNIFICANT CHANGE UP (ref 50–1200)
PCO2 BLDV: 43 MMHG — SIGNIFICANT CHANGE UP (ref 41–51)
PH BLDV: 7.38 — SIGNIFICANT CHANGE UP (ref 7.32–7.43)
PH UR: 8 — SIGNIFICANT CHANGE UP (ref 5–8)
PHOSPHATE SERPL-MCNC: 2.5 MG/DL — SIGNIFICANT CHANGE UP (ref 2.5–4.5)
PHOSPHATE SERPL-MCNC: 2.5 MG/DL — SIGNIFICANT CHANGE UP (ref 2.5–4.5)
PLATELET # BLD AUTO: 388 K/UL — SIGNIFICANT CHANGE UP (ref 150–400)
PO2 BLDV: 30 MMHG — LOW (ref 35–40)
POTASSIUM BLDV-SCNC: 3.5 MMOL/L — SIGNIFICANT CHANGE UP (ref 3.4–4.5)
POTASSIUM SERPL-MCNC: 3.5 MMOL/L — SIGNIFICANT CHANGE UP (ref 3.5–5.3)
POTASSIUM SERPL-MCNC: 3.5 MMOL/L — SIGNIFICANT CHANGE UP (ref 3.5–5.3)
POTASSIUM SERPL-MCNC: 4.4 MMOL/L — SIGNIFICANT CHANGE UP (ref 3.5–5.3)
POTASSIUM SERPL-SCNC: 3.5 MMOL/L — SIGNIFICANT CHANGE UP (ref 3.5–5.3)
POTASSIUM SERPL-SCNC: 3.5 MMOL/L — SIGNIFICANT CHANGE UP (ref 3.5–5.3)
POTASSIUM SERPL-SCNC: 4.4 MMOL/L — SIGNIFICANT CHANGE UP (ref 3.5–5.3)
PROT SERPL-MCNC: 7.9 G/DL — SIGNIFICANT CHANGE UP (ref 6–8.3)
PROT UR-MCNC: ABNORMAL
RBC # BLD: 3.28 M/UL — LOW (ref 4.2–5.8)
RBC # FLD: 13.4 % — SIGNIFICANT CHANGE UP (ref 10.3–14.5)
RBC CASTS # UR COMP ASSIST: >720 /HPF — HIGH (ref 0–4)
SAO2 % BLDV: 51.4 % — LOW (ref 60–85)
SARS-COV-2 RNA SPEC QL NAA+PROBE: SIGNIFICANT CHANGE UP
SODIUM SERPL-SCNC: 123 MMOL/L — LOW (ref 135–145)
SODIUM SERPL-SCNC: 126 MMOL/L — LOW (ref 135–145)
SODIUM SERPL-SCNC: 127 MMOL/L — LOW (ref 135–145)
SP GR SPEC: 1.01 — LOW (ref 1.01–1.02)
TROPONIN T, HIGH SENSITIVITY RESULT: 40 NG/L — SIGNIFICANT CHANGE UP
TROPONIN T, HIGH SENSITIVITY RESULT: 43 NG/L — SIGNIFICANT CHANGE UP
UROBILINOGEN FLD QL: SIGNIFICANT CHANGE UP
WBC # BLD: 9.31 K/UL — SIGNIFICANT CHANGE UP (ref 3.8–10.5)
WBC # FLD AUTO: 9.31 K/UL — SIGNIFICANT CHANGE UP (ref 3.8–10.5)
WBC UR QL: 4 /HPF — SIGNIFICANT CHANGE UP (ref 0–5)

## 2021-07-24 PROCEDURE — 99285 EMERGENCY DEPT VISIT HI MDM: CPT

## 2021-07-24 PROCEDURE — 99223 1ST HOSP IP/OBS HIGH 75: CPT

## 2021-07-24 PROCEDURE — 71045 X-RAY EXAM CHEST 1 VIEW: CPT | Mod: 26

## 2021-07-24 PROCEDURE — 74176 CT ABD & PELVIS W/O CONTRAST: CPT | Mod: 26

## 2021-07-24 RX ORDER — MORPHINE SULFATE 50 MG/1
4 CAPSULE, EXTENDED RELEASE ORAL ONCE
Refills: 0 | Status: DISCONTINUED | OUTPATIENT
Start: 2021-07-24 | End: 2021-07-24

## 2021-07-24 RX ORDER — AMLODIPINE BESYLATE 2.5 MG/1
10 TABLET ORAL DAILY
Refills: 0 | Status: DISCONTINUED | OUTPATIENT
Start: 2021-07-24 | End: 2021-07-25

## 2021-07-24 RX ORDER — SODIUM CHLORIDE 9 MG/ML
1000 INJECTION INTRAMUSCULAR; INTRAVENOUS; SUBCUTANEOUS ONCE
Refills: 0 | Status: COMPLETED | OUTPATIENT
Start: 2021-07-24 | End: 2021-07-24

## 2021-07-24 RX ORDER — FUROSEMIDE 40 MG
20 TABLET ORAL DAILY
Refills: 0 | Status: DISCONTINUED | OUTPATIENT
Start: 2021-07-24 | End: 2021-07-26

## 2021-07-24 RX ORDER — SODIUM CHLORIDE 9 MG/ML
1 INJECTION INTRAMUSCULAR; INTRAVENOUS; SUBCUTANEOUS
Refills: 0 | Status: DISCONTINUED | OUTPATIENT
Start: 2021-07-24 | End: 2021-07-24

## 2021-07-24 RX ADMIN — SODIUM CHLORIDE 1 GRAM(S): 9 INJECTION INTRAMUSCULAR; INTRAVENOUS; SUBCUTANEOUS at 18:41

## 2021-07-24 RX ADMIN — MORPHINE SULFATE 4 MILLIGRAM(S): 50 CAPSULE, EXTENDED RELEASE ORAL at 04:19

## 2021-07-24 RX ADMIN — Medication 10 MILLIGRAM(S): at 17:50

## 2021-07-24 RX ADMIN — SODIUM CHLORIDE 1000 MILLILITER(S): 9 INJECTION INTRAMUSCULAR; INTRAVENOUS; SUBCUTANEOUS at 04:19

## 2021-07-24 RX ADMIN — MORPHINE SULFATE 4 MILLIGRAM(S): 50 CAPSULE, EXTENDED RELEASE ORAL at 04:50

## 2021-07-24 RX ADMIN — AMLODIPINE BESYLATE 10 MILLIGRAM(S): 2.5 TABLET ORAL at 17:50

## 2021-07-24 RX ADMIN — Medication 20 MILLIGRAM(S): at 17:50

## 2021-07-24 RX ADMIN — SODIUM CHLORIDE 1000 MILLILITER(S): 9 INJECTION INTRAMUSCULAR; INTRAVENOUS; SUBCUTANEOUS at 07:57

## 2021-07-24 RX ADMIN — Medication 30 MILLILITER(S): at 17:49

## 2021-07-24 NOTE — H&P ADULT - PROBLEM SELECTOR PLAN 2
1 week of generalized abdominal pain in the setting of constipation. CT A/P without obstruction- noted to have distended bladder. PVR in .   - Bowel regimen: senna, miralax, suppository x 1   - Hold Oxybutynin in setting of constipation   - Ctm bladder scans

## 2021-07-24 NOTE — H&P ADULT - HISTORY OF PRESENT ILLNESS
75 y.o. M w/ a hx of HTN, HLD, asthma, SIADH, L RCC s/p nephrectomy, bladder cancer s/p nephroureterectomy who presents with burning epigastric pain.   15 days ago, patient had worsening constipation, not passing as much flatus, he was able to pass some small B, with Miralax.   5 days ago, patient had significant abdominal bloating and tightness, burning pain that starts in his abdomen and travels to the L side of his chest.   Nausea no vomiting  75 y.o. M w/ a hx of HTN, HLD, asthma, SIADH, L RCC s/p nephrectomy, bladder cancer s/p nephroureterectomy who presents with burning epigastric pain, chest pain and constipation. Patient was in his USOH until 2 weeks ago when he noted constipation- was only able to pass very small amounts of stool infrequently. He took Miralax the day prior to admission with some improvement. Patient also noted decreased flatus. One week ago, patient developed generalized abdominal pain, burning 5/10 in intensity along with abdominal distension which is now improving. Also notes sharp generalized chest pain 5-8/10 in intensity. CP was worse with exertion and radiated to his left and right side, did not travel down his arm. No associated diaphoresis. N/V. Patient has had decreased PO intake- only taking in about 50% of usual PO. Endorses chills, denies fevers, sick contacts. Patient notes having a recent cystoscopy which noted a bladder lesion and has a surgery on the 3rd. Patient has been drinking more fluid over the last week, takes 1g NaCl 1-2 times a day. Patient notes some intermittent incomplete bladder emptying.  [place] PI services used     75 y.o. M w/ a hx of HTN, HLD, asthma, SIADH, L RCC s/p nephrectomy, bladder cancer s/p nephroureterectomy who presents with burning epigastric pain, chest pain and constipation. Patient was in his USOH until 2 weeks ago when he noted constipation- was only able to pass very small amounts of stool infrequently. He took Miralax the day prior to admission with some improvement. Patient also noted decreased flatus. One week ago, patient developed generalized abdominal pain, burning 5/10 in intensity along with abdominal distension which is now improving. Also notes sharp generalized chest pain 5-8/10 in intensity. CP was worse with exertion and radiated to his left and right side, did not travel down his arm. No associated diaphoresis. N/V. Patient has had decreased PO intake- only taking in about 50% of usual PO. Endorses chills, denies fevers, sick contacts. Patient notes having a recent cystoscopy which noted a bladder lesion and has a surgery on the 3rd. Patient has been drinking more fluid over the last week, takes 1g NaCl 1-2 times a day. Patient notes some intermittent incomplete bladder emptying.   In the ED, patient received Morphine 4 and IL NS.

## 2021-07-24 NOTE — CHART NOTE - NSCHARTNOTEFT_GEN_A_CORE
[facility] NIGHT MEDICINE COVERAGE.    d/w Dr. Conde evening BMP results re: Na level of 126 now down from 127. As per his recs, change Sodium Tabs from BID to TID (ordered) and recheck in AM. Continue to monitor.     07-24    126<L>  |  89<L>  |  12  ----------------------------<  122<H>  3.5   |  21<L>  |  1.12    Ca    9.5      24 Jul 2021 19:15  Phos  2.5     07-24  Mg     1.90     07-24    TPro  7.9  /  Alb  4.2  /  TBili  0.4  /  DBili  x   /  AST  25  /  ALT  11  /  AlkPhos  140<H>  07-24    Melissa Reid PA  Medicine [id number]

## 2021-07-24 NOTE — H&P ADULT - PROBLEM SELECTOR PLAN 3
Generalized sharp chest pain for 1 week, Lupe flat. Given patient has had sx for the last week and Lupe are only elevated to 40's, lower suspicion for active ACS.   - EKG w/ TWI, V2, v4-v6, unchanged from prior   [ ] Repeat EKG   [ ] TTE

## 2021-07-24 NOTE — ED ADULT TRIAGE NOTE - CHIEF COMPLAINT QUOTE
Pt c/o chest pain, epigastric pain, and abdominal pain.  Endorses nausea/vomiting, belching in triage.  PMHx:  bladder tumor, stomach ulcers, kidney cancer, asthma, hyponatremia, SIADH, BPH, UTI, HTN

## 2021-07-24 NOTE — ED PROVIDER NOTE - ATTENDING CONTRIBUTION TO CARE
MD Hubbard:  I performed a face to face bedside interview with patient regarding history of present illness, review of symptoms and past medical history. I completed an independent physical exam(documented below).  I have discussed patient's plan of care with medical student and resident.   I agree with note as stated above, having amended the EMR as needed to reflect my findings. I have discussed the assessment and plan of care.  This includes during the time I functioned as the attending physician for this patient.  PE:  Gen: Alert, looks uncomfortable  Head: NC, AT,  EOMI, normal lids/conjunctiva  ENT:  normal hearing, patent oropharynx without erythema/exudate  Neck: +supple, no tenderness/meningismus/JVD, +Trachea midline  Chest: no chest wall tenderness, equal chest rise  Pulm: Bilateral BS, normal resp effort, no wheeze/stridor/retractions  CV: RRR, no M/R/G, +dist pulses  Abd: +BS, soft, distended, diffusely ttp, no rebound  Rectal: deferred  Mskel: no edema/erythema/cyanosis  Neuro: AAOx3  MDM:  76yo M w/ pmh and surgical hx as above, p/w worsening burning abd pain (mostly epigastric) x few days, constipation and passing minimal flatus, associated w/ nausea but no emesis. Most recent BMs were of narrow caliber stool. Denies f/c. Labs, imaging, meds.

## 2021-07-24 NOTE — ED PROVIDER NOTE - PROGRESS NOTE DETAILS
Patient updated with results of testing including hyponatremia and new metastatic cancer lesions.  He exhibited some confusion, including saying he had not seen his oncologist for 5 years and could not say who his oncologist was; review of records say that he saw his oncologist in May. Spoke with Dr. Conde, nephrologist, who agrees that hyponatremia is likely siadh given urine osm.  Will repeat BMP.  He requested admission to Dr. Samuels.

## 2021-07-24 NOTE — ED PROVIDER NOTE - OBJECTIVE STATEMENT
74 y/o man with PMHx of HTN, HLD, gastritis, asthma, SIADH, L RCC s/p L nephrectomy and bladder ca s/p lap nephroureterctomy presenting to the ED for worsening burning abdominal and epigastric pain for the last few days. About 15 days ago, pt started to have worsening constipation and reduced flatus. He was able to pass small amounts of BM with miralax. Over the last 5 days he started having significant abdominal bloating and tightness and burning pain that starts in his abdomen and travels to the L side of his chest. He has also been nauseated. he does not vomit but has been burping a lot, feels some relief with burping. He has passed a few stools recently and describes them as being very narrow and minimal in quantity for the last few weeks. He also has chills, but no fevers. He has some difficulty breathing when he feels burning gastric pain.

## 2021-07-24 NOTE — CONSULT NOTE ADULT - ASSESSMENT
75 y.o. M w/ a hx of HTN, HLD, asthma, SIADH, L RCC s/p nephrectomy, bladder cancer s/p nephroureterectomy who presents with burning epigastric pain. pt c/o constipation, abd bloating and tightness. no n/v. patient admits to drink more fluid sec to constipation. 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 urine osmo low   takes na 1g bid at home  free water restriction <1L/day. pt educated  s/p 1L NS  Na improving appropriately repeat bmp @ 4pm. call dr. howard at [phone] with result  MOnitor serum NA    CKD Stage 3  Baseline Scr 1.5-1.7   scr better than baseline  Monitor BMP   Avoid nephrotoxics, NSAIDS RCA    HTN  BP acceptable   MOnitor BP     75 y.o. M w/ a hx of HTN, HLD, asthma, SIADH, L RCC s/p nephrectomy, bladder cancer s/p nephroureterectomy who presents with burning epigastric pain. pt c/o constipation, abd bloating and tightness. no n/v. patient admits to drink more fluid sec to constipation. nephrology consulted for hyponatremia    Hyponatremia  chronic, worsen than baseline likely h/o SIADH with polydipsia in current work up  patient admits to drink more fluid sec to severe constipation. urine osmo low   takes na 1g bid at home  free water restriction <1L/day. pt educated  s/p 1L NS  Na improving appropriately repeat bmp @ 4pm. call dr. howard at [phone] with result  MOnitor serum NA    CKD Stage 3  Baseline Scr 1.5-1.7   scr better than baseline  Monitor BMP   Avoid nephrotoxics, NSAIDS RCA    HTN  BP acceptable   MOnitor BP

## 2021-07-24 NOTE — H&P ADULT - NSHPSOCIALHISTORY_GEN_ALL_CORE
Lives at home with wife, son, DIL and grandchildren   Tobacco: 40 years x 1 pack/day   Etoh: none   drugs: None   Not currently working   Ambulates with a cane at baseline

## 2021-07-24 NOTE — H&P ADULT - PROBLEM SELECTOR PLAN 1
Hx of SIADH on NaCL tabs at home, has not been adherent to fluid restriction 2/2 poor PO intake, abdominal pain. Na improved w/ IVF resuscitation which would be atypical for SIADH. Currently appears euvolemic though may have been slightly hypovolemic on admission? Sosm low, urine osm >100  - Monitor BMP, will repeat at 1800   - Cont salt tabs   - Cont fluid restriction to 1L/day   - Nephrology consulted  [ ] 1800 BMP, urine Na

## 2021-07-24 NOTE — H&P ADULT - NSHPPHYSICALEXAM_GEN_ALL_CORE
GENERAL: NAD, well-developed male  HEAD:  Atraumatic, Normocephalic  EYES: EOMI, PERRLA, conjunctiva and sclera clear  NECK: Supple, No JVD  CHEST/LUNG: Clear to auscultation bilaterally; No wheeze  HEART: Regular rate and rhythm; No murmurs, rubs, or gallops  ABDOMEN: Soft, mild generalized tenderness to palpation, mildly distended; Bowel sounds present  EXTREMITIES:  2+ Peripheral Pulses, No clubbing, cyanosis, or edema  PSYCH: AAOx3  NEUROLOGY: non-focal  SKIN: No rashes or lesions

## 2021-07-24 NOTE — ED PROVIDER NOTE - CONSTITUTIONAL, MLM
Ill appearing, awake, alert, oriented to person, place, time/situation and in some apparent distress. normal...

## 2021-07-24 NOTE — H&P ADULT - NSHPREVIEWOFSYSTEMS_GEN_ALL_CORE
ROS:    Constitutional: [ ] fevers [x ] chills [ ] weight loss [ ] weight gain  HEENT: [ ] dry eyes [ ] eye irritation [ ] postnasal drip [ ] nasal congestion  CV: [x ] chest pain [ ] orthopnea [ ] palpitations [ ] murmur  Resp: [ ] cough [ ] shortness of breath [ ] dyspnea [ ] wheezing [ ] sputum [ ] hemoptysis  GI: [ ] nausea [ ] vomiting [ ] diarrhea [x ] constipation [x ] abd pain [ ] dysphagia   : [ ] dysuria [ ] nocturia [ ] hematuria [ ] increased urinary frequency  Musculoskeletal: [ ] back pain [ ] myalgias [ ] arthralgias [ ] fracture  Skin: [ ] rash [ ] itch  Neurological: [ ] headache [ ] dizziness [ ] syncope [x ] weakness [ ] numbness  Psychiatric: [ ] anxiety [ ] depression  Endocrine: [ ] diabetes [ ] thyroid problem  Hematologic/Lymphatic: [ ] anemia [ ] bleeding problem  Allergic/Immunologic: [ ] itchy eyes [ ] nasal discharge [ ] hives [ ] angioedema  [x ] All other systems negative  [ ] Unable to assess ROS because ________

## 2021-07-24 NOTE — CONSULT NOTE ADULT - SUBJECTIVE AND OBJECTIVE BOX
[facility]   MD LEONELA JULIO DO ANAM SIDDIQUI ANGELA WONG, PA        TEL:  OFFICE: [phone]  DR POST CELL: [phone]  DR. RAMOS CELL: [phone]  DR. MARTINEZ CELL: [phone]  LESVIA WILLOUGHBY CELL: [phone]    From 5pm-7am answering service [phone]    --- INITIAL RENAL CONSULT NOTE ---date of service 21 @ 14:43    HPI:  75 y.o. M w/ a hx of HTN, HLD, asthma, SIADH, L RCC s/p nephrectomy, bladder cancer s/p nephroureterectomy who presents with burning epigastric pain. pt c/o constipation, abd bloating and tightness. no n/v. patient admits to drink more fluid sec to constipation. nephrology consulted for hyponatremia    Allergies:  chocolate (Pruritus)  flour (Rash)  No Known Drug Allergies  Nuts (Rash)  Soy (Unknown)      PAST MEDICAL & SURGICAL HISTORY:  Hypercholesterolemia    Anxiety    HTN (Hypertension)    Essential hypertension    History of BPH    History of SIADH    Urinary tract infection with hematuria    Gross hematuria    Left renal mass    Asthma  denies recent asthma exacerbation    Hyponatremia  admission x 2 last     Cancer of kidney    History of stomach ulcers  denies recent endoscopy    Bladder tumor    S/P cystoscopy  Insertion left ureteral stent ; biopsy     History of prostate surgery  ? exact procedure 3/19    H/O left nephrectomy        Home Medications Reviewed    Hospital Medications:   MEDICATIONS  (STANDING):      SOCIAL HISTORY:  Denies ETOh, Smoking,     FAMILY HISTORY:  Family history of stroke    FH: lung cancer  brother        REVIEW OF SYSTEMS:  CONSTITUTIONAL: No weakness, fevers or chills  EYES/ENT: No visual changes;  No vertigo or throat pain   NECK: No pain or stiffness  RESPIRATORY: No cough, wheezing, hemoptysis; No shortness of breath  CARDIOVASCULAR: +chest pain  GASTROINTESTINAL: see hpi  GENITOURINARY: No dysuria, frequency, foamy urine, urinary urgency, incontinence or hematuria  NEUROLOGICAL: No numbness or weakness  SKIN: No itching, burning, rashes, or lesions   VASCULAR: No bilateral lower extremity edema.   All other review of systems is negative unless indicated above.    VITALS:  T(F): 98.2 (07-24-21 @ 11:08), Max: 98.5 (21 @ 06:27)  HR: 96 (21 @ 11:08)  BP: 154/65 (21 @ 11:08)  RR: 16 (21 @ 11:08)  SpO2: 100% (21 @ 11:08)  Wt(kg): --    Height (cm): 165.1 ( @ 02:56)    PHYSICAL EXAM:  Constitutional: NAD  HEENT: anicteric sclera, oropharynx clear, MMM  Neck: No JVD  Respiratory: CTAB, no wheezes, rales or rhonchi  Cardiovascular: S1, S2, RRR  Gastrointestinal: BS+, soft, NT/ND  Extremities: trace peripheral edema  Neurological: A/O x 3, no focal deficits  Psychiatric: Normal mood, normal affect  : No CVA tenderness. No pineda.   Skin: No rashes    LABS:      127<L>  |  90<L>  |  13  ----------------------------<  120<H>  3.5   |  23  |  1.20    Ca    9.6      2021 10:45  Phos  2.5       Mg     1.80         TPro  7.9  /  Alb  4.2  /  TBili  0.4  /  DBili      /  AST  25  /  ALT  11  /  AlkPhos  140<H>      Creatinine Trend: 1.20 <--, 1.29 <--                        9.7    9.31  )-----------( 388      ( 2021 04:33 )             28.4     Urine Studies:  Urinalysis Basic - ( 2021 11:43 )    Color: Light Red / Appearance: Slightly Turbid / S.007 / pH:   Gluc:  / Ketone: Negative  / Bili: Negative / Urobili: <2 mg/dL   Blood:  / Protein: 30 mg/dL / Nitrite: Negative   Leuk Esterase: Negative / RBC: >720 /HPF / WBC 4 /HPF   Sq Epi:  / Non Sq Epi: 0 /HPF / Bacteria: Negative      Osmolality, Random Urine: 188 mosm/kg ( @ 07:54)      RADIOLOGY & ADDITIONAL STUDIES:

## 2021-07-24 NOTE — ED PROVIDER NOTE - PMH
Anxiety    Asthma  denies recent asthma exacerbation  Bladder tumor    Cancer of kidney    Essential hypertension    Gross hematuria    History of BPH    History of SIADH    History of stomach ulcers  denies recent endoscopy  HTN (Hypertension)    Hypercholesterolemia    Hyponatremia  admission x 2 last 2017  Left renal mass    Urinary tract infection with hematuria

## 2021-07-24 NOTE — ED PROVIDER NOTE - CLINICAL SUMMARY MEDICAL DECISION MAKING FREE TEXT BOX
74 y/o man with comorbid conditions and prior  abdominal surgery presents to ED for remote hx of constipation, obstipation, abdominal bloating and burning sensation, nausea. Exam concerning for abd distension. DDx SBO, visceral inflammation,  gastritis. Pending labs and CT abdomen

## 2021-07-24 NOTE — H&P ADULT - NSHPLABSRESULTS_GEN_ALL_CORE
LABS:                        9.7    9.31  )-----------( 388      ( 24 Jul 2021 04:33 )             28.4     24 Jul 2021 10:45    127    |  90     |  13     ----------------------------<  120    3.5     |  23     |  1.20     Ca    9.6        24 Jul 2021 10:45  Phos  2.5       24 Jul 2021 10:45  Mg     1.80      24 Jul 2021 10:45    TPro  7.9    /  Alb  4.2    /  TBili  0.4    /  DBili  x      /  AST  25     /  ALT  11     /  AlkPhos  140    24 Jul 2021 04:33

## 2021-07-24 NOTE — ED PROVIDER NOTE - PSH
H/O left nephrectomy    History of prostate surgery  ? exact procedure 3/19  S/P cystoscopy  Insertion left ureteral stent ; biopsy 8/19

## 2021-07-24 NOTE — ED ADULT NURSE NOTE - OBJECTIVE STATEMENT
patient complain of pain to chest today. Patient also complain of lower abdominal pain with nausea and vomiting.

## 2021-07-24 NOTE — ED PROVIDER NOTE - GASTROINTESTINAL NEGATIVE STATEMENT, MLM
burning epigastric pain and abdominal bloating per HPI; constipation and obstipation; nausea and burping, but no vomiting. increased reflux sensation

## 2021-07-24 NOTE — H&P ADULT - PROBLEM SELECTOR PLAN 4
Hx of bladder cancer s/p s/p cystoscopy, Incision of Ureteral Orifice, Left laparoscopic Nephroureterectomy and Retroperitoneal Lymphadenectomy follows OP with Urology and Oncology.   - Bladder lesion noted on CT A/P w/ bone lesions and lung lesions c/f mets   - Patient unaware of these findings but reports plans for ?surgery on 8/3   [ ] C/s Urology tomorrow AM

## 2021-07-25 LAB
ANION GAP SERPL CALC-SCNC: 14 MMOL/L — SIGNIFICANT CHANGE UP (ref 7–14)
ANION GAP SERPL CALC-SCNC: 16 MMOL/L — HIGH (ref 7–14)
BUN SERPL-MCNC: 14 MG/DL — SIGNIFICANT CHANGE UP (ref 7–23)
BUN SERPL-MCNC: 18 MG/DL — SIGNIFICANT CHANGE UP (ref 7–23)
CALCIUM SERPL-MCNC: 9.6 MG/DL — SIGNIFICANT CHANGE UP (ref 8.4–10.5)
CALCIUM SERPL-MCNC: 9.8 MG/DL — SIGNIFICANT CHANGE UP (ref 8.4–10.5)
CHLORIDE SERPL-SCNC: 88 MMOL/L — LOW (ref 98–107)
CHLORIDE SERPL-SCNC: 92 MMOL/L — LOW (ref 98–107)
CO2 SERPL-SCNC: 21 MMOL/L — LOW (ref 22–31)
CO2 SERPL-SCNC: 24 MMOL/L — SIGNIFICANT CHANGE UP (ref 22–31)
COVID-19 SPIKE DOMAIN AB INTERP: POSITIVE
COVID-19 SPIKE DOMAIN ANTIBODY RESULT: >250 U/ML — HIGH
CREAT SERPL-MCNC: 1.26 MG/DL — SIGNIFICANT CHANGE UP (ref 0.5–1.3)
CREAT SERPL-MCNC: 1.45 MG/DL — HIGH (ref 0.5–1.3)
CULTURE RESULTS: NO GROWTH — SIGNIFICANT CHANGE UP
GLUCOSE SERPL-MCNC: 115 MG/DL — HIGH (ref 70–99)
GLUCOSE SERPL-MCNC: 117 MG/DL — HIGH (ref 70–99)
HCT VFR BLD CALC: 27.9 % — LOW (ref 39–50)
HGB BLD-MCNC: 9.5 G/DL — LOW (ref 13–17)
MAGNESIUM SERPL-MCNC: 2 MG/DL — SIGNIFICANT CHANGE UP (ref 1.6–2.6)
MAGNESIUM SERPL-MCNC: 2.2 MG/DL — SIGNIFICANT CHANGE UP (ref 1.6–2.6)
MCHC RBC-ENTMCNC: 29.4 PG — SIGNIFICANT CHANGE UP (ref 27–34)
MCHC RBC-ENTMCNC: 34.1 GM/DL — SIGNIFICANT CHANGE UP (ref 32–36)
MCV RBC AUTO: 86.4 FL — SIGNIFICANT CHANGE UP (ref 80–100)
NRBC # BLD: 0 /100 WBCS — SIGNIFICANT CHANGE UP
NRBC # FLD: 0 K/UL — SIGNIFICANT CHANGE UP
PHOSPHATE SERPL-MCNC: 2.7 MG/DL — SIGNIFICANT CHANGE UP (ref 2.5–4.5)
PHOSPHATE SERPL-MCNC: 3.1 MG/DL — SIGNIFICANT CHANGE UP (ref 2.5–4.5)
PLATELET # BLD AUTO: 376 K/UL — SIGNIFICANT CHANGE UP (ref 150–400)
POTASSIUM SERPL-MCNC: 3.7 MMOL/L — SIGNIFICANT CHANGE UP (ref 3.5–5.3)
POTASSIUM SERPL-MCNC: 4.3 MMOL/L — SIGNIFICANT CHANGE UP (ref 3.5–5.3)
POTASSIUM SERPL-SCNC: 3.7 MMOL/L — SIGNIFICANT CHANGE UP (ref 3.5–5.3)
POTASSIUM SERPL-SCNC: 4.3 MMOL/L — SIGNIFICANT CHANGE UP (ref 3.5–5.3)
RBC # BLD: 3.23 M/UL — LOW (ref 4.2–5.8)
RBC # FLD: 13.4 % — SIGNIFICANT CHANGE UP (ref 10.3–14.5)
SARS-COV-2 IGG+IGM SERPL QL IA: >250 U/ML — HIGH
SARS-COV-2 IGG+IGM SERPL QL IA: POSITIVE
SODIUM SERPL-SCNC: 125 MMOL/L — LOW (ref 135–145)
SODIUM SERPL-SCNC: 130 MMOL/L — LOW (ref 135–145)
SPECIMEN SOURCE: SIGNIFICANT CHANGE UP
WBC # BLD: 7.38 K/UL — SIGNIFICANT CHANGE UP (ref 3.8–10.5)
WBC # FLD AUTO: 7.38 K/UL — SIGNIFICANT CHANGE UP (ref 3.8–10.5)

## 2021-07-25 RX ADMIN — Medication 20 MILLIGRAM(S): at 06:36

## 2021-07-25 NOTE — PROGRESS NOTE ADULT - SUBJECTIVE AND OBJECTIVE BOX
Name of Patient : MD QURESHI  MRN: [id number]      Subjective: Patient seen and examined. No new events except as noted.   abdominal pain     REVIEW OF SYSTEMS:    CONSTITUTIONAL: No weakness, fevers or chills  EYES/ENT: No visual changes;  No vertigo or throat pain   NECK: No pain or stiffness  RESPIRATORY: No cough, wheezing, hemoptysis; No shortness of breath  CARDIOVASCULAR: No chest pain or palpitations  GASTROINTESTINAL: No abdominal or epigastric pain. No nausea, vomiting, or hematemesis; No diarrhea or constipation. No melena or hematochezia.  GENITOURINARY: No dysuria, frequency or hematuria  NEUROLOGICAL: No numbness or weakness  SKIN: No itching, burning, rashes, or lesions   All other review of systems is negative unless indicated above.    MEDICATIONS:  MEDICATIONS  (STANDING):  amLODIPine   Tablet 10 milliGRAM(s) Oral daily  atorvastatin 10 milliGRAM(s) Oral at bedtime  budesonide  80 MICROgram(s)/formoterol 4.5 MICROgram(s) Inhaler 2 Puff(s) Inhalation two times a day  cholecalciferol 1000 Unit(s) Oral daily  cyanocobalamin 1000 MICROGram(s) Oral daily  enoxaparin Injectable 40 milliGRAM(s) SubCutaneous daily  finasteride 5 milliGRAM(s) Oral daily  fluticasone propionate 50 MICROgram(s)/spray Nasal Spray 1 Spray(s) Both Nostrils two times a day  furosemide    Tablet 20 milliGRAM(s) Oral daily  metoprolol succinate ER 25 milliGRAM(s) Oral daily  multivitamin 1 Tablet(s) Oral daily  pantoprazole    Tablet 40 milliGRAM(s) Oral before breakfast  polyethylene glycol 3350 17 Gram(s) Oral daily  senna 2 Tablet(s) Oral at bedtime  sodium chloride 1 Gram(s) Oral three times a day  tamsulosin 0.4 milliGRAM(s) Oral at bedtime      PHYSICAL EXAM:  T(C): 36.9 (21 @ 21:12), Max: 37.1 (21 @ 17:21)  HR: 79 (21 @ 21:12) (79 - 81)  BP: 153/63 (21 @ 21:12) (127/51 - 153/63)  RR: 18 (21 @ 21:12) (17 - 18)  SpO2: 97% (21 @ 21:12) (97% - 100%)  Wt(kg): --  I&O's Summary    2021 07:  -  2021 07:00  --------------------------------------------------------  IN: 480 mL / OUT: 0 mL / NET: 480 mL    2021 07:01  -  2021 00:21  --------------------------------------------------------  IN: 0 mL / OUT: 2150 mL / NET: -2150 mL          Appearance: Normal	  HEENT:  PERRLA   Lymphatic: No lymphadenopathy   Cardiovascular: Normal S1 S2, no JVD  Respiratory: normal effort , clear  Gastrointestinal:  Soft, Non-tender  Skin: No rashes,  warm to touch  Psychiatry:  Mood & affect appropriate  Musculuskeletal: No edema      All labs, Imaging and EKGs personally reviewed           21 @ 07:01  -  21 @ 07:00  --------------------------------------------------------  IN: 480 mL / OUT: 0 mL / NET: 480 mL    21 @ 07:  -  21 @ 00:21  --------------------------------------------------------  IN: 0 mL / OUT: 2150 mL / NET: -2150 mL                            9.5    7.38  )-----------( 376      ( 2021 07:58 )             27.9                   130<L>  |  92<L>  |  18  ----------------------------<  117<H>  4.3   |  24  |  1.45<H>    Ca    9.8      2021 17:12  Phos  3.1       Mg     2.20         TPro  7.9  /  Alb  4.2  /  TBili  0.4  /  DBili  x   /  AST  25  /  ALT  11  /  AlkPhos  140<H>                         Urinalysis Basic - ( 2021 11:43 )    Color: Light Red / Appearance: Slightly Turbid / S.007 / pH: x  Gluc: x / Ketone: Negative  / Bili: Negative / Urobili: <2 mg/dL   Blood: x / Protein: 30 mg/dL / Nitrite: Negative   Leuk Esterase: Negative / RBC: >720 /HPF / WBC 4 /HPF   Sq Epi: x / Non Sq Epi: 0 /HPF / Bacteria: Negative      < from: CT Abdomen and Pelvis w/ Oral Cont (21 @ 08:39) >  IMPRESSION:  No bowel obstruction, as clinically questioned.    Ill-defined hyperattenuating lesion along the left bladder wall, indeterminate on the current examination, neoplasm not excluded. Recommend urological consultation with consideration to cystoscopy.    Multiple new lytic osseous lesions, highly concerning for metastases.    Several additional subcentimeter solid nodules in the bilateral lung bases, indeterminate, metastases cannot be excluded.

## 2021-07-25 NOTE — PROGRESS NOTE ADULT - SUBJECTIVE AND OBJECTIVE BOX
[facility] [place] [facility]   MD NOLAN JULIO MD RUORU WONG, PA    TEL:  OFFICE: [phone]  DR POST CELL: [phone]  HARSH WILLOUGHBY CELL: [phone]  DR. MARTINEZ CELL: [phone]    RENAL FOLLOW UP NOTE-- Date of Service ;; 07-25-21 @ 19:44  --------------------------------------------------------------------------------  HPI:  Pt seen and examined at bedside  Denies SOB, chest pain.         PAST HISTORY  --------------------------------------------------------------------------------  No significant changes to PMH, PSH, FHx, SHx, unless otherwise noted    ALLERGIES & MEDICATIONS  --------------------------------------------------------------------------------  Allergies    chocolate (Pruritus)  flour (Rash)  No Known Drug Allergies  Nuts (Rash)  Soy (Unknown)    Intolerances      Standing Inpatient Medications  amLODIPine   Tablet 10 milliGRAM(s) Oral daily  atorvastatin 10 milliGRAM(s) Oral at bedtime  budesonide  80 MICROgram(s)/formoterol 4.5 MICROgram(s) Inhaler 2 Puff(s) Inhalation two times a day  cholecalciferol 1000 Unit(s) Oral daily  cyanocobalamin 1000 MICROGram(s) Oral daily  enoxaparin Injectable 40 milliGRAM(s) SubCutaneous daily  finasteride 5 milliGRAM(s) Oral daily  fluticasone propionate 50 MICROgram(s)/spray Nasal Spray 1 Spray(s) Both Nostrils two times a day  furosemide    Tablet 20 milliGRAM(s) Oral daily  metoprolol succinate ER 25 milliGRAM(s) Oral daily  multivitamin 1 Tablet(s) Oral daily  pantoprazole    Tablet 40 milliGRAM(s) Oral before breakfast  polyethylene glycol 3350 17 Gram(s) Oral daily  senna 2 Tablet(s) Oral at bedtime  sodium chloride 1 Gram(s) Oral three times a day  tamsulosin 0.4 milliGRAM(s) Oral at bedtime    PRN Inpatient Medications  acetaminophen   Tablet .. 650 milliGRAM(s) Oral every 6 hours PRN  ALBUTerol    0.083% 2.5 milliGRAM(s) Nebulizer every 6 hours PRN  aluminum hydroxide/magnesium hydroxide/simethicone Suspension 30 milliLiter(s) Oral every 4 hours PRN  melatonin 3 milliGRAM(s) Oral at bedtime PRN  ondansetron Injectable 4 milliGRAM(s) IV Push every 8 hours PRN  simethicone 80 milliGRAM(s) Chew two times a day PRN      REVIEW OF SYSTEMS  --------------------------------------------------------------------------------  General: no fever  CVS: no chest pain  RESP: no sob, no cough  ABD: no abdominal pain  : no dysuria,  MSK: no edema     VITALS/PHYSICAL EXAM  --------------------------------------------------------------------------------  T(C): 37.1 (07-25-21 @ 17:21), Max: 37.1 (07-25-21 @ 17:21)  HR: 80 (07-25-21 @ 17:21) (79 - 91)  BP: 141/54 (07-25-21 @ 17:21) (127/51 - 152/63)  RR: 18 (07-25-21 @ 17:21) (16 - 18)  SpO2: 98% (07-25-21 @ 17:21) (98% - 100%)  Wt(kg): --  Height (cm): 165.1 (07-24-21 @ 02:56)      07-24-21 @ 07:01  -  07-25-21 @ 07:00  --------------------------------------------------------  IN: 480 mL / OUT: 0 mL / NET: 480 mL    07-25-21 @ 07:01  -  07-25-21 @ 19:44  --------------------------------------------------------  IN: 0 mL / OUT: 1250 mL / NET: -1250 mL      Physical Exam:  	Gen: NAD  	HEENT: MMM  	Pulm: CTA B/L  	CV: S1S2  	Abd: Soft, +BS  	Ext: No LE edema B/L                      Neuro: Awake , alert  	Skin: Warm and Dry   	Vascular access:               LABS/STUDIES  --------------------------------------------------------------------------------              9.5    7.38  >-----------<  376      [07-25-21 @ 07:58]              27.9     130  |  92  |  18  ----------------------------<  117      [07-25-21 @ 17:12]  4.3   |  24  |  1.45        Ca     9.8     [07-25-21 @ 17:12]      Mg     2.20     [07-25-21 @ 17:12]      Phos  3.1     [07-25-21 @ 17:12]    TPro  7.9  /  Alb  4.2  /  TBili  0.4  /  DBili  x   /  AST  25  /  ALT  11  /  AlkPhos  140  [07-24-21 @ 04:33]        Serum Osmolality 261      [07-24-21 @ 04:46]    Creatinine Trend:  SCr 1.45 [07-25 @ 17:12]  SCr 1.26 [07-25 @ 07:58]  SCr 1.12 [07-24 @ 19:15]  SCr 1.20 [07-24 @ 10:45]  SCr 1.29 [07-24 @ 04:33]    Urinalysis - [07-24-21 @ 11:43]      Color Light Red / Appearance Slightly Turbid / SG 1.007 / pH 8.0      Gluc Negative / Ketone Negative  / Bili Negative / Urobili <2 mg/dL       Blood Large / Protein 30 mg/dL / Leuk Est Negative / Nitrite Negative      RBC >720 / WBC 4 / Hyaline  / Gran  / Sq Epi  / Non Sq Epi 0 / Bacteria Negative    Urine Osmolality 188      [07-24-21 @ 07:54]    Ferritin 469      [01-13-21 @ 07:27]  Vitamin D (25OH) 26.2      [01-05-21 @ 11:00]  HbA1c 6.3      [09-13-17 @ 08:15]       [facility]   MD NOLAN JULIO MD RUORU WONG, PA    TEL:  OFFICE: [phone]  DR POST CELL: [phone]  HARSH WILLOUGHBY CELL: [phone]  DR. MARTINEZ CELL: [phone]    RENAL FOLLOW UP NOTE-- Date of Service ;; 07-25-21 @ 19:44  --------------------------------------------------------------------------------  HPI:  Pt seen and examined at bedside  Denies SOB, chest pain.         PAST HISTORY  --------------------------------------------------------------------------------  No significant changes to PMH, PSH, FHx, SHx, unless otherwise noted    ALLERGIES & MEDICATIONS  --------------------------------------------------------------------------------  Allergies    chocolate (Pruritus)  flour (Rash)  No Known Drug Allergies  Nuts (Rash)  Soy (Unknown)    Intolerances      Standing Inpatient Medications  amLODIPine   Tablet 10 milliGRAM(s) Oral daily  atorvastatin 10 milliGRAM(s) Oral at bedtime  budesonide  80 MICROgram(s)/formoterol 4.5 MICROgram(s) Inhaler 2 Puff(s) Inhalation two times a day  cholecalciferol 1000 Unit(s) Oral daily  cyanocobalamin 1000 MICROGram(s) Oral daily  enoxaparin Injectable 40 milliGRAM(s) SubCutaneous daily  finasteride 5 milliGRAM(s) Oral daily  fluticasone propionate 50 MICROgram(s)/spray Nasal Spray 1 Spray(s) Both Nostrils two times a day  furosemide    Tablet 20 milliGRAM(s) Oral daily  metoprolol succinate ER 25 milliGRAM(s) Oral daily  multivitamin 1 Tablet(s) Oral daily  pantoprazole    Tablet 40 milliGRAM(s) Oral before breakfast  polyethylene glycol 3350 17 Gram(s) Oral daily  senna 2 Tablet(s) Oral at bedtime  sodium chloride 1 Gram(s) Oral three times a day  tamsulosin 0.4 milliGRAM(s) Oral at bedtime    PRN Inpatient Medications  acetaminophen   Tablet .. 650 milliGRAM(s) Oral every 6 hours PRN  ALBUTerol    0.083% 2.5 milliGRAM(s) Nebulizer every 6 hours PRN  aluminum hydroxide/magnesium hydroxide/simethicone Suspension 30 milliLiter(s) Oral every 4 hours PRN  melatonin 3 milliGRAM(s) Oral at bedtime PRN  ondansetron Injectable 4 milliGRAM(s) IV Push every 8 hours PRN  simethicone 80 milliGRAM(s) Chew two times a day PRN      REVIEW OF SYSTEMS  --------------------------------------------------------------------------------  General: no fever  CVS: no chest pain  RESP: no sob, no cough  ABD: no abdominal pain  : no dysuria,  MSK: no edema     VITALS/PHYSICAL EXAM  --------------------------------------------------------------------------------  T(C): 37.1 (07-25-21 @ 17:21), Max: 37.1 (07-25-21 @ 17:21)  HR: 80 (07-25-21 @ 17:21) (79 - 91)  BP: 141/54 (07-25-21 @ 17:21) (127/51 - 152/63)  RR: 18 (07-25-21 @ 17:21) (16 - 18)  SpO2: 98% (07-25-21 @ 17:21) (98% - 100%)  Wt(kg): --  Height (cm): 165.1 (07-24-21 @ 02:56)      07-24-21 @ 07:01  -  07-25-21 @ 07:00  --------------------------------------------------------  IN: 480 mL / OUT: 0 mL / NET: 480 mL    07-25-21 @ 07:01  -  07-25-21 @ 19:44  --------------------------------------------------------  IN: 0 mL / OUT: 1250 mL / NET: -1250 mL      Physical Exam:  	Gen: NAD  	HEENT: MMM  	Pulm: CTA B/L  	CV: S1S2  	Abd: Soft, +BS  	Ext: No LE edema B/L                      Neuro: Awake , alert  	Skin: Warm and Dry   	  LABS/STUDIES  --------------------------------------------------------------------------------              9.5    7.38  >-----------<  376      [07-25-21 @ 07:58]              27.9     130  |  92  |  18  ----------------------------<  117      [07-25-21 @ 17:12]  4.3   |  24  |  1.45        Ca     9.8     [07-25-21 @ 17:12]      Mg     2.20     [07-25-21 @ 17:12]      Phos  3.1     [07-25-21 @ 17:12]    TPro  7.9  /  Alb  4.2  /  TBili  0.4  /  DBili  x   /  AST  25  /  ALT  11  /  AlkPhos  140  [07-24-21 @ 04:33]        Serum Osmolality 261      [07-24-21 @ 04:46]    Creatinine Trend:  SCr 1.45 [07-25 @ 17:12]  SCr 1.26 [07-25 @ 07:58]  SCr 1.12 [07-24 @ 19:15]  SCr 1.20 [07-24 @ 10:45]  SCr 1.29 [07-24 @ 04:33]    Urinalysis - [07-24-21 @ 11:43]      Color Light Red / Appearance Slightly Turbid / SG 1.007 / pH 8.0      Gluc Negative / Ketone Negative  / Bili Negative / Urobili <2 mg/dL       Blood Large / Protein 30 mg/dL / Leuk Est Negative / Nitrite Negative      RBC >720 / WBC 4 / Hyaline  / Gran  / Sq Epi  / Non Sq Epi 0 / Bacteria Negative    Urine Osmolality 188      [07-24-21 @ 07:54]    Ferritin 469      [01-13-21 @ 07:27]  Vitamin D (25OH) 26.2      [01-05-21 @ 11:00]  HbA1c 6.3      [09-13-17 @ 08:15]

## 2021-07-26 LAB
ANION GAP SERPL CALC-SCNC: 14 MMOL/L — SIGNIFICANT CHANGE UP (ref 7–14)
BUN SERPL-MCNC: 16 MG/DL — SIGNIFICANT CHANGE UP (ref 7–23)
CALCIUM SERPL-MCNC: 9.7 MG/DL — SIGNIFICANT CHANGE UP (ref 8.4–10.5)
CHLORIDE SERPL-SCNC: 93 MMOL/L — LOW (ref 98–107)
CO2 SERPL-SCNC: 24 MMOL/L — SIGNIFICANT CHANGE UP (ref 22–31)
CREAT SERPL-MCNC: 1.36 MG/DL — HIGH (ref 0.5–1.3)
GLUCOSE SERPL-MCNC: 106 MG/DL — HIGH (ref 70–99)
HCT VFR BLD CALC: 27.5 % — LOW (ref 39–50)
HGB BLD-MCNC: 9.4 G/DL — LOW (ref 13–17)
MAGNESIUM SERPL-MCNC: 2.1 MG/DL — SIGNIFICANT CHANGE UP (ref 1.6–2.6)
MCHC RBC-ENTMCNC: 29.6 PG — SIGNIFICANT CHANGE UP (ref 27–34)
MCHC RBC-ENTMCNC: 34.2 GM/DL — SIGNIFICANT CHANGE UP (ref 32–36)
MCV RBC AUTO: 86.5 FL — SIGNIFICANT CHANGE UP (ref 80–100)
NRBC # BLD: 0 /100 WBCS — SIGNIFICANT CHANGE UP
NRBC # FLD: 0 K/UL — SIGNIFICANT CHANGE UP
PHOSPHATE SERPL-MCNC: 3.3 MG/DL — SIGNIFICANT CHANGE UP (ref 2.5–4.5)
PLATELET # BLD AUTO: 393 K/UL — SIGNIFICANT CHANGE UP (ref 150–400)
POTASSIUM SERPL-MCNC: 3.7 MMOL/L — SIGNIFICANT CHANGE UP (ref 3.5–5.3)
POTASSIUM SERPL-SCNC: 3.7 MMOL/L — SIGNIFICANT CHANGE UP (ref 3.5–5.3)
RBC # BLD: 3.18 M/UL — LOW (ref 4.2–5.8)
RBC # FLD: 13.4 % — SIGNIFICANT CHANGE UP (ref 10.3–14.5)
SODIUM SERPL-SCNC: 131 MMOL/L — LOW (ref 135–145)
WBC # BLD: 8.47 K/UL — SIGNIFICANT CHANGE UP (ref 3.8–10.5)
WBC # FLD AUTO: 8.47 K/UL — SIGNIFICANT CHANGE UP (ref 3.8–10.5)

## 2021-07-26 PROCEDURE — 99221 1ST HOSP IP/OBS SF/LOW 40: CPT

## 2021-07-26 RX ORDER — OXYCODONE AND ACETAMINOPHEN 5; 325 MG/1; MG/1
1 TABLET ORAL ONCE
Refills: 0 | Status: DISCONTINUED | OUTPATIENT
Start: 2021-07-26 | End: 2021-07-26

## 2021-07-26 RX ADMIN — OXYCODONE AND ACETAMINOPHEN 1 TABLET(S): 5; 325 TABLET ORAL at 19:36

## 2021-07-26 RX ADMIN — Medication 20 MILLIGRAM(S): at 05:47

## 2021-07-26 RX ADMIN — OXYCODONE AND ACETAMINOPHEN 1 TABLET(S): 5; 325 TABLET ORAL at 20:28

## 2021-07-26 NOTE — CONSULT NOTE ADULT - ASSESSMENT
Disease: urothelial cancer, Stage: cII   prior Left renal pelvis HG urothelial, pT1N0 (15 nodes), November 2019, Left radical nephrectomy   Started gemcitabine on 09/15/2020, on chemoRT. Completed RT 10/9/20, 20 fractions.      75m with  urothelial cancer, Stage cII, started gemcitabine on 09/15/2020, on chemoRT. Completed RT 10/9/20, 20 fractions, prior Left renal pelvis HG urothelial, pT1N0 (15 nodes), November 2019, Left radical nephrectomy, presenting with abdominal pain and blood in urine.   CT scan with Ill-defined hyperattenuating lesion along the left bladder wall, indeterminate on the current examination, neoplasm not excluded. Recommend urological consultation with consideration to cystoscopy.  Multiple new lytic osseous lesions, highly concerning for metastases.  Several additional subcentimeter solid nodules in the bilateral lung bases, indeterminate, metastases cannot be excluded.    -CT chest to eval lung nodules and complete staging  -Bone scan  -Urology consult, pt follows with Dr Moya. Please consider cystoscopy and biopsy.   -Bowel regimen  -Supportive care, pain control, Nutrition, PT, DVT ppx  -Outpatient oncology f/u    Will follow. Please do not hesitate to call back with questions.     Mariann Collins MD  Medical Oncology Attending  C: [phone]

## 2021-07-26 NOTE — CONSULT NOTE ADULT - SUBJECTIVE AND OBJECTIVE BOX
Patient is a 75y old  Male who presents with a chief complaint of Chest pain (25 Jul 2021 19:44)      HPI:  [facility] services used     75 y.o. M w/ a hx of HTN, HLD, asthma, SIADH, L RCC s/p nephrectomy, bladder cancer s/p nephroureterectomy who presents with burning epigastric pain, chest pain and constipation. Patient was in his USOH until 2 weeks ago when he noted constipation- was only able to pass very small amounts of stool infrequently. He took Miralax the day prior to admission with some improvement. Patient also noted decreased flatus. One week ago, patient developed generalized abdominal pain, burning 5/10 in intensity along with abdominal distension which is now improving. Also notes sharp generalized chest pain 5-8/10 in intensity. CP was worse with exertion and radiated to his left and right side, did not travel down his arm. No associated diaphoresis. N/V. Patient has had decreased PO intake- only taking in about 50% of usual PO. Endorses chills, denies fevers, sick contacts. Patient notes having a recent cystoscopy which noted a bladder lesion and has a surgery on the 3rd. Patient has been drinking more fluid over the last week, takes 1g NaCl 1-2 times a day. Patient notes some intermittent incomplete bladder emptying.   In the ED, patient received Morphine 4 and IL NS.  (24 Jul 2021 14:25)       Oncologic History:      ROS: as above     PAST MEDICAL & SURGICAL HISTORY:  Hypercholesterolemia    Anxiety    HTN (Hypertension)    Essential hypertension    History of BPH    History of SIADH    Urinary tract infection with hematuria    Gross hematuria    Left renal mass    Asthma  denies recent asthma exacerbation    Hyponatremia  admission x 2 last 2017    Cancer of kidney    History of stomach ulcers  denies recent endoscopy    Bladder tumor    S/P cystoscopy  Insertion left ureteral stent ; biopsy 8/19    History of prostate surgery  ? exact procedure 3/19    H/O left nephrectomy        SOCIAL HISTORY:    FAMILY HISTORY:  Family history of stroke    FH: lung cancer  brother        MEDICATIONS  (STANDING):  amLODIPine   Tablet 10 milliGRAM(s) Oral daily  atorvastatin 10 milliGRAM(s) Oral at bedtime  budesonide  80 MICROgram(s)/formoterol 4.5 MICROgram(s) Inhaler 2 Puff(s) Inhalation two times a day  cholecalciferol 1000 Unit(s) Oral daily  cyanocobalamin 1000 MICROGram(s) Oral daily  enoxaparin Injectable 40 milliGRAM(s) SubCutaneous daily  finasteride 5 milliGRAM(s) Oral daily  fluticasone propionate 50 MICROgram(s)/spray Nasal Spray 1 Spray(s) Both Nostrils two times a day  furosemide    Tablet 20 milliGRAM(s) Oral daily  metoprolol succinate ER 25 milliGRAM(s) Oral daily  multivitamin 1 Tablet(s) Oral daily  pantoprazole    Tablet 40 milliGRAM(s) Oral before breakfast  polyethylene glycol 3350 17 Gram(s) Oral daily  senna 2 Tablet(s) Oral at bedtime  sodium chloride 1 Gram(s) Oral three times a day  tamsulosin 0.4 milliGRAM(s) Oral at bedtime    MEDICATIONS  (PRN):  acetaminophen   Tablet .. 650 milliGRAM(s) Oral every 6 hours PRN Temp greater or equal to 38.5C (101.3F), Mild Pain (1 - 3)  ALBUTerol    0.083% 2.5 milliGRAM(s) Nebulizer every 6 hours PRN Shortness of Breath and/or Wheezing  aluminum hydroxide/magnesium hydroxide/simethicone Suspension 30 milliLiter(s) Oral every 4 hours PRN Dyspepsia  melatonin 3 milliGRAM(s) Oral at bedtime PRN Insomnia  ondansetron Injectable 4 milliGRAM(s) IV Push every 8 hours PRN Nausea and/or Vomiting  simethicone 80 milliGRAM(s) Chew two times a day PRN Gas      Allergies    chocolate (Pruritus)  flour (Rash)  No Known Drug Allergies  Nuts (Rash)  Soy (Unknown)    Intolerances        Vital Signs Last 24 Hrs  T(C): 36.6 (26 Jul 2021 08:42), Max: 37.1 (25 Jul 2021 17:21)  T(F): 97.8 (26 Jul 2021 08:42), Max: 98.7 (25 Jul 2021 17:21)  HR: 83 (26 Jul 2021 08:42) (79 - 83)  BP: 129/50 (26 Jul 2021 08:42) (129/50 - 153/63)  BP(mean): --  RR: 20 (26 Jul 2021 08:42) (18 - 20)  SpO2: 98% (26 Jul 2021 08:42) (97% - 100%)    PHYSICAL EXAM  General: adult in NAD  HEENT: clear oropharynx, anicteric sclera, pink conjunctiva  Neck: supple  CV: normal S1/S2 with no murmur rubs or gallops  Lungs: positive air movement b/l ant lungs, clear to auscultation, no wheezes, no rales  Abdomen: soft non-tender non-distended, no hepatosplenomegaly  Ext: no clubbing cyanosis or edema  Skin: no rashes and no petechiae  Neuro: alert and oriented X 3, none focal    LABS:                          9.4    8.47  )-----------( 393      ( 26 Jul 2021 07:08 )             27.5         Mean Cell Volume : 86.5 fL  Mean Cell Hemoglobin : 29.6 pg  Mean Cell Hemoglobin Concentration : 34.2 gm/dL  Auto Neutrophil # : x  Auto Lymphocyte # : x  Auto Monocyte # : x  Auto Eosinophil # : x  Auto Basophil # : x  Auto Neutrophil % : x  Auto Lymphocyte % : x  Auto Monocyte % : x  Auto Eosinophil % : x  Auto Basophil % : x      Serial CBC's  07-26 @ 07:08  Hct-27.5 / Hgb-9.4 / Plat-393 / RBC-3.18 / WBC-8.47  Serial CBC's  07-25 @ 07:58  Hct-27.9 / Hgb-9.5 / Plat-376 / RBC-3.23 / WBC-7.38  Serial CBC's  07-24 @ 04:33  Hct-28.4 / Hgb-9.7 / Plat-388 / RBC-3.28 / WBC-9.31      07-26    131<L>  |  93<L>  |  16  ----------------------------<  106<H>  3.7   |  24  |  1.36<H>    Ca    9.7      26 Jul 2021 07:08  Phos  3.3     07-26  Mg     2.10     07-26                        RADIOLOGY & ADDITIONAL STUDIES:     Patient is a 75y old  Male who presents with a chief complaint of Chest pain (25 Jul 2021 19:44)      HPI:  [facility] used     75 y.o. M w/ a hx of HTN, HLD, asthma, SIADH, L RCC s/p nephrectomy, bladder cancer s/p nephroureterectomy who presents with burning epigastric pain, chest pain and constipation. Patient was in his USOH until 2 weeks ago when he noted constipation- was only able to pass very small amounts of stool infrequently. He took Miralax the day prior to admission with some improvement. Patient also noted decreased flatus. One week ago, patient developed generalized abdominal pain, burning 5/10 in intensity along with abdominal distension which is now improving. Also notes sharp generalized chest pain 5-8/10 in intensity. CP was worse with exertion and radiated to his left and right side, did not travel down his arm. No associated diaphoresis. N/V. Patient has had decreased PO intake- only taking in about 50% of usual PO. Endorses chills, denies fevers, sick contacts. Patient notes having a recent cystoscopy which noted a bladder lesion and has a surgery on the 3rd. Patient has been drinking more fluid over the last week, takes 1g NaCl 1-2 times a day. Patient notes some intermittent incomplete bladder emptying.   In the ED, patient received Morphine 4 and IL NS.  (24 Jul 2021 14:25)    ROS: as above     PAST MEDICAL & SURGICAL HISTORY:  Hypercholesterolemia    Anxiety    HTN (Hypertension)    Essential hypertension    History of BPH    History of SIADH    Urinary tract infection with hematuria    Gross hematuria    Left renal mass    Asthma  denies recent asthma exacerbation    Hyponatremia  admission x 2 last 2017    Cancer of kidney    History of stomach ulcers  denies recent endoscopy    Bladder tumor    S/P cystoscopy  Insertion left ureteral stent ; biopsy 8/19    History of prostate surgery  ? exact procedure 3/19    H/O left nephrectomy        SOCIAL HISTORY:    FAMILY HISTORY:  Family history of stroke    FH: lung cancer  brother        MEDICATIONS  (STANDING):  amLODIPine   Tablet 10 milliGRAM(s) Oral daily  atorvastatin 10 milliGRAM(s) Oral at bedtime  budesonide  80 MICROgram(s)/formoterol 4.5 MICROgram(s) Inhaler 2 Puff(s) Inhalation two times a day  cholecalciferol 1000 Unit(s) Oral daily  cyanocobalamin 1000 MICROGram(s) Oral daily  enoxaparin Injectable 40 milliGRAM(s) SubCutaneous daily  finasteride 5 milliGRAM(s) Oral daily  fluticasone propionate 50 MICROgram(s)/spray Nasal Spray 1 Spray(s) Both Nostrils two times a day  furosemide    Tablet 20 milliGRAM(s) Oral daily  metoprolol succinate ER 25 milliGRAM(s) Oral daily  multivitamin 1 Tablet(s) Oral daily  pantoprazole    Tablet 40 milliGRAM(s) Oral before breakfast  polyethylene glycol 3350 17 Gram(s) Oral daily  senna 2 Tablet(s) Oral at bedtime  sodium chloride 1 Gram(s) Oral three times a day  tamsulosin 0.4 milliGRAM(s) Oral at bedtime    MEDICATIONS  (PRN):  acetaminophen   Tablet .. 650 milliGRAM(s) Oral every 6 hours PRN Temp greater or equal to 38.5C (101.3F), Mild Pain (1 - 3)  ALBUTerol    0.083% 2.5 milliGRAM(s) Nebulizer every 6 hours PRN Shortness of Breath and/or Wheezing  aluminum hydroxide/magnesium hydroxide/simethicone Suspension 30 milliLiter(s) Oral every 4 hours PRN Dyspepsia  melatonin 3 milliGRAM(s) Oral at bedtime PRN Insomnia  ondansetron Injectable 4 milliGRAM(s) IV Push every 8 hours PRN Nausea and/or Vomiting  simethicone 80 milliGRAM(s) Chew two times a day PRN Gas      Allergies    chocolate (Pruritus)  flour (Rash)  No Known Drug Allergies  Nuts (Rash)  Soy (Unknown)    Intolerances        Vital Signs Last 24 Hrs  T(C): 36.6 (26 Jul 2021 08:42), Max: 37.1 (25 Jul 2021 17:21)  T(F): 97.8 (26 Jul 2021 08:42), Max: 98.7 (25 Jul 2021 17:21)  HR: 83 (26 Jul 2021 08:42) (79 - 83)  BP: 129/50 (26 Jul 2021 08:42) (129/50 - 153/63)  BP(mean): --  RR: 20 (26 Jul 2021 08:42) (18 - 20)  SpO2: 98% (26 Jul 2021 08:42) (97% - 100%)    PHYSICAL EXAM  General: adult in NAD  HEENT: clear oropharynx, anicteric sclera, pink conjunctiva  Neck: supple  CV: normal S1/S2 with no murmur rubs or gallops  Lungs: positive air movement b/l ant lungs, clear to auscultation, no wheezes, no rales  Abdomen: soft non-tender non-distended, no hepatosplenomegaly  Ext: no clubbing cyanosis or edema  Skin: no rashes and no petechiae  Neuro: alert and oriented X 3, none focal    LABS:                          9.4    8.47  )-----------( 393      ( 26 Jul 2021 07:08 )             27.5         Mean Cell Volume : 86.5 fL  Mean Cell Hemoglobin : 29.6 pg  Mean Cell Hemoglobin Concentration : 34.2 gm/dL  Auto Neutrophil # : x  Auto Lymphocyte # : x  Auto Monocyte # : x  Auto Eosinophil # : x  Auto Basophil # : x  Auto Neutrophil % : x  Auto Lymphocyte % : x  Auto Monocyte % : x  Auto Eosinophil % : x  Auto Basophil % : x      Serial CBC's  07-26 @ 07:08  Hct-27.5 / Hgb-9.4 / Plat-393 / RBC-3.18 / WBC-8.47  Serial CBC's  07-25 @ 07:58  Hct-27.9 / Hgb-9.5 / Plat-376 / RBC-3.23 / WBC-7.38  Serial CBC's  07-24 @ 04:33  Hct-28.4 / Hgb-9.7 / Plat-388 / RBC-3.28 / WBC-9.31      07-26    131<L>  |  93<L>  |  16  ----------------------------<  106<H>  3.7   |  24  |  1.36<H>    Ca    9.7      26 Jul 2021 07:08  Phos  3.3     07-26  Mg     2.10     07-26            RADIOLOGY & ADDITIONAL STUDIES:    CT scan with Ill-defined hyperattenuating lesion along the left bladder wall, indeterminate on the current examination, neoplasm not excluded. Recommend urological consultation with consideration to cystoscopy.  Multiple new lytic osseous lesions, highly concerning for metastases.  Several additional subcentimeter solid nodules in the bilateral lung bases, indeterminate, metastases cannot be excluded.

## 2021-07-26 NOTE — PROVIDER CONTACT NOTE (OTHER) - SITUATION
Pt complaining of intermittent 3 to 5 out of 10 right ear and right jaw pain. Tylenol given as per patient request.
Pt complaining of 5/10 right ear and right jaw pain. Pt requesting something stronger than tylenol.

## 2021-07-26 NOTE — CHART NOTE - NSCHARTNOTEFT_GEN_A_CORE
75 year old male s/p laparoscopic left nephroureterectomy with periaortic lymph node dissection 10/28/2019  Path: High-grade TCC, pT1N0, negative margins.     Had bladder recurrence, s/p TURBT 6/15/2020  Path: HG TCC pT2  Completed radiation/chemotherapy October 2020   No postop complications.    Surveillance cystoscopy 3/22/2021: No evidence of bladder recurrence.    Surveillance cystoscopy 6/24/21: papillary bladder tumor/ erythema     Patient was scheduled to have repeat cysto and TURBT on Aug 5th.     Patient presented to [facility] with epigastric pain. Had CT done 7/24/21 demonstrates new lytic osseous lesions and bilateral lung nodules. Left a message with Dr. Moya to explain new imaging findings. Will follow up early next week regarding patient eligibility for surgery.

## 2021-07-26 NOTE — PROGRESS NOTE ADULT - SUBJECTIVE AND OBJECTIVE BOX
[facility]   MD LEONELA JULIO DO ANAM SIDDIQUI ANGELA WONG, PA    TEL:  OFFICE: [phone]  DR POST CELL: [phone]  DR. RAMOS CELL: [phone]  DR. MARTINEZ CELL: [phone]  LESVIA WILLOUGHBY CELL: [phone]    From 5pm-7am Answering Service [phone]    -- RENAL FOLLOW UP NOTE ---Date of Service 07-26-21 @ 12:29    Patient is a 75y old  Male who presents with a chief complaint of Chest pain (26 Jul 2021 11:28)      Patient seen and examined at bedside. No chest pain/sob    VITALS:  T(F): 97.8 (07-26-21 @ 08:42), Max: 98.7 (07-25-21 @ 17:21)  HR: 83 (07-26-21 @ 08:42)  BP: 129/50 (07-26-21 @ 08:42)  RR: 20 (07-26-21 @ 08:42)  SpO2: 98% (07-26-21 @ 08:42)  Wt(kg): --    07-25 @ 07:01  -  07-26 @ 07:00  --------------------------------------------------------  IN: 75 mL / OUT: 2450 mL / NET: -2375 mL    07-26 @ 07:01  -  07-26 @ 12:29  --------------------------------------------------------  IN: 240 mL / OUT: 300 mL / NET: -60 mL          PHYSICAL EXAM:  Constitutional: NAD  Neck: No JVD  Respiratory: CTAB, no wheezes, rales or rhonchi  Cardiovascular: S1, S2, RRR  Gastrointestinal: BS+, soft, NT/ND  Extremities: No peripheral edema    Hospital Medications:   MEDICATIONS  (STANDING):  amLODIPine   Tablet 10 milliGRAM(s) Oral daily  atorvastatin 10 milliGRAM(s) Oral at bedtime  budesonide  80 MICROgram(s)/formoterol 4.5 MICROgram(s) Inhaler 2 Puff(s) Inhalation two times a day  cholecalciferol 1000 Unit(s) Oral daily  cyanocobalamin 1000 MICROGram(s) Oral daily  enoxaparin Injectable 40 milliGRAM(s) SubCutaneous daily  finasteride 5 milliGRAM(s) Oral daily  fluticasone propionate 50 MICROgram(s)/spray Nasal Spray 1 Spray(s) Both Nostrils two times a day  furosemide    Tablet 20 milliGRAM(s) Oral daily  metoprolol succinate ER 25 milliGRAM(s) Oral daily  multivitamin 1 Tablet(s) Oral daily  pantoprazole    Tablet 40 milliGRAM(s) Oral before breakfast  polyethylene glycol 3350 17 Gram(s) Oral daily  senna 2 Tablet(s) Oral at bedtime  sodium chloride 1 Gram(s) Oral three times a day  tamsulosin 0.4 milliGRAM(s) Oral at bedtime      LABS:  07-26    131<L>  |  93<L>  |  16  ----------------------------<  106<H>  3.7   |  24  |  1.36<H>    Ca    9.7      26 Jul 2021 07:08  Phos  3.3     07-26  Mg     2.10     07-26      Creatinine Trend: 1.36 <--, 1.45 <--, 1.26 <--, 1.12 <--, 1.20 <--, 1.29 <--    Phosphorus Level, Serum: 3.3 mg/dL (07-26 @ 07:08)  Phosphorus Level, Serum: 3.1 mg/dL (07-25 @ 17:12)                              9.4    8.47  )-----------( 393      ( 26 Jul 2021 07:08 )             27.5     Urine Studies:  Urinalysis - [07-24-21 @ 11:43]      Color Light Red / Appearance Slightly Turbid / SG 1.007 / pH 8.0      Gluc Negative / Ketone Negative  / Bili Negative / Urobili <2 mg/dL       Blood Large / Protein 30 mg/dL / Leuk Est Negative / Nitrite Negative      RBC >720 / WBC 4 / Hyaline  / Gran  / Sq Epi  / Non Sq Epi 0 / Bacteria Negative    Urine Osmolality 188      [07-24-21 @ 07:54]    Ferritin 469      [01-13-21 @ 07:27]  Vitamin D (25OH) 26.2      [01-05-21 @ 11:00]  HbA1c 6.3      [09-13-17 @ 08:15]        RADIOLOGY & ADDITIONAL STUDIES:

## 2021-07-26 NOTE — PROGRESS NOTE ADULT - SUBJECTIVE AND OBJECTIVE BOX
Name of Patient : MD QURESHI  MRN: [id number]  DATE OF SERVICE: 07-26-21     Subjective: Patient seen and examined. No new events except as noted.   doing okay  epigastric pain  urology eval     REVIEW OF SYSTEMS:    CONSTITUTIONAL: No weakness, fevers or chills  EYES/ENT: No visual changes;  No vertigo or throat pain   NECK: No pain or stiffness  RESPIRATORY: No cough, wheezing, hemoptysis; No shortness of breath  CARDIOVASCULAR: No chest pain or palpitations  GASTROINTESTINAL: No abdominal or epigastric pain. No nausea, vomiting, or hematemesis; No diarrhea or constipation. No melena or hematochezia.  GENITOURINARY: No dysuria, frequency or hematuria  NEUROLOGICAL: No numbness or weakness  SKIN: No itching, burning, rashes, or lesions   All other review of systems is negative unless indicated above.    MEDICATIONS:  MEDICATIONS  (STANDING):  amLODIPine   Tablet 10 milliGRAM(s) Oral daily  atorvastatin 10 milliGRAM(s) Oral at bedtime  budesonide  80 MICROgram(s)/formoterol 4.5 MICROgram(s) Inhaler 2 Puff(s) Inhalation two times a day  cholecalciferol 1000 Unit(s) Oral daily  cyanocobalamin 1000 MICROGram(s) Oral daily  enoxaparin Injectable 40 milliGRAM(s) SubCutaneous daily  finasteride 5 milliGRAM(s) Oral daily  fluticasone propionate 50 MICROgram(s)/spray Nasal Spray 1 Spray(s) Both Nostrils two times a day  furosemide    Tablet 20 milliGRAM(s) Oral daily  metoprolol succinate ER 25 milliGRAM(s) Oral daily  multivitamin 1 Tablet(s) Oral daily  pantoprazole    Tablet 40 milliGRAM(s) Oral before breakfast  polyethylene glycol 3350 17 Gram(s) Oral daily  senna 2 Tablet(s) Oral at bedtime  sodium chloride 1 Gram(s) Oral three times a day  tamsulosin 0.4 milliGRAM(s) Oral at bedtime      PHYSICAL EXAM:  T(C): 36.8 (07-26-21 @ 17:26), Max: 36.9 (07-25-21 @ 21:12)  HR: 86 (07-26-21 @ 17:26) (79 - 86)  BP: 157/62 (07-26-21 @ 17:26) (129/50 - 157/62)  RR: 19 (07-26-21 @ 17:26) (18 - 20)  SpO2: 98% (07-26-21 @ 17:26) (97% - 100%)  Wt(kg): --  I&O's Summary    25 Jul 2021 07:01  -  26 Jul 2021 07:00  --------------------------------------------------------  IN: 75 mL / OUT: 2450 mL / NET: -2375 mL    26 Jul 2021 07:01  -  26 Jul 2021 17:29  --------------------------------------------------------  IN: 360 mL / OUT: 800 mL / NET: -440 mL          Appearance: Normal	  HEENT:  PERRLA   Lymphatic: No lymphadenopathy   Cardiovascular: Normal S1 S2, no JVD  Respiratory: normal effort , clear  Gastrointestinal:  Soft, Non-tender  Skin: No rashes,  warm to touch  Psychiatry:  Mood & affect appropriate  Musculuskeletal: No edema      All labs, Imaging and EKGs personally reviewed       07-25-21 @ 07:01  -  07-26-21 @ 07:00  --------------------------------------------------------  IN: 75 mL / OUT: 2450 mL / NET: -2375 mL    07-26-21 @ 07:01  -  07-26-21 @ 17:29  --------------------------------------------------------  IN: 360 mL / OUT: 800 mL / NET: -440 mL                          9.4    8.47  )-----------( 393      ( 26 Jul 2021 07:08 )             27.5               07-26    131<L>  |  93<L>  |  16  ----------------------------<  106<H>  3.7   |  24  |  1.36<H>    Ca    9.7      26 Jul 2021 07:08  Phos  3.3     07-26  Mg     2.10     07-26

## 2021-07-27 LAB
ANION GAP SERPL CALC-SCNC: 14 MMOL/L — SIGNIFICANT CHANGE UP (ref 7–14)
BUN SERPL-MCNC: 17 MG/DL — SIGNIFICANT CHANGE UP (ref 7–23)
CALCIUM SERPL-MCNC: 9.5 MG/DL — SIGNIFICANT CHANGE UP (ref 8.4–10.5)
CHLORIDE SERPL-SCNC: 95 MMOL/L — LOW (ref 98–107)
CO2 SERPL-SCNC: 24 MMOL/L — SIGNIFICANT CHANGE UP (ref 22–31)
CREAT SERPL-MCNC: 1.35 MG/DL — HIGH (ref 0.5–1.3)
GLUCOSE SERPL-MCNC: 105 MG/DL — HIGH (ref 70–99)
HCT VFR BLD CALC: 25.2 % — LOW (ref 39–50)
HGB BLD-MCNC: 8.6 G/DL — LOW (ref 13–17)
MAGNESIUM SERPL-MCNC: 2.2 MG/DL — SIGNIFICANT CHANGE UP (ref 1.6–2.6)
MCHC RBC-ENTMCNC: 29.5 PG — SIGNIFICANT CHANGE UP (ref 27–34)
MCHC RBC-ENTMCNC: 34.1 GM/DL — SIGNIFICANT CHANGE UP (ref 32–36)
MCV RBC AUTO: 86.3 FL — SIGNIFICANT CHANGE UP (ref 80–100)
NRBC # BLD: 0 /100 WBCS — SIGNIFICANT CHANGE UP
NRBC # FLD: 0 K/UL — SIGNIFICANT CHANGE UP
PHOSPHATE SERPL-MCNC: 3.6 MG/DL — SIGNIFICANT CHANGE UP (ref 2.5–4.5)
PLATELET # BLD AUTO: 391 K/UL — SIGNIFICANT CHANGE UP (ref 150–400)
POTASSIUM SERPL-MCNC: 3.6 MMOL/L — SIGNIFICANT CHANGE UP (ref 3.5–5.3)
POTASSIUM SERPL-SCNC: 3.6 MMOL/L — SIGNIFICANT CHANGE UP (ref 3.5–5.3)
RBC # BLD: 2.92 M/UL — LOW (ref 4.2–5.8)
RBC # FLD: 13.6 % — SIGNIFICANT CHANGE UP (ref 10.3–14.5)
SODIUM SERPL-SCNC: 133 MMOL/L — LOW (ref 135–145)
WBC # BLD: 8.33 K/UL — SIGNIFICANT CHANGE UP (ref 3.8–10.5)
WBC # FLD AUTO: 8.33 K/UL — SIGNIFICANT CHANGE UP (ref 3.8–10.5)

## 2021-07-27 PROCEDURE — 71260 CT THORAX DX C+: CPT | Mod: 26

## 2021-07-27 PROCEDURE — 78306 BONE IMAGING WHOLE BODY: CPT | Mod: 26

## 2021-07-27 RX ORDER — FUROSEMIDE 40 MG
20 TABLET ORAL DAILY
Refills: 0 | Status: DISCONTINUED | OUTPATIENT
Start: 2021-07-28 | End: 2021-08-19

## 2021-07-27 RX ORDER — OXYCODONE AND ACETAMINOPHEN 5; 325 MG/1; MG/1
1 TABLET ORAL ONCE
Refills: 0 | Status: DISCONTINUED | OUTPATIENT
Start: 2021-07-27 | End: 2021-07-27

## 2021-07-27 RX ADMIN — OXYCODONE AND ACETAMINOPHEN 1 TABLET(S): 5; 325 TABLET ORAL at 10:00

## 2021-07-27 RX ADMIN — OXYCODONE AND ACETAMINOPHEN 1 TABLET(S): 5; 325 TABLET ORAL at 09:09

## 2021-07-27 NOTE — CONSULT NOTE ADULT - SUBJECTIVE AND OBJECTIVE BOX
DATE OF SERVICE: 07-27-21    CHIEF COMPLAINT:Patient is a 75y old  Male who presents with a chief complaint of Chest pain (27 Jul 2021 18:24)      HISTORY OF PRESENT ILLNESS:HPI:  [facility] services used     75 y.o. M w/ a hx of HTN, HLD, asthma, SIADH, L RCC s/p nephrectomy, bladder cancer s/p nephroureterectomy who presents with burning epigastric pain, chest pain and constipation. Patient was in his USOH until 2 weeks ago when he noted constipation- was only able to pass very small amounts of stool infrequently. He took Miralax the day prior to admission with some improvement. Patient also noted decreased flatus. One week ago, patient developed generalized abdominal pain, burning 5/10 in intensity along with abdominal distension which is now improving. Also notes sharp generalized chest pain 5-8/10 in intensity. CP was worse with exertion and radiated to his left and right side, did not travel down his arm. No associated diaphoresis. N/V. Patient has had decreased PO intake- only taking in about 50% of usual PO. Endorses chills, denies fevers, sick contacts. Patient notes having a recent cystoscopy which noted a bladder lesion and has a surgery on the 3rd. Patient has been drinking more fluid over the last week, takes 1g NaCl 1-2 times a day. Patient notes some intermittent incomplete bladder emptying.   In the ED, patient received Morphine 4 and IL NS.  (24 Jul 2021 14:25)      PAST MEDICAL & SURGICAL HISTORY:  Hypercholesterolemia    Anxiety    HTN (Hypertension)    Essential hypertension    History of BPH    History of SIADH    Urinary tract infection with hematuria    Gross hematuria    Left renal mass    Asthma  denies recent asthma exacerbation    Hyponatremia  admission x 2 last 2017    Cancer of kidney    History of stomach ulcers  denies recent endoscopy    Bladder tumor    S/P cystoscopy  Insertion left ureteral stent ; biopsy 8/19    History of prostate surgery  ? exact procedure 3/19    H/O left nephrectomy            MEDICATIONS:  amLODIPine   Tablet 10 milliGRAM(s) Oral daily  enoxaparin Injectable 40 milliGRAM(s) SubCutaneous daily  metoprolol succinate ER 25 milliGRAM(s) Oral daily  tamsulosin 0.4 milliGRAM(s) Oral at bedtime      ALBUTerol    0.083% 2.5 milliGRAM(s) Nebulizer every 6 hours PRN  budesonide  80 MICROgram(s)/formoterol 4.5 MICROgram(s) Inhaler 2 Puff(s) Inhalation two times a day    acetaminophen   Tablet .. 650 milliGRAM(s) Oral every 6 hours PRN  melatonin 3 milliGRAM(s) Oral at bedtime PRN  ondansetron Injectable 4 milliGRAM(s) IV Push every 8 hours PRN    aluminum hydroxide/magnesium hydroxide/simethicone Suspension 30 milliLiter(s) Oral every 4 hours PRN  pantoprazole    Tablet 40 milliGRAM(s) Oral before breakfast  polyethylene glycol 3350 17 Gram(s) Oral daily  senna 2 Tablet(s) Oral at bedtime  simethicone 80 milliGRAM(s) Chew two times a day PRN    atorvastatin 10 milliGRAM(s) Oral at bedtime  finasteride 5 milliGRAM(s) Oral daily    cholecalciferol 1000 Unit(s) Oral daily  cyanocobalamin 1000 MICROGram(s) Oral daily  fluticasone propionate 50 MICROgram(s)/spray Nasal Spray 1 Spray(s) Both Nostrils two times a day  multivitamin 1 Tablet(s) Oral daily  sodium chloride 1 Gram(s) Oral three times a day      FAMILY HISTORY:  Family history of stroke    FH: lung cancer  brother        Non-contributory    SOCIAL HISTORY:    not a smoker    Allergies    chocolate (Pruritus)  flour (Rash)  No Known Drug Allergies  Nuts (Rash)  Soy (Unknown)    Intolerances    	    REVIEW OF SYSTEMS:  CONSTITUTIONAL: No fever  EYES: No eye pain, visual disturbances, or discharge  ENMT:  No difficulty hearing, tinnitus  NECK: No pain or stiffness  RESPIRATORY: No cough, wheezing,  CARDIOVASCULAR: No chest pain, palpitations, passing out, dizziness, or leg swelling  GASTROINTESTINAL:  No nausea, vomiting, diarrhea or constipation. No melena.  GENITOURINARY: No dysuria, hematuria  NEUROLOGICAL: No stroke like symptoms  SKIN: No burning or lesions   ENDOCRINE: No heat or cold intolerance  MUSCULOSKELETAL: No joint pain or swelling  PSYCHIATRIC: No  anxiety, mood swings  HEME/LYMPH: No bleeding gums  ALLERGY AND IMMUNOLOGIC: No hives or eczema	    All other ROS negative    PHYSICAL EXAM:  T(C): 36.8 (07-27-21 @ 21:59), Max: 37.1 (07-27-21 @ 09:33)  HR: 86 (07-27-21 @ 21:59) (71 - 86)  BP: 149/60 (07-27-21 @ 21:59) (112/85 - 149/60)  RR: 17 (07-27-21 @ 21:59) (17 - 18)  SpO2: 97% (07-27-21 @ 21:59) (96% - 99%)  Wt(kg): --  I&O's Summary    26 Jul 2021 07:01  -  27 Jul 2021 07:00  --------------------------------------------------------  IN: 360 mL / OUT: 1400 mL / NET: -1040 mL    27 Jul 2021 07:01  -  27 Jul 2021 22:00  --------------------------------------------------------  IN: 0 mL / OUT: 850 mL / NET: -850 mL        Appearance: Normal	  HEENT:   Normal oral mucosa, EOMI	  Cardiovascular:  S1 S2, No JVD,    Respiratory: Lungs clear to auscultation	  Psychiatry: Alert  Gastrointestinal:  Soft, Non-tender, + BS	  Skin: No rashes   Neurologic: Non-focal  Extremities:  No edema  Vascular: Peripheral pulses palpable    	    	  	  CARDIAC MARKERS:  Labs personally reviewed by me                                  8.6    8.33  )-----------( 391      ( 27 Jul 2021 07:31 )             25.2     07-27    133<L>  |  95<L>  |  17  ----------------------------<  105<H>  3.6   |  24  |  1.35<H>    Ca    9.5      27 Jul 2021 07:31  Phos  3.6     07-27  Mg     2.20     07-27            EKG: Personally reviewed by me - nsr with inferior and anterolateral wall TWI  Radiology: Personally reviewed by me -   < from: CT Chest w/ IV Cont (07.27.21 @ 10:37) >  IMPRESSION: Several solid nodules are noted within both lungs. Primary consideration is metastasis.    Findings suggestive of bony metastases.         Assessment and Plan:   Problem/Plan - 1:  ·  Problem: Abdominal pain.  Plan: 1 week of generalized abdominal pain in the setting of constipation. CT A/P without obstruction- noted to have distended bladder. PVR in .   - Bowel regimen: senna, miralax, suppository x 1   - Hold Oxybutynin in setting of constipation   - Ctm bladder scans.     Problem/Plan - 2:  ·  Problem: Chest pain.  Plan: Generalized sharp chest pain for 1 week, noncardiac in nature   - EKG w/ TWI, V2, v4-v6, unchanged from prior   - given metastatic CA will manage medically     Problem/Plan - 3:  ·  Problem: Bladder cancer.  Plan: Hx of bladder cancer s/p s/p cystoscopy, Incision of Ureteral Orifice, Left laparoscopic Nephroureterectomy and Retroperitoneal Lymphadenectomy follows OP with Urology and Oncology.   - Bladder lesion noted on CT A/P w/ bone lesions and lung lesions c/f mets   - Patient unaware of these findings but reports plans for ?surgery on 8/3   urology eval  - check CT chest.     Problem/Plan -4:  ·  Problem: HTN (Hypertension).  Plan: Cont home amlodipine.          Differential diagnosis and plan of care discussed with patient after the evaluation. Counseling on diet, nutritional counseling, weight management, exercise and medication compliance was done.   Advanced care planning/advanced directives discussed with patient/family. DNR status including forceful chest compressions to attempt to restart the heart, ventilator support/artificial breathing, electric shock, artificial nutrition, health care proxy, Molst form all discussed with pt. Pt wishes to consider.          Charles Hughes DO [facility]  Cardiovascular Medicine  [address] [place] [address] Dr, [address]  Office [phone]  Cell [phone]

## 2021-07-27 NOTE — PROGRESS NOTE ADULT - SUBJECTIVE AND OBJECTIVE BOX
Name of Patient : MD QURESHI  MRN: [id number]  DATE OF SERVICE: 07-27-21    Subjective: Patient seen and examined. No new events except as noted.   abdominal pain     REVIEW OF SYSTEMS:    CONSTITUTIONAL: No weakness, fevers or chills  EYES/ENT: No visual changes;  No vertigo or throat pain   NECK: No pain or stiffness  RESPIRATORY: No cough, wheezing, hemoptysis; No shortness of breath  CARDIOVASCULAR: No chest pain or palpitations  GASTROINTESTINAL: + pain  GENITOURINARY: No dysuria, frequency or hematuria  NEUROLOGICAL: No numbness or weakness  SKIN: No itching, burning, rashes, or lesions   All other review of systems is negative unless indicated above.    MEDICATIONS:  MEDICATIONS  (STANDING):  amLODIPine   Tablet 10 milliGRAM(s) Oral daily  atorvastatin 10 milliGRAM(s) Oral at bedtime  budesonide  80 MICROgram(s)/formoterol 4.5 MICROgram(s) Inhaler 2 Puff(s) Inhalation two times a day  cholecalciferol 1000 Unit(s) Oral daily  cyanocobalamin 1000 MICROGram(s) Oral daily  enoxaparin Injectable 40 milliGRAM(s) SubCutaneous daily  finasteride 5 milliGRAM(s) Oral daily  fluticasone propionate 50 MICROgram(s)/spray Nasal Spray 1 Spray(s) Both Nostrils two times a day  metoprolol succinate ER 25 milliGRAM(s) Oral daily  multivitamin 1 Tablet(s) Oral daily  pantoprazole    Tablet 40 milliGRAM(s) Oral before breakfast  polyethylene glycol 3350 17 Gram(s) Oral daily  senna 2 Tablet(s) Oral at bedtime  sodium chloride 1 Gram(s) Oral three times a day  tamsulosin 0.4 milliGRAM(s) Oral at bedtime      PHYSICAL EXAM:  T(C): 36.8 (07-27-21 @ 21:59), Max: 37.1 (07-27-21 @ 09:33)  HR: 86 (07-27-21 @ 21:59) (71 - 86)  BP: 149/60 (07-27-21 @ 21:59) (112/85 - 149/60)  RR: 17 (07-27-21 @ 21:59) (17 - 18)  SpO2: 97% (07-27-21 @ 21:59) (96% - 99%)  Wt(kg): --  I&O's Summary    26 Jul 2021 07:01  -  27 Jul 2021 07:00  --------------------------------------------------------  IN: 360 mL / OUT: 1400 mL / NET: -1040 mL    27 Jul 2021 07:01  -  27 Jul 2021 22:28  --------------------------------------------------------  IN: 0 mL / OUT: 1100 mL / NET: -1100 mL          Appearance: Normal	  HEENT:  PERRLA   Lymphatic: No lymphadenopathy   Cardiovascular: Normal S1 S2, no JVD  Respiratory: normal effort , clear  Gastrointestinal:  Soft, Non-tender  Skin: No rashes,  warm to touch  Psychiatry:  Mood & affect appropriate  Musculuskeletal: No edema      All labs, Imaging and EKGs personally reviewed       07-26-21 @ 07:01  -  07-27-21 @ 07:00  --------------------------------------------------------  IN: 360 mL / OUT: 1400 mL / NET: -1040 mL    07-27-21 @ 07:01 - 07-27-21 @ 22:28  --------------------------------------------------------  IN: 0 mL / OUT: 1100 mL / NET: -1100 mL                            8.6    8.33  )-----------( 391      ( 27 Jul 2021 07:31 )             25.2               07-27    133<L>  |  95<L>  |  17  ----------------------------<  105<H>  3.6   |  24  |  1.35<H>    Ca    9.5      27 Jul 2021 07:31  Phos  3.6     07-27  Mg     2.20     07-27           < from: CT Chest w/ IV Cont (07.27.21 @ 10:37) >  IMPRESSION: Several solid nodules are noted within both lungs. Primary consideration is metastasis.    Findings suggestive of bony metastases.    < from: NM Bone Imaging Total (07.27.21 @ 14:09) >  IMPRESSION: Abnormal bone scan. Extensive osseous metastases in the axial skeleton and proximal shaft of the right femur, all of which are new since the previous bone scan of 7/23/2020. Radiographic evaluation of the right femur to evaluate potential for pathological fracture is suggested.    JOAO Schultz was informed of the above findings including the potential for pathologic fracture of weightbearing bones, by Dr. REJI Bryant via telephone with read back at about 3:25 PM on 7/27/2021.

## 2021-07-27 NOTE — CHART NOTE - NSCHARTNOTEFT_GEN_A_CORE
Patient with hematuria, voiding blood clots. Now with imaging concerning for diffuse mets in bone and lung. Discussed with Urology team who spoke with Dr. Moya yesterday, bladder cancer is known and patient likely not a surgical candidate given diffuse mets. Regarding hematuria, if concerned for urinary retention i/s/o clots then recall Urology to evaluate. Patient voiding, no retention at this time. Oncology Dr. Collins and Dr. Samuels made aware of CT chest and Bone scan results. Will need biopsy of any of the lesions with molecular studies to see if he will qualify for immunotherapy. Will consult IR to review imaging and decide where to biopsy.

## 2021-07-27 NOTE — PROGRESS NOTE ADULT - SUBJECTIVE AND OBJECTIVE BOX
[facility]   MD LEONELA JULIO DO ANAM SIDDIQUI ANGELA WONG, [place]    TEL:  OFFICE: [phone]  DR POST CELL: [phone]  DR. RAMOS CELL: [phone]  DR. MARTINEZ CELL: [phone]  LESVIA WILLOUGHBY CELL: [phone]    From 5pm-7am Answering Service [phone]    -- RENAL FOLLOW UP NOTE ---Date of Service 07-27-21 @ 12:18    Patient is a 75y old  Male who presents with a chief complaint of Chest pain (26 Jul 2021 12:29)      Patient seen and examined at bedside.     VITALS:  T(F): 98.8 (07-27-21 @ 09:33), Max: 98.8 (07-27-21 @ 09:33)  HR: 71 (07-27-21 @ 09:33)  BP: 125/55 (07-27-21 @ 09:33)  RR: 17 (07-27-21 @ 09:33)  SpO2: 97% (07-27-21 @ 09:33)  Wt(kg): --    07-26 @ 07:01  -  07-27 @ 07:00  --------------------------------------------------------  IN: 360 mL / OUT: 1400 mL / NET: -1040 mL          PHYSICAL EXAM:  Constitutional: NAD  Neck: No JVD  Respiratory: CTAB, no wheezes, rales or rhonchi  Cardiovascular: S1, S2, RRR  Gastrointestinal: BS+, soft, NT/ND  Extremities: No peripheral edema    Hospital Medications:   MEDICATIONS  (STANDING):  amLODIPine   Tablet 10 milliGRAM(s) Oral daily  atorvastatin 10 milliGRAM(s) Oral at bedtime  budesonide  80 MICROgram(s)/formoterol 4.5 MICROgram(s) Inhaler 2 Puff(s) Inhalation two times a day  cholecalciferol 1000 Unit(s) Oral daily  cyanocobalamin 1000 MICROGram(s) Oral daily  enoxaparin Injectable 40 milliGRAM(s) SubCutaneous daily  finasteride 5 milliGRAM(s) Oral daily  fluticasone propionate 50 MICROgram(s)/spray Nasal Spray 1 Spray(s) Both Nostrils two times a day  metoprolol succinate ER 25 milliGRAM(s) Oral daily  multivitamin 1 Tablet(s) Oral daily  pantoprazole    Tablet 40 milliGRAM(s) Oral before breakfast  polyethylene glycol 3350 17 Gram(s) Oral daily  senna 2 Tablet(s) Oral at bedtime  sodium chloride 1 Gram(s) Oral three times a day  tamsulosin 0.4 milliGRAM(s) Oral at bedtime      LABS:  07-27    133<L>  |  95<L>  |  17  ----------------------------<  105<H>  3.6   |  24  |  1.35<H>    Ca    9.5      27 Jul 2021 07:31  Phos  3.6     07-27  Mg     2.20     07-27      Creatinine Trend: 1.35 <--, 1.36 <--, 1.45 <--, 1.26 <--, 1.12 <--, 1.20 <--, 1.29 <--    Phosphorus Level, Serum: 3.6 mg/dL (07-27 @ 07:31)                              8.6    8.33  )-----------( 391      ( 27 Jul 2021 07:31 )             25.2     Urine Studies:  Urinalysis - [07-24-21 @ 11:43]      Color Light Red / Appearance Slightly Turbid / SG 1.007 / pH 8.0      Gluc Negative / Ketone Negative  / Bili Negative / Urobili <2 mg/dL       Blood Large / Protein 30 mg/dL / Leuk Est Negative / Nitrite Negative      RBC >720 / WBC 4 / Hyaline  / Gran  / Sq Epi  / Non Sq Epi 0 / Bacteria Negative    Urine Osmolality 188      [07-24-21 @ 07:54]    Ferritin 469      [01-13-21 @ 07:27]  Vitamin D (25OH) 26.2      [01-05-21 @ 11:00]  HbA1c 6.3      [09-13-17 @ 08:15]        RADIOLOGY & ADDITIONAL STUDIES:

## 2021-07-27 NOTE — CONSULT NOTE ADULT - ASSESSMENT
Assessment/Plan:   75m with  urothelial cancer, Stage cII, Left radical nephrectomy, presenting with abdominal pain and blood in urine, Ct with bladder lesions and multiple osseous lytic lesions and lung nodules, Ir consulted for biopsy.    Imaging reviewed, most likely will biopsy left pubic ramus lytic bone lesion.  plan for  above procedure Thursday 7/29. can put order for IR procedure under Dr. Lemon  NPO AMN for sedation  please recheck CBC BMP Coags 4AM  hold SQL the evening before

## 2021-07-28 LAB
ANION GAP SERPL CALC-SCNC: 12 MMOL/L — SIGNIFICANT CHANGE UP (ref 7–14)
BUN SERPL-MCNC: 16 MG/DL — SIGNIFICANT CHANGE UP (ref 7–23)
CALCIUM SERPL-MCNC: 9.6 MG/DL — SIGNIFICANT CHANGE UP (ref 8.4–10.5)
CHLORIDE SERPL-SCNC: 97 MMOL/L — LOW (ref 98–107)
CO2 SERPL-SCNC: 22 MMOL/L — SIGNIFICANT CHANGE UP (ref 22–31)
CREAT SERPL-MCNC: 1.34 MG/DL — HIGH (ref 0.5–1.3)
GLUCOSE SERPL-MCNC: 109 MG/DL — HIGH (ref 70–99)
HCT VFR BLD CALC: 26.6 % — LOW (ref 39–50)
HGB BLD-MCNC: 9.2 G/DL — LOW (ref 13–17)
MAGNESIUM SERPL-MCNC: 2.1 MG/DL — SIGNIFICANT CHANGE UP (ref 1.6–2.6)
MCHC RBC-ENTMCNC: 29.7 PG — SIGNIFICANT CHANGE UP (ref 27–34)
MCHC RBC-ENTMCNC: 34.6 GM/DL — SIGNIFICANT CHANGE UP (ref 32–36)
MCV RBC AUTO: 85.8 FL — SIGNIFICANT CHANGE UP (ref 80–100)
NRBC # BLD: 0 /100 WBCS — SIGNIFICANT CHANGE UP
NRBC # FLD: 0 K/UL — SIGNIFICANT CHANGE UP
PHOSPHATE SERPL-MCNC: 3.9 MG/DL — SIGNIFICANT CHANGE UP (ref 2.5–4.5)
PLATELET # BLD AUTO: 382 K/UL — SIGNIFICANT CHANGE UP (ref 150–400)
POTASSIUM SERPL-MCNC: 3.8 MMOL/L — SIGNIFICANT CHANGE UP (ref 3.5–5.3)
POTASSIUM SERPL-SCNC: 3.8 MMOL/L — SIGNIFICANT CHANGE UP (ref 3.5–5.3)
RBC # BLD: 3.1 M/UL — LOW (ref 4.2–5.8)
RBC # FLD: 13.8 % — SIGNIFICANT CHANGE UP (ref 10.3–14.5)
SODIUM SERPL-SCNC: 131 MMOL/L — LOW (ref 135–145)
WBC # BLD: 7.25 K/UL — SIGNIFICANT CHANGE UP (ref 3.8–10.5)
WBC # FLD AUTO: 7.25 K/UL — SIGNIFICANT CHANGE UP (ref 3.8–10.5)

## 2021-07-28 PROCEDURE — 99232 SBSQ HOSP IP/OBS MODERATE 35: CPT

## 2021-07-28 RX ORDER — POLYETHYLENE GLYCOL 3350 17 G/17G
17 POWDER, FOR SOLUTION ORAL DAILY
Refills: 0 | Status: DISCONTINUED | OUTPATIENT
Start: 2021-07-28 | End: 2021-07-30

## 2021-07-28 RX ADMIN — Medication 20 MILLIGRAM(S): at 05:42

## 2021-07-28 RX ADMIN — POLYETHYLENE GLYCOL 3350 17 GRAM(S): 17 POWDER, FOR SOLUTION ORAL at 22:19

## 2021-07-28 NOTE — PROGRESS NOTE ADULT - SUBJECTIVE AND OBJECTIVE BOX
[facility]   MD LEONELA JULIO DO ANAM SIDDIQUI ANGELA WONG, [place]    TEL:  OFFICE: [phone]  DR POST CELL: [phone]  DR. RAMOS CELL: [phone]  DR. MARTINEZ CELL: [phone]  LESVIA WILLOUGHBY CELL: [phone]    From 5pm-7am Answering Service [phone]    -- RENAL FOLLOW UP NOTE ---Date of Service 07-28-21 @ 13:15    Patient is a 75y old  Male who presents with a chief complaint of Chest pain (28 Jul 2021 11:55)      Patient seen and examined at bedside. No chest pain/sob    VITALS:  T(F): 98.2 (07-28-21 @ 09:03), Max: 98.5 (07-27-21 @ 17:26)  HR: 74 (07-28-21 @ 09:03)  BP: 124/65 (07-28-21 @ 09:03)  RR: 18 (07-28-21 @ 09:03)  SpO2: 98% (07-28-21 @ 09:03)  Wt(kg): --    07-27 @ 07:01  -  07-28 @ 07:00  --------------------------------------------------------  IN: 400 mL / OUT: 2150 mL / NET: -1750 mL    07-28 @ 07:01  -  07-28 @ 13:15  --------------------------------------------------------  IN: 0 mL / OUT: 400 mL / NET: -400 mL          PHYSICAL EXAM:  Constitutional: NAD  Neck: No JVD  Respiratory: CTAB, no wheezes, rales or rhonchi  Cardiovascular: S1, S2, RRR  Gastrointestinal: BS+, soft, NT/ND  Extremities: No peripheral edema    Hospital Medications:   MEDICATIONS  (STANDING):  amLODIPine   Tablet 10 milliGRAM(s) Oral daily  atorvastatin 10 milliGRAM(s) Oral at bedtime  budesonide  80 MICROgram(s)/formoterol 4.5 MICROgram(s) Inhaler 2 Puff(s) Inhalation two times a day  cholecalciferol 1000 Unit(s) Oral daily  cyanocobalamin 1000 MICROGram(s) Oral daily  finasteride 5 milliGRAM(s) Oral daily  fluticasone propionate 50 MICROgram(s)/spray Nasal Spray 1 Spray(s) Both Nostrils two times a day  furosemide    Tablet 20 milliGRAM(s) Oral daily  metoprolol succinate ER 25 milliGRAM(s) Oral daily  multivitamin 1 Tablet(s) Oral daily  pantoprazole    Tablet 40 milliGRAM(s) Oral before breakfast  polyethylene glycol 3350 17 Gram(s) Oral daily  senna 2 Tablet(s) Oral at bedtime  sodium chloride 1 Gram(s) Oral three times a day  tamsulosin 0.4 milliGRAM(s) Oral at bedtime      LABS:  07-28    131<L>  |  97<L>  |  16  ----------------------------<  109<H>  3.8   |  22  |  1.34<H>    Ca    9.6      28 Jul 2021 07:11  Phos  3.9     07-28  Mg     2.10     07-28      Creatinine Trend: 1.34 <--, 1.35 <--, 1.36 <--, 1.45 <--, 1.26 <--, 1.12 <--, 1.20 <--, 1.29 <--    Phosphorus Level, Serum: 3.9 mg/dL (07-28 @ 07:11)                              9.2    7.25  )-----------( 382      ( 28 Jul 2021 07:11 )             26.6     Urine Studies:  Urinalysis - [07-24-21 @ 11:43]      Color Light Red / Appearance Slightly Turbid / SG 1.007 / pH 8.0      Gluc Negative / Ketone Negative  / Bili Negative / Urobili <2 mg/dL       Blood Large / Protein 30 mg/dL / Leuk Est Negative / Nitrite Negative      RBC >720 / WBC 4 / Hyaline  / Gran  / Sq Epi  / Non Sq Epi 0 / Bacteria Negative    Urine Osmolality 188      [07-24-21 @ 07:54]    Ferritin 469      [01-13-21 @ 07:27]  Vitamin D (25OH) 26.2      [01-05-21 @ 11:00]  HbA1c 6.3      [09-13-17 @ 08:15]        RADIOLOGY & ADDITIONAL STUDIES:

## 2021-07-28 NOTE — PROGRESS NOTE ADULT - SUBJECTIVE AND OBJECTIVE BOX
DATE OF SERVICE: 07-28-21     Patient is a 75y old  Male who presents with a chief complaint of Chest pain (28 Jul 2021 16:56)      INTERVAL HISTORY:     TELEMETRY Personally reviewed:  	  MEDICATIONS:  amLODIPine   Tablet 10 milliGRAM(s) Oral daily  furosemide    Tablet 20 milliGRAM(s) Oral daily  metoprolol succinate ER 25 milliGRAM(s) Oral daily  tamsulosin 0.4 milliGRAM(s) Oral at bedtime        PHYSICAL EXAM:  T(C): 37.2 (07-28-21 @ 21:33), Max: 37.2 (07-28-21 @ 21:33)  HR: 89 (07-28-21 @ 21:33) (74 - 92)  BP: 146/58 (07-28-21 @ 21:33) (124/65 - 150/55)  RR: 18 (07-28-21 @ 21:33) (18 - 18)  SpO2: 98% (07-28-21 @ 21:33) (96% - 98%)  Wt(kg): --  I&O's Summary    27 Jul 2021 07:01  -  28 Jul 2021 07:00  --------------------------------------------------------  IN: 400 mL / OUT: 2150 mL / NET: -1750 mL    28 Jul 2021 07:01  -  29 Jul 2021 00:05  --------------------------------------------------------  IN: 0 mL / OUT: 600 mL / NET: -600 mL          Appearance: In no distress	  HEENT:    PERRL, EOMI	  Cardiovascular:  S1 S2, No JVD  Respiratory: Lungs clear to auscultation	  Gastrointestinal:  Soft, Non-tender, + BS	  Vascularature:  No edema of LE  Psychiatric: Appropriate affect   Neuro: no acute focal deficits                               9.2    7.25  )-----------( 382      ( 28 Jul 2021 07:11 )             26.6     07-28    131<L>  |  97<L>  |  16  ----------------------------<  109<H>  3.8   |  22  |  1.34<H>    Ca    9.6      28 Jul 2021 07:11  Phos  3.9     07-28  Mg     2.10     07-28          Labs personally reviewed      ASSESSMENT/PLAN: 	          Charles Hughes DO [facility]  Cardiovascular Medicine  [address] [place] [address], [address]  Office: [phone]  Cell: [phone] DATE OF SERVICE: 07-28-21     Patient is a 75y old  Male who presents with a chief complaint of Chest pain (28 Jul 2021 16:56)      INTERVAL HISTORY:     TELEMETRY Personally reviewed:  	  MEDICATIONS:  amLODIPine   Tablet 10 milliGRAM(s) Oral daily  furosemide    Tablet 20 milliGRAM(s) Oral daily  metoprolol succinate ER 25 milliGRAM(s) Oral daily  tamsulosin 0.4 milliGRAM(s) Oral at bedtime        PHYSICAL EXAM:  T(C): 37.2 (07-28-21 @ 21:33), Max: 37.2 (07-28-21 @ 21:33)  HR: 89 (07-28-21 @ 21:33) (74 - 92)  BP: 146/58 (07-28-21 @ 21:33) (124/65 - 150/55)  RR: 18 (07-28-21 @ 21:33) (18 - 18)  SpO2: 98% (07-28-21 @ 21:33) (96% - 98%)  Wt(kg): --  I&O's Summary    27 Jul 2021 07:01  -  28 Jul 2021 07:00  --------------------------------------------------------  IN: 400 mL / OUT: 2150 mL / NET: -1750 mL    28 Jul 2021 07:01  -  29 Jul 2021 00:05  --------------------------------------------------------  IN: 0 mL / OUT: 600 mL / NET: -600 mL          Appearance: In no distress	  HEENT:    PERRL, EOMI	  Cardiovascular:  S1 S2, No JVD  Respiratory: Lungs clear to auscultation	  Gastrointestinal:  Soft, Non-tender, + BS	  Vascularature:  No edema of LE  Psychiatric: Appropriate affect   Neuro: no acute focal deficits                               9.2    7.25  )-----------( 382      ( 28 Jul 2021 07:11 )             26.6     07-28    131<L>  |  97<L>  |  16  ----------------------------<  109<H>  3.8   |  22  |  1.34<H>    Ca    9.6      28 Jul 2021 07:11  Phos  3.9     07-28  Mg     2.10     07-28          Labs personally reviewed      EKG: Personally reviewed by me - nsr with inferior and anterolateral wall TWI  Radiology: Personally reviewed by me -   < from: CT Chest w/ IV Cont (07.27.21 @ 10:37) >  IMPRESSION: Several solid nodules are noted within both lungs. Primary consideration is metastasis.    Findings suggestive of bony metastases.         Assessment and Plan:   Problem/Plan - 1:  ·  Problem: Abdominal pain.  Plan: 1 week of generalized abdominal pain in the setting of constipation. CT A/P without obstruction- noted to have distended bladder. PVR in .   - Bowel regimen: senna, miralax, suppository x 1   - Hold Oxybutynin in setting of constipation   - Ctm bladder scans.     Problem/Plan - 2:  ·  Problem: Chest pain.  Plan: Generalized sharp chest pain for 1 week, noncardiac in nature   - EKG w/ TWI, V2, v4-v6, unchanged from prior   - given metastatic CA will manage medically     Problem/Plan - 3:  ·  Problem: Bladder cancer.  Plan: Hx of bladder cancer s/p s/p cystoscopy, Incision of Ureteral Orifice, Left laparoscopic Nephroureterectomy and Retroperitoneal Lymphadenectomy follows OP with Urology and Oncology.   - Bladder lesion noted on CT A/P w/ bone lesions and lung lesions c/f mets   - Patient unaware of these findings but reports plans for ?surgery on 8/3   urology eval  - check CT chest.     Problem/Plan -4:  ·  Problem: HTN (Hypertension).  Plan: Cont home amlodipine.          Differential diagnosis and plan of care discussed with patient after the evaluation. Counseling on diet, nutritional counseling, weight management, exercise and medication compliance was done.   Advanced care planning/advanced directives discussed with patient/family. DNR status including forceful chest compressions to attempt to restart the heart, ventilator support/artificial breathing, electric shock, artificial nutrition, health care proxy, Molst form all discussed with pt. Pt wishes to consider.              Charles Hughes DO [facility]  Cardiovascular Medicine  [address] [place] [address], [address]  Office: [phone]  Cell: [phone]

## 2021-07-28 NOTE — CHART NOTE - NSCHARTNOTEFT_GEN_A_CORE
Ir preprocedure note   age 75   gender M  procedure IR biopsy left pubic lesion in setting of urothelial CA  diagnosis/indication: urothelial CA  IR attending: Dr. Xochilt Edwards Md: Dr. Samuels  Medical history   Hx of bladder cancer s/p s/p cystoscopy, Incision of Ureteral Orifice, Left laparoscopic Nephroureterectomy and Retroperitoneal Lymphadenectomy follows OP with Urology and Oncology.   Labs  Complete Blood Count in AM (07.28.21 @ 07:11)    Nucleated RBC: 0 /100 WBCs    WBC Count: 7.25 K/uL    RBC Count: 3.10 M/uL    Hemoglobin: 9.2 g/dL    Hematocrit: 26.6 %    Mean Cell Volume: 85.8 fL    Mean Cell Hemoglobin: 29.7 pg    Mean Cell Hemoglobin Conc: 34.6 gm/dL    Red Cell Distrib Width: 13.8 %    Platelet Count - Automated: 382 K/uL    Nucleated RBC #: 0.00 K/uL    Basic Metabolic Panel w/Mg &amp; Inorg Phos (07.28.21 @ 07:11)    Sodium, Serum: 131 mmol/L    Potassium, Serum: 3.8 mmol/L    Chloride, Serum: 97 mmol/L    Carbon Dioxide, Serum: 22 mmol/L    Anion Gap, Serum: 12 mmol/L    Blood Urea Nitrogen, Serum: 16 mg/dL    Creatinine, Serum: 1.34 mg/dL    Glucose, Serum: 109 mg/dL    Calcium, Total Serum: 9.6 mg/dL    eGFR if Non : 52: The units for eGFR are ml/min/1.73m2 (normalized body surface area). The  eGFR is calculated from a serum creatinine using the CKD-EPI equation.  Other variables required for calculation are race, age and sex. Among  patients with chronic kidney disease (CKD), the eGFR is useful in  determining the stage of disease according to KDOQI CKD classification.  All eGFR results are reported numerically with the following  interpretation.      GFR  (ml/min/1.73 m2)          W/KIDNEY DAMAGE    W/O KIDNEY DMG  ==========================================================  >= 90.......................Stage 1..............Normal  60-89.......................Stage 2...........Decreased GFR  30-59.......................Stage 3..............Stage 3  15-29.......................Stage 4..............Stage 4  < 15........................Stage 5..............Stage 5  Each stage of CKD assumes that the associated GFR level has been in  effect for at least 3 months. Determination of stages one and two (with  eGFR > 59ml/min/m2) requires estimation of kidney damage for at least 3  months as defined by structural or functional abnormalities.  Limitations: All estimates of GFR will be less accurate for patients at  extremes of muscle mass (including but not limited to frail elderly,  critically ill, or cancer patients), those with unusual diets, and those  with conditions associated with reduced secretion or extrarenal  elimination of creatinine. The eGFR equation is not recommended for use  in patients with unstable creatinine levels. mL/min/1.73M2    eGFR if African American: 60 mL/min/1.73M2    Magnesium, Serum: 2.10 mg/dL    Phosphorus Level, Serum: 3.9 mg/dL      patient and family aware

## 2021-07-28 NOTE — PROGRESS NOTE ADULT - SUBJECTIVE AND OBJECTIVE BOX
INTERVAL HPI/OVERNIGHT EVENTS:  Patient seen at bedside.      VITAL SIGNS:  T(F): 98.2 (07-28-21 @ 09:03)  HR: 74 (07-28-21 @ 09:03)  BP: 124/65 (07-28-21 @ 09:03)  RR: 18 (07-28-21 @ 09:03)  SpO2: 98% (07-28-21 @ 09:03)  Wt(kg): --    PHYSICAL EXAM:    Constitutional: NAD, resting in bed comfortably  Eyes: EOMI, sclera non-icteric  Neck: supple, no LAP  Respiratory: CTA b/l, good air entry b/l, no wheezing, rhonchi or crackels  Cardiovascular: RRR, normal S1S2, no M/R/G  Gastrointestinal: soft, NTND  Extremities: no edema  Neurological: AAOx3, non focal  Skin: Normal temperature    MEDICATIONS  (STANDING):  amLODIPine   Tablet 10 milliGRAM(s) Oral daily  atorvastatin 10 milliGRAM(s) Oral at bedtime  budesonide  80 MICROgram(s)/formoterol 4.5 MICROgram(s) Inhaler 2 Puff(s) Inhalation two times a day  cholecalciferol 1000 Unit(s) Oral daily  cyanocobalamin 1000 MICROGram(s) Oral daily  finasteride 5 milliGRAM(s) Oral daily  fluticasone propionate 50 MICROgram(s)/spray Nasal Spray 1 Spray(s) Both Nostrils two times a day  furosemide    Tablet 20 milliGRAM(s) Oral daily  metoprolol succinate ER 25 milliGRAM(s) Oral daily  multivitamin 1 Tablet(s) Oral daily  pantoprazole    Tablet 40 milliGRAM(s) Oral before breakfast  polyethylene glycol 3350 17 Gram(s) Oral daily  senna 2 Tablet(s) Oral at bedtime  sodium chloride 1 Gram(s) Oral three times a day  tamsulosin 0.4 milliGRAM(s) Oral at bedtime    MEDICATIONS  (PRN):  acetaminophen   Tablet .. 650 milliGRAM(s) Oral every 6 hours PRN Temp greater or equal to 38.5C (101.3F), Mild Pain (1 - 3)  ALBUTerol    0.083% 2.5 milliGRAM(s) Nebulizer every 6 hours PRN Shortness of Breath and/or Wheezing  aluminum hydroxide/magnesium hydroxide/simethicone Suspension 30 milliLiter(s) Oral every 4 hours PRN Dyspepsia  melatonin 3 milliGRAM(s) Oral at bedtime PRN Insomnia  ondansetron Injectable 4 milliGRAM(s) IV Push every 8 hours PRN Nausea and/or Vomiting  simethicone 80 milliGRAM(s) Chew two times a day PRN Gas      Allergies    chocolate (Pruritus)  flour (Rash)  No Known Drug Allergies  Nuts (Rash)  Soy (Unknown)    Intolerances        LABS:                        9.2    7.25  )-----------( 382      ( 28 Jul 2021 07:11 )             26.6     07-28    131<L>  |  97<L>  |  16  ----------------------------<  109<H>  3.8   |  22  |  1.34<H>    Ca    9.6      28 Jul 2021 07:11  Phos  3.9     07-28  Mg     2.10     07-28            RADIOLOGY & ADDITIONAL TESTS:  Studies reviewed.

## 2021-07-29 ENCOUNTER — RESULT REVIEW (OUTPATIENT)
Age: 76
End: 2021-07-29

## 2021-07-29 LAB
ANION GAP SERPL CALC-SCNC: 14 MMOL/L — SIGNIFICANT CHANGE UP (ref 7–14)
APTT BLD: 29.6 SEC — SIGNIFICANT CHANGE UP (ref 27–36.3)
BUN SERPL-MCNC: 19 MG/DL — SIGNIFICANT CHANGE UP (ref 7–23)
CALCIUM SERPL-MCNC: 10 MG/DL — SIGNIFICANT CHANGE UP (ref 8.4–10.5)
CHLORIDE SERPL-SCNC: 94 MMOL/L — LOW (ref 98–107)
CO2 SERPL-SCNC: 23 MMOL/L — SIGNIFICANT CHANGE UP (ref 22–31)
CREAT SERPL-MCNC: 1.44 MG/DL — HIGH (ref 0.5–1.3)
GLUCOSE SERPL-MCNC: 113 MG/DL — HIGH (ref 70–99)
HCT VFR BLD CALC: 27.3 % — LOW (ref 39–50)
HGB BLD-MCNC: 9.3 G/DL — LOW (ref 13–17)
INR BLD: 1.25 RATIO — HIGH (ref 0.88–1.16)
MAGNESIUM SERPL-MCNC: 2.1 MG/DL — SIGNIFICANT CHANGE UP (ref 1.6–2.6)
MCHC RBC-ENTMCNC: 29.2 PG — SIGNIFICANT CHANGE UP (ref 27–34)
MCHC RBC-ENTMCNC: 34.1 GM/DL — SIGNIFICANT CHANGE UP (ref 32–36)
MCV RBC AUTO: 85.8 FL — SIGNIFICANT CHANGE UP (ref 80–100)
NRBC # BLD: 0 /100 WBCS — SIGNIFICANT CHANGE UP
NRBC # FLD: 0 K/UL — SIGNIFICANT CHANGE UP
PHOSPHATE SERPL-MCNC: 3.7 MG/DL — SIGNIFICANT CHANGE UP (ref 2.5–4.5)
PLATELET # BLD AUTO: 410 K/UL — HIGH (ref 150–400)
POTASSIUM SERPL-MCNC: 3.9 MMOL/L — SIGNIFICANT CHANGE UP (ref 3.5–5.3)
POTASSIUM SERPL-SCNC: 3.9 MMOL/L — SIGNIFICANT CHANGE UP (ref 3.5–5.3)
PROTHROM AB SERPL-ACNC: 14.2 SEC — HIGH (ref 10.6–13.6)
RBC # BLD: 3.18 M/UL — LOW (ref 4.2–5.8)
RBC # FLD: 13.8 % — SIGNIFICANT CHANGE UP (ref 10.3–14.5)
SODIUM SERPL-SCNC: 131 MMOL/L — LOW (ref 135–145)
WBC # BLD: 8.93 K/UL — SIGNIFICANT CHANGE UP (ref 3.8–10.5)
WBC # FLD AUTO: 8.93 K/UL — SIGNIFICANT CHANGE UP (ref 3.8–10.5)

## 2021-07-29 PROCEDURE — 88305 TISSUE EXAM BY PATHOLOGIST: CPT | Mod: 26

## 2021-07-29 PROCEDURE — 77012 CT SCAN FOR NEEDLE BIOPSY: CPT | Mod: 26

## 2021-07-29 PROCEDURE — 88173 CYTOPATH EVAL FNA REPORT: CPT | Mod: 26

## 2021-07-29 PROCEDURE — 73552 X-RAY EXAM OF FEMUR 2/>: CPT | Mod: 26,RT

## 2021-07-29 PROCEDURE — 88342 IMHCHEM/IMCYTCHM 1ST ANTB: CPT | Mod: 26

## 2021-07-29 PROCEDURE — 20225 BONE BIOPSY TROCAR/NDL DEEP: CPT

## 2021-07-29 PROCEDURE — 88341 IMHCHEM/IMCYTCHM EA ADD ANTB: CPT | Mod: 26

## 2021-07-29 RX ORDER — POLYETHYLENE GLYCOL 3350 17 G/17G
17 POWDER, FOR SOLUTION ORAL AT BEDTIME
Refills: 0 | Status: DISCONTINUED | OUTPATIENT
Start: 2021-07-29 | End: 2021-07-30

## 2021-07-29 RX ADMIN — POLYETHYLENE GLYCOL 3350 17 GRAM(S): 17 POWDER, FOR SOLUTION ORAL at 22:33

## 2021-07-29 RX ADMIN — Medication 20 MILLIGRAM(S): at 06:40

## 2021-07-29 NOTE — PROGRESS NOTE ADULT - SUBJECTIVE AND OBJECTIVE BOX
[facility]   MD LEONELA JULIO DO ANAM SIDDIQUI ANGELA WONG, [place]    TEL:  OFFICE: [phone]  DR POST CELL: [phone]  DR. RAMOS CELL: [phone]  DR. MARTINEZ CELL: [phone]  LESVIA WILLOUGHBY CELL: [phone]    From 5pm-7am Answering Service [phone]    -- RENAL FOLLOW UP NOTE ---Date of Service 07-29-21 @ 17:29    Patient is a 75y old  Male who presents with a chief complaint of Chest pain (29 Jul 2021 08:59)      Patient seen and examined at bedside. No chest pain/sob    VITALS:  T(F): 98.6 (07-29-21 @ 15:03), Max: 98.9 (07-28-21 @ 21:33)  HR: 90 (07-29-21 @ 15:03)  BP: 151/58 (07-29-21 @ 15:03)  RR: 18 (07-29-21 @ 15:03)  SpO2: 99% (07-29-21 @ 15:03)  Wt(kg): --    07-28 @ 07:01  -  07-29 @ 07:00  --------------------------------------------------------  IN: 50 mL / OUT: 900 mL / NET: -850 mL    07-29 @ 07:01  -  07-29 @ 17:29  --------------------------------------------------------  IN: 300 mL / OUT: 875 mL / NET: -575 mL      Height (cm): 167.6 (07-29 @ 09:45), 167.6 (07-29 @ 09:44)  Weight (kg): 72.575 (07-29 @ 09:45), 72.575 (07-29 @ 09:44)  BMI (kg/m2): 25.8 (07-29 @ 09:45), 25.8 (07-29 @ 09:45), 25.8 (07-29 @ 09:44)  BSA (m2): 1.82 (07-29 @ 09:45), 1.82 (07-29 @ 09:45), 1.82 (07-29 @ 09:44)    PHYSICAL EXAM:  Constitutional: NAD  Neck: No JVD  Respiratory: CTAB, no wheezes, rales or rhonchi  Cardiovascular: S1, S2, RRR  Gastrointestinal: BS+, soft, NT/ND  Extremities: No peripheral edema    Hospital Medications:   MEDICATIONS  (STANDING):  amLODIPine   Tablet 10 milliGRAM(s) Oral daily  atorvastatin 10 milliGRAM(s) Oral at bedtime  budesonide  80 MICROgram(s)/formoterol 4.5 MICROgram(s) Inhaler 2 Puff(s) Inhalation two times a day  cholecalciferol 1000 Unit(s) Oral daily  cyanocobalamin 1000 MICROGram(s) Oral daily  finasteride 5 milliGRAM(s) Oral daily  fluticasone propionate 50 MICROgram(s)/spray Nasal Spray 1 Spray(s) Both Nostrils two times a day  furosemide    Tablet 20 milliGRAM(s) Oral daily  metoprolol succinate ER 25 milliGRAM(s) Oral daily  multivitamin 1 Tablet(s) Oral daily  pantoprazole    Tablet 40 milliGRAM(s) Oral before breakfast  polyethylene glycol 3350 17 Gram(s) Oral daily  polyethylene glycol 3350 17 Gram(s) Oral daily  senna 2 Tablet(s) Oral at bedtime  sodium chloride 1 Gram(s) Oral three times a day  tamsulosin 0.4 milliGRAM(s) Oral at bedtime      LABS:  07-29    131<L>  |  94<L>  |  19  ----------------------------<  113<H>  3.9   |  23  |  1.44<H>    Ca    10.0      29 Jul 2021 04:25  Phos  3.7     07-29  Mg     2.10     07-29      Creatinine Trend: 1.44 <--, 1.34 <--, 1.35 <--, 1.36 <--, 1.45 <--, 1.26 <--, 1.12 <--, 1.20 <--, 1.29 <--    Phosphorus Level, Serum: 3.7 mg/dL (07-29 @ 04:25)                              9.3    8.93  )-----------( 410      ( 29 Jul 2021 04:25 )             27.3     Urine Studies:  Urinalysis - [07-24-21 @ 11:43]      Color Light Red / Appearance Slightly Turbid / SG 1.007 / pH 8.0      Gluc Negative / Ketone Negative  / Bili Negative / Urobili <2 mg/dL       Blood Large / Protein 30 mg/dL / Leuk Est Negative / Nitrite Negative      RBC >720 / WBC 4 / Hyaline  / Gran  / Sq Epi  / Non Sq Epi 0 / Bacteria Negative    Urine Osmolality 188      [07-24-21 @ 07:54]    Ferritin 469      [01-13-21 @ 07:27]  Vitamin D (25OH) 26.2      [01-05-21 @ 11:00]  HbA1c 6.3      [09-13-17 @ 08:15]        RADIOLOGY & ADDITIONAL STUDIES:

## 2021-07-29 NOTE — PROGRESS NOTE ADULT - SUBJECTIVE AND OBJECTIVE BOX
Name of Patient : MD QURESHI  MRN: [id number]  DATE OF SERVICE: 07-29-21     Subjective: Patient seen and examined. No new events except as noted.   biopsy by IR     REVIEW OF SYSTEMS:    CONSTITUTIONAL: No weakness, fevers or chills  EYES/ENT: No visual changes;  No vertigo or throat pain   NECK: No pain or stiffness  RESPIRATORY: No cough, wheezing, hemoptysis; No shortness of breath  CARDIOVASCULAR: No chest pain or palpitations  GASTROINTESTINAL: No abdominal or epigastric pain. No nausea, vomiting, or hematemesis; No diarrhea or constipation. No melena or hematochezia.  GENITOURINARY: No dysuria, frequency or hematuria  NEUROLOGICAL: No numbness or weakness  SKIN: No itching, burning, rashes, or lesions   All other review of systems is negative unless indicated above.    MEDICATIONS:  MEDICATIONS  (STANDING):  amLODIPine   Tablet 10 milliGRAM(s) Oral daily  atorvastatin 10 milliGRAM(s) Oral at bedtime  budesonide  80 MICROgram(s)/formoterol 4.5 MICROgram(s) Inhaler 2 Puff(s) Inhalation two times a day  cholecalciferol 1000 Unit(s) Oral daily  cyanocobalamin 1000 MICROGram(s) Oral daily  finasteride 5 milliGRAM(s) Oral daily  fluticasone propionate 50 MICROgram(s)/spray Nasal Spray 1 Spray(s) Both Nostrils two times a day  furosemide    Tablet 20 milliGRAM(s) Oral daily  metoprolol succinate ER 25 milliGRAM(s) Oral daily  multivitamin 1 Tablet(s) Oral daily  pantoprazole    Tablet 40 milliGRAM(s) Oral before breakfast  polyethylene glycol 3350 17 Gram(s) Oral daily  polyethylene glycol 3350 17 Gram(s) Oral daily  polyethylene glycol 3350 17 Gram(s) Oral at bedtime  senna 2 Tablet(s) Oral at bedtime  sodium chloride 1 Gram(s) Oral three times a day  tamsulosin 0.4 milliGRAM(s) Oral at bedtime      PHYSICAL EXAM:  T(C): 37 (07-29-21 @ 21:22), Max: 37 (07-29-21 @ 15:03)  HR: 87 (07-29-21 @ 21:22) (79 - 90)  BP: 143/64 (07-29-21 @ 21:22) (131/52 - 151/58)  RR: 18 (07-29-21 @ 21:22) (18 - 18)  SpO2: 96% (07-29-21 @ 21:22) (96% - 99%)  Wt(kg): --  I&O's Summary    28 Jul 2021 07:01  -  29 Jul 2021 07:00  --------------------------------------------------------  IN: 50 mL / OUT: 900 mL / NET: -850 mL    29 Jul 2021 07:01  -  29 Jul 2021 22:48  --------------------------------------------------------  IN: 300 mL / OUT: 1125 mL / NET: -825 mL      Height (cm): 167.6 (07-29 @ 09:45), 167.6 (07-29 @ 09:44)  Weight (kg): 72.575 (07-29 @ 09:45), 72.575 (07-29 @ 09:44)  BMI (kg/m2): 25.8 (07-29 @ 09:45), 25.8 (07-29 @ 09:45), 25.8 (07-29 @ 09:44)  BSA (m2): 1.82 (07-29 @ 09:45), 1.82 (07-29 @ 09:45), 1.82 (07-29 @ 09:44)    Appearance: Normal	  HEENT:  PERRLA   Lymphatic: No lymphadenopathy   Cardiovascular: Normal S1 S2, no JVD  Respiratory: normal effort , clear  Gastrointestinal:  Soft, Non-tender  Skin: No rashes,  warm to touch  Psychiatry:  Mood & affect appropriate  Musculuskeletal: No edema      All labs, Imaging and EKGs personally reviewed     07-28-21 @ 07:01  -  07-29-21 @ 07:00  --------------------------------------------------------  IN: 50 mL / OUT: 900 mL / NET: -850 mL    07-29-21 @ 07:01  -  07-29-21 @ 22:48  --------------------------------------------------------  IN: 300 mL / OUT: 1125 mL / NET: -825 mL                            9.3    8.93  )-----------( 410      ( 29 Jul 2021 04:25 )             27.3               07-29    131<L>  |  94<L>  |  19  ----------------------------<  113<H>  3.9   |  23  |  1.44<H>    Ca    10.0      29 Jul 2021 04:25  Phos  3.7     07-29  Mg     2.10     07-29      PT/INR - ( 29 Jul 2021 04:25 )   PT: 14.2 sec;   INR: 1.25 ratio         PTT - ( 29 Jul 2021 04:25 )  PTT:29.6 sec

## 2021-07-29 NOTE — PROCEDURE NOTE - PLAN
-monitor for signs of bleeding, monitor AM CBC  -follow up pathology results  -may restart SQH @ 24h if no signs of bleeding   -advance diet/restart all other orders per primary team

## 2021-07-29 NOTE — PROGRESS NOTE ADULT - SUBJECTIVE AND OBJECTIVE BOX
Interventional Radiology    75m with  urothelial cancer, Stage cII, Left radical nephrectomy, presenting with abdominal pain and blood in urine, Ct with bladder lesions and multiple osseous lytic lesions and lung nodules, presents for core needle biopsy of left pubic bone lytic lesion.    Allergies:   Medications (Abx/Cardiac/Anticoagulation/Blood Products)    amLODIPine   Tablet: 10 milliGRAM(s) Oral (07-29 @ 06:41)  enoxaparin Injectable: 40 milliGRAM(s) SubCutaneous (07-27 @ 12:04)  furosemide    Tablet: 20 milliGRAM(s) Oral (07-29 @ 06:40)  metoprolol succinate ER: 25 milliGRAM(s) Oral (07-29 @ 06:40)  tamsulosin: 0.4 milliGRAM(s) Oral (07-28 @ 22:19)    Data:    T(C): 36.8  HR: 80  BP: 141/60  RR: 18  SpO2: 98%    Exam  General: No acute distress  Chest: Non labored breathing  Abdomen: Non-distended  Extremities: No swelling, warm    -WBC 8.93 / HgB 9.3 / Hct 27.3 / Plt 410  -Na 131 / Cl 94 / BUN 19 / Glucose 113  -K 3.9 / CO2 23 / Cr 1.44  -ALT -- / Alk Phos -- / T.Bili --  -INR1.25    Imaging:     Plan: 75y Male presents for above procedure  -Risks/Benefits/alternatives explained with the patient with Armenian  and witnessed informed consent obtained.

## 2021-07-30 ENCOUNTER — TRANSCRIPTION ENCOUNTER (OUTPATIENT)
Age: 76
End: 2021-07-30

## 2021-07-30 VITALS
RESPIRATION RATE: 18 BRPM | DIASTOLIC BLOOD PRESSURE: 53 MMHG | OXYGEN SATURATION: 98 % | TEMPERATURE: 98 F | HEART RATE: 80 BPM | SYSTOLIC BLOOD PRESSURE: 142 MMHG

## 2021-07-30 PROCEDURE — 93306 TTE W/DOPPLER COMPLETE: CPT | Mod: 26

## 2021-07-30 RX ORDER — POLYETHYLENE GLYCOL 3350 17 G/17G
17 POWDER, FOR SOLUTION ORAL
Qty: 510 | Refills: 0
Start: 2021-07-30 | End: 2021-08-28

## 2021-07-30 RX ORDER — OXYCODONE HYDROCHLORIDE 5 MG/1
1 TABLET ORAL
Qty: 20 | Refills: 0
Start: 2021-07-30 | End: 2021-08-03

## 2021-07-30 RX ORDER — SODIUM CHLORIDE 9 MG/ML
1 INJECTION INTRAMUSCULAR; INTRAVENOUS; SUBCUTANEOUS
Qty: 30 | Refills: 0
Start: 2021-07-30 | End: 2021-08-08

## 2021-07-30 RX ORDER — ENOXAPARIN SODIUM 100 MG/ML
40 INJECTION SUBCUTANEOUS DAILY
Refills: 0 | Status: DISCONTINUED | OUTPATIENT
Start: 2021-07-30 | End: 2021-07-30

## 2021-07-30 RX ORDER — LANOLIN ALCOHOL/MO/W.PET/CERES
1 CREAM (GRAM) TOPICAL
Qty: 0 | Refills: 0 | DISCHARGE
Start: 2021-07-30

## 2021-07-30 RX ORDER — SODIUM CHLORIDE 9 MG/ML
1 INJECTION INTRAMUSCULAR; INTRAVENOUS; SUBCUTANEOUS
Qty: 0 | Refills: 0 | DISCHARGE

## 2021-07-30 RX ORDER — SENNA PLUS 8.6 MG/1
2 TABLET ORAL
Qty: 0 | Refills: 0 | DISCHARGE
Start: 2021-07-30

## 2021-07-30 RX ORDER — SIMETHICONE 80 MG/1
1 TABLET, CHEWABLE ORAL
Qty: 20 | Refills: 0
Start: 2021-07-30 | End: 2021-08-08

## 2021-07-30 RX ORDER — ONDANSETRON 8 MG/1
1 TABLET, FILM COATED ORAL
Qty: 15 | Refills: 0
Start: 2021-07-30 | End: 2021-08-03

## 2021-07-30 RX ADMIN — POLYETHYLENE GLYCOL 3350 17 GRAM(S): 17 POWDER, FOR SOLUTION ORAL at 14:33

## 2021-07-30 RX ADMIN — Medication 20 MILLIGRAM(S): at 05:13

## 2021-07-30 NOTE — PROGRESS NOTE ADULT - PROBLEM SELECTOR PLAN 7
Cont home amlodipine

## 2021-07-30 NOTE — DISCHARGE NOTE NURSING/CASE MANAGEMENT/SOCIAL WORK - NSSCCONTNUM_GEN_ALL_CORE
[phone]                                                                              Aide Service 21hrs/wk

## 2021-07-30 NOTE — PROGRESS NOTE ADULT - PROBLEM SELECTOR PROBLEM 5
Cancer of kidney

## 2021-07-30 NOTE — PROGRESS NOTE ADULT - ASSESSMENT
75 y.o. M w/ a hx of HTN, HLD, asthma, SIADH, L RCC s/p nephrectomy, bladder cancer s/p nephroureterectomy who presents with burning epigastric pain. pt c/o constipation, abd bloating and tightness. no n/v. patient admits to drink more fluid sec to constipation. 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 urine osmo low   free water restriction <1L/day. pt educated  on na tab 1g tid  Na relatively stable.  Monitor serum NA    CKD Stage 3  Baseline Scr 1.5-1.7   scr better than baseline  Monitor BMP   Avoid nephrotoxics, NSAIDS RCA    HTN  BP acceptable   Monitor BP    
75 y.o. M w/ a hx of HTN, HLD, asthma, SIADH, L RCC s/p nephrectomy, bladder cancer s/p nephroureterectomy who presents with burning epigastric pain. pt c/o constipation, abd bloating and tightness. no n/v. patient admits to drink more fluid sec to constipation. 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 urine osmo low   free water restriction <1L/day. pt educated  on na tab 1g tid  Na relatively stable.  Monitor serum NA    CKD Stage 3  Baseline Scr 1.5-1.7   scr better than baseline  Monitor BMP   Avoid nephrotoxics, NSAIDS RCA    HTN  BP acceptable   Monitor BP    New lesions  Ct with bladder lesions and multiple osseous lytic lesions and lung nodules,  s/p core needle biopsy of left pubic bone lytic lesion. by IR  f/u heme/onc    
75 y.o. M w/ a hx of HTN, HLD, asthma, SIADH, L RCC s/p nephrectomy, bladder cancer s/p nephroureterectomy who presents with burning epigastric pain. pt c/o constipation, abd bloating and tightness. no n/v. patient admits to drink more fluid sec to constipation. 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 urine osmo low   takes na 1g bid at home  free water restriction <1L/day. pt educated  s/p 1L NS  Na is improving  Monitor serum NA    CKD Stage 3  Baseline Scr 1.5-1.7   scr better than baseline  Monitor BMP   Avoid nephrotoxics, NSAIDS RCA    HTN  BP acceptable   Monitor BP    
75 y.o. M w/ a hx of HTN, HLD, asthma, SIADH, L RCC s/p nephrectomy, bladder cancer s/p nephroureterectomy who presents with burning epigastric pain. pt c/o constipation, abd bloating and tightness. no n/v. patient admits to drink more fluid sec to constipation. 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 urine osmo low   free water restriction <1L/day. pt educated  on na tab 1g tid  Na relatively stable.  Monitor serum NA    CKD Stage 3  Baseline Scr 1.5-1.7   scr better than baseline  Monitor BMP   Avoid nephrotoxics, NSAIDS RCA    HTN  BP acceptable   Monitor BP    New lesions  Ct with bladder lesions and multiple osseous lytic lesions and lung nodules,  s/p core needle biopsy of left pubic bone lytic lesion. by IR  f/u heme/onc    
75 y.o. M w/ a hx of HTN, HLD, asthma, SIADH, L RCC s/p nephrectomy, bladder cancer s/p nephroureterectomy who presents with burning epigastric pain. pt c/o constipation, abd bloating and tightness. no n/v. patient admits to drink more fluid sec to constipation. 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 urine osmo low   takes na 1g bid at home  free water restriction <1L/day. pt educated  s/p 1L NS  Na is improving  Monitor serum NA    CKD Stage 3  Baseline Scr 1.5-1.7   scr better than baseline  Monitor BMP   Avoid nephrotoxics, NSAIDS RCA    HTN  BP acceptable   Monitor BP    
75m with  urothelial cancer, Stage cII, started gemcitabine on 09/15/2020, on chemoRT. Completed RT 10/9/20, 20 fractions, prior Left renal pelvis HG urothelial, pT1N0 (15 nodes), November 2019, Left radical nephrectomy, presenting with abdominal pain and blood in urine.   CT scan with Ill-defined hyperattenuating lesion along the left bladder wall, indeterminate on the current examination, neoplasm not excluded. Recommend urological consultation with consideration to cystoscopy.  CT chest  IMPRESSION: Several solid nodules are noted within both lungs. Primary consideration is metastasis.    CT a/p  IMPRESSION:  No bowel obstruction, as clinically questioned.  Ill-defined hyperattenuating lesion along the left bladder wall, indeterminate on the current examination, neoplasm not excluded. Recommend urological consultation with consideration to cystoscopy.  Multiple new lytic osseous lesions, highly concerning for metastases.  Several additional subcentimeter solid nodules in the bilateral lung bases, indeterminate, metastases cannot be excluded.    Bone scan  IMPRESSION: Abnormal bone scan. Extensive osseous metastases in the axial skeleton and proximal shaft of the right femur, all of which are new since the previous bone scan of 7/23/2020. Radiographic evaluation of the right femur to evaluate potential for pathological fracture is suggested.    -IR consult for bx to document metastatic disease, will follow path,   -Next step in treatment of metastatic cancer will be based on pathology results  -Bowel regimen  -Supportive care, pain control, Nutrition, PT, DVT ppx  -Outpatient oncology f/u    Will follow. Please do not hesitate to call back with questions.     Mariann Collins MD  Medical Oncology Attending  C: [phone]    
75 y.o. M w/ a hx of HTN, HLD, asthma, SIADH, L RCC s/p nephrectomy, bladder cancer s/p nephroureterectomy who presents with burning epigastric pain. pt c/o constipation, abd bloating and tightness. no n/v. patient admits to drink more fluid sec to constipation. 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 urine osmo low   takes na 1g bid at home  free water restriction <1L/day. pt educated  s/p 1L NS  Na is improving  Monitor serum NA    CKD Stage 3  Baseline Scr 1.5-1.7   scr better than baseline  Monitor BMP   Avoid nephrotoxics, NSAIDS RCA    HTN  BP acceptable   Monitor BP

## 2021-07-30 NOTE — PROGRESS NOTE ADULT - NSICDXPILOT_GEN_ALL_CORE
[place]

## 2021-07-30 NOTE — PROGRESS NOTE ADULT - PROBLEM SELECTOR PLAN 4
Hx of bladder cancer s/p s/p cystoscopy, Incision of Ureteral Orifice, Left laparoscopic Nephroureterectomy and Retroperitoneal Lymphadenectomy follows OP with Urology and Oncology.   - Bladder lesion noted on CT A/P w/ bone lesions and lung lesions c/f mets   - Patient unaware of these findings but reports plans for ?surgery on 8/3   urology eval  - CT chest noted  - bone scan showed mets   - plan for IR guided biopsy
Hx of bladder cancer s/p s/p cystoscopy, Incision of Ureteral Orifice, Left laparoscopic Nephroureterectomy and Retroperitoneal Lymphadenectomy follows OP with Urology and Oncology.   - Bladder lesion noted on CT A/P w/ bone lesions and lung lesions c/f mets   - Patient unaware of these findings but reports plans for ?surgery on 8/3   urology eval  - CT chest noted  - bone scan showed mets   - plan for IR guided biopsy
Hx of bladder cancer s/p s/p cystoscopy, Incision of Ureteral Orifice, Left laparoscopic Nephroureterectomy and Retroperitoneal Lymphadenectomy follows OP with Urology and Oncology.   - Bladder lesion noted on CT A/P w/ bone lesions and lung lesions c/f mets   - Patient unaware of these findings but reports plans for ?surgery on 8/3   urology eval  - check CT chest
Hx of bladder cancer s/p s/p cystoscopy, Incision of Ureteral Orifice, Left laparoscopic Nephroureterectomy and Retroperitoneal Lymphadenectomy follows OP with Urology and Oncology.   - Bladder lesion noted on CT A/P w/ bone lesions and lung lesions c/f mets   - Patient unaware of these findings but reports plans for ?surgery on 8/3   urology eval
Hx of bladder cancer s/p s/p cystoscopy, Incision of Ureteral Orifice, Left laparoscopic Nephroureterectomy and Retroperitoneal Lymphadenectomy follows OP with Urology and Oncology.   - Bladder lesion noted on CT A/P w/ bone lesions and lung lesions c/f mets   - Patient unaware of these findings but reports plans for ?surgery on 8/3   urology eval  - CT chest noted  - bone scan showed mets   - plan for IR guided biopsy
Hx of bladder cancer s/p s/p cystoscopy, Incision of Ureteral Orifice, Left laparoscopic Nephroureterectomy and Retroperitoneal Lymphadenectomy follows OP with Urology and Oncology.   - Bladder lesion noted on CT A/P w/ bone lesions and lung lesions c/f mets   - Patient unaware of these findings but reports plans for ?surgery on 8/3   urology eval  - CT chest noted  - bone scan showed mets   - plan for IR guided biopsy

## 2021-07-30 NOTE — DISCHARGE NOTE NURSING/CASE MANAGEMENT/SOCIAL WORK - NSSCNAMETXT_GEN_ALL_CORE
[facility]                                                        [facility]

## 2021-07-30 NOTE — DISCHARGE NOTE PROVIDER - NSDCMRMEDTOKEN_GEN_ALL_CORE_FT
Albuterol (Eqv-ProAir HFA) 90 mcg/inh inhalation aerosol: 2 puff(s) inhaled every 6 hours, As Needed  amLODIPine 10 mg oral tablet: 1 tab(s) orally once a day  Breztri Aerosphere inhalation aerosol: 2 puff(s) inhaled 2 times a day  cholecalciferol 1000 intl units (25 mcg) oral tablet: 25 microgram(s) orally once a day   finasteride 5 mg oral tablet: 1 tab(s) orally once a day  fluticasone 0.5 mg/2 mL inhalation suspension:   furosemide 20 mg oral tablet: 1 tab(s) orally once a day  melatonin 3 mg oral tablet: 1 tab(s) orally once a day (at bedtime), As needed, Insomnia  metoprolol succinate 25 mg oral tablet, extended release: 1 tab(s) orally once a day  Multiple Vitamins oral capsule: 1 cap(s) orally once a day  omeprazole 40 mg oral delayed release capsule: 1 cap(s) orally once a day  oxybutynin 10 mg/24 hr oral tablet, extended release: 1 tab(s) orally once a day  oxyCODONE 5 mg oral tablet: 1 tab(s) orally every 6 hours PRN for severe pain MDD:4  polyethylene glycol 3350 oral powder for reconstitution: 17 gram(s) orally once a day (at bedtime)  pravastatin 40 mg oral tablet: 1 tab(s) orally once a day  senna oral tablet: 2 tab(s) orally once a day (at bedtime)  simethicone 80 mg oral tablet, chewable: 1 tab(s) orally 2 times a day, As needed, Gas  sodium chloride 1 g oral tablet: 1 tab(s) orally 3 times a day   tamsulosin 0.4 mg oral capsule: 1 cap(s) orally once a day  Vitamin B12 1000 mcg oral tablet: 1 tab(s) orally once a day  Zofran 4 mg oral tablet: 1 tab(s) orally every 8 hours as needed for nausea

## 2021-07-30 NOTE — PROGRESS NOTE ADULT - SUBJECTIVE AND OBJECTIVE BOX
[facility]   MD LEONELA JULIO DO ANAM SIDDIQUI ANGELA WONG, PA    TEL:  OFFICE: [phone]  DR POST CELL: [phone]  DR. RAMOS CELL: [phone]  DR. MARTINEZ CELL: [phone]  LESVIA WILLOUGHBY CELL: [phone]    From 5pm-7am Answering Service [phone]    -- RENAL FOLLOW UP NOTE ---Date of Service 07-30-21 @ 16:14    Patient is a 75y old  Male who presents with a chief complaint of Chest pain (30 Jul 2021 14:01)      Patient seen and examined at bedside. No chest pain/sob    VITALS:  T(F): 98 (07-30-21 @ 12:35), Max: 98.6 (07-29-21 @ 21:22)  HR: 80 (07-30-21 @ 12:35)  BP: 142/53 (07-30-21 @ 12:35)  RR: 18 (07-30-21 @ 12:35)  SpO2: 98% (07-30-21 @ 12:35)  Wt(kg): --    07-29 @ 07:01  -  07-30 @ 07:00  --------------------------------------------------------  IN: 300 mL / OUT: 1125 mL / NET: -825 mL          PHYSICAL EXAM:  Constitutional: NAD  Neck: No JVD  Respiratory: CTAB, no wheezes, rales or rhonchi  Cardiovascular: S1, S2, RRR  Gastrointestinal: BS+, soft, NT/ND  Extremities: No peripheral edema    Hospital Medications:   MEDICATIONS  (STANDING):  amLODIPine   Tablet 10 milliGRAM(s) Oral daily  atorvastatin 10 milliGRAM(s) Oral at bedtime  budesonide  80 MICROgram(s)/formoterol 4.5 MICROgram(s) Inhaler 2 Puff(s) Inhalation two times a day  cholecalciferol 1000 Unit(s) Oral daily  cyanocobalamin 1000 MICROGram(s) Oral daily  enoxaparin Injectable 40 milliGRAM(s) SubCutaneous daily  finasteride 5 milliGRAM(s) Oral daily  fluticasone propionate 50 MICROgram(s)/spray Nasal Spray 1 Spray(s) Both Nostrils two times a day  furosemide    Tablet 20 milliGRAM(s) Oral daily  metoprolol succinate ER 25 milliGRAM(s) Oral daily  multivitamin 1 Tablet(s) Oral daily  pantoprazole    Tablet 40 milliGRAM(s) Oral before breakfast  polyethylene glycol 3350 17 Gram(s) Oral daily  polyethylene glycol 3350 17 Gram(s) Oral daily  polyethylene glycol 3350 17 Gram(s) Oral at bedtime  senna 2 Tablet(s) Oral at bedtime  sodium chloride 1 Gram(s) Oral three times a day  tamsulosin 0.4 milliGRAM(s) Oral at bedtime      LABS:  07-29    131<L>  |  94<L>  |  19  ----------------------------<  113<H>  3.9   |  23  |  1.44<H>    Ca    10.0      29 Jul 2021 04:25  Phos  3.7     07-29  Mg     2.10     07-29      Creatinine Trend: 1.44 <--, 1.34 <--, 1.35 <--, 1.36 <--, 1.45 <--, 1.26 <--, 1.12 <--, 1.20 <--, 1.29 <--                                9.3    8.93  )-----------( 410      ( 29 Jul 2021 04:25 )             27.3     Urine Studies:  Urinalysis - [07-24-21 @ 11:43]      Color Light Red / Appearance Slightly Turbid / SG 1.007 / pH 8.0      Gluc Negative / Ketone Negative  / Bili Negative / Urobili <2 mg/dL       Blood Large / Protein 30 mg/dL / Leuk Est Negative / Nitrite Negative      RBC >720 / WBC 4 / Hyaline  / Gran  / Sq Epi  / Non Sq Epi 0 / Bacteria Negative    Urine Osmolality 188      [07-24-21 @ 07:54]    Ferritin 469      [01-13-21 @ 07:27]  Vitamin D (25OH) 26.2      [01-05-21 @ 11:00]  HbA1c 6.3      [09-13-17 @ 08:15]        RADIOLOGY & ADDITIONAL STUDIES:

## 2021-07-30 NOTE — DISCHARGE NOTE PROVIDER - CARE PROVIDER_API CALL
Timur Holley (MD)  Hematology; Internal Medicine; Medical Oncology  [address] [place] [address]  Phone: [phone]  Fax: [phone]  Follow Up Time:

## 2021-07-30 NOTE — PROGRESS NOTE ADULT - ATTENDING COMMENTS
Pt care and plan discussed and reviewed with NP. Plan as outlined above edited by me to reflect our discussion.
Hyponatremia: Continue salt tab, monitor Na level
Plan: 75y Male with metastatic urothelial CA presents for biopsy of left pubic ramus soft tissue lesion  -Risks/Benefits/alternatives explained with the patient with Yi  and witnessed informed consent obtained.

## 2021-07-30 NOTE — DISCHARGE NOTE PROVIDER - NSDCFUSCHEDAPPT_GEN_ALL_CORE_FT
MD TITUS S ; 08/01/2021 ; LINETTE QURESHI MD S ; 08/06/2021 ; LINETTE Amb Surg TREVOR QURESHI MD S ; 08/06/2021 ; NPP Urology [address]  MD TITUS S ; 08/17/2021 ; NPJUAN PABLO [facility]  MD TITUS S ; 08/26/2021 ; NPP [address]

## 2021-07-30 NOTE — DISCHARGE NOTE NURSING/CASE MANAGEMENT/SOCIAL WORK - NSSCTYPOFSERV_GEN_ALL_CORE
Authorization obtained for [employer] by Doris.               Confirmed by Ms Deisi coordinator.

## 2021-07-30 NOTE — DISCHARGE NOTE PROVIDER - NSDCCPCAREPLAN_GEN_ALL_CORE_FT
PRINCIPAL DISCHARGE DIAGNOSIS  Diagnosis: Hyponatremia  Assessment and Plan of Treatment: -fluid restriction to 1L/day   Increase Salt tab to 3 x day for now and repeat Labs with PCP in 1 week for further instriuctions      SECONDARY DISCHARGE DIAGNOSES  Diagnosis: Bone lesion  Assessment and Plan of Treatment: Please make a follow up appt with Dr Holley [phone] [facility] in 7-10 days to discuss further plan of treatment  Take Tylenol for mild and moderate pain and Oxycintin for severe pain  Take stool softenered to prevent constipation    Diagnosis: HTN (Hypertension)  Assessment and Plan of Treatment: continue -amlodipine as ordered

## 2021-07-30 NOTE — PROGRESS NOTE ADULT - SUBJECTIVE AND OBJECTIVE BOX
Patient is a 75y old  Male who presents with a chief complaint of Chest pain (29 Jul 2021 17:28)      INTERVAL HISTORY: feels ok     REVIEW OF SYSTEMS:   CONSTITUTIONAL: No weakness  EYES/ENT: No visual changes; No throat pain  Neck: No pain or stiffness  Respiratory: No cough, wheezing, No shortness of breath  CARDIOVASCULAR: no chest pain or palpitations  GASTROINTESTINAL: No abdominal pain, no nausea, vomiting or hematemesis  GENITOURINARY: No dysuria, frequency or hematuria  NEUROLOGICAL: No stroke like symptoms  SKIN: No rashes    	  MEDICATIONS:  amLODIPine   Tablet 10 milliGRAM(s) Oral daily  furosemide    Tablet 20 milliGRAM(s) Oral daily  metoprolol succinate ER 25 milliGRAM(s) Oral daily  tamsulosin 0.4 milliGRAM(s) Oral at bedtime    PHYSICAL EXAM:  T(C): 36.7 (07-30-21 @ 05:12), Max: 37 (07-29-21 @ 15:03)  HR: 76 (07-30-21 @ 05:12) (76 - 90)  BP: 144/64 (07-30-21 @ 05:12) (131/52 - 159/63)  RR: 18 (07-30-21 @ 05:12) (18 - 18)  SpO2: 97% (07-30-21 @ 05:12) (96% - 99%)  Wt(kg): --  I&O's Summary    29 Jul 2021 07:01  -  30 Jul 2021 07:00  --------------------------------------------------------  IN: 300 mL / OUT: 1125 mL / NET: -825 mL    Appearance: In no distress	  HEENT:    PERRL, EOMI	  Cardiovascular:  S1 S2, No JVD  Respiratory: Lungs clear to auscultation	  Gastrointestinal:  Soft, Non-tender, + BS	  Vascularature:  No edema of LE  Psychiatric: Appropriate affect   Neuro: no acute focal deficits                         9.3    8.93  )-----------( 410      ( 29 Jul 2021 04:25 )             27.3     07-29    131<L>  |  94<L>  |  19  ----------------------------<  113<H>  3.9   |  23  |  1.44<H>    Ca    10.0      29 Jul 2021 04:25  Phos  3.7     07-29  Mg     2.10     07-29  Labs personally reviewed    ASSESSMENT/PLAN: 	  Problem/Plan - 1:  ·  Problem: Abdominal pain.  Plan: 1 week of generalized abdominal pain in the setting of constipation. CT A/P without obstruction- noted to have distended bladder. PVR in .   - Bowel regimen: senna, miralax, suppository x 1   - Hold Oxybutynin in setting of constipation   - Ctm bladder scans.     Problem/Plan - 2:  ·  Problem: Chest pain.  Plan: Generalized sharp chest pain for 1 week, noncardiac in nature   - EKG w/ TWI, V2, v4-v6, unchanged from prior   - given metastatic CA will manage medically     Problem/Plan - 3:  ·  Problem: Bladder cancer.  Plan: Hx of bladder cancer s/p s/p cystoscopy, Incision of Ureteral Orifice, Left laparoscopic Nephroureterectomy and Retroperitoneal Lymphadenectomy follows OP with Urology and Oncology.   - Bladder lesion noted on CT A/P w/ bone lesions and lung lesions c/f mets   - Patient unaware of these findings but reports plans for ?surgery on 8/3   urology eval  - s/p bone bx of Left pubic bone lesion   - CT chest with multiple nodules- consistent with mets   pending path report-   f/u heme/onc reccs     Problem/Plan -4:  ·  Problem: HTN (Hypertension).  Plan: Cont home amlodipine.   well controlled         Tara Doe ANP-C  [facility] DO [facility]  Cardiovascular Medicine  [address] [place] [address], [address]  Office: [phone]  Cell: [phone]

## 2021-07-30 NOTE — PROGRESS NOTE ADULT - PROBLEM SELECTOR PLAN 1
Hx of SIADH on NaCL tabs at home, has not been adherent to fluid restriction 2/2 poor PO intake, abdominal pain. Na improved w/ IVF resuscitation which would be atypical for SIADH. Currently appears euvolemic though may have been slightly hypovolemic on admission? Sosm low, urine osm >100  - Monitor BMP, will repeat at 1800   - Cont salt tabs   - Cont fluid restriction to 1L/day   - Nephrology eval a[ppreciated
Hx of SIADH on NaCL tabs at home, has not been adherent to fluid restriction 2/2 poor PO intake, abdominal pain. Na improved w/ IVF resuscitation which would be atypical for SIADH. Currently appears euvolemic though may have been slightly hypovolemic on admission? Sosm low, urine osm >100  - Monitor BMP, will repeat at 1800   - Cont salt tabs   - Cont fluid restriction to 1L/day   - Nephrology eval a[appreciated
Hx of SIADH on NaCL tabs at home, has not been adherent to fluid restriction 2/2 poor PO intake, abdominal pain. Na improved w/ IVF resuscitation which would be atypical for SIADH. Currently appears euvolemic though may have been slightly hypovolemic on admission? Sosm low, urine osm >100  - Monitor BMP, will repeat at 1800   - Cont salt tabs   - Cont fluid restriction to 1L/day   - Nephrology eval a[appreciated
Hx of SIADH on NaCL tabs at home, has not been adherent to fluid restriction 2/2 poor PO intake, abdominal pain. Na improved w/ IVF resuscitation which would be atypical for SIADH. Currently appears euvolemic though may have been slightly hypovolemic on admission? Sosm low, urine osm >100  - Monitor BMP, will repeat at 1800   - Cont salt tabs   - Cont fluid restriction to 1L/day   - Nephrology eval a[ppreciated   [ ] 1800 BMP, urine Na
Hx of SIADH on NaCL tabs at home, has not been adherent to fluid restriction 2/2 poor PO intake, abdominal pain. Na improved w/ IVF resuscitation which would be atypical for SIADH. Currently appears euvolemic though may have been slightly hypovolemic on admission? Sosm low, urine osm >100  - Monitor BMP, will repeat at 1800   - Cont salt tabs   - Cont fluid restriction to 1L/day   - Nephrology eval a[appreciated
Hx of SIADH on NaCL tabs at home, has not been adherent to fluid restriction 2/2 poor PO intake, abdominal pain. Na improved w/ IVF resuscitation which would be atypical for SIADH. Currently appears euvolemic though may have been slightly hypovolemic on admission? Sosm low, urine osm >100  - Monitor BMP, will repeat at 1800   - Cont salt tabs   - Cont fluid restriction to 1L/day   - Nephrology eval a[ppreciated

## 2021-07-30 NOTE — PROGRESS NOTE ADULT - PROVIDER SPECIALTY LIST ADULT
Cardiology
Heme/Onc
Internal Medicine
Nephrology
Cardiology
Nephrology
Intervent Radiology
Nephrology
Internal Medicine

## 2021-07-30 NOTE — DISCHARGE NOTE PROVIDER - HOSPITAL COURSE
75 y.o. M w/ a hx of HTN, HLD, asthma, SIADH, L RCC s/p nephrectomy, bladder cancer s/p nephroureterectomy who presents with burning epigastric pain, chest pain. pt c/o constipation, abd bloating and tightness. no n/v. patient admits to drink more fluid sec to constipation. nephrology consulted for hyponatremia  +Hyponatremia - salt tabs TID, on fluid restriction s/p 1L NS, Renal following  +Bladder cancer - Onc following, s/p IR bx on 7/29, + fore malignant cells, as per on, pt to be dc with outpt follow up with Dr Holley for further management   Pt is optimized for dc home w/ HC

## 2021-07-30 NOTE — PROGRESS NOTE ADULT - SUBJECTIVE AND OBJECTIVE BOX
Name of Patient : MD QURESHI  MRN: [id number]  DATE OF SERVICE: 07-30-21     Subjective: Patient seen and examined. No new events except as noted.   doing okay   S/P biopsy     REVIEW OF SYSTEMS:    CONSTITUTIONAL: No weakness, fevers or chills  EYES/ENT: No visual changes;  No vertigo or throat pain   NECK: No pain or stiffness  RESPIRATORY: No cough, wheezing, hemoptysis; No shortness of breath  CARDIOVASCULAR: No chest pain or palpitations  GASTROINTESTINAL: No abdominal or epigastric pain.   GENITOURINARY: No dysuria, frequency or hematuria  NEUROLOGICAL: No numbness or weakness  SKIN: No itching, burning, rashes, or lesions   All other review of systems is negative unless indicated above.    MEDICATIONS:  MEDICATIONS  (STANDING):  amLODIPine   Tablet 10 milliGRAM(s) Oral daily  atorvastatin 10 milliGRAM(s) Oral at bedtime  budesonide  80 MICROgram(s)/formoterol 4.5 MICROgram(s) Inhaler 2 Puff(s) Inhalation two times a day  cholecalciferol 1000 Unit(s) Oral daily  cyanocobalamin 1000 MICROGram(s) Oral daily  enoxaparin Injectable 40 milliGRAM(s) SubCutaneous daily  finasteride 5 milliGRAM(s) Oral daily  fluticasone propionate 50 MICROgram(s)/spray Nasal Spray 1 Spray(s) Both Nostrils two times a day  furosemide    Tablet 20 milliGRAM(s) Oral daily  metoprolol succinate ER 25 milliGRAM(s) Oral daily  multivitamin 1 Tablet(s) Oral daily  pantoprazole    Tablet 40 milliGRAM(s) Oral before breakfast  polyethylene glycol 3350 17 Gram(s) Oral daily  polyethylene glycol 3350 17 Gram(s) Oral daily  polyethylene glycol 3350 17 Gram(s) Oral at bedtime  senna 2 Tablet(s) Oral at bedtime  sodium chloride 1 Gram(s) Oral three times a day  tamsulosin 0.4 milliGRAM(s) Oral at bedtime      PHYSICAL EXAM:  T(C): 36.7 (07-30-21 @ 12:35), Max: 37 (07-29-21 @ 21:22)  HR: 80 (07-30-21 @ 12:35) (76 - 87)  BP: 142/53 (07-30-21 @ 12:35) (142/53 - 159/63)  RR: 18 (07-30-21 @ 12:35) (18 - 18)  SpO2: 98% (07-30-21 @ 12:35) (96% - 98%)  Wt(kg): --  I&O's Summary    29 Jul 2021 07:01  -  30 Jul 2021 07:00  --------------------------------------------------------  IN: 300 mL / OUT: 1125 mL / NET: -825 mL          Appearance: Normal	  HEENT:  PERRLA   Lymphatic: No lymphadenopathy   Cardiovascular: Normal S1 S2, no JVD  Respiratory: normal effort , clear  Gastrointestinal:  Soft, Non-tender  Skin: No rashes,  warm to touch  Psychiatry:  Mood & affect appropriate  Musculuskeletal: No edema      All labs, Imaging and EKGs personally reviewed     07-29-21 @ 07:01  -  07-30-21 @ 07:00  --------------------------------------------------------  IN: 300 mL / OUT: 1125 mL / NET: -825 mL                            9.3    8.93  )-----------( 410      ( 29 Jul 2021 04:25 )             27.3               07-29    131<L>  |  94<L>  |  19  ----------------------------<  113<H>  3.9   |  23  |  1.44<H>    Ca    10.0      29 Jul 2021 04:25  Phos  3.7     07-29  Mg     2.10     07-29      PT/INR - ( 29 Jul 2021 04:25 )   PT: 14.2 sec;   INR: 1.25 ratio         PTT - ( 29 Jul 2021 04:25 )  PTT:29.6 sec

## 2021-08-05 ENCOUNTER — TRANSCRIPTION ENCOUNTER (OUTPATIENT)
Age: 76
End: 2021-08-05

## 2021-08-05 ENCOUNTER — OUTPATIENT (OUTPATIENT)
Dept: OUTPATIENT SERVICES | Facility: HOSPITAL | Age: 76
LOS: 1 days | End: 2021-08-05

## 2021-08-05 VITALS
HEART RATE: 89 BPM | TEMPERATURE: 98 F | HEIGHT: 64 IN | SYSTOLIC BLOOD PRESSURE: 120 MMHG | RESPIRATION RATE: 14 BRPM | WEIGHT: 139.99 LBS | OXYGEN SATURATION: 98 % | DIASTOLIC BLOOD PRESSURE: 80 MMHG

## 2021-08-05 DIAGNOSIS — Z90.5 ACQUIRED ABSENCE OF KIDNEY: Chronic | ICD-10-CM

## 2021-08-05 DIAGNOSIS — Z98.890 OTHER SPECIFIED POSTPROCEDURAL STATES: Chronic | ICD-10-CM

## 2021-08-05 DIAGNOSIS — C67.8 MALIGNANT NEOPLASM OF OVERLAPPING SITES OF BLADDER: ICD-10-CM

## 2021-08-05 LAB
ALBUMIN SERPL ELPH-MCNC: 4.1 G/DL — SIGNIFICANT CHANGE UP (ref 3.3–5)
ALP SERPL-CCNC: 155 U/L — HIGH (ref 40–120)
ALT FLD-CCNC: 17 U/L — SIGNIFICANT CHANGE UP (ref 4–41)
ANION GAP SERPL CALC-SCNC: 15 MMOL/L — HIGH (ref 7–14)
AST SERPL-CCNC: 21 U/L — SIGNIFICANT CHANGE UP (ref 4–40)
BILIRUB SERPL-MCNC: 0.2 MG/DL — SIGNIFICANT CHANGE UP (ref 0.2–1.2)
BUN SERPL-MCNC: 29 MG/DL — HIGH (ref 7–23)
CALCIUM SERPL-MCNC: 10.4 MG/DL — SIGNIFICANT CHANGE UP (ref 8.4–10.5)
CHLORIDE SERPL-SCNC: 96 MMOL/L — LOW (ref 98–107)
CO2 SERPL-SCNC: 21 MMOL/L — LOW (ref 22–31)
CREAT SERPL-MCNC: 1.35 MG/DL — HIGH (ref 0.5–1.3)
GLUCOSE SERPL-MCNC: 114 MG/DL — HIGH (ref 70–99)
HCT VFR BLD CALC: 29.8 % — LOW (ref 39–50)
HGB BLD-MCNC: 9.6 G/DL — LOW (ref 13–17)
MCHC RBC-ENTMCNC: 29.2 PG — SIGNIFICANT CHANGE UP (ref 27–34)
MCHC RBC-ENTMCNC: 32.2 GM/DL — SIGNIFICANT CHANGE UP (ref 32–36)
MCV RBC AUTO: 90.6 FL — SIGNIFICANT CHANGE UP (ref 80–100)
NRBC # BLD: 0 /100 WBCS — SIGNIFICANT CHANGE UP
NRBC # FLD: 0 K/UL — SIGNIFICANT CHANGE UP
PLATELET # BLD AUTO: 511 K/UL — HIGH (ref 150–400)
POTASSIUM SERPL-MCNC: 4.2 MMOL/L — SIGNIFICANT CHANGE UP (ref 3.5–5.3)
POTASSIUM SERPL-SCNC: 4.2 MMOL/L — SIGNIFICANT CHANGE UP (ref 3.5–5.3)
PROT SERPL-MCNC: 7.9 G/DL — SIGNIFICANT CHANGE UP (ref 6–8.3)
RBC # BLD: 3.29 M/UL — LOW (ref 4.2–5.8)
RBC # FLD: 14.6 % — HIGH (ref 10.3–14.5)
SODIUM SERPL-SCNC: 132 MMOL/L — LOW (ref 135–145)
WBC # BLD: 9.35 K/UL — SIGNIFICANT CHANGE UP (ref 3.8–10.5)
WBC # FLD AUTO: 9.35 K/UL — SIGNIFICANT CHANGE UP (ref 3.8–10.5)

## 2021-08-05 RX ORDER — FINASTERIDE 5 MG/1
1 TABLET, FILM COATED ORAL
Qty: 0 | Refills: 0 | DISCHARGE

## 2021-08-05 RX ORDER — METOPROLOL TARTRATE 50 MG
1 TABLET ORAL
Qty: 0 | Refills: 0 | DISCHARGE

## 2021-08-05 RX ORDER — BUDESONIDE, GLYCOPYRROLATE, AND FORMOTEROL FUMARATE 160; 9; 4.8 UG/1; UG/1; UG/1
2 AEROSOL, METERED RESPIRATORY (INHALATION)
Qty: 0 | Refills: 0 | DISCHARGE

## 2021-08-05 RX ORDER — TAMSULOSIN HYDROCHLORIDE 0.4 MG/1
1 CAPSULE ORAL
Qty: 0 | Refills: 0 | DISCHARGE

## 2021-08-05 RX ORDER — AMLODIPINE BESYLATE 2.5 MG/1
1 TABLET ORAL
Qty: 0 | Refills: 0 | DISCHARGE

## 2021-08-05 RX ORDER — SODIUM CHLORIDE 9 MG/ML
1000 INJECTION, SOLUTION INTRAVENOUS
Refills: 0 | Status: DISCONTINUED | OUTPATIENT
Start: 2021-08-06 | End: 2021-08-20

## 2021-08-05 RX ORDER — FLUTICASONE PROPIONATE AND SALMETEROL 50; 250 UG/1; UG/1
1 POWDER ORAL; RESPIRATORY (INHALATION)
Qty: 0 | Refills: 0 | DISCHARGE

## 2021-08-05 RX ORDER — OMEPRAZOLE 10 MG/1
1 CAPSULE, DELAYED RELEASE ORAL
Qty: 0 | Refills: 0 | DISCHARGE

## 2021-08-05 RX ORDER — PREGABALIN 225 MG/1
1 CAPSULE ORAL
Qty: 0 | Refills: 0 | DISCHARGE

## 2021-08-05 RX ORDER — OXYBUTYNIN CHLORIDE 5 MG
1 TABLET ORAL
Qty: 0 | Refills: 0 | DISCHARGE

## 2021-08-05 RX ORDER — FLUTICASONE PROPIONATE 220 MCG
0 AEROSOL WITH ADAPTER (GRAM) INHALATION
Qty: 0 | Refills: 0 | DISCHARGE

## 2021-08-05 RX ORDER — ALBUTEROL 90 UG/1
2 AEROSOL, METERED ORAL
Qty: 0 | Refills: 0 | DISCHARGE

## 2021-08-05 NOTE — H&P PST ADULT - NSICDXPROBLEM_GEN_ALL_CORE_FT
PROBLEM DIAGNOSES  Problem: Malignant neoplasm of overlapping sites of bladder  Assessment and Plan: Cystoscopy ,transurethral resection of bladder tumor   Preop instructions provided and patient verbalizes understanding.  Labs done and results pending.  One Day pre -op discussed with Dr Amos . Bloods sent stat       R/O PROBLEM DIAGNOSES  Problem: R/O History of SIADH  Assessment and Plan: bloods pending , discussed with Dr Amos .     Problem: R/O Hypertension  Assessment and Plan: pt instructed to take metoprolol and amlodipine day ofsurgery ,monitor BP during hospital stay .     Problem: R/O Hyponatremia  Assessment and Plan: pt instructed to take sodium chloride day of surgery .     Problem: R/O DAYA (obstructive sleep apnea)  Assessment and Plan: STOP bang questionaire positive ; recommend DAYA precautions .

## 2021-08-05 NOTE — H&P PST ADULT - REASON FOR ADMISSION
" I am having surgery for the bladder ". Pt speaks [facility]  , Andrea #[id number]. Pt Is requesting , Grandson , Chuck Landaverde to translate as per pt request . Pt is vague historian.

## 2021-08-05 NOTE — H&P PST ADULT - NSICDXPASTMEDICALHX_GEN_ALL_CORE_FT
PAST MEDICAL HISTORY:  Anxiety     Asthma denies recent asthma exacerbation    Bladder tumor     Cancer of kidney     Essential hypertension     Gross hematuria     History of BPH     History of SIADH     History of stomach ulcers denies recent endoscopy    HTN (Hypertension)     Hypercholesterolemia     Hyponatremia admission x 2 last 2017, 7/21    Left renal mass     Urinary tract infection with hematuria

## 2021-08-05 NOTE — H&P PST ADULT - HISTORY OF PRESENT ILLNESS
This is a 75 y.o. male with history of malignant neoplasm of overlapping sites of bladder s/p laparoscopic left nephroureterectomy with periaortic lymph node dissection 10/28/19 , s/p TURBT 6/15/20 . PT completed radiation and chemo 10/20 . Pt had surveillance cystoscopy 3/22/21 with no evidence of bladder recurrence. Pt had recent hematuria and was evaluated by DR Moya 4/21. Pt had follow up cystoscopy 6/21 . Pt has malignant neoplasm overlapping sites of bladder now for surgery .

## 2021-08-05 NOTE — H&P PST ADULT - NSICDXPASTSURGICALHX_GEN_ALL_CORE_FT
PAST SURGICAL HISTORY:  H/O cystoscopy multiple , last 6/21    H/O left nephrectomy     History of prostate surgery ? exact procedure 3/19    S/P cystoscopy Insertion left ureteral stent ; biopsy 8/19,

## 2021-08-06 ENCOUNTER — APPOINTMENT (OUTPATIENT)
Dept: UROLOGY | Facility: HOSPITAL | Age: 76
End: 2021-08-06

## 2021-08-06 ENCOUNTER — RESULT REVIEW (OUTPATIENT)
Age: 76
End: 2021-08-06

## 2021-08-06 ENCOUNTER — OUTPATIENT (OUTPATIENT)
Dept: OUTPATIENT SERVICES | Facility: HOSPITAL | Age: 76
LOS: 1 days | Discharge: HOME | End: 2021-08-06
Payer: MEDICARE

## 2021-08-06 VITALS
HEIGHT: 64 IN | SYSTOLIC BLOOD PRESSURE: 137 MMHG | TEMPERATURE: 98 F | WEIGHT: 138.89 LBS | DIASTOLIC BLOOD PRESSURE: 51 MMHG | HEART RATE: 77 BPM | OXYGEN SATURATION: 97 % | RESPIRATION RATE: 16 BRPM

## 2021-08-06 VITALS
HEART RATE: 94 BPM | DIASTOLIC BLOOD PRESSURE: 69 MMHG | SYSTOLIC BLOOD PRESSURE: 155 MMHG | TEMPERATURE: 98 F | OXYGEN SATURATION: 98 % | RESPIRATION RATE: 16 BRPM

## 2021-08-06 DIAGNOSIS — Z98.890 OTHER SPECIFIED POSTPROCEDURAL STATES: Chronic | ICD-10-CM

## 2021-08-06 DIAGNOSIS — C67.8 MALIGNANT NEOPLASM OF OVERLAPPING SITES OF BLADDER: ICD-10-CM

## 2021-08-06 DIAGNOSIS — Z90.5 ACQUIRED ABSENCE OF KIDNEY: Chronic | ICD-10-CM

## 2021-08-06 PROCEDURE — 52235 CYSTOSCOPY AND TREATMENT: CPT

## 2021-08-06 PROCEDURE — 88307 TISSUE EXAM BY PATHOLOGIST: CPT | Mod: 26

## 2021-08-06 RX ORDER — ONDANSETRON 8 MG/1
4 TABLET, FILM COATED ORAL ONCE
Refills: 0 | Status: DISCONTINUED | OUTPATIENT
Start: 2021-08-06 | End: 2021-08-20

## 2021-08-06 RX ORDER — FENTANYL CITRATE 50 UG/ML
50 INJECTION INTRAVENOUS ONCE
Refills: 0 | Status: DISCONTINUED | OUTPATIENT
Start: 2021-08-06 | End: 2021-08-06

## 2021-08-06 RX ORDER — FENTANYL CITRATE 50 UG/ML
25 INJECTION INTRAVENOUS
Refills: 0 | Status: DISCONTINUED | OUTPATIENT
Start: 2021-08-06 | End: 2021-08-06

## 2021-08-06 RX ORDER — LIDOCAINE HCL 20 MG/ML
20 VIAL (ML) INJECTION ONCE
Refills: 0 | Status: DISCONTINUED | OUTPATIENT
Start: 2021-08-06 | End: 2021-08-20

## 2021-08-06 RX ORDER — AMLODIPINE BESYLATE 2.5 MG/1
1 TABLET ORAL
Qty: 0 | Refills: 0 | DISCHARGE

## 2021-08-06 RX ADMIN — FENTANYL CITRATE 25 MICROGRAM(S): 50 INJECTION INTRAVENOUS at 09:39

## 2021-08-06 RX ADMIN — FENTANYL CITRATE 50 MICROGRAM(S): 50 INJECTION INTRAVENOUS at 09:24

## 2021-08-06 RX ADMIN — FENTANYL CITRATE 25 MICROGRAM(S): 50 INJECTION INTRAVENOUS at 11:43

## 2021-08-06 NOTE — ASU DISCHARGE PLAN (ADULT/PEDIATRIC) - CARE PROVIDER_API CALL
Zachery Moya)  Urology  [address] [place] [address], [place] [address]  Phone: [phone]  Fax: [phone]  Follow Up Time:

## 2021-08-06 NOTE — ASU DISCHARGE PLAN (ADULT/PEDIATRIC) - ASU DC SPECIAL INSTRUCTIONSFT
Discharge Instructions: TURP/TURBT    •	Catheter: Some patients are sent home with a pineda catheter while others go home urinating on their own. If you still have a catheter, the nurses will review instructions and care before you go home. For men, you will have a prescription for 1% lidocaine jelly to apply to the tip of your penis as needed for catheter related discomfort.  •	General: It is common to have blood in the urine after your procedure. It may be pink or even red; inform your doctor if you have a significant amount of clot in the urine or if you are unable to void at all or if your catheter stops draining. It is not uncommon to have some burning when you urinate, this will gradually improve. With a catheter in place, it is not uncommon to have occasional leakage of urine or blood around the catheter. Please call your urologist if this is excessive and/or the urine is not draining through the catheter into the bag.  •	Bathing: You may shower or bathe. If going home with Pineda, shower only until catheter is removed.  •	Diet: You may resume your regular diet and regular medication regimen.  •	Pain: You may take Tylenol (acetaminophen) 650-975mg and/or Motrin (ibuprofen) 400-600mg, available over the counter, for pain every 6 hours as needed. Do not exceed 4000 milligrams of Tylenol (acetaminophen) daily. You may alternate these medications such that you take either one every 3 hours.  •	Antibiotics: You may be given a prescription for an antibiotic, please take this medication as instructed and be sure to complete entire course.  •	Stool softeners: Do not allow yourself to become constipated as straining will cause bleeding. Take stool softeners (ex. Colace) or a laxative (ex. Senekot, ExLax), available over the counter, if needed.  •	Activity: No heavy lifting or strenuous exercise until you are evaluated at your post-operative appointment. Otherwise, you may return to your usual level of activity.  •	Anticoagulation: If you are taking any blood thinning medications, please discuss with your urologist prior to restarting these medications unless otherwise specified.  •	Follow-up: If you did not already schedule your post-operative appointment, please call your urologist to schedule a follow-up appointment.  •	Call your urologist if: You have any bleeding that does not stop, inability to void >8 hours, fever over 100.4 F, chills, persistent nausea/vomiting, or if your pain is not controlled on your discharge pain medications. Discharge Instructions: TURP/TURBT    ** You were unable to void after surgery and required a pineda catheter to be placed.  ** You will follow up with Dr. Moya in his office on Monday 8/9 at 9:00 am for catheter removal.        •	Catheter: Some patients are sent home with a pineda catheter while others go home urinating on their own. You have a catheter, the nurses will review instructions and care before you go home. For men, you will have a prescription for 1% lidocaine jelly to apply to the tip of your penis as needed for catheter related discomfort.  •	General: It is common to have blood in the urine after your procedure. It may be pink or even red; inform your doctor if you have a significant amount of clot in the urine or if you are unable to void at all or if your catheter stops draining. It is not uncommon to have some burning when you urinate, this will gradually improve. With a catheter in place, it is not uncommon to have occasional leakage of urine or blood around the catheter. Please call your urologist if this is excessive and/or the urine is not draining through the catheter into the bag.  •	Bathing: You may shower or bathe. If going home with Pineda, shower only until catheter is removed.  •	Diet: You may resume your regular diet and regular medication regimen.  •	Pain: You may take Tylenol (acetaminophen) 650-975mg and/or Motrin (ibuprofen) 400-600mg, available over the counter, for pain every 6 hours as needed. Do not exceed 4000 milligrams of Tylenol (acetaminophen) daily. You may alternate these medications such that you take either one every 3 hours.  •	Antibiotics: You may be given a prescription for an antibiotic, please take this medication as instructed and be sure to complete entire course.  •	Stool softeners: Do not allow yourself to become constipated as straining will cause bleeding. Take stool softeners (ex. Colace) or a laxative (ex. Senekot, ExLax), available over the counter, if needed.  •	Activity: No heavy lifting or strenuous exercise until you are evaluated at your post-operative appointment. Otherwise, you may return to your usual level of activity.  •	Anticoagulation: If you are taking any blood thinning medications, please discuss with your urologist prior to restarting these medications unless otherwise specified.  •	Follow-up: If you did not already schedule your post-operative appointment, please call your urologist to schedule a follow-up appointment.  •	Call your urologist if: You have any bleeding that does not stop, inability to void >8 hours, fever over 100.4 F, chills, persistent nausea/vomiting, or if your pain is not controlled on your discharge pain medications.

## 2021-08-06 NOTE — ASU DISCHARGE PLAN (ADULT/PEDIATRIC) - NURSING INSTRUCTIONS
Watch for signs of infection; redness, swelling, fever, chills or heat, report such symptoms to the MD.  Drink 6-8 glasses of fluids daily to promote hydration. No heavy lifting, pulling or pushing heavy objects. Follow up with surgical MD.  Written & verbal pineda care instructions provided. Notify MD if catheter accidentally comes out or if bright bleeding occurs or the catheter is not draining. Do Not lie down with leg bag in place, urine may flow back into the bladder & infection can occur. Place larger bedside bag in place when going to bed or lying down. Swab ends of drainage bags with alcohol wipes before changes Cleanse bag with mild soap & water between changes & rinse with one part water to one part white vinegar. Wash hands before & after care.

## 2021-08-06 NOTE — ASU PREOP CHECKLIST - COMMENTS
took famotidine this am with sip water took famotidine amlodipine metoprolol and sodium chloride this am with sip water

## 2021-08-09 ENCOUNTER — APPOINTMENT (OUTPATIENT)
Dept: UROLOGY | Facility: CLINIC | Age: 76
End: 2021-08-09
Payer: MEDICARE

## 2021-08-09 ENCOUNTER — OUTPATIENT (OUTPATIENT)
Dept: OUTPATIENT SERVICES | Facility: HOSPITAL | Age: 76
LOS: 1 days | End: 2021-08-09
Payer: COMMERCIAL

## 2021-08-09 VITALS — DIASTOLIC BLOOD PRESSURE: 70 MMHG | SYSTOLIC BLOOD PRESSURE: 124 MMHG | HEART RATE: 110 BPM

## 2021-08-09 DIAGNOSIS — Z98.890 OTHER SPECIFIED POSTPROCEDURAL STATES: Chronic | ICD-10-CM

## 2021-08-09 DIAGNOSIS — Z90.5 ACQUIRED ABSENCE OF KIDNEY: Chronic | ICD-10-CM

## 2021-08-09 DIAGNOSIS — C67.8 MALIGNANT NEOPLASM OF OVERLAPPING SITES OF BLADDER: ICD-10-CM

## 2021-08-09 DIAGNOSIS — R35.0 FREQUENCY OF MICTURITION: ICD-10-CM

## 2021-08-09 PROBLEM — E87.1 HYPO-OSMOLALITY AND HYPONATREMIA: Chronic | Status: ACTIVE | Noted: 2019-08-29

## 2021-08-09 PROCEDURE — 51700 IRRIGATION OF BLADDER: CPT

## 2021-08-09 NOTE — ASSESSMENT
[FreeTextEntry1] : Bladder filled, catheter removed.\par Voided x 2.\par Patient will follow up with Dr. Holley on 8/12

## 2021-08-09 NOTE — HISTORY OF PRESENT ILLNESS
[FreeTextEntry1] : s/p TURBT 8/6/2021\par Discharged with Contreras catheter.\par Urine is clear today.\par No abdominal or flank pain.

## 2021-08-10 ENCOUNTER — OUTPATIENT (OUTPATIENT)
Dept: OUTPATIENT SERVICES | Facility: HOSPITAL | Age: 76
LOS: 1 days | Discharge: HOME | End: 2021-08-10

## 2021-08-10 ENCOUNTER — EMERGENCY (EMERGENCY)
Facility: HOSPITAL | Age: 76
LOS: 1 days | Discharge: HOME | End: 2021-08-10
Attending: EMERGENCY MEDICINE | Admitting: EMERGENCY MEDICINE
Payer: MEDICARE

## 2021-08-10 VITALS
OXYGEN SATURATION: 100 % | RESPIRATION RATE: 18 BRPM | DIASTOLIC BLOOD PRESSURE: 73 MMHG | TEMPERATURE: 98 F | SYSTOLIC BLOOD PRESSURE: 154 MMHG | HEART RATE: 92 BPM

## 2021-08-10 VITALS
SYSTOLIC BLOOD PRESSURE: 150 MMHG | OXYGEN SATURATION: 100 % | HEART RATE: 98 BPM | RESPIRATION RATE: 18 BRPM | DIASTOLIC BLOOD PRESSURE: 72 MMHG | HEIGHT: 64 IN | TEMPERATURE: 98 F

## 2021-08-10 DIAGNOSIS — Z98.890 OTHER SPECIFIED POSTPROCEDURAL STATES: Chronic | ICD-10-CM

## 2021-08-10 DIAGNOSIS — Z90.5 ACQUIRED ABSENCE OF KIDNEY: Chronic | ICD-10-CM

## 2021-08-10 DIAGNOSIS — C67.9 MALIGNANT NEOPLASM OF BLADDER, UNSPECIFIED: ICD-10-CM

## 2021-08-10 LAB
ALBUMIN SERPL ELPH-MCNC: 4 G/DL — SIGNIFICANT CHANGE UP (ref 3.3–5)
ALP SERPL-CCNC: 177 U/L — HIGH (ref 40–120)
ALT FLD-CCNC: 16 U/L — SIGNIFICANT CHANGE UP (ref 4–41)
ANION GAP SERPL CALC-SCNC: 13 MMOL/L — SIGNIFICANT CHANGE UP (ref 7–14)
APPEARANCE UR: CLEAR — SIGNIFICANT CHANGE UP
AST SERPL-CCNC: 22 U/L — SIGNIFICANT CHANGE UP (ref 4–40)
BASOPHILS # BLD AUTO: 0.03 K/UL — SIGNIFICANT CHANGE UP (ref 0–0.2)
BASOPHILS NFR BLD AUTO: 0.3 % — SIGNIFICANT CHANGE UP (ref 0–2)
BILIRUB SERPL-MCNC: 0.4 MG/DL — SIGNIFICANT CHANGE UP (ref 0.2–1.2)
BILIRUB UR-MCNC: NEGATIVE — SIGNIFICANT CHANGE UP
BUN SERPL-MCNC: 27 MG/DL — HIGH (ref 7–23)
CALCIUM SERPL-MCNC: 10.5 MG/DL — SIGNIFICANT CHANGE UP (ref 8.4–10.5)
CHLORIDE SERPL-SCNC: 95 MMOL/L — LOW (ref 98–107)
CO2 SERPL-SCNC: 25 MMOL/L — SIGNIFICANT CHANGE UP (ref 22–31)
COLOR SPEC: SIGNIFICANT CHANGE UP
CREAT SERPL-MCNC: 1.32 MG/DL — HIGH (ref 0.5–1.3)
DIFF PNL FLD: ABNORMAL
EOSINOPHIL # BLD AUTO: 0.25 K/UL — SIGNIFICANT CHANGE UP (ref 0–0.5)
EOSINOPHIL NFR BLD AUTO: 2.2 % — SIGNIFICANT CHANGE UP (ref 0–6)
GLUCOSE SERPL-MCNC: 116 MG/DL — HIGH (ref 70–99)
GLUCOSE UR QL: NEGATIVE — SIGNIFICANT CHANGE UP
HCT VFR BLD CALC: 28 % — LOW (ref 39–50)
HGB BLD-MCNC: 9.1 G/DL — LOW (ref 13–17)
IANC: 9.86 K/UL — HIGH (ref 1.5–8.5)
IMM GRANULOCYTES NFR BLD AUTO: 0.8 % — SIGNIFICANT CHANGE UP (ref 0–1.5)
KETONES UR-MCNC: NEGATIVE — SIGNIFICANT CHANGE UP
LEUKOCYTE ESTERASE UR-ACNC: NEGATIVE — SIGNIFICANT CHANGE UP
LYMPHOCYTES # BLD AUTO: 0.54 K/UL — LOW (ref 1–3.3)
LYMPHOCYTES # BLD AUTO: 4.7 % — LOW (ref 13–44)
MCHC RBC-ENTMCNC: 28.9 PG — SIGNIFICANT CHANGE UP (ref 27–34)
MCHC RBC-ENTMCNC: 32.5 GM/DL — SIGNIFICANT CHANGE UP (ref 32–36)
MCV RBC AUTO: 88.9 FL — SIGNIFICANT CHANGE UP (ref 80–100)
MONOCYTES # BLD AUTO: 0.69 K/UL — SIGNIFICANT CHANGE UP (ref 0–0.9)
MONOCYTES NFR BLD AUTO: 6 % — SIGNIFICANT CHANGE UP (ref 2–14)
NEUTROPHILS # BLD AUTO: 9.86 K/UL — HIGH (ref 1.8–7.4)
NEUTROPHILS NFR BLD AUTO: 86 % — HIGH (ref 43–77)
NITRITE UR-MCNC: NEGATIVE — SIGNIFICANT CHANGE UP
NRBC # BLD: 0 /100 WBCS — SIGNIFICANT CHANGE UP
NRBC # FLD: 0 K/UL — SIGNIFICANT CHANGE UP
PH UR: 7 — SIGNIFICANT CHANGE UP (ref 5–8)
PLATELET # BLD AUTO: 494 K/UL — HIGH (ref 150–400)
POTASSIUM SERPL-MCNC: 4.2 MMOL/L — SIGNIFICANT CHANGE UP (ref 3.5–5.3)
POTASSIUM SERPL-SCNC: 4.2 MMOL/L — SIGNIFICANT CHANGE UP (ref 3.5–5.3)
PROT SERPL-MCNC: 7.6 G/DL — SIGNIFICANT CHANGE UP (ref 6–8.3)
PROT UR-MCNC: ABNORMAL
RBC # BLD: 3.15 M/UL — LOW (ref 4.2–5.8)
RBC # FLD: 14.3 % — SIGNIFICANT CHANGE UP (ref 10.3–14.5)
SODIUM SERPL-SCNC: 133 MMOL/L — LOW (ref 135–145)
SP GR SPEC: 1.02 — SIGNIFICANT CHANGE UP (ref 1.01–1.02)
UROBILINOGEN FLD QL: SIGNIFICANT CHANGE UP
WBC # BLD: 11.46 K/UL — HIGH (ref 3.8–10.5)
WBC # FLD AUTO: 11.46 K/UL — HIGH (ref 3.8–10.5)

## 2021-08-10 PROCEDURE — 99284 EMERGENCY DEPT VISIT MOD MDM: CPT

## 2021-08-10 RX ORDER — SODIUM CHLORIDE 9 MG/ML
500 INJECTION INTRAMUSCULAR; INTRAVENOUS; SUBCUTANEOUS ONCE
Refills: 0 | Status: COMPLETED | OUTPATIENT
Start: 2021-08-10 | End: 2021-08-10

## 2021-08-10 RX ORDER — ACETAMINOPHEN 500 MG
650 TABLET ORAL ONCE
Refills: 0 | Status: COMPLETED | OUTPATIENT
Start: 2021-08-10 | End: 2021-08-10

## 2021-08-10 RX ADMIN — SODIUM CHLORIDE 500 MILLILITER(S): 9 INJECTION INTRAMUSCULAR; INTRAVENOUS; SUBCUTANEOUS at 04:23

## 2021-08-10 RX ADMIN — Medication 650 MILLIGRAM(S): at 04:22

## 2021-08-10 NOTE — ED PROVIDER NOTE - ATTENDING CONTRIBUTION TO CARE
HPI: 75M Past Medical History HTN, HLD, asthma, SIADH, L RCC s/p nephrectomy, bladder cancer s/p nephroureterectomy  presenting for urinary retention. Had TURBT done on 8/6 with pineda placement afterwards. Had pineda removed today and had urination afterwards. However, after that he has no longer been able to urinate besides 2 episodes of small amounts of urination. Additionally noted burning sensation to his penis with suprapubic pressure. No fevers/chills, flank pain, chest pain, SOB, nausea/vomiting  EXAM: Uncomfortable appearing, suprapubic tenderness to palpation with mass, bedside US shows approx 800 cc urine retained. no CVAT.   MDM: pt with multiple medical problems that had a placement of pineda in recently but today at [facility] office had the pineda removed for a trial void which likely was successful and sent home without pineda but now unable to void. appears uncomfortable at this time, known to have 800 cc urine in bladder. Will place immediate pineda, obtain UA/culture and labs to check Cr and reassess. If normal and pain improved, will discharge to f/u with URO f/u outpt again for reassessment.

## 2021-08-10 NOTE — ED PROVIDER NOTE - PROGRESS NOTE DETAILS
Scott WOODRUFF (PGY-2)   pineda placed and pt appearing much more comfortable afterwards. about 700cc of yellow urine already came out thus far. awaiting ua and labs currently Scott WOODRUFF (PGY-2)   explained results of w/u to pt. he understands need to keep pineda in place until he is seen by his urologist. symptoms much improved after pineda placement. will d/c to home with return precautions Scott WOODRUFF (PGY-2)   explained results of w/u to pt. he understands need to keep pineda in place until he is seen by his urologist. symptoms much improved after pineda placement. will d/c to home with return precautions  Spoke to pt's daughter Jose who is aware of the plan as well. she understands pt needs to keep pineda in and needs to his urologist within next 2-3 days. she was also informed of return precautions. she will come to  pt today Scott WOODRUFF (PGY-2)   pineda placed and pt appearing much more comfortable afterwards; immediate expulsion of about 700c of yellow tinged urine afterwards. awaiting ua and labs currently

## 2021-08-10 NOTE — ED PROVIDER NOTE - PSH
H/O cystoscopy  multiple , last 6/21  H/O left nephrectomy    History of prostate surgery  ? exact procedure 3/19  S/P cystoscopy  Insertion left ureteral stent ; biopsy 8/19,

## 2021-08-10 NOTE — ED PROVIDER NOTE - NSFOLLOWUPINSTRUCTIONS_ED_ALL_ED_FT
Please keep your Contreras catheter in place until you are evaluated by your Urologist. See your Urologist within the next 48-72 hours for further evaluation of your symptoms    Return to the Emergency Department if you have any new or worsening symptoms

## 2021-08-10 NOTE — ED ADULT NURSE NOTE - OBJECTIVE STATEMENT
Patient A&Ox4 ambulatory with walker at baseline c/o no urination x1 day. Patient states his MD removed Contreras morning of 8/09/21 urinated right after discontinuation but has not urinated since. Abdomen is distended, patient visibly uncomfortable. Tenderness noted to suprapubic area. 14 Arabic Contreras placed by Josh RN, 700cc output. Patient states improvement in condition. 20G IV placed to left antecubital, labs drawn and sent. Stretcher in lowest position, wheels locked, appropriate side rails in place, call bell in reach. Patient discharged. Contreras drainage changed to leg bag.

## 2021-08-10 NOTE — ED PROVIDER NOTE - NS ED ROS FT
CONST: no fevers, no chills  ENT: no sore throat  CV: no chest pain, no leg swelling  RESP: no shortness of breath, no cough  ABD: no abdominal pain, no nausea, no vomiting, no diarrhea  : +inability to urinate, +burning of penis, no dysuria, no flank pain, no hematuria  MSK: no back pain, no extremity pain  SKIN:  no rash

## 2021-08-10 NOTE — ED ADULT NURSE REASSESSMENT NOTE - NS ED NURSE REASSESS COMMENT FT1
16fr pineda placed using sterile technique. approx 700 of clear yellow urine noted. pt has relief. No complaints of chest pain, headache, nausea, dizziness, vomiting  SOB, fever, chills verbalized.

## 2021-08-10 NOTE — ED ADULT NURSE NOTE - NSIMPLEMENTINTERV_GEN_ALL_ED
Implemented All Fall Risk Interventions:  [place] to call system. Call bell, personal items and telephone within reach. Instruct patient to call for assistance. Room bathroom lighting operational. Non-slip footwear when patient is off stretcher. Physically safe environment: no spills, clutter or unnecessary equipment. Stretcher in lowest position, wheels locked, appropriate side rails in place. Provide visual cue, wrist band, yellow gown, etc. Monitor gait and stability. Monitor for mental status changes and reorient to person, place, and time. Review medications for side effects contributing to fall risk. Reinforce activity limits and safety measures with patient and family.

## 2021-08-10 NOTE — ED ADULT TRIAGE NOTE - CHIEF COMPLAINT QUOTE
pt c/o urinary retention since pineda catheter removed early today. Reports had cytoscopy friday when pineda was placed. PMHX Bladder tumor, Kidney Ca, HTN, Anxiety. Pt appears uncomfortable in triage.

## 2021-08-10 NOTE — ED PROVIDER NOTE - PATIENT PORTAL LINK FT
You can access the FollowMyHealth Patient Portal offered by [facility] by registering at the following website: [email or web address]. By joining [facility]’s FollowMyHealth portal, you will also be able to view your health information using other applications (apps) compatible with our system.

## 2021-08-10 NOTE — ED PROVIDER NOTE - PHYSICAL EXAMINATION
Physical Exam:  Gen: uncomfortable 2/2 pain, awake alert   HEENT: normal conjunctiva, oral mucosa moist  Lung: CTAB, no respiratory distress, no wheezes/rhonchi/rales B/L, speaking in full sentences  CV: RRR, no murmurs, rubs or gallops  Abd: soft, suprapubic tenderenss to palpation, ND, no guarding, no rigidity, no rebound tenderness, no CVA tenderness   MSK: no visible deformities  Skin: Warm, well perfused, no rash, no leg swelling  Psych: normal affect, calm  ~Hans Chavez MD (PGY-2) Physical Exam:  Gen: uncomfortable 2/2 pain, awake alert   HEENT: normal conjunctiva, oral mucosa moist  Lung: CTAB, no respiratory distress, no wheezes/rhonchi/rales B/L, speaking in full sentences  CV: RRR, no murmurs, rubs or gallops  Abd: soft, suprapubic tenderenss to palpation, ND, no guarding, no rigidity, no rebound tenderness, no CVA tenderness   : no external lesions or rash noted, no testicular or penile tenderness to palpation, no blood or discharge noted at urethral meatus  MSK: no visible deformities  Skin: Warm, well perfused, no rash, no leg swelling  Psych: normal affect, calm  ~Hans Chavez MD (PGY-2)

## 2021-08-10 NOTE — ED ADULT NURSE NOTE - NSFALLRSKPASTHIST_ED_ALL_ED
Expiration Date (Optional): 11/30/2020
no
Urine Pregnancy Test Result: negative
Lot # (Optional): [id number]
Performed By: Araseli BAKER
Detail Level: None

## 2021-08-10 NOTE — ED PROVIDER NOTE - CLINICAL SUMMARY MEDICAL DECISION MAKING FREE TEXT BOX
75M with extensive PMH presenting for urinary retention. VSS in ED. Exam with 800cc and suprapubic tenderness  Found to have urinary obstruction on w/u so will place pineda and assess with ua/ucx. Will also check Cr to evaluate for any EDU. Reassess symptoms after pineda placement

## 2021-08-10 NOTE — ED PROVIDER NOTE - PMH
Anxiety    Asthma  denies recent asthma exacerbation  Bladder tumor    Cancer of kidney    Essential hypertension    Gross hematuria    History of BPH    History of SIADH    History of stomach ulcers  denies recent endoscopy  HTN (Hypertension)    Hypercholesterolemia    Hyponatremia  admission x 2 last 2017, 7/21  Left renal mass    Urinary tract infection with hematuria

## 2021-08-10 NOTE — ED PROVIDER NOTE - OBJECTIVE STATEMENT
75M PMH HTN, HLD, asthma, SIADH, L RCC s/p nephrectomy, bladder cancer s/p nephroureterectomy  presenting for urinary retention. Had TURBT done on 8/6 with pineda placement afterwards. Had pineda removed today and had urination afterwards. However, after that he has no longer been able to urinate besides 2 episodes of small amounts of urination. Additionally noted burning sensation to his penis with suprapubic pressure. No fevers/chills, flank pain, chest pain, SOB, nausea/vomiting    800cc in bladder 75M PMH HTN, HLD, asthma, SIADH, L RCC s/p nephrectomy, bladder cancer s/p nephroureterectomy  presenting for urinary retention. Had TURBT done on 8/6 with pineda placement afterwards. Had pineda removed today at 10AM with adequate urination subsequently. However, after that he has no longer been able to urinate besides 2 episodes of small amounts of urination. Additionally noted burning sensation to his penis with suprapubic pressure. No fevers/chills, flank pain, chest pain, SOB, nausea/vomiting    800cc in bladder on bedside U/S scan

## 2021-08-11 LAB
CULTURE RESULTS: SIGNIFICANT CHANGE UP
SPECIMEN SOURCE: SIGNIFICANT CHANGE UP
SURGICAL PATHOLOGY STUDY: SIGNIFICANT CHANGE UP

## 2021-08-12 ENCOUNTER — INPATIENT (INPATIENT)
Facility: HOSPITAL | Age: 76
LOS: 13 days | Discharge: HOSPICE | End: 2021-08-26
Attending: INTERNAL MEDICINE | Admitting: INTERNAL MEDICINE
Payer: MEDICARE

## 2021-08-12 ENCOUNTER — APPOINTMENT (OUTPATIENT)
Dept: HEMATOLOGY ONCOLOGY | Facility: CLINIC | Age: 76
End: 2021-08-12
Payer: MEDICARE

## 2021-08-12 VITALS
SYSTOLIC BLOOD PRESSURE: 136 MMHG | HEIGHT: 64 IN | TEMPERATURE: 99 F | RESPIRATION RATE: 17 BRPM | DIASTOLIC BLOOD PRESSURE: 70 MMHG | OXYGEN SATURATION: 100 % | HEART RATE: 110 BPM

## 2021-08-12 VITALS
SYSTOLIC BLOOD PRESSURE: 147 MMHG | WEIGHT: 142.2 LBS | HEART RATE: 108 BPM | HEIGHT: 66 IN | OXYGEN SATURATION: 95 % | RESPIRATION RATE: 16 BRPM | TEMPERATURE: 99 F | DIASTOLIC BLOOD PRESSURE: 69 MMHG | BODY MASS INDEX: 22.85 KG/M2

## 2021-08-12 DIAGNOSIS — Z90.5 ACQUIRED ABSENCE OF KIDNEY: Chronic | ICD-10-CM

## 2021-08-12 DIAGNOSIS — Z98.890 OTHER SPECIFIED POSTPROCEDURAL STATES: Chronic | ICD-10-CM

## 2021-08-12 LAB
ALBUMIN SERPL ELPH-MCNC: 4 G/DL — SIGNIFICANT CHANGE UP (ref 3.3–5)
ALP SERPL-CCNC: 194 U/L — HIGH (ref 40–120)
ALT FLD-CCNC: 17 U/L — SIGNIFICANT CHANGE UP (ref 4–41)
ANION GAP SERPL CALC-SCNC: 14 MMOL/L — SIGNIFICANT CHANGE UP (ref 7–14)
AST SERPL-CCNC: 17 U/L — SIGNIFICANT CHANGE UP (ref 4–40)
BASOPHILS # BLD AUTO: 0.03 K/UL — SIGNIFICANT CHANGE UP (ref 0–0.2)
BASOPHILS NFR BLD AUTO: 0.3 % — SIGNIFICANT CHANGE UP (ref 0–2)
BILIRUB SERPL-MCNC: 0.3 MG/DL — SIGNIFICANT CHANGE UP (ref 0.2–1.2)
BUN SERPL-MCNC: 26 MG/DL — HIGH (ref 7–23)
CALCIUM SERPL-MCNC: 10.3 MG/DL — SIGNIFICANT CHANGE UP (ref 8.4–10.5)
CHLORIDE SERPL-SCNC: 97 MMOL/L — LOW (ref 98–107)
CO2 SERPL-SCNC: 22 MMOL/L — SIGNIFICANT CHANGE UP (ref 22–31)
CREAT SERPL-MCNC: 1.31 MG/DL — HIGH (ref 0.5–1.3)
EOSINOPHIL # BLD AUTO: 0.46 K/UL — SIGNIFICANT CHANGE UP (ref 0–0.5)
EOSINOPHIL NFR BLD AUTO: 4.6 % — SIGNIFICANT CHANGE UP (ref 0–6)
GLUCOSE SERPL-MCNC: 118 MG/DL — HIGH (ref 70–99)
HCT VFR BLD CALC: 27.4 % — LOW (ref 39–50)
HGB BLD-MCNC: 8.9 G/DL — LOW (ref 13–17)
IANC: 7.62 K/UL — SIGNIFICANT CHANGE UP (ref 1.5–8.5)
IMM GRANULOCYTES NFR BLD AUTO: 0.8 % — SIGNIFICANT CHANGE UP (ref 0–1.5)
LDH SERPL L TO P-CCNC: 594 U/L — HIGH (ref 135–225)
LYMPHOCYTES # BLD AUTO: 0.96 K/UL — LOW (ref 1–3.3)
LYMPHOCYTES # BLD AUTO: 9.6 % — LOW (ref 13–44)
MAGNESIUM SERPL-MCNC: 2 MG/DL — SIGNIFICANT CHANGE UP (ref 1.6–2.6)
MCHC RBC-ENTMCNC: 28.3 PG — SIGNIFICANT CHANGE UP (ref 27–34)
MCHC RBC-ENTMCNC: 32.5 GM/DL — SIGNIFICANT CHANGE UP (ref 32–36)
MCV RBC AUTO: 87 FL — SIGNIFICANT CHANGE UP (ref 80–100)
MONOCYTES # BLD AUTO: 0.82 K/UL — SIGNIFICANT CHANGE UP (ref 0–0.9)
MONOCYTES NFR BLD AUTO: 8.2 % — SIGNIFICANT CHANGE UP (ref 2–14)
NEUTROPHILS # BLD AUTO: 7.62 K/UL — HIGH (ref 1.8–7.4)
NEUTROPHILS NFR BLD AUTO: 76.5 % — SIGNIFICANT CHANGE UP (ref 43–77)
NRBC # BLD: 0 /100 WBCS — SIGNIFICANT CHANGE UP
NRBC # FLD: 0 K/UL — SIGNIFICANT CHANGE UP
PHOSPHATE SERPL-MCNC: 4.1 MG/DL — SIGNIFICANT CHANGE UP (ref 2.5–4.5)
PLATELET # BLD AUTO: 520 K/UL — HIGH (ref 150–400)
POTASSIUM SERPL-MCNC: 4 MMOL/L — SIGNIFICANT CHANGE UP (ref 3.5–5.3)
POTASSIUM SERPL-SCNC: 4 MMOL/L — SIGNIFICANT CHANGE UP (ref 3.5–5.3)
PROT SERPL-MCNC: 7.2 G/DL — SIGNIFICANT CHANGE UP (ref 6–8.3)
RBC # BLD: 3.15 M/UL — LOW (ref 4.2–5.8)
RBC # FLD: 14.2 % — SIGNIFICANT CHANGE UP (ref 10.3–14.5)
SODIUM SERPL-SCNC: 133 MMOL/L — LOW (ref 135–145)
URATE SERPL-MCNC: 5.4 MG/DL — SIGNIFICANT CHANGE UP (ref 3.4–8.8)
WBC # BLD: 9.97 K/UL — SIGNIFICANT CHANGE UP (ref 3.8–10.5)
WBC # FLD AUTO: 9.97 K/UL — SIGNIFICANT CHANGE UP (ref 3.8–10.5)

## 2021-08-12 PROCEDURE — 73562 X-RAY EXAM OF KNEE 3: CPT | Mod: 26,LT

## 2021-08-12 PROCEDURE — 99214 OFFICE O/P EST MOD 30 MIN: CPT

## 2021-08-12 RX ORDER — DEXAMETHASONE 0.5 MG/5ML
4 ELIXIR ORAL ONCE
Refills: 0 | Status: DISCONTINUED | OUTPATIENT
Start: 2021-08-12 | End: 2021-08-12

## 2021-08-12 RX ORDER — ACETAMINOPHEN 500 MG
975 TABLET ORAL ONCE
Refills: 0 | Status: COMPLETED | OUTPATIENT
Start: 2021-08-12 | End: 2021-08-13

## 2021-08-12 NOTE — ED ADULT NURSE NOTE - CHIEF COMPLAINT QUOTE
Danish speaking states wants grandson to speak for him. Sent by MD Holley for hyperreflexia of knees. MD would like an MRI order & CT of the Spine. MD Note states to also "start on steroid in ED while waiting for MRI." Also requesting the patient receive oxycodone or hydromorphone in the ED. Patient endorsing TRAN. Hx Metastatic Bladder CA with mets to the bone no recent chemo or radiation.

## 2021-08-12 NOTE — ASSESSMENT
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  He was admitted to the hospital in late July.  He presented with chest pain at that time.  He was found to have metastases in multiple sites.  He states that he has some cervical pain as well as pain in the sternum, the right lateral ribs, and the left iliac crest.  A biopsy was performed of a pubic ramus lesion and this was positive for metastases.  He continues to have pain and has been taking Tylenol 650 mg approximately twice a day which pain helps the pain somewhat.  The pain awakens him at night.  He states that he has pain all the time and is worse with activities.  He also has discomfort and pain in the perineum as well in the mid back area.  He is spending almost all of his time in bed.  His appetite is poor, but he says that he eats.  He has lost some weight.  There is no nausea or vomiting.  He does have constipation and uses MiraLAX and other agents.  He spends almost the entire day in bed and requires assistance in his activities of daily living.  He has some cough and some shortness of breath.  He has minor headaches.  There is no edema.  He has a Contreras catheter in place.\par \par He underwent a cystoscopy on August 6 which revealed 2 lesions within the bladder.  These were nonmuscle invasive lesions.\par \par During his hospital stay he had hyponatremia requiring fluid restriction as well as salt tablets.\par \par The imaging studies were reviewed.  The bone scan shows diffuse metastasis.  There are several lung nodules.\par \par On physical examination, he is unable to sit.  He is lying on the examination table.  There are some sites of tenderness to palpation over the chest wall and in the left iliac crest.  The abdominal examination is normal.  The lungs are clear.  There is no edema of the extremities.  There is hyperreflexia at the knees and several beats of clonus at the ankles.\par \par In this man with cervical spine pain complaints and hyperreflexia in the lower extremities, it is required that he have an MRI as soon as possible.  I am sending him to the emergency room to receive steroids and have an MRI of the cervical and thoracic spine to rule out the possibility of epidural disease causing spinal cord compression.\par \par He requires some effort at pain management, and I would offer him oxycodone or hydromorphone at low doses to assess his pain response.  It is likely that he will require a long-acting pain medication, and fentanyl may be a good choice for him provided his renal function has not deteriorated since his discharge.\par \par His performance status is poor at the current time.  If he has better pain relief, he may be somewhat more functional.  He would need to improve greatly to be a candidate for chemotherapy.  He is not a candidate for cisplatin based chemotherapy, but could receive carboplatin and gemcitabine.  In patients with rapidly progressive disease, it is better to utilize chemotherapy.  We do not know the PD–L-1 status of his tumor, but he could also receive immunotherapy treatment.  With this latter treatment, the responses are not as rapid as with chemotherapy.\par \par He will be taken to the emergency room for urgent evaluation of the cervical spine and thoracic spine for the possibility of epidural disease.  All questions were answered to the best of my ability and to their apparent satisfaction.

## 2021-08-12 NOTE — RESULTS/DATA
[FreeTextEntry1] : EXAM: NM BONE IMG WHOLE BODY\par PROCEDURE DATE: Jul 27 2021\par INTERPRETATION: RADIOPHARMACEUTICAL: 20.0 mCi Tc-99m HDP, I.V.\par CLINICAL INFORMATION: 75 year old male with transitional cell carcinoma, status post left nephroureterectomy, and recurrent bladder tumor with osseous abnormalities on recent CT referred for evaluation of the extent of osseous metastases.\par TECHNIQUE: Anterior and posterior whole body images were obtained approximately 2 hours following radiopharmaceutical administration.\par COMPARISON: Bone scan performed on 7/23/2020 was reviewed.\par FINDINGS: There are multiple, poorly defined foci of abnormal radiopharmaceutical uptake in both scapulae, anterior and posterior ribs bilaterally, thoracolumbar spine, pelvis, and proximal shaft of the right femur. These abnormalities are new since the previous study of 7/23/2020.\par The right kidney is visualized; the patient is status post left nephroureterectomy.\par IMPRESSION: Abnormal bone scan. Extensive osseous metastases in the axial skeleton and proximal shaft of the right femur, all of which are new since the previous bone scan of 7/23/2020. Radiographic evaluation of the right femur to evaluate potential for pathological fracture is suggested.\par *********************************************\par EXAM: CT CHEST IC\par PROCEDURE DATE: Jul 27 2021\par INTERPRETATION: Clinical information: Evaluate pulmonary nodules. Exam is compared to previous study of 3/18/2021.\par CT scan of the chest was obtained without administration of intravenous contrast. Approximately 90 cc of Omnipaque 350 was administered and 10 cc was discarded.\par Few small lymph nodes are present in the pretracheal space and AP window.\par Heart is normal in size. Calcification of the coronary arteries is noted. No pericardial effusion is noted.\par No endobronchial lesions are noted. Several solid nodules are noted within both lungs. One of the largest nodule is noted in the right upper lobe and measures approximately 1 cm. They are new when compared to previous exam. Once again, patchy groundglass/linear opacities are present within both lungs. They have not significantly changed when compared to previous exam. Emphysematous changes are present in both lungs. No pleural effusions are noted.\par For interpretation of the abdominal contents. Please see the report of the CT scan of the abdomen performed on 7/24/2021.\par The visualized osseous structures demonstrate numerous lytic lesions, some with soft tissue component. One of the largest lesion with soft tissue component is noted involving the right lateral third rib.\par IMPRESSION: Several solid nodules are noted within both lungs. Primary consideration is metastasis.\par Findings suggestive of bony metastases.\par ******************************************\par EXAM: CT ABDOMEN AND PELVIS OC\par PROCEDURE DATE: Jul 24 2021\par INTERPRETATION: CLINICAL INFORMATION: Abdominal pain, concern for SBO.\par COMPARISON: CT abdomen pelvis 2/25/2021.\par CONTRAST/COMPLICATIONS:\par IV Contrast: NONE\par Oral Contrast: Omnipaque 300 + Fruit 2o\par Complications: None reported at time of study completion\par PROCEDURE:\par CT of the Abdomen and Pelvis was performed.\par Sagittal and coronal reformats were performed.\par FINDINGS:\par LOWER CHEST: Patchy groundglass and reticular opacities in the bilateral lung bases, slightly improved since the prior study, likely infectious/inflammatory. Ill-defined high attenuation foci in the lung bases, likely aspirated contrast. Nonspecific lung nodules in the left lung base measuring up to 7 mm, new since the prior study. Trace bibasilar pleural effusions. Atherosclerotic calcifications of the aortic root.\par LIVER: Within normal limits.\par BILE DUCTS: Normal caliber.\par GALLBLADDER: Within normal limits.\par SPLEEN: Within normal limits.\par PANCREAS: Within normal limits.\par ADRENALS: Within normal limits.\par KIDNEYS/URETERS: Status post left nephroureterostomy.\par BLADDER: Moderate to marked distention. Ill-defined 2.2 cm hyperdense lesion along the left bladder wall on 2:85, indeterminate.\par REPRODUCTIVE ORGANS: Within normal limits.\par BOWEL: No bowel obstruction. Appendix is normal.\par PERITONEUM: No ascites.\par VESSELS: Atherosclerotic changes.\par RETROPERITONEUM/LYMPH NODES: No lymphadenopathy. Unchanged low density lesions in the left para-aortic space.\par ABDOMINAL WALL: Small fat-containing left ventral hernia. Diastasis recti. Additionally, small fat-containing left inguinal hernia.\par BONES: Lytic lesion of the left inferior pubic ramus on 2:18 with associated cortical dehiscence. Numerous additional scattered lytic lesions of the vertebral bodies and iliac bones, new since the prior examination. Degenerative changes in the spine.\par IMPRESSION:\par No bowel obstruction, as clinically questioned.\par Ill-defined hyperattenuating lesion along the left bladder wall, indeterminate on the current examination, neoplasm not excluded. Recommend urological consultation with consideration to cystoscopy.\par Multiple new lytic osseous lesions, highly concerning for metastases.\par Several additional subcentimeter solid nodules in the bilateral lung bases, indeterminate, metastases cannot be excluded.

## 2021-08-12 NOTE — REVIEW OF SYSTEMS
[Fatigue] : fatigue [Chest Pain] : chest pain [Cough] : cough [Constipation] : constipation [Dysuria] : dysuria [Difficulty Walking] : difficulty walking [Anxiety] : anxiety [Muscle Weakness] : muscle weakness [Negative] : ENT [Fever] : no fever [Chills] : no chills [Recent Change In Weight] : ~T no recent weight change [Palpitations] : no palpitations [Lower Ext Edema] : no lower extremity edema [SOB on Exertion] : no shortness of breath during exertion [Abdominal Pain] : no abdominal pain [Vomiting] : no vomiting [Diarrhea] : no diarrhea [Incontinence] : no incontinence [Joint Pain] : no joint pain [Joint Stiffness] : no joint stiffness [Muscle Pain] : no muscle pain [Skin Rash] : no skin rash [Dizziness] : no dizziness [Depression] : no depression [Easy Bleeding] : no tendency for easy bleeding [Easy Bruising] : no tendency for easy bruising [FreeTextEntry4] : has dysgeusia [de-identified] : occ HA

## 2021-08-12 NOTE — ED ADULT TRIAGE NOTE - CHIEF COMPLAINT QUOTE
Croatian speaking states wants grandson to speak for him. Sent by MD Holley for hyperreflexia of knees. MD would like an MRI order & CT of the Spine. MD Note states to also "start on steroid in ED while waiting for MRI." Also requesting the patient receive oxycodone or hydromorphone in the ED. Patient endorsing TRAN. Hx Metastatic Bladder CA with mets to the bone no recent chemo or radiation.

## 2021-08-12 NOTE — ED PROVIDER NOTE - CLINICAL SUMMARY MEDICAL DECISION MAKING FREE TEXT BOX
M[place], PGY1 - 76yo man with bladder CA with bone mets presenting from oncologist office for additional testing. Patient not hyperreflexic during physical, no acute findings concerning for cord compression. CT head and lumbar spine ordered for mets surveillance. Labwork for pending admission. Milton, PGY1 - 74yo man with bladder CA with bone mets presenting from oncologist office for additional testing. Pt has hyperreflexia, clonus on Neuro physical. No acute findings concerning for cord compression. CT head negative for mets. CT lumbar spine shows worsening mets. Admit to [facility] for MRI and further management.

## 2021-08-12 NOTE — ED PROVIDER NOTE - PHYSICAL EXAMINATION
Gen: NAD, AOx3, able to make needs known, non-toxic  Head: NCAT  HEENT: EOMI, normal conjunctiva  Lung: CTAB, no respiratory distress, no wheezes/rhonchi/rales B/L, speaking in full sentences  CV: RRR, no M/R/G, pulses bilaterally   Abd: no CVA tenderness  MSK: no visible deformities  Neuro: No focal sensory or motor deficits  Skin: Warm, well perfused, no rash  Psych: normal affect Gen: NAD, AOx3, able to make needs known, non-toxic  Head: NCAT  HEENT: EOMI, normal conjunctiva  Lung: CTAB, no respiratory distress, no wheezes/rhonchi/rales B/L, speaking in full sentences  CV: RRR, no M/R/G, pulses bilaterally   Abd: no CVA tenderness  MSK: no visible deformities  Neuro: No focal sensory or motor deficits  Skin: Warm, well perfused, no rash  : rectal tone normal; chaperone: Tre Wilson  Psych: normal affect Gen: NAD, AOx3, able to make needs known, non-toxic  Head: NCAT  HEENT: EOMI, normal conjunctiva  Lung: CTAB, no respiratory distress, no wheezes/rhonchi/rales B/L, speaking in full sentences  CV: RRR, no M/R/G, pulses bilaterally   Abd: no CVA tenderness  MSK: no visible deformities  Neuro: No focal sensory or motor deficits, no hyperreflexia of knee noted; able to bend left knee without difficulty, proprioception intact   Skin: Warm, well perfused, no rash  : rectal tone normal; chaperone: Tre Wilson  Psych: normal affect

## 2021-08-12 NOTE — ED PROVIDER NOTE - ATTENDING CONTRIBUTION TO CARE
I performed a face to face evaluation of this patient and performed a full history and physical examination on the patient.  I agree with the resident's history, physical examination, and plan of the patient. I performed a face to face evaluation of this patient and performed a full history and physical examination on the patient.  I agree with the resident's history, physical examination, and plan of the patient.  76 yo male sent to ED for knee hyperreflexia and hand clonus. PE as above. A/P Labs, imaging, adimt

## 2021-08-12 NOTE — HISTORY OF PRESENT ILLNESS
[Disease: _____________________] : Disease: [unfilled] [T: ___] : T[unfilled] [N: ___] : N[unfilled] [M: ___] : M[unfilled] [AJCC Stage: ____] : AJCC Stage: [unfilled] [de-identified] : Mr. Md Harry presented on 8/20/20 to medical oncology for initial consultation for evaluation and treatment of urothelial cancer found on surveillance cystoscopy; he was referred by his urologist, Dr Moya.\par \par Mr. Harry reports that he initially came to medical attention last year when he developed gross hematuria; he was treated for a presumed UTI by his previous urologist (at [facility]) for 3-4 weeks without improvement. Per the chart, he presented to [facility] on 7/20/19 with worsening gross hematuria and dysuria, and was admitted given marked hyponatremia to 127; per the ER note and H&P, he had had an outpatient cystoscopy x2 that was negative, though outside records were not available at the time of his hospitalization. Urology was consulted for evaluation hematuria; as part of his work up, on 7/23/20 he underwent a CT of the abdomen and pelvis w/wo IV contrast which showed a 2.7 cm L lower pole renal mass concerning for transitional cell carcinoma (negative for other sites of disease). A noncontrast chest CT on 7/24/20 showed nonspecific 4-5 mm nodules in the R apex and and emphysema. After his electrolyte abnormalities resolved, he was discharged on 7/25/20 to follow up with urology as an outpatientl; he was referred to Dr. Moya for further follow up for his newly found renal mass. \par \par On 9/9/19, he underwent a cystourethroscopy with diagnostic ureteroscopy and ureteral stent insertion; a bx of the left renal pelvis tumor showed clusters of urothelial cells suspicious for high grade urothelial carcinoma admixed in blood clot. On 10/28/19, he underwent a L laparoscopic nephroureterectomy, cystoscopy, and lymph node dissection; pathology showed a 3.5 cm tumor in the renal pelvis consistent with high grade urothelial carcinoma invading subepithelial connective tissue (lamina propia); margins uninvolved by invasive carcinoma; and carcinoma in situ / noninvasive urothelial carcinoma; 0 out of 15 lymph nodes dissected were positive; he staged [id number]. Non-neoplastic renal parenchyma showed global glomerulosclerosis (10% of glomeruli), tubular atrophy and interstitial fibrosis (20% of the cortex) and arterial and arteriolar obliterative sclerosis (severe).\par \par After his nephrectomy, he had no post-op complications and he followed up as an outpatient with urology for surveillance. Per the chart, he had intermittent episodes of gross hematuria between Nov 2019 and May 2020. At a follow up visit on 5/21/20 he underwent a surveillance cystoscopy which showed a papillary tumor on the posterior wall as well as a nodular tumor at the bladder neck. On 6/15/20, he underwent a cystourethroscopy with resection of the bladder tumor; he was noted to have tumor overlying the L trigone in area of the previous left UO, as well as a small papillary tumor at the anterior bladder neck. Bx of the anterior bladder wall tumor showed invasive high grade urothelial carcinoma with necrosis (muscularis propia present and was NOT involved; pT1) and bx of the L trigone of the bladder tumor showed invasive high grade urothelial carcinoma with necrosis (muscularis propia present and WAS involved; pT2). The results of his bx were discussed on follow up by his urologist; treatment options were discussed, including cystectomy,  he was referred to medical oncology.\par \par 8/20/20: Mr. Harry presents today with his son for initial consultation. He reports that over the past few weeks he has not had any visible gross hematuria but reports he had been told that he still has microscopic hematuria "about 3 months ago."  Reports that he feels weak when walking at baseline since he had his nephrectomy, has to use a cane; also has a hx of a childhood injury with some "nerve pain" of the R foot that became more symptomatic since he had his nephrectomy. Has a hx of duodenal ulcers diagnosed 7 years prior (via endoscopy; records not available on EHR) but denies any bleeding in his stool, and appetite is "good", has been gaining some weight, though reports some apprehension that by eating more that he will exacerbate his existing GERD. Denies any nausea, vomiting, diarrhea, constipation. Does endorse some nocturia (3x per night on average), makes "a lot of urine during the night" with a normal stream, makes less urine during the day but can't quantify; denies any associated dysuria or incontinence, though does have some urgency. Denies any joint pain or calf pain.  \par \ger Imm[place] from [facility] [place] the [place] to live with his children in 2009. He is independent in his ADLs, but he lives with his wife, son, and grandchildren at home. Previously worked doing field work, milling, and endorses some exposure to pesticides, but denies any exospore to dyes or solvents. He retired 30 years prior. He admits to hx of heavy tobacco use, 1 pack per day starting at age 14, quit 20 years prior (~40 pack years); denies alcohol use. \par \par 9/9/20...Completed 2/20 fractions of RT to the bladder/pelvis. Cycle 1 of gemcitabine today. Feels "okay." Appetite is "good." No nausea or vomiting. No diarrheal stools. Some constipation issues, takes occasional med but couldn't remember the name. Last BM was this AM. No blood in stools. No headaches. States that he had some dizziness after RT but resolved spontaneously. No balance issues, has a cane but does not use it at home and uses it as needed when he's outside, no falls. No cough, some SOB related to asthma, couldn't remember last usage of rescue inhaler, states it's been a while. No complaints of pain other than occ pain in the bladder, no pain med taken. Has some fatigue with increased exertion, does not impair daily activities. No skin rashes or pruritus. Has rare occasions of paresthesias in the toes. Urine flow is "okay," nocturia x 3-4. No blood or dysuria. Some incontinence with urgency. No fevers or chills. Needs some assistance in showering but independent in other ADLs.\par \par 9/29/20...On gem/RT. Dose #3 due today. On First mouthwash for mucositis, started on topical. He says it started when the chemotherapy started. Today is fraction #12 of 20 treatments. NO nausea, no vomiting. Appetite is good, but is unable to eat due to the mouth irritation. feels dizzy at times, no falls. No falls. No fevers, notes some chills right after the chemo and feels warm, no temperature checked. He has some fatigue. Has edema, less than before. Occ cough, sometimes with dark green sputum. No TRAN. This is a long standing issue. No pains noted. Urine flow is frequent, more than before, nocturia x 4-5. He has some incontinence with urgency. No blood in the urine. \par \par 10/6/20...On chemo/RT, completed fraction #17/20 RT and 2 doses of gemcitabine, dose # 3 was held last week 2/2 mucositis. Feels "good" overall. Appetite is "fair," no weight loss. No nausea or vomiting. Mucositis is improved, using magic mouthwash TID, able to eat better now compared to last week. No odynophagia or dysphagia. No diarrheal stools. Some constipation, less than before, taking medication given by his nephew. No headaches or dizziness. No TRAN. Occ productive cough, unsure of sputum color. No complaints of pain. Some fatigue, not daily, does not impair daily activities. Some balance issues, no falls, using cane. Urine flow is "moderate," nocturia x 5-6. No hematuria, occ dysuria. No spasms. Some incontinence with urgency. No skin rashes or pruritus. No paresthesias. Has edema, but less than before. No fevers or chills. Gets assistance from wife with showering, but independent in other ADLs. \par \par 11/18/20...Completed RT, received only 3 doses of gemcitabine. feels "okay", feels weak somewhat. Ambulating with a cane, has used it for the last year. Had a fall in the past, none recently. Appetite is good, has some altered taste. Rx ended more than one month ago. No weight loss. NO mouth sores at this time. He can stop the mouth wash. Urine flow is good, occasional burning sensation with voiding. Occ incontinence. NO blood noted. he has follow up with Dr Valentin on the 24th, no appt with Dr Moya. Notes some leg edema at times, occ cough, mild TRAN. Fatigue is present  at times. He ambulates within the house, often seated, watching TV. He dresses himself, wife helps him to shower. No difficulty getting up from  chair. No headaches, no dizziness\par \par 2/17/21 - Verbal permission for telehealth services granted by the patient, MD Harry, on 2/17/21 at 10:02 AM\par Grandson as . He had COVID infection in January, for 13 days. He is recovered. No fevers. has some continued TRAN and cough, with small amounts of blood. Appetite is decreased, No weight loss. Urine flow is good, no blood, no dysuria. He has some incontinence. No edema. No fatigue. Requires some assist with ADLs from wife. He spends time in bed during the day, has PT once a week. walks around somewhat with walker or cane, no falls. \par \par 5/18/21 - permission for telehealth granted by the patient on May 18, 2021 at 9:41 AM. Grandson present, served as assistance in interpretation.\par Feels well. He sometimes has some pain when he passes urine. This does not occur every day. It has been present for a few months, lasts just a few seconds. It is a burning sensation. Flow is "medium". Occ incontinence, urgency on occasion. He has nocturia every 1.5 hours. He says he has noted some pink urine at times. No fevers, no chills. Had his CV vaccine. Appetite is normal.no weight loss. No  cough, has some TRAN, uses inhalers. No N/V/D/C. He has edema of the legs and continues on furosemide. He has some joint pains in the lower extremities. No falls. Wife assists him in dressing and showering. He walks about the house, no falls. He does have fatigue at times and rests. He has a dry mouth.  [de-identified] : prior Left renal pelvis HG urothelial, pT1N0 (15 nodes), November 2019, Left radical nephrectomy [FreeTextEntry1] : Started gemcitabine on 09/15/2020, on chemoRT. Completed RT 10/9/20, 20 fractions.  [de-identified] : 8/12/21 - Last seen 5/18/21. he was admitted for chest pain, CT and bone scan with widespread  mets. Had  a biopsy of a pubic ramus lesion, positive. He continues to have pain, taking Tylenol, 650 mg BID. It helps the pain somewhat. The pain awakens him at night. he has pain all the time, worse with activities. Has pain in the perineum as well, mid back area. Appetite is poor, but he says that he eats. Has lost some weight. No nausea, no vomiting. Has constipation, using MiraLAX. Spends almost the entire day in bed, needs assistance in ADLs. Has cough, some TRAN.Minor headache. No edema. Contreras in place\par \par 8/6/21...PROCEDURE:After the patient was identified both verbally and by wristband identification, informed consent was obtained.  The patient was then brought  to the operating room and placed in the supine position on the operating table.  Once adequate anesthesia was induced, antibiotics were given.  The patient was then repositioned in dorsal lithotomy and the groin and perineum were prepped and draped in  the usual sterile fashion.  Initially, a 21Telugu cystoscope was inserted into the bladder.  On cystoscopy, there were two small bladder tumors along the posterior lateral wall.  A resectoscope was then placed into the bladder and transurethral resection  of both bladder tumors were performed.  The specimens were collected and sent for pathology.  The base of the resection was carefully and fully cauterized.  At the completion of the procedure, there was no further bleeding.  No residual tumor and no residual  specimens were seen in the bladder.\par \par Hospital Course: \par Discharge Date	30-Jul-2021 \par Admission Date	24-Jul-2021 10:10 \par Reason for Admission	Chest pain \par Hospital Course	 \par 75 y.o. M w/ a hx of HTN, HLD, asthma, SIADH, L RCC s/p nephrectomy, bladder cancer s/p nephroureterectomy who presents with burning epigastric pain, chest \par pain. pt c/o constipation, abd bloating and tightness. no n/v. patient admits to drink more fluid sec to constipation. nephrology consulted for hyponatremia \par +Hyponatremia - salt tabs TID, on fluid restriction s/p 1L NS, Renal following +Bladder cancer - Onc following, s/p IR bx on 7/29, + fore malignant cells, as \par per on, pt to be dc with outpt follow up with Dr Holley for further management \par \par

## 2021-08-12 NOTE — ED PROVIDER NOTE - NS ED ROS FT
GENERAL: No fever or chills  EYES: No acute change in vision  HEENT: No trouble swallowing or speaking  CARDIAC: No new chest pain  PULMONARY: No cough or SOB  GI: No abdominal pain, no nausea or no vomiting, no diarrhea or acute constipation  : +pineda  SKIN: No rashes  NEURO: No headache, no numbness  MSK: +back pain  Otherwise as HPI or negative.

## 2021-08-12 NOTE — PHYSICAL EXAM
[Completely disabled. Cannot carry on any self care. Totally confined to bed or chair] : Status 4- Completely disabled. Cannot carry on any self care. Totally confined to bed or chair [Normal] : affect appropriate [de-identified] : no edema [de-identified] : Ben [de-identified] : discomfort over the sternum, ribs and left iliac bone [de-identified] : KJ reflexes are brisk at 3/4, symmetrical, 2-3 beats of clonus in the  ankles.

## 2021-08-12 NOTE — ED PROVIDER NOTE - OBJECTIVE STATEMENT
76yo man with bladder CA with bone mets presenting with knee hyperreflexia and mild hand clonus of one month.  #[id number]. Patient states that he was sent here by PCP for MRI, CT spine, and meds. Denies saddle numbness. Has had some tingling and pain, which he states is chronic and present since cancer diagnosis. Constipation present chronically, alleviated with stool softeners. Ambulates with walker, no changes noted. Took Tylenol at 3:30pm today, no pain at this time. Denies fever, chills. 76yo man with bladder CA with bone mets presenting from PCP with knee hyperreflexia and mild hand clonus of one month.  #[id number]. Patient states that he was sent here by PCP for MRI, CT spine, and meds. Denies saddle numbness. Has had some tingling and pain, which he states is chronic and present since cancer diagnosis. Constipation present chronically, alleviated with stool softeners. Ambulates with walker; worsening ambulation due to left knee stiffness. Took Tylenol at 3:30pm today, no pain at this time. Denies fever, chills.

## 2021-08-12 NOTE — ED PROVIDER NOTE - PROGRESS NOTE DETAILS
Milton, PGY1 - Patient endorses Milton, PGY1 - Patient pain-free. Awaiting Ct scan. Milton, PGY1 - Consulted Neurology 2/2 unable to elicit hyperreflexia and clonus at ED physical. Neuro spoken to and will come to see patient. Milton, PGY1 - Patient pain-free. Awaiting CT scan. Milton, PGY1 - Consulted Heme/Onc, suggests having Neuro on board, CT scan, and admit. Neurology 2/2 unable to elicit hyperreflexia and clonus at ED physical. Neuro spoken to and will come to see patient. Milton, PGY1 - Neurology came to see patient. Were able to elicit hyperreflexia and clonus on exam. No signs of acute cord compression. Recommends MRI inpatient for further management. Milton, PGY1 - Paged PCP Dr. Samuels to see if he is in agreement with admission to Medicine. Awaiting response. CT head negative for mass. CT lumbar spine indicative of worsening mets, no acute cord.

## 2021-08-12 NOTE — ED ADULT NURSE NOTE - OBJECTIVE STATEMENT
Patient received with c/o knee hyperreflexia and mild hand clonus of one month. Sent by PCP for MRI. Pt denies any pain or discomfort at this time. Labs sent. Pending CT

## 2021-08-13 ENCOUNTER — APPOINTMENT (OUTPATIENT)
Dept: UROLOGY | Facility: CLINIC | Age: 76
End: 2021-08-13

## 2021-08-13 DIAGNOSIS — I10 ESSENTIAL (PRIMARY) HYPERTENSION: ICD-10-CM

## 2021-08-13 DIAGNOSIS — G89.3 NEOPLASM RELATED PAIN (ACUTE) (CHRONIC): ICD-10-CM

## 2021-08-13 DIAGNOSIS — E87.1 HYPO-OSMOLALITY AND HYPONATREMIA: ICD-10-CM

## 2021-08-13 DIAGNOSIS — N40.0 BENIGN PROSTATIC HYPERPLASIA WITHOUT LOWER URINARY TRACT SYMPTOMS: ICD-10-CM

## 2021-08-13 DIAGNOSIS — Z29.9 ENCOUNTER FOR PROPHYLACTIC MEASURES, UNSPECIFIED: ICD-10-CM

## 2021-08-13 DIAGNOSIS — D64.9 ANEMIA, UNSPECIFIED: ICD-10-CM

## 2021-08-13 DIAGNOSIS — N18.9 CHRONIC KIDNEY DISEASE, UNSPECIFIED: ICD-10-CM

## 2021-08-13 DIAGNOSIS — R29.2 ABNORMAL REFLEX: ICD-10-CM

## 2021-08-13 DIAGNOSIS — C67.9 MALIGNANT NEOPLASM OF BLADDER, UNSPECIFIED: ICD-10-CM

## 2021-08-13 LAB — SARS-COV-2 RNA SPEC QL NAA+PROBE: SIGNIFICANT CHANGE UP

## 2021-08-13 PROCEDURE — 99223 1ST HOSP IP/OBS HIGH 75: CPT

## 2021-08-13 PROCEDURE — 99223 1ST HOSP IP/OBS HIGH 75: CPT | Mod: GC

## 2021-08-13 PROCEDURE — 72125 CT NECK SPINE W/O DYE: CPT | Mod: 26

## 2021-08-13 PROCEDURE — 72192 CT PELVIS W/O DYE: CPT | Mod: 26

## 2021-08-13 PROCEDURE — 70450 CT HEAD/BRAIN W/O DYE: CPT | Mod: 26

## 2021-08-13 PROCEDURE — 72170 X-RAY EXAM OF PELVIS: CPT | Mod: 26

## 2021-08-13 PROCEDURE — 99221 1ST HOSP IP/OBS SF/LOW 40: CPT

## 2021-08-13 PROCEDURE — 73552 X-RAY EXAM OF FEMUR 2/>: CPT | Mod: 26,RT

## 2021-08-13 PROCEDURE — 72132 CT LUMBAR SPINE W/DYE: CPT | Mod: 26

## 2021-08-13 PROCEDURE — 72128 CT CHEST SPINE W/O DYE: CPT | Mod: 26

## 2021-08-13 RX ORDER — FUROSEMIDE 40 MG
20 TABLET ORAL DAILY
Refills: 0 | Status: DISCONTINUED | OUTPATIENT
Start: 2021-08-13 | End: 2021-08-26

## 2021-08-13 RX ORDER — SODIUM CHLORIDE 9 MG/ML
1 INJECTION INTRAMUSCULAR; INTRAVENOUS; SUBCUTANEOUS THREE TIMES A DAY
Refills: 0 | Status: DISCONTINUED | OUTPATIENT
Start: 2021-08-13 | End: 2021-08-26

## 2021-08-13 RX ORDER — FLUTICASONE PROPIONATE 50 MCG
1 SPRAY, SUSPENSION NASAL
Refills: 0 | Status: DISCONTINUED | OUTPATIENT
Start: 2021-08-13 | End: 2021-08-26

## 2021-08-13 RX ORDER — BUDESONIDE AND FORMOTEROL FUMARATE DIHYDRATE 160; 4.5 UG/1; UG/1
2 AEROSOL RESPIRATORY (INHALATION)
Refills: 0 | Status: DISCONTINUED | OUTPATIENT
Start: 2021-08-13 | End: 2021-08-26

## 2021-08-13 RX ORDER — TAMSULOSIN HYDROCHLORIDE 0.4 MG/1
0.4 CAPSULE ORAL AT BEDTIME
Refills: 0 | Status: DISCONTINUED | OUTPATIENT
Start: 2021-08-13 | End: 2021-08-26

## 2021-08-13 RX ORDER — TAMSULOSIN HYDROCHLORIDE 0.4 MG/1
1 CAPSULE ORAL
Qty: 0 | Refills: 0 | DISCHARGE

## 2021-08-13 RX ORDER — PREGABALIN 225 MG/1
1000 CAPSULE ORAL DAILY
Refills: 0 | Status: DISCONTINUED | OUTPATIENT
Start: 2021-08-13 | End: 2021-08-26

## 2021-08-13 RX ORDER — HEPARIN SODIUM 5000 [USP'U]/ML
5000 INJECTION INTRAVENOUS; SUBCUTANEOUS EVERY 12 HOURS
Refills: 0 | Status: DISCONTINUED | OUTPATIENT
Start: 2021-08-13 | End: 2021-08-26

## 2021-08-13 RX ORDER — ACETAMINOPHEN 500 MG
650 TABLET ORAL EVERY 6 HOURS
Refills: 0 | Status: DISCONTINUED | OUTPATIENT
Start: 2021-08-13 | End: 2021-08-26

## 2021-08-13 RX ORDER — BUDESONIDE, GLYCOPYRROLATE, AND FORMOTEROL FUMARATE 160; 9; 4.8 UG/1; UG/1; UG/1
2 AEROSOL, METERED RESPIRATORY (INHALATION)
Qty: 0 | Refills: 0 | DISCHARGE

## 2021-08-13 RX ORDER — ONDANSETRON 8 MG/1
4 TABLET, FILM COATED ORAL EVERY 8 HOURS
Refills: 0 | Status: DISCONTINUED | OUTPATIENT
Start: 2021-08-13 | End: 2021-08-26

## 2021-08-13 RX ORDER — PANTOPRAZOLE SODIUM 20 MG/1
40 TABLET, DELAYED RELEASE ORAL
Refills: 0 | Status: DISCONTINUED | OUTPATIENT
Start: 2021-08-13 | End: 2021-08-26

## 2021-08-13 RX ORDER — OXYCODONE HYDROCHLORIDE 5 MG/1
5 TABLET ORAL EVERY 6 HOURS
Refills: 0 | Status: DISCONTINUED | OUTPATIENT
Start: 2021-08-13 | End: 2021-08-16

## 2021-08-13 RX ORDER — AMLODIPINE BESYLATE 2.5 MG/1
10 TABLET ORAL DAILY
Refills: 0 | Status: DISCONTINUED | OUTPATIENT
Start: 2021-08-13 | End: 2021-08-26

## 2021-08-13 RX ORDER — FINASTERIDE 5 MG/1
1 TABLET, FILM COATED ORAL
Qty: 0 | Refills: 0 | DISCHARGE

## 2021-08-13 RX ORDER — ATORVASTATIN CALCIUM 80 MG/1
10 TABLET, FILM COATED ORAL AT BEDTIME
Refills: 0 | Status: DISCONTINUED | OUTPATIENT
Start: 2021-08-13 | End: 2021-08-26

## 2021-08-13 RX ORDER — POLYETHYLENE GLYCOL 3350 17 G/17G
17 POWDER, FOR SOLUTION ORAL DAILY
Refills: 0 | Status: DISCONTINUED | OUTPATIENT
Start: 2021-08-13 | End: 2021-08-14

## 2021-08-13 RX ORDER — FINASTERIDE 5 MG/1
5 TABLET, FILM COATED ORAL AT BEDTIME
Refills: 0 | Status: DISCONTINUED | OUTPATIENT
Start: 2021-08-13 | End: 2021-08-26

## 2021-08-13 RX ORDER — METOPROLOL TARTRATE 50 MG
25 TABLET ORAL DAILY
Refills: 0 | Status: DISCONTINUED | OUTPATIENT
Start: 2021-08-13 | End: 2021-08-26

## 2021-08-13 RX ADMIN — TAMSULOSIN HYDROCHLORIDE 0.4 MILLIGRAM(S): 0.4 CAPSULE ORAL at 22:19

## 2021-08-13 RX ADMIN — FINASTERIDE 5 MILLIGRAM(S): 5 TABLET, FILM COATED ORAL at 22:19

## 2021-08-13 RX ADMIN — Medication 650 MILLIGRAM(S): at 21:13

## 2021-08-13 RX ADMIN — Medication 25 MILLIGRAM(S): at 16:38

## 2021-08-13 RX ADMIN — Medication 1 TABLET(S): at 17:28

## 2021-08-13 RX ADMIN — BUDESONIDE AND FORMOTEROL FUMARATE DIHYDRATE 2 PUFF(S): 160; 4.5 AEROSOL RESPIRATORY (INHALATION) at 22:18

## 2021-08-13 RX ADMIN — POLYETHYLENE GLYCOL 3350 17 GRAM(S): 17 POWDER, FOR SOLUTION ORAL at 23:17

## 2021-08-13 RX ADMIN — SODIUM CHLORIDE 1 GRAM(S): 9 INJECTION INTRAMUSCULAR; INTRAVENOUS; SUBCUTANEOUS at 22:20

## 2021-08-13 RX ADMIN — Medication 650 MILLIGRAM(S): at 11:45

## 2021-08-13 RX ADMIN — Medication 1 SPRAY(S): at 22:18

## 2021-08-13 RX ADMIN — PREGABALIN 1000 MICROGRAM(S): 225 CAPSULE ORAL at 17:28

## 2021-08-13 RX ADMIN — Medication 20 MILLIGRAM(S): at 17:28

## 2021-08-13 RX ADMIN — HEPARIN SODIUM 5000 UNIT(S): 5000 INJECTION INTRAVENOUS; SUBCUTANEOUS at 17:28

## 2021-08-13 RX ADMIN — Medication 975 MILLIGRAM(S): at 00:40

## 2021-08-13 RX ADMIN — ATORVASTATIN CALCIUM 10 MILLIGRAM(S): 80 TABLET, FILM COATED ORAL at 22:19

## 2021-08-13 RX ADMIN — SODIUM CHLORIDE 1 GRAM(S): 9 INJECTION INTRAMUSCULAR; INTRAVENOUS; SUBCUTANEOUS at 17:34

## 2021-08-13 RX ADMIN — AMLODIPINE BESYLATE 10 MILLIGRAM(S): 2.5 TABLET ORAL at 16:38

## 2021-08-13 NOTE — CONSULT NOTE ADULT - SUBJECTIVE AND OBJECTIVE BOX
Patient is a 75y Male who presents c/o LBP, atraumatic. Patient has a Hx of kidney and bladder Marilee. Denies HS/LOC. Denies pain/injury elsewhere. Denies numbness/tingling/paresthesias/weakness. Denies bowel/bladder incontinence. Denies fevers/chills. No other complaints at this time.    Patient states that he has known mets to the bone, pointing to the L hip, but could not specify further.     HEALTH ISSUES - PROBLEM Dx:  Cancer associated pain    Prophylactic measure    Bladder cancer metastasized to bone    Chronic hyponatremia    Chronic kidney disease, unspecified CKD stage    BPH (benign prostatic hyperplasia)    Hypertension    Anemia            MEDICATIONS  (STANDING):  amLODIPine   Tablet 10 milliGRAM(s) Oral daily  atorvastatin 10 milliGRAM(s) Oral at bedtime  budesonide 160 MICROgram(s)/formoterol 4.5 MICROgram(s) Inhaler 2 Puff(s) Inhalation two times a day  cyanocobalamin 1000 MICROGram(s) Oral daily  finasteride 5 milliGRAM(s) Oral at bedtime  fluticasone propionate 50 MICROgram(s)/spray Nasal Spray 1 Spray(s) Both Nostrils two times a day  furosemide    Tablet 20 milliGRAM(s) Oral daily  heparin   Injectable 5000 Unit(s) SubCutaneous every 12 hours  metoprolol succinate ER 25 milliGRAM(s) Oral daily  multivitamin 1 Tablet(s) Oral daily  pantoprazole    Tablet 40 milliGRAM(s) Oral before breakfast  sodium chloride 1 Gram(s) Oral three times a day  tamsulosin 0.4 milliGRAM(s) Oral at bedtime      Allergies    chocolate (Pruritus)  flour (Rash)  No Known Drug Allergies  Nuts (Rash)  Soy (Unknown)    Intolerances        PAST MEDICAL & SURGICAL HISTORY:  Personal History of Hypertension    Hypercholesterolemia    Anxiety    HTN (Hypertension)    Essential hypertension    History of BPH    History of SIADH    Urinary tract infection with hematuria    Gross hematuria    Left renal mass    Asthma  denies recent asthma exacerbation    Hyponatremia  admission x 2 last 2017, 7/21    Cancer of kidney    History of stomach ulcers  denies recent endoscopy    Bladder tumor    No significant past surgical history    No significant past surgical history    S/P cystoscopy  Insertion left ureteral stent ; biopsy 8/19,    History of prostate surgery  ? exact procedure 3/19    H/O left nephrectomy    H/O cystoscopy  multiple , last 6/21                              8.9    9.97  )-----------( 520      ( 12 Aug 2021 22:23 )             27.4       12 Aug 2021 22:23    133    |  97     |  26     ----------------------------<  118    4.0     |  22     |  1.31     Ca    10.3       12 Aug 2021 22:23  Phos  4.1       12 Aug 2021 22:23  Mg     2.00      12 Aug 2021 22:23    TPro  7.2    /  Alb  4.0    /  TBili  0.3    /  DBili  x      /  AST  17     /  ALT  17     /  AlkPhos  194    12 Aug 2021 22:23              Vital Signs Last 24 Hrs  T(C): 36.8 (08-13-21 @ 15:01), Max: 37.2 (08-12-21 @ 17:23)  T(F): 98.3 (08-13-21 @ 15:01), Max: 98.9 (08-12-21 @ 17:23)  HR: 88 (08-13-21 @ 15:01) (86 - 110)  BP: 144/68 (08-13-21 @ 15:01) (136/70 - 163/78)  BP(mean): 89 (08-12-21 @ 20:36) (89 - 89)  RR: 16 (08-13-21 @ 15:01) (16 - 18)  SpO2: 98% (08-13-21 @ 15:01) (98% - 100%)    Gen: NAD    Spine PE:  Skin intact  No gross deformity  No midline TTP C/T/L/S spine; +TTP over R/L flanks  No bony step offs  No paraspinal muscle ttp/hypertonicity   Negative clonus  Negative babinski  Negative scherer  + rectal tone  No saddle anesthesia  +Contreras (placed outpatient on Friday)    Motor:                   C5                C6              C7               C8           T1   R            5/5                5/5            5/5             5/5          5/5  L             5/5               5/5             5/5             5/5          5/5                L2             L3             L4               L5            S1  R         5/5           5/5          5/5             5/5           5/5  L          5/5          5/5           5/5             5/5           5/5    Sensory:            C5         C6         C7      C8       T1        (0=absent, 1=impaired, 2=normal, NT=not testable)  R         2            2           2        2         2  L          2            2           2        2         2               L2          L3         L4      L5       S1         (0=absent, 1=impaired, 2=normal, NT=not testable)  R         2            2            2        2        2  L          2            2           2        2         2    Imaging:   IMPRESSION:  1. Interval worsening of lytic osseous metastatic disease.  2. Acute pathologic fracture at the superior endplate of the L1 vertebral body.  3. Heterogeneously thickened urinary bladder likely correlating with the history of bladder cancer.      A/P: 75y Male with L1 pathologic Fx  Pain control  NWB- can advance once hip images are completed  FU Labs/imaging  SCDs  CT C/T spine  CT pelvis (for ?hip lesion)  XR AP Pelvis   Patient is a 75y Male who presents c/o LBP, atraumatic. Patient has a Hx of kidney and bladder Marilee. Denies HS/LOC. Denies pain/injury elsewhere. Denies numbness/tingling/paresthesias/weakness. Denies bowel/bladder incontinence. Denies fevers/chills. No other complaints at this time.    Patient states that he has known mets to the bone, pointing to the L hip, but could not specify further.     HEALTH ISSUES - PROBLEM Dx:  Cancer associated pain    Prophylactic measure    Bladder cancer metastasized to bone    Chronic hyponatremia    Chronic kidney disease, unspecified CKD stage    BPH (benign prostatic hyperplasia)    Hypertension    Anemia            MEDICATIONS  (STANDING):  amLODIPine   Tablet 10 milliGRAM(s) Oral daily  atorvastatin 10 milliGRAM(s) Oral at bedtime  budesonide 160 MICROgram(s)/formoterol 4.5 MICROgram(s) Inhaler 2 Puff(s) Inhalation two times a day  cyanocobalamin 1000 MICROGram(s) Oral daily  finasteride 5 milliGRAM(s) Oral at bedtime  fluticasone propionate 50 MICROgram(s)/spray Nasal Spray 1 Spray(s) Both Nostrils two times a day  furosemide    Tablet 20 milliGRAM(s) Oral daily  heparin   Injectable 5000 Unit(s) SubCutaneous every 12 hours  metoprolol succinate ER 25 milliGRAM(s) Oral daily  multivitamin 1 Tablet(s) Oral daily  pantoprazole    Tablet 40 milliGRAM(s) Oral before breakfast  sodium chloride 1 Gram(s) Oral three times a day  tamsulosin 0.4 milliGRAM(s) Oral at bedtime      Allergies    chocolate (Pruritus)  flour (Rash)  No Known Drug Allergies  Nuts (Rash)  Soy (Unknown)    Intolerances        PAST MEDICAL & SURGICAL HISTORY:  Personal History of Hypertension    Hypercholesterolemia    Anxiety    HTN (Hypertension)    Essential hypertension    History of BPH    History of SIADH    Urinary tract infection with hematuria    Gross hematuria    Left renal mass    Asthma  denies recent asthma exacerbation    Hyponatremia  admission x 2 last 2017, 7/21    Cancer of kidney    History of stomach ulcers  denies recent endoscopy    Bladder tumor    No significant past surgical history    No significant past surgical history    S/P cystoscopy  Insertion left ureteral stent ; biopsy 8/19,    History of prostate surgery  ? exact procedure 3/19    H/O left nephrectomy    H/O cystoscopy  multiple , last 6/21                              8.9    9.97  )-----------( 520      ( 12 Aug 2021 22:23 )             27.4       12 Aug 2021 22:23    133    |  97     |  26     ----------------------------<  118    4.0     |  22     |  1.31     Ca    10.3       12 Aug 2021 22:23  Phos  4.1       12 Aug 2021 22:23  Mg     2.00      12 Aug 2021 22:23    TPro  7.2    /  Alb  4.0    /  TBili  0.3    /  DBili  x      /  AST  17     /  ALT  17     /  AlkPhos  194    12 Aug 2021 22:23              Vital Signs Last 24 Hrs  T(C): 36.8 (08-13-21 @ 15:01), Max: 37.2 (08-12-21 @ 17:23)  T(F): 98.3 (08-13-21 @ 15:01), Max: 98.9 (08-12-21 @ 17:23)  HR: 88 (08-13-21 @ 15:01) (86 - 110)  BP: 144/68 (08-13-21 @ 15:01) (136/70 - 163/78)  BP(mean): 89 (08-12-21 @ 20:36) (89 - 89)  RR: 16 (08-13-21 @ 15:01) (16 - 18)  SpO2: 98% (08-13-21 @ 15:01) (98% - 100%)    Gen: NAD    Spine PE:  Skin intact  No gross deformity  No midline TTP C/T/L/S spine; +TTP over R/L flanks  No bony step offs  No paraspinal muscle ttp/hypertonicity   Negative clonus  Negative babinski  Negative scherer  + rectal tone  No saddle anesthesia  +Contreras (placed outpatient on Friday)    Motor:                   C5                C6              C7               C8           T1   R            5/5                5/5            5/5             5/5          5/5  L             5/5               5/5             5/5             5/5          5/5                L2             L3             L4               L5            S1  R         5/5           5/5          5/5             5/5           5/5  L          5/5          5/5           5/5             5/5           5/5    Sensory:            C5         C6         C7      C8       T1        (0=absent, 1=impaired, 2=normal, NT=not testable)  R         2            2           2        2         2  L          2            2           2        2         2               L2          L3         L4      L5       S1         (0=absent, 1=impaired, 2=normal, NT=not testable)  R         2            2            2        2        2  L          2            2           2        2         2    Imaging:   IMPRESSION:  1. Interval worsening of lytic osseous metastatic disease.  2. Acute pathologic fracture at the superior endplate of the L1 vertebral body.  3. Heterogeneously thickened urinary bladder likely correlating with the history of bladder cancer.      A/P: 75y Male with L1 pathologic Fx  Pain control  NWB- can advance once hip images are completed  FU Labs/imaging  SCDs  CT C/T spine  CT pelvis (for ?hip lesion)  XR AP Pelvis  XR L Femur

## 2021-08-13 NOTE — CHART NOTE - NSCHARTNOTEFT_GEN_A_CORE
Called Spine service, covered by ortho today-   As per ortho, ct of spine Cervical and Thoracic needed for w/u of Acute pathologic fracture at the superior endplate of the L1 vertebral body- ordered.

## 2021-08-13 NOTE — CONSULT NOTE ADULT - ASSESSMENT
75M w/ PMH of Bladder Cancer w/ bone metastasis sent in by PCP for "hyperreflexia" for one month. When asked why he is in the hospital, he states for pain. Denies any urinary incontinence or weakness. Denies sensory symptoms. STates pain is in his knees but also in his back. No difficulty ambulating.     Impression: Hyperreflexia in patient with history of prostate cancer and osseous metastasis. Unclear whether patient has bone mets to the spine, although the nuclear scan suggested that he may. Patient likely need dedicated MRI imaging to evaluate for compressive myelopathy. No signs acute cord compression.     Plan:   [] MR c spine, t spine, l spine w/w/o contrast (when able)   [] B12, TSH, Folate, Methylmalonic acid, Homocystein, ESR, CRP    75M w/ PMH of Bladder Cancer w/ bone metastasis sent in by PCP for "hyperreflexia" for one month. When asked why he is in the hospital, he states for pain. Denies any urinary incontinence or weakness. Denies sensory symptoms. STates pain is in his knees but also in his back. No difficulty ambulating.     Impression: Hyperreflexia in patient with history of prostate cancer and osseous metastasis. Unclear whether patient has bone mets to the spine, although the nuclear scan suggested that he may. Patient likely need dedicated MRI imaging to evaluate for compressive myelopathy. No signs acute cord compression.     Plan:   [] MR c spine, t spine, l spine w/w/o contrast (when able)   [] B12, TSH, Folate, Methylmalonic acid, Homocystein, ESR, CRP   [] PT/OT evaluation    75M w/ PMH of Bladder Cancer w/ bone metastasis sent in by PCP for "hyperreflexia" for one month. When asked why he is in the hospital, he states for pain. Denies any urinary incontinence or weakness. Denies sensory symptoms. STates pain is in his knees but also in his back. No difficulty ambulating.     Impression: Hyperreflexia in patient with history of prostate cancer and osseous metastasis. Unclear whether patient has bone mets to the spine, although the nuclear scan suggested that he may. Patient likely need dedicated MRI imaging to evaluate for compressive myelopathy. No signs acute cord compression.     Plan:     [] MR c spine, t spine, lumbar spine w/w/o contrast (when able)   [] B12, TSH, Folate, Methylmalonic acid, Homocystein, ESR, CRP   [] PT/OT evaluation   [] Continue oncologic and medical mgt and care

## 2021-08-13 NOTE — H&P ADULT - NSHPOUTPATIENTPROVIDERS_GEN_ALL_CORE
PCP: Dr. Divya Conte [phone]  Onc: Dr. Timur Holley [phone]  Neuro: Dr. Zachery Moya [phone]

## 2021-08-13 NOTE — H&P ADULT - NEGATIVE NEUROLOGICAL SYMPTOMS
no weakness/no syncope/no tremors/no vertigo/no loss of sensation/no headache/no loss of consciousness/no confusion

## 2021-08-13 NOTE — H&P ADULT - HISTORY OF PRESENT ILLNESS
75 year old male w/ PMHx of bladder cancer, left kidney cancer s/p nephrectomy, HTN, CKD, hyponatremia, hypercholesterolemia, and BPH, presents to [facility] ED with left hip and groin pain. Patient has history of bladder cancer with bone metastasis. Patient was sent to [facility] by his oncologist Dr. Holley for a MRI and CT of his spine suggestive of metastasis cancer. Patient states he has dull pain throughout his body but it is most severe sharp pain at his left hip and left groin. Pain is constant, worsens with ambulation, no association of symptoms, no radiation, not relieved by Oxycodone. Patient states pain is relieved by Tylenol extra strength on occasion.  He also complains of lower back, left knee, and ankle pain. Patient is not able to ambulate for an extended period of time due to his pain. Patient is having tingling and pain throughout his lower extremities that has been present for about a year when he was diagnosed with cancer. Patient denies any fever, chills, dizziness, HA, chest pain, SOB, incontinence, saddle anesthesia, difficulty with bowel movements, and abdominal pain.

## 2021-08-13 NOTE — H&P ADULT - PROBLEM SELECTOR PLAN 5
- , which is baseline  - Continue home med, sodium chloride 1g oral tablet TID  - IVF hydration, as needed  - Monitor I's & O's  - Labs in the am - Hx/o SIADH  - , which is baseline  - Continue home med, sodium chloride 1g oral tablet TID  - free water restriction <1L/day.  - Monitor I's & O's  - Labs in the am

## 2021-08-13 NOTE — H&P ADULT - PROBLEM SELECTOR PLAN 4
Anemia work up last done on 7/2019 (iron studies, B12, Folate, retic count,)   Will monitor H/H.  8.9/27.4  Pt currently asymptomatic and hemodynamically stable.

## 2021-08-13 NOTE — H&P ADULT - NSICDXFAMILYHX_GEN_ALL_CORE_FT
FAMILY HISTORY:  Family history of stroke  FH: lung cancer, brother    Sibling  Still living? Unknown  Family history of breast cancer in sister, Age at diagnosis: Age Unknown

## 2021-08-13 NOTE — H&P ADULT - MUSCULOSKELETAL
detailed exam normal/ROM intact/no joint swelling/no joint erythema/no joint warmth/normal strength/calf tenderness details…

## 2021-08-13 NOTE — H&P ADULT - PROBLEM SELECTOR PLAN 3
- BUN 26, creatinine 1.31   - Monitor BMP daily w/ mg and phos   - Monitor Cr  - avoid nephrotoxic agents - Hx of CKD stage 3, baseline Scr 1.5- 1.7  - BUN 26, creatinine 1.31   - Monitor BMP daily w/ mg and phos   - Monitor Cr  - avoid nephrotoxic agents  - Renal consult appreciated

## 2021-08-13 NOTE — H&P ADULT - PROBLEM SELECTOR PLAN 2
- Patient takes Acetaminophen 650 mg  at home. Given 975 mg in ED and tolerated well.  - Ordered Acetaminophen 650 mg Q 6 hrs for pain  - Neuro checks q6h  - PT/OT evaluation  - Fall risk protocol

## 2021-08-13 NOTE — H&P ADULT - NSHPSOCIALHISTORY_GEN_ALL_CORE
Patient lives at home with his wife, daughter, son in law, and 2x grandsons.  Patient is a former smoker who smoked for about 40-50 years. Last use was about 14 years ago.  Patient denies any alcohol or illicit drug use.  Patient received COVID vaccine in April and May 2021.

## 2021-08-13 NOTE — H&P ADULT - ATTENDING COMMENTS
ID# [id number]   75M with PMH of HTN, HLD, asthma, SIADH, diffusely metastatic bladder cancer s/p nephroureterectomy who presents from his oncologist office with knee hyperreflexia and +Babinski concerning for possible CNS lesion. He is also reporting pain in multiple bony areas.  Neurology was consulted and recommending further imaging work up. CT showed L1 pathological fracture.  I reviewed the labs and imaging. I independently reviewed the EKG which showed TWI in the precordial and inferior leads seen on prior studies, no MAMI.    Constitutional: elderly man in bed NAD, awake and alert  EYES: PERRLA, normal sclera  HEENT: NCAT, nares clear, no tonsillar exudates   Neck: Soft and supple, no thyromegaly   Respiratory: no respiratory distress, Breath sounds are clear bilaterally.  Cardiovascular: S1 and S2, regular rate and rhythm, no murmurs.   Gastrointestinal: Soft, nontender, nondistended. +BS  : pineda in place with clear urine.   Extremities: No lower extremity edema, no calf tenderness, warm to touch  Neurological: 4+/5 strength b/l lower extremities. 3+ knee reflex bilaterally. upwards babinski bilaterally.   Musculoskeletal: Normal ROM, no joint swelling.  Skin: No rashes, No erythema   Psych: Alert and Oriented x3, normal mood, normal affect    # R/o compressive myelopathy   - Presenting with hyperreflexia and + Babinski, Neurology consulted recommending MRI c/t/l spine. C/w neuro check   # Metastatic urothelial cancer #L1 pathologic fracture - will consult spine surgery and f/u on Heme-onc recs. MRI c/t/l spine pending.  # Anemia-  Hbg ~9 which is slightly lower than previous. repeat anemia w/u, continue to monitor CBC  # Thrombocytosis - likely reactive, continue to monitor

## 2021-08-13 NOTE — H&P ADULT - PROBLEM SELECTOR PLAN 1
- Patient referred to [facility] ED by Dr. Holley (Oncologist) for CT and MRI to determine if cancer metastasized to spine.   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 Awaiting MRI cervical and thoracic spine.  - Oncology recommendations appreciated.  - Neurology consulted  - Neuro checks q6h  - Labs in am: B12, TSH, folate, MMA, homocysteine, ESR, and CRP.  - PT/OT evaluation  - Fall risk protocol - Patient referred to [facility] ED by Dr. Holley (Oncologist) for CT and MRI to determine if cancer metastasized to spine.   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 Awaiting MRI cervical and thoracic spine.  - Oncology recommendations appreciated.  - Neurology consulted - Recs - MR c spine, t spine, l spine w/w/o contrast (when able)   - Neuro checks q6h  - Labs in am: B12, TSH, folate, MMA, homocysteine, ESR, and CRP.  - PT/OT evaluation  - Fall risk protocol

## 2021-08-13 NOTE — CONSULT NOTE ADULT - SUBJECTIVE AND OBJECTIVE BOX
[facility]   MD LEONELA JULIO DO ANAM SIDDIQUI ANGELA WONG, PA        TEL:  OFFICE: [phone]  DR POST CELL: [phone]  DR. RAMOS CELL: [phone]  DR. MARTINEZ CELL: [phone]  LESVIA WILLOUGHBY CELL: [phone]    From 5pm-7am answering service [phone]    --- INITIAL RENAL CONSULT NOTE ---date of service 21 @ 13:41    HPI:    74yo man with bladder CA with bone mets presenting from PCP with knee hyperreflexia and mild hand clonus of one month.  #[id number]. Patient states that he was sent here by PCP for MRI, CT spine, and meds. Denies saddle numbness. Has had some tingling and pain, which he states is chronic and present since cancer diagnosis. Constipation present chronically, alleviated with stool softeners. Ambulates with walker; worsening ambulation due to left knee stiffness. nephrology seen for hyponatremia    Allergies:  chocolate (Pruritus)  flour (Rash)  No Known Drug Allergies  Nuts (Rash)  Soy (Unknown)      PAST MEDICAL & SURGICAL HISTORY:  Hypercholesterolemia    Anxiety    HTN (Hypertension)    Essential hypertension    History of BPH    History of SIADH    Urinary tract infection with hematuria    Gross hematuria    Left renal mass    Asthma  denies recent asthma exacerbation    Hyponatremia  admission x 2 last     Cancer of kidney    History of stomach ulcers  denies recent endoscopy    Bladder tumor    S/P cystoscopy  Insertion left ureteral stent ; biopsy ,    History of prostate surgery  ? exact procedure 3/19    H/O left nephrectomy    H/O cystoscopy  multiple , last         Home Medications Reviewed    Hospital Medications:   MEDICATIONS  (STANDING):  amLODIPine   Tablet 10 milliGRAM(s) Oral daily  atorvastatin 10 milliGRAM(s) Oral at bedtime  budesonide 160 MICROgram(s)/formoterol 4.5 MICROgram(s) Inhaler 2 Puff(s) Inhalation two times a day  cyanocobalamin 1000 MICROGram(s) Oral daily  finasteride 5 milliGRAM(s) Oral at bedtime  fluticasone propionate 50 MICROgram(s)/spray Nasal Spray 1 Spray(s) Both Nostrils two times a day  furosemide    Tablet 20 milliGRAM(s) Oral daily  heparin   Injectable 5000 Unit(s) SubCutaneous every 12 hours  metoprolol succinate ER 25 milliGRAM(s) Oral daily  multivitamin 1 Tablet(s) Oral daily  pantoprazole    Tablet 40 milliGRAM(s) Oral before breakfast  sodium chloride 1 Gram(s) Oral three times a day  tamsulosin 0.4 milliGRAM(s) Oral at bedtime      SOCIAL HISTORY:  Denies ETOh, Smoking,     FAMILY HISTORY:  Family history of stroke    FH: lung cancer  brother    Family history of breast cancer in sister (Sibling)        REVIEW OF SYSTEMS:  CONSTITUTIONAL: No weakness, fevers or chills  EYES/ENT: No visual changes;  No vertigo or throat pain   NECK: No pain or stiffness  RESPIRATORY: No cough, wheezing, hemoptysis; No shortness of breath  CARDIOVASCULAR: No chest pain or palpitations.  GASTROINTESTINAL: No abdominal or epigastric pain. No nausea, vomiting, or hematemesis; No diarrhea or constipation. No melena or hematochezia.  GENITOURINARY: No dysuria, frequency, foamy urine, urinary urgency, incontinence or hematuria  NEUROLOGICAL: see hpi  SKIN: No itching, burning, rashes, or lesions   VASCULAR: No bilateral lower extremity edema.   All other review of systems is negative unless indicated above.    VITALS:  T(F): 98.8 (21 @ 12:19), Max: 98.9 (21 @ 17:23)  HR: 99 (21 @ 12:19)  BP: 155/63 (21 @ 12:19)  RR: 17 (21 @ 12:19)  SpO2: 98% (21 @ 12:19)  Wt(kg): --    Height (cm): 162.6 ( @ 17:23)    PHYSICAL EXAM:  Constitutional: NAD  HEENT: anicteric sclera, oropharynx clear, MMM  Neck: No JVD  Respiratory: CTAB, no wheezes, rales or rhonchi  Cardiovascular: S1, S2, RRR  Gastrointestinal: BS+, soft, NT/ND  Extremities: No cyanosis or clubbing. No peripheral edema  Neurological: A/O x 3, no focal deficits  Psychiatric: Normal mood, normal affect  : No CVA tenderness. No pineda.   Skin: No rashes      LABS:      133<L>  |  97<L>  |  26<H>  ----------------------------<  118<H>  4.0   |  22  |  1.31<H>    Ca    10.3      12 Aug 2021 22:23  Phos  4.1     08-12  Mg     2.00     08-12    TPro  7.2  /  Alb  4.0  /  TBili  0.3  /  DBili      /  AST  17  /  ALT  17  /  AlkPhos  194<H>  08-12    Creatinine Trend: 1.31 <--, 1.32 <--                        8.9    9.97  )-----------( 520      ( 12 Aug 2021 22:23 )             27.4     Urine Studies:  Urinalysis Basic - ( 10 Aug 2021 04:16 )    Color: Light Yellow / Appearance: Clear / S.016 / pH:   Gluc:  / Ketone: Negative  / Bili: Negative / Urobili: <2 mg/dL   Blood:  / Protein: Trace / Nitrite: Negative   Leuk Esterase: Negative / RBC: 57 /HPF / WBC 5 /HPF   Sq Epi:  / Non Sq Epi: 0 /HPF / Bacteria: Negative          RADIOLOGY & ADDITIONAL STUDIES:

## 2021-08-13 NOTE — CONSULT NOTE ADULT - SUBJECTIVE AND OBJECTIVE BOX
MD TITUS  Male  MRN-[id number]    HPI:    75M w/ PMH of Bladder Cancer w/ bone metastasis sent in by PCP for "hyperreflexia" for one month. When asked why he is in the hospital, he states for pain. Denies any urinary incontinence or weakness. Denies sensory symptoms. STates pain is in his knees but also in his back. No difficulty ambulating. NM bone scan 7.27.2021: Extensive osseous metastatis in the axial skeleton and proximal shaft of the right femur. Poorly defined foci of abnormal uptake in scapulae, anterior and posterior ribs, throacolumbar spine, pelvic.     Afebrile, no keukocytosis  UA neg      NIHSS:  MRS:     ROS: All negative except as mentioned in HPI    PAST MEDICAL & SURGICAL HISTORY:  Hypercholesterolemia    Anxiety    HTN (Hypertension)    Essential hypertension    History of BPH    History of SIADH    Urinary tract infection with hematuria    Gross hematuria    Left renal mass    Asthma  denies recent asthma exacerbation    Hyponatremia  admission x 2 last 2017, 7/21    Cancer of kidney    History of stomach ulcers  denies recent endoscopy    Bladder tumor    S/P cystoscopy  Insertion left ureteral stent ; biopsy 8/19,    History of prostate surgery  ? exact procedure 3/19    H/O left nephrectomy    H/O cystoscopy  multiple , last 6/21      MEDICATIONS  (STANDING):    MEDICATIONS  (PRN):    Allergies    chocolate (Pruritus)  flour (Rash)  No Known Drug Allergies  Nuts (Rash)  Soy (Unknown)    Intolerances        VITAL SIGNS:  T(F): 98  HR: 86  BP: 145/70  RR: 18  SpO2: 100%    Exam:   MS: Oriented x3. Fluent. Follows crossed commands.   CN: VFF. EOMI. V1-V3 intact. Face symmetric. T/u midline.   Motor: Full strength throughout.   Sensory: Intact to LT throughout   Reflexes: 3+ throughout. Babinski present bilaterally. +Crossed adductors.   Coordination: No dysmetria on FNF or ataxia on HTS bilaterally   Gait: Deferred    LABS:                          8.9    9.97  )-----------( 520      ( 12 Aug 2021 22:23 )             27.4     08-12    133<L>  |  97<L>  |  26<H>  ----------------------------<  118<H>  4.0   |  22  |  1.31<H>    Ca    10.3      12 Aug 2021 22:23  Phos  4.1     08-12  Mg     2.00     08-12    TPro  7.2  /  Alb  4.0  /  TBili  0.3  /  DBili  x   /  AST  17  /  ALT  17  /  AlkPhos  194<H>  08-12        RADIOLOGY & ADDITIONAL STUDIES:

## 2021-08-13 NOTE — CONSULT NOTE ADULT - ASSESSMENT
MRI cervical and thoracic spine 75m with metastatic urothelial cancer, presenting with hyperreflexia and + Babinski.    -MRI C/T/L spine  -Poor performance status, currently not a candidate for systemic chemotherapy  -Consider pal care consult for GOC  -Supportive care, pain control, Nutrition, PT, DVT ppx  -Outpatient oncology f/u    Will follow. Please do not hesitate to call back with questions.     Mariann Collins MD  Medical Oncology Attending  C: [phone]

## 2021-08-13 NOTE — ED ADULT NURSE REASSESSMENT NOTE - NS ED NURSE REASSESS COMMENT FT1
Pt h/o metastatic bladder ca, in NAD, breathing even and unlabored, endorses generalized body pain, MAR contacted and made aware, no additional interventions at this time, ivl is clear and patent, will continue to monitor.

## 2021-08-13 NOTE — H&P ADULT - ASSESSMENT
75 year old male w/ PMHx of bladder cancer, left kidney cancer s/p nephrectomy, HTN, CKD, hyponatremia, hypercholesterolemia, and BPH, presents to [facility] ED with left hip and groin pain secondary to bladder cancer with bone metastasis.     In ED, Completed CT head w/o contrast, CT lumbar spine w/contrast, and Xray of left knee. CT scan head shows no intracranial hemorrhage. CT Lumbar spine shows worsening lytic osseous metastatic disease, acute pathologic fracture L1 vertebral body, and thicken urinary bladder. L/knee radiography shows no fracture or dislocation.   75 year old male w/ PMHx of bladder cancer, left kidney cancer s/p nephrectomy, HTN, CKD, hyponatremia, hypercholesterolemia, and BPH, presents to [facility] ED with left hip and groin pain secondary to bladder cancer with bone metastasis.     In ED, Completed CT head w/o contrast, CT lumbar spine w/contrast, and Xray of left knee. CT scan head shows no intracranial hemorrhage. CT Lumbar spine shows worsening lytic osseous metastatic disease, acute pathologic fracture L1 vertebral body, and thicken urinary bladder. L/knee radiography shows no fracture or dislocation.

## 2021-08-13 NOTE — CONSULT NOTE ADULT - SUBJECTIVE AND OBJECTIVE BOX
Patient is a 75y Male who presents c/o LBP, atraumatic. Patient has a Hx of kidney and bladder Marilee. Denies HS/LOC. Denies pain/injury elsewhere. Denies numbness/tingling/paresthesias/weakness. Denies bowel/bladder incontinence. Denies fevers/chills. No other complaints at this time.    On chart review, found to have recent bone scan indicating lesions in pelvis and R femur.    Patient states that he has known mets to the bone, pointing to the L hip, but could not specify further.     HEALTH ISSUES - PROBLEM Dx:  Cancer associated pain    Prophylactic measure    Bladder cancer metastasized to bone    Chronic hyponatremia    Chronic kidney disease, unspecified CKD stage    BPH (benign prostatic hyperplasia)    Hypertension    Anemia            MEDICATIONS  (STANDING):  amLODIPine   Tablet 10 milliGRAM(s) Oral daily  atorvastatin 10 milliGRAM(s) Oral at bedtime  budesonide 160 MICROgram(s)/formoterol 4.5 MICROgram(s) Inhaler 2 Puff(s) Inhalation two times a day  cyanocobalamin 1000 MICROGram(s) Oral daily  finasteride 5 milliGRAM(s) Oral at bedtime  fluticasone propionate 50 MICROgram(s)/spray Nasal Spray 1 Spray(s) Both Nostrils two times a day  furosemide    Tablet 20 milliGRAM(s) Oral daily  heparin   Injectable 5000 Unit(s) SubCutaneous every 12 hours  metoprolol succinate ER 25 milliGRAM(s) Oral daily  multivitamin 1 Tablet(s) Oral daily  pantoprazole    Tablet 40 milliGRAM(s) Oral before breakfast  sodium chloride 1 Gram(s) Oral three times a day  tamsulosin 0.4 milliGRAM(s) Oral at bedtime      Allergies    chocolate (Pruritus)  flour (Rash)  No Known Drug Allergies  Nuts (Rash)  Soy (Unknown)    Intolerances        PAST MEDICAL & SURGICAL HISTORY:  Personal History of Hypertension    Hypercholesterolemia    Anxiety    HTN (Hypertension)    Essential hypertension    History of BPH    History of SIADH    Urinary tract infection with hematuria    Gross hematuria    Left renal mass    Asthma  denies recent asthma exacerbation    Hyponatremia  admission x 2 last 2017, 7/21    Cancer of kidney    History of stomach ulcers  denies recent endoscopy    Bladder tumor    No significant past surgical history    No significant past surgical history    S/P cystoscopy  Insertion left ureteral stent ; biopsy 8/19,    History of prostate surgery  ? exact procedure 3/19    H/O left nephrectomy    H/O cystoscopy  multiple , last 6/21                              8.9    9.97  )-----------( 520      ( 12 Aug 2021 22:23 )             27.4       12 Aug 2021 22:23    133    |  97     |  26     ----------------------------<  118    4.0     |  22     |  1.31     Ca    10.3       12 Aug 2021 22:23  Phos  4.1       12 Aug 2021 22:23  Mg     2.00      12 Aug 2021 22:23    TPro  7.2    /  Alb  4.0    /  TBili  0.3    /  DBili  x      /  AST  17     /  ALT  17     /  AlkPhos  194    12 Aug 2021 22:23              Vital Signs Last 24 Hrs  T(C): 36.8 (08-13-21 @ 15:01), Max: 37.2 (08-12-21 @ 17:23)  T(F): 98.3 (08-13-21 @ 15:01), Max: 98.9 (08-12-21 @ 17:23)  HR: 88 (08-13-21 @ 15:01) (86 - 110)  BP: 144/68 (08-13-21 @ 15:01) (136/70 - 163/78)  BP(mean): 89 (08-12-21 @ 20:36) (89 - 89)  RR: 16 (08-13-21 @ 15:01) (16 - 18)  SpO2: 98% (08-13-21 @ 15:01) (98% - 100%)    Gen: NAD  Non-TTP over pelvis  Able to SLR b/l  No pain w/ log roll b/l  No pain w/ heel strike b/    Contreras in place    Secondary:  No TTP over bony landmarks, SILT BL, ROM intact BL, distal pulses palpable.      Imaging:   IMPRESSION:  1. Interval worsening of lytic osseous metastatic disease.  2. Acute pathologic fracture at the superior endplate of the L1 vertebral body.  3. Heterogeneously thickened urinary bladder likely correlating with the history of bladder cancer.      A/P: 75y Male with L1 pathologic Fx + bony mets  Pain control  NWB- can advance once hip images are completed  FU Labs/imaging  SCDs  CT C/T spine  CT pelvis (for ?hip lesion)  XR AP Pelvis  XR L Femur

## 2021-08-13 NOTE — CONSULT NOTE ADULT - SUBJECTIVE AND OBJECTIVE BOX
Patient is a 75y old  Male who presents with a chief complaint of     HPI:       Oncologic History:      ROS: as above     PAST MEDICAL & SURGICAL HISTORY:  Hypercholesterolemia    Anxiety    HTN (Hypertension)    Essential hypertension    History of BPH    History of SIADH    Urinary tract infection with hematuria    Gross hematuria    Left renal mass    Asthma  denies recent asthma exacerbation    Hyponatremia  admission x 2 last 2017, 7/21    Cancer of kidney    History of stomach ulcers  denies recent endoscopy    Bladder tumor    S/P cystoscopy  Insertion left ureteral stent ; biopsy 8/19,    History of prostate surgery  ? exact procedure 3/19    H/O left nephrectomy    H/O cystoscopy  multiple , last 6/21        SOCIAL HISTORY:    FAMILY HISTORY:  Family history of stroke    FH: lung cancer  brother        MEDICATIONS  (STANDING):    MEDICATIONS  (PRN):  acetaminophen   Tablet .. 650 milliGRAM(s) Oral every 6 hours PRN Temp greater or equal to 38.5C (101.3F), Mild Pain (1 - 3)  aluminum hydroxide/magnesium hydroxide/simethicone Suspension 30 milliLiter(s) Oral every 4 hours PRN Dyspepsia  ondansetron Injectable 4 milliGRAM(s) IV Push every 8 hours PRN Nausea and/or Vomiting      Allergies    chocolate (Pruritus)  flour (Rash)  No Known Drug Allergies  Nuts (Rash)  Soy (Unknown)    Intolerances        Vital Signs Last 24 Hrs  T(C): 37.1 (13 Aug 2021 08:06), Max: 37.2 (12 Aug 2021 17:23)  T(F): 98.8 (13 Aug 2021 08:06), Max: 98.9 (12 Aug 2021 17:23)  HR: 94 (13 Aug 2021 08:06) (86 - 110)  BP: 158/70 (13 Aug 2021 08:06) (136/70 - 163/78)  BP(mean): 89 (12 Aug 2021 20:36) (89 - 89)  RR: 17 (13 Aug 2021 08:06) (17 - 18)  SpO2: 98% (13 Aug 2021 08:06) (98% - 100%)    PHYSICAL EXAM  General: adult in NAD  HEENT: clear oropharynx, anicteric sclera, pink conjunctiva  Neck: supple  CV: normal S1/S2 with no murmur rubs or gallops  Lungs: positive air movement b/l ant lungs, clear to auscultation, no wheezes, no rales  Abdomen: soft non-tender non-distended, no hepatosplenomegaly  Ext: no clubbing cyanosis or edema  Skin: no rashes and no petechiae  Neuro: alert and oriented X 3, none focal    LABS:                          8.9    9.97  )-----------( 520      ( 12 Aug 2021 22:23 )             27.4         Mean Cell Volume : 87.0 fL  Mean Cell Hemoglobin : 28.3 pg  Mean Cell Hemoglobin Concentration : 32.5 gm/dL  Auto Neutrophil # : 7.62 K/uL  Auto Lymphocyte # : 0.96 K/uL  Auto Monocyte # : 0.82 K/uL  Auto Eosinophil # : 0.46 K/uL  Auto Basophil # : 0.03 K/uL  Auto Neutrophil % : 76.5 %  Auto Lymphocyte % : 9.6 %  Auto Monocyte % : 8.2 %  Auto Eosinophil % : 4.6 %  Auto Basophil % : 0.3 %      Serial CBC's  08-12 @ 22:23  Hct-27.4 / Hgb-8.9 / Plat-520 / RBC-3.15 / WBC-9.97  Serial CBC's  08-10 @ 04:19  Hct-28.0 / Hgb-9.1 / Plat-494 / RBC-3.15 / WBC-11.46      08-12    133<L>  |  97<L>  |  26<H>  ----------------------------<  118<H>  4.0   |  22  |  1.31<H>    Ca    10.3      12 Aug 2021 22:23  Phos  4.1     08-12  Mg     2.00     08-12    TPro  7.2  /  Alb  4.0  /  TBili  0.3  /  DBili  x   /  AST  17  /  ALT  17  /  AlkPhos  194<H>  08-12                      RADIOLOGY & ADDITIONAL STUDIES:     Patient is a 75y old  Male who presents with a chief complaint of Left hip and groin pain (13 Aug 2021 15:53)      HPI:  75 year old male w/ PMHx of bladder cancer, left kidney cancer s/p nephrectomy, HTN, CKD, hyponatremia, hypercholesterolemia, and BPH, presents to [facility] ED with left hip and groin pain. Patient has history of bladder cancer with bone metastasis. Patient was sent to [facility] by his oncologist Dr. Holley for a MRI and CT of his spine suggestive of metastasis cancer. Patient states he has dull pain throughout his body but it is most severe sharp pain at his left hip and left groin. Pain is constant, worsens with ambulation, no association of symptoms, no radiation, not relieved by Oxycodone. Patient states pain is relieved by Tylenol extra strength on occasion.  He also complains of lower back, left knee, and ankle pain. Patient is not able to ambulate for an extended period of time due to his pain. Patient is having tingling and pain throughout his lower extremities that has been present for about a year when he was diagnosed with cancer. Patient denies any fever, chills, dizziness, HA, chest pain, SOB, incontinence, saddle anesthesia, difficulty with bowel movements, and abdominal pain. (13 Aug 2021 11:44)      ROS: as above     PAST MEDICAL & SURGICAL HISTORY:  Hypercholesterolemia    Anxiety    HTN (Hypertension)    Essential hypertension    History of BPH    History of SIADH    Urinary tract infection with hematuria    Gross hematuria    Left renal mass    Asthma  denies recent asthma exacerbation    Hyponatremia  admission x 2 last 2017, 7/21    Cancer of kidney    History of stomach ulcers  denies recent endoscopy    Bladder tumor    S/P cystoscopy  Insertion left ureteral stent ; biopsy 8/19,    History of prostate surgery  ? exact procedure 3/19    H/O left nephrectomy    H/O cystoscopy  multiple , last 6/21      FAMILY HISTORY:  Family history of stroke    FH: lung cancer  brother    Family history of breast cancer in sister (Sibling)    MEDICATIONS  (STANDING):  amLODIPine   Tablet 10 milliGRAM(s) Oral daily  atorvastatin 10 milliGRAM(s) Oral at bedtime  budesonide 160 MICROgram(s)/formoterol 4.5 MICROgram(s) Inhaler 2 Puff(s) Inhalation two times a day  cyanocobalamin 1000 MICROGram(s) Oral daily  finasteride 5 milliGRAM(s) Oral at bedtime  fluticasone propionate 50 MICROgram(s)/spray Nasal Spray 1 Spray(s) Both Nostrils two times a day  furosemide    Tablet 20 milliGRAM(s) Oral daily  heparin   Injectable 5000 Unit(s) SubCutaneous every 12 hours  metoprolol succinate ER 25 milliGRAM(s) Oral daily  multivitamin 1 Tablet(s) Oral daily  pantoprazole    Tablet 40 milliGRAM(s) Oral before breakfast  sodium chloride 1 Gram(s) Oral three times a day  tamsulosin 0.4 milliGRAM(s) Oral at bedtime    MEDICATIONS  (PRN):  acetaminophen   Tablet .. 650 milliGRAM(s) Oral every 6 hours PRN Temp greater or equal to 38.5C (101.3F), Mild Pain (1 - 3)  aluminum hydroxide/magnesium hydroxide/simethicone Suspension 30 milliLiter(s) Oral every 4 hours PRN Dyspepsia  ondansetron Injectable 4 milliGRAM(s) IV Push every 8 hours PRN Nausea and/or Vomiting  oxyCODONE    IR 5 milliGRAM(s) Oral every 6 hours PRN Moderate and severe pain  polyethylene glycol 3350 17 Gram(s) Oral daily PRN Constipation      Allergies    chocolate (Pruritus)  flour (Rash)  No Known Drug Allergies  Nuts (Rash)  Soy (Unknown)    Intolerances        Vital Signs Last 24 Hrs  T(C): 36.8 (13 Aug 2021 15:01), Max: 37.2 (12 Aug 2021 17:23)  T(F): 98.3 (13 Aug 2021 15:01), Max: 98.9 (12 Aug 2021 17:23)  HR: 88 (13 Aug 2021 15:01) (86 - 110)  BP: 144/68 (13 Aug 2021 15:01) (136/70 - 163/78)  BP(mean): 89 (12 Aug 2021 20:36) (89 - 89)  RR: 16 (13 Aug 2021 15:01) (16 - 18)  SpO2: 98% (13 Aug 2021 15:01) (98% - 100%)    PHYSICAL EXAM  General: adult in NAD  HEENT: clear oropharynx, anicteric sclera, pink conjunctiva  Neck: supple  CV: normal S1/S2 with no murmur rubs or gallops  Lungs: positive air movement b/l ant lungs, clear to auscultation, no wheezes, no rales  Abdomen: soft non-tender non-distended, no hepatosplenomegaly  Ext: no clubbing cyanosis or edema  Skin: no rashes and no petechiae  Neuro: alert and oriented X 3, none focal    LABS:                          8.9    9.97  )-----------( 520      ( 12 Aug 2021 22:23 )             27.4         Mean Cell Volume : 87.0 fL  Mean Cell Hemoglobin : 28.3 pg  Mean Cell Hemoglobin Concentration : 32.5 gm/dL  Auto Neutrophil # : 7.62 K/uL  Auto Lymphocyte # : 0.96 K/uL  Auto Monocyte # : 0.82 K/uL  Auto Eosinophil # : 0.46 K/uL  Auto Basophil # : 0.03 K/uL  Auto Neutrophil % : 76.5 %  Auto Lymphocyte % : 9.6 %  Auto Monocyte % : 8.2 %  Auto Eosinophil % : 4.6 %  Auto Basophil % : 0.3 %      Serial CBC's  08-12 @ 22:23  Hct-27.4 / Hgb-8.9 / Plat-520 / RBC-3.15 / WBC-9.97  Serial CBC's  08-10 @ 04:19  Hct-28.0 / Hgb-9.1 / Plat-494 / RBC-3.15 / WBC-11.46      08-12    133<L>  |  97<L>  |  26<H>  ----------------------------<  118<H>  4.0   |  22  |  1.31<H>    Ca    10.3      12 Aug 2021 22:23  Phos  4.1     08-12  Mg     2.00     08-12    TPro  7.2  /  Alb  4.0  /  TBili  0.3  /  DBili  x   /  AST  17  /  ALT  17  /  AlkPhos  194<H>  08-12            RADIOLOGY & ADDITIONAL STUDIES:    reviewed

## 2021-08-13 NOTE — H&P ADULT - PROBLEM SELECTOR PLAN 6
- Continue home meds, furosemide 20mg qd and metoprolol succinate 25mg qd  - DASH diet  - monitor BP & titrate BP meds PRN.

## 2021-08-13 NOTE — CONSULT NOTE ADULT - ASSESSMENT
74yo man with bladder CA with bone mets presenting from PCP with knee hyperreflexia and mild hand clonus of one month.  #[id number]. Patient states that he was sent here by PCP for MRI, CT spine, and meds. Denies saddle numbness. Has had some tingling and pain, which he states is chronic and present since cancer diagnosis. Constipation present chronically, alleviated with stool softeners. Ambulates with walker; worsening ambulation due to left knee stiffness. nephrology seen for hyponatremia      Hyponatremia  chronic, h/o SIADH  stable  free water restriction <1L/day. pt educated  on na tab 1g tid  Na relatively stable.  Monitor serum NA    CKD Stage 3  Baseline Scr 1.5-1.7   scr better than baseline likely sec to muscle mass lost  Monitor BMP   Avoid nephrotoxics, NSAIDS RCA    HTN  BP acceptable   Monitor BP    New lesions  bladder lesions and multiple osseous lytic lesions and lung nodules,  s/p core needle biopsy of left pubic bone lytic lesion. by IR last admission  repeat CT with worsen  lytic osseous metastatic disease.  f/u heme/onc

## 2021-08-14 LAB
ALBUMIN SERPL ELPH-MCNC: 3.6 G/DL — SIGNIFICANT CHANGE UP (ref 3.3–5)
ALP SERPL-CCNC: 185 U/L — HIGH (ref 40–120)
ALT FLD-CCNC: 14 U/L — SIGNIFICANT CHANGE UP (ref 4–41)
ANION GAP SERPL CALC-SCNC: 14 MMOL/L — SIGNIFICANT CHANGE UP (ref 7–14)
AST SERPL-CCNC: 18 U/L — SIGNIFICANT CHANGE UP (ref 4–40)
BILIRUB SERPL-MCNC: 0.4 MG/DL — SIGNIFICANT CHANGE UP (ref 0.2–1.2)
BUN SERPL-MCNC: 20 MG/DL — SIGNIFICANT CHANGE UP (ref 7–23)
CALCIUM SERPL-MCNC: 10.3 MG/DL — SIGNIFICANT CHANGE UP (ref 8.4–10.5)
CHLORIDE SERPL-SCNC: 94 MMOL/L — LOW (ref 98–107)
CHOLEST SERPL-MCNC: 148 MG/DL — SIGNIFICANT CHANGE UP
CO2 SERPL-SCNC: 22 MMOL/L — SIGNIFICANT CHANGE UP (ref 22–31)
COVID-19 SPIKE DOMAIN AB INTERP: POSITIVE
COVID-19 SPIKE DOMAIN ANTIBODY RESULT: >250 U/ML — HIGH
CREAT SERPL-MCNC: 1.26 MG/DL — SIGNIFICANT CHANGE UP (ref 0.5–1.3)
CRP SERPL-MCNC: 149.1 MG/L — HIGH
ERYTHROCYTE [SEDIMENTATION RATE] IN BLOOD: 120 MM/HR — HIGH (ref 1–15)
FERRITIN SERPL-MCNC: 302 NG/ML — SIGNIFICANT CHANGE UP (ref 30–400)
FOLATE SERPL-MCNC: 19.2 NG/ML — HIGH (ref 3.1–17.5)
GLUCOSE SERPL-MCNC: 102 MG/DL — HIGH (ref 70–99)
HAPTOGLOB SERPL-MCNC: 351 MG/DL — HIGH (ref 34–200)
HCT VFR BLD CALC: 26.9 % — LOW (ref 39–50)
HCYS SERPL-MCNC: 11.8 UMOL/L — SIGNIFICANT CHANGE UP
HDLC SERPL-MCNC: 41 MG/DL — SIGNIFICANT CHANGE UP
HGB BLD-MCNC: 8.9 G/DL — LOW (ref 13–17)
IRON SATN MFR SERPL: 10 % — LOW (ref 14–50)
IRON SATN MFR SERPL: 22 UG/DL — LOW (ref 45–165)
LIPID PNL WITH DIRECT LDL SERPL: 78 MG/DL — SIGNIFICANT CHANGE UP
MAGNESIUM SERPL-MCNC: 2 MG/DL — SIGNIFICANT CHANGE UP (ref 1.6–2.6)
MCHC RBC-ENTMCNC: 28 PG — SIGNIFICANT CHANGE UP (ref 27–34)
MCHC RBC-ENTMCNC: 33.1 GM/DL — SIGNIFICANT CHANGE UP (ref 32–36)
MCV RBC AUTO: 84.6 FL — SIGNIFICANT CHANGE UP (ref 80–100)
NON HDL CHOLESTEROL: 107 MG/DL — SIGNIFICANT CHANGE UP
NRBC # BLD: 0 /100 WBCS — SIGNIFICANT CHANGE UP
NRBC # FLD: 0 K/UL — SIGNIFICANT CHANGE UP
PHOSPHATE SERPL-MCNC: 4.2 MG/DL — SIGNIFICANT CHANGE UP (ref 2.5–4.5)
PLATELET # BLD AUTO: 473 K/UL — HIGH (ref 150–400)
POTASSIUM SERPL-MCNC: 3.6 MMOL/L — SIGNIFICANT CHANGE UP (ref 3.5–5.3)
POTASSIUM SERPL-SCNC: 3.6 MMOL/L — SIGNIFICANT CHANGE UP (ref 3.5–5.3)
PROT SERPL-MCNC: 6.8 G/DL — SIGNIFICANT CHANGE UP (ref 6–8.3)
RBC # BLD: 3.18 M/UL — LOW (ref 4.2–5.8)
RBC # BLD: 3.18 M/UL — LOW (ref 4.2–5.8)
RBC # FLD: 13.8 % — SIGNIFICANT CHANGE UP (ref 10.3–14.5)
RETICS #: 63.6 K/UL — SIGNIFICANT CHANGE UP (ref 25–125)
RETICS/RBC NFR: 2 % — SIGNIFICANT CHANGE UP (ref 0.5–2.5)
SARS-COV-2 IGG+IGM SERPL QL IA: >250 U/ML — HIGH
SARS-COV-2 IGG+IGM SERPL QL IA: POSITIVE
SODIUM SERPL-SCNC: 130 MMOL/L — LOW (ref 135–145)
TIBC SERPL-MCNC: 227 UG/DL — SIGNIFICANT CHANGE UP (ref 220–430)
TRIGL SERPL-MCNC: 146 MG/DL — SIGNIFICANT CHANGE UP
TSH SERPL-MCNC: 2.38 UIU/ML — SIGNIFICANT CHANGE UP (ref 0.27–4.2)
UIBC SERPL-MCNC: 205 UG/DL — SIGNIFICANT CHANGE UP (ref 110–370)
VIT B12 SERPL-MCNC: 2000 PG/ML — HIGH (ref 200–900)
WBC # BLD: 8.29 K/UL — SIGNIFICANT CHANGE UP (ref 3.8–10.5)
WBC # FLD AUTO: 8.29 K/UL — SIGNIFICANT CHANGE UP (ref 3.8–10.5)

## 2021-08-14 RX ORDER — POLYETHYLENE GLYCOL 3350 17 G/17G
17 POWDER, FOR SOLUTION ORAL
Refills: 0 | Status: DISCONTINUED | OUTPATIENT
Start: 2021-08-14 | End: 2021-08-26

## 2021-08-14 RX ADMIN — TAMSULOSIN HYDROCHLORIDE 0.4 MILLIGRAM(S): 0.4 CAPSULE ORAL at 21:44

## 2021-08-14 RX ADMIN — Medication 25 MILLIGRAM(S): at 06:46

## 2021-08-14 RX ADMIN — SODIUM CHLORIDE 1 GRAM(S): 9 INJECTION INTRAMUSCULAR; INTRAVENOUS; SUBCUTANEOUS at 06:46

## 2021-08-14 RX ADMIN — Medication 650 MILLIGRAM(S): at 21:45

## 2021-08-14 RX ADMIN — Medication 20 MILLIGRAM(S): at 06:46

## 2021-08-14 RX ADMIN — Medication 1 SPRAY(S): at 06:45

## 2021-08-14 RX ADMIN — HEPARIN SODIUM 5000 UNIT(S): 5000 INJECTION INTRAVENOUS; SUBCUTANEOUS at 18:42

## 2021-08-14 RX ADMIN — Medication 650 MILLIGRAM(S): at 22:15

## 2021-08-14 RX ADMIN — PANTOPRAZOLE SODIUM 40 MILLIGRAM(S): 20 TABLET, DELAYED RELEASE ORAL at 06:46

## 2021-08-14 RX ADMIN — ATORVASTATIN CALCIUM 10 MILLIGRAM(S): 80 TABLET, FILM COATED ORAL at 21:44

## 2021-08-14 RX ADMIN — Medication 1 SPRAY(S): at 18:42

## 2021-08-14 RX ADMIN — BUDESONIDE AND FORMOTEROL FUMARATE DIHYDRATE 2 PUFF(S): 160; 4.5 AEROSOL RESPIRATORY (INHALATION) at 11:01

## 2021-08-14 RX ADMIN — AMLODIPINE BESYLATE 10 MILLIGRAM(S): 2.5 TABLET ORAL at 06:46

## 2021-08-14 RX ADMIN — Medication 1 TABLET(S): at 13:08

## 2021-08-14 RX ADMIN — Medication 650 MILLIGRAM(S): at 12:00

## 2021-08-14 RX ADMIN — PREGABALIN 1000 MICROGRAM(S): 225 CAPSULE ORAL at 13:07

## 2021-08-14 RX ADMIN — SODIUM CHLORIDE 1 GRAM(S): 9 INJECTION INTRAMUSCULAR; INTRAVENOUS; SUBCUTANEOUS at 13:08

## 2021-08-14 RX ADMIN — BUDESONIDE AND FORMOTEROL FUMARATE DIHYDRATE 2 PUFF(S): 160; 4.5 AEROSOL RESPIRATORY (INHALATION) at 21:44

## 2021-08-14 RX ADMIN — SODIUM CHLORIDE 1 GRAM(S): 9 INJECTION INTRAMUSCULAR; INTRAVENOUS; SUBCUTANEOUS at 21:46

## 2021-08-14 RX ADMIN — FINASTERIDE 5 MILLIGRAM(S): 5 TABLET, FILM COATED ORAL at 21:45

## 2021-08-14 RX ADMIN — POLYETHYLENE GLYCOL 3350 17 GRAM(S): 17 POWDER, FOR SOLUTION ORAL at 21:46

## 2021-08-14 RX ADMIN — Medication 650 MILLIGRAM(S): at 11:00

## 2021-08-14 RX ADMIN — HEPARIN SODIUM 5000 UNIT(S): 5000 INJECTION INTRAVENOUS; SUBCUTANEOUS at 06:46

## 2021-08-14 NOTE — PROGRESS NOTE ADULT - SUBJECTIVE AND OBJECTIVE BOX
[facility]   MD NOLAN JULIO MD RUORU WONG, PA    TEL:  OFFICE: [phone]  DR POST CELL: [phone]  HARSH WILLOUGHBY CELL: [phone]  DR. MARTINEZ CELL: [phone]      FROM 5 PM - 7 AM PLEASE CALL ANSWERING SERVICE: [phone]    RENAL FOLLOW UP NOTE--Date of Service 08-14-21 @ 11:09  --------------------------------------------------------------------------------  HPI:      Pt seen and examined at bedside.   Denies SOB, chest pain     PAST HISTORY  --------------------------------------------------------------------------------  No significant changes to PMH, PSH, FHx, SHx, unless otherwise noted    ALLERGIES & MEDICATIONS  --------------------------------------------------------------------------------  Allergies    chocolate (Pruritus)  flour (Rash)  No Known Drug Allergies  Nuts (Rash)  Soy (Unknown)    Intolerances      Standing Inpatient Medications  amLODIPine   Tablet 10 milliGRAM(s) Oral daily  atorvastatin 10 milliGRAM(s) Oral at bedtime  budesonide 160 MICROgram(s)/formoterol 4.5 MICROgram(s) Inhaler 2 Puff(s) Inhalation two times a day  cyanocobalamin 1000 MICROGram(s) Oral daily  finasteride 5 milliGRAM(s) Oral at bedtime  fluticasone propionate 50 MICROgram(s)/spray Nasal Spray 1 Spray(s) Both Nostrils two times a day  furosemide    Tablet 20 milliGRAM(s) Oral daily  heparin   Injectable 5000 Unit(s) SubCutaneous every 12 hours  metoprolol succinate ER 25 milliGRAM(s) Oral daily  multivitamin 1 Tablet(s) Oral daily  pantoprazole    Tablet 40 milliGRAM(s) Oral before breakfast  sodium chloride 1 Gram(s) Oral three times a day  tamsulosin 0.4 milliGRAM(s) Oral at bedtime    PRN Inpatient Medications  acetaminophen   Tablet .. 650 milliGRAM(s) Oral every 6 hours PRN  aluminum hydroxide/magnesium hydroxide/simethicone Suspension 30 milliLiter(s) Oral every 4 hours PRN  ondansetron Injectable 4 milliGRAM(s) IV Push every 8 hours PRN  oxyCODONE    IR 5 milliGRAM(s) Oral every 6 hours PRN  polyethylene glycol 3350 17 Gram(s) Oral daily PRN      REVIEW OF SYSTEMS  --------------------------------------------------------------------------------  General: no fever  CVS: no chest pain  RESP: no sob, no cough  ABD: no abdominal pain  : no dysuria,  MSK: no edema     VITALS/PHYSICAL EXAM  --------------------------------------------------------------------------------  T(C): 37.1 (08-14-21 @ 06:10), Max: 37.3 (08-13-21 @ 20:54)  HR: 88 (08-14-21 @ 06:10) (88 - 99)  BP: 128/65 (08-14-21 @ 06:10) (128/65 - 164/63)  RR: 17 (08-14-21 @ 06:10) (16 - 18)  SpO2: 96% (08-14-21 @ 06:10) (96% - 99%)  Wt(kg): --  Height (cm): 167.6 (08-13-21 @ 17:10)  Weight (kg): 66.5 (08-13-21 @ 17:10)  BMI (kg/m2): 23.7 (08-13-21 @ 17:10)  BSA (m2): 1.75 (08-13-21 @ 17:10)      08-13-21 @ 07:01  -  08-14-21 @ 07:00  --------------------------------------------------------  IN: 0 mL / OUT: 700 mL / NET: -700 mL      Physical Exam:  	Gen: NAD  	HEENT: MMM  	Pulm: CTA B/L  	CV: S1S2  	Abd: Soft, +BS  	Ext: No LE edema B/L                      Neuro: Awake alert  	Skin: Warm and Dry   	Vascular access: NO HD catheter           GEO pineda  LABS/STUDIES  --------------------------------------------------------------------------------              8.9    8.29  >-----------<  473      [08-14-21 @ 06:25]              26.9     130  |  94  |  20  ----------------------------<  102      [08-14-21 @ 06:25]  3.6   |  22  |  1.26        Ca     10.3     [08-14-21 @ 06:25]      Mg     2.00     [08-14-21 @ 06:25]      Phos  4.2     [08-14-21 @ 06:25]    TPro  6.8  /  Alb  3.6  /  TBili  0.4  /  DBili  x   /  AST  18  /  ALT  14  /  AlkPhos  185  [08-14-21 @ 06:25]        Uric acid 5.4      [08-12-21 @ 22:23]        [08-12-21 @ 22:23]    Creatinine Trend:  SCr 1.26 [08-14 @ 06:25]  SCr 1.31 [08-12 @ 22:23]  SCr 1.32 [08-10 @ 04:19]  SCr 1.35 [08-05 @ 13:07]  SCr 1.44 [07-29 @ 04:25]    Urinalysis - [08-10-21 @ 04:16]      Color Light Yellow / Appearance Clear / SG 1.016 / pH 7.0      Gluc Negative / Ketone Negative  / Bili Negative / Urobili <2 mg/dL       Blood Moderate / Protein Trace / Leuk Est Negative / Nitrite Negative      RBC 57 / WBC 5 / Hyaline  / Gran  / Sq Epi  / Non Sq Epi 0 / Bacteria Negative      Iron 22, TIBC 227, %sat 10      [08-14-21 @ 06:25]  Ferritin 302      [08-14-21 @ 06:25]  Vitamin D (25OH) 26.2      [01-05-21 @ 11:00]  HbA1c 6.3      [09-13-17 @ 08:15]  TSH 2.38      [08-14-21 @ 06:25]  Lipid: chol 148, , HDL 41, LDL --      [08-14-21 @ 06:25]

## 2021-08-14 NOTE — DIETITIAN INITIAL EVALUATION ADULT. - OTHER INFO
74 y/o Persian speaking male with bladder ca metastasized to bone, admitted with dx hyperreflexia. Visited with pt and dtr at bedside to obtain nutrition hx. Dtr acted as . Pt said that he hasn't been feeling well enough to eat much over the past 2 month PTA. He also endorsed significant weight loss of almost 15 lbs during this time period.  lbs with current dosing weight 146 lbs. Pt is allergic to flour, chocolate, nuts and soy and abstains from pork for Baptism reasons. Food preferences taken and menu alternatives discussed. Food and Nutrition Dept made aware of food allergies. No GI distress for issues with chewing nor swallowing. Pt on a fluid restriction likely due to hx hyponatremia. Would suggest Ensure Compact x 3/day (660 kcal, 27 gm protein) as part of fluid restriction to optimize nutrition and assist with weight stabilization. Pt presents at severe risk of malnutrition based on <75% of estimated nutrition needs met with 9% weight loss over 2 mos PTA. Encourage and monitor oral intake, especially of nutrition supplement. RDN services to remain available as needed.

## 2021-08-14 NOTE — DIETITIAN INITIAL EVALUATION ADULT. - PERTINENT MEDS FT
MEDICATIONS  (STANDING):  amLODIPine   Tablet 10 milliGRAM(s) Oral daily  atorvastatin 10 milliGRAM(s) Oral at bedtime  budesonide 160 MICROgram(s)/formoterol 4.5 MICROgram(s) Inhaler 2 Puff(s) Inhalation two times a day  cyanocobalamin 1000 MICROGram(s) Oral daily  finasteride 5 milliGRAM(s) Oral at bedtime  fluticasone propionate 50 MICROgram(s)/spray Nasal Spray 1 Spray(s) Both Nostrils two times a day  furosemide    Tablet 20 milliGRAM(s) Oral daily  heparin   Injectable 5000 Unit(s) SubCutaneous every 12 hours  metoprolol succinate ER 25 milliGRAM(s) Oral daily  multivitamin 1 Tablet(s) Oral daily  pantoprazole    Tablet 40 milliGRAM(s) Oral before breakfast  sodium chloride 1 Gram(s) Oral three times a day  tamsulosin 0.4 milliGRAM(s) Oral at bedtime

## 2021-08-14 NOTE — OCCUPATIONAL THERAPY INITIAL EVALUATION ADULT - NS ASR OT EQUIP NEEDS DISCH
For increased safety & independence, recommended: Shower Chair & Grab bar & 3:1 commode. Pt to be educated on Durable Medical Equipment (DME) the benefits and how to privately purchase if needed./bathing/toileting

## 2021-08-14 NOTE — OCCUPATIONAL THERAPY INITIAL EVALUATION ADULT - LIVES WITH, PROFILE
Pt reports he lives with wife, daughter, son-in law, and 2 grandsons in a private home with 3 steps to enter. Pt reports bedroom and bathroom, with shower/tub combination, are located on the main floor.

## 2021-08-14 NOTE — OCCUPATIONAL THERAPY INITIAL EVALUATION ADULT - GENERAL OBSERVATIONS, REHAB EVAL
Pt received semisupine in bed in NAD. Pt agreed to OT evaluation and performed activity as tolerated.  +Heplock

## 2021-08-14 NOTE — OCCUPATIONAL THERAPY INITIAL EVALUATION ADULT - DIAGNOSIS, OT EVAL
Left Hip and Groin Pain; hx Bladder Cancer Metastasized to Bone; Decreased ADL management; Decreased Functional Mobility hx Bladder Cancer Metastasized to Bone; Decreased ADL management; Decreased Functional Mobility

## 2021-08-14 NOTE — PROGRESS NOTE ADULT - SUBJECTIVE AND OBJECTIVE BOX
Name of Patient : MD QURESHI  MRN: [id number]  DATE OF SERVICE: 08-14-21     Subjective: Patient seen and examined. No new events except as noted.   + weakness      MEDICATIONS:  MEDICATIONS  (STANDING):  amLODIPine   Tablet 10 milliGRAM(s) Oral daily  atorvastatin 10 milliGRAM(s) Oral at bedtime  budesonide 160 MICROgram(s)/formoterol 4.5 MICROgram(s) Inhaler 2 Puff(s) Inhalation two times a day  cyanocobalamin 1000 MICROGram(s) Oral daily  finasteride 5 milliGRAM(s) Oral at bedtime  fluticasone propionate 50 MICROgram(s)/spray Nasal Spray 1 Spray(s) Both Nostrils two times a day  furosemide    Tablet 20 milliGRAM(s) Oral daily  heparin   Injectable 5000 Unit(s) SubCutaneous every 12 hours  metoprolol succinate ER 25 milliGRAM(s) Oral daily  multivitamin 1 Tablet(s) Oral daily  pantoprazole    Tablet 40 milliGRAM(s) Oral before breakfast  polyethylene glycol 3350 17 Gram(s) Oral two times a day  sodium chloride 1 Gram(s) Oral three times a day  tamsulosin 0.4 milliGRAM(s) Oral at bedtime      PHYSICAL EXAM:  T(C): 37 (08-14-21 @ 21:38), Max: 37.1 (08-14-21 @ 06:10)  HR: 92 (08-14-21 @ 21:38) (88 - 92)  BP: 149/62 (08-14-21 @ 21:38) (111/55 - 149/62)  RR: 18 (08-14-21 @ 21:38) (17 - 18)  SpO2: 98% (08-14-21 @ 21:38) (96% - 99%)  Wt(kg): --  I&O's Summary    13 Aug 2021 07:01  -  14 Aug 2021 07:00  --------------------------------------------------------  IN: 0 mL / OUT: 700 mL / NET: -700 mL          Appearance: Normal	  HEENT:  PERRLA   Lymphatic: No lymphadenopathy   Cardiovascular: Normal S1 S2, no JVD  Respiratory: normal effort , clear  Gastrointestinal:  Soft, Non-tender  Skin: No rashes,  warm to touch  Psychiatry:  Mood & affect appropriate  Musculuskeletal: No edema      All labs, Imaging and EKGs personally reviewed       08-13-21 @ 07:01  -  08-14-21 @ 07:00  --------------------------------------------------------  IN: 0 mL / OUT: 700 mL / NET: -700 mL                            8.9    8.29  )-----------( 473      ( 14 Aug 2021 06:25 )             26.9               08-14    130<L>  |  94<L>  |  20  ----------------------------<  102<H>  3.6   |  22  |  1.26    Ca    10.3      14 Aug 2021 06:25  Phos  4.2     08-14  Mg     2.00     08-14    TPro  6.8  /  Alb  3.6  /  TBili  0.4  /  DBili  x   /  AST  18  /  ALT  14  /  AlkPhos  185<H>  08-14

## 2021-08-14 NOTE — CHART NOTE - NSCHARTNOTEFT_GEN_A_CORE
Reviewed pending imaging with radiology @ [id number]: imaging should be changed to MRI w/ and w/ out alice. Discussed with Dr. Samuels in agreement.  Case reviewed with pt's flores/HCP MD Julio C @ [phone]. Gadolinium consent obtained and MRI safety sheet obtained. Spinal imaging re-ordered with alice as urgent.   Flores made aware of CT findings and progression of disease. Understands patient is awaiting palliative and radiation consult.

## 2021-08-14 NOTE — OCCUPATIONAL THERAPY INITIAL EVALUATION ADULT - MD ORDER
Occupational therapy (OT) to evaluate and treat. Occupational therapy (OT) to evaluate and treat. As per DEZ Edward, pt is okay to participate in OT evaluation.

## 2021-08-14 NOTE — OCCUPATIONAL THERAPY INITIAL EVALUATION ADULT - ADDITIONAL COMMENTS
(see continuation above)   CT Lumbar spine shows worsening lytic osseous metastatic disease, acute pathologic fracture L1 vertebral body, and thicken urinary bladder. L/knee radiography shows no fracture or dislocation. Pt is admitted for further management. Pt reports his wife provides varying levels of assistance for ADLs.    (see continuation above)   CT Lumbar spine shows worsening lytic osseous metastatic disease, acute pathologic fracture L1 vertebral body, and thicken urinary bladder. L/knee radiography shows no fracture or dislocation. Pt is admitted for further management. Pt reports his wife provides varying levels of assistance for ADLs. Pt also reports he has family to also help/support him to complete his ADLs. Pt states he uses a walker for ambulation.   (see continuation above)   CT Lumbar spine shows worsening lytic osseous metastatic disease, acute pathologic fracture L1 vertebral body, and thicken urinary bladder. Left knee radiography shows no fracture or dislocation. Pt is admitted for further management.

## 2021-08-14 NOTE — PHYSICAL THERAPY INITIAL EVALUATION ADULT - PERTINENT HX OF CURRENT PROBLEM, REHAB EVAL
patient presents with L1 pathologic Fx + bony mets. PMH includes bladder ca, L RCC s/p nephrectomy, HTN, CKD, HLD, BPH

## 2021-08-14 NOTE — OCCUPATIONAL THERAPY INITIAL EVALUATION ADULT - ANTICIPATED DISCHARGE DISPOSITION, OT EVAL
Recommendation of home with skilled OT services in order for patient to regain independence in ADL skills and functional mobility.

## 2021-08-14 NOTE — OCCUPATIONAL THERAPY INITIAL EVALUATION ADULT - PERTINENT HX OF CURRENT PROBLEM, REHAB EVAL
Pt is a 75 year old male with hx of bladder cancer, left kidney cancer s/p nephrectomy, HTN, CKD, hyponatremia, hypercholesterolemia, and BPH, presents to [facility] on 8/13/21 with left hip and groin pain secondary to bladder cancer with bone metastasis. CTs done on 08/13/21 reported CT Head shows no intracranial hemorrhage. (see continuation below)

## 2021-08-15 LAB
ANION GAP SERPL CALC-SCNC: 15 MMOL/L — HIGH (ref 7–14)
BUN SERPL-MCNC: 24 MG/DL — HIGH (ref 7–23)
CALCIUM SERPL-MCNC: 10.3 MG/DL — SIGNIFICANT CHANGE UP (ref 8.4–10.5)
CHLORIDE SERPL-SCNC: 96 MMOL/L — LOW (ref 98–107)
CO2 SERPL-SCNC: 22 MMOL/L — SIGNIFICANT CHANGE UP (ref 22–31)
CREAT SERPL-MCNC: 1.4 MG/DL — HIGH (ref 0.5–1.3)
GLUCOSE SERPL-MCNC: 102 MG/DL — HIGH (ref 70–99)
HCT VFR BLD CALC: 27.4 % — LOW (ref 39–50)
HGB BLD-MCNC: 8.9 G/DL — LOW (ref 13–17)
MAGNESIUM SERPL-MCNC: 2 MG/DL — SIGNIFICANT CHANGE UP (ref 1.6–2.6)
MCHC RBC-ENTMCNC: 28.2 PG — SIGNIFICANT CHANGE UP (ref 27–34)
MCHC RBC-ENTMCNC: 32.5 GM/DL — SIGNIFICANT CHANGE UP (ref 32–36)
MCV RBC AUTO: 86.7 FL — SIGNIFICANT CHANGE UP (ref 80–100)
NRBC # BLD: 0 /100 WBCS — SIGNIFICANT CHANGE UP
NRBC # FLD: 0 K/UL — SIGNIFICANT CHANGE UP
PHOSPHATE SERPL-MCNC: 4.2 MG/DL — SIGNIFICANT CHANGE UP (ref 2.5–4.5)
PLATELET # BLD AUTO: 488 K/UL — HIGH (ref 150–400)
POTASSIUM SERPL-MCNC: 3.7 MMOL/L — SIGNIFICANT CHANGE UP (ref 3.5–5.3)
POTASSIUM SERPL-SCNC: 3.7 MMOL/L — SIGNIFICANT CHANGE UP (ref 3.5–5.3)
RBC # BLD: 3.16 M/UL — LOW (ref 4.2–5.8)
RBC # FLD: 13.8 % — SIGNIFICANT CHANGE UP (ref 10.3–14.5)
SODIUM SERPL-SCNC: 133 MMOL/L — LOW (ref 135–145)
WBC # BLD: 7.55 K/UL — SIGNIFICANT CHANGE UP (ref 3.8–10.5)
WBC # FLD AUTO: 7.55 K/UL — SIGNIFICANT CHANGE UP (ref 3.8–10.5)

## 2021-08-15 RX ORDER — TRAMADOL HYDROCHLORIDE 50 MG/1
25 TABLET ORAL ONCE
Refills: 0 | Status: DISCONTINUED | OUTPATIENT
Start: 2021-08-15 | End: 2021-08-15

## 2021-08-15 RX ADMIN — Medication 650 MILLIGRAM(S): at 11:33

## 2021-08-15 RX ADMIN — PANTOPRAZOLE SODIUM 40 MILLIGRAM(S): 20 TABLET, DELAYED RELEASE ORAL at 07:19

## 2021-08-15 RX ADMIN — Medication 1 SPRAY(S): at 07:19

## 2021-08-15 RX ADMIN — SODIUM CHLORIDE 1 GRAM(S): 9 INJECTION INTRAMUSCULAR; INTRAVENOUS; SUBCUTANEOUS at 07:18

## 2021-08-15 RX ADMIN — BUDESONIDE AND FORMOTEROL FUMARATE DIHYDRATE 2 PUFF(S): 160; 4.5 AEROSOL RESPIRATORY (INHALATION) at 10:33

## 2021-08-15 RX ADMIN — ATORVASTATIN CALCIUM 10 MILLIGRAM(S): 80 TABLET, FILM COATED ORAL at 22:21

## 2021-08-15 RX ADMIN — SODIUM CHLORIDE 1 GRAM(S): 9 INJECTION INTRAMUSCULAR; INTRAVENOUS; SUBCUTANEOUS at 11:48

## 2021-08-15 RX ADMIN — AMLODIPINE BESYLATE 10 MILLIGRAM(S): 2.5 TABLET ORAL at 07:18

## 2021-08-15 RX ADMIN — Medication 20 MILLIGRAM(S): at 07:18

## 2021-08-15 RX ADMIN — OXYCODONE HYDROCHLORIDE 5 MILLIGRAM(S): 5 TABLET ORAL at 22:21

## 2021-08-15 RX ADMIN — PREGABALIN 1000 MICROGRAM(S): 225 CAPSULE ORAL at 11:48

## 2021-08-15 RX ADMIN — TAMSULOSIN HYDROCHLORIDE 0.4 MILLIGRAM(S): 0.4 CAPSULE ORAL at 22:21

## 2021-08-15 RX ADMIN — Medication 25 MILLIGRAM(S): at 07:18

## 2021-08-15 RX ADMIN — HEPARIN SODIUM 5000 UNIT(S): 5000 INJECTION INTRAVENOUS; SUBCUTANEOUS at 18:28

## 2021-08-15 RX ADMIN — Medication 1 TABLET(S): at 11:48

## 2021-08-15 RX ADMIN — FINASTERIDE 5 MILLIGRAM(S): 5 TABLET, FILM COATED ORAL at 22:21

## 2021-08-15 RX ADMIN — HEPARIN SODIUM 5000 UNIT(S): 5000 INJECTION INTRAVENOUS; SUBCUTANEOUS at 07:19

## 2021-08-15 RX ADMIN — POLYETHYLENE GLYCOL 3350 17 GRAM(S): 17 POWDER, FOR SOLUTION ORAL at 11:48

## 2021-08-15 RX ADMIN — BUDESONIDE AND FORMOTEROL FUMARATE DIHYDRATE 2 PUFF(S): 160; 4.5 AEROSOL RESPIRATORY (INHALATION) at 22:24

## 2021-08-15 RX ADMIN — Medication 650 MILLIGRAM(S): at 10:33

## 2021-08-15 RX ADMIN — OXYCODONE HYDROCHLORIDE 5 MILLIGRAM(S): 5 TABLET ORAL at 23:00

## 2021-08-15 RX ADMIN — POLYETHYLENE GLYCOL 3350 17 GRAM(S): 17 POWDER, FOR SOLUTION ORAL at 18:28

## 2021-08-15 RX ADMIN — SODIUM CHLORIDE 1 GRAM(S): 9 INJECTION INTRAMUSCULAR; INTRAVENOUS; SUBCUTANEOUS at 22:23

## 2021-08-15 NOTE — PROGRESS NOTE ADULT - SUBJECTIVE AND OBJECTIVE BOX
Name of Patient : MD QURESHI  MRN: [id number]  DATE OF SERVICE: 08-15-21      Subjective: Patient seen and examined. No new events except as noted.   stable    REVIEW OF SYSTEMS:    CONSTITUTIONAL: No weakness, fevers or chills  EYES/ENT: No visual changes;  No vertigo or throat pain   NECK: No pain or stiffness  RESPIRATORY: No cough, wheezing, hemoptysis; No shortness of breath  CARDIOVASCULAR: No chest pain or palpitations  GASTROINTESTINAL: No abdominal or epigastric pain. No nausea, vomiting, or hematemesis; No diarrhea or constipation. No melena or hematochezia.  GENITOURINARY: No dysuria, frequency or hematuria  NEUROLOGICAL: No numbness or weakness  SKIN: No itching, burning, rashes, or lesions   All other review of systems is negative unless indicated above.    MEDICATIONS:  MEDICATIONS  (STANDING):  amLODIPine   Tablet 10 milliGRAM(s) Oral daily  atorvastatin 10 milliGRAM(s) Oral at bedtime  budesonide 160 MICROgram(s)/formoterol 4.5 MICROgram(s) Inhaler 2 Puff(s) Inhalation two times a day  cyanocobalamin 1000 MICROGram(s) Oral daily  finasteride 5 milliGRAM(s) Oral at bedtime  fluticasone propionate 50 MICROgram(s)/spray Nasal Spray 1 Spray(s) Both Nostrils two times a day  furosemide    Tablet 20 milliGRAM(s) Oral daily  heparin   Injectable 5000 Unit(s) SubCutaneous every 12 hours  metoprolol succinate ER 25 milliGRAM(s) Oral daily  multivitamin 1 Tablet(s) Oral daily  pantoprazole    Tablet 40 milliGRAM(s) Oral before breakfast  polyethylene glycol 3350 17 Gram(s) Oral two times a day  sodium chloride 1 Gram(s) Oral three times a day  tamsulosin 0.4 milliGRAM(s) Oral at bedtime      PHYSICAL EXAM:  T(C): 36.9 (08-15-21 @ 22:17), Max: 36.9 (08-15-21 @ 22:17)  HR: 90 (08-15-21 @ 22:17) (83 - 95)  BP: 136/65 (08-15-21 @ 22:17) (133/56 - 141/73)  RR: 18 (08-15-21 @ 22:17) (18 - 18)  SpO2: 96% (08-15-21 @ 22:17) (96% - 100%)  Wt(kg): --  I&O's Summary    14 Aug 2021 07:01  -  15 Aug 2021 07:00  --------------------------------------------------------  IN: 0 mL / OUT: 500 mL / NET: -500 mL    15 Aug 2021 07:01  -  15 Aug 2021 23:26  --------------------------------------------------------  IN: 0 mL / OUT: 700 mL / NET: -700 mL          Appearance: Normal	  HEENT:  PERRLA   Lymphatic: No lymphadenopathy   Cardiovascular: Normal S1 S2, no JVD  Respiratory: normal effort , clear  Gastrointestinal:  Soft, Non-tender  Skin: No rashes,  warm to touch  Psychiatry:  Mood & affect appropriate  Musculuskeletal: No edema      All labs, Imaging and EKGs personally reviewed       08-14-21 @ 07:01  -  08-15-21 @ 07:00  --------------------------------------------------------  IN: 0 mL / OUT: 500 mL / NET: -500 mL    08-15-21 @ 07:01  -  08-15-21 @ 23:26  --------------------------------------------------------  IN: 0 mL / OUT: 700 mL / NET: -700 mL                            8.9    7.55  )-----------( 488      ( 15 Aug 2021 07:16 )             27.4               08-15    133<L>  |  96<L>  |  24<H>  ----------------------------<  102<H>  3.7   |  22  |  1.40<H>    Ca    10.3      15 Aug 2021 07:16  Phos  4.2     08-15  Mg     2.00     08-15    TPro  6.8  /  Alb  3.6  /  TBili  0.4  /  DBili  x   /  AST  18  /  ALT  14  /  AlkPhos  185<H>  08-14

## 2021-08-15 NOTE — PROGRESS NOTE ADULT - SUBJECTIVE AND OBJECTIVE BOX
[facility]   MD NOLAN JULIO MD RUORU WONG, PA    TEL:  OFFICE: [phone]  DR POST CELL: [phone]  HARSH [place]OUGHBY CELL: [phone]  DR. MARTINEZ CELL: [phone]      FROM 5 PM - 7 AM PLEASE CALL ANSWERING SERVICE: [phone]    RENAL FOLLOW UP NOTE--Date of Service 08-15-21 @ 10:08  --------------------------------------------------------------------------------  HPI:      Pt seen and examined at bedside.   Denies SOB, chest pain     PAST HISTORY  --------------------------------------------------------------------------------  No significant changes to PMH, PSH, FHx, SHx, unless otherwise noted    ALLERGIES & MEDICATIONS  --------------------------------------------------------------------------------  Allergies    chocolate (Pruritus)  flour (Rash)  No Known Drug Allergies  Nuts (Rash)  Soy (Unknown)    Intolerances      Standing Inpatient Medications  amLODIPine   Tablet 10 milliGRAM(s) Oral daily  atorvastatin 10 milliGRAM(s) Oral at bedtime  budesonide 160 MICROgram(s)/formoterol 4.5 MICROgram(s) Inhaler 2 Puff(s) Inhalation two times a day  cyanocobalamin 1000 MICROGram(s) Oral daily  finasteride 5 milliGRAM(s) Oral at bedtime  fluticasone propionate 50 MICROgram(s)/spray Nasal Spray 1 Spray(s) Both Nostrils two times a day  furosemide    Tablet 20 milliGRAM(s) Oral daily  heparin   Injectable 5000 Unit(s) SubCutaneous every 12 hours  metoprolol succinate ER 25 milliGRAM(s) Oral daily  multivitamin 1 Tablet(s) Oral daily  pantoprazole    Tablet 40 milliGRAM(s) Oral before breakfast  polyethylene glycol 3350 17 Gram(s) Oral two times a day  sodium chloride 1 Gram(s) Oral three times a day  tamsulosin 0.4 milliGRAM(s) Oral at bedtime    PRN Inpatient Medications  acetaminophen   Tablet .. 650 milliGRAM(s) Oral every 6 hours PRN  aluminum hydroxide/magnesium hydroxide/simethicone Suspension 30 milliLiter(s) Oral every 4 hours PRN  ondansetron Injectable 4 milliGRAM(s) IV Push every 8 hours PRN  oxyCODONE    IR 5 milliGRAM(s) Oral every 6 hours PRN      REVIEW OF SYSTEMS  --------------------------------------------------------------------------------  General: no fever  CVS: no chest pain  RESP: no sob, no cough  ABD: no abdominal pain  MSK: no edema     VITALS/PHYSICAL EXAM  --------------------------------------------------------------------------------  T(C): 36.6 (08-15-21 @ 07:17), Max: 37 (08-14-21 @ 21:38)  HR: 83 (08-15-21 @ 07:17) (83 - 92)  BP: 133/56 (08-15-21 @ 07:17) (111/55 - 149/62)  RR: 18 (08-15-21 @ 07:17) (18 - 18)  SpO2: 98% (08-15-21 @ 07:17) (98% - 99%)  Wt(kg): --  Height (cm): 167.6 (08-13-21 @ 17:10)  Weight (kg): 66.5 (08-13-21 @ 17:10)  BMI (kg/m2): 23.7 (08-13-21 @ 17:10)  BSA (m2): 1.75 (08-13-21 @ 17:10)      08-14-21 @ 07:01  -  08-15-21 @ 07:00  --------------------------------------------------------  IN: 0 mL / OUT: 500 mL / NET: -500 mL      Physical Exam:  	Gen: NAD  	HEENT: MMM  	Pulm: CTA B/L  	CV: S1S2  	Abd: Soft, +BS  	Ext: No LE edema B/L                      Neuro: Awake alert  	Skin: Warm and Dry   	Vascular access: NO HD catheter           [facility]  LABS/STUDIES  --------------------------------------------------------------------------------              8.9    7.55  >-----------<  488      [08-15-21 @ 07:16]              27.4     133  |  96  |  24  ----------------------------<  102      [08-15-21 @ 07:16]  3.7   |  22  |  1.40        Ca     10.3     [08-15-21 @ 07:16]      Mg     2.00     [08-15-21 @ 07:16]      Phos  4.2     [08-15-21 @ 07:16]    TPro  6.8  /  Alb  3.6  /  TBili  0.4  /  DBili  x   /  AST  18  /  ALT  14  /  AlkPhos  185  [08-14-21 @ 06:25]          Creatinine Trend:  SCr 1.40 [08-15 @ 07:16]  SCr 1.26 [08-14 @ 06:25]  SCr 1.31 [08-12 @ 22:23]  SCr 1.32 [08-10 @ 04:19]  SCr 1.35 [08-05 @ 13:07]    Urinalysis - [08-10-21 @ 04:16]      Color Light Yellow / Appearance Clear / SG 1.016 / pH 7.0      Gluc Negative / Ketone Negative  / Bili Negative / Urobili <2 mg/dL       Blood Moderate / Protein Trace / Leuk Est Negative / Nitrite Negative      RBC 57 / WBC 5 / Hyaline  / Gran  / Sq Epi  / Non Sq Epi 0 / Bacteria Negative      Iron 22, TIBC 227, %sat 10      [08-14-21 @ 06:25]  Ferritin 302      [08-14-21 @ 06:25]  Vitamin D (25OH) 26.2      [01-05-21 @ 11:00]  HbA1c 6.3      [09-13-17 @ 08:15]  TSH 2.38      [08-14-21 @ 06:25]  Lipid: chol 148, , HDL 41, LDL --      [08-14-21 @ 06:25]

## 2021-08-15 NOTE — PROVIDER CONTACT NOTE (MEDICATION) - ASSESSMENT
Most recent 2016 hemoglobin A1C routed to [employer] as attestation documentation for [employer]-Blood Sugar Controlled measure. Measure has been met.   
Pt demonstrating grimace

## 2021-08-15 NOTE — PROVIDER CONTACT NOTE (MEDICATION) - SITUATION
Pt c/o back pa in 6/10. Pt refused  Oxy IR 5 mg. patient did not  demonstrate adequate pain control after Tylenol administration. He  reported not reaching a tolerable pain level of 3 or lower

## 2021-08-16 DIAGNOSIS — Z51.5 ENCOUNTER FOR PALLIATIVE CARE: ICD-10-CM

## 2021-08-16 LAB
ANION GAP SERPL CALC-SCNC: 12 MMOL/L — SIGNIFICANT CHANGE UP (ref 7–14)
BUN SERPL-MCNC: 26 MG/DL — HIGH (ref 7–23)
CALCIUM SERPL-MCNC: 10.6 MG/DL — HIGH (ref 8.4–10.5)
CHLORIDE SERPL-SCNC: 95 MMOL/L — LOW (ref 98–107)
CO2 SERPL-SCNC: 25 MMOL/L — SIGNIFICANT CHANGE UP (ref 22–31)
CREAT SERPL-MCNC: 1.47 MG/DL — HIGH (ref 0.5–1.3)
GLUCOSE SERPL-MCNC: 115 MG/DL — HIGH (ref 70–99)
HCT VFR BLD CALC: 27.9 % — LOW (ref 39–50)
HGB BLD-MCNC: 9.1 G/DL — LOW (ref 13–17)
MAGNESIUM SERPL-MCNC: 2.1 MG/DL — SIGNIFICANT CHANGE UP (ref 1.6–2.6)
MCHC RBC-ENTMCNC: 28.3 PG — SIGNIFICANT CHANGE UP (ref 27–34)
MCHC RBC-ENTMCNC: 32.6 GM/DL — SIGNIFICANT CHANGE UP (ref 32–36)
MCV RBC AUTO: 86.6 FL — SIGNIFICANT CHANGE UP (ref 80–100)
NRBC # BLD: 0 /100 WBCS — SIGNIFICANT CHANGE UP
NRBC # FLD: 0 K/UL — SIGNIFICANT CHANGE UP
PHOSPHATE SERPL-MCNC: 3.6 MG/DL — SIGNIFICANT CHANGE UP (ref 2.5–4.5)
PLATELET # BLD AUTO: 521 K/UL — HIGH (ref 150–400)
POTASSIUM SERPL-MCNC: 4.4 MMOL/L — SIGNIFICANT CHANGE UP (ref 3.5–5.3)
POTASSIUM SERPL-SCNC: 4.4 MMOL/L — SIGNIFICANT CHANGE UP (ref 3.5–5.3)
RBC # BLD: 3.22 M/UL — LOW (ref 4.2–5.8)
RBC # FLD: 13.8 % — SIGNIFICANT CHANGE UP (ref 10.3–14.5)
SODIUM SERPL-SCNC: 132 MMOL/L — LOW (ref 135–145)
WBC # BLD: 9.11 K/UL — SIGNIFICANT CHANGE UP (ref 3.8–10.5)
WBC # FLD AUTO: 9.11 K/UL — SIGNIFICANT CHANGE UP (ref 3.8–10.5)

## 2021-08-16 PROCEDURE — 99232 SBSQ HOSP IP/OBS MODERATE 35: CPT

## 2021-08-16 PROCEDURE — 99223 1ST HOSP IP/OBS HIGH 75: CPT | Mod: GC

## 2021-08-16 RX ORDER — OXYCODONE HYDROCHLORIDE 5 MG/1
2.5 TABLET ORAL EVERY 4 HOURS
Refills: 0 | Status: DISCONTINUED | OUTPATIENT
Start: 2021-08-16 | End: 2021-08-18

## 2021-08-16 RX ADMIN — SODIUM CHLORIDE 1 GRAM(S): 9 INJECTION INTRAMUSCULAR; INTRAVENOUS; SUBCUTANEOUS at 22:04

## 2021-08-16 RX ADMIN — POLYETHYLENE GLYCOL 3350 17 GRAM(S): 17 POWDER, FOR SOLUTION ORAL at 06:23

## 2021-08-16 RX ADMIN — POLYETHYLENE GLYCOL 3350 17 GRAM(S): 17 POWDER, FOR SOLUTION ORAL at 17:45

## 2021-08-16 RX ADMIN — Medication 20 MILLIGRAM(S): at 06:22

## 2021-08-16 RX ADMIN — Medication 650 MILLIGRAM(S): at 22:06

## 2021-08-16 RX ADMIN — Medication 1 SPRAY(S): at 06:23

## 2021-08-16 RX ADMIN — HEPARIN SODIUM 5000 UNIT(S): 5000 INJECTION INTRAVENOUS; SUBCUTANEOUS at 17:46

## 2021-08-16 RX ADMIN — SODIUM CHLORIDE 1 GRAM(S): 9 INJECTION INTRAMUSCULAR; INTRAVENOUS; SUBCUTANEOUS at 06:22

## 2021-08-16 RX ADMIN — Medication 650 MILLIGRAM(S): at 11:56

## 2021-08-16 RX ADMIN — PREGABALIN 1000 MICROGRAM(S): 225 CAPSULE ORAL at 11:52

## 2021-08-16 RX ADMIN — TAMSULOSIN HYDROCHLORIDE 0.4 MILLIGRAM(S): 0.4 CAPSULE ORAL at 22:04

## 2021-08-16 RX ADMIN — BUDESONIDE AND FORMOTEROL FUMARATE DIHYDRATE 2 PUFF(S): 160; 4.5 AEROSOL RESPIRATORY (INHALATION) at 22:04

## 2021-08-16 RX ADMIN — HEPARIN SODIUM 5000 UNIT(S): 5000 INJECTION INTRAVENOUS; SUBCUTANEOUS at 06:22

## 2021-08-16 RX ADMIN — ATORVASTATIN CALCIUM 10 MILLIGRAM(S): 80 TABLET, FILM COATED ORAL at 22:04

## 2021-08-16 RX ADMIN — Medication 1 TABLET(S): at 11:52

## 2021-08-16 RX ADMIN — FINASTERIDE 5 MILLIGRAM(S): 5 TABLET, FILM COATED ORAL at 22:04

## 2021-08-16 RX ADMIN — SODIUM CHLORIDE 1 GRAM(S): 9 INJECTION INTRAMUSCULAR; INTRAVENOUS; SUBCUTANEOUS at 13:05

## 2021-08-16 RX ADMIN — Medication 25 MILLIGRAM(S): at 06:22

## 2021-08-16 RX ADMIN — PANTOPRAZOLE SODIUM 40 MILLIGRAM(S): 20 TABLET, DELAYED RELEASE ORAL at 06:22

## 2021-08-16 RX ADMIN — Medication 650 MILLIGRAM(S): at 12:50

## 2021-08-16 RX ADMIN — BUDESONIDE AND FORMOTEROL FUMARATE DIHYDRATE 2 PUFF(S): 160; 4.5 AEROSOL RESPIRATORY (INHALATION) at 08:39

## 2021-08-16 RX ADMIN — AMLODIPINE BESYLATE 10 MILLIGRAM(S): 2.5 TABLET ORAL at 06:22

## 2021-08-16 NOTE — CONSULT NOTE ADULT - REASON FOR ADMISSION
Left hip and groin pain

## 2021-08-16 NOTE — CONSULT NOTE ADULT - PROBLEM SELECTOR RECOMMENDATION 4
Palliative care consulted for assistance with symptom/ pain management and assistance with complex decision making related to goals of care. Recommendations have been made. Will continue to follow. Spoke with patients daughter today via telephone who is interested in her father receiving the MRI and for heme/onc to please call her once the imaging is back to discuss further treatment options.    Please page [phone] with any questions, concerns, or uncontrolled symptoms

## 2021-08-16 NOTE — CONSULT NOTE ADULT - CONSULT REASON
bone mets
hyponatremia
Clonus
LBP
hyperreflexia, bladder ca
Bony mets
Complex decision making related to goals of care and assistance with pain/symptom management

## 2021-08-16 NOTE — CONSULT NOTE ADULT - SUBJECTIVE AND OBJECTIVE BOX
HPI:  75 year old male w/ PMHx of bladder cancer, left kidney cancer s/p nephrectomy, HTN, CKD, hyponatremia, hypercholesterolemia, and BPH, presents to [facility] ED with left hip and groin pain. Patient has history of bladder cancer with bone metastasis. Patient was sent to [facility] by his oncologist Dr. Holley for a MRI and CT of his spine suggestive of metastasis cancer. Patient states he has dull pain throughout his body but it is most severe sharp pain at his left hip and left groin. Pain is constant, worsens with ambulation, no association of symptoms, no radiation, not relieved by Oxycodone. Patient denies any fever, chills, dizziness, HA, chest pain, SOB, incontinence, saddle anesthesia, difficulty with bowel movements, and abdominal pain. (13 Aug 2021 11:44)      Seen by orthopedics- no plans for intervention.  Seen by heme-onc- no plans for systemic therapy.    Palliative care consult, GOC has been recommended.     < from: CT Pelvis No Cont (08.13.21 @ 18:26) >  IMPRESSION:    1.  Diffuse lytic osseous metastatic disease. Includes a left pubic body lesion with associated soft tissue mass in the adjacent adductor musculature  2.  Pathologic fracture of the left pubic body.  3.  Left acetabular medial wall lesion markedly attenuated the left medial acetabular wall in keeping with a medial acetabular wall pathologic fracture.  4.  Mild distention of the urinary bladder with Contreras catheter in place. Correlate clinically.      < from: CT Thoracic Spine No Cont (08.13.21 @ 18:26) >    IMPRESSION:  Multiple cervical thoracic osseous metastases with a extra osseous extension within the thoracic spine. Multiple rib metastases. Limited evaluation of the intrathecal contents which would be better evaluated with contrast enhanced cervical and thoracic spine MRI. Unchanged pathologic compression fracture of the L1 vertebral body.    < end of copied text >        Allergies    chocolate (Pruritus)  flour (Rash)  No Known Drug Allergies  Nuts (Rash)  Soy (Unknown)    Intolerances        ROS: [  ] Fever  [  ] Chills  [  ]Chest Pain [  ] SOB  [  ]Cough [  ] N/V  [  ] Diarrhea [  ]Constipation [  ]Other ROS:  [  ] ROS otherwise negative    PAST MEDICAL & SURGICAL HISTORY:  Personal History of Hypertension    Hypercholesterolemia    Anxiety    HTN (Hypertension)    Essential hypertension    History of BPH    History of SIADH    Urinary tract infection with hematuria    Gross hematuria    Left renal mass    Asthma  denies recent asthma exacerbation    Hyponatremia  admission x 2 last 2017, 7/21    Cancer of kidney    History of stomach ulcers  denies recent endoscopy    Bladder tumor    No significant past surgical history    No significant past surgical history    S/P cystoscopy  Insertion left ureteral stent ; biopsy 8/19,    History of prostate surgery  ? exact procedure 3/19    H/O left nephrectomy    H/O cystoscopy  multiple , last 6/21        FAMILY HISTORY:  Family history of stroke    FH: lung cancer  brother    Family history of breast cancer in sister (Sibling)        MEDICATIONS  (STANDING):  amLODIPine   Tablet 10 milliGRAM(s) Oral daily  atorvastatin 10 milliGRAM(s) Oral at bedtime  budesonide 160 MICROgram(s)/formoterol 4.5 MICROgram(s) Inhaler 2 Puff(s) Inhalation two times a day  cyanocobalamin 1000 MICROGram(s) Oral daily  finasteride 5 milliGRAM(s) Oral at bedtime  fluticasone propionate 50 MICROgram(s)/spray Nasal Spray 1 Spray(s) Both Nostrils two times a day  furosemide    Tablet 20 milliGRAM(s) Oral daily  heparin   Injectable 5000 Unit(s) SubCutaneous every 12 hours  metoprolol succinate ER 25 milliGRAM(s) Oral daily  multivitamin 1 Tablet(s) Oral daily  pantoprazole    Tablet 40 milliGRAM(s) Oral before breakfast  polyethylene glycol 3350 17 Gram(s) Oral two times a day  sodium chloride 1 Gram(s) Oral three times a day  tamsulosin 0.4 milliGRAM(s) Oral at bedtime    MEDICATIONS  (PRN):  acetaminophen   Tablet .. 650 milliGRAM(s) Oral every 6 hours PRN Temp greater or equal to 38.5C (101.3F), Mild Pain (1 - 3)  aluminum hydroxide/magnesium hydroxide/simethicone Suspension 30 milliLiter(s) Oral every 4 hours PRN Dyspepsia  ondansetron Injectable 4 milliGRAM(s) IV Push every 8 hours PRN Nausea and/or Vomiting  oxyCODONE    IR 5 milliGRAM(s) Oral every 6 hours PRN Moderate and severe pain      PHYSICAL EXAM  Vital Signs Last 24 Hrs  T(C): 36.9 (16 Aug 2021 06:18), Max: 36.9 (15 Aug 2021 22:17)  T(F): 98.4 (16 Aug 2021 06:18), Max: 98.5 (15 Aug 2021 22:17)  HR: 88 (16 Aug 2021 06:18) (88 - 95)  BP: 133/54 (16 Aug 2021 06:18) (133/54 - 141/73)  BP(mean): --  RR: 16 (16 Aug 2021 06:18) (16 - 18)  SpO2: 97% (16 Aug 2021 06:18) (96% - 100%)    General: Well nourished, well developed, no acute distress  HEENT: NC/AT; EOMI, PERRL, sclera nonicteric; external ears normal; no rhinorrhea or epistaxis; mucous membranes moist; oropharynx clear and without erythema  CV: NR, RR; no appreciable r/m/g  Lungs: CTAB, no increased work of breathing  Abdomen: Bowel sounds present; soft, NTND  MSK: Vertebral spine non-tender to palpation  Neuro: AAOx3; cranial nerves II-XII intact; strength 5/5 in upper and lower extremities; sensation to light touch in tact bilaterally.  Psych: Full affect; mood congruent  Skin: no visible rashes on limited examination    IMAGING/LABS/PATHOLOGY: I have personally reviewed the relevant labs, pathology, and imaging as noted in the HPI.  In addition,                          9.1    9.11  )-----------( 521      ( 16 Aug 2021 06:50 )             27.9     08-16    132<L>  |  95<L>  |  26<H>  ----------------------------<  115<H>  4.4   |  25  |  1.47<H>    Ca    10.6<H>      16 Aug 2021 06:50  Phos  3.6     08-16  Mg     2.10     08-16          ASSESSMENT/PLAN    MD QURESHI is a 75y man with     We discussed the use of palliative radiation in this setting, namely to improve quality of life through the reduction of symptoms.  We talked about the risks, benefits, acute and long term side effects, as well as expected treatment outcomes.  He/She was given the opportunity to ask questions, which were answered to his/her apparent satisfaction.  He/She provided written consent to proceed with radiation therapy. We will arrange for inpatient/outpatient treatment. HPI:  75 year old male w/ PMHx of bladder cancer, left kidney cancer s/p nephrectomy, HTN, CKD, hyponatremia, hypercholesterolemia, and BPH, presents to [facility] ED with left hip and groin pain. Patient has history of bladder cancer with bone metastasis. Patient was sent to [facility] by his oncologist Dr. Holley for a MRI and CT of his spine suggestive of metastasis cancer. Patient states he has dull pain throughout his body but it is most severe sharp pain at his left hip and left groin. Pain is constant, worsens with ambulation, no association of symptoms, no radiation, not relieved by Oxycodone. Patient denies any fever, chills, dizziness, HA, chest pain, SOB, incontinence, saddle anesthesia, difficulty with bowel movements, and abdominal pain. (13 Aug 2021 11:44)    Pain is 6/10 at its worst to the left pelvis and lower back, 1/10 at rest and at minimum.      Seen by orthopedics- no plans for intervention.  Seen by heme-onc- no plans for systemic therapy.    Palliative care consult, GO has been recommended.     < from: CT Pelvis No Cont (08.13.21 @ 18:26) >  IMPRESSION:    1.  Diffuse lytic osseous metastatic disease. Includes a left pubic body lesion with associated soft tissue mass in the adjacent adductor musculature  2.  Pathologic fracture of the left pubic body.  3.  Left acetabular medial wall lesion markedly attenuated the left medial acetabular wall in keeping with a medial acetabular wall pathologic fracture.  4.  Mild distention of the urinary bladder with Contreras catheter in place. Correlate clinically.      < from: CT Thoracic Spine No Cont (08.13.21 @ 18:26) >    IMPRESSION:  Multiple cervical thoracic osseous metastases with a extra osseous extension within the thoracic spine. Multiple rib metastases. Limited evaluation of the intrathecal contents which would be better evaluated with contrast enhanced cervical and thoracic spine MRI. Unchanged pathologic compression fracture of the L1 vertebral body.        Allergies    chocolate (Pruritus)  flour (Rash)  No Known Drug Allergies  Nuts (Rash)  Soy (Unknown)    Intolerances        ROS: [  ] Fever  [  ] Chills  [  ]Chest Pain [  ] SOB  [  ]Cough [  ] N/V  [  ] Diarrhea [  ]Constipation [  ]Other ROS:  [  ] ROS otherwise negative    PAST MEDICAL & SURGICAL HISTORY:  Personal History of Hypertension    Hypercholesterolemia    Anxiety    HTN (Hypertension)    Essential hypertension    History of BPH    History of SIADH    Urinary tract infection with hematuria    Gross hematuria    Left renal mass    Asthma  denies recent asthma exacerbation    Hyponatremia  admission x 2 last 2017, 7/21    Cancer of kidney    History of stomach ulcers  denies recent endoscopy    Bladder tumor    No significant past surgical history    No significant past surgical history    S/P cystoscopy  Insertion left ureteral stent ; biopsy 8/19,    History of prostate surgery  ? exact procedure 3/19    H/O left nephrectomy    H/O cystoscopy  multiple , last 6/21        FAMILY HISTORY:  Family history of stroke    FH: lung cancer  brother    Family history of breast cancer in sister (Sibling)        MEDICATIONS  (STANDING):  amLODIPine   Tablet 10 milliGRAM(s) Oral daily  atorvastatin 10 milliGRAM(s) Oral at bedtime  budesonide 160 MICROgram(s)/formoterol 4.5 MICROgram(s) Inhaler 2 Puff(s) Inhalation two times a day  cyanocobalamin 1000 MICROGram(s) Oral daily  finasteride 5 milliGRAM(s) Oral at bedtime  fluticasone propionate 50 MICROgram(s)/spray Nasal Spray 1 Spray(s) Both Nostrils two times a day  furosemide    Tablet 20 milliGRAM(s) Oral daily  heparin   Injectable 5000 Unit(s) SubCutaneous every 12 hours  metoprolol succinate ER 25 milliGRAM(s) Oral daily  multivitamin 1 Tablet(s) Oral daily  pantoprazole    Tablet 40 milliGRAM(s) Oral before breakfast  polyethylene glycol 3350 17 Gram(s) Oral two times a day  sodium chloride 1 Gram(s) Oral three times a day  tamsulosin 0.4 milliGRAM(s) Oral at bedtime    MEDICATIONS  (PRN):  acetaminophen   Tablet .. 650 milliGRAM(s) Oral every 6 hours PRN Temp greater or equal to 38.5C (101.3F), Mild Pain (1 - 3)  aluminum hydroxide/magnesium hydroxide/simethicone Suspension 30 milliLiter(s) Oral every 4 hours PRN Dyspepsia  ondansetron Injectable 4 milliGRAM(s) IV Push every 8 hours PRN Nausea and/or Vomiting  oxyCODONE    IR 5 milliGRAM(s) Oral every 6 hours PRN Moderate and severe pain      PHYSICAL EXAM  KPS 50-60  Vital Signs Last 24 Hrs  T(C): 36.9 (16 Aug 2021 06:18), Max: 36.9 (15 Aug 2021 22:17)  T(F): 98.4 (16 Aug 2021 06:18), Max: 98.5 (15 Aug 2021 22:17)  HR: 88 (16 Aug 2021 06:18) (88 - 95)  BP: 133/54 (16 Aug 2021 06:18) (133/54 - 141/73)  BP(mean): --  RR: 16 (16 Aug 2021 06:18) (16 - 18)  SpO2: 97% (16 Aug 2021 06:18) (96% - 100%)    General: Well nourished, well developed, no acute distress  HEENT: NC/AT; EOMI, PERRL, sclera nonicteric; external ears normal; no rhinorrhea or epistaxis; mucous membranes moist; oropharynx clear and without erythema  CV: NR, RR; no appreciable r/m/g  Lungs: CTAB, no increased work of breathing  Abdomen: Bowel sounds present; soft, NTND  MSK: Vertebral spine mild pain to lower spine, tenderness to left pelvic area, he prefers to have his left leg bent to avoid pain.   Neuro: AAOx3; cranial nerves II-XII intact; strength 5/5 in upper and lower extremities; sensation to light touch in tact bilaterally.  Psych: Full affect; mood congruent  Skin: no visible rashes on limited examination    IMAGING/LABS/PATHOLOGY: I have personally reviewed the relevant labs, pathology, and imaging as noted in the HPI.  In addition,                          9.1    9.11  )-----------( 521      ( 16 Aug 2021 06:50 )             27.9     08-16    132<L>  |  95<L>  |  26<H>  ----------------------------<  115<H>  4.4   |  25  |  1.47<H>    Ca    10.6<H>      16 Aug 2021 06:50  Phos  3.6     08-16  Mg     2.10     08-16          ASSESSMENT/PLAN    MD QURESHI is a 75y man with h/o kidney cancer, s/p left nephrectomy, also with metastatic bladder cancer, sent in by PCP having left groin/pelvic pain and some mild back pain.  Imaging denotes left pelvic ramus fracture and L1 compression fracture (not new?)   Discussed with Dr. Deluna-  plan would be to control pain and offer SBRT as an outpatient at [address].  Let us know if he can be discharged soon for outpatient RT.     [phone] (Dr. Deluna.)   HPI:  75 year old male w/ PMHx of bladder cancer, left kidney cancer s/p nephrectomy, HTN, CKD, hyponatremia, hypercholesterolemia, and BPH, presents to [facility] ED with left hip and groin pain. Patient has history of bladder cancer with bone metastasis. Patient was sent to [facility] by his oncologist Dr. Holley for a MRI and CT of his spine suggestive of metastasis cancer. Patient states he has dull pain throughout his body but it is most severe sharp pain at his left hip and left groin. Pain is constant, worsens with ambulation, no association of symptoms, no radiation, not relieved by Oxycodone. Patient denies any fever, chills, dizziness, HA, chest pain, SOB, incontinence, saddle anesthesia, difficulty with bowel movements, and abdominal pain. (13 Aug 2021 11:44)    Pain is 6/10 at its worst to the left pelvis and lower back, 1/10 at rest and at minimum.      Seen by orthopedics- no plans for intervention.  Seen by heme-onc- no plans for systemic therapy.    Palliative care consult, GO has been recommended.     < from: CT Pelvis No Cont (08.13.21 @ 18:26) >  IMPRESSION:    1.  Diffuse lytic osseous metastatic disease. Includes a left pubic body lesion with associated soft tissue mass in the adjacent adductor musculature  2.  Pathologic fracture of the left pubic body.  3.  Left acetabular medial wall lesion markedly attenuated the left medial acetabular wall in keeping with a medial acetabular wall pathologic fracture.  4.  Mild distention of the urinary bladder with Contreras catheter in place. Correlate clinically.      < from: CT Thoracic Spine No Cont (08.13.21 @ 18:26) >    IMPRESSION:  Multiple cervical thoracic osseous metastases with a extra osseous extension within the thoracic spine. Multiple rib metastases. Limited evaluation of the intrathecal contents which would be better evaluated with contrast enhanced cervical and thoracic spine MRI. Unchanged pathologic compression fracture of the L1 vertebral body.        Allergies    chocolate (Pruritus)  flour (Rash)  No Known Drug Allergies  Nuts (Rash)  Soy (Unknown)    Intolerances        ROS: [  ] Fever  [  ] Chills  [  ]Chest Pain [  ] SOB  [  ]Cough [  ] N/V  [  ] Diarrhea [  ]Constipation [  ]Other ROS:  [  ] ROS otherwise negative    PAST MEDICAL & SURGICAL HISTORY:  Personal History of Hypertension    Hypercholesterolemia    Anxiety    HTN (Hypertension)    Essential hypertension    History of BPH    History of SIADH    Urinary tract infection with hematuria    Gross hematuria    Left renal mass    Asthma  denies recent asthma exacerbation    Hyponatremia  admission x 2 last 2017, 7/21    Cancer of kidney    History of stomach ulcers  denies recent endoscopy    Bladder tumor    No significant past surgical history    No significant past surgical history    S/P cystoscopy  Insertion left ureteral stent ; biopsy 8/19,    History of prostate surgery  ? exact procedure 3/19    H/O left nephrectomy    H/O cystoscopy  multiple , last 6/21        FAMILY HISTORY:  Family history of stroke    FH: lung cancer  brother    Family history of breast cancer in sister (Sibling)        MEDICATIONS  (STANDING):  amLODIPine   Tablet 10 milliGRAM(s) Oral daily  atorvastatin 10 milliGRAM(s) Oral at bedtime  budesonide 160 MICROgram(s)/formoterol 4.5 MICROgram(s) Inhaler 2 Puff(s) Inhalation two times a day  cyanocobalamin 1000 MICROGram(s) Oral daily  finasteride 5 milliGRAM(s) Oral at bedtime  fluticasone propionate 50 MICROgram(s)/spray Nasal Spray 1 Spray(s) Both Nostrils two times a day  furosemide    Tablet 20 milliGRAM(s) Oral daily  heparin   Injectable 5000 Unit(s) SubCutaneous every 12 hours  metoprolol succinate ER 25 milliGRAM(s) Oral daily  multivitamin 1 Tablet(s) Oral daily  pantoprazole    Tablet 40 milliGRAM(s) Oral before breakfast  polyethylene glycol 3350 17 Gram(s) Oral two times a day  sodium chloride 1 Gram(s) Oral three times a day  tamsulosin 0.4 milliGRAM(s) Oral at bedtime    MEDICATIONS  (PRN):  acetaminophen   Tablet .. 650 milliGRAM(s) Oral every 6 hours PRN Temp greater or equal to 38.5C (101.3F), Mild Pain (1 - 3)  aluminum hydroxide/magnesium hydroxide/simethicone Suspension 30 milliLiter(s) Oral every 4 hours PRN Dyspepsia  ondansetron Injectable 4 milliGRAM(s) IV Push every 8 hours PRN Nausea and/or Vomiting  oxyCODONE    IR 5 milliGRAM(s) Oral every 6 hours PRN Moderate and severe pain      PHYSICAL EXAM  KPS 50-60  Vital Signs Last 24 Hrs  T(C): 36.9 (16 Aug 2021 06:18), Max: 36.9 (15 Aug 2021 22:17)  T(F): 98.4 (16 Aug 2021 06:18), Max: 98.5 (15 Aug 2021 22:17)  HR: 88 (16 Aug 2021 06:18) (88 - 95)  BP: 133/54 (16 Aug 2021 06:18) (133/54 - 141/73)  BP(mean): --  RR: 16 (16 Aug 2021 06:18) (16 - 18)  SpO2: 97% (16 Aug 2021 06:18) (96% - 100%)    General: Well nourished, well developed, no acute distress  HEENT: NC/AT; EOMI, PERRL, sclera nonicteric; external ears normal; no rhinorrhea or epistaxis; mucous membranes moist; oropharynx clear and without erythema  CV: NR, RR; no appreciable r/m/g  Lungs: CTAB, no increased work of breathing  Abdomen: Bowel sounds present; soft, NTND  MSK: Vertebral spine mild pain to lower spine, tenderness to left pelvic area, he prefers to have his left leg bent to avoid pain.   Neuro: AAOx3; cranial nerves II-XII intact; strength 5/5 in upper and lower extremities; sensation to light touch in tact bilaterally.  Psych: Full affect; mood congruent  Skin: no visible rashes on limited examination    IMAGING/LABS/PATHOLOGY: I have personally reviewed the relevant labs, pathology, and imaging as noted in the HPI.  In addition,                          9.1    9.11  )-----------( 521      ( 16 Aug 2021 06:50 )             27.9     08-16    132<L>  |  95<L>  |  26<H>  ----------------------------<  115<H>  4.4   |  25  |  1.47<H>    Ca    10.6<H>      16 Aug 2021 06:50  Phos  3.6     08-16  Mg     2.10     08-16          ASSESSMENT/PLAN    MD QURESHI is a 75y man with h/o kidney cancer, s/p left nephrectomy, also with metastatic bladder cancer, sent in by PCP having left groin/pelvic pain and some mild back pain.  Imaging denotes left pelvic ramus fracture and L1 compression fracture (not new?)   Discussed with Dr. Deluna-  plan would be to control pain and offer SBRT as an outpatient at [address].  Inpatient RT not recommended as bladder cancer can be resistant to RT.   Alternatively, we discussed hospice during rounds.   Let us know if he can be discharged for outpatient RT.     [phone] (Dr. Deluna.)   HPI:  75 year old male w/ PMHx of bladder cancer, left kidney cancer s/p nephrectomy, HTN, CKD, hyponatremia, hypercholesterolemia, and BPH, presents to [facility] ED with left hip and groin pain. Patient has history of bladder cancer with bone metastasis.  Prior RT: bladder 10/2020- Dr. Valentin.     Patient was sent to [facility] by his oncologist Dr. Holley for a MRI and CT of his spine suggestive of metastasis cancer. Patient states he has dull pain throughout his body but it is most severe sharp pain at his left hip and left groin. Pain is constant, worsens with ambulation, no association of symptoms, no radiation, not relieved by Oxycodone. Patient denies any fever, chills, dizziness, HA, chest pain, SOB, incontinence, saddle anesthesia, difficulty with bowel movements, and abdominal pain. (13 Aug 2021 11:44)    Pain is 6/10 at its worst to the left pelvis and lower back, 1/10 at rest and at minimum.      Seen by orthopedics- no plans for intervention.  Seen by heme-onc- no plans for systemic therapy.    Palliative care consult, GOC has been recommended.     < from: CT Pelvis No Cont (08.13.21 @ 18:26) >  IMPRESSION:    1.  Diffuse lytic osseous metastatic disease. Includes a left pubic body lesion with associated soft tissue mass in the adjacent adductor musculature  2.  Pathologic fracture of the left pubic body.  3.  Left acetabular medial wall lesion markedly attenuated the left medial acetabular wall in keeping with a medial acetabular wall pathologic fracture.  4.  Mild distention of the urinary bladder with Contreras catheter in place. Correlate clinically.      < from: CT Thoracic Spine No Cont (08.13.21 @ 18:26) >    IMPRESSION:  Multiple cervical thoracic osseous metastases with a extra osseous extension within the thoracic spine. Multiple rib metastases. Limited evaluation of the intrathecal contents which would be better evaluated with contrast enhanced cervical and thoracic spine MRI. Unchanged pathologic compression fracture of the L1 vertebral body.        Allergies    chocolate (Pruritus)  flour (Rash)  No Known Drug Allergies  Nuts (Rash)  Soy (Unknown)    Intolerances        ROS: [  ] Fever  [  ] Chills  [  ]Chest Pain [  ] SOB  [  ]Cough [  ] N/V  [  ] Diarrhea [  ]Constipation [  ]Other ROS:  [  ] ROS otherwise negative    PAST MEDICAL & SURGICAL HISTORY:  Personal History of Hypertension    Hypercholesterolemia    Anxiety    HTN (Hypertension)    Essential hypertension    History of BPH    History of SIADH    Urinary tract infection with hematuria    Gross hematuria    Left renal mass    Asthma  denies recent asthma exacerbation    Hyponatremia  admission x 2 last 2017, 7/21    Cancer of kidney    History of stomach ulcers  denies recent endoscopy    Bladder tumor    No significant past surgical history    No significant past surgical history    S/P cystoscopy  Insertion left ureteral stent ; biopsy 8/19,    History of prostate surgery  ? exact procedure 3/19    H/O left nephrectomy    H/O cystoscopy  multiple , last 6/21        FAMILY HISTORY:  Family history of stroke    FH: lung cancer  brother    Family history of breast cancer in sister (Sibling)        MEDICATIONS  (STANDING):  amLODIPine   Tablet 10 milliGRAM(s) Oral daily  atorvastatin 10 milliGRAM(s) Oral at bedtime  budesonide 160 MICROgram(s)/formoterol 4.5 MICROgram(s) Inhaler 2 Puff(s) Inhalation two times a day  cyanocobalamin 1000 MICROGram(s) Oral daily  finasteride 5 milliGRAM(s) Oral at bedtime  fluticasone propionate 50 MICROgram(s)/spray Nasal Spray 1 Spray(s) Both Nostrils two times a day  furosemide    Tablet 20 milliGRAM(s) Oral daily  heparin   Injectable 5000 Unit(s) SubCutaneous every 12 hours  metoprolol succinate ER 25 milliGRAM(s) Oral daily  multivitamin 1 Tablet(s) Oral daily  pantoprazole    Tablet 40 milliGRAM(s) Oral before breakfast  polyethylene glycol 3350 17 Gram(s) Oral two times a day  sodium chloride 1 Gram(s) Oral three times a day  tamsulosin 0.4 milliGRAM(s) Oral at bedtime    MEDICATIONS  (PRN):  acetaminophen   Tablet .. 650 milliGRAM(s) Oral every 6 hours PRN Temp greater or equal to 38.5C (101.3F), Mild Pain (1 - 3)  aluminum hydroxide/magnesium hydroxide/simethicone Suspension 30 milliLiter(s) Oral every 4 hours PRN Dyspepsia  ondansetron Injectable 4 milliGRAM(s) IV Push every 8 hours PRN Nausea and/or Vomiting  oxyCODONE    IR 5 milliGRAM(s) Oral every 6 hours PRN Moderate and severe pain      PHYSICAL EXAM  KPS 50-60  Vital Signs Last 24 Hrs  T(C): 36.9 (16 Aug 2021 06:18), Max: 36.9 (15 Aug 2021 22:17)  T(F): 98.4 (16 Aug 2021 06:18), Max: 98.5 (15 Aug 2021 22:17)  HR: 88 (16 Aug 2021 06:18) (88 - 95)  BP: 133/54 (16 Aug 2021 06:18) (133/54 - 141/73)  BP(mean): --  RR: 16 (16 Aug 2021 06:18) (16 - 18)  SpO2: 97% (16 Aug 2021 06:18) (96% - 100%)    General: Well nourished, well developed, no acute distress  HEENT: NC/AT; EOMI, PERRL, sclera nonicteric; external ears normal; no rhinorrhea or epistaxis; mucous membranes moist; oropharynx clear and without erythema  CV: NR, RR; no appreciable r/m/g  Lungs: CTAB, no increased work of breathing  Abdomen: Bowel sounds present; soft, NTND  MSK: Vertebral spine mild pain to lower spine, tenderness to left pelvic area, he prefers to have his left leg bent to avoid pain.   Neuro: AAOx3; cranial nerves II-XII intact; strength 5/5 in upper and lower extremities; sensation to light touch in tact bilaterally.  Psych: Full affect; mood congruent  Skin: no visible rashes on limited examination    IMAGING/LABS/PATHOLOGY: I have personally reviewed the relevant labs, pathology, and imaging as noted in the HPI.  In addition,                          9.1    9.11  )-----------( 521      ( 16 Aug 2021 06:50 )             27.9     08-16    132<L>  |  95<L>  |  26<H>  ----------------------------<  115<H>  4.4   |  25  |  1.47<H>    Ca    10.6<H>      16 Aug 2021 06:50  Phos  3.6     08-16  Mg     2.10     08-16          ASSESSMENT/PLAN    MD QURESHI is a 75y man with h/o kidney cancer, s/p left nephrectomy, also with metastatic bladder cancer, sent in by PCP having left groin/pelvic pain and some mild back pain.  Imaging denotes left pelvic ramus fracture and L1 compression fracture (not new?)   Discussed with Dr. Deluna-  plan would be to control pain and offer SBRT as an outpatient at [address].  Inpatient RT not recommended as bladder cancer can be resistant to RT.   Alternatively, we discussed hospice during rounds.   Let us know if he can be discharged for outpatient RT.     [phone] (Dr. Deluna.)

## 2021-08-16 NOTE — CONSULT NOTE ADULT - CONSULT REQUESTED DATE/TIME
13-Aug-2021 11:28
13-Aug-2021 15:53
13-Aug-2021 17:38
16-Aug-2021 10:45
13-Aug-2021 13:40
16-Aug-2021 15:48
13-Aug-2021 01:03

## 2021-08-16 NOTE — CONSULT NOTE ADULT - ATTENDING COMMENTS
> Hemorrhagic Shock - acute blood loss anemia due to GI Bleed / Duodenal Ulcer, GE junction tear - H/H appears to now be stable, not on pressors.  Monitor H/h.  Continue Protonix.  --> advance diet to full  --> for another unit prbc today     > CAD - c.w meds    > DVT Prophylaxis - Lower extremity intermittent compression devices.     dc tomorrow
> Hemorrhagic Shock - acute blood loss anemia due to GI Bleed / Duodenal Ulcer, GE junction tear - H/H appears to now be stable, not on pressors.  Monitor H/h.  Continue Protonix.  --> advance diet to full  > CAD - c.w meds    > DVT Prophylaxis - Lower extremity intermittent compression devices.
> Hemorrhagic Shock - acute blood loss anemia due to GI Bleed / Duodenal Ulcer, GE junction tear - H/H appears to now be stable, not on pressors.  Monitor H/h.  Continue Protonix.  > CAD - continue Enalapril, Statin.  Consider restarting Coreg in am if BP permits.  > DVT Prophylaxis - Lower extremity intermittent compression devices.   The Hospitalist Service will assume care of this patient beginning tomorrow.
GIB bleed, likely upper  CAD,s/p CABG  HTN  HLD    Neuro: No active issues  Cardiovascular: Aim for MAP target 65. Hold hypertensives  Resp/Chest: Maintain SpO2 > 92%  GI/Nutrition: NPO overnight. Scheduled for EGD today. On IV PPI gtt. Transfuse PRN to keep Hb>7.0. Hold ASA. CBC later today  ID: No active issues. Leukocytosis is likely reactive. Check UA  Renal: Avoid nephrotoxic agents. Strict I&O  Endocrinology: Maintain Blood sugar < 180. ISS as per protocol  Haem/Oncology:  DVT ppx w/ SCDs    Code: DNR, MOLST updated    Critical Care time: 35 minutes assessing presenting problems of acute illness that poses high probability of life threatening deterioration or end organ damage/dysfunction.  Medical decision making including Initiating plan of care, reviewing data, reviewing radiology, discussing with multidisciplinary team, non inclusive of procedures
Agree with above.    75M w/ hx of bladder ca, L RCC s/p nephrectomy, HTN, CKD, HLD, BPH, was sent to ED d/t spine mets and L groin pain.   Per hemeonc, he is a poor candidate for systemic chemo and rec'd pal care and GOC discussion.    7/27/21 - bone scan: extensive osseous mets in axial skeleton. R subtroch area of uptake.  7/29/21 - R femur xray: subtle ovoid area of cortical scalloping at subtroch region  8/13/21 - R femur xrays: no change  8/13/21 - CT Pelvis: L inferior pubic ramus lesion with soft tissue mass, 5.4X3.4cm. Additional smaller lesion in left anterior wall acetabular area.     Exam:   Difficult to assess fully as patient is not complaint with exam. No obvious TTP, possible TTP in left groin area. Full hip ROM w/o pain. NVI.    A/P:   75M w/ extensive osseous met disease. Surgical excision will not be sufficient to control his diffusely metastatic disease. Would recommend palliative XRT to the left groin area. Continue to follow R femur with interval xray in 1 month- may consider XRT to this area as well. Consult palliative care team for goals of care discussion. May be WBAT.
Patient seen and examined with neurology team and above note reviewed and I agree with assessment and plan as outlined. Patient hx as noted and he has prostate cancer and mets to bone. He has been having increased pains in his back, and lower extremities and poor mobility and balance.   Exam shows generalized weakness and increased reflexes in legs with right upgoing toe     Plan: Obtain MRI of the cervical and thoracic, and lumbar spine to rule out spinal mets and cord compression    2. Oncology follow up    3. Blood work as outlined above and continue medical mgt and supportive care and all questions answered.     4. PT and OT
Pt seen with NP  Agree with above plan    74yo M with bladder CA  Consulted for complex symptom management in the setting of advanced malignancy  Oxy PRN as above  Pending final recs from rad onc and oncology

## 2021-08-16 NOTE — CONSULT NOTE ADULT - SUBJECTIVE AND OBJECTIVE BOX
HPI:  75 year old male w/ PMHx of bladder cancer, left kidney cancer s/p nephrectomy, HTN, CKD, hyponatremia, hypercholesterolemia, and BPH, presents to [facility] ED with left hip and groin pain. Patient has history of bladder cancer with bone metastasis. Patient was sent to [facility] by his oncologist Dr. Holley for a MRI and CT of his spine suggestive of metastasis cancer. Patient states he has dull pain throughout his body but it is most severe sharp pain at his left hip and left groin. Pain is constant, worsens with ambulation, no association of symptoms, no radiation, not relieved by Oxycodone. Patient states pain is relieved by Tylenol extra strength on occasion.  He also complains of lower back, left knee, and ankle pain. Patient is not able to ambulate for an extended period of time due to his pain. Patient is having tingling and pain throughout his lower extremities that has been present for about a year when he was diagnosed with cancer. Patient denies any fever, chills, dizziness, HA, chest pain, SOB, incontinence, saddle anesthesia, difficulty with bowel movements, and abdominal pain. (13 Aug 2021 11:44)    PERTINENT PM/SXH:   Personal History of Hypertension    Hypercholesterolemia    Anxiety    HTN (Hypertension)    Essential hypertension    History of BPH    History of SIADH    Urinary tract infection with hematuria    Gross hematuria    Left renal mass    Asthma    Hyponatremia    Cancer of kidney    History of stomach ulcers    Bladder tumor      No significant past surgical history    No significant past surgical history    S/P cystoscopy    History of prostate surgery    H/O left nephrectomy    H/O cystoscopy      FAMILY HISTORY:  Family history of stroke    FH: lung cancer  brother    Family history of breast cancer in sister (Sibling)      ITEMS NOT CHECKED ARE NOT PRESENT    SOCIAL HISTORY:   Significant other/partner:  [x]  Children:  [x]  Advent/Spirituality: Judaism   Substance hx:  []   Tobacco hx:  [x]   Alcohol hx: []   Home Opioid hx:  [] I-Stop Reference No: [id number]   07/30/2021 07/30/2021 oxycodone hcl 5 mg tablet 20 5 Phuong Zimmer  Living Situation: [x]Home  []Long term care  []Rehab []Other    ADVANCE DIRECTIVES:    DNR  []MOLST  []Living Will  DECISION MAKER(s):  [] Health Care Proxy(s)  [x] Surrogate(s)  [] Guardian           Name(s):              Phone Number(s):  Jose Alcala (daughter)        [phone]  Angi Bello (spouse)       [phone]  BASELINE (I)ADL(s) (prior to admission):  [place]: []Total  [x] Moderate []Dependent    ALLERGIES:  Allergies    chocolate (Pruritus)  flour (Rash)  No Known Drug Allergies  Nuts (Rash)  Soy (Unknown)    Intolerances    MEDICATIONS  (STANDING):  amLODIPine   Tablet 10 milliGRAM(s) Oral daily  atorvastatin 10 milliGRAM(s) Oral at bedtime  budesonide 160 MICROgram(s)/formoterol 4.5 MICROgram(s) Inhaler 2 Puff(s) Inhalation two times a day  cyanocobalamin 1000 MICROGram(s) Oral daily  finasteride 5 milliGRAM(s) Oral at bedtime  fluticasone propionate 50 MICROgram(s)/spray Nasal Spray 1 Spray(s) Both Nostrils two times a day  furosemide    Tablet 20 milliGRAM(s) Oral daily  heparin   Injectable 5000 Unit(s) SubCutaneous every 12 hours  metoprolol succinate ER 25 milliGRAM(s) Oral daily  multivitamin 1 Tablet(s) Oral daily  pantoprazole    Tablet 40 milliGRAM(s) Oral before breakfast  polyethylene glycol 3350 17 Gram(s) Oral two times a day  sodium chloride 1 Gram(s) Oral three times a day  tamsulosin 0.4 milliGRAM(s) Oral at bedtime    MEDICATIONS  (PRN):  acetaminophen   Tablet .. 650 milliGRAM(s) Oral every 6 hours PRN Temp greater or equal to 38.5C (101.3F), Mild Pain (1 - 3)  aluminum hydroxide/magnesium hydroxide/simethicone Suspension 30 milliLiter(s) Oral every 4 hours PRN Dyspepsia  ondansetron Injectable 4 milliGRAM(s) IV Push every 8 hours PRN Nausea and/or Vomiting  oxyCODONE    IR 2.5 milliGRAM(s) Oral every 4 hours PRN Moderate to Severe pain    PRESENT SYMPTOMS: []Unable to obtain due to poor mentation/encephalopathy  Source if other than patient:  []Family   []Team     Pain: [x] yes [ ] no  QOL impact - difficulty performing ADLs   Location - back and left hip/groin pain                    Aggravating factors - movement   Quality - dull/ aching   Radiation - back to left hip/groin   Timing - constant   Severity (0-10 scale): 6/10  Minimal acceptable level (0-10 scale): 2/10    PAIN AD Score:  [email or web address]/painad.pdf (press ctrl +  left click to view)    Dyspnea:                           []Mild  []Moderate []Severe  Anxiety:                             []Mild []Moderate []Severe  Fatigue:                             []Mild []Moderate []Severe  Nausea:                             []Mild []Moderate []Severe  Loss of appetite:              []Mild []Moderate []Severe  Constipation:                    []Mild []Moderate []Severe    Other Symptoms:  [x]All other review of systems negative     Palliative Performance Status Version 2:  50%    [email or web address]    PHYSICAL EXAM:  Vital Signs Last 24 Hrs  T(C): 36.9 (16 Aug 2021 06:18), Max: 36.9 (15 Aug 2021 22:17)  T(F): 98.4 (16 Aug 2021 06:18), Max: 98.5 (15 Aug 2021 22:17)  HR: 88 (16 Aug 2021 06:18) (88 - 90)  BP: 133/54 (16 Aug 2021 06:18) (133/54 - 136/65)  BP(mean): --  RR: 16 (16 Aug 2021 06:18) (16 - 18)  SpO2: 97% (16 Aug 2021 06:18) (96% - 97%)   I&O's Summary    15 Aug 2021 07:01  -  16 Aug 2021 07:00  --------------------------------------------------------  IN: 0 mL / OUT: 700 mL / NET: -700 mL    16 Aug 2021 07:01  -  16 Aug 2021 15:49  --------------------------------------------------------  IN: 400 mL / OUT: 1000 mL / NET: -600 mL    GENERAL:  [x]Alert  [x]Oriented x 3  []Lethargic  []Cachexia  []Unarousable  [x]Verbal  []Non-Verbal  Behavioral:   [] Anxiety  [] Delirium [] Agitation [] Other  HEENT:  [x]Normal   []Dry mouth   []ET Tube/Trach  []Oral lesions  PULMONARY:   [x]Clear []Tachypnea  []Audible excessive secretions   []Rhonchi        []Right []Left []Bilateral  []Crackles        []Right []Left []Bilateral  []Wheezing     []Right []Left []Bilateral  CARDIOVASCULAR:    [x]Regular []Irregular []Tachy  []Kj []Murmur []Other  GASTROINTESTINAL:  [x]Soft  []Distended   []+BS  [x]Non tender []Tender  []PEG []OGT/ NGT  Last BM: 8/15  GENITOURINARY:  []Normal [] Incontinent   []Oliguria/Anuria   [x]Contreras  MUSCULOSKELETAL:   []Normal   [x]Weakness  []Bed/Wheelchair bound []Edema  NEUROLOGIC:   [x]No focal deficits  [] Cognitive impairment  [] Dysphagia []Dysarthria [] Paresis []Other   SKIN:   [x]Normal   []Pressure ulcer(s)  []Rash    CRITICAL CARE:  [ ] Shock Present  [ ]Septic [ ]Cardiogenic [ ]Neurologic [ ]Hypovolemic  [ ]  Vasopressors [ ]  Inotropes   [ ] Respiratory failure present [ ] Mechanical Ventilation [ ] Non-invasive ventilatory support [ ] High-Flow  [ ] Acute  [ ] Chronic [ ] Hypoxic  [ ] Hypercarbic [ ] Other  [ ] Other organ failure    LABS:                        9.1    9.11  )-----------( 521      ( 16 Aug 2021 06:50 )             27.9   08-16    132<L>  |  95<L>  |  26<H>  ----------------------------<  115<H>  4.4   |  25  |  1.47<H>    Ca    10.6<H>      16 Aug 2021 06:50  Phos  3.6     08-16  Mg     2.10     08-16      RADIOLOGY & ADDITIONAL STUDIES:  < from: CT Head No Cont (08.13.21 @ 02:07) >    IMPRESSION:    No acute intracranial hemorrhage, territorial infarct or mass effect. If clinical concern persists for metastatic disease to the brain, a contrast-enhanced MRI of the brain would be a more sensitive examination.    --- End of Report ---    < end of copied text >    < from: CT Lumbar Spine w/ IV Cont (08.13.21 @ 02:14) >  IMPRESSION:  1. Interval worsening of lytic osseous metastatic disease.  2. Acute pathologic fracture at the superior endplate of the L1 vertebral body.  3. Heterogeneously thickened urinary bladder likely correlating with the history of bladder cancer.    --- End of Report ---    < end of copied text >    < from: CT Cervical Spine No Cont (08.13.21 @ 18:26) >  IMPRESSION:  Multiple cervical thoracic osseous metastases with a extra osseous extension within the thoracic spine. Multiple rib metastases. Limited evaluation of the intrathecal contents which would be better evaluated with contrast enhanced cervical and thoracic spine MRI. Unchanged pathologic compression fracture of the L1 vertebral body.    --- End of Report ---    < end of copied text >  < from: CT Thoracic Spine No Cont (08.13.21 @ 18:26) >    IMPRESSION:  Multiple cervical thoracic osseous metastases with a extra osseous extension within the thoracic spine. Multiple rib metastases. Limited evaluation of the intrathecal contents which would be better evaluated with contrast enhanced cervical and thoracic spine MRI. Unchanged pathologic compression fracture of the L1 vertebral body.    --- End of Report ---    < end of copied text >    < from: CT Pelvis No Cont (08.13.21 @ 18:26) >      IMPRESSION:    1.  Diffuse lytic osseous metastatic disease. Includes a left pubic body lesion with associated soft tissue mass in the adjacent adductor musculature  2.  Pathologic fracture of the left pubic body.  3.  Left acetabular medial wall lesion markedly attenuated the left medial acetabular wall in keeping with a medial acetabular wall pathologic fracture.  4.  Mild distention of the urinary bladder with Contreras catheter in place. Correlate clinically.    --- End of Report ---      < end of copied text >    < from: Xray Femur 2 Views, Right (08.13.21 @ 18:43) >  Findings/  Impression: There is no acute fracture or dislocation. Degenerative changes of the spine. Possible compression deformity of L5.    --- End of Report ---      < end of copied text >    < from: Xray Pelvis AP only (08.13.21 @ 18:44) >  Findings/  Impression: There is no acute fracture or dislocation. Degenerative changes of the spine. Possible compression deformity of L5.    --- End of Report ---    < end of copied text >      PROTEIN CALORIE MALNUTRITION PRESENT: [ ]mild [ ]moderate [ ]severe [ ]underweight [ ]morbid obesity  []PPSV2 < or = to 30% []significant weight loss  []poor nutritional intake []catabolic state []anasarca     Artificial Nutrition []     REFERRALS:   []Chaplaincy  []Hospice  []Child Life  [x]Social Work  []Case management []Holistic Therapy     Goals of Care Document:     HPI:  75 year old male w/ PMHx of bladder cancer, left kidney cancer s/p nephrectomy, HTN, CKD, hyponatremia, hypercholesterolemia, and BPH, presents to [facility] ED with left hip and groin pain. Patient has history of bladder cancer with bone metastasis. Patient was sent to [facility] by his oncologist Dr. Hollye for a MRI and CT of his spine suggestive of metastasis cancer. Patient states he has dull pain throughout his body but it is most severe sharp pain at his left hip and left groin. Pain is constant, worsens with ambulation, no association of symptoms, no radiation, not relieved by Oxycodone. Patient states pain is relieved by Tylenol extra strength on occasion.  He also complains of lower back, left knee, and ankle pain. Patient is not able to ambulate for an extended period of time due to his pain. Patient is having tingling and pain throughout his lower extremities that has been present for about a year when he was diagnosed with cancer. Patient denies any fever, chills, dizziness, HA, chest pain, SOB, incontinence, saddle anesthesia, difficulty with bowel movements, and abdominal pain. (13 Aug 2021 11:44)    PERTINENT PM/SXH:   Personal History of Hypertension    Hypercholesterolemia    Anxiety    HTN (Hypertension)    Essential hypertension    History of BPH    History of SIADH    Urinary tract infection with hematuria    Gross hematuria    Left renal mass    Asthma    Hyponatremia    Cancer of kidney    History of stomach ulcers    Bladder tumor      No significant past surgical history    No significant past surgical history    S/P cystoscopy    History of prostate surgery    H/O left nephrectomy    H/O cystoscopy      FAMILY HISTORY:  Family history of stroke    FH: lung cancer  brother    Family history of breast cancer in sister (Sibling)      ITEMS NOT CHECKED ARE NOT PRESENT    SOCIAL HISTORY:   Significant other/partner:  [x]  Children:  [x]  Restorationist/Spirituality: Evangelical   Substance hx:  []   Tobacco hx:  [x]   Alcohol hx: []   Home Opioid hx:  [] I-Stop Reference No: [id number]   07/30/2021 07/30/2021 oxycodone hcl 5 mg tablet 20 5 Phuong Zimmer  Living Situation: [x]Home  []Long term care  []Rehab []Other    ADVANCE DIRECTIVES:    DNR  []MOLST  []Living Will  DECISION MAKER(s):  [] Health Care Proxy(s)  [x] Surrogate(s)  [] Guardian           Name(s):              Phone Number(s):  Jose Alcala (daughter)        [phone]  Angi Bello (spouse)       [phone]  BASELINE (I)ADL(s) (prior to admission):  [place]: []Total  [x] Moderate []Dependent    ALLERGIES:  Allergies    chocolate (Pruritus)  flour (Rash)  No Known Drug Allergies  Nuts (Rash)  Soy (Unknown)    Intolerances    MEDICATIONS  (STANDING):  amLODIPine   Tablet 10 milliGRAM(s) Oral daily  atorvastatin 10 milliGRAM(s) Oral at bedtime  budesonide 160 MICROgram(s)/formoterol 4.5 MICROgram(s) Inhaler 2 Puff(s) Inhalation two times a day  cyanocobalamin 1000 MICROGram(s) Oral daily  finasteride 5 milliGRAM(s) Oral at bedtime  fluticasone propionate 50 MICROgram(s)/spray Nasal Spray 1 Spray(s) Both Nostrils two times a day  furosemide    Tablet 20 milliGRAM(s) Oral daily  heparin   Injectable 5000 Unit(s) SubCutaneous every 12 hours  metoprolol succinate ER 25 milliGRAM(s) Oral daily  multivitamin 1 Tablet(s) Oral daily  pantoprazole    Tablet 40 milliGRAM(s) Oral before breakfast  polyethylene glycol 3350 17 Gram(s) Oral two times a day  sodium chloride 1 Gram(s) Oral three times a day  tamsulosin 0.4 milliGRAM(s) Oral at bedtime    MEDICATIONS  (PRN):  acetaminophen   Tablet .. 650 milliGRAM(s) Oral every 6 hours PRN Temp greater or equal to 38.5C (101.3F), Mild Pain (1 - 3)  aluminum hydroxide/magnesium hydroxide/simethicone Suspension 30 milliLiter(s) Oral every 4 hours PRN Dyspepsia  ondansetron Injectable 4 milliGRAM(s) IV Push every 8 hours PRN Nausea and/or Vomiting  oxyCODONE    IR 2.5 milliGRAM(s) Oral every 4 hours PRN Moderate to Severe pain    PRESENT SYMPTOMS: []Unable to obtain due to poor mentation/encephalopathy  Source if other than patient:  []Family   []Team     Pain: [x] yes [ ] no  QOL impact - difficulty performing ADLs   Location - back and left hip/groin pain                    Aggravating factors - movement   Quality - dull/ aching   Radiation - back to left hip/groin   Timing - constant   Severity (0-10 scale): 6/10  Minimal acceptable level (0-10 scale): 2/10    PAIN AD Score:  [email or web address]/[email or web address].pdf (press ctrl +  left click to view)    Dyspnea:                           []Mild  []Moderate []Severe  Anxiety:                             []Mild []Moderate []Severe  Fatigue:                             []Mild []Moderate []Severe  Nausea:                             []Mild []Moderate []Severe  Loss of appetite:              []Mild []Moderate []Severe  Constipation:                    []Mild []Moderate []Severe    Other Symptoms:  [x]All other review of systems negative     Palliative Performance Status Version 2:  50%    [email or web address]    PHYSICAL EXAM:  Vital Signs Last 24 Hrs  T(C): 36.9 (16 Aug 2021 06:18), Max: 36.9 (15 Aug 2021 22:17)  T(F): 98.4 (16 Aug 2021 06:18), Max: 98.5 (15 Aug 2021 22:17)  HR: 88 (16 Aug 2021 06:18) (88 - 90)  BP: 133/54 (16 Aug 2021 06:18) (133/54 - 136/65)  BP(mean): --  RR: 16 (16 Aug 2021 06:18) (16 - 18)  SpO2: 97% (16 Aug 2021 06:18) (96% - 97%)   I&O's Summary    15 Aug 2021 07:01  -  16 Aug 2021 07:00  --------------------------------------------------------  IN: 0 mL / OUT: 700 mL / NET: -700 mL    16 Aug 2021 07:01  -  16 Aug 2021 15:49  --------------------------------------------------------  IN: 400 mL / OUT: 1000 mL / NET: -600 mL    GENERAL:  [x]Alert  [x]Oriented x 3  []Lethargic  []Cachexia  []Unarousable  [x]Verbal  []Non-Verbal  Behavioral:   [] Anxiety  [] Delirium [] Agitation [] Other  HEENT:  [x]Normal   []Dry mouth   []ET Tube/Trach  []Oral lesions  PULMONARY:   [x]Clear []Tachypnea  []Audible excessive secretions   []Rhonchi        []Right []Left []Bilateral  []Crackles        []Right []Left []Bilateral  []Wheezing     []Right []Left []Bilateral  CARDIOVASCULAR:    [x]Regular []Irregular []Tachy  []Kj []Murmur []Other  GASTROINTESTINAL:  [x]Soft  []Distended   []+BS  [x]Non tender []Tender  []PEG []OGT/ NGT  Last BM: 8/15  GENITOURINARY:  []Normal [] Incontinent   []Oliguria/Anuria   [x]Contreras  MUSCULOSKELETAL:   []Normal   [x]Weakness  []Bed/Wheelchair bound []Edema  NEUROLOGIC:   [x]No focal deficits  [] Cognitive impairment  [] Dysphagia []Dysarthria [] Paresis []Other   SKIN:   [x]Normal   []Pressure ulcer(s)  []Rash    CRITICAL CARE:  [ ] Shock Present  [ ]Septic [ ]Cardiogenic [ ]Neurologic [ ]Hypovolemic  [ ]  Vasopressors [ ]  Inotropes   [ ] Respiratory failure present [ ] Mechanical Ventilation [ ] Non-invasive ventilatory support [ ] High-Flow  [ ] Acute  [ ] Chronic [ ] Hypoxic  [ ] Hypercarbic [ ] Other  [ ] Other organ failure    LABS:                        9.1    9.11  )-----------( 521      ( 16 Aug 2021 06:50 )             27.9   08-16    132<L>  |  95<L>  |  26<H>  ----------------------------<  115<H>  4.4   |  25  |  1.47<H>    Ca    10.6<H>      16 Aug 2021 06:50  Phos  3.6     08-16  Mg     2.10     08-16    RADIOLOGY & ADDITIONAL STUDIES:  < from: CT Head No Cont (08.13.21 @ 02:07) >  IMPRESSION:  No acute intracranial hemorrhage, territorial infarct or mass effect. If clinical concern persists for metastatic disease to the brain, a contrast-enhanced MRI of the brain would be a more sensitive examination.    < from: CT Lumbar Spine w/ IV Cont (08.13.21 @ 02:14) >  IMPRESSION:  1. Interval worsening of lytic osseous metastatic disease.  2. Acute pathologic fracture at the superior endplate of the L1 vertebral body.  3. Heterogeneously thickened urinary bladder likely correlating with the history of bladder cancer.    < from: CT Cervical Spine No Cont (08.13.21 @ 18:26) >  IMPRESSION:  Multiple cervical thoracic osseous metastases with a extra osseous extension within the thoracic spine. Multiple rib metastases. Limited evaluation of the intrathecal contents which would be better evaluated with contrast enhanced cervical and thoracic spine MRI. Unchanged pathologic compression fracture of the L1 vertebral body.    < from: CT Thoracic Spine No Cont (08.13.21 @ 18:26) >  IMPRESSION:  Multiple cervical thoracic osseous metastases with a extra osseous extension within the thoracic spine. Multiple rib metastases. Limited evaluation of the intrathecal contents which would be better evaluated with contrast enhanced cervical and thoracic spine MRI. Unchanged pathologic compression fracture of the L1 vertebral body.    < from: CT Pelvis No Cont (08.13.21 @ 18:26) >  IMPRESSION:  1.  Diffuse lytic osseous metastatic disease. Includes a left pubic body lesion with associated soft tissue mass in the adjacent adductor musculature  2.  Pathologic fracture of the left pubic body.  3.  Left acetabular medial wall lesion markedly attenuated the left medial acetabular wall in keeping with a medial acetabular wall pathologic fracture.  4.  Mild distention of the urinary bladder with Contreras catheter in place. Correlate clinically.    < from: Xray Femur 2 Views, Right (08.13.21 @ 18:43) >  Findings/  Impression: There is no acute fracture or dislocation. Degenerative changes of the spine. Possible compression deformity of L5.    < from: Xray Pelvis AP only (08.13.21 @ 18:44) >  Findings/  Impression: There is no acute fracture or dislocation. Degenerative changes of the spine. Possible compression deformity of L5.    PROTEIN CALORIE MALNUTRITION PRESENT: [ ]mild [ ]moderate [ ]severe [ ]underweight [ ]morbid obesity  []PPSV2 < or = to 30% []significant weight loss  []poor nutritional intake []catabolic state []anasarca     Artificial Nutrition []     REFERRALS:   []Chaplaincy  []Hospice  []Child Life  [x]Social Work  []Case management []Holistic Therapy     Goals of Care Document:

## 2021-08-16 NOTE — PROGRESS NOTE ADULT - SUBJECTIVE AND OBJECTIVE BOX
[facility]   MD LEONELA JULIO DO ANAM SIDDIQUI ANGELA WONG, PA    TEL:  OFFICE: [phone]  DR POST CELL: [phone]  DR. RAMOS CELL: [phone]  DR. MARTINEZ CELL: [phone]  LESVIA WILLOUGHBY CELL: [phone]    From 5pm-7am Answering Service [phone]    -- RENAL FOLLOW UP NOTE ---Date of Service 08-16-21 @ 13:56    Patient is a 75y old  Male who presents with a chief complaint of Left hip and groin pain (16 Aug 2021 10:40)      Patient seen and examined at bedside. No chest pain/sob    VITALS:  T(F): 98.4 (08-16-21 @ 06:18), Max: 98.5 (08-15-21 @ 22:17)  HR: 88 (08-16-21 @ 06:18)  BP: 133/54 (08-16-21 @ 06:18)  RR: 16 (08-16-21 @ 06:18)  SpO2: 97% (08-16-21 @ 06:18)  Wt(kg): --    08-15 @ 07:01  -  08-16 @ 07:00  --------------------------------------------------------  IN: 0 mL / OUT: 700 mL / NET: -700 mL    08-16 @ 07:01  -  08-16 @ 13:56  --------------------------------------------------------  IN: 400 mL / OUT: 1000 mL / NET: -600 mL          PHYSICAL EXAM:  Constitutional: NAD  Neck: No JVD  Respiratory: CTAB, no wheezes, rales or rhonchi  Cardiovascular: S1, S2, RRR  Gastrointestinal: BS+, soft, NT/ND  Extremities: No peripheral edema    Hospital Medications:   MEDICATIONS  (STANDING):  amLODIPine   Tablet 10 milliGRAM(s) Oral daily  atorvastatin 10 milliGRAM(s) Oral at bedtime  budesonide 160 MICROgram(s)/formoterol 4.5 MICROgram(s) Inhaler 2 Puff(s) Inhalation two times a day  cyanocobalamin 1000 MICROGram(s) Oral daily  finasteride 5 milliGRAM(s) Oral at bedtime  fluticasone propionate 50 MICROgram(s)/spray Nasal Spray 1 Spray(s) Both Nostrils two times a day  furosemide    Tablet 20 milliGRAM(s) Oral daily  heparin   Injectable 5000 Unit(s) SubCutaneous every 12 hours  metoprolol succinate ER 25 milliGRAM(s) Oral daily  multivitamin 1 Tablet(s) Oral daily  pantoprazole    Tablet 40 milliGRAM(s) Oral before breakfast  polyethylene glycol 3350 17 Gram(s) Oral two times a day  sodium chloride 1 Gram(s) Oral three times a day  tamsulosin 0.4 milliGRAM(s) Oral at bedtime      LABS:  08-16    132<L>  |  95<L>  |  26<H>  ----------------------------<  115<H>  4.4   |  25  |  1.47<H>    Ca    10.6<H>      16 Aug 2021 06:50  Phos  3.6     08-16  Mg     2.10     08-16      Creatinine Trend: 1.47 <--, 1.40 <--, 1.26 <--, 1.31 <--, 1.32 <--    Phosphorus Level, Serum: 3.6 mg/dL (08-16 @ 06:50)                              9.1    9.11  )-----------( 521      ( 16 Aug 2021 06:50 )             27.9     Urine Studies:  Urinalysis - [08-10-21 @ 04:16]      Color Light Yellow / Appearance Clear / SG 1.016 / pH 7.0      Gluc Negative / Ketone Negative  / Bili Negative / Urobili <2 mg/dL       Blood Moderate / Protein Trace / Leuk Est Negative / Nitrite Negative      RBC 57 / WBC 5 / Hyaline  / Gran  / Sq Epi  / Non Sq Epi 0 / Bacteria Negative      Iron 22, TIBC 227, %sat 10      [08-14-21 @ 06:25]  Ferritin 302      [08-14-21 @ 06:25]  Vitamin D (25OH) 26.2      [01-05-21 @ 11:00]  HbA1c 6.3      [09-13-17 @ 08:15]  TSH 2.38      [08-14-21 @ 06:25]  Lipid: chol 148, , HDL 41, LDL --      [08-14-21 @ 06:25]        RADIOLOGY & ADDITIONAL STUDIES:

## 2021-08-16 NOTE — CONSULT NOTE ADULT - ASSESSMENT
75 year old male with neoplasm related pain, CKD, bladder cancer with mets to bone, and encounter for palliative care.

## 2021-08-16 NOTE — PROGRESS NOTE ADULT - SUBJECTIVE AND OBJECTIVE BOX
INTERVAL HPI/OVERNIGHT EVENTS:  Patient seen at bedside.  He has no complaints.   MRI pending.     VITAL SIGNS:  T(F): 98.4 (08-16-21 @ 06:18)  HR: 88 (08-16-21 @ 06:18)  BP: 133/54 (08-16-21 @ 06:18)  RR: 16 (08-16-21 @ 06:18)  SpO2: 97% (08-16-21 @ 06:18)  Wt(kg): --    PHYSICAL EXAM:    Constitutional: NAD, resting in bed comfortably  Eyes: EOMI, sclera non-icteric  Neck: supple, no LAP  Respiratory: CTA b/l, good air entry b/l, no wheezing, rhonchi or crackels  Cardiovascular: RRR, normal S1S2, no M/R/G  Gastrointestinal: soft, NTND  Extremities: no edema  Neurological: AAOx3, non focal  Skin: Normal temperature    MEDICATIONS  (STANDING):  amLODIPine   Tablet 10 milliGRAM(s) Oral daily  atorvastatin 10 milliGRAM(s) Oral at bedtime  budesonide 160 MICROgram(s)/formoterol 4.5 MICROgram(s) Inhaler 2 Puff(s) Inhalation two times a day  cyanocobalamin 1000 MICROGram(s) Oral daily  finasteride 5 milliGRAM(s) Oral at bedtime  fluticasone propionate 50 MICROgram(s)/spray Nasal Spray 1 Spray(s) Both Nostrils two times a day  furosemide    Tablet 20 milliGRAM(s) Oral daily  heparin   Injectable 5000 Unit(s) SubCutaneous every 12 hours  metoprolol succinate ER 25 milliGRAM(s) Oral daily  multivitamin 1 Tablet(s) Oral daily  pantoprazole    Tablet 40 milliGRAM(s) Oral before breakfast  polyethylene glycol 3350 17 Gram(s) Oral two times a day  sodium chloride 1 Gram(s) Oral three times a day  tamsulosin 0.4 milliGRAM(s) Oral at bedtime    MEDICATIONS  (PRN):  acetaminophen   Tablet .. 650 milliGRAM(s) Oral every 6 hours PRN Temp greater or equal to 38.5C (101.3F), Mild Pain (1 - 3)  aluminum hydroxide/magnesium hydroxide/simethicone Suspension 30 milliLiter(s) Oral every 4 hours PRN Dyspepsia  ondansetron Injectable 4 milliGRAM(s) IV Push every 8 hours PRN Nausea and/or Vomiting  oxyCODONE    IR 2.5 milliGRAM(s) Oral every 4 hours PRN Moderate to Severe pain      Allergies    chocolate (Pruritus)  flour (Rash)  No Known Drug Allergies  Nuts (Rash)  Soy (Unknown)    Intolerances        LABS:                        9.1    9.11  )-----------( 521      ( 16 Aug 2021 06:50 )             27.9     08-16    132<L>  |  95<L>  |  26<H>  ----------------------------<  115<H>  4.4   |  25  |  1.47<H>    Ca    10.6<H>      16 Aug 2021 06:50  Phos  3.6     08-16  Mg     2.10     08-16            RADIOLOGY & ADDITIONAL TESTS:  Studies reviewed.

## 2021-08-16 NOTE — CONSULT NOTE ADULT - PROBLEM SELECTOR RECOMMENDATION 9
pt reports 7/10 pain on exam   reports back pain that radiates to his left hip/groin  minimal relief with Tylenol but reports that the oxycodone 5mg that was given to him made him dizzy    recommending:  -Oxycodone IR 2.5mg q4h PRN for moderate to severe pain  -encourage bowel regimen with the use of opiates

## 2021-08-16 NOTE — PROGRESS NOTE ADULT - SUBJECTIVE AND OBJECTIVE BOX
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 No new events except as noted.       MEDICATIONS:  MEDICATIONS  (STANDING):  amLODIPine   Tablet 10 milliGRAM(s) Oral daily  atorvastatin 10 milliGRAM(s) Oral at bedtime  budesonide 160 MICROgram(s)/formoterol 4.5 MICROgram(s) Inhaler 2 Puff(s) Inhalation two times a day  cyanocobalamin 1000 MICROGram(s) Oral daily  finasteride 5 milliGRAM(s) Oral at bedtime  fluticasone propionate 50 MICROgram(s)/spray Nasal Spray 1 Spray(s) Both Nostrils two times a day  furosemide    Tablet 20 milliGRAM(s) Oral daily  heparin   Injectable 5000 Unit(s) SubCutaneous every 12 hours  metoprolol succinate ER 25 milliGRAM(s) Oral daily  multivitamin 1 Tablet(s) Oral daily  pantoprazole    Tablet 40 milliGRAM(s) Oral before breakfast  polyethylene glycol 3350 17 Gram(s) Oral two times a day  sodium chloride 1 Gram(s) Oral three times a day  tamsulosin 0.4 milliGRAM(s) Oral at bedtime      PHYSICAL EXAM:  T(C): 36.9 (08-16-21 @ 22:10), Max: 36.9 (08-16-21 @ 06:18)  HR: 87 (08-16-21 @ 22:10) (87 - 88)  BP: 120/52 (08-16-21 @ 22:10) (120/52 - 133/54)  RR: 18 (08-16-21 @ 22:10) (16 - 18)  SpO2: 98% (08-16-21 @ 22:10) (97% - 98%)  Wt(kg): --  I&O's Summary    15 Aug 2021 07:01  -  16 Aug 2021 07:00  --------------------------------------------------------  IN: 0 mL / OUT: 700 mL / NET: -700 mL    16 Aug 2021 07:01  -  16 Aug 2021 23:07  --------------------------------------------------------  IN: 400 mL / OUT: 1000 mL / NET: -600 mL          Appearance: Normal	  HEENT:  PERRLA   Lymphatic: No lymphadenopathy   Cardiovascular: Normal S1 S2, no JVD  Respiratory: normal effort , clear  Gastrointestinal:  Soft, Non-tender  Skin: No rashes,  warm to touch  Psychiatry:  Mood & affect appropriate  Musculuskeletal: No edema      All labs, Imaging and EKGs personally reviewed     08-15-21 @ 07:01  -  08-16-21 @ 07:00  --------------------------------------------------------  IN: 0 mL / OUT: 700 mL / NET: -700 mL    08-16-21 @ 07:01  -  08-16-21 @ 23:07  --------------------------------------------------------  IN: 400 mL / OUT: 1000 mL / NET: -600 mL                          9.1    9.11  )-----------( 521      ( 16 Aug 2021 06:50 )             27.9               08-16    132<L>  |  95<L>  |  26<H>  ----------------------------<  115<H>  4.4   |  25  |  1.47<H>    Ca    10.6<H>      16 Aug 2021 06:50  Phos  3.6     08-16  Mg     2.10     08-16

## 2021-08-17 ENCOUNTER — APPOINTMENT (OUTPATIENT)
Dept: HEMATOLOGY ONCOLOGY | Facility: CLINIC | Age: 76
End: 2021-08-17

## 2021-08-17 LAB
24R-OH-CALCIDIOL SERPL-MCNC: 37.4 NG/ML — SIGNIFICANT CHANGE UP (ref 30–80)
ANION GAP SERPL CALC-SCNC: 17 MMOL/L — HIGH (ref 7–14)
BUN SERPL-MCNC: 31 MG/DL — HIGH (ref 7–23)
CALCIUM SERPL-MCNC: 10.7 MG/DL — HIGH (ref 8.4–10.5)
CHLORIDE SERPL-SCNC: 95 MMOL/L — LOW (ref 98–107)
CO2 SERPL-SCNC: 23 MMOL/L — SIGNIFICANT CHANGE UP (ref 22–31)
CREAT SERPL-MCNC: 1.3 MG/DL — SIGNIFICANT CHANGE UP (ref 0.5–1.3)
GLUCOSE SERPL-MCNC: 123 MG/DL — HIGH (ref 70–99)
HCT VFR BLD CALC: 28.2 % — LOW (ref 39–50)
HGB BLD-MCNC: 9 G/DL — LOW (ref 13–17)
MCHC RBC-ENTMCNC: 28.1 PG — SIGNIFICANT CHANGE UP (ref 27–34)
MCHC RBC-ENTMCNC: 31.9 GM/DL — LOW (ref 32–36)
MCV RBC AUTO: 88.1 FL — SIGNIFICANT CHANGE UP (ref 80–100)
NRBC # BLD: 0 /100 WBCS — SIGNIFICANT CHANGE UP
NRBC # FLD: 0 K/UL — SIGNIFICANT CHANGE UP
PLATELET # BLD AUTO: 544 K/UL — HIGH (ref 150–400)
POTASSIUM SERPL-MCNC: 3.9 MMOL/L — SIGNIFICANT CHANGE UP (ref 3.5–5.3)
POTASSIUM SERPL-SCNC: 3.9 MMOL/L — SIGNIFICANT CHANGE UP (ref 3.5–5.3)
PTH-INTACT FLD-MCNC: 16 PG/ML — SIGNIFICANT CHANGE UP (ref 15–65)
RBC # BLD: 3.2 M/UL — LOW (ref 4.2–5.8)
RBC # FLD: 13.9 % — SIGNIFICANT CHANGE UP (ref 10.3–14.5)
SODIUM SERPL-SCNC: 135 MMOL/L — SIGNIFICANT CHANGE UP (ref 135–145)
VIT D25+D1,25 OH+D1,25 PNL SERPL-MCNC: 9 PG/ML — LOW (ref 19.9–79.3)
WBC # BLD: 9.38 K/UL — SIGNIFICANT CHANGE UP (ref 3.8–10.5)
WBC # FLD AUTO: 9.38 K/UL — SIGNIFICANT CHANGE UP (ref 3.8–10.5)

## 2021-08-17 PROCEDURE — 72157 MRI CHEST SPINE W/O & W/DYE: CPT | Mod: 26

## 2021-08-17 PROCEDURE — 72158 MRI LUMBAR SPINE W/O & W/DYE: CPT | Mod: 26

## 2021-08-17 PROCEDURE — 72156 MRI NECK SPINE W/O & W/DYE: CPT | Mod: 26

## 2021-08-17 RX ADMIN — Medication 1 SPRAY(S): at 17:34

## 2021-08-17 RX ADMIN — PREGABALIN 1000 MICROGRAM(S): 225 CAPSULE ORAL at 13:28

## 2021-08-17 RX ADMIN — AMLODIPINE BESYLATE 10 MILLIGRAM(S): 2.5 TABLET ORAL at 06:22

## 2021-08-17 RX ADMIN — Medication 20 MILLIGRAM(S): at 06:22

## 2021-08-17 RX ADMIN — Medication 650 MILLIGRAM(S): at 14:54

## 2021-08-17 RX ADMIN — BUDESONIDE AND FORMOTEROL FUMARATE DIHYDRATE 2 PUFF(S): 160; 4.5 AEROSOL RESPIRATORY (INHALATION) at 08:35

## 2021-08-17 RX ADMIN — Medication 25 MILLIGRAM(S): at 06:22

## 2021-08-17 RX ADMIN — ATORVASTATIN CALCIUM 10 MILLIGRAM(S): 80 TABLET, FILM COATED ORAL at 21:31

## 2021-08-17 RX ADMIN — SODIUM CHLORIDE 1 GRAM(S): 9 INJECTION INTRAMUSCULAR; INTRAVENOUS; SUBCUTANEOUS at 21:31

## 2021-08-17 RX ADMIN — POLYETHYLENE GLYCOL 3350 17 GRAM(S): 17 POWDER, FOR SOLUTION ORAL at 17:34

## 2021-08-17 RX ADMIN — FINASTERIDE 5 MILLIGRAM(S): 5 TABLET, FILM COATED ORAL at 21:31

## 2021-08-17 RX ADMIN — SODIUM CHLORIDE 1 GRAM(S): 9 INJECTION INTRAMUSCULAR; INTRAVENOUS; SUBCUTANEOUS at 14:11

## 2021-08-17 RX ADMIN — Medication 650 MILLIGRAM(S): at 09:30

## 2021-08-17 RX ADMIN — Medication 650 MILLIGRAM(S): at 22:30

## 2021-08-17 RX ADMIN — SODIUM CHLORIDE 1 GRAM(S): 9 INJECTION INTRAMUSCULAR; INTRAVENOUS; SUBCUTANEOUS at 06:22

## 2021-08-17 RX ADMIN — Medication 1 TABLET(S): at 13:29

## 2021-08-17 RX ADMIN — Medication 650 MILLIGRAM(S): at 21:31

## 2021-08-17 RX ADMIN — HEPARIN SODIUM 5000 UNIT(S): 5000 INJECTION INTRAVENOUS; SUBCUTANEOUS at 17:34

## 2021-08-17 RX ADMIN — BUDESONIDE AND FORMOTEROL FUMARATE DIHYDRATE 2 PUFF(S): 160; 4.5 AEROSOL RESPIRATORY (INHALATION) at 21:31

## 2021-08-17 RX ADMIN — Medication 650 MILLIGRAM(S): at 08:33

## 2021-08-17 RX ADMIN — Medication 1 SPRAY(S): at 06:23

## 2021-08-17 RX ADMIN — HEPARIN SODIUM 5000 UNIT(S): 5000 INJECTION INTRAVENOUS; SUBCUTANEOUS at 06:23

## 2021-08-17 RX ADMIN — Medication 650 MILLIGRAM(S): at 15:30

## 2021-08-17 RX ADMIN — TAMSULOSIN HYDROCHLORIDE 0.4 MILLIGRAM(S): 0.4 CAPSULE ORAL at 21:31

## 2021-08-17 RX ADMIN — PANTOPRAZOLE SODIUM 40 MILLIGRAM(S): 20 TABLET, DELAYED RELEASE ORAL at 06:22

## 2021-08-17 NOTE — PROGRESS NOTE ADULT - SUBJECTIVE AND OBJECTIVE BOX
[facility] [place] [facility] [place] [facility]   MD LEONELA JULIO DO ANAM SIDDIQUI ANGELA WONG, [place]    TEL:  OFFICE: [phone]  DR POST CELL: [phone]  DR. RAMOS CELL: [phone]  DR. MARTINEZ CELL: [phone]  LESVIA WILLOUGHBY CELL: [phone]    From 5pm-7am Answering Service [phone]    -- RENAL FOLLOW UP NOTE ---Date of Service 08-17-21 @ 10:54    Patient is a 75y old  Male who presents with a chief complaint of Left hip and groin pain (16 Aug 2021 17:01)      Patient seen and examined at bedside. No chest pain/sob    VITALS:  T(F): 98.1 (08-17-21 @ 06:03), Max: 98.5 (08-16-21 @ 22:10)  HR: 88 (08-17-21 @ 06:03)  BP: 154/71 (08-17-21 @ 06:03)  RR: 17 (08-17-21 @ 06:03)  SpO2: 99% (08-17-21 @ 06:03)  Wt(kg): --    08-16 @ 07:01  -  08-17 @ 07:00  --------------------------------------------------------  IN: 400 mL / OUT: 1700 mL / NET: -1300 mL          PHYSICAL EXAM:  Constitutional: NAD  Neck: No JVD  Respiratory: CTAB, no wheezes, rales or rhonchi  Cardiovascular: S1, S2, RRR  Gastrointestinal: BS+, soft, NT/ND  Extremities: No peripheral edema    Hospital Medications:   MEDICATIONS  (STANDING):  amLODIPine   Tablet 10 milliGRAM(s) Oral daily  atorvastatin 10 milliGRAM(s) Oral at bedtime  budesonide 160 MICROgram(s)/formoterol 4.5 MICROgram(s) Inhaler 2 Puff(s) Inhalation two times a day  cyanocobalamin 1000 MICROGram(s) Oral daily  finasteride 5 milliGRAM(s) Oral at bedtime  fluticasone propionate 50 MICROgram(s)/spray Nasal Spray 1 Spray(s) Both Nostrils two times a day  furosemide    Tablet 20 milliGRAM(s) Oral daily  heparin   Injectable 5000 Unit(s) SubCutaneous every 12 hours  metoprolol succinate ER 25 milliGRAM(s) Oral daily  multivitamin 1 Tablet(s) Oral daily  pantoprazole    Tablet 40 milliGRAM(s) Oral before breakfast  polyethylene glycol 3350 17 Gram(s) Oral two times a day  sodium chloride 1 Gram(s) Oral three times a day  tamsulosin 0.4 milliGRAM(s) Oral at bedtime      LABS:  08-17    135  |  95<L>  |  31<H>  ----------------------------<  123<H>  3.9   |  23  |  1.30    Ca    10.7<H>      17 Aug 2021 06:55  Phos  3.6     08-16  Mg     2.10     08-16    TPro      /  Alb  3.8  /  TBili      /  DBili      /  AST      /  ALT      /  AlkPhos      08-17    Creatinine Trend: 1.30 <--, 1.47 <--, 1.40 <--, 1.26 <--, 1.31 <--    Albumin, Serum: 3.8 g/dL (08-17 @ 07:03)    calcium--  intact pth16  parathyroid hormone intact, serum--                            9.0    9.38  )-----------( 544      ( 17 Aug 2021 06:55 )             28.2     Urine Studies:  Urinalysis - [08-10-21 @ 04:16]      Color Light Yellow / Appearance Clear / SG 1.016 / pH 7.0      Gluc Negative / Ketone Negative  / Bili Negative / Urobili <2 mg/dL       Blood Moderate / Protein Trace / Leuk Est Negative / Nitrite Negative      RBC 57 / WBC 5 / Hyaline  / Gran  / Sq Epi  / Non Sq Epi 0 / Bacteria Negative      Iron 22, TIBC 227, %sat 10      [08-14-21 @ 06:25]  Ferritin 302      [08-14-21 @ 06:25]  PTH -- (Ca --)      [08-17-21 @ 06:55]   16  Vitamin D (25OH) 26.2      [01-05-21 @ 11:00]  HbA1c 6.3      [09-13-17 @ 08:15]  TSH 2.38      [08-14-21 @ 06:25]  Lipid: chol 148, , HDL 41, LDL --      [08-14-21 @ 06:25]        RADIOLOGY & ADDITIONAL STUDIES:

## 2021-08-17 NOTE — PROGRESS NOTE ADULT - SUBJECTIVE AND OBJECTIVE BOX
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 No new events except as noted.   MRI today     REVIEW OF SYSTEMS:    CONSTITUTIONAL: No weakness, fevers or chills  EYES/ENT: No visual changes;  No vertigo or throat pain   NECK: No pain or stiffness  RESPIRATORY: No cough, wheezing, hemoptysis; No shortness of breath  CARDIOVASCULAR: No chest pain or palpitations  GASTROINTESTINAL: No abdominal or epigastric pain. No nausea, vomiting, or hematemesis; No diarrhea or constipation. No melena or hematochezia.  GENITOURINARY: No dysuria, frequency or hematuria  NEUROLOGICAL: No numbness or weakness  SKIN: No itching, burning, rashes, or lesions   All other review of systems is negative unless indicated above.    MEDICATIONS:  MEDICATIONS  (STANDING):  amLODIPine   Tablet 10 milliGRAM(s) Oral daily  atorvastatin 10 milliGRAM(s) Oral at bedtime  budesonide 160 MICROgram(s)/formoterol 4.5 MICROgram(s) Inhaler 2 Puff(s) Inhalation two times a day  cyanocobalamin 1000 MICROGram(s) Oral daily  finasteride 5 milliGRAM(s) Oral at bedtime  fluticasone propionate 50 MICROgram(s)/spray Nasal Spray 1 Spray(s) Both Nostrils two times a day  furosemide    Tablet 20 milliGRAM(s) Oral daily  heparin   Injectable 5000 Unit(s) SubCutaneous every 12 hours  metoprolol succinate ER 25 milliGRAM(s) Oral daily  multivitamin 1 Tablet(s) Oral daily  pantoprazole    Tablet 40 milliGRAM(s) Oral before breakfast  polyethylene glycol 3350 17 Gram(s) Oral two times a day  sodium chloride 1 Gram(s) Oral three times a day  tamsulosin 0.4 milliGRAM(s) Oral at bedtime      PHYSICAL EXAM:  T(C): 36.7 (08-17-21 @ 06:03), Max: 36.9 (08-16-21 @ 22:10)  HR: 88 (08-17-21 @ 06:03) (87 - 88)  BP: 154/71 (08-17-21 @ 06:03) (120/52 - 154/71)  RR: 17 (08-17-21 @ 06:03) (17 - 18)  SpO2: 99% (08-17-21 @ 06:03) (98% - 99%)  Wt(kg): --  I&O's Summary    16 Aug 2021 07:01  -  17 Aug 2021 07:00  --------------------------------------------------------  IN: 400 mL / OUT: 1700 mL / NET: -1300 mL    17 Aug 2021 07:01  -  17 Aug 2021 18:08  --------------------------------------------------------  IN: 0 mL / OUT: 1000 mL / NET: -1000 mL          Appearance: Normal	  HEENT:  PERRLA   Lymphatic: No lymphadenopathy   Cardiovascular: Normal S1 S2, no JVD  Respiratory: normal effort , clear  Gastrointestinal:  Soft, Non-tender  Skin: No rashes,  warm to touch  Psychiatry:  Mood & affect appropriate  Musculuskeletal: No edema      All labs, Imaging and EKGs personally reviewed       08-16-21 @ 07:01  -  08-17-21 @ 07:00  --------------------------------------------------------  IN: 400 mL / OUT: 1700 mL / NET: -1300 mL    08-17-21 @ 07:01  -  08-17-21 @ 18:08  --------------------------------------------------------  IN: 0 mL / OUT: 1000 mL / NET: -1000 mL                          9.0    9.38  )-----------( 544      ( 17 Aug 2021 06:55 )             28.2               08-17    135  |  95<L>  |  31<H>  ----------------------------<  123<H>  3.9   |  23  |  1.30    Ca    10.7<H>      17 Aug 2021 06:55  Phos  3.6     08-16  Mg     2.10     08-16    TPro  x   /  Alb  3.8  /  TBili  x   /  DBili  x   /  AST  x   /  ALT  x   /  AlkPhos  x   08-17

## 2021-08-17 NOTE — CHART NOTE - NSCHARTNOTEFT_GEN_A_CORE
pt was down ar MRI this AM  has not required any Oxycodone 2.5mg PRN for pain in the last 24 hours  c/w Tylenol 650mg q6h PRN for mild pain  daughter is awaiting MRI results and wants to speak to heme/onc in regards to next steps  will continue to follow   please page [phone] with any questions, concerns, or uncontrolled symptoms pt was down ar MRI this AM  has not required any Oxycodone 2.5mg PRN for pain in the last 24 hours  c/w Tylenol 650mg q6h PRN for mild pain  daughter is awaiting MRI results and wants to speak to heme/onc in regards to next steps  will continue to follow   please page [phone] with any questions, concerns, or uncontrolled symptoms.

## 2021-08-18 LAB
ALBUMIN SERPL ELPH-MCNC: 3.5 G/DL — SIGNIFICANT CHANGE UP (ref 3.3–5)
ALP SERPL-CCNC: 193 U/L — HIGH (ref 40–120)
ALT FLD-CCNC: 20 U/L — SIGNIFICANT CHANGE UP (ref 4–41)
ANION GAP SERPL CALC-SCNC: 15 MMOL/L — HIGH (ref 7–14)
AST SERPL-CCNC: 22 U/L — SIGNIFICANT CHANGE UP (ref 4–40)
BILIRUB SERPL-MCNC: 0.2 MG/DL — SIGNIFICANT CHANGE UP (ref 0.2–1.2)
BUN SERPL-MCNC: 31 MG/DL — HIGH (ref 7–23)
CALCIUM SERPL-MCNC: 10.4 MG/DL — SIGNIFICANT CHANGE UP (ref 8.4–10.5)
CHLORIDE SERPL-SCNC: 96 MMOL/L — LOW (ref 98–107)
CO2 SERPL-SCNC: 23 MMOL/L — SIGNIFICANT CHANGE UP (ref 22–31)
CREAT SERPL-MCNC: 1.35 MG/DL — HIGH (ref 0.5–1.3)
GLUCOSE SERPL-MCNC: 111 MG/DL — HIGH (ref 70–99)
HCT VFR BLD CALC: 26.2 % — LOW (ref 39–50)
HGB BLD-MCNC: 8.4 G/DL — LOW (ref 13–17)
MCHC RBC-ENTMCNC: 28.3 PG — SIGNIFICANT CHANGE UP (ref 27–34)
MCHC RBC-ENTMCNC: 32.1 GM/DL — SIGNIFICANT CHANGE UP (ref 32–36)
MCV RBC AUTO: 88.2 FL — SIGNIFICANT CHANGE UP (ref 80–100)
NRBC # BLD: 0 /100 WBCS — SIGNIFICANT CHANGE UP
NRBC # FLD: 0 K/UL — SIGNIFICANT CHANGE UP
PLATELET # BLD AUTO: 511 K/UL — HIGH (ref 150–400)
POTASSIUM SERPL-MCNC: 4 MMOL/L — SIGNIFICANT CHANGE UP (ref 3.5–5.3)
POTASSIUM SERPL-SCNC: 4 MMOL/L — SIGNIFICANT CHANGE UP (ref 3.5–5.3)
PROT SERPL-MCNC: 6.7 G/DL — SIGNIFICANT CHANGE UP (ref 6–8.3)
RBC # BLD: 2.97 M/UL — LOW (ref 4.2–5.8)
RBC # FLD: 14 % — SIGNIFICANT CHANGE UP (ref 10.3–14.5)
SODIUM SERPL-SCNC: 134 MMOL/L — LOW (ref 135–145)
WBC # BLD: 9.24 K/UL — SIGNIFICANT CHANGE UP (ref 3.8–10.5)
WBC # FLD AUTO: 9.24 K/UL — SIGNIFICANT CHANGE UP (ref 3.8–10.5)

## 2021-08-18 PROCEDURE — 99232 SBSQ HOSP IP/OBS MODERATE 35: CPT

## 2021-08-18 RX ORDER — DIPHENHYDRAMINE HYDROCHLORIDE AND LIDOCAINE HYDROCHLORIDE AND ALUMINUM HYDROXIDE AND MAGNESIUM HYDRO
5 KIT
Refills: 0 | Status: DISCONTINUED | OUTPATIENT
Start: 2021-08-18 | End: 2021-08-26

## 2021-08-18 RX ADMIN — BUDESONIDE AND FORMOTEROL FUMARATE DIHYDRATE 2 PUFF(S): 160; 4.5 AEROSOL RESPIRATORY (INHALATION) at 21:20

## 2021-08-18 RX ADMIN — SODIUM CHLORIDE 1 GRAM(S): 9 INJECTION INTRAMUSCULAR; INTRAVENOUS; SUBCUTANEOUS at 21:21

## 2021-08-18 RX ADMIN — SODIUM CHLORIDE 1 GRAM(S): 9 INJECTION INTRAMUSCULAR; INTRAVENOUS; SUBCUTANEOUS at 13:51

## 2021-08-18 RX ADMIN — FINASTERIDE 5 MILLIGRAM(S): 5 TABLET, FILM COATED ORAL at 21:22

## 2021-08-18 RX ADMIN — OXYCODONE HYDROCHLORIDE 2.5 MILLIGRAM(S): 5 TABLET ORAL at 14:30

## 2021-08-18 RX ADMIN — Medication 650 MILLIGRAM(S): at 23:00

## 2021-08-18 RX ADMIN — PREGABALIN 1000 MICROGRAM(S): 225 CAPSULE ORAL at 13:51

## 2021-08-18 RX ADMIN — ATORVASTATIN CALCIUM 10 MILLIGRAM(S): 80 TABLET, FILM COATED ORAL at 21:22

## 2021-08-18 RX ADMIN — Medication 650 MILLIGRAM(S): at 21:21

## 2021-08-18 RX ADMIN — POLYETHYLENE GLYCOL 3350 17 GRAM(S): 17 POWDER, FOR SOLUTION ORAL at 17:22

## 2021-08-18 RX ADMIN — Medication 650 MILLIGRAM(S): at 22:00

## 2021-08-18 RX ADMIN — Medication 1 SPRAY(S): at 17:20

## 2021-08-18 RX ADMIN — TAMSULOSIN HYDROCHLORIDE 0.4 MILLIGRAM(S): 0.4 CAPSULE ORAL at 21:22

## 2021-08-18 RX ADMIN — Medication 1 TABLET(S): at 13:51

## 2021-08-18 RX ADMIN — Medication 25 MILLIGRAM(S): at 06:45

## 2021-08-18 RX ADMIN — HEPARIN SODIUM 5000 UNIT(S): 5000 INJECTION INTRAVENOUS; SUBCUTANEOUS at 06:46

## 2021-08-18 RX ADMIN — ONDANSETRON 4 MILLIGRAM(S): 8 TABLET, FILM COATED ORAL at 18:27

## 2021-08-18 RX ADMIN — Medication 650 MILLIGRAM(S): at 07:45

## 2021-08-18 RX ADMIN — Medication 20 MILLIGRAM(S): at 06:45

## 2021-08-18 RX ADMIN — AMLODIPINE BESYLATE 10 MILLIGRAM(S): 2.5 TABLET ORAL at 06:45

## 2021-08-18 RX ADMIN — SODIUM CHLORIDE 1 GRAM(S): 9 INJECTION INTRAMUSCULAR; INTRAVENOUS; SUBCUTANEOUS at 06:45

## 2021-08-18 RX ADMIN — OXYCODONE HYDROCHLORIDE 2.5 MILLIGRAM(S): 5 TABLET ORAL at 13:38

## 2021-08-18 RX ADMIN — Medication 650 MILLIGRAM(S): at 06:46

## 2021-08-18 RX ADMIN — BUDESONIDE AND FORMOTEROL FUMARATE DIHYDRATE 2 PUFF(S): 160; 4.5 AEROSOL RESPIRATORY (INHALATION) at 09:00

## 2021-08-18 RX ADMIN — HEPARIN SODIUM 5000 UNIT(S): 5000 INJECTION INTRAVENOUS; SUBCUTANEOUS at 17:21

## 2021-08-18 RX ADMIN — PANTOPRAZOLE SODIUM 40 MILLIGRAM(S): 20 TABLET, DELAYED RELEASE ORAL at 06:45

## 2021-08-18 NOTE — PROGRESS NOTE ADULT - SUBJECTIVE AND OBJECTIVE BOX
SUBJECTIVE AND OBJECTIVE: upon exam today pt asking RN for pain medications, was hesitant to try Oxycodone 2.5mg PO because of hx of dizziness, re-educated pt that his current 5mg dose has been decreased to 2.5mg, pt amenable to trying Oxycodone 2.5mg today.   INTERVAL HPI/OVERNIGHT EVENTS: required Tylenol 650mg PRN X3 in the last 24 hours     DNR on chart:   Allergies    chocolate (Pruritus)  flour (Rash)  No Known Drug Allergies  Nuts (Rash)  Soy (Unknown)    Intolerances    MEDICATIONS  (STANDING):  amLODIPine   Tablet 10 milliGRAM(s) Oral daily  atorvastatin 10 milliGRAM(s) Oral at bedtime  budesonide 160 MICROgram(s)/formoterol 4.5 MICROgram(s) Inhaler 2 Puff(s) Inhalation two times a day  cyanocobalamin 1000 MICROGram(s) Oral daily  finasteride 5 milliGRAM(s) Oral at bedtime  fluticasone propionate 50 MICROgram(s)/spray Nasal Spray 1 Spray(s) Both Nostrils two times a day  furosemide    Tablet 20 milliGRAM(s) Oral daily  heparin   Injectable 5000 Unit(s) SubCutaneous every 12 hours  metoprolol succinate ER 25 milliGRAM(s) Oral daily  multivitamin 1 Tablet(s) Oral daily  pantoprazole    Tablet 40 milliGRAM(s) Oral before breakfast  polyethylene glycol 3350 17 Gram(s) Oral two times a day  sodium chloride 1 Gram(s) Oral three times a day  tamsulosin 0.4 milliGRAM(s) Oral at bedtime    MEDICATIONS  (PRN):  acetaminophen   Tablet .. 650 milliGRAM(s) Oral every 6 hours PRN Temp greater or equal to 38.5C (101.3F), Mild Pain (1 - 3)  aluminum hydroxide/magnesium hydroxide/simethicone Suspension 30 milliLiter(s) Oral every 4 hours PRN Dyspepsia  ondansetron Injectable 4 milliGRAM(s) IV Push every 8 hours PRN Nausea and/or Vomiting  oxyCODONE    IR 2.5 milliGRAM(s) Oral every 4 hours PRN Moderate to Severe pain      ITEMS UNCHECKED ARE NOT PRESENT    PRESENT SYMPTOMS: [ ]Unable to obtain due to poor mentation   Source if other than patient:  [ ]Family   [ ]Team     Pain: [x] yes [ ] no  QOL impact - difficulty performing ADLs   Location - back and left hip/groin pain                    Aggravating factors - movement   Quality - dull/ aching   Radiation - back to left hip/groin   Timing - constant   Severity (0-10 scale): 8/10  Minimal acceptable level (0-10 scale): 2/10    Dyspnea:                           [ ]Mild [ ]Moderate [ ]Severe  Anxiety:                             [ ]Mild [ ]Moderate [ ]Severe  Fatigue:                             [ ]Mild [ ]Moderate [ ]Severe  Nausea:                             [ ]Mild [ ]Moderate [ ]Severe  Loss of appetite:              [ ]Mild [ ]Moderate [ ]Severe  Constipation:                    [ ]Mild [ ]Moderate [ ]Severe    PAIN AD Score:	  [email or web address]/painad.pdf (Ctrl + left click to view)    Other Symptoms:  [x ]All other review of systems negative     Karnofsky Performance Score/Palliative Performance Status Version 2:   50  %    http://palliative.info/resource_material/[email or web address].pdf  PHYSICAL EXAM:  Vital Signs Last 24 Hrs  T(C): 37.1 (18 Aug 2021 14:38), Max: 37.1 (18 Aug 2021 14:38)  T(F): 98.7 (18 Aug 2021 14:38), Max: 98.7 (18 Aug 2021 14:38)  HR: 88 (18 Aug 2021 13:27) (84 - 88)  BP: 137/52 (18 Aug 2021 13:27) (130/56 - 137/60)  BP(mean): --  RR: 16 (18 Aug 2021 06:20) (16 - 18)  SpO2: 98% (18 Aug 2021 13:27) (98% - 100%)     I&O's Summary    17 Aug 2021 07:01  -  18 Aug 2021 07:00  --------------------------------------------------------  IN: 0 mL / OUT: 1000 mL / NET: -1000 mL    18 Aug 2021 07:01  -  18 Aug 2021 16:24  --------------------------------------------------------  IN: 450 mL / OUT: 875 mL / NET: -425 mL    GENERAL:  [x]Alert  [x]Oriented x 3  []Lethargic  []Cachexia  []Unarousable  [x]Verbal  []Non-Verbal  Behavioral:   [] Anxiety  [] Delirium [] Agitation [] Other  HEENT:  [x]Normal   []Dry mouth   []ET Tube/Trach  []Oral lesions  PULMONARY:   [x]Clear []Tachypnea  []Audible excessive secretions   []Rhonchi        []Right []Left []Bilateral  []Crackles        []Right []Left []Bilateral  []Wheezing     []Right []Left []Bilateral  CARDIOVASCULAR:    [x]Regular []Irregular []Tachy  []Kj []Murmur []Other  GASTROINTESTINAL:  [x]Soft  []Distended   []+BS  [x]Non tender []Tender  []PEG []OGT/ NGT  Last BM: 8/18  GENITOURINARY:  []Normal [] Incontinent   []Oliguria/Anuria   [x]Contreras  MUSCULOSKELETAL:   []Normal   [x]Weakness  []Bed/Wheelchair bound []Edema  NEUROLOGIC:   [x]No focal deficits  [] Cognitive impairment  [] Dysphagia []Dysarthria [] Paresis []Other   SKIN:   [x]Normal   []Pressure ulcer(s)  []Rash    CRITICAL CARE:  [ ] Shock Present  [ ]Septic [ ]Cardiogenic [ ]Neurologic [ ]Hypovolemic  [ ]  Vasopressors [ ]  Inotropes   [ ] Respiratory failure present [ ] Mechanical Ventilation [ ] Non-invasive ventilatory support [ ] High-Flow  [ ] Acute  [ ] Chronic [ ] Hypoxic  [ ] Hypercarbic [ ] Other  [ ] Other organ failure    LABS:                        8.4    9.24  )-----------( 511      ( 18 Aug 2021 07:21 )             26.2   08-18    134<L>  |  96<L>  |  31<H>  ----------------------------<  111<H>  4.0   |  23  |  1.35<H>    Ca    10.4      18 Aug 2021 07:21    TPro  6.7  /  Alb  3.5  /  TBili  0.2  /  DBili  x   /  AST  22  /  ALT  20  /  AlkPhos  193<H>  08-18        RADIOLOGY & ADDITIONAL STUDIES: reviewed     Protein Calorie Malnutrition Present: [ ] yes [ ] no  [ ] PPSV2 < or = 30%  [ ] significant weight loss [ ] poor nutritional intake [ ] anasarca [ ] catabolic state Artificial Nutrition [ ]     REFERRALS:   [ ]Chaplaincy  [ ] Hospice  [ ]Child Life  [x ]Social Work  [ ]Case management [ ]Holistic Therapy   Goals of Care Document:

## 2021-08-18 NOTE — PROGRESS NOTE ADULT - PROBLEM SELECTOR PLAN 4
Palliative care consulted for assistance with symptom/ pain management and assistance with complex decision making related to goals of care. Recommendations have been made. Will continue to follow. Pt is an appropriate candidate for hospice but daughter waiting to here from oncology first before making any decisions.    Please page [phone] with any questions, concerns, or uncontrolled symptoms.

## 2021-08-18 NOTE — PROGRESS NOTE ADULT - SUBJECTIVE AND OBJECTIVE BOX
[facility] [place] [facility]   MD LEONELA JULIO DO ANAM SIDDIQUI ANGELA WONG, [place]    TEL:  OFFICE: [phone]  DR POST CELL: [phone]  DR. RAMOS CELL: [phone]  DR. MARTINEZ CELL: [phone]  LESVIA WILLOUGHBY CELL: [phone]    From 5pm-7am Answering Service [phone]    -- RENAL FOLLOW UP NOTE ---Date of Service 08-18-21 @ 11:06    Patient is a 75y old  Male who presents with a chief complaint of Left hip and groin pain (17 Aug 2021 12:08)      Patient seen and examined at bedside. No chest pain/sob    VITALS:  T(F): 98 (08-18-21 @ 06:20), Max: 98 (08-18-21 @ 06:20)  HR: 86 (08-18-21 @ 06:20)  BP: 137/60 (08-18-21 @ 06:20)  RR: 16 (08-18-21 @ 06:20)  SpO2: 100% (08-18-21 @ 06:20)  Wt(kg): --    08-17 @ 07:01  -  08-18 @ 07:00  --------------------------------------------------------  IN: 0 mL / OUT: 1000 mL / NET: -1000 mL    08-18 @ 07:01  -  08-18 @ 11:06  --------------------------------------------------------  IN: 250 mL / OUT: 0 mL / NET: 250 mL          PHYSICAL EXAM:  Constitutional: NAD  Neck: No JVD  Respiratory: CTAB, no wheezes, rales or rhonchi  Cardiovascular: S1, S2, RRR  Gastrointestinal: BS+, soft, NT/ND  Extremities: No peripheral edema    Hospital Medications:   MEDICATIONS  (STANDING):  amLODIPine   Tablet 10 milliGRAM(s) Oral daily  atorvastatin 10 milliGRAM(s) Oral at bedtime  budesonide 160 MICROgram(s)/formoterol 4.5 MICROgram(s) Inhaler 2 Puff(s) Inhalation two times a day  cyanocobalamin 1000 MICROGram(s) Oral daily  finasteride 5 milliGRAM(s) Oral at bedtime  fluticasone propionate 50 MICROgram(s)/spray Nasal Spray 1 Spray(s) Both Nostrils two times a day  furosemide    Tablet 20 milliGRAM(s) Oral daily  heparin   Injectable 5000 Unit(s) SubCutaneous every 12 hours  metoprolol succinate ER 25 milliGRAM(s) Oral daily  multivitamin 1 Tablet(s) Oral daily  pantoprazole    Tablet 40 milliGRAM(s) Oral before breakfast  polyethylene glycol 3350 17 Gram(s) Oral two times a day  sodium chloride 1 Gram(s) Oral three times a day  tamsulosin 0.4 milliGRAM(s) Oral at bedtime      LABS:  08-18    134<L>  |  96<L>  |  31<H>  ----------------------------<  111<H>  4.0   |  23  |  1.35<H>    Ca    10.4      18 Aug 2021 07:21    TPro  6.7  /  Alb  3.5  /  TBili  0.2  /  DBili      /  AST  22  /  ALT  20  /  AlkPhos  193<H>  08-18    Creatinine Trend: 1.35 <--, 1.30 <--, 1.47 <--, 1.40 <--, 1.26 <--, 1.31 <--    Albumin, Serum: 3.5 g/dL (08-18 @ 07:21)  Vitamin D, 25-Hydroxy: 37.4 ng/mL (08-17 @ 13:37)                              8.4    9.24  )-----------( 511      ( 18 Aug 2021 07:21 )             26.2     Urine Studies:  Urinalysis - [08-10-21 @ 04:16]      Color Light Yellow / Appearance Clear / SG 1.016 / pH 7.0      Gluc Negative / Ketone Negative  / Bili Negative / Urobili <2 mg/dL       Blood Moderate / Protein Trace / Leuk Est Negative / Nitrite Negative      RBC 57 / WBC 5 / Hyaline  / Gran  / Sq Epi  / Non Sq Epi 0 / Bacteria Negative      Iron 22, TIBC 227, %sat 10      [08-14-21 @ 06:25]  Ferritin 302      [08-14-21 @ 06:25]  PTH -- (Ca --)      [08-17-21 @ 06:55]   16  Vitamin D (25OH) 37.4      [08-17-21 @ 13:37]  HbA1c 6.3      [09-13-17 @ 08:15]  TSH 2.38      [08-14-21 @ 06:25]  Lipid: chol 148, , HDL 41, LDL --      [08-14-21 @ 06:25]        RADIOLOGY & ADDITIONAL STUDIES:

## 2021-08-18 NOTE — PROGRESS NOTE ADULT - SUBJECTIVE AND OBJECTIVE BOX
Name of Patient : MD QURESHI  MRN: [id number]  DATE OF SERVICE: 08-18-21     Subjective: Patient seen and examined. No new events except as noted.   doing okay  + weakness         MEDICATIONS:  MEDICATIONS  (STANDING):  amLODIPine   Tablet 10 milliGRAM(s) Oral daily  atorvastatin 10 milliGRAM(s) Oral at bedtime  budesonide 160 MICROgram(s)/formoterol 4.5 MICROgram(s) Inhaler 2 Puff(s) Inhalation two times a day  cyanocobalamin 1000 MICROGram(s) Oral daily  finasteride 5 milliGRAM(s) Oral at bedtime  fluticasone propionate 50 MICROgram(s)/spray Nasal Spray 1 Spray(s) Both Nostrils two times a day  furosemide    Tablet 20 milliGRAM(s) Oral daily  heparin   Injectable 5000 Unit(s) SubCutaneous every 12 hours  metoprolol succinate ER 25 milliGRAM(s) Oral daily  MIRACLE MOUTHWASH 5 milliLiter(s) Swish and Spit four times a day  multivitamin 1 Tablet(s) Oral daily  pantoprazole    Tablet 40 milliGRAM(s) Oral before breakfast  polyethylene glycol 3350 17 Gram(s) Oral two times a day  sodium chloride 1 Gram(s) Oral three times a day  tamsulosin 0.4 milliGRAM(s) Oral at bedtime      PHYSICAL EXAM:  T(C): 38.1 (08-18-21 @ 21:23), Max: 38.1 (08-18-21 @ 21:23)  HR: 96 (08-18-21 @ 21:23) (86 - 101)  BP: 137/56 (08-18-21 @ 21:23) (137/52 - 144/54)  RR: 18 (08-18-21 @ 21:23) (16 - 19)  SpO2: 97% (08-18-21 @ 21:23) (97% - 100%)  Wt(kg): --  I&O's Summary    17 Aug 2021 07:01  -  18 Aug 2021 07:00  --------------------------------------------------------  IN: 0 mL / OUT: 1000 mL / NET: -1000 mL    18 Aug 2021 07:01  -  18 Aug 2021 22:58  --------------------------------------------------------  IN: 725 mL / OUT: 1575 mL / NET: -850 mL          Appearance: Normal	  HEENT:  PERRLA   Lymphatic: No lymphadenopathy   Cardiovascular: Normal S1 S2, no JVD  Respiratory: normal effort , clear  Gastrointestinal:  Soft, Non-tender  Skin: No rashes,  warm to touch  Psychiatry:  Mood & affect appropriate  Musculuskeletal: No edema      All labs, Imaging and EKGs personally reviewed         08-17-21 @ 07:01  -  08-18-21 @ 07:00  --------------------------------------------------------  IN: 0 mL / OUT: 1000 mL / NET: -1000 mL    08-18-21 @ 07:01  -  08-18-21 @ 22:58  --------------------------------------------------------  IN: 725 mL / OUT: 1575 mL / NET: -850 mL                          8.4    9.24  )-----------( 511      ( 18 Aug 2021 07:21 )             26.2               08-18    134<L>  |  96<L>  |  31<H>  ----------------------------<  111<H>  4.0   |  23  |  1.35<H>    Ca    10.4      18 Aug 2021 07:21    TPro  6.7  /  Alb  3.5  /  TBili  0.2  /  DBili  x   /  AST  22  /  ALT  20  /  AlkPhos  193<H>  08-18

## 2021-08-18 NOTE — PROGRESS NOTE ADULT - SUBJECTIVE AND OBJECTIVE BOX
INTERVAL HPI/OVERNIGHT EVENTS:  Patient seen at bedside.  Spoke to daughter at bedside about goals of care.    VITAL SIGNS:  T(F): 98.2 (08-18-21 @ 13:27)  HR: 88 (08-18-21 @ 13:27)  BP: 137/52 (08-18-21 @ 13:27)  RR: 16 (08-18-21 @ 06:20)  SpO2: 98% (08-18-21 @ 13:27)  Wt(kg): --    PHYSICAL EXAM:    Constitutional: NAD, resting in bed comfortably  Eyes: EOMI, sclera non-icteric  Neck: supple, no LAP  Respiratory: CTA b/l, good air entry b/l, no wheezing, rhonchi or crackels  Cardiovascular: RRR, normal S1S2, no M/R/G  Gastrointestinal: soft, NTND  Extremities: no edema  Neurological: AAOx3, non focal  Skin: Normal temperature    MEDICATIONS  (STANDING):  amLODIPine   Tablet 10 milliGRAM(s) Oral daily  atorvastatin 10 milliGRAM(s) Oral at bedtime  budesonide 160 MICROgram(s)/formoterol 4.5 MICROgram(s) Inhaler 2 Puff(s) Inhalation two times a day  cyanocobalamin 1000 MICROGram(s) Oral daily  finasteride 5 milliGRAM(s) Oral at bedtime  fluticasone propionate 50 MICROgram(s)/spray Nasal Spray 1 Spray(s) Both Nostrils two times a day  furosemide    Tablet 20 milliGRAM(s) Oral daily  heparin   Injectable 5000 Unit(s) SubCutaneous every 12 hours  metoprolol succinate ER 25 milliGRAM(s) Oral daily  multivitamin 1 Tablet(s) Oral daily  pantoprazole    Tablet 40 milliGRAM(s) Oral before breakfast  polyethylene glycol 3350 17 Gram(s) Oral two times a day  sodium chloride 1 Gram(s) Oral three times a day  tamsulosin 0.4 milliGRAM(s) Oral at bedtime    MEDICATIONS  (PRN):  acetaminophen   Tablet .. 650 milliGRAM(s) Oral every 6 hours PRN Temp greater or equal to 38.5C (101.3F), Mild Pain (1 - 3)  aluminum hydroxide/magnesium hydroxide/simethicone Suspension 30 milliLiter(s) Oral every 4 hours PRN Dyspepsia  ondansetron Injectable 4 milliGRAM(s) IV Push every 8 hours PRN Nausea and/or Vomiting  oxyCODONE    IR 2.5 milliGRAM(s) Oral every 4 hours PRN Moderate to Severe pain      Allergies    chocolate (Pruritus)  flour (Rash)  No Known Drug Allergies  Nuts (Rash)  Soy (Unknown)    Intolerances        LABS:                        8.4    9.24  )-----------( 511      ( 18 Aug 2021 07:21 )             26.2     08-18    134<L>  |  96<L>  |  31<H>  ----------------------------<  111<H>  4.0   |  23  |  1.35<H>    Ca    10.4      18 Aug 2021 07:21    TPro  6.7  /  Alb  3.5  /  TBili  0.2  /  DBili  x   /  AST  22  /  ALT  20  /  AlkPhos  193<H>  08-18          RADIOLOGY & ADDITIONAL TESTS:  Studies reviewed.

## 2021-08-19 LAB
ALBUMIN SERPL ELPH-MCNC: 3.2 G/DL — LOW (ref 3.3–5)
ALP SERPL-CCNC: 188 U/L — HIGH (ref 40–120)
ALT FLD-CCNC: 20 U/L — SIGNIFICANT CHANGE UP (ref 4–41)
ANION GAP SERPL CALC-SCNC: 16 MMOL/L — HIGH (ref 7–14)
APPEARANCE UR: ABNORMAL
AST SERPL-CCNC: 23 U/L — SIGNIFICANT CHANGE UP (ref 4–40)
BACTERIA # UR AUTO: ABNORMAL
BASOPHILS # BLD AUTO: 0.03 K/UL — SIGNIFICANT CHANGE UP (ref 0–0.2)
BASOPHILS NFR BLD AUTO: 0.3 % — SIGNIFICANT CHANGE UP (ref 0–2)
BILIRUB SERPL-MCNC: 0.3 MG/DL — SIGNIFICANT CHANGE UP (ref 0.2–1.2)
BILIRUB UR-MCNC: NEGATIVE — SIGNIFICANT CHANGE UP
BUN SERPL-MCNC: 25 MG/DL — HIGH (ref 7–23)
CALCIUM SERPL-MCNC: 10 MG/DL — SIGNIFICANT CHANGE UP (ref 8.4–10.5)
CHLORIDE SERPL-SCNC: 93 MMOL/L — LOW (ref 98–107)
CO2 SERPL-SCNC: 23 MMOL/L — SIGNIFICANT CHANGE UP (ref 22–31)
COLOR SPEC: YELLOW — SIGNIFICANT CHANGE UP
CREAT SERPL-MCNC: 1.23 MG/DL — SIGNIFICANT CHANGE UP (ref 0.5–1.3)
DIFF PNL FLD: ABNORMAL
EOSINOPHIL # BLD AUTO: 0.53 K/UL — HIGH (ref 0–0.5)
EOSINOPHIL NFR BLD AUTO: 5.6 % — SIGNIFICANT CHANGE UP (ref 0–6)
EPI CELLS # UR: 10 /HPF — HIGH (ref 0–5)
GLUCOSE SERPL-MCNC: 127 MG/DL — HIGH (ref 70–99)
GLUCOSE UR QL: NEGATIVE — SIGNIFICANT CHANGE UP
HCT VFR BLD CALC: 26.2 % — LOW (ref 39–50)
HGB BLD-MCNC: 8.5 G/DL — LOW (ref 13–17)
HYALINE CASTS # UR AUTO: 2 /LPF — SIGNIFICANT CHANGE UP (ref 0–7)
IANC: 7.37 K/UL — SIGNIFICANT CHANGE UP (ref 1.5–8.5)
IMM GRANULOCYTES NFR BLD AUTO: 0.7 % — SIGNIFICANT CHANGE UP (ref 0–1.5)
KETONES UR-MCNC: NEGATIVE — SIGNIFICANT CHANGE UP
LEUKOCYTE ESTERASE UR-ACNC: ABNORMAL
LYMPHOCYTES # BLD AUTO: 0.6 K/UL — LOW (ref 1–3.3)
LYMPHOCYTES # BLD AUTO: 6.4 % — LOW (ref 13–44)
MCHC RBC-ENTMCNC: 28 PG — SIGNIFICANT CHANGE UP (ref 27–34)
MCHC RBC-ENTMCNC: 32.4 GM/DL — SIGNIFICANT CHANGE UP (ref 32–36)
MCV RBC AUTO: 86.2 FL — SIGNIFICANT CHANGE UP (ref 80–100)
METHYLMALONATE SERPL-SCNC: 221 NMOL/L — SIGNIFICANT CHANGE UP (ref 0–378)
MONOCYTES # BLD AUTO: 0.82 K/UL — SIGNIFICANT CHANGE UP (ref 0–0.9)
MONOCYTES NFR BLD AUTO: 8.7 % — SIGNIFICANT CHANGE UP (ref 2–14)
NEUTROPHILS # BLD AUTO: 7.37 K/UL — SIGNIFICANT CHANGE UP (ref 1.8–7.4)
NEUTROPHILS NFR BLD AUTO: 78.3 % — HIGH (ref 43–77)
NITRITE UR-MCNC: NEGATIVE — SIGNIFICANT CHANGE UP
NRBC # BLD: 0 /100 WBCS — SIGNIFICANT CHANGE UP
NRBC # FLD: 0 K/UL — SIGNIFICANT CHANGE UP
PH UR: 6.5 — SIGNIFICANT CHANGE UP (ref 5–8)
PLATELET # BLD AUTO: 479 K/UL — HIGH (ref 150–400)
POTASSIUM SERPL-MCNC: 3.6 MMOL/L — SIGNIFICANT CHANGE UP (ref 3.5–5.3)
POTASSIUM SERPL-SCNC: 3.6 MMOL/L — SIGNIFICANT CHANGE UP (ref 3.5–5.3)
PROT SERPL-MCNC: 6.3 G/DL — SIGNIFICANT CHANGE UP (ref 6–8.3)
PROT UR-MCNC: ABNORMAL
RBC # BLD: 3.04 M/UL — LOW (ref 4.2–5.8)
RBC # FLD: 13.8 % — SIGNIFICANT CHANGE UP (ref 10.3–14.5)
RBC CASTS # UR COMP ASSIST: >50 /HPF — SIGNIFICANT CHANGE UP (ref 0–4)
SODIUM SERPL-SCNC: 132 MMOL/L — LOW (ref 135–145)
SP GR SPEC: 1.02 — SIGNIFICANT CHANGE UP (ref 1.01–1.02)
UROBILINOGEN FLD QL: SIGNIFICANT CHANGE UP
WBC # BLD: 9.42 K/UL — SIGNIFICANT CHANGE UP (ref 3.8–10.5)
WBC # FLD AUTO: 9.42 K/UL — SIGNIFICANT CHANGE UP (ref 3.8–10.5)
WBC UR QL: >50 /HPF — SIGNIFICANT CHANGE UP (ref 0–5)

## 2021-08-19 PROCEDURE — 71045 X-RAY EXAM CHEST 1 VIEW: CPT | Mod: 26

## 2021-08-19 RX ORDER — CEFTRIAXONE 500 MG/1
1000 INJECTION, POWDER, FOR SOLUTION INTRAMUSCULAR; INTRAVENOUS EVERY 24 HOURS
Refills: 0 | Status: DISCONTINUED | OUTPATIENT
Start: 2021-08-19 | End: 2021-08-19

## 2021-08-19 RX ORDER — PIPERACILLIN AND TAZOBACTAM 4; .5 G/20ML; G/20ML
3.38 INJECTION, POWDER, LYOPHILIZED, FOR SOLUTION INTRAVENOUS EVERY 8 HOURS
Refills: 0 | Status: DISCONTINUED | OUTPATIENT
Start: 2021-08-19 | End: 2021-08-20

## 2021-08-19 RX ORDER — PIPERACILLIN AND TAZOBACTAM 4; .5 G/20ML; G/20ML
3.38 INJECTION, POWDER, LYOPHILIZED, FOR SOLUTION INTRAVENOUS ONCE
Refills: 0 | Status: COMPLETED | OUTPATIENT
Start: 2021-08-19 | End: 2021-08-19

## 2021-08-19 RX ADMIN — PREGABALIN 1000 MICROGRAM(S): 225 CAPSULE ORAL at 14:01

## 2021-08-19 RX ADMIN — Medication 1 SPRAY(S): at 05:33

## 2021-08-19 RX ADMIN — PIPERACILLIN AND TAZOBACTAM 200 GRAM(S): 4; .5 INJECTION, POWDER, LYOPHILIZED, FOR SOLUTION INTRAVENOUS at 05:43

## 2021-08-19 RX ADMIN — DIPHENHYDRAMINE HYDROCHLORIDE AND LIDOCAINE HYDROCHLORIDE AND ALUMINUM HYDROXIDE AND MAGNESIUM HYDRO 5 MILLILITER(S): KIT at 11:54

## 2021-08-19 RX ADMIN — Medication 650 MILLIGRAM(S): at 08:30

## 2021-08-19 RX ADMIN — Medication 25 MILLIGRAM(S): at 05:32

## 2021-08-19 RX ADMIN — BUDESONIDE AND FORMOTEROL FUMARATE DIHYDRATE 2 PUFF(S): 160; 4.5 AEROSOL RESPIRATORY (INHALATION) at 21:41

## 2021-08-19 RX ADMIN — ATORVASTATIN CALCIUM 10 MILLIGRAM(S): 80 TABLET, FILM COATED ORAL at 21:41

## 2021-08-19 RX ADMIN — PANTOPRAZOLE SODIUM 40 MILLIGRAM(S): 20 TABLET, DELAYED RELEASE ORAL at 07:38

## 2021-08-19 RX ADMIN — TAMSULOSIN HYDROCHLORIDE 0.4 MILLIGRAM(S): 0.4 CAPSULE ORAL at 21:41

## 2021-08-19 RX ADMIN — SODIUM CHLORIDE 1 GRAM(S): 9 INJECTION INTRAMUSCULAR; INTRAVENOUS; SUBCUTANEOUS at 05:32

## 2021-08-19 RX ADMIN — Medication 650 MILLIGRAM(S): at 22:20

## 2021-08-19 RX ADMIN — Medication 650 MILLIGRAM(S): at 07:33

## 2021-08-19 RX ADMIN — PIPERACILLIN AND TAZOBACTAM 200 GRAM(S): 4; .5 INJECTION, POWDER, LYOPHILIZED, FOR SOLUTION INTRAVENOUS at 18:44

## 2021-08-19 RX ADMIN — Medication 20 MILLIGRAM(S): at 05:32

## 2021-08-19 RX ADMIN — Medication 1 TABLET(S): at 11:49

## 2021-08-19 RX ADMIN — SODIUM CHLORIDE 1 GRAM(S): 9 INJECTION INTRAMUSCULAR; INTRAVENOUS; SUBCUTANEOUS at 21:40

## 2021-08-19 RX ADMIN — BUDESONIDE AND FORMOTEROL FUMARATE DIHYDRATE 2 PUFF(S): 160; 4.5 AEROSOL RESPIRATORY (INHALATION) at 07:40

## 2021-08-19 RX ADMIN — FINASTERIDE 5 MILLIGRAM(S): 5 TABLET, FILM COATED ORAL at 21:41

## 2021-08-19 RX ADMIN — Medication 650 MILLIGRAM(S): at 14:07

## 2021-08-19 RX ADMIN — Medication 1 SPRAY(S): at 17:18

## 2021-08-19 RX ADMIN — Medication 650 MILLIGRAM(S): at 14:59

## 2021-08-19 RX ADMIN — DIPHENHYDRAMINE HYDROCHLORIDE AND LIDOCAINE HYDROCHLORIDE AND ALUMINUM HYDROXIDE AND MAGNESIUM HYDRO 5 MILLILITER(S): KIT at 23:02

## 2021-08-19 RX ADMIN — HEPARIN SODIUM 5000 UNIT(S): 5000 INJECTION INTRAVENOUS; SUBCUTANEOUS at 07:34

## 2021-08-19 RX ADMIN — Medication 650 MILLIGRAM(S): at 21:40

## 2021-08-19 RX ADMIN — HEPARIN SODIUM 5000 UNIT(S): 5000 INJECTION INTRAVENOUS; SUBCUTANEOUS at 17:20

## 2021-08-19 RX ADMIN — AMLODIPINE BESYLATE 10 MILLIGRAM(S): 2.5 TABLET ORAL at 05:32

## 2021-08-19 RX ADMIN — POLYETHYLENE GLYCOL 3350 17 GRAM(S): 17 POWDER, FOR SOLUTION ORAL at 17:18

## 2021-08-19 RX ADMIN — SODIUM CHLORIDE 1 GRAM(S): 9 INJECTION INTRAMUSCULAR; INTRAVENOUS; SUBCUTANEOUS at 14:02

## 2021-08-19 RX ADMIN — POLYETHYLENE GLYCOL 3350 17 GRAM(S): 17 POWDER, FOR SOLUTION ORAL at 07:33

## 2021-08-19 NOTE — PROGRESS NOTE ADULT - SUBJECTIVE AND OBJECTIVE BOX
[facility]   MD LEONELA JULIO DO ANAM SIDDIQUI ANGELA WONG, PA    TEL:  OFFICE: [phone]  DR POST CELL: [phone]  DR. RAMOS CELL: [phone]  DR. MARTINEZ CELL: [phone]  LESVIA WILLOUGHBY CELL: [phone]    From 5pm-7am Answering Service [phone]    -- RENAL FOLLOW UP NOTE ---Date of Service 08-19-21 @ 14:45    Patient is a 75y old  Male who presents with a chief complaint of Left hip and groin pain (18 Aug 2021 16:23)      Patient seen and examined at bedside. No chest pain/sob    VITALS:  T(F): 97.9 (08-19-21 @ 12:58), Max: 100.5 (08-18-21 @ 21:23)  HR: 82 (08-19-21 @ 12:58)  BP: 126/53 (08-19-21 @ 12:58)  RR: 16 (08-19-21 @ 12:58)  SpO2: 99% (08-19-21 @ 12:58)  Wt(kg): --    08-18 @ 07:01  -  08-19 @ 07:00  --------------------------------------------------------  IN: 725 mL / OUT: 1575 mL / NET: -850 mL    08-19 @ 07:01  -  08-19 @ 14:45  --------------------------------------------------------  IN: 360 mL / OUT: 950 mL / NET: -590 mL          PHYSICAL EXAM:  Constitutional: NAD  Neck: No JVD  Respiratory: CTAB, no wheezes, rales or rhonchi  Cardiovascular: S1, S2, RRR  Gastrointestinal: BS+, soft, NT/ND  Extremities: No peripheral edema    Hospital Medications:   MEDICATIONS  (STANDING):  amLODIPine   Tablet 10 milliGRAM(s) Oral daily  atorvastatin 10 milliGRAM(s) Oral at bedtime  budesonide 160 MICROgram(s)/formoterol 4.5 MICROgram(s) Inhaler 2 Puff(s) Inhalation two times a day  cyanocobalamin 1000 MICROGram(s) Oral daily  finasteride 5 milliGRAM(s) Oral at bedtime  fluticasone propionate 50 MICROgram(s)/spray Nasal Spray 1 Spray(s) Both Nostrils two times a day  furosemide    Tablet 20 milliGRAM(s) Oral daily  heparin   Injectable 5000 Unit(s) SubCutaneous every 12 hours  metoprolol succinate ER 25 milliGRAM(s) Oral daily  MIRACLE MOUTHWASH 5 milliLiter(s) Swish and Spit four times a day  multivitamin 1 Tablet(s) Oral daily  pantoprazole    Tablet 40 milliGRAM(s) Oral before breakfast  polyethylene glycol 3350 17 Gram(s) Oral two times a day  sodium chloride 1 Gram(s) Oral three times a day  tamsulosin 0.4 milliGRAM(s) Oral at bedtime      LABS:  08-19    132<L>  |  93<L>  |  25<H>  ----------------------------<  127<H>  3.6   |  23  |  1.23    Ca    10.0      19 Aug 2021 07:28    TPro  6.3  /  Alb  3.2<L>  /  TBili  0.3  /  DBili      /  AST  23  /  ALT  20  /  AlkPhos  188<H>  08-19    Creatinine Trend: 1.23 <--, 1.35 <--, 1.30 <--, 1.47 <--, 1.40 <--, 1.26 <--, 1.31 <--    Albumin, Serum: 3.2 g/dL (08-19 @ 07:28)                              8.5    9.42  )-----------( 479      ( 19 Aug 2021 07:28 )             26.2     Urine Studies:  Urinalysis - [08-19-21 @ 00:42]      Color Yellow / Appearance Slightly Turbid / SG 1.025 / pH 6.5      Gluc Negative / Ketone Negative  / Bili Negative / Urobili <2 mg/dL       Blood Moderate / Protein 100 mg/dL / Leuk Est Large / Nitrite Negative      RBC >50 / WBC >50 / Hyaline 2 / Gran  / Sq Epi  / Non Sq Epi 10 / Bacteria Moderate      Iron 22, TIBC 227, %sat 10      [08-14-21 @ 06:25]  Ferritin 302      [08-14-21 @ 06:25]  PTH -- (Ca --)      [08-17-21 @ 06:55]   16  Vitamin D (25OH) 37.4      [08-17-21 @ 13:37]  HbA1c 6.3      [09-13-17 @ 08:15]  TSH 2.38      [08-14-21 @ 06:25]  Lipid: chol 148, , HDL 41, LDL --      [08-14-21 @ 06:25]        RADIOLOGY & ADDITIONAL STUDIES:

## 2021-08-19 NOTE — CHART NOTE - NSCHARTNOTEFT_GEN_A_CORE
Patient evaluated today. He is resting comfortably in bed and reports that his pain is controlled (2/10 in severity). No hip pain or leg pain today, however he reports mild neck and upper back pain, as well as R lower chest wall/rib pain. Denies focal weakness or numbness. Reports that he can walk with a walker. Denies problems with urination or bowel movements.  Exam: C/T paraspinal tenderness; abdomen soft NTND. Breathing without difficulty. Lower extremity strength and sensation intact.     Reviewed MRI: stable pathologic fracture at L1. 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 No epidural disease.    Per note, patient is febrile with +UA and CXR showing consolidation. Zosyn was started today and ID will be consulted.    Plan:  Priority should be ID workup and treating his infection. His pain appears to be well controlled with low dose of 2.5 mg of oxycodone and pain palliation with RT is not urgently needed. The risk and side effects from treating his multilevel paraspinal disease likely outweighs the benefit. Since his hip pain is well controlled with pain medication alone, he likely will derive little benefit from outpatient SBRT treatment also.    Given his overall poor KPS, clinical decline, and likely prolonged hospital stay with new infection, outpatient SBRT is will not be beneficial for him. SBRT planning takes approximately 2 weeks and the effects of SBRT will not be apparent for another 2-4 weeks. As such, our recommendation will be GOC and hospice.     Discussed with on call attending, Len Brand Ma, PGY5  Radiation Medicine  cell: [phone] Patient evaluated today. He is resting comfortably in bed and reports that his pain is controlled (2/10 in severity). No hip pain or leg pain today, however he reports mild neck and upper back pain, as well as R lower chest wall/rib pain. Denies focal weakness or numbness. Reports that he can walk with a walker. Denies problems with urination or bowel movements.  Exam: C/T paraspinal tenderness; abdomen soft NTND. Breathing without difficulty. Lower extremity strength and sensation intact.     Reviewed MRI: stable pathologic fracture at L1. 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 No epidural disease.    Per note, patient is febrile with +UA and CXR showing consolidation. Zosyn was started today and ID will be consulted.    Plan:  Priority should be ID workup and treating his infection. His pain appears to be well controlled with low dose of 2.5 mg of oxycodone and pain palliation with RT is not urgently needed. Given his overall poor KPS, clinical decline, and likely prolonged hospital stay with new infection, outpatient SBRT is likely not feasible. SBRT planning takes approximately 2 weeks and the effects of SBRT will not be apparent for another 2-4 weeks. An alternative is inpatient palliative RT to his left hip and groin with 8Gy/1fx. The benefit of inpatient RT is quick turnaround and faster symptom control compared to SBRT. We can possibly treat some parts of his paraspinal disease with minimal toxicity with inpatient treatment at the same time.     If patient and family is agreeable, we can offer inpatient treatment w/ one fraction for palliation. We also recommend continued GOC and hospice discussion.    Discussed with on call attending, Len Brand Ma, PGY5  Radiation Medicine  cell: [phone] Patient evaluated today. He is resting comfortably in bed and reports that his pain is controlled (2/10 in severity). No hip pain or leg pain today, however he reports mild neck and upper back pain, as well as R lower chest wall/rib pain. Denies focal weakness or numbness. Reports that he can walk with a walker. Denies problems with urination or bowel movements.  Exam: C/T paraspinal tenderness; abdomen soft NTND. Breathing without difficulty. Lower extremity strength and sensation intact.     Reviewed MRI: stable pathologic fracture at L1. 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 No epidural disease.    Per note, patient is febrile with +UA and CXR showing consolidation. Zosyn was started today and ID will be consulted.    Plan:  Priority should be ID workup and treating his infection. His pain appears to be well controlled with low dose of 2.5 mg of oxycodone and pain palliation with RT is not urgently needed. Given his overall poor KPS, clinical decline, and likely prolonged hospital stay with new infection, outpatient SBRT is likely not feasible. SBRT planning takes approximately 2 weeks and the effects of SBRT will not be apparent for another 2-4 weeks. An alternative is inpatient palliative RT to his left hip and groin with 8Gy/1fx. The benefit of inpatient RT is quick turnaround and faster symptom control compared to SBRT. We can possibly treat some parts of his paraspinal disease with minimal toxicity with inpatient treatment at the same time.     If patient and family are agreeable, we can offer inpatient treatment w/ one fraction for palliation. We also recommend continued GOC and hospice discussion.    Discussed with on call attending, Len Brand Ma, PGY5  Radiation Medicine  cell: [phone]

## 2021-08-19 NOTE — CHART NOTE - NSCHARTNOTEFT_GEN_A_CORE
Primary RN notified that patient spiked a temp of 100.5 for the first time this admission. Infectious w/u sent- Blood c/s, UA and Chest Xray done. UA came back positive even after UA was sent after replacing the Contreras cath. Chest Xray prelim read noted and has ?Right upper lobe focal consolidation which is more prominent compared to previous xray as per d/w radiology on call. Ordered zosyn x1 dose for coverage of both UTI vs lung consolidation . Will f/u with  attending and ID in am Primary RN notified that patient spiked a temp of 100.5 for the first time this admission. Infectious w/u sent- Blood c/s, UA and Chest Xray done. UA came back positive even after UA was sent after replacing the Pineda cath. Chest Xray prelim read noted and has ?Right upper lobe focal consolidation which is more prominent compared to previous xray as per d/w radiology on call. Ordered zosyn x1 dose for coverage of both UTI vs lung consolidation . Will f/u with  attending and ID in am    T(C): 37.3 (08-18-21 @ 23:13), Max: 38.1 (08-18-21 @ 21:23)  HR: 96 (08-18-21 @ 21:23) (86 - 101)  BP: 137/56 (08-18-21 @ 21:23) (137/52 - 144/54)  RR: 18 (08-18-21 @ 21:23) (16 - 19)  SpO2: 97% (08-18-21 @ 21:23) (97% - 100%)    Appearance: Normal	  HEENT:  PERRLA   Lymphatic: No lymphadenopathy   Cardiovascular: Normal S1 S2, no JVD  Respiratory: normal effort , clear  Gastrointestinal:  Soft, Non-tender  Skin: No rashes,  warm to touch  Psychiatry:  Mood & affect appropriate      LABS:  cret                        8.4    9.24  )-----------( 511      ( 18 Aug 2021 07:21 )             26.2     08-18    134<L>  |  96<L>  |  31<H>  ----------------------------<  111<H>  4.0   |  23  |  1.35<H>    Ca    10.4      18 Aug 2021 07:21    TPro  6.7  /  Alb  3.5  /  TBili  0.2  /  DBili  x   /  AST  22  /  ALT  20  /  AlkPhos  193<H>  08-18    Assessment and Plan:  75 year old male w/ PMHx of bladder cancer, left kidney cancer s/p nephrectomy, HTN, CKD, hyponatremia, hypercholesterolemia, and BPH, presents to [facility] ED with left hip and groin pain secondary to bladder cancer with bone metastasis now febrile 2/2 UTI and RUL consolidation..    -Blood cultures   - UA sent after replacing pineda--> came back positive   - Urine c/s   - Tylenol 650 mg po  - C xray- urgent - prelim read- ? new RUL focal consolidation    - Zosyn 3.375 mg iv x 1 dose  - Will endorse to  f/u with ID in am

## 2021-08-19 NOTE — PROGRESS NOTE ADULT - PROBLEM SELECTOR PLAN 4
Palliative care consulted for assistance with symptom/ pain management and assistance with complex decision making related to goals of care. Recommendations have been made. Will continue to follow. Pt is an appropriate candidate for hospice but daughter waiting to here from oncology first before making any decisions.    Please page [phone] with any questions, concerns, or uncontrolled symptoms. Palliative care consulted for assistance with symptom/ pain management and assistance with complex decision making related to goals of care. Recommendations have been made. Will continue to follow.     Had a conversation via telephone this afternoon with grandson who reports knowing that pt is no longer a candidate for DMT but that the family is interested in him receiving palliative RT to help with his symptoms. Hospice was introduced and grandson reports he will bring it up to his mother and family since they have not thought about hospice in the past. Emotional support provided. Pt is an appropriate candidate for hospice.    Please page [phone] with any questions, concerns, or uncontrolled symptoms.

## 2021-08-19 NOTE — PROGRESS NOTE ADULT - SUBJECTIVE AND OBJECTIVE BOX
SUBJECTIVE AND OBJECTIVE: reports pain is controlled with Tylenol and that he does not want to take oxycodone   INTERVAL HPI/OVERNIGHT EVENTS: low grade temp overnight, now with UTI on abx. Required Tylenol 650mg PRN X2 in the last 24 hours and oxycodone 2.5mg PRN X1    DNR on chart:   Allergies    chocolate (Pruritus)  flour (Rash)  No Known Drug Allergies  Nuts (Rash)  Soy (Unknown)    Intolerances    MEDICATIONS  (STANDING):  amLODIPine   Tablet 10 milliGRAM(s) Oral daily  atorvastatin 10 milliGRAM(s) Oral at bedtime  budesonide 160 MICROgram(s)/formoterol 4.5 MICROgram(s) Inhaler 2 Puff(s) Inhalation two times a day  cyanocobalamin 1000 MICROGram(s) Oral daily  finasteride 5 milliGRAM(s) Oral at bedtime  fluticasone propionate 50 MICROgram(s)/spray Nasal Spray 1 Spray(s) Both Nostrils two times a day  furosemide    Tablet 20 milliGRAM(s) Oral daily  heparin   Injectable 5000 Unit(s) SubCutaneous every 12 hours  metoprolol succinate ER 25 milliGRAM(s) Oral daily  MIRACLE MOUTHWASH 5 milliLiter(s) Swish and Spit four times a day  multivitamin 1 Tablet(s) Oral daily  pantoprazole    Tablet 40 milliGRAM(s) Oral before breakfast  polyethylene glycol 3350 17 Gram(s) Oral two times a day  sodium chloride 1 Gram(s) Oral three times a day  tamsulosin 0.4 milliGRAM(s) Oral at bedtime    MEDICATIONS  (PRN):  acetaminophen   Tablet .. 650 milliGRAM(s) Oral every 6 hours PRN Temp greater or equal to 38.5C (101.3F), Mild Pain (1 - 3)  aluminum hydroxide/magnesium hydroxide/simethicone Suspension 30 milliLiter(s) Oral every 4 hours PRN Dyspepsia  ondansetron Injectable 4 milliGRAM(s) IV Push every 8 hours PRN Nausea and/or Vomiting  oxyCODONE    IR 2.5 milliGRAM(s) Oral every 4 hours PRN Moderate to Severe pain      ITEMS UNCHECKED ARE NOT PRESENT    PRESENT SYMPTOMS: [ ]Unable to obtain due to poor mentation   Source if other than patient:  [ ]Family   [ ]Team     Pain: [x] yes [ ] no  QOL impact - difficulty performing ADLs   Location - back and left hip/groin pain                    Aggravating factors - movement   Quality - dull/ aching   Radiation - back to left hip/groin   Timing - constant   Severity (0-10 scale): 3/10  Minimal acceptable level (0-10 scale): 2/10    Dyspnea:                           [ ]Mild [ ]Moderate [ ]Severe  Anxiety:                             [ ]Mild [ ]Moderate [ ]Severe  Fatigue:                             [ ]Mild [ ]Moderate [ ]Severe  Nausea:                             [ ]Mild [ ]Moderate [ ]Severe  Loss of appetite:              [ ]Mild [ ]Moderate [ ]Severe  Constipation:                    [ ]Mild [ ]Moderate [ ]Severe    PAIN AD Score:	  [email or web address] (Ctrl + left click to view)    Other Symptoms:  [x ]All other review of systems negative     Karnofsky Performance Score/Palliative Performance Status Version 2:   50  %    http://palliative.info/resource_material/[email or web address].pdf  PHYSICAL EXAM:  Vital Signs Last 24 Hrs  T(C): 36.6 (19 Aug 2021 12:58), Max: 38.1 (18 Aug 2021 21:23)  T(F): 97.9 (19 Aug 2021 12:58), Max: 100.5 (18 Aug 2021 21:23)  HR: 82 (19 Aug 2021 12:58) (78 - 101)  BP: 126/53 (19 Aug 2021 12:58) (126/53 - 144/54)  BP(mean): --  RR: 16 (19 Aug 2021 12:58) (16 - 19)  SpO2: 99% (19 Aug 2021 12:58) (97% - 100%)    I&O's Summary    18 Aug 2021 07:01  -  19 Aug 2021 07:00  --------------------------------------------------------  IN: 725 mL / OUT: 1575 mL / NET: -850 mL    19 Aug 2021 07:01  -  19 Aug 2021 15:45  --------------------------------------------------------  IN: 360 mL / OUT: 950 mL / NET: -590 mL      GENERAL:  [x]Alert  [x]Oriented x 3  []Lethargic  []Cachexia  []Unarousable  [x]Verbal  []Non-Verbal  Behavioral:   [] Anxiety  [] Delirium [] Agitation [] Other  HEENT:  [x]Normal   []Dry mouth   []ET Tube/Trach  []Oral lesions  PULMONARY:   [x]Clear []Tachypnea  []Audible excessive secretions   []Rhonchi        []Right []Left []Bilateral  []Crackles        []Right []Left []Bilateral  []Wheezing     []Right []Left []Bilateral  CARDIOVASCULAR:    [x]Regular []Irregular []Tachy  []Kj []Murmur []Other  GASTROINTESTINAL:  [x]Soft  []Distended   []+BS  [x]Non tender []Tender  []PEG []OGT/ NGT  Last BM: 8/18  GENITOURINARY:  []Normal [] Incontinent   []Oliguria/Anuria   [x]Contreras  MUSCULOSKELETAL:   []Normal   [x]Weakness  []Bed/Wheelchair bound []Edema  NEUROLOGIC:   [x]No focal deficits  [] Cognitive impairment  [] Dysphagia []Dysarthria [] Paresis []Other   SKIN:   [x]Normal   []Pressure ulcer(s)  []Rash    CRITICAL CARE:  [ ] Shock Present  [ ]Septic [ ]Cardiogenic [ ]Neurologic [ ]Hypovolemic  [ ]  Vasopressors [ ]  Inotropes   [ ] Respiratory failure present [ ] Mechanical Ventilation [ ] Non-invasive ventilatory support [ ] High-Flow  [ ] Acute  [ ] Chronic [ ] Hypoxic  [ ] Hypercarbic [ ] Other  [ ] Other organ failure    LABS:                                   8.5    9.42  )-----------( 479      ( 19 Aug 2021 07:28 )             26.2   08-19    132<L>  |  93<L>  |  25<H>  ----------------------------<  127<H>  3.6   |  23  |  1.23    Ca    10.0      19 Aug 2021 07:28    TPro  6.3  /  Alb  3.2<L>  /  TBili  0.3  /  DBili  x   /  AST  23  /  ALT  20  /  AlkPhos  188<H>  08-19    RADIOLOGY & ADDITIONAL STUDIES: reviewed     Protein Calorie Malnutrition Present: [ ] yes [ ] no  [ ] PPSV2 < or = 30%  [ ] significant weight loss [ ] poor nutritional intake [ ] anasarca [ ] catabolic state Artificial Nutrition [ ]     REFERRALS:   [ ]Chaplaincy  [ ] Hospice  [ ]Child Life  [x ]Social Work  [ ]Case management [ ]Holistic Therapy   Goals of Care Document:

## 2021-08-19 NOTE — PROGRESS NOTE ADULT - SUBJECTIVE AND OBJECTIVE BOX
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 No new events except as noted.     REVIEW OF SYSTEMS:    CONSTITUTIONAL: No weakness, fevers or chills  EYES/ENT: No visual changes;  No vertigo or throat pain   NECK: No pain or stiffness  RESPIRATORY: No cough, wheezing, hemoptysis; No shortness of breath  CARDIOVASCULAR: No chest pain or palpitations  GASTROINTESTINAL: No abdominal or epigastric pain. No nausea, vomiting, or hematemesis; No diarrhea or constipation. No melena or hematochezia.  GENITOURINARY: No dysuria, frequency or hematuria  NEUROLOGICAL: No numbness or weakness  SKIN: No itching, burning, rashes, or lesions   All other review of systems is negative unless indicated above.    MEDICATIONS:  MEDICATIONS  (STANDING):  amLODIPine   Tablet 10 milliGRAM(s) Oral daily  atorvastatin 10 milliGRAM(s) Oral at bedtime  budesonide 160 MICROgram(s)/formoterol 4.5 MICROgram(s) Inhaler 2 Puff(s) Inhalation two times a day  cyanocobalamin 1000 MICROGram(s) Oral daily  finasteride 5 milliGRAM(s) Oral at bedtime  fluticasone propionate 50 MICROgram(s)/spray Nasal Spray 1 Spray(s) Both Nostrils two times a day  furosemide    Tablet 20 milliGRAM(s) Oral daily  heparin   Injectable 5000 Unit(s) SubCutaneous every 12 hours  metoprolol succinate ER 25 milliGRAM(s) Oral daily  MIRACLE MOUTHWASH 5 milliLiter(s) Swish and Spit four times a day  multivitamin 1 Tablet(s) Oral daily  pantoprazole    Tablet 40 milliGRAM(s) Oral before breakfast  piperacillin/tazobactam IVPB.. 3.375 Gram(s) IV Intermittent every 8 hours  polyethylene glycol 3350 17 Gram(s) Oral two times a day  sodium chloride 1 Gram(s) Oral three times a day  tamsulosin 0.4 milliGRAM(s) Oral at bedtime      PHYSICAL EXAM:  T(C): 36.8 (21 @ 21:32), Max: 36.8 (21 @ 05:53)  HR: 81 (21 @ 21:32) (78 - 82)  BP: 127/54 (21 @ 21:32) (126/53 - 129/62)  RR: 17 (21 @ 21:32) (16 - 17)  SpO2: 97% (21 @ 21:32) (97% - 100%)  Wt(kg): --  I&O's Summary    18 Aug 2021 07:  -  19 Aug 2021 07:00  --------------------------------------------------------  IN: 725 mL / OUT: 1575 mL / NET: -850 mL    19 Aug 2021 07:  -  19 Aug 2021 23:37  --------------------------------------------------------  IN: 360 mL / OUT: 950 mL / NET: -590 mL          Appearance: Normal	  HEENT:  PERRLA   Lymphatic: No lymphadenopathy   Cardiovascular: Normal S1 S2, no JVD  Respiratory: normal effort , clear  Gastrointestinal:  Soft, Non-tender  Skin: No rashes,  warm to touch  Psychiatry:  Mood & affect appropriate  Musculuskeletal: No edema      All labs, Imaging and EKGs personally reviewed       21 @ 07:  -  21 @ 07:00  --------------------------------------------------------  IN: 725 mL / OUT: 1575 mL / NET: -850 mL    21 @ 07:  -  21 @ 23:37  --------------------------------------------------------  IN: 360 mL / OUT: 950 mL / NET: -590 mL                            8.5    9.42  )-----------( 479      ( 19 Aug 2021 07:28 )             26.2                   132<L>  |  93<L>  |  25<H>  ----------------------------<  127<H>  3.6   |  23  |  1.23    Ca    10.0      19 Aug 2021 07:28    TPro  6.3  /  Alb  3.2<L>  /  TBili  0.3  /  DBili  x   /  AST  23  /  ALT  20  /  AlkPhos  188<H>                         Urinalysis Basic - ( 19 Aug 2021 00:42 )    Color: Yellow / Appearance: Slightly Turbid / S.025 / pH: x  Gluc: x / Ketone: Negative  / Bili: Negative / Urobili: <2 mg/dL   Blood: x / Protein: 100 mg/dL / Nitrite: Negative   Leuk Esterase: Large / RBC: >50 /HPF / WBC >50 /HPF   Sq Epi: x / Non Sq Epi: 10 /HPF / Bacteria: Moderate

## 2021-08-20 LAB
ANION GAP SERPL CALC-SCNC: 15 MMOL/L — HIGH (ref 7–14)
BASOPHILS # BLD AUTO: 0.03 K/UL — SIGNIFICANT CHANGE UP (ref 0–0.2)
BASOPHILS NFR BLD AUTO: 0.3 % — SIGNIFICANT CHANGE UP (ref 0–2)
BUN SERPL-MCNC: 23 MG/DL — SIGNIFICANT CHANGE UP (ref 7–23)
CALCIUM SERPL-MCNC: 10.5 MG/DL — SIGNIFICANT CHANGE UP (ref 8.4–10.5)
CHLORIDE SERPL-SCNC: 92 MMOL/L — LOW (ref 98–107)
CO2 SERPL-SCNC: 23 MMOL/L — SIGNIFICANT CHANGE UP (ref 22–31)
CREAT SERPL-MCNC: 1.22 MG/DL — SIGNIFICANT CHANGE UP (ref 0.5–1.3)
EOSINOPHIL # BLD AUTO: 0.76 K/UL — HIGH (ref 0–0.5)
EOSINOPHIL NFR BLD AUTO: 6.9 % — HIGH (ref 0–6)
GLUCOSE SERPL-MCNC: 113 MG/DL — HIGH (ref 70–99)
HCT VFR BLD CALC: 26.2 % — LOW (ref 39–50)
HGB BLD-MCNC: 8.5 G/DL — LOW (ref 13–17)
IANC: 8.72 K/UL — HIGH (ref 1.5–8.5)
IMM GRANULOCYTES NFR BLD AUTO: 0.8 % — SIGNIFICANT CHANGE UP (ref 0–1.5)
LYMPHOCYTES # BLD AUTO: 0.65 K/UL — LOW (ref 1–3.3)
LYMPHOCYTES # BLD AUTO: 5.9 % — LOW (ref 13–44)
MAGNESIUM SERPL-MCNC: 2.1 MG/DL — SIGNIFICANT CHANGE UP (ref 1.6–2.6)
MCHC RBC-ENTMCNC: 28.1 PG — SIGNIFICANT CHANGE UP (ref 27–34)
MCHC RBC-ENTMCNC: 32.4 GM/DL — SIGNIFICANT CHANGE UP (ref 32–36)
MCV RBC AUTO: 86.8 FL — SIGNIFICANT CHANGE UP (ref 80–100)
MONOCYTES # BLD AUTO: 0.79 K/UL — SIGNIFICANT CHANGE UP (ref 0–0.9)
MONOCYTES NFR BLD AUTO: 7.2 % — SIGNIFICANT CHANGE UP (ref 2–14)
NEUTROPHILS # BLD AUTO: 8.72 K/UL — HIGH (ref 1.8–7.4)
NEUTROPHILS NFR BLD AUTO: 78.9 % — HIGH (ref 43–77)
NRBC # BLD: 0 /100 WBCS — SIGNIFICANT CHANGE UP
NRBC # FLD: 0 K/UL — SIGNIFICANT CHANGE UP
PHOSPHATE SERPL-MCNC: 3.8 MG/DL — SIGNIFICANT CHANGE UP (ref 2.5–4.5)
PLATELET # BLD AUTO: 515 K/UL — HIGH (ref 150–400)
POTASSIUM SERPL-MCNC: 3.8 MMOL/L — SIGNIFICANT CHANGE UP (ref 3.5–5.3)
POTASSIUM SERPL-SCNC: 3.8 MMOL/L — SIGNIFICANT CHANGE UP (ref 3.5–5.3)
RBC # BLD: 3.02 M/UL — LOW (ref 4.2–5.8)
RBC # FLD: 14 % — SIGNIFICANT CHANGE UP (ref 10.3–14.5)
SODIUM SERPL-SCNC: 130 MMOL/L — LOW (ref 135–145)
WBC # BLD: 11.04 K/UL — HIGH (ref 3.8–10.5)
WBC # FLD AUTO: 11.04 K/UL — HIGH (ref 3.8–10.5)

## 2021-08-20 RX ORDER — PIPERACILLIN AND TAZOBACTAM 4; .5 G/20ML; G/20ML
3.38 INJECTION, POWDER, LYOPHILIZED, FOR SOLUTION INTRAVENOUS EVERY 8 HOURS
Refills: 0 | Status: DISCONTINUED | OUTPATIENT
Start: 2021-08-20 | End: 2021-08-26

## 2021-08-20 RX ORDER — UREA 15 G
15 POWDER IN PACKET (EA) ORAL
Refills: 0 | Status: COMPLETED | OUTPATIENT
Start: 2021-08-20 | End: 2021-08-22

## 2021-08-20 RX ADMIN — Medication 1 SPRAY(S): at 18:59

## 2021-08-20 RX ADMIN — POLYETHYLENE GLYCOL 3350 17 GRAM(S): 17 POWDER, FOR SOLUTION ORAL at 06:43

## 2021-08-20 RX ADMIN — FINASTERIDE 5 MILLIGRAM(S): 5 TABLET, FILM COATED ORAL at 21:08

## 2021-08-20 RX ADMIN — PIPERACILLIN AND TAZOBACTAM 25 GRAM(S): 4; .5 INJECTION, POWDER, LYOPHILIZED, FOR SOLUTION INTRAVENOUS at 18:59

## 2021-08-20 RX ADMIN — Medication 1 SPRAY(S): at 06:41

## 2021-08-20 RX ADMIN — Medication 650 MILLIGRAM(S): at 07:21

## 2021-08-20 RX ADMIN — Medication 650 MILLIGRAM(S): at 14:40

## 2021-08-20 RX ADMIN — BUDESONIDE AND FORMOTEROL FUMARATE DIHYDRATE 2 PUFF(S): 160; 4.5 AEROSOL RESPIRATORY (INHALATION) at 21:06

## 2021-08-20 RX ADMIN — DIPHENHYDRAMINE HYDROCHLORIDE AND LIDOCAINE HYDROCHLORIDE AND ALUMINUM HYDROXIDE AND MAGNESIUM HYDRO 5 MILLILITER(S): KIT at 07:20

## 2021-08-20 RX ADMIN — PIPERACILLIN AND TAZOBACTAM 25 GRAM(S): 4; .5 INJECTION, POWDER, LYOPHILIZED, FOR SOLUTION INTRAVENOUS at 03:30

## 2021-08-20 RX ADMIN — SODIUM CHLORIDE 1 GRAM(S): 9 INJECTION INTRAMUSCULAR; INTRAVENOUS; SUBCUTANEOUS at 14:40

## 2021-08-20 RX ADMIN — ATORVASTATIN CALCIUM 10 MILLIGRAM(S): 80 TABLET, FILM COATED ORAL at 21:08

## 2021-08-20 RX ADMIN — AMLODIPINE BESYLATE 10 MILLIGRAM(S): 2.5 TABLET ORAL at 06:42

## 2021-08-20 RX ADMIN — Medication 650 MILLIGRAM(S): at 06:46

## 2021-08-20 RX ADMIN — BUDESONIDE AND FORMOTEROL FUMARATE DIHYDRATE 2 PUFF(S): 160; 4.5 AEROSOL RESPIRATORY (INHALATION) at 09:29

## 2021-08-20 RX ADMIN — PREGABALIN 1000 MICROGRAM(S): 225 CAPSULE ORAL at 14:40

## 2021-08-20 RX ADMIN — Medication 20 MILLIGRAM(S): at 06:42

## 2021-08-20 RX ADMIN — Medication 650 MILLIGRAM(S): at 21:06

## 2021-08-20 RX ADMIN — SODIUM CHLORIDE 1 GRAM(S): 9 INJECTION INTRAMUSCULAR; INTRAVENOUS; SUBCUTANEOUS at 21:08

## 2021-08-20 RX ADMIN — Medication 25 MILLIGRAM(S): at 06:43

## 2021-08-20 RX ADMIN — Medication 15 GRAM(S): at 19:00

## 2021-08-20 RX ADMIN — Medication 650 MILLIGRAM(S): at 15:40

## 2021-08-20 RX ADMIN — PIPERACILLIN AND TAZOBACTAM 25 GRAM(S): 4; .5 INJECTION, POWDER, LYOPHILIZED, FOR SOLUTION INTRAVENOUS at 09:29

## 2021-08-20 RX ADMIN — TAMSULOSIN HYDROCHLORIDE 0.4 MILLIGRAM(S): 0.4 CAPSULE ORAL at 21:08

## 2021-08-20 RX ADMIN — SODIUM CHLORIDE 1 GRAM(S): 9 INJECTION INTRAMUSCULAR; INTRAVENOUS; SUBCUTANEOUS at 06:42

## 2021-08-20 RX ADMIN — HEPARIN SODIUM 5000 UNIT(S): 5000 INJECTION INTRAVENOUS; SUBCUTANEOUS at 06:45

## 2021-08-20 RX ADMIN — PANTOPRAZOLE SODIUM 40 MILLIGRAM(S): 20 TABLET, DELAYED RELEASE ORAL at 06:42

## 2021-08-20 RX ADMIN — Medication 650 MILLIGRAM(S): at 21:36

## 2021-08-20 RX ADMIN — Medication 1 TABLET(S): at 14:40

## 2021-08-20 RX ADMIN — HEPARIN SODIUM 5000 UNIT(S): 5000 INJECTION INTRAVENOUS; SUBCUTANEOUS at 18:59

## 2021-08-20 NOTE — PROGRESS NOTE ADULT - SUBJECTIVE AND OBJECTIVE BOX
[facility] [place] [facility]   MD LEONELA JULIO DO ANAM SIDDIQUI ANGELA WONG, PA    TEL:  OFFICE: [phone]  DR POST CELL: [phone]  DR. RAMOS CELL: [phone]  DR. MARTINEZ CELL: [phone]  LESVIA WILLOUGHBY CELL: [phone]    From 5pm-7am Answering Service [phone]    -- RENAL FOLLOW UP NOTE ---Date of Service 08-20-21 @ 14:10    Patient is a 75y old  Male who presents with a chief complaint of Left hip and groin pain (19 Aug 2021 15:41)      Patient seen and examined at bedside. No chest pain/sob    VITALS:  T(F): 98.6 (08-20-21 @ 13:34), Max: 98.6 (08-20-21 @ 13:34)  HR: 90 (08-20-21 @ 13:34)  BP: 130/58 (08-20-21 @ 13:34)  RR: 18 (08-20-21 @ 06:40)  SpO2: 97% (08-20-21 @ 13:34)  Wt(kg): --    08-19 @ 07:01  -  08-20 @ 07:00  --------------------------------------------------------  IN: 360 mL / OUT: 950 mL / NET: -590 mL          PHYSICAL EXAM:  Constitutional: NAD  Neck: No JVD  Respiratory: CTAB, no wheezes, rales or rhonchi  Cardiovascular: S1, S2, RRR  Gastrointestinal: BS+, soft, NT/ND  Extremities: No peripheral edema    Hospital Medications:   MEDICATIONS  (STANDING):  amLODIPine   Tablet 10 milliGRAM(s) Oral daily  atorvastatin 10 milliGRAM(s) Oral at bedtime  budesonide 160 MICROgram(s)/formoterol 4.5 MICROgram(s) Inhaler 2 Puff(s) Inhalation two times a day  cyanocobalamin 1000 MICROGram(s) Oral daily  finasteride 5 milliGRAM(s) Oral at bedtime  fluticasone propionate 50 MICROgram(s)/spray Nasal Spray 1 Spray(s) Both Nostrils two times a day  furosemide    Tablet 20 milliGRAM(s) Oral daily  heparin   Injectable 5000 Unit(s) SubCutaneous every 12 hours  metoprolol succinate ER 25 milliGRAM(s) Oral daily  MIRACLE MOUTHWASH 5 milliLiter(s) Swish and Spit four times a day  multivitamin 1 Tablet(s) Oral daily  pantoprazole    Tablet 40 milliGRAM(s) Oral before breakfast  piperacillin/tazobactam IVPB.. 3.375 Gram(s) IV Intermittent every 8 hours  polyethylene glycol 3350 17 Gram(s) Oral two times a day  sodium chloride 1 Gram(s) Oral three times a day  tamsulosin 0.4 milliGRAM(s) Oral at bedtime      LABS:  08-20    130<L>  |  92<L>  |  23  ----------------------------<  113<H>  3.8   |  23  |  1.22    Ca    10.5      20 Aug 2021 07:29  Phos  3.8     08-20  Mg     2.10     08-20    TPro  6.3  /  Alb  3.2<L>  /  TBili  0.3  /  DBili      /  AST  23  /  ALT  20  /  AlkPhos  188<H>  08-19    Creatinine Trend: 1.22 <--, 1.23 <--, 1.35 <--, 1.30 <--, 1.47 <--, 1.40 <--, 1.26 <--    Phosphorus Level, Serum: 3.8 mg/dL (08-20 @ 07:29)                              8.5    11.04 )-----------( 515      ( 20 Aug 2021 07:29 )             26.2     Urine Studies:  Urinalysis - [08-19-21 @ 00:42]      Color Yellow / Appearance Slightly Turbid / SG 1.025 / pH 6.5      Gluc Negative / Ketone Negative  / Bili Negative / Urobili <2 mg/dL       Blood Moderate / Protein 100 mg/dL / Leuk Est Large / Nitrite Negative      RBC >50 / WBC >50 / Hyaline 2 / Gran  / Sq Epi  / Non Sq Epi 10 / Bacteria Moderate      Iron 22, TIBC 227, %sat 10      [08-14-21 @ 06:25]  Ferritin 302      [08-14-21 @ 06:25]  PTH -- (Ca --)      [08-17-21 @ 06:55]   16  Vitamin D (25OH) 37.4      [08-17-21 @ 13:37]  HbA1c 6.3      [09-13-17 @ 08:15]  TSH 2.38      [08-14-21 @ 06:25]  Lipid: chol 148, , HDL 41, LDL --      [08-14-21 @ 06:25]        RADIOLOGY & ADDITIONAL STUDIES:

## 2021-08-21 LAB
-  AMIKACIN: SIGNIFICANT CHANGE UP
-  AMPICILLIN/SULBACTAM: SIGNIFICANT CHANGE UP
-  CEFEPIME: SIGNIFICANT CHANGE UP
-  CEFTAZIDIME: SIGNIFICANT CHANGE UP
-  CEFTRIAXONE: SIGNIFICANT CHANGE UP
-  CIPROFLOXACIN: SIGNIFICANT CHANGE UP
-  GENTAMICIN: SIGNIFICANT CHANGE UP
-  LEVOFLOXACIN: SIGNIFICANT CHANGE UP
-  MEROPENEM: SIGNIFICANT CHANGE UP
-  TOBRAMYCIN: SIGNIFICANT CHANGE UP
-  TRIMETHOPRIM/SULFAMETHOXAZOLE: SIGNIFICANT CHANGE UP
ANION GAP SERPL CALC-SCNC: 16 MMOL/L — HIGH (ref 7–14)
BASOPHILS # BLD AUTO: 0.03 K/UL — SIGNIFICANT CHANGE UP (ref 0–0.2)
BASOPHILS NFR BLD AUTO: 0.3 % — SIGNIFICANT CHANGE UP (ref 0–2)
BUN SERPL-MCNC: 33 MG/DL — HIGH (ref 7–23)
CALCIUM SERPL-MCNC: 10.5 MG/DL — SIGNIFICANT CHANGE UP (ref 8.4–10.5)
CHLORIDE SERPL-SCNC: 93 MMOL/L — LOW (ref 98–107)
CO2 SERPL-SCNC: 22 MMOL/L — SIGNIFICANT CHANGE UP (ref 22–31)
CREAT SERPL-MCNC: 1.3 MG/DL — SIGNIFICANT CHANGE UP (ref 0.5–1.3)
EOSINOPHIL # BLD AUTO: 0.69 K/UL — HIGH (ref 0–0.5)
EOSINOPHIL NFR BLD AUTO: 6.7 % — HIGH (ref 0–6)
GLUCOSE SERPL-MCNC: 112 MG/DL — HIGH (ref 70–99)
HCT VFR BLD CALC: 24.6 % — LOW (ref 39–50)
HGB BLD-MCNC: 8.2 G/DL — LOW (ref 13–17)
IANC: 7.95 K/UL — SIGNIFICANT CHANGE UP (ref 1.5–8.5)
IMM GRANULOCYTES NFR BLD AUTO: 0.8 % — SIGNIFICANT CHANGE UP (ref 0–1.5)
LYMPHOCYTES # BLD AUTO: 0.69 K/UL — LOW (ref 1–3.3)
LYMPHOCYTES # BLD AUTO: 6.7 % — LOW (ref 13–44)
MAGNESIUM SERPL-MCNC: 2.1 MG/DL — SIGNIFICANT CHANGE UP (ref 1.6–2.6)
MCHC RBC-ENTMCNC: 28.3 PG — SIGNIFICANT CHANGE UP (ref 27–34)
MCHC RBC-ENTMCNC: 33.3 GM/DL — SIGNIFICANT CHANGE UP (ref 32–36)
MCV RBC AUTO: 84.8 FL — SIGNIFICANT CHANGE UP (ref 80–100)
METHOD TYPE: SIGNIFICANT CHANGE UP
MONOCYTES # BLD AUTO: 0.84 K/UL — SIGNIFICANT CHANGE UP (ref 0–0.9)
MONOCYTES NFR BLD AUTO: 8.2 % — SIGNIFICANT CHANGE UP (ref 2–14)
NEUTROPHILS # BLD AUTO: 7.95 K/UL — HIGH (ref 1.8–7.4)
NEUTROPHILS NFR BLD AUTO: 77.3 % — HIGH (ref 43–77)
NRBC # BLD: 0 /100 WBCS — SIGNIFICANT CHANGE UP
NRBC # FLD: 0 K/UL — SIGNIFICANT CHANGE UP
OSMOLALITY UR: 419 MOSM/KG — SIGNIFICANT CHANGE UP (ref 50–1200)
PHOSPHATE SERPL-MCNC: 4.1 MG/DL — SIGNIFICANT CHANGE UP (ref 2.5–4.5)
PLATELET # BLD AUTO: 502 K/UL — HIGH (ref 150–400)
POTASSIUM SERPL-MCNC: 3.9 MMOL/L — SIGNIFICANT CHANGE UP (ref 3.5–5.3)
POTASSIUM SERPL-SCNC: 3.9 MMOL/L — SIGNIFICANT CHANGE UP (ref 3.5–5.3)
PTH RELATED PROT SERPL-MCNC: <2 PMOL/L — SIGNIFICANT CHANGE UP
RBC # BLD: 2.9 M/UL — LOW (ref 4.2–5.8)
RBC # FLD: 14 % — SIGNIFICANT CHANGE UP (ref 10.3–14.5)
SODIUM SERPL-SCNC: 131 MMOL/L — LOW (ref 135–145)
SODIUM UR-SCNC: 74 MMOL/L — SIGNIFICANT CHANGE UP
WBC # BLD: 10.28 K/UL — SIGNIFICANT CHANGE UP (ref 3.8–10.5)
WBC # FLD AUTO: 10.28 K/UL — SIGNIFICANT CHANGE UP (ref 3.8–10.5)

## 2021-08-21 RX ADMIN — PANTOPRAZOLE SODIUM 40 MILLIGRAM(S): 20 TABLET, DELAYED RELEASE ORAL at 05:17

## 2021-08-21 RX ADMIN — BUDESONIDE AND FORMOTEROL FUMARATE DIHYDRATE 2 PUFF(S): 160; 4.5 AEROSOL RESPIRATORY (INHALATION) at 21:06

## 2021-08-21 RX ADMIN — ONDANSETRON 4 MILLIGRAM(S): 8 TABLET, FILM COATED ORAL at 21:11

## 2021-08-21 RX ADMIN — Medication 1 SPRAY(S): at 04:57

## 2021-08-21 RX ADMIN — Medication 1 TABLET(S): at 13:46

## 2021-08-21 RX ADMIN — ATORVASTATIN CALCIUM 10 MILLIGRAM(S): 80 TABLET, FILM COATED ORAL at 21:06

## 2021-08-21 RX ADMIN — Medication 650 MILLIGRAM(S): at 14:46

## 2021-08-21 RX ADMIN — SODIUM CHLORIDE 1 GRAM(S): 9 INJECTION INTRAMUSCULAR; INTRAVENOUS; SUBCUTANEOUS at 13:46

## 2021-08-21 RX ADMIN — Medication 15 GRAM(S): at 19:14

## 2021-08-21 RX ADMIN — Medication 650 MILLIGRAM(S): at 13:46

## 2021-08-21 RX ADMIN — SODIUM CHLORIDE 1 GRAM(S): 9 INJECTION INTRAMUSCULAR; INTRAVENOUS; SUBCUTANEOUS at 04:56

## 2021-08-21 RX ADMIN — AMLODIPINE BESYLATE 10 MILLIGRAM(S): 2.5 TABLET ORAL at 04:57

## 2021-08-21 RX ADMIN — HEPARIN SODIUM 5000 UNIT(S): 5000 INJECTION INTRAVENOUS; SUBCUTANEOUS at 04:58

## 2021-08-21 RX ADMIN — Medication 20 MILLIGRAM(S): at 04:56

## 2021-08-21 RX ADMIN — SODIUM CHLORIDE 1 GRAM(S): 9 INJECTION INTRAMUSCULAR; INTRAVENOUS; SUBCUTANEOUS at 21:05

## 2021-08-21 RX ADMIN — Medication 650 MILLIGRAM(S): at 06:20

## 2021-08-21 RX ADMIN — Medication 650 MILLIGRAM(S): at 05:49

## 2021-08-21 RX ADMIN — Medication 650 MILLIGRAM(S): at 21:06

## 2021-08-21 RX ADMIN — BUDESONIDE AND FORMOTEROL FUMARATE DIHYDRATE 2 PUFF(S): 160; 4.5 AEROSOL RESPIRATORY (INHALATION) at 10:37

## 2021-08-21 RX ADMIN — Medication 15 GRAM(S): at 05:17

## 2021-08-21 RX ADMIN — Medication 25 MILLIGRAM(S): at 04:56

## 2021-08-21 RX ADMIN — Medication 1 SPRAY(S): at 19:15

## 2021-08-21 RX ADMIN — PIPERACILLIN AND TAZOBACTAM 25 GRAM(S): 4; .5 INJECTION, POWDER, LYOPHILIZED, FOR SOLUTION INTRAVENOUS at 10:37

## 2021-08-21 RX ADMIN — PREGABALIN 1000 MICROGRAM(S): 225 CAPSULE ORAL at 13:45

## 2021-08-21 RX ADMIN — HEPARIN SODIUM 5000 UNIT(S): 5000 INJECTION INTRAVENOUS; SUBCUTANEOUS at 19:15

## 2021-08-21 RX ADMIN — PIPERACILLIN AND TAZOBACTAM 25 GRAM(S): 4; .5 INJECTION, POWDER, LYOPHILIZED, FOR SOLUTION INTRAVENOUS at 00:56

## 2021-08-21 RX ADMIN — PIPERACILLIN AND TAZOBACTAM 25 GRAM(S): 4; .5 INJECTION, POWDER, LYOPHILIZED, FOR SOLUTION INTRAVENOUS at 19:14

## 2021-08-21 RX ADMIN — TAMSULOSIN HYDROCHLORIDE 0.4 MILLIGRAM(S): 0.4 CAPSULE ORAL at 21:06

## 2021-08-21 RX ADMIN — Medication 650 MILLIGRAM(S): at 22:06

## 2021-08-21 RX ADMIN — FINASTERIDE 5 MILLIGRAM(S): 5 TABLET, FILM COATED ORAL at 21:06

## 2021-08-21 NOTE — PROGRESS NOTE ADULT - SUBJECTIVE AND OBJECTIVE BOX
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 No new events except as noted.   Doing okay     REVIEW OF SYSTEMS:    CONSTITUTIONAL: No weakness, fevers or chills  EYES/ENT: No visual changes;  No vertigo or throat pain   NECK: No pain or stiffness  RESPIRATORY: No cough, wheezing, hemoptysis; No shortness of breath  CARDIOVASCULAR: No chest pain or palpitations  GASTROINTESTINAL: No abdominal or epigastric pain. No nausea, vomiting, or hematemesis; No diarrhea or constipation. No melena or hematochezia.  GENITOURINARY: No dysuria, frequency or hematuria  NEUROLOGICAL: No numbness or weakness  SKIN: No itching, burning, rashes, or lesions   All other review of systems is negative unless indicated above.    MEDICATIONS:  MEDICATIONS  (STANDING):  amLODIPine   Tablet 10 milliGRAM(s) Oral daily  atorvastatin 10 milliGRAM(s) Oral at bedtime  budesonide 160 MICROgram(s)/formoterol 4.5 MICROgram(s) Inhaler 2 Puff(s) Inhalation two times a day  cyanocobalamin 1000 MICROGram(s) Oral daily  finasteride 5 milliGRAM(s) Oral at bedtime  fluticasone propionate 50 MICROgram(s)/spray Nasal Spray 1 Spray(s) Both Nostrils two times a day  furosemide    Tablet 20 milliGRAM(s) Oral daily  heparin   Injectable 5000 Unit(s) SubCutaneous every 12 hours  metoprolol succinate ER 25 milliGRAM(s) Oral daily  MIRACLE MOUTHWASH 5 milliLiter(s) Swish and Spit four times a day  multivitamin 1 Tablet(s) Oral daily  pantoprazole    Tablet 40 milliGRAM(s) Oral before breakfast  piperacillin/tazobactam IVPB.. 3.375 Gram(s) IV Intermittent every 8 hours  polyethylene glycol 3350 17 Gram(s) Oral two times a day  sodium chloride 1 Gram(s) Oral three times a day  tamsulosin 0.4 milliGRAM(s) Oral at bedtime  urea Oral Powder 15 Gram(s) Oral two times a day      PHYSICAL EXAM:  T(C): 36.8 (08-21-21 @ 14:49), Max: 37 (08-20-21 @ 20:52)  HR: 96 (08-21-21 @ 14:49) (81 - 96)  BP: 150/65 (08-21-21 @ 14:49) (122/52 - 150/65)  RR: 16 (08-21-21 @ 14:49) (16 - 18)  SpO2: 97% (08-21-21 @ 14:49) (97% - 98%)  Wt(kg): --  I&O's Summary    20 Aug 2021 07:01  -  21 Aug 2021 07:00  --------------------------------------------------------  IN: 0 mL / OUT: 1900 mL / NET: -1900 mL    21 Aug 2021 07:01  -  21 Aug 2021 20:04  --------------------------------------------------------  IN: 0 mL / OUT: 800 mL / NET: -800 mL          Appearance: Normal	  HEENT:  PERRLA   Lymphatic: No lymphadenopathy   Cardiovascular: Normal S1 S2, no JVD  Respiratory: normal effort , clear  Gastrointestinal:  Soft, Non-tender  Skin: No rashes,  warm to touch  Psychiatry:  Mood & affect appropriate  Musculuskeletal: No edema      All labs, Imaging and EKGs personally reviewed     08-20-21 @ 07:01  -  08-21-21 @ 07:00  --------------------------------------------------------  IN: 0 mL / OUT: 1900 mL / NET: -1900 mL    08-21-21 @ 07:01  -  08-21-21 @ 20:04  --------------------------------------------------------  IN: 0 mL / OUT: 800 mL / NET: -800 mL                          8.2    10.28 )-----------( 502      ( 21 Aug 2021 06:31 )             24.6               08-21    131<L>  |  93<L>  |  33<H>  ----------------------------<  112<H>  3.9   |  22  |  1.30    Ca    10.5      21 Aug 2021 06:31  Phos  4.1     08-21  Mg     2.10     08-21

## 2021-08-21 NOTE — PROGRESS NOTE ADULT - SUBJECTIVE AND OBJECTIVE BOX
[facility] [place] [facility]   MD LEONELA JULIO DO ANAM SIDDIQUI ANGELA WONG, PA    TEL:  OFFICE: [phone]  DR POST CELL: [phone]  DR. RAMOS CELL: [phone]  DR. MARTINEZ CELL: [phone]  LESVIA WILLOUGHBY CELL: [phone]    From 5pm-7am Answering Service [phone]    -- RENAL FOLLOW UP NOTE ---Date of Service 08-21-21 @ 13:25    Patient is a 75y old  Male who presents with a chief complaint of Left hip and groin pain (20 Aug 2021 14:12)      Patient seen and examined at bedside. No chest pain/sob. feeling weak    VITALS:  T(F): 98 (08-21-21 @ 04:57), Max: 98.6 (08-20-21 @ 13:34)  HR: 81 (08-21-21 @ 04:57)  BP: 127/53 (08-21-21 @ 04:57)  RR: 18 (08-21-21 @ 04:57)  SpO2: 98% (08-21-21 @ 04:57)  Wt(kg): --    08-20 @ 07:01  -  08-21 @ 07:00  --------------------------------------------------------  IN: 0 mL / OUT: 1900 mL / NET: -1900 mL    08-21 @ 07:01  -  08-21 @ 13:25  --------------------------------------------------------  IN: 0 mL / OUT: 800 mL / NET: -800 mL          PHYSICAL EXAM:  Constitutional: NAD  Neck: No JVD  Respiratory: CTAB, no wheezes, rales or rhonchi  Cardiovascular: S1, S2, RRR  Gastrointestinal: BS+, soft, NT/ND  Extremities: No peripheral edema    Hospital Medications:   MEDICATIONS  (STANDING):  amLODIPine   Tablet 10 milliGRAM(s) Oral daily  atorvastatin 10 milliGRAM(s) Oral at bedtime  budesonide 160 MICROgram(s)/formoterol 4.5 MICROgram(s) Inhaler 2 Puff(s) Inhalation two times a day  cyanocobalamin 1000 MICROGram(s) Oral daily  finasteride 5 milliGRAM(s) Oral at bedtime  fluticasone propionate 50 MICROgram(s)/spray Nasal Spray 1 Spray(s) Both Nostrils two times a day  furosemide    Tablet 20 milliGRAM(s) Oral daily  heparin   Injectable 5000 Unit(s) SubCutaneous every 12 hours  metoprolol succinate ER 25 milliGRAM(s) Oral daily  MIRACLE MOUTHWASH 5 milliLiter(s) Swish and Spit four times a day  multivitamin 1 Tablet(s) Oral daily  pantoprazole    Tablet 40 milliGRAM(s) Oral before breakfast  piperacillin/tazobactam IVPB.. 3.375 Gram(s) IV Intermittent every 8 hours  polyethylene glycol 3350 17 Gram(s) Oral two times a day  sodium chloride 1 Gram(s) Oral three times a day  tamsulosin 0.4 milliGRAM(s) Oral at bedtime  urea Oral Powder 15 Gram(s) Oral two times a day      LABS:  08-21    131<L>  |  93<L>  |  33<H>  ----------------------------<  112<H>  3.9   |  22  |  1.30    Ca    10.5      21 Aug 2021 06:31  Phos  4.1     08-21  Mg     2.10     08-21      Creatinine Trend: 1.30 <--, 1.22 <--, 1.23 <--, 1.35 <--, 1.30 <--, 1.47 <--, 1.40 <--    Phosphorus Level, Serum: 4.1 mg/dL (08-21 @ 06:31)                              8.2    10.28 )-----------( 502      ( 21 Aug 2021 06:31 )             24.6     Urine Studies:  Urinalysis - [08-19-21 @ 00:42]      Color Yellow / Appearance Slightly Turbid / SG 1.025 / pH 6.5      Gluc Negative / Ketone Negative  / Bili Negative / Urobili <2 mg/dL       Blood Moderate / Protein 100 mg/dL / Leuk Est Large / Nitrite Negative      RBC >50 / WBC >50 / Hyaline 2 / Gran  / Sq Epi  / Non Sq Epi 10 / Bacteria Moderate      Iron 22, TIBC 227, %sat 10      [08-14-21 @ 06:25]  Ferritin 302      [08-14-21 @ 06:25]  PTH -- (Ca --)      [08-17-21 @ 06:55]   16  Vitamin D (25OH) 37.4      [08-17-21 @ 13:37]  HbA1c 6.3      [09-13-17 @ 08:15]  TSH 2.38      [08-14-21 @ 06:25]  Lipid: chol 148, , HDL 41, LDL --      [08-14-21 @ 06:25]        RADIOLOGY & ADDITIONAL STUDIES:

## 2021-08-22 LAB
-  IMIPENEM: SIGNIFICANT CHANGE UP
-  PIPERACILLIN/TAZOBACTAM: SIGNIFICANT CHANGE UP
ANION GAP SERPL CALC-SCNC: 16 MMOL/L — HIGH (ref 7–14)
BASOPHILS # BLD AUTO: 0.02 K/UL — SIGNIFICANT CHANGE UP (ref 0–0.2)
BASOPHILS NFR BLD AUTO: 0.2 % — SIGNIFICANT CHANGE UP (ref 0–2)
BUN SERPL-MCNC: 50 MG/DL — HIGH (ref 7–23)
CALCIUM SERPL-MCNC: 10.5 MG/DL — SIGNIFICANT CHANGE UP (ref 8.4–10.5)
CHLORIDE SERPL-SCNC: 93 MMOL/L — LOW (ref 98–107)
CO2 SERPL-SCNC: 22 MMOL/L — SIGNIFICANT CHANGE UP (ref 22–31)
CREAT SERPL-MCNC: 1.31 MG/DL — HIGH (ref 0.5–1.3)
CULTURE RESULTS: SIGNIFICANT CHANGE UP
EOSINOPHIL # BLD AUTO: 0.38 K/UL — SIGNIFICANT CHANGE UP (ref 0–0.5)
EOSINOPHIL NFR BLD AUTO: 4.2 % — SIGNIFICANT CHANGE UP (ref 0–6)
GLUCOSE SERPL-MCNC: 129 MG/DL — HIGH (ref 70–99)
HCT VFR BLD CALC: 26 % — LOW (ref 39–50)
HGB BLD-MCNC: 8.5 G/DL — LOW (ref 13–17)
IANC: 7 K/UL — SIGNIFICANT CHANGE UP (ref 1.5–8.5)
IMM GRANULOCYTES NFR BLD AUTO: 1 % — SIGNIFICANT CHANGE UP (ref 0–1.5)
LYMPHOCYTES # BLD AUTO: 0.83 K/UL — LOW (ref 1–3.3)
LYMPHOCYTES # BLD AUTO: 9.2 % — LOW (ref 13–44)
MAGNESIUM SERPL-MCNC: 2 MG/DL — SIGNIFICANT CHANGE UP (ref 1.6–2.6)
MCHC RBC-ENTMCNC: 28.1 PG — SIGNIFICANT CHANGE UP (ref 27–34)
MCHC RBC-ENTMCNC: 32.7 GM/DL — SIGNIFICANT CHANGE UP (ref 32–36)
MCV RBC AUTO: 85.8 FL — SIGNIFICANT CHANGE UP (ref 80–100)
METHOD TYPE: SIGNIFICANT CHANGE UP
MONOCYTES # BLD AUTO: 0.75 K/UL — SIGNIFICANT CHANGE UP (ref 0–0.9)
MONOCYTES NFR BLD AUTO: 8.3 % — SIGNIFICANT CHANGE UP (ref 2–14)
NEUTROPHILS # BLD AUTO: 7 K/UL — SIGNIFICANT CHANGE UP (ref 1.8–7.4)
NEUTROPHILS NFR BLD AUTO: 77.1 % — HIGH (ref 43–77)
NRBC # BLD: 0 /100 WBCS — SIGNIFICANT CHANGE UP
NRBC # FLD: 0 K/UL — SIGNIFICANT CHANGE UP
ORGANISM # SPEC MICROSCOPIC CNT: SIGNIFICANT CHANGE UP
PHOSPHATE SERPL-MCNC: 3.9 MG/DL — SIGNIFICANT CHANGE UP (ref 2.5–4.5)
PLATELET # BLD AUTO: 523 K/UL — HIGH (ref 150–400)
POTASSIUM SERPL-MCNC: 3.2 MMOL/L — LOW (ref 3.5–5.3)
POTASSIUM SERPL-SCNC: 3.2 MMOL/L — LOW (ref 3.5–5.3)
RBC # BLD: 3.03 M/UL — LOW (ref 4.2–5.8)
RBC # FLD: 14 % — SIGNIFICANT CHANGE UP (ref 10.3–14.5)
SODIUM SERPL-SCNC: 131 MMOL/L — LOW (ref 135–145)
SPECIMEN SOURCE: SIGNIFICANT CHANGE UP
WBC # BLD: 9.07 K/UL — SIGNIFICANT CHANGE UP (ref 3.8–10.5)
WBC # FLD AUTO: 9.07 K/UL — SIGNIFICANT CHANGE UP (ref 3.8–10.5)

## 2021-08-22 RX ORDER — POTASSIUM CHLORIDE 20 MEQ
40 PACKET (EA) ORAL ONCE
Refills: 0 | Status: COMPLETED | OUTPATIENT
Start: 2021-08-22 | End: 2021-08-22

## 2021-08-22 RX ADMIN — Medication 20 MILLIGRAM(S): at 05:53

## 2021-08-22 RX ADMIN — SODIUM CHLORIDE 1 GRAM(S): 9 INJECTION INTRAMUSCULAR; INTRAVENOUS; SUBCUTANEOUS at 22:00

## 2021-08-22 RX ADMIN — SODIUM CHLORIDE 1 GRAM(S): 9 INJECTION INTRAMUSCULAR; INTRAVENOUS; SUBCUTANEOUS at 14:03

## 2021-08-22 RX ADMIN — PREGABALIN 1000 MICROGRAM(S): 225 CAPSULE ORAL at 14:02

## 2021-08-22 RX ADMIN — PIPERACILLIN AND TAZOBACTAM 25 GRAM(S): 4; .5 INJECTION, POWDER, LYOPHILIZED, FOR SOLUTION INTRAVENOUS at 10:38

## 2021-08-22 RX ADMIN — SODIUM CHLORIDE 1 GRAM(S): 9 INJECTION INTRAMUSCULAR; INTRAVENOUS; SUBCUTANEOUS at 05:53

## 2021-08-22 RX ADMIN — ATORVASTATIN CALCIUM 10 MILLIGRAM(S): 80 TABLET, FILM COATED ORAL at 22:00

## 2021-08-22 RX ADMIN — Medication 40 MILLIEQUIVALENT(S): at 10:37

## 2021-08-22 RX ADMIN — HEPARIN SODIUM 5000 UNIT(S): 5000 INJECTION INTRAVENOUS; SUBCUTANEOUS at 05:54

## 2021-08-22 RX ADMIN — Medication 650 MILLIGRAM(S): at 23:01

## 2021-08-22 RX ADMIN — Medication 25 MILLIGRAM(S): at 05:53

## 2021-08-22 RX ADMIN — AMLODIPINE BESYLATE 10 MILLIGRAM(S): 2.5 TABLET ORAL at 05:53

## 2021-08-22 RX ADMIN — BUDESONIDE AND FORMOTEROL FUMARATE DIHYDRATE 2 PUFF(S): 160; 4.5 AEROSOL RESPIRATORY (INHALATION) at 22:01

## 2021-08-22 RX ADMIN — PIPERACILLIN AND TAZOBACTAM 25 GRAM(S): 4; .5 INJECTION, POWDER, LYOPHILIZED, FOR SOLUTION INTRAVENOUS at 19:09

## 2021-08-22 RX ADMIN — PANTOPRAZOLE SODIUM 40 MILLIGRAM(S): 20 TABLET, DELAYED RELEASE ORAL at 05:54

## 2021-08-22 RX ADMIN — Medication 650 MILLIGRAM(S): at 22:01

## 2021-08-22 RX ADMIN — FINASTERIDE 5 MILLIGRAM(S): 5 TABLET, FILM COATED ORAL at 22:01

## 2021-08-22 RX ADMIN — Medication 1 SPRAY(S): at 19:09

## 2021-08-22 RX ADMIN — Medication 650 MILLIGRAM(S): at 05:53

## 2021-08-22 RX ADMIN — Medication 650 MILLIGRAM(S): at 14:04

## 2021-08-22 RX ADMIN — HEPARIN SODIUM 5000 UNIT(S): 5000 INJECTION INTRAVENOUS; SUBCUTANEOUS at 19:09

## 2021-08-22 RX ADMIN — Medication 1 SPRAY(S): at 05:53

## 2021-08-22 RX ADMIN — Medication 650 MILLIGRAM(S): at 13:04

## 2021-08-22 RX ADMIN — TAMSULOSIN HYDROCHLORIDE 0.4 MILLIGRAM(S): 0.4 CAPSULE ORAL at 22:00

## 2021-08-22 RX ADMIN — BUDESONIDE AND FORMOTEROL FUMARATE DIHYDRATE 2 PUFF(S): 160; 4.5 AEROSOL RESPIRATORY (INHALATION) at 10:38

## 2021-08-22 RX ADMIN — PIPERACILLIN AND TAZOBACTAM 25 GRAM(S): 4; .5 INJECTION, POWDER, LYOPHILIZED, FOR SOLUTION INTRAVENOUS at 02:41

## 2021-08-22 RX ADMIN — Medication 15 GRAM(S): at 05:54

## 2021-08-22 RX ADMIN — Medication 650 MILLIGRAM(S): at 06:53

## 2021-08-22 RX ADMIN — Medication 1 TABLET(S): at 14:02

## 2021-08-22 NOTE — PROGRESS NOTE ADULT - SUBJECTIVE AND OBJECTIVE BOX
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 No new events except as noted.     REVIEW OF SYSTEMS:    CONSTITUTIONAL: No weakness, fevers or chills  EYES/ENT: No visual changes;  No vertigo or throat pain   NECK: No pain or stiffness  RESPIRATORY: No cough, wheezing, hemoptysis; No shortness of breath  CARDIOVASCULAR: No chest pain or palpitations  GASTROINTESTINAL: No abdominal or epigastric pain. No nausea, vomiting, or hematemesis; No diarrhea or constipation. No melena or hematochezia.  GENITOURINARY: No dysuria, frequency or hematuria  NEUROLOGICAL: No numbness or weakness  SKIN: No itching, burning, rashes, or lesions   All other review of systems is negative unless indicated above.    MEDICATIONS:  MEDICATIONS  (STANDING):  amLODIPine   Tablet 10 milliGRAM(s) Oral daily  atorvastatin 10 milliGRAM(s) Oral at bedtime  budesonide 160 MICROgram(s)/formoterol 4.5 MICROgram(s) Inhaler 2 Puff(s) Inhalation two times a day  cyanocobalamin 1000 MICROGram(s) Oral daily  finasteride 5 milliGRAM(s) Oral at bedtime  fluticasone propionate 50 MICROgram(s)/spray Nasal Spray 1 Spray(s) Both Nostrils two times a day  furosemide    Tablet 20 milliGRAM(s) Oral daily  heparin   Injectable 5000 Unit(s) SubCutaneous every 12 hours  metoprolol succinate ER 25 milliGRAM(s) Oral daily  MIRACLE MOUTHWASH 5 milliLiter(s) Swish and Spit four times a day  multivitamin 1 Tablet(s) Oral daily  pantoprazole    Tablet 40 milliGRAM(s) Oral before breakfast  piperacillin/tazobactam IVPB.. 3.375 Gram(s) IV Intermittent every 8 hours  polyethylene glycol 3350 17 Gram(s) Oral two times a day  sodium chloride 1 Gram(s) Oral three times a day  tamsulosin 0.4 milliGRAM(s) Oral at bedtime      PHYSICAL EXAM:  T(C): 37.2 (08-22-21 @ 20:47), Max: 37.2 (08-22-21 @ 20:47)  HR: 85 (08-22-21 @ 20:47) (80 - 88)  BP: 132/53 (08-22-21 @ 20:47) (129/51 - 132/53)  RR: 16 (08-22-21 @ 20:47) (16 - 17)  SpO2: 98% (08-22-21 @ 20:47) (98% - 98%)  Wt(kg): --  I&O's Summary    21 Aug 2021 07:01  -  22 Aug 2021 07:00  --------------------------------------------------------  IN: 0 mL / OUT: 800 mL / NET: -800 mL    22 Aug 2021 07:01  -  22 Aug 2021 23:10  --------------------------------------------------------  IN: 0 mL / OUT: 1100 mL / NET: -1100 mL          Appearance: Normal	  HEENT:  PERRLA   Lymphatic: No lymphadenopathy   Cardiovascular: Normal S1 S2, no JVD  Respiratory: normal effort , clear  Gastrointestinal:  Soft, Non-tender  Skin: No rashes,  warm to touch  Psychiatry:  Mood & affect appropriate  Musculuskeletal: No edema      All labs, Imaging and EKGs personally reviewed       08-21-21 @ 07:01  -  08-22-21 @ 07:00  --------------------------------------------------------  IN: 0 mL / OUT: 800 mL / NET: -800 mL    08-22-21 @ 07:01  -  08-22-21 @ 23:10  --------------------------------------------------------  IN: 0 mL / OUT: 1100 mL / NET: -1100 mL                          8.5    9.07  )-----------( 523      ( 22 Aug 2021 07:16 )             26.0               08-22    131<L>  |  93<L>  |  50<H>  ----------------------------<  129<H>  3.2<L>   |  22  |  1.31<H>    Ca    10.5      22 Aug 2021 07:16  Phos  3.9     08-22  Mg     2.00     08-22

## 2021-08-22 NOTE — PROGRESS NOTE ADULT - SUBJECTIVE AND OBJECTIVE BOX
[facility] [place] [facility]   MD NOLAN JULIO MD RUORU WONG, PA    TEL:  OFFICE: [phone]  DR POST CELL: [phone]  HARSH WILLOUGHBY CELL: [phone]  DR. MARTINEZ CELL: [phone]    RENAL FOLLOW UP NOTE-- Date of Service ;; 08-22-21 @ 12:01  --------------------------------------------------------------------------------  HPI:  Pt seen and examined at bedside  Denies SOB, chest pain.         PAST HISTORY  --------------------------------------------------------------------------------  No significant changes to PMH, PSH, FHx, SHx, unless otherwise noted    ALLERGIES & MEDICATIONS  --------------------------------------------------------------------------------  Allergies    chocolate (Pruritus)  flour (Rash)  No Known Drug Allergies  Nuts (Rash)  Soy (Unknown)    Intolerances      Standing Inpatient Medications  amLODIPine   Tablet 10 milliGRAM(s) Oral daily  atorvastatin 10 milliGRAM(s) Oral at bedtime  budesonide 160 MICROgram(s)/formoterol 4.5 MICROgram(s) Inhaler 2 Puff(s) Inhalation two times a day  cyanocobalamin 1000 MICROGram(s) Oral daily  finasteride 5 milliGRAM(s) Oral at bedtime  fluticasone propionate 50 MICROgram(s)/spray Nasal Spray 1 Spray(s) Both Nostrils two times a day  furosemide    Tablet 20 milliGRAM(s) Oral daily  heparin   Injectable 5000 Unit(s) SubCutaneous every 12 hours  metoprolol succinate ER 25 milliGRAM(s) Oral daily  MIRACLE MOUTHWASH 5 milliLiter(s) Swish and Spit four times a day  multivitamin 1 Tablet(s) Oral daily  pantoprazole    Tablet 40 milliGRAM(s) Oral before breakfast  piperacillin/tazobactam IVPB.. 3.375 Gram(s) IV Intermittent every 8 hours  polyethylene glycol 3350 17 Gram(s) Oral two times a day  sodium chloride 1 Gram(s) Oral three times a day  tamsulosin 0.4 milliGRAM(s) Oral at bedtime    PRN Inpatient Medications  acetaminophen   Tablet .. 650 milliGRAM(s) Oral every 6 hours PRN  aluminum hydroxide/magnesium hydroxide/simethicone Suspension 30 milliLiter(s) Oral every 4 hours PRN  ondansetron Injectable 4 milliGRAM(s) IV Push every 8 hours PRN  oxyCODONE    IR 2.5 milliGRAM(s) Oral every 4 hours PRN      REVIEW OF SYSTEMS  --------------------------------------------------------------------------------  General: no fever  CVS: no chest pain  RESP: no sob, no cough  ABD: no abdominal pain  : no dysuria,  MSK: no edema     VITALS/PHYSICAL EXAM  --------------------------------------------------------------------------------  T(C): 36.4 (08-22-21 @ 05:45), Max: 37.3 (08-21-21 @ 21:01)  HR: 80 (08-22-21 @ 05:45) (80 - 96)  BP: 130/58 (08-22-21 @ 05:45) (130/58 - 150/65)  RR: 17 (08-22-21 @ 05:45) (16 - 17)  SpO2: 98% (08-22-21 @ 05:45) (97% - 99%)  Wt(kg): --        08-21-21 @ 07:01  -  08-22-21 @ 07:00  --------------------------------------------------------  IN: 0 mL / OUT: 800 mL / NET: -800 mL    08-22-21 @ 07:01  -  08-22-21 @ 12:01  --------------------------------------------------------  IN: 0 mL / OUT: 1100 mL / NET: -1100 mL      Physical Exam:  	Gen: NAD  	HEENT: MMM  	Pulm: CTA B/L  	CV: S1S2  	Abd: Soft, +BS  	Ext: No LE edema B/L                      Neuro: Awake , alert  	Skin: Warm and Dry   	Vascular access:           GEO pineda  LABS/STUDIES  --------------------------------------------------------------------------------              8.5    9.07  >-----------<  523      [08-22-21 @ 07:16]              26.0     131  |  93  |  50  ----------------------------<  129      [08-22-21 @ 07:16]  3.2   |  22  |  1.31        Ca     10.5     [08-22-21 @ 07:16]      Mg     2.00     [08-22-21 @ 07:16]      Phos  3.9     [08-22-21 @ 07:16]            Creatinine Trend:  SCr 1.31 [08-22 @ 07:16]  SCr 1.30 [08-21 @ 06:31]  SCr 1.22 [08-20 @ 07:29]  SCr 1.23 [08-19 @ 07:28]  SCr 1.35 [08-18 @ 07:21]    Urinalysis - [08-19-21 @ 00:42]      Color Yellow / Appearance Slightly Turbid / SG 1.025 / pH 6.5      Gluc Negative / Ketone Negative  / Bili Negative / Urobili <2 mg/dL       Blood Moderate / Protein 100 mg/dL / Leuk Est Large / Nitrite Negative      RBC >50 / WBC >50 / Hyaline 2 / Gran  / Sq Epi  / Non Sq Epi 10 / Bacteria Moderate    Urine Sodium 74      [08-21-21 @ 14:14]  Urine Osmolality 419      [08-21-21 @ 14:14]    Iron 22, TIBC 227, %sat 10      [08-14-21 @ 06:25]  Ferritin 302      [08-14-21 @ 06:25]  PTH -- (Ca --)      [08-17-21 @ 06:55]   16  Vitamin D (25OH) 37.4      [08-17-21 @ 13:37]  HbA1c 6.3      [09-13-17 @ 08:15]  TSH 2.38      [08-14-21 @ 06:25]  Lipid: chol 148, , HDL 41, LDL --      [08-14-21 @ 06:25]       [facility] [place] [facility]   MD NOLAN JULIO MD RUORU WONG, PA    TEL:  OFFICE: [phone]  DR POST CELL: [phone]  HARSH WILLOUGHBY CELL: [phone]  DR. MARTINEZ CELL: [phone]    RENAL FOLLOW UP NOTE-- Date of Service ;; 08-22-21 @ 12:01  --------------------------------------------------------------------------------  HPI:  Pt seen and examined at bedside  Denies SOB, chest pain.         PAST HISTORY  --------------------------------------------------------------------------------  No significant changes to PMH, PSH, FHx, SHx, unless otherwise noted    ALLERGIES & MEDICATIONS  --------------------------------------------------------------------------------  Allergies    chocolate (Pruritus)  flour (Rash)  No Known Drug Allergies  Nuts (Rash)  Soy (Unknown)    Intolerances      Standing Inpatient Medications  amLODIPine   Tablet 10 milliGRAM(s) Oral daily  atorvastatin 10 milliGRAM(s) Oral at bedtime  budesonide 160 MICROgram(s)/formoterol 4.5 MICROgram(s) Inhaler 2 Puff(s) Inhalation two times a day  cyanocobalamin 1000 MICROGram(s) Oral daily  finasteride 5 milliGRAM(s) Oral at bedtime  fluticasone propionate 50 MICROgram(s)/spray Nasal Spray 1 Spray(s) Both Nostrils two times a day  furosemide    Tablet 20 milliGRAM(s) Oral daily  heparin   Injectable 5000 Unit(s) SubCutaneous every 12 hours  metoprolol succinate ER 25 milliGRAM(s) Oral daily  MIRACLE MOUTHWASH 5 milliLiter(s) Swish and Spit four times a day  multivitamin 1 Tablet(s) Oral daily  pantoprazole    Tablet 40 milliGRAM(s) Oral before breakfast  piperacillin/tazobactam IVPB.. 3.375 Gram(s) IV Intermittent every 8 hours  polyethylene glycol 3350 17 Gram(s) Oral two times a day  sodium chloride 1 Gram(s) Oral three times a day  tamsulosin 0.4 milliGRAM(s) Oral at bedtime    PRN Inpatient Medications  acetaminophen   Tablet .. 650 milliGRAM(s) Oral every 6 hours PRN  aluminum hydroxide/magnesium hydroxide/simethicone Suspension 30 milliLiter(s) Oral every 4 hours PRN  ondansetron Injectable 4 milliGRAM(s) IV Push every 8 hours PRN  oxyCODONE    IR 2.5 milliGRAM(s) Oral every 4 hours PRN      REVIEW OF SYSTEMS  --------------------------------------------------------------------------------  General: no fever  CVS: no chest pain  RESP: no sob, no cough  ABD: no abdominal pain  : no dysuria,  MSK: no edema     VITALS/PHYSICAL EXAM  --------------------------------------------------------------------------------  T(C): 36.4 (08-22-21 @ 05:45), Max: 37.3 (08-21-21 @ 21:01)  HR: 80 (08-22-21 @ 05:45) (80 - 96)  BP: 130/58 (08-22-21 @ 05:45) (130/58 - 150/65)  RR: 17 (08-22-21 @ 05:45) (16 - 17)  SpO2: 98% (08-22-21 @ 05:45) (97% - 99%)  Wt(kg): --        08-21-21 @ 07:01  -  08-22-21 @ 07:00  --------------------------------------------------------  IN: 0 mL / OUT: 800 mL / NET: -800 mL    08-22-21 @ 07:01  -  08-22-21 @ 12:01  --------------------------------------------------------  IN: 0 mL / OUT: 1100 mL / NET: -1100 mL      Physical Exam:  	Gen: NAD  	HEENT: MMM  	Pulm: CTA B/L  	CV: S1S2  	Abd: Soft, +BS  	Ext: No LE edema B/L                      Neuro: Awake , alert  	Skin: Warm and Dry   	Vascular access:           GEO : pineda  LABS/STUDIES  --------------------------------------------------------------------------------              8.5    9.07  >-----------<  523      [08-22-21 @ 07:16]              26.0     131  |  93  |  50  ----------------------------<  129      [08-22-21 @ 07:16]  3.2   |  22  |  1.31        Ca     10.5     [08-22-21 @ 07:16]      Mg     2.00     [08-22-21 @ 07:16]      Phos  3.9     [08-22-21 @ 07:16]            Creatinine Trend:  SCr 1.31 [08-22 @ 07:16]  SCr 1.30 [08-21 @ 06:31]  SCr 1.22 [08-20 @ 07:29]  SCr 1.23 [08-19 @ 07:28]  SCr 1.35 [08-18 @ 07:21]    Urinalysis - [08-19-21 @ 00:42]      Color Yellow / Appearance Slightly Turbid / SG 1.025 / pH 6.5      Gluc Negative / Ketone Negative  / Bili Negative / Urobili <2 mg/dL       Blood Moderate / Protein 100 mg/dL / Leuk Est Large / Nitrite Negative      RBC >50 / WBC >50 / Hyaline 2 / Gran  / Sq Epi  / Non Sq Epi 10 / Bacteria Moderate    Urine Sodium 74      [08-21-21 @ 14:14]  Urine Osmolality 419      [08-21-21 @ 14:14]    Iron 22, TIBC 227, %sat 10      [08-14-21 @ 06:25]  Ferritin 302      [08-14-21 @ 06:25]  PTH -- (Ca --)      [08-17-21 @ 06:55]   16  Vitamin D (25OH) 37.4      [08-17-21 @ 13:37]  HbA1c 6.3      [09-13-17 @ 08:15]  TSH 2.38      [08-14-21 @ 06:25]  Lipid: chol 148, , HDL 41, LDL --      [08-14-21 @ 06:25]

## 2021-08-23 ENCOUNTER — TRANSCRIPTION ENCOUNTER (OUTPATIENT)
Age: 76
End: 2021-08-23

## 2021-08-23 LAB
ANION GAP SERPL CALC-SCNC: 19 MMOL/L — HIGH (ref 7–14)
BASOPHILS # BLD AUTO: 0.02 K/UL — SIGNIFICANT CHANGE UP (ref 0–0.2)
BASOPHILS NFR BLD AUTO: 0.2 % — SIGNIFICANT CHANGE UP (ref 0–2)
BUN SERPL-MCNC: 25 MG/DL — HIGH (ref 7–23)
CALCIUM SERPL-MCNC: 10.5 MG/DL — SIGNIFICANT CHANGE UP (ref 8.4–10.5)
CHLORIDE SERPL-SCNC: 95 MMOL/L — LOW (ref 98–107)
CO2 SERPL-SCNC: 21 MMOL/L — LOW (ref 22–31)
CREAT SERPL-MCNC: 1.22 MG/DL — SIGNIFICANT CHANGE UP (ref 0.5–1.3)
EOSINOPHIL # BLD AUTO: 0.33 K/UL — SIGNIFICANT CHANGE UP (ref 0–0.5)
EOSINOPHIL NFR BLD AUTO: 3.3 % — SIGNIFICANT CHANGE UP (ref 0–6)
GLUCOSE SERPL-MCNC: 113 MG/DL — HIGH (ref 70–99)
HCT VFR BLD CALC: 24.3 % — LOW (ref 39–50)
HGB BLD-MCNC: 8 G/DL — LOW (ref 13–17)
IANC: 8.22 K/UL — SIGNIFICANT CHANGE UP (ref 1.5–8.5)
IMM GRANULOCYTES NFR BLD AUTO: 0.8 % — SIGNIFICANT CHANGE UP (ref 0–1.5)
LYMPHOCYTES # BLD AUTO: 0.53 K/UL — LOW (ref 1–3.3)
LYMPHOCYTES # BLD AUTO: 5.3 % — LOW (ref 13–44)
MAGNESIUM SERPL-MCNC: 2 MG/DL — SIGNIFICANT CHANGE UP (ref 1.6–2.6)
MCHC RBC-ENTMCNC: 28.1 PG — SIGNIFICANT CHANGE UP (ref 27–34)
MCHC RBC-ENTMCNC: 32.9 GM/DL — SIGNIFICANT CHANGE UP (ref 32–36)
MCV RBC AUTO: 85.3 FL — SIGNIFICANT CHANGE UP (ref 80–100)
MONOCYTES # BLD AUTO: 0.78 K/UL — SIGNIFICANT CHANGE UP (ref 0–0.9)
MONOCYTES NFR BLD AUTO: 7.8 % — SIGNIFICANT CHANGE UP (ref 2–14)
NEUTROPHILS # BLD AUTO: 8.22 K/UL — HIGH (ref 1.8–7.4)
NEUTROPHILS NFR BLD AUTO: 82.6 % — HIGH (ref 43–77)
NRBC # BLD: 0 /100 WBCS — SIGNIFICANT CHANGE UP
NRBC # FLD: 0 K/UL — SIGNIFICANT CHANGE UP
PHOSPHATE SERPL-MCNC: 3.4 MG/DL — SIGNIFICANT CHANGE UP (ref 2.5–4.5)
PLATELET # BLD AUTO: 498 K/UL — HIGH (ref 150–400)
POTASSIUM SERPL-MCNC: 3.8 MMOL/L — SIGNIFICANT CHANGE UP (ref 3.5–5.3)
POTASSIUM SERPL-SCNC: 3.8 MMOL/L — SIGNIFICANT CHANGE UP (ref 3.5–5.3)
RBC # BLD: 2.85 M/UL — LOW (ref 4.2–5.8)
RBC # FLD: 14 % — SIGNIFICANT CHANGE UP (ref 10.3–14.5)
SODIUM SERPL-SCNC: 135 MMOL/L — SIGNIFICANT CHANGE UP (ref 135–145)
WBC # BLD: 9.96 K/UL — SIGNIFICANT CHANGE UP (ref 3.8–10.5)
WBC # FLD AUTO: 9.96 K/UL — SIGNIFICANT CHANGE UP (ref 3.8–10.5)

## 2021-08-23 PROCEDURE — 77261 THER RADIOLOGY TX PLNG SMPL: CPT

## 2021-08-23 RX ADMIN — SODIUM CHLORIDE 1 GRAM(S): 9 INJECTION INTRAMUSCULAR; INTRAVENOUS; SUBCUTANEOUS at 05:41

## 2021-08-23 RX ADMIN — TAMSULOSIN HYDROCHLORIDE 0.4 MILLIGRAM(S): 0.4 CAPSULE ORAL at 21:50

## 2021-08-23 RX ADMIN — HEPARIN SODIUM 5000 UNIT(S): 5000 INJECTION INTRAVENOUS; SUBCUTANEOUS at 18:44

## 2021-08-23 RX ADMIN — HEPARIN SODIUM 5000 UNIT(S): 5000 INJECTION INTRAVENOUS; SUBCUTANEOUS at 05:41

## 2021-08-23 RX ADMIN — ATORVASTATIN CALCIUM 10 MILLIGRAM(S): 80 TABLET, FILM COATED ORAL at 21:50

## 2021-08-23 RX ADMIN — Medication 650 MILLIGRAM(S): at 05:40

## 2021-08-23 RX ADMIN — PANTOPRAZOLE SODIUM 40 MILLIGRAM(S): 20 TABLET, DELAYED RELEASE ORAL at 05:41

## 2021-08-23 RX ADMIN — Medication 650 MILLIGRAM(S): at 14:22

## 2021-08-23 RX ADMIN — DIPHENHYDRAMINE HYDROCHLORIDE AND LIDOCAINE HYDROCHLORIDE AND ALUMINUM HYDROXIDE AND MAGNESIUM HYDRO 5 MILLILITER(S): KIT at 05:40

## 2021-08-23 RX ADMIN — Medication 20 MILLIGRAM(S): at 05:41

## 2021-08-23 RX ADMIN — PIPERACILLIN AND TAZOBACTAM 25 GRAM(S): 4; .5 INJECTION, POWDER, LYOPHILIZED, FOR SOLUTION INTRAVENOUS at 01:04

## 2021-08-23 RX ADMIN — PREGABALIN 1000 MICROGRAM(S): 225 CAPSULE ORAL at 11:53

## 2021-08-23 RX ADMIN — Medication 650 MILLIGRAM(S): at 22:24

## 2021-08-23 RX ADMIN — Medication 1 SPRAY(S): at 18:42

## 2021-08-23 RX ADMIN — Medication 25 MILLIGRAM(S): at 05:40

## 2021-08-23 RX ADMIN — PIPERACILLIN AND TAZOBACTAM 25 GRAM(S): 4; .5 INJECTION, POWDER, LYOPHILIZED, FOR SOLUTION INTRAVENOUS at 18:42

## 2021-08-23 RX ADMIN — FINASTERIDE 5 MILLIGRAM(S): 5 TABLET, FILM COATED ORAL at 21:49

## 2021-08-23 RX ADMIN — Medication 1 SPRAY(S): at 05:41

## 2021-08-23 RX ADMIN — Medication 650 MILLIGRAM(S): at 08:00

## 2021-08-23 RX ADMIN — SODIUM CHLORIDE 1 GRAM(S): 9 INJECTION INTRAMUSCULAR; INTRAVENOUS; SUBCUTANEOUS at 21:49

## 2021-08-23 RX ADMIN — AMLODIPINE BESYLATE 10 MILLIGRAM(S): 2.5 TABLET ORAL at 05:40

## 2021-08-23 RX ADMIN — PIPERACILLIN AND TAZOBACTAM 25 GRAM(S): 4; .5 INJECTION, POWDER, LYOPHILIZED, FOR SOLUTION INTRAVENOUS at 11:53

## 2021-08-23 RX ADMIN — SODIUM CHLORIDE 1 GRAM(S): 9 INJECTION INTRAMUSCULAR; INTRAVENOUS; SUBCUTANEOUS at 13:16

## 2021-08-23 RX ADMIN — Medication 650 MILLIGRAM(S): at 21:49

## 2021-08-23 RX ADMIN — BUDESONIDE AND FORMOTEROL FUMARATE DIHYDRATE 2 PUFF(S): 160; 4.5 AEROSOL RESPIRATORY (INHALATION) at 21:50

## 2021-08-23 RX ADMIN — Medication 650 MILLIGRAM(S): at 13:22

## 2021-08-23 RX ADMIN — Medication 1 TABLET(S): at 11:54

## 2021-08-23 RX ADMIN — BUDESONIDE AND FORMOTEROL FUMARATE DIHYDRATE 2 PUFF(S): 160; 4.5 AEROSOL RESPIRATORY (INHALATION) at 11:53

## 2021-08-23 NOTE — PROGRESS NOTE ADULT - SUBJECTIVE AND OBJECTIVE BOX
Name of Patient : MD QURESHI  MRN: [id number]  Date of visit: 08-23-21      Subjective: Patient seen and examined. No new events except as noted.   Doing okay     REVIEW OF SYSTEMS:    CONSTITUTIONAL: No weakness, fevers or chills  EYES/ENT: No visual changes;  No vertigo or throat pain   NECK: No pain or stiffness  RESPIRATORY: No cough, wheezing, hemoptysis; No shortness of breath  CARDIOVASCULAR: No chest pain or palpitations  GASTROINTESTINAL: No abdominal or epigastric pain.   GENITOURINARY: No dysuria, frequency or hematuria  NEUROLOGICAL: No numbness or weakness  SKIN: No itching, burning, rashes, or lesions   All other review of systems is negative unless indicated above.    MEDICATIONS:  MEDICATIONS  (STANDING):  amLODIPine   Tablet 10 milliGRAM(s) Oral daily  atorvastatin 10 milliGRAM(s) Oral at bedtime  budesonide 160 MICROgram(s)/formoterol 4.5 MICROgram(s) Inhaler 2 Puff(s) Inhalation two times a day  cyanocobalamin 1000 MICROGram(s) Oral daily  finasteride 5 milliGRAM(s) Oral at bedtime  fluticasone propionate 50 MICROgram(s)/spray Nasal Spray 1 Spray(s) Both Nostrils two times a day  furosemide    Tablet 20 milliGRAM(s) Oral daily  heparin   Injectable 5000 Unit(s) SubCutaneous every 12 hours  metoprolol succinate ER 25 milliGRAM(s) Oral daily  MIRACLE MOUTHWASH 5 milliLiter(s) Swish and Spit four times a day  multivitamin 1 Tablet(s) Oral daily  pantoprazole    Tablet 40 milliGRAM(s) Oral before breakfast  piperacillin/tazobactam IVPB.. 3.375 Gram(s) IV Intermittent every 8 hours  polyethylene glycol 3350 17 Gram(s) Oral two times a day  sodium chloride 1 Gram(s) Oral three times a day  tamsulosin 0.4 milliGRAM(s) Oral at bedtime      PHYSICAL EXAM:  T(C): 37.7 (08-23-21 @ 20:39), Max: 37.7 (08-23-21 @ 20:39)  HR: 94 (08-23-21 @ 20:39) (90 - 94)  BP: 128/64 (08-23-21 @ 20:39) (128/64 - 140/62)  RR: 17 (08-23-21 @ 20:39) (17 - 17)  SpO2: 98% (08-23-21 @ 20:39) (98% - 100%)  Wt(kg): --  I&O's Summary    22 Aug 2021 07:01  -  23 Aug 2021 07:00  --------------------------------------------------------  IN: 0 mL / OUT: 1100 mL / NET: -1100 mL    23 Aug 2021 07:01  -  24 Aug 2021 00:20  --------------------------------------------------------  IN: 0 mL / OUT: 2050 mL / NET: -2050 mL          Appearance: Normal	  HEENT:  PERRLA   Lymphatic: No lymphadenopathy   Cardiovascular: Normal S1 S2, no JVD  Respiratory: normal effort , clear  Gastrointestinal:  Soft, Non-tender  Skin: No rashes,  warm to touch  Psychiatry:  Mood & affect appropriate  Musculuskeletal: No edema      All labs, Imaging and EKGs personally reviewed       08-22-21 @ 07:01  -  08-23-21 @ 07:00  --------------------------------------------------------  IN: 0 mL / OUT: 1100 mL / NET: -1100 mL    08-23-21 @ 07:01  -  08-24-21 @ 00:20  --------------------------------------------------------  IN: 0 mL / OUT: 2050 mL / NET: -2050 mL                          8.0    9.96  )-----------( 498      ( 23 Aug 2021 07:26 )             24.3               08-23    135  |  95<L>  |  25<H>  ----------------------------<  113<H>  3.8   |  21<L>  |  1.22    Ca    10.5      23 Aug 2021 07:26  Phos  3.4     08-23  Mg     2.00     08-23

## 2021-08-23 NOTE — DISCHARGE NOTE PROVIDER - CARE PROVIDER_API CALL
[facility],   [phone]  Phone: (   )    -  Fax: (   )    -  Follow Up Time:

## 2021-08-23 NOTE — PROGRESS NOTE ADULT - SUBJECTIVE AND OBJECTIVE BOX
[facility] [place] [facility]   MD LEONELA JULIO DO ANAM SIDDIQUI ANGELA WONG, PA    TEL:  OFFICE: [phone]  DR POST CELL: [phone]  DR. RAMOS CELL: [phone]  DR. MARTINEZ CELL: [phone]  LESVIA WILLOUGHBY CELL: [phone]    From 5pm-7am Answering Service [phone]    -- RENAL FOLLOW UP NOTE ---Date of Service 08-23-21 @ 15:10    Patient is a 75y old  Male who presents with a chief complaint of Left hip and groin pain (22 Aug 2021 12:10)      Patient seen and examined at bedside. No chest pain/sob    VITALS:  T(F): 97.9 (08-23-21 @ 05:38), Max: 98.9 (08-22-21 @ 20:47)  HR: 90 (08-23-21 @ 05:38)  BP: 140/62 (08-23-21 @ 05:38)  RR: 17 (08-23-21 @ 05:38)  SpO2: 100% (08-23-21 @ 05:38)  Wt(kg): --    08-22 @ 07:01  -  08-23 @ 07:00  --------------------------------------------------------  IN: 0 mL / OUT: 1100 mL / NET: -1100 mL    08-23 @ 07:01  -  08-23 @ 15:10  --------------------------------------------------------  IN: 0 mL / OUT: 800 mL / NET: -800 mL          PHYSICAL EXAM:  Constitutional: NAD  Neck: No JVD  Respiratory: CTAB, no wheezes, rales or rhonchi  Cardiovascular: S1, S2, RRR  Gastrointestinal: BS+, soft, NT/ND  Extremities: No peripheral edema    Hospital Medications:   MEDICATIONS  (STANDING):  amLODIPine   Tablet 10 milliGRAM(s) Oral daily  atorvastatin 10 milliGRAM(s) Oral at bedtime  budesonide 160 MICROgram(s)/formoterol 4.5 MICROgram(s) Inhaler 2 Puff(s) Inhalation two times a day  cyanocobalamin 1000 MICROGram(s) Oral daily  finasteride 5 milliGRAM(s) Oral at bedtime  fluticasone propionate 50 MICROgram(s)/spray Nasal Spray 1 Spray(s) Both Nostrils two times a day  furosemide    Tablet 20 milliGRAM(s) Oral daily  heparin   Injectable 5000 Unit(s) SubCutaneous every 12 hours  metoprolol succinate ER 25 milliGRAM(s) Oral daily  MIRACLE MOUTHWASH 5 milliLiter(s) Swish and Spit four times a day  multivitamin 1 Tablet(s) Oral daily  pantoprazole    Tablet 40 milliGRAM(s) Oral before breakfast  piperacillin/tazobactam IVPB.. 3.375 Gram(s) IV Intermittent every 8 hours  polyethylene glycol 3350 17 Gram(s) Oral two times a day  sodium chloride 1 Gram(s) Oral three times a day  tamsulosin 0.4 milliGRAM(s) Oral at bedtime      LABS:  08-23    135  |  95<L>  |  25<H>  ----------------------------<  113<H>  3.8   |  21<L>  |  1.22    Ca    10.5      23 Aug 2021 07:26  Phos  3.4     08-23  Mg     2.00     08-23      Creatinine Trend: 1.22 <--, 1.31 <--, 1.30 <--, 1.22 <--, 1.23 <--, 1.35 <--, 1.30 <--    Phosphorus Level, Serum: 3.4 mg/dL (08-23 @ 07:26)                              8.0    9.96  )-----------( 498      ( 23 Aug 2021 07:26 )             24.3     Urine Studies:  Urinalysis - [08-19-21 @ 00:42]      Color Yellow / Appearance Slightly Turbid / SG 1.025 / pH 6.5      Gluc Negative / Ketone Negative  / Bili Negative / Urobili <2 mg/dL       Blood Moderate / Protein 100 mg/dL / Leuk Est Large / Nitrite Negative      RBC >50 / WBC >50 / Hyaline 2 / Gran  / Sq Epi  / Non Sq Epi 10 / Bacteria Moderate    Urine Sodium 74      [08-21-21 @ 14:14]  Urine Osmolality 419      [08-21-21 @ 14:14]    Iron 22, TIBC 227, %sat 10      [08-14-21 @ 06:25]  Ferritin 302      [08-14-21 @ 06:25]  PTH -- (Ca --)      [08-17-21 @ 06:55]   16  Vitamin D (25OH) 37.4      [08-17-21 @ 13:37]  HbA1c 6.3      [09-13-17 @ 08:15]  TSH 2.38      [08-14-21 @ 06:25]  Lipid: chol 148, , HDL 41, LDL --      [08-14-21 @ 06:25]        RADIOLOGY & ADDITIONAL STUDIES:

## 2021-08-23 NOTE — DISCHARGE NOTE PROVIDER - HOSPITAL COURSE
75 year old male w/ PMHx of bladder cancer, left kidney cancer s/p nephrectomy, HTN, CKD, hyponatremia, hypercholesterolemia, and BPH, presents to [facility] ED with left hip and groin pain. Patient has history of bladder cancer with bone metastasis. Patient was sent to [facility] by his oncologist Dr. Holley for a MRI and CT of his spine suggestive of metastasis cancer. Patient states he has dull pain throughout his body but it is most severe sharp pain at his left hip and left groin. Pain is constant, worsens with ambulation, no association of symptoms, no radiation, not relieved by Oxycodone. Patient states pain is relieved by Tylenol extra strength on occasion.  He also complains of lower back, left knee, and ankle pain. Patient is not able to ambulate for an extended period of time due to his pain. Patient is having tingling and pain throughout his lower extremities that has been present for about a year when he was diagnosed with cancer. Patient denies any fever, chills, dizziness, HA, chest pain, SOB, incontinence, saddle anesthesia, difficulty with bowel movements, and abdominal pain.    Hospital Course: 75 year old male w/ PMHx of bladder cancer, left kidney cancer s/p nephrectomy, HTN, CKD, hyponatremia, hypercholesterolemia, and BPH, presents to [facility] ED with left hip and groin pain. Patient has history of bladder cancer with bone metastasis. Patient was sent to [facility] by his oncologist Dr. Holley for a MRI and CT of his spine suggestive of metastasis cancer. Patient states he has dull pain throughout his body but it is most severe sharp pain at his left hip and left groin. Pain is constant, worsens with ambulation, no association of symptoms, no radiation, not relieved by Oxycodone. Patient states pain is relieved by Tylenol extra strength on occasion.  He also complains of lower back, left knee, and ankle pain. Patient is not able to ambulate for an extended period of time due to his pain. Patient is having tingling and pain throughout his lower extremities that has been present for about a year when he was diagnosed with cancer. Patient denies any fever, chills, dizziness, HA, chest pain, SOB, incontinence, saddle anesthesia, difficulty with bowel movements, and abdominal pain.    Hospital Course:    Bladder cancer metastasized to bone  - Patient referred to [facility] ED by Dr. Holley (Oncologist) for CT and MRI to determine if cancer metastasized to spine  - CT lumbar spine shows worsening lytic osseous metastatic disease --> Awaiting MRI cervical and thoracic spine.  - CT pelvis: mets, including left pubic body lesion associated w/ soft tissue mass, pathological fx of left pubic body, lef acetabular wall lesion and path fx\  -CT thoracic: Multiple cervical thoracic osseous metastases with a extra osseous extension within the thoracic spine. Multiple rib metastases  - Xray L knee: neg for fx or dislocation   - xray femur/pelvis: no acute fracture or dislocation. Degenerative changes of the spine. Possible compression deformity of L5.  - CTH: negative   - Oncology consulted- rec hospice   - Neurology consulted  - Vitamin [id number], TSH 2.38, folate 19.2, MMA, homocysteine, , and .1. Homocysteine 11.8  - PT/OT evaluation: home   - Fall risk protocol.   -MRI C/T/L spine--Extensive bony metastatic disease as described. Stable pathologic fracture at L1.  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 There is tumor involvement of the right T4-T5 neural foramen which contributes to foraminal narrowing. No epidural tumor involvement.  -Rad/on eval--> s/p 1 fx left pelvis, 1 fx T - spine.       Hypercalcemia  -Trend Ca+  - Vit D 9, PTH 16  - s/p IVF     Cancer associated pain.    - Patient takes Acetaminophen 650 mg  at home. Given 975 mg in ED and tolerated well.  - Ordered Acetaminophen 650 mg Q 6 hrs for pain  - Neuro checks q6h  - PT/OT: home   - Fall risk protocol.     Chronic kidney disease, unspecified CKD stage.    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 urine osmo low)  - free water restriction <1L/day. pt educated  - Continue na tab 1g tid  - urea 15 bid x4 dose started 8/26    Anemia  -Anemia work up last done on 7/2019 (iron studies, B12, Folate, retic count,)   -Pt currently asymptomatic and hemodynamically stable.     L.Pelvic Ramus fx  -Rad/on eval--> s/p 1 fx left pelvis, 1 fx T - spine.     Hypertension  - Continue home meds, furosemide 20mg qd and metoprolol succinate 25mg qd  - DASH diet  - monitor BP & titrate BP meds PRN.    BPH   - c/w Proscar & Flomax.     Fever  -S/p IV zosyn treated for uti/pna   - blood cx Neg, urine cx acinetobacter  50-99K      Hospice eval: Home with hospice.     On 8/26/2021, discussed with Dr. Samuels, patient is medically cleared and optimized for discharge today home with hospice.

## 2021-08-23 NOTE — DISCHARGE NOTE PROVIDER - DETAILS OF MALNUTRITION DIAGNOSIS/DIAGNOSES
This patient has been assessed with a concern for Malnutrition and was treated during this hospitalization for the following Nutrition diagnosis/diagnoses:     -  08/14/2021: Severe protein-calorie malnutrition

## 2021-08-23 NOTE — CHART NOTE - NSCHARTNOTEFT_GEN_A_CORE
Plan is for single fraction RT:  left pelvis, T - spine.   After RT is completed, planned for home hospice.  PT being managed and reports pain control with Tylenol 650mg PO q6h PRN, refusing Oxycodone   c/w Tylenol 650mg PO q6h PRN   Palliative care will sign off at this time. Please reconsult as needed.

## 2021-08-23 NOTE — DISCHARGE NOTE PROVIDER - NSDCMRMEDTOKEN_GEN_ALL_CORE_FT
amLODIPine 10 mg oral tablet: 1 tab(s) orally once a day  Breztri Aerosphere inhalation aerosol: 2 puff(s) inhaled 2 times a day  finasteride 5 mg oral tablet: 1 tab(s) orally once a day (at bedtime)  fluticasone 50 mcg/inh nasal spray: 1 spray(s) nasal once a day  furosemide 20 mg oral tablet: 1 tab(s) orally once a day-morning  metoprolol succinate 25 mg oral tablet, extended release: 1 tab(s) orally once a day-morning  Multiple Vitamins oral tablet: 1 tab(s) orally once a day-last dose 08/05/21  omeprazole 40 mg oral delayed release capsule: 1 cap(s) orally once a day  pravastatin 40 mg oral tablet: 1 tab(s) orally once a day  Sodium Chloride 1 g oral tablet: 1 tab(s) orally 3 times a day  tamsulosin 0.4 mg oral capsule: 1 cap(s) orally once a day (at bedtime)  Tylenol 325 mg oral tablet: 2 tab(s) orally 2 times a day  Vitamin B-12 100 mcg oral tablet: 1 tab(s) orally once a day   acetaminophen 325 mg oral tablet: 2 tab(s) orally every 6 hours, As needed, Temp greater or equal to 38.5C (101.3F), Mild Pain (1 - 3)  aluminum hydroxide-magnesium hydroxide 200 mg-200 mg/5 mL oral suspension: 30 milliliter(s) orally every 4 hours, As needed, Dyspepsia  amLODIPine 10 mg oral tablet: 1 tab(s) orally once a day, hold for SBP &lt;100  Breztri Aerosphere inhalation aerosol: 2 puff(s) inhaled 2 times a day  cyanocobalamin 1000 mcg oral tablet: 1 tab(s) orally once a day  diphenhydramine/lidocaine/aluminum hydroxide/magnesium hydroxide/simethicone mucous membrane suspension: 5 milliliter(s) mucous membrane 4 times a day   finasteride 5 mg oral tablet: 1 tab(s) orally once a day (at bedtime)  fluticasone 50 mcg/inh nasal spray: 1 spray(s) nasal 2 times a day  furosemide 20 mg oral tablet: 1 tab(s) orally once a day  metoprolol succinate 25 mg oral tablet, extended release: 1 tab(s) orally once a day, hold for SBP &lt;100 or HR &lt;60  Multiple Vitamins oral tablet: 1 tab(s) orally once a day  pantoprazole 40 mg oral delayed release tablet: 1 tab(s) orally once a day (before a meal)  polyethylene glycol 3350 oral powder for reconstitution: 17 gram(s) orally 2 times a day, hold for loose stools  pravastatin 40 mg oral tablet: 1 tab(s) orally once a day  prochlorperazine 5 mg oral tablet: 1 tab(s) orally every 8 hours, As Needed for nausea/vomiting   sodium chloride 1 g oral tablet: 1 tab(s) orally 3 times a day  tamsulosin 0.4 mg oral capsule: 1 cap(s) orally once a day (at bedtime)  urea 15 g oral powder for reconstitution: 1 packet(s) orally 2 times a day

## 2021-08-23 NOTE — CHART NOTE - NSCHARTNOTEFT_GEN_A_CORE
Source: Patient [X]    Family [ ]     other [X] electronic chart, RN    Diet : Diet, Regular:   1000mL Fluid Restriction ([id number])  Nut Restricted  Soy Restricted  Gluten-Gliadin Restricted  No Chocolate  No Pork   Tube Feed Start Time: 18:00  Supplement Feeding Modality:  Oral  Ensure Compact Cans or Servings Per Day:  1       Frequency:  Three Times a day (08-14-21 @ 11:45)    Nutrition Follow-up Note, severe malnutrition. Patient is Latvian Speaking, RD able to communicate with patient in preferred language. Patient states that last 3 days appetite and PO intake diminished but feeling better today. He ate most of breakfast provided. This was confirmed with nursing flowsheet documentation of 90% po intake.         Current Weight:       Pertinent Medications: amLODIPine   Tablet  atorvastatin  budesonide 160 MICROgram(s)/formoterol 4.5 MICROgram(s) Inhaler  cyanocobalamin  finasteride  furosemide    Tablet  metoprolol succinate ER  multivitamin  pantoprazole    Tablet  polyethylene glycol 3350  sodium chloride  tamsulosin    Pertinent Labs:  08-23 Na135 mmol/L Glu 113 mg/dL<H> K+ 3.8 mmol/L Cr  1.22 mg/dL BUN 25 mg/dL<H> 08-23 Phos 3.4 mg/dL 08-19 Alb 3.2 g/dL<L> 08-14 Chol 148 mg/dL LDL --    HDL 41 mg/dL Trig 146 mg/dL      Skin:     Estimated Needs:   [ ] no change since previous assessment  [ ] recalculated:       Previous Nutrition Diagnosis:     [ ] Inadequate Energy Intake [ ]Inadequate Oral Intake [ ] Excessive Energy Intake     [ ] Underweight [ ] Increased Nutrient Needs [ ] Overweight/Obesity     [ ] Altered GI Function [ ] Unintended Weight Loss [ ] Food & Nutrition Related Knowledge Deficit [ ] Malnutrition      Nutrition Diagnosis is [ ] ongoing  [ ] resolved [ ] not applicable          Additional Recommendations: Source: Patient [X]    Family [ ]     other [X] electronic chart, RN    Diet : Diet, Regular:   1000mL Fluid Restriction ([id number])  Nut Restricted  Soy Restricted  Gluten-Gliadin Restricted  No Chocolate  No Pork   Tube Feed Start Time: 18:00  Supplement Feeding Modality:  Oral  Ensure Compact Cans or Servings Per Day:  1       Frequency:  Three Times a day (08-14-21 @ 11:45)    Nutrition Follow-up Note, severe malnutrition. Patient is Danish Speaking, RD able to communicate with patient in preferred language. Patient states that last 3 days appetite and PO intake diminished but feeling better today. He ate most of breakfast provided. This was confirmed with nursing flowsheet documentation of 90% po intake. Also consuming food brought from home by his wife. Does take Ensure Compact x 2 per day. Denies any nausea/vomiting/diarrhea/constipation or difficulty chewing and swallowing reported. Continue encouragement of good po intake of nutrient and protein dense foods provided. RD remains available, patient made aware.     Weight: 66.5kg (8/13)   No new weight recorded.       Pertinent Medications: amLODIPine   Tablet  atorvastatin  budesonide 160 MICROgram(s)/formoterol 4.5 MICROgram(s) Inhaler  cyanocobalamin  finasteride  furosemide    Tablet  metoprolol succinate ER  multivitamin  pantoprazole    Tablet  polyethylene glycol 3350  sodium chloride  tamsulosin    Pertinent Labs:  08-23 Na135 mmol/L Glu 113 mg/dL<H> K+ 3.8 mmol/L Cr  1.22 mg/dL BUN 25 mg/dL<H> 08-23 Phos 3.4 mg/dL 08-19 Alb 3.2 g/dL<L> 08-14 Chol 148 mg/dL LDL --    HDL 41 mg/dL Trig 146 mg/dL      Skin: intact.    No edema noted.    Estimated Needs:   [ X] no change since previous assessment  [ ] recalculated:       Previous Nutrition Diagnosis:   [ X] Malnutrition, Severe     Nutrition Diagnosis is [X] ongoing  [ ] resolved [ ] not applicable    Additional Recommendations:    1. Continue current diet order, which remains appropriate at this time.   2. Monitor weights, labs, BM's, skin integrity, p.o. intake.   3. Please Encourage po intake, assist with meals and menu selections, provide alternatives PRN.   4. Honor food and beverage preferences within diet restriction of patient in an effort to maximize level of nutrient intake.

## 2021-08-23 NOTE — DISCHARGE NOTE PROVIDER - NSDCCPCAREPLAN_GEN_ALL_CORE_FT
PRINCIPAL DISCHARGE DIAGNOSIS  Diagnosis: Cancer associated pain  Assessment and Plan of Treatment: You received 1 session of radiation for pain while hospitalized.      SECONDARY DISCHARGE DIAGNOSES  Diagnosis: Bladder cancer  Assessment and Plan of Treatment: You are being discharged home with Hospice services and it is recommended to focus on comfort measures and supportive care. Continue with medications prescribed for pain and other symptom control. If you have questions or concerns you may contact the Hospice service line by calling [phone].       PRINCIPAL DISCHARGE DIAGNOSIS  Diagnosis: Cancer associated pain  Assessment and Plan of Treatment: You received 2 sessions of radiation for pain while hospitalized.      SECONDARY DISCHARGE DIAGNOSES  Diagnosis: Chronic hyponatremia  Assessment and Plan of Treatment: continue salt tablets and urea as directed.    Diagnosis: Bladder cancer  Assessment and Plan of Treatment: You are being discharged home with Hospice services and it is recommended to focus on comfort measures and supportive care. Continue with medications prescribed for pain and other symptom control. If you have questions or concerns you may contact the Hospice service line by calling [phone].      Diagnosis: Fever  Assessment and Plan of Treatment: Completed IV antbiotics in the hospital. Monitor for any further signs and symptoms of further infection including but not limited to fevers, rigors, chills and or difficulty breathing.

## 2021-08-23 NOTE — CHART NOTE - NSCHARTNOTEFT_GEN_A_CORE
Plan is for single fraction RT:    left pelvis, T - spine.     After RT is completed, planned for home hospice.    Case d/w Dr. Tripathi, and also discussed at rounds this morning.

## 2021-08-23 NOTE — CHART NOTE - NSCHARTNOTESELECT_GEN_ALL_CORE
Event Note
Radonc/Event Note
ACP/Event Note
Event Note
Nutrition Follow-up Note-Registered Dietitian/Nutrition Services
Palliative Care/Event Note
Palliative Care/Off Service Note
rad onc/Event Note

## 2021-08-24 LAB
ANION GAP SERPL CALC-SCNC: 14 MMOL/L — SIGNIFICANT CHANGE UP (ref 7–14)
BASOPHILS # BLD AUTO: 0.02 K/UL — SIGNIFICANT CHANGE UP (ref 0–0.2)
BASOPHILS NFR BLD AUTO: 0.2 % — SIGNIFICANT CHANGE UP (ref 0–2)
BUN SERPL-MCNC: 20 MG/DL — SIGNIFICANT CHANGE UP (ref 7–23)
CALCIUM SERPL-MCNC: 10.8 MG/DL — HIGH (ref 8.4–10.5)
CHLORIDE SERPL-SCNC: 94 MMOL/L — LOW (ref 98–107)
CO2 SERPL-SCNC: 24 MMOL/L — SIGNIFICANT CHANGE UP (ref 22–31)
CREAT SERPL-MCNC: 1.43 MG/DL — HIGH (ref 0.5–1.3)
CULTURE RESULTS: SIGNIFICANT CHANGE UP
CULTURE RESULTS: SIGNIFICANT CHANGE UP
EOSINOPHIL # BLD AUTO: 0.18 K/UL — SIGNIFICANT CHANGE UP (ref 0–0.5)
EOSINOPHIL NFR BLD AUTO: 1.6 % — SIGNIFICANT CHANGE UP (ref 0–6)
GLUCOSE SERPL-MCNC: 124 MG/DL — HIGH (ref 70–99)
HCT VFR BLD CALC: 26.7 % — LOW (ref 39–50)
HGB BLD-MCNC: 8.8 G/DL — LOW (ref 13–17)
IANC: 9.34 K/UL — HIGH (ref 1.5–8.5)
IMM GRANULOCYTES NFR BLD AUTO: 0.7 % — SIGNIFICANT CHANGE UP (ref 0–1.5)
LYMPHOCYTES # BLD AUTO: 0.68 K/UL — LOW (ref 1–3.3)
LYMPHOCYTES # BLD AUTO: 6.1 % — LOW (ref 13–44)
MAGNESIUM SERPL-MCNC: 2 MG/DL — SIGNIFICANT CHANGE UP (ref 1.6–2.6)
MCHC RBC-ENTMCNC: 28.4 PG — SIGNIFICANT CHANGE UP (ref 27–34)
MCHC RBC-ENTMCNC: 33 GM/DL — SIGNIFICANT CHANGE UP (ref 32–36)
MCV RBC AUTO: 86.1 FL — SIGNIFICANT CHANGE UP (ref 80–100)
MONOCYTES # BLD AUTO: 0.83 K/UL — SIGNIFICANT CHANGE UP (ref 0–0.9)
MONOCYTES NFR BLD AUTO: 7.5 % — SIGNIFICANT CHANGE UP (ref 2–14)
NEUTROPHILS # BLD AUTO: 9.34 K/UL — HIGH (ref 1.8–7.4)
NEUTROPHILS NFR BLD AUTO: 83.9 % — HIGH (ref 43–77)
NRBC # BLD: 0 /100 WBCS — SIGNIFICANT CHANGE UP
NRBC # FLD: 0 K/UL — SIGNIFICANT CHANGE UP
PHOSPHATE SERPL-MCNC: 3.7 MG/DL — SIGNIFICANT CHANGE UP (ref 2.5–4.5)
PLATELET # BLD AUTO: 559 K/UL — HIGH (ref 150–400)
POTASSIUM SERPL-MCNC: 4.1 MMOL/L — SIGNIFICANT CHANGE UP (ref 3.5–5.3)
POTASSIUM SERPL-SCNC: 4.1 MMOL/L — SIGNIFICANT CHANGE UP (ref 3.5–5.3)
RBC # BLD: 3.1 M/UL — LOW (ref 4.2–5.8)
RBC # FLD: 14.1 % — SIGNIFICANT CHANGE UP (ref 10.3–14.5)
SODIUM SERPL-SCNC: 132 MMOL/L — LOW (ref 135–145)
SPECIMEN SOURCE: SIGNIFICANT CHANGE UP
SPECIMEN SOURCE: SIGNIFICANT CHANGE UP
WBC # BLD: 11.13 K/UL — HIGH (ref 3.8–10.5)
WBC # FLD AUTO: 11.13 K/UL — HIGH (ref 3.8–10.5)

## 2021-08-24 PROCEDURE — 77334 RADIATION TREATMENT AID(S): CPT | Mod: 26

## 2021-08-24 PROCEDURE — 77290 THER RAD SIMULAJ FIELD CPLX: CPT | Mod: 26

## 2021-08-24 RX ORDER — SODIUM CHLORIDE 9 MG/ML
1000 INJECTION INTRAMUSCULAR; INTRAVENOUS; SUBCUTANEOUS
Refills: 0 | Status: DISCONTINUED | OUTPATIENT
Start: 2021-08-24 | End: 2021-08-26

## 2021-08-24 RX ORDER — SODIUM CHLORIDE 9 MG/ML
1000 INJECTION INTRAMUSCULAR; INTRAVENOUS; SUBCUTANEOUS
Refills: 0 | Status: DISCONTINUED | OUTPATIENT
Start: 2021-08-24 | End: 2021-08-24

## 2021-08-24 RX ADMIN — AMLODIPINE BESYLATE 10 MILLIGRAM(S): 2.5 TABLET ORAL at 06:55

## 2021-08-24 RX ADMIN — BUDESONIDE AND FORMOTEROL FUMARATE DIHYDRATE 2 PUFF(S): 160; 4.5 AEROSOL RESPIRATORY (INHALATION) at 22:10

## 2021-08-24 RX ADMIN — Medication 650 MILLIGRAM(S): at 07:50

## 2021-08-24 RX ADMIN — Medication 650 MILLIGRAM(S): at 23:10

## 2021-08-24 RX ADMIN — Medication 650 MILLIGRAM(S): at 06:56

## 2021-08-24 RX ADMIN — Medication 1 SPRAY(S): at 19:05

## 2021-08-24 RX ADMIN — POLYETHYLENE GLYCOL 3350 17 GRAM(S): 17 POWDER, FOR SOLUTION ORAL at 06:57

## 2021-08-24 RX ADMIN — BUDESONIDE AND FORMOTEROL FUMARATE DIHYDRATE 2 PUFF(S): 160; 4.5 AEROSOL RESPIRATORY (INHALATION) at 13:15

## 2021-08-24 RX ADMIN — TAMSULOSIN HYDROCHLORIDE 0.4 MILLIGRAM(S): 0.4 CAPSULE ORAL at 22:09

## 2021-08-24 RX ADMIN — Medication 650 MILLIGRAM(S): at 14:14

## 2021-08-24 RX ADMIN — PIPERACILLIN AND TAZOBACTAM 25 GRAM(S): 4; .5 INJECTION, POWDER, LYOPHILIZED, FOR SOLUTION INTRAVENOUS at 01:10

## 2021-08-24 RX ADMIN — Medication 1 TABLET(S): at 13:16

## 2021-08-24 RX ADMIN — DIPHENHYDRAMINE HYDROCHLORIDE AND LIDOCAINE HYDROCHLORIDE AND ALUMINUM HYDROXIDE AND MAGNESIUM HYDRO 5 MILLILITER(S): KIT at 02:24

## 2021-08-24 RX ADMIN — PANTOPRAZOLE SODIUM 40 MILLIGRAM(S): 20 TABLET, DELAYED RELEASE ORAL at 06:55

## 2021-08-24 RX ADMIN — PIPERACILLIN AND TAZOBACTAM 25 GRAM(S): 4; .5 INJECTION, POWDER, LYOPHILIZED, FOR SOLUTION INTRAVENOUS at 13:15

## 2021-08-24 RX ADMIN — SODIUM CHLORIDE 1 GRAM(S): 9 INJECTION INTRAMUSCULAR; INTRAVENOUS; SUBCUTANEOUS at 13:16

## 2021-08-24 RX ADMIN — HEPARIN SODIUM 5000 UNIT(S): 5000 INJECTION INTRAVENOUS; SUBCUTANEOUS at 19:05

## 2021-08-24 RX ADMIN — Medication 650 MILLIGRAM(S): at 13:14

## 2021-08-24 RX ADMIN — ATORVASTATIN CALCIUM 10 MILLIGRAM(S): 80 TABLET, FILM COATED ORAL at 22:09

## 2021-08-24 RX ADMIN — SODIUM CHLORIDE 1 GRAM(S): 9 INJECTION INTRAMUSCULAR; INTRAVENOUS; SUBCUTANEOUS at 22:09

## 2021-08-24 RX ADMIN — Medication 650 MILLIGRAM(S): at 22:10

## 2021-08-24 RX ADMIN — HEPARIN SODIUM 5000 UNIT(S): 5000 INJECTION INTRAVENOUS; SUBCUTANEOUS at 06:56

## 2021-08-24 RX ADMIN — SODIUM CHLORIDE 75 MILLILITER(S): 9 INJECTION INTRAMUSCULAR; INTRAVENOUS; SUBCUTANEOUS at 22:10

## 2021-08-24 RX ADMIN — PREGABALIN 1000 MICROGRAM(S): 225 CAPSULE ORAL at 13:16

## 2021-08-24 RX ADMIN — FINASTERIDE 5 MILLIGRAM(S): 5 TABLET, FILM COATED ORAL at 22:10

## 2021-08-24 RX ADMIN — SODIUM CHLORIDE 1 GRAM(S): 9 INJECTION INTRAMUSCULAR; INTRAVENOUS; SUBCUTANEOUS at 06:55

## 2021-08-24 RX ADMIN — Medication 1 SPRAY(S): at 06:56

## 2021-08-24 RX ADMIN — PIPERACILLIN AND TAZOBACTAM 25 GRAM(S): 4; .5 INJECTION, POWDER, LYOPHILIZED, FOR SOLUTION INTRAVENOUS at 19:04

## 2021-08-24 RX ADMIN — Medication 20 MILLIGRAM(S): at 06:55

## 2021-08-24 RX ADMIN — Medication 25 MILLIGRAM(S): at 06:55

## 2021-08-24 NOTE — PROGRESS NOTE ADULT - SUBJECTIVE AND OBJECTIVE BOX
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 No new events except as noted.   radiation per team, palliative       MEDICATIONS:  MEDICATIONS  (STANDING):  amLODIPine   Tablet 10 milliGRAM(s) Oral daily  atorvastatin 10 milliGRAM(s) Oral at bedtime  budesonide 160 MICROgram(s)/formoterol 4.5 MICROgram(s) Inhaler 2 Puff(s) Inhalation two times a day  cyanocobalamin 1000 MICROGram(s) Oral daily  finasteride 5 milliGRAM(s) Oral at bedtime  fluticasone propionate 50 MICROgram(s)/spray Nasal Spray 1 Spray(s) Both Nostrils two times a day  furosemide    Tablet 20 milliGRAM(s) Oral daily  heparin   Injectable 5000 Unit(s) SubCutaneous every 12 hours  metoprolol succinate ER 25 milliGRAM(s) Oral daily  MIRACLE MOUTHWASH 5 milliLiter(s) Swish and Spit four times a day  multivitamin 1 Tablet(s) Oral daily  pantoprazole    Tablet 40 milliGRAM(s) Oral before breakfast  piperacillin/tazobactam IVPB.. 3.375 Gram(s) IV Intermittent every 8 hours  polyethylene glycol 3350 17 Gram(s) Oral two times a day  sodium chloride 1 Gram(s) Oral three times a day  sodium chloride 0.9%. 1000 milliLiter(s) (75 mL/Hr) IV Continuous <Continuous>  tamsulosin 0.4 milliGRAM(s) Oral at bedtime      PHYSICAL EXAM:  T(C): 37.1 (08-24-21 @ 13:20), Max: 37.1 (08-24-21 @ 06:48)  HR: 91 (08-24-21 @ 13:20) (91 - 99)  BP: 141/51 (08-24-21 @ 13:20) (134/57 - 141/51)  RR: 18 (08-24-21 @ 06:48) (18 - 18)  SpO2: 97% (08-24-21 @ 13:20) (97% - 99%)  Wt(kg): --  I&O's Summary    23 Aug 2021 07:01  -  24 Aug 2021 07:00  --------------------------------------------------------  IN: 0 mL / OUT: 2425 mL / NET: -2425 mL    24 Aug 2021 07:01  -  24 Aug 2021 23:05  --------------------------------------------------------  IN: 0 mL / OUT: 1800 mL / NET: -1800 mL          Appearance: Normal	  HEENT:  PERRLA   Lymphatic: No lymphadenopathy   Cardiovascular: Normal S1 S2, no JVD  Respiratory: normal effort , clear  Gastrointestinal:  Soft, Non-tender  Skin: No rashes,  warm to touch  Psychiatry:  Mood & affect appropriate  Musculuskeletal: No edema      All labs, Imaging and EKGs personally reviewed     08-23-21 @ 07:01  -  08-24-21 @ 07:00  --------------------------------------------------------  IN: 0 mL / OUT: 2425 mL / NET: -2425 mL    08-24-21 @ 07:01  -  08-24-21 @ 23:05  --------------------------------------------------------  IN: 0 mL / OUT: 1800 mL / NET: -1800 mL                          8.8    11.13 )-----------( 559      ( 24 Aug 2021 06:55 )             26.7               08-24    132<L>  |  94<L>  |  20  ----------------------------<  124<H>  4.1   |  24  |  1.43<H>    Ca    10.8<H>      24 Aug 2021 06:55  Phos  3.7     08-24  Mg     2.00     08-24

## 2021-08-24 NOTE — PROGRESS NOTE ADULT - SUBJECTIVE AND OBJECTIVE BOX
[facility]   MD LEONELA JULIO DO ANAM SIDDIQUI ANGELA WONG, PA    TEL:  OFFICE: [phone]  DR POST CELL: [phone]  DR. RAMOS CELL: [phone]  DR. MARTINEZ CELL: [phone]  LESVIA WILLOUGHBY CELL: [phone]    From 5pm-7am Answering Service [phone]    -- RENAL FOLLOW UP NOTE ---Date of Service 08-24-21 @ 14:00    Patient is a 75y old  Male who presents with a chief complaint of Left hip and groin pain (23 Aug 2021 17:12)      Patient seen and examined at bedside. No chest pain/sob. feeling weak    VITALS:  T(F): 98.8 (08-24-21 @ 13:20), Max: 99.9 (08-23-21 @ 20:39)  HR: 91 (08-24-21 @ 13:20)  BP: 141/51 (08-24-21 @ 13:20)  RR: 18 (08-24-21 @ 06:48)  SpO2: 97% (08-24-21 @ 13:20)  Wt(kg): --    08-23 @ 07:01  -  08-24 @ 07:00  --------------------------------------------------------  IN: 0 mL / OUT: 2425 mL / NET: -2425 mL          PHYSICAL EXAM:  Constitutional: NAD  Neck: No JVD  Respiratory: CTAB, no wheezes, rales or rhonchi  Cardiovascular: S1, S2, RRR  Gastrointestinal: BS+, soft, NT/ND  Extremities: No peripheral edema    Hospital Medications:   MEDICATIONS  (STANDING):  amLODIPine   Tablet 10 milliGRAM(s) Oral daily  atorvastatin 10 milliGRAM(s) Oral at bedtime  budesonide 160 MICROgram(s)/formoterol 4.5 MICROgram(s) Inhaler 2 Puff(s) Inhalation two times a day  cyanocobalamin 1000 MICROGram(s) Oral daily  finasteride 5 milliGRAM(s) Oral at bedtime  fluticasone propionate 50 MICROgram(s)/spray Nasal Spray 1 Spray(s) Both Nostrils two times a day  furosemide    Tablet 20 milliGRAM(s) Oral daily  heparin   Injectable 5000 Unit(s) SubCutaneous every 12 hours  metoprolol succinate ER 25 milliGRAM(s) Oral daily  MIRACLE MOUTHWASH 5 milliLiter(s) Swish and Spit four times a day  multivitamin 1 Tablet(s) Oral daily  pantoprazole    Tablet 40 milliGRAM(s) Oral before breakfast  piperacillin/tazobactam IVPB.. 3.375 Gram(s) IV Intermittent every 8 hours  polyethylene glycol 3350 17 Gram(s) Oral two times a day  sodium chloride 1 Gram(s) Oral three times a day  sodium chloride 0.9%. 1000 milliLiter(s) (75 mL/Hr) IV Continuous <Continuous>  tamsulosin 0.4 milliGRAM(s) Oral at bedtime      LABS:  08-24    132<L>  |  94<L>  |  20  ----------------------------<  124<H>  4.1   |  24  |  1.43<H>    Ca    10.8<H>      24 Aug 2021 06:55  Phos  3.7     08-24  Mg     2.00     08-24      Creatinine Trend: 1.43 <--, 1.22 <--, 1.31 <--, 1.30 <--, 1.22 <--, 1.23 <--, 1.35 <--    Phosphorus Level, Serum: 3.7 mg/dL (08-24 @ 06:55)                              8.8    11.13 )-----------( 559      ( 24 Aug 2021 06:55 )             26.7     Urine Studies:  Urinalysis - [08-19-21 @ 00:42]      Color Yellow / Appearance Slightly Turbid / SG 1.025 / pH 6.5      Gluc Negative / Ketone Negative  / Bili Negative / Urobili <2 mg/dL       Blood Moderate / Protein 100 mg/dL / Leuk Est Large / Nitrite Negative      RBC >50 / WBC >50 / Hyaline 2 / Gran  / Sq Epi  / Non Sq Epi 10 / Bacteria Moderate    Urine Sodium 74      [08-21-21 @ 14:14]  Urine Osmolality 419      [08-21-21 @ 14:14]    Iron 22, TIBC 227, %sat 10      [08-14-21 @ 06:25]  Ferritin 302      [08-14-21 @ 06:25]  PTH -- (Ca --)      [08-17-21 @ 06:55]   16  Vitamin D (25OH) 37.4      [08-17-21 @ 13:37]  HbA1c 6.3      [09-13-17 @ 08:15]  TSH 2.38      [08-14-21 @ 06:25]  Lipid: chol 148, , HDL 41, LDL --      [08-14-21 @ 06:25]        RADIOLOGY & ADDITIONAL STUDIES:

## 2021-08-25 LAB
ALBUMIN SERPL ELPH-MCNC: 3.1 G/DL — LOW (ref 3.3–5)
ALP SERPL-CCNC: 220 U/L — HIGH (ref 40–120)
ALT FLD-CCNC: 23 U/L — SIGNIFICANT CHANGE UP (ref 4–41)
ANION GAP SERPL CALC-SCNC: 18 MMOL/L — HIGH (ref 7–14)
AST SERPL-CCNC: 18 U/L — SIGNIFICANT CHANGE UP (ref 4–40)
BASOPHILS # BLD AUTO: 0.02 K/UL — SIGNIFICANT CHANGE UP (ref 0–0.2)
BASOPHILS NFR BLD AUTO: 0.2 % — SIGNIFICANT CHANGE UP (ref 0–2)
BILIRUB SERPL-MCNC: 0.3 MG/DL — SIGNIFICANT CHANGE UP (ref 0.2–1.2)
BUN SERPL-MCNC: 17 MG/DL — SIGNIFICANT CHANGE UP (ref 7–23)
CALCIUM SERPL-MCNC: 9 MG/DL — SIGNIFICANT CHANGE UP (ref 8.4–10.5)
CHLORIDE SERPL-SCNC: 87 MMOL/L — LOW (ref 98–107)
CO2 SERPL-SCNC: 21 MMOL/L — LOW (ref 22–31)
CREAT SERPL-MCNC: 1.21 MG/DL — SIGNIFICANT CHANGE UP (ref 0.5–1.3)
EOSINOPHIL # BLD AUTO: 0.13 K/UL — SIGNIFICANT CHANGE UP (ref 0–0.5)
EOSINOPHIL NFR BLD AUTO: 1.4 % — SIGNIFICANT CHANGE UP (ref 0–6)
GLUCOSE SERPL-MCNC: 396 MG/DL — HIGH (ref 70–99)
HCT VFR BLD CALC: 21.8 % — LOW (ref 39–50)
HGB BLD-MCNC: 7.1 G/DL — LOW (ref 13–17)
IANC: 7.53 K/UL — SIGNIFICANT CHANGE UP (ref 1.5–8.5)
IMM GRANULOCYTES NFR BLD AUTO: 1.1 % — SIGNIFICANT CHANGE UP (ref 0–1.5)
LYMPHOCYTES # BLD AUTO: 0.74 K/UL — LOW (ref 1–3.3)
LYMPHOCYTES # BLD AUTO: 8 % — LOW (ref 13–44)
MAGNESIUM SERPL-MCNC: 1.7 MG/DL — SIGNIFICANT CHANGE UP (ref 1.6–2.6)
MCHC RBC-ENTMCNC: 28 PG — SIGNIFICANT CHANGE UP (ref 27–34)
MCHC RBC-ENTMCNC: 32.6 GM/DL — SIGNIFICANT CHANGE UP (ref 32–36)
MCV RBC AUTO: 85.8 FL — SIGNIFICANT CHANGE UP (ref 80–100)
MONOCYTES # BLD AUTO: 0.71 K/UL — SIGNIFICANT CHANGE UP (ref 0–0.9)
MONOCYTES NFR BLD AUTO: 7.7 % — SIGNIFICANT CHANGE UP (ref 2–14)
NEUTROPHILS # BLD AUTO: 7.53 K/UL — HIGH (ref 1.8–7.4)
NEUTROPHILS NFR BLD AUTO: 81.6 % — HIGH (ref 43–77)
NRBC # BLD: 0 /100 WBCS — SIGNIFICANT CHANGE UP
NRBC # FLD: 0 K/UL — SIGNIFICANT CHANGE UP
PHOSPHATE SERPL-MCNC: 3 MG/DL — SIGNIFICANT CHANGE UP (ref 2.5–4.5)
PLATELET # BLD AUTO: 532 K/UL — HIGH (ref 150–400)
POTASSIUM SERPL-MCNC: 3 MMOL/L — LOW (ref 3.5–5.3)
POTASSIUM SERPL-SCNC: 3 MMOL/L — LOW (ref 3.5–5.3)
PROT SERPL-MCNC: 6.3 G/DL — SIGNIFICANT CHANGE UP (ref 6–8.3)
RBC # BLD: 2.54 M/UL — LOW (ref 4.2–5.8)
RBC # FLD: 14.2 % — SIGNIFICANT CHANGE UP (ref 10.3–14.5)
SODIUM SERPL-SCNC: 126 MMOL/L — LOW (ref 135–145)
WBC # BLD: 9.23 K/UL — SIGNIFICANT CHANGE UP (ref 3.8–10.5)
WBC # FLD AUTO: 9.23 K/UL — SIGNIFICANT CHANGE UP (ref 3.8–10.5)

## 2021-08-25 PROCEDURE — 77334 RADIATION TREATMENT AID(S): CPT | Mod: 26

## 2021-08-25 PROCEDURE — 77280 THER RAD SIMULAJ FIELD SMPL: CPT | Mod: 26

## 2021-08-25 PROCEDURE — 77307 TELETHX ISODOSE PLAN CPLX: CPT | Mod: 26

## 2021-08-25 RX ORDER — POTASSIUM CHLORIDE 20 MEQ
40 PACKET (EA) ORAL ONCE
Refills: 0 | Status: COMPLETED | OUTPATIENT
Start: 2021-08-25 | End: 2021-08-25

## 2021-08-25 RX ADMIN — Medication 40 MILLIEQUIVALENT(S): at 16:03

## 2021-08-25 RX ADMIN — TAMSULOSIN HYDROCHLORIDE 0.4 MILLIGRAM(S): 0.4 CAPSULE ORAL at 21:42

## 2021-08-25 RX ADMIN — BUDESONIDE AND FORMOTEROL FUMARATE DIHYDRATE 2 PUFF(S): 160; 4.5 AEROSOL RESPIRATORY (INHALATION) at 10:40

## 2021-08-25 RX ADMIN — POLYETHYLENE GLYCOL 3350 17 GRAM(S): 17 POWDER, FOR SOLUTION ORAL at 17:22

## 2021-08-25 RX ADMIN — PREGABALIN 1000 MICROGRAM(S): 225 CAPSULE ORAL at 12:13

## 2021-08-25 RX ADMIN — SODIUM CHLORIDE 1 GRAM(S): 9 INJECTION INTRAMUSCULAR; INTRAVENOUS; SUBCUTANEOUS at 06:10

## 2021-08-25 RX ADMIN — PIPERACILLIN AND TAZOBACTAM 25 GRAM(S): 4; .5 INJECTION, POWDER, LYOPHILIZED, FOR SOLUTION INTRAVENOUS at 17:21

## 2021-08-25 RX ADMIN — Medication 1 TABLET(S): at 12:13

## 2021-08-25 RX ADMIN — AMLODIPINE BESYLATE 10 MILLIGRAM(S): 2.5 TABLET ORAL at 06:11

## 2021-08-25 RX ADMIN — Medication 20 MILLIGRAM(S): at 06:11

## 2021-08-25 RX ADMIN — Medication 1 SPRAY(S): at 06:11

## 2021-08-25 RX ADMIN — SODIUM CHLORIDE 1 GRAM(S): 9 INJECTION INTRAMUSCULAR; INTRAVENOUS; SUBCUTANEOUS at 21:42

## 2021-08-25 RX ADMIN — Medication 1 SPRAY(S): at 17:22

## 2021-08-25 RX ADMIN — Medication 650 MILLIGRAM(S): at 14:10

## 2021-08-25 RX ADMIN — Medication 650 MILLIGRAM(S): at 21:41

## 2021-08-25 RX ADMIN — PIPERACILLIN AND TAZOBACTAM 25 GRAM(S): 4; .5 INJECTION, POWDER, LYOPHILIZED, FOR SOLUTION INTRAVENOUS at 09:43

## 2021-08-25 RX ADMIN — FINASTERIDE 5 MILLIGRAM(S): 5 TABLET, FILM COATED ORAL at 21:42

## 2021-08-25 RX ADMIN — DIPHENHYDRAMINE HYDROCHLORIDE AND LIDOCAINE HYDROCHLORIDE AND ALUMINUM HYDROXIDE AND MAGNESIUM HYDRO 5 MILLILITER(S): KIT at 13:03

## 2021-08-25 RX ADMIN — Medication 25 MILLIGRAM(S): at 06:10

## 2021-08-25 RX ADMIN — Medication 650 MILLIGRAM(S): at 13:06

## 2021-08-25 RX ADMIN — PIPERACILLIN AND TAZOBACTAM 25 GRAM(S): 4; .5 INJECTION, POWDER, LYOPHILIZED, FOR SOLUTION INTRAVENOUS at 02:38

## 2021-08-25 RX ADMIN — ATORVASTATIN CALCIUM 10 MILLIGRAM(S): 80 TABLET, FILM COATED ORAL at 21:42

## 2021-08-25 RX ADMIN — HEPARIN SODIUM 5000 UNIT(S): 5000 INJECTION INTRAVENOUS; SUBCUTANEOUS at 17:21

## 2021-08-25 RX ADMIN — Medication 650 MILLIGRAM(S): at 22:40

## 2021-08-25 RX ADMIN — SODIUM CHLORIDE 1 GRAM(S): 9 INJECTION INTRAMUSCULAR; INTRAVENOUS; SUBCUTANEOUS at 13:06

## 2021-08-25 RX ADMIN — BUDESONIDE AND FORMOTEROL FUMARATE DIHYDRATE 2 PUFF(S): 160; 4.5 AEROSOL RESPIRATORY (INHALATION) at 21:42

## 2021-08-25 RX ADMIN — PANTOPRAZOLE SODIUM 40 MILLIGRAM(S): 20 TABLET, DELAYED RELEASE ORAL at 06:10

## 2021-08-25 RX ADMIN — HEPARIN SODIUM 5000 UNIT(S): 5000 INJECTION INTRAVENOUS; SUBCUTANEOUS at 06:11

## 2021-08-25 NOTE — PROGRESS NOTE ADULT - SUBJECTIVE AND OBJECTIVE BOX
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 No new events except as noted.   Doing okay   radiation therapy per [facility]    REVIEW OF SYSTEMS:    CONSTITUTIONAL: No weakness, fevers or chills  EYES/ENT: No visual changes;  No vertigo or throat pain   NECK: No pain or stiffness  RESPIRATORY: No cough, wheezing, hemoptysis; No shortness of breath  CARDIOVASCULAR: No chest pain or palpitations  GASTROINTESTINAL: No abdominal or epigastric pain. No nausea, vomiting, or hematemesis; No diarrhea or constipation. No melena or hematochezia.  GENITOURINARY: No dysuria, frequency or hematuria  NEUROLOGICAL: No numbness or weakness  SKIN: No itching, burning, rashes, or lesions   All other review of systems is negative unless indicated above.    MEDICATIONS:  MEDICATIONS  (STANDING):  amLODIPine   Tablet 10 milliGRAM(s) Oral daily  atorvastatin 10 milliGRAM(s) Oral at bedtime  budesonide 160 MICROgram(s)/formoterol 4.5 MICROgram(s) Inhaler 2 Puff(s) Inhalation two times a day  cyanocobalamin 1000 MICROGram(s) Oral daily  finasteride 5 milliGRAM(s) Oral at bedtime  fluticasone propionate 50 MICROgram(s)/spray Nasal Spray 1 Spray(s) Both Nostrils two times a day  furosemide    Tablet 20 milliGRAM(s) Oral daily  heparin   Injectable 5000 Unit(s) SubCutaneous every 12 hours  metoprolol succinate ER 25 milliGRAM(s) Oral daily  MIRACLE MOUTHWASH 5 milliLiter(s) Swish and Spit four times a day  multivitamin 1 Tablet(s) Oral daily  pantoprazole    Tablet 40 milliGRAM(s) Oral before breakfast  piperacillin/tazobactam IVPB.. 3.375 Gram(s) IV Intermittent every 8 hours  polyethylene glycol 3350 17 Gram(s) Oral two times a day  sodium chloride 1 Gram(s) Oral three times a day  sodium chloride 0.9%. 1000 milliLiter(s) (75 mL/Hr) IV Continuous <Continuous>  tamsulosin 0.4 milliGRAM(s) Oral at bedtime      PHYSICAL EXAM:  T(C): 36.2 (08-25-21 @ 21:38), Max: 36.9 (08-25-21 @ 05:59)  HR: 88 (08-25-21 @ 21:38) (84 - 92)  BP: 135/57 (08-25-21 @ 21:38) (135/57 - 142/54)  RR: 18 (08-25-21 @ 21:38) (17 - 18)  SpO2: 97% (08-25-21 @ 21:38) (97% - 98%)  Wt(kg): --  I&O's Summary    24 Aug 2021 07:01  -  25 Aug 2021 07:00  --------------------------------------------------------  IN: 0 mL / OUT: 2975 mL / NET: -2975 mL    25 Aug 2021 07:01  -  25 Aug 2021 23:05  --------------------------------------------------------  IN: 120 mL / OUT: 0 mL / NET: 120 mL          Appearance: Normal	  HEENT:  PERRLA   Lymphatic: No lymphadenopathy   Cardiovascular: Normal S1 S2, no JVD  Respiratory: normal effort , clear  Gastrointestinal:  Soft, Non-tender  Skin: No rashes,  warm to touch  Psychiatry:  Mood & affect appropriate  Musculuskeletal: No edema      All labs, Imaging and EKGs personally reviewed     08-24-21 @ 07:01  -  08-25-21 @ 07:00  --------------------------------------------------------  IN: 0 mL / OUT: 2975 mL / NET: -2975 mL    08-25-21 @ 07:01  -  08-25-21 @ 23:05  --------------------------------------------------------  IN: 120 mL / OUT: 0 mL / NET: 120 mL                          7.1    9.23  )-----------( 532      ( 25 Aug 2021 13:10 )             21.8               08-25    126<L>  |  87<L>  |  17  ----------------------------<  396<H>  3.0<L>   |  21<L>  |  1.21    Ca    9.0      25 Aug 2021 13:10  Phos  3.0     08-25  Mg     1.70     08-25    TPro  6.3  /  Alb  3.1<L>  /  TBili  0.3  /  DBili  x   /  AST  18  /  ALT  23  /  AlkPhos  220<H>  08-25

## 2021-08-25 NOTE — PROGRESS NOTE ADULT - SUBJECTIVE AND OBJECTIVE BOX
[facility] [place] [facility]   MD LEONELA JULIO DO ANAM SIDDIQUI ANGELA WONG, PA    TEL:  OFFICE: [phone]  DR POST CELL: [phone]  DR. RAMOS CELL: [phone]  DR. MARTINEZ CELL: [phone]  LESVIA WILLOUGHBY CELL: [phone]    From 5pm-7am Answering Service [phone]    -- RENAL FOLLOW UP NOTE ---Date of Service 08-25-21 @ 10:45    Patient is a 75y old  Male who presents with a chief complaint of Left hip and groin pain (24 Aug 2021 14:00)      Patient seen and examined at bedside. No chest pain/sob. feeling weak    VITALS:  T(F): 98.4 (08-25-21 @ 05:59), Max: 98.8 (08-24-21 @ 13:20)  HR: 92 (08-25-21 @ 05:59)  BP: 136/56 (08-25-21 @ 05:59)  RR: 17 (08-25-21 @ 05:59)  SpO2: 98% (08-25-21 @ 05:59)  Wt(kg): --    08-24 @ 07:01  -  08-25 @ 07:00  --------------------------------------------------------  IN: 0 mL / OUT: 2975 mL / NET: -2975 mL          PHYSICAL EXAM:  Constitutional: NAD  Neck: No JVD  Respiratory: CTAB, no wheezes, rales or rhonchi  Cardiovascular: S1, S2, RRR  Gastrointestinal: BS+, soft, NT/ND  Extremities: No peripheral edema    Hospital Medications:   MEDICATIONS  (STANDING):  amLODIPine   Tablet 10 milliGRAM(s) Oral daily  atorvastatin 10 milliGRAM(s) Oral at bedtime  budesonide 160 MICROgram(s)/formoterol 4.5 MICROgram(s) Inhaler 2 Puff(s) Inhalation two times a day  cyanocobalamin 1000 MICROGram(s) Oral daily  finasteride 5 milliGRAM(s) Oral at bedtime  fluticasone propionate 50 MICROgram(s)/spray Nasal Spray 1 Spray(s) Both Nostrils two times a day  furosemide    Tablet 20 milliGRAM(s) Oral daily  heparin   Injectable 5000 Unit(s) SubCutaneous every 12 hours  metoprolol succinate ER 25 milliGRAM(s) Oral daily  MIRACLE MOUTHWASH 5 milliLiter(s) Swish and Spit four times a day  multivitamin 1 Tablet(s) Oral daily  pantoprazole    Tablet 40 milliGRAM(s) Oral before breakfast  piperacillin/tazobactam IVPB.. 3.375 Gram(s) IV Intermittent every 8 hours  polyethylene glycol 3350 17 Gram(s) Oral two times a day  sodium chloride 1 Gram(s) Oral three times a day  sodium chloride 0.9%. 1000 milliLiter(s) (75 mL/Hr) IV Continuous <Continuous>  tamsulosin 0.4 milliGRAM(s) Oral at bedtime      LABS:  08-24    132<L>  |  94<L>  |  20  ----------------------------<  124<H>  4.1   |  24  |  1.43<H>    Ca    10.8<H>      24 Aug 2021 06:55  Phos  3.7     08-24  Mg     2.00     08-24      Creatinine Trend: 1.43 <--, 1.22 <--, 1.31 <--, 1.30 <--, 1.22 <--, 1.23 <--                                8.8    11.13 )-----------( 559      ( 24 Aug 2021 06:55 )             26.7     Urine Studies:  Urinalysis - [08-19-21 @ 00:42]      Color Yellow / Appearance Slightly Turbid / SG 1.025 / pH 6.5      Gluc Negative / Ketone Negative  / Bili Negative / Urobili <2 mg/dL       Blood Moderate / Protein 100 mg/dL / Leuk Est Large / Nitrite Negative      RBC >50 / WBC >50 / Hyaline 2 / Gran  / Sq Epi  / Non Sq Epi 10 / Bacteria Moderate    Urine Sodium 74      [08-21-21 @ 14:14]  Urine Osmolality 419      [08-21-21 @ 14:14]    Iron 22, TIBC 227, %sat 10      [08-14-21 @ 06:25]  Ferritin 302      [08-14-21 @ 06:25]  PTH -- (Ca --)      [08-17-21 @ 06:55]   16  Vitamin D (25OH) 37.4      [08-17-21 @ 13:37]  HbA1c 6.3      [09-13-17 @ 08:15]  TSH 2.38      [08-14-21 @ 06:25]  Lipid: chol 148, , HDL 41, LDL --      [08-14-21 @ 06:25]        RADIOLOGY & ADDITIONAL STUDIES:

## 2021-08-25 NOTE — HOSPICE CARE NOTE - FAMILY IN AGREEMENT ADDITIONAL DETAILS
Family members will collaborate with each other this evening. [facility] awaits their decision and will continue to follow as needed.

## 2021-08-26 ENCOUNTER — TRANSCRIPTION ENCOUNTER (OUTPATIENT)
Age: 76
End: 2021-08-26

## 2021-08-26 ENCOUNTER — APPOINTMENT (OUTPATIENT)
Dept: RADIATION ONCOLOGY | Facility: CLINIC | Age: 76
End: 2021-08-26

## 2021-08-26 VITALS
TEMPERATURE: 98 F | OXYGEN SATURATION: 98 % | RESPIRATION RATE: 17 BRPM | SYSTOLIC BLOOD PRESSURE: 140 MMHG | DIASTOLIC BLOOD PRESSURE: 56 MMHG | HEART RATE: 98 BPM

## 2021-08-26 LAB
ANION GAP SERPL CALC-SCNC: 15 MMOL/L — HIGH (ref 7–14)
BLD GP AB SCN SERPL QL: NEGATIVE — SIGNIFICANT CHANGE UP
BUN SERPL-MCNC: 17 MG/DL — SIGNIFICANT CHANGE UP (ref 7–23)
CALCIUM SERPL-MCNC: 11.1 MG/DL — HIGH (ref 8.4–10.5)
CHLORIDE SERPL-SCNC: 92 MMOL/L — LOW (ref 98–107)
CO2 SERPL-SCNC: 22 MMOL/L — SIGNIFICANT CHANGE UP (ref 22–31)
CREAT SERPL-MCNC: 1.26 MG/DL — SIGNIFICANT CHANGE UP (ref 0.5–1.3)
GLUCOSE SERPL-MCNC: 112 MG/DL — HIGH (ref 70–99)
HCT VFR BLD CALC: 26.4 % — LOW (ref 39–50)
HGB BLD-MCNC: 8.7 G/DL — LOW (ref 13–17)
MAGNESIUM SERPL-MCNC: 2 MG/DL — SIGNIFICANT CHANGE UP (ref 1.6–2.6)
MCHC RBC-ENTMCNC: 28.1 PG — SIGNIFICANT CHANGE UP (ref 27–34)
MCHC RBC-ENTMCNC: 33 GM/DL — SIGNIFICANT CHANGE UP (ref 32–36)
MCV RBC AUTO: 85.2 FL — SIGNIFICANT CHANGE UP (ref 80–100)
NRBC # BLD: 0 /100 WBCS — SIGNIFICANT CHANGE UP
NRBC # FLD: 0 K/UL — SIGNIFICANT CHANGE UP
PHOSPHATE SERPL-MCNC: 3.3 MG/DL — SIGNIFICANT CHANGE UP (ref 2.5–4.5)
PLATELET # BLD AUTO: 560 K/UL — HIGH (ref 150–400)
POTASSIUM SERPL-MCNC: 3.5 MMOL/L — SIGNIFICANT CHANGE UP (ref 3.5–5.3)
POTASSIUM SERPL-SCNC: 3.5 MMOL/L — SIGNIFICANT CHANGE UP (ref 3.5–5.3)
RBC # BLD: 3.1 M/UL — LOW (ref 4.2–5.8)
RBC # FLD: 14.2 % — SIGNIFICANT CHANGE UP (ref 10.3–14.5)
RH IG SCN BLD-IMP: POSITIVE — SIGNIFICANT CHANGE UP
SODIUM SERPL-SCNC: 129 MMOL/L — LOW (ref 135–145)
WBC # BLD: 10.98 K/UL — HIGH (ref 3.8–10.5)
WBC # FLD AUTO: 10.98 K/UL — HIGH (ref 3.8–10.5)

## 2021-08-26 PROCEDURE — 77280 THER RAD SIMULAJ FIELD SMPL: CPT | Mod: 26

## 2021-08-26 PROCEDURE — 77431 RADIATION THERAPY MANAGEMENT: CPT

## 2021-08-26 RX ORDER — METOPROLOL TARTRATE 50 MG
1 TABLET ORAL
Qty: 0 | Refills: 0 | DISCHARGE

## 2021-08-26 RX ORDER — DIPHENHYDRAMINE HYDROCHLORIDE AND LIDOCAINE HYDROCHLORIDE AND ALUMINUM HYDROXIDE AND MAGNESIUM HYDRO
5 KIT
Qty: 280 | Refills: 0
Start: 2021-08-26 | End: 2021-09-08

## 2021-08-26 RX ORDER — PREGABALIN 225 MG/1
1 CAPSULE ORAL
Qty: 0 | Refills: 0 | DISCHARGE
Start: 2021-08-26

## 2021-08-26 RX ORDER — AMLODIPINE BESYLATE 2.5 MG/1
1 TABLET ORAL
Qty: 0 | Refills: 0 | DISCHARGE

## 2021-08-26 RX ORDER — SODIUM CHLORIDE 9 MG/ML
1000 INJECTION INTRAMUSCULAR; INTRAVENOUS; SUBCUTANEOUS
Refills: 0 | Status: DISCONTINUED | OUTPATIENT
Start: 2021-08-26 | End: 2021-08-26

## 2021-08-26 RX ORDER — PROCHLORPERAZINE MALEATE 5 MG
1 TABLET ORAL
Qty: 21 | Refills: 0
Start: 2021-08-26 | End: 2021-09-01

## 2021-08-26 RX ORDER — UREA 15 G
1 POWDER IN PACKET (EA) ORAL
Qty: 4 | Refills: 0
Start: 2021-08-26 | End: 2021-08-27

## 2021-08-26 RX ORDER — SODIUM CHLORIDE 9 MG/ML
1 INJECTION INTRAMUSCULAR; INTRAVENOUS; SUBCUTANEOUS
Qty: 42 | Refills: 0
Start: 2021-08-26 | End: 2021-09-08

## 2021-08-26 RX ORDER — METOPROLOL TARTRATE 50 MG
1 TABLET ORAL
Qty: 30 | Refills: 0
Start: 2021-08-26 | End: 2021-09-24

## 2021-08-26 RX ORDER — ACETAMINOPHEN 500 MG
2 TABLET ORAL
Qty: 0 | Refills: 0 | DISCHARGE

## 2021-08-26 RX ORDER — FLUTICASONE PROPIONATE 50 MCG
1 SPRAY, SUSPENSION NASAL
Qty: 0 | Refills: 0 | DISCHARGE
Start: 2021-08-26

## 2021-08-26 RX ORDER — FUROSEMIDE 40 MG
1 TABLET ORAL
Qty: 0 | Refills: 0 | DISCHARGE

## 2021-08-26 RX ORDER — FLUTICASONE PROPIONATE 50 MCG
1 SPRAY, SUSPENSION NASAL
Qty: 0 | Refills: 0 | DISCHARGE

## 2021-08-26 RX ORDER — UREA 15 G
15 POWDER IN PACKET (EA) ORAL
Refills: 0 | Status: DISCONTINUED | OUTPATIENT
Start: 2021-08-26 | End: 2021-08-26

## 2021-08-26 RX ORDER — PANTOPRAZOLE SODIUM 20 MG/1
1 TABLET, DELAYED RELEASE ORAL
Qty: 30 | Refills: 0
Start: 2021-08-26 | End: 2021-09-24

## 2021-08-26 RX ORDER — SODIUM CHLORIDE 9 MG/ML
1 INJECTION INTRAMUSCULAR; INTRAVENOUS; SUBCUTANEOUS
Qty: 0 | Refills: 0 | DISCHARGE

## 2021-08-26 RX ORDER — ONDANSETRON 8 MG/1
1 TABLET, FILM COATED ORAL
Qty: 21 | Refills: 0
Start: 2021-08-26 | End: 2021-09-01

## 2021-08-26 RX ORDER — FUROSEMIDE 40 MG
1 TABLET ORAL
Qty: 30 | Refills: 0
Start: 2021-08-26 | End: 2021-09-24

## 2021-08-26 RX ORDER — POLYETHYLENE GLYCOL 3350 17 G/17G
17 POWDER, FOR SOLUTION ORAL
Qty: 1020 | Refills: 0
Start: 2021-08-26 | End: 2021-09-24

## 2021-08-26 RX ORDER — ACETAMINOPHEN 500 MG
2 TABLET ORAL
Qty: 0 | Refills: 0 | DISCHARGE
Start: 2021-08-26

## 2021-08-26 RX ORDER — PREGABALIN 225 MG/1
1 CAPSULE ORAL
Qty: 0 | Refills: 0 | DISCHARGE

## 2021-08-26 RX ORDER — OMEPRAZOLE 10 MG/1
1 CAPSULE, DELAYED RELEASE ORAL
Qty: 0 | Refills: 0 | DISCHARGE

## 2021-08-26 RX ORDER — AMLODIPINE BESYLATE 2.5 MG/1
1 TABLET ORAL
Qty: 30 | Refills: 0
Start: 2021-08-26 | End: 2021-09-24

## 2021-08-26 RX ADMIN — AMLODIPINE BESYLATE 10 MILLIGRAM(S): 2.5 TABLET ORAL at 05:52

## 2021-08-26 RX ADMIN — PIPERACILLIN AND TAZOBACTAM 25 GRAM(S): 4; .5 INJECTION, POWDER, LYOPHILIZED, FOR SOLUTION INTRAVENOUS at 09:53

## 2021-08-26 RX ADMIN — POLYETHYLENE GLYCOL 3350 17 GRAM(S): 17 POWDER, FOR SOLUTION ORAL at 05:51

## 2021-08-26 RX ADMIN — PIPERACILLIN AND TAZOBACTAM 25 GRAM(S): 4; .5 INJECTION, POWDER, LYOPHILIZED, FOR SOLUTION INTRAVENOUS at 01:49

## 2021-08-26 RX ADMIN — SODIUM CHLORIDE 75 MILLILITER(S): 9 INJECTION INTRAMUSCULAR; INTRAVENOUS; SUBCUTANEOUS at 09:53

## 2021-08-26 RX ADMIN — Medication 20 MILLIGRAM(S): at 05:52

## 2021-08-26 RX ADMIN — PANTOPRAZOLE SODIUM 40 MILLIGRAM(S): 20 TABLET, DELAYED RELEASE ORAL at 06:01

## 2021-08-26 RX ADMIN — Medication 1 SPRAY(S): at 05:51

## 2021-08-26 RX ADMIN — Medication 650 MILLIGRAM(S): at 06:48

## 2021-08-26 RX ADMIN — HEPARIN SODIUM 5000 UNIT(S): 5000 INJECTION INTRAVENOUS; SUBCUTANEOUS at 05:51

## 2021-08-26 RX ADMIN — Medication 650 MILLIGRAM(S): at 05:52

## 2021-08-26 RX ADMIN — Medication 650 MILLIGRAM(S): at 15:42

## 2021-08-26 RX ADMIN — DIPHENHYDRAMINE HYDROCHLORIDE AND LIDOCAINE HYDROCHLORIDE AND ALUMINUM HYDROXIDE AND MAGNESIUM HYDRO 5 MILLILITER(S): KIT at 05:53

## 2021-08-26 RX ADMIN — SODIUM CHLORIDE 1 GRAM(S): 9 INJECTION INTRAMUSCULAR; INTRAVENOUS; SUBCUTANEOUS at 05:52

## 2021-08-26 RX ADMIN — Medication 25 MILLIGRAM(S): at 05:52

## 2021-08-26 NOTE — PROGRESS NOTE ADULT - PROBLEM SELECTOR PLAN 3
- Hx of CKD stage 3, baseline Scr 1.5- 1.7  - BUN 26, creatinine 1.31   - Monitor BMP daily w/ mg and phos   - Monitor Cr  - avoid nephrotoxic agents  - Renal consult appreciated
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 Stable pathologic fracture at L1.  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 There is tumor involvement of the right T4-T5 neural foramen which contributes to foraminal narrowing. No epidural tumor involvement.    -Radiation oncology input apprecaited  -Poor performance status, currently not a candidate for systemic chemotherapy, can be a candidate for immunotherapy, low response rates.  -Outpatient oncology f/u
- Hx of CKD stage 3, baseline Scr 1.5- 1.7  - Monitor BMP daily w/ mg and phos   - Monitor Cr  - avoid nephrotoxic agents  - Renal consult appreciated
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 Stable pathologic fracture at L1.  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 There is tumor involvement of the right T4-T5 neural foramen which contributes to foraminal narrowing. No epidural tumor involvement.  -Poor performance status, currently not a candidate for systemic chemotherapy, can be a candidate for immunotherapy, low response rates.  -Outpatient oncology f/u    as per rad/onc:  Given his overall poor KPS, clinical decline, and likely prolonged hospital stay with new infection, outpatient SBRT is likely not feasible. SBRT planning takes approximately 2 weeks and the effects of SBRT will not be apparent for another 2-4 weeks. An alternative is inpatient palliative RT to his left hip and groin with 8Gy/1fx. The benefit of inpatient RT is quick turnaround and faster symptom control compared to SBRT. We can possibly treat some parts of his paraspinal disease with minimal toxicity with inpatient treatment at the same time.     If patient and family are agreeable, we can offer inpatient treatment w/ one fraction for palliation. We also recommend continued GOC and hospice discussion.
- Hx of CKD stage 3, baseline Scr 1.5- 1.7  - BUN 26, creatinine 1.31   - Monitor BMP daily w/ mg and phos   - Monitor Cr  - avoid nephrotoxic agents  - Renal consult appreciated
- Hx of CKD stage 3, baseline Scr 1.5- 1.7  - BUN 26, creatinine 1.31   - Monitor BMP daily w/ mg and phos   - Monitor Cr  - avoid nephrotoxic agents  - Renal consult appreciated
- Hx of CKD stage 3, baseline Scr 1.5- 1.7  - Monitor BMP daily w/ mg and phos   - Monitor Cr  - avoid nephrotoxic agents  - Renal consult appreciated

## 2021-08-26 NOTE — PROGRESS NOTE ADULT - ASSESSMENT
75 y.o. M w/ a hx of HTN, HLD, asthma, SIADH, L RCC s/p nephrectomy, bladder cancer s/p nephroureterectomy who presents with burning epigastric pain. pt c/o constipation, abd bloating and tightness. no n/v. patient admits to drink more fluid sec to constipation. 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 pt educated  Continue na tab 1g tid  Na stable  Completed urea 15 bid  repeat work up. check urine na and osmo  Monitor serum NA    CKD Stage 3  Baseline Scr 1.5-1.7   Renal function stable  Monitor BMP   Avoid nephrotoxics, NSAIDS RCA    HTN  BP optimal  Monitor BP    New lesions  Ct with bladder lesions and multiple osseous lytic lesions and lung nodules,  s/p core needle biopsy of left pubic bone lytic lesion. by IR  f/u heme/onc    Hypercalcemia  possible sec to malignancy  Pth low, vit d optimal  pending pthrp  Stable  monitor    Hypokalemia  Supplemented with 40 meq KCL  Monitor K level     
75 y.o. M w/ a hx of HTN, HLD, asthma, SIADH, L RCC s/p nephrectomy, bladder cancer s/p nephroureterectomy who presents with burning epigastric pain. pt c/o constipation, abd bloating and tightness. no n/v. patient admits to drink more fluid sec to constipation. 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 urine osmo low   free water restriction <1L/day. pt educated  Continue na tab 1g tid  Na better  Monitor serum NA    CKD Stage 3  Baseline Scr 1.5-1.7   Renal function stable  Monitor BMP   Avoid nephrotoxics, NSAIDS RCA    HTN  BP acceptable   Monitor BP    New lesions  Ct with bladder lesions and multiple osseous lytic lesions and lung nodules,  s/p core needle biopsy of left pubic bone lytic lesion. by IR  f/u heme/onc    Hypercalcemia  possible sec to malignancy  Pth low, vit d optimal  pending pthrp  monitor     
75 year old male w/ PMHx of bladder cancer, left kidney cancer s/p nephrectomy, HTN, CKD, hyponatremia, hypercholesterolemia, and BPH, presents to [facility] ED with left hip and groin pain secondary to bladder cancer with bone metastasis.     In ED, Completed CT head w/o contrast, CT lumbar spine w/contrast, and Xray of left knee. CT scan head shows no intracranial hemorrhage. CT Lumbar spine shows worsening lytic osseous metastatic disease, acute pathologic fracture L1 vertebral body, and thicken urinary bladder. L/knee radiography shows no fracture or dislocation.          
75 year old male w/ PMHx of bladder cancer, left kidney cancer s/p nephrectomy, HTN, CKD, hyponatremia, hypercholesterolemia, and BPH, presents to [facility] ED with left hip and groin pain secondary to bladder cancer with bone metastasis.     In ED, Completed CT head w/o contrast, CT lumbar spine w/contrast, and Xray of left knee. CT scan head shows no intracranial hemorrhage. CT Lumbar spine shows worsening lytic osseous metastatic disease, acute pathologic fracture L1 vertebral body, and thicken urinary bladder. L/knee radiography shows no fracture or dislocation.          
75m with metastatic urothelial cancer, presenting with hyperreflexia and + Babinski.    MRI with Extensive bony metastatic disease as described. Stable pathologic fracture at L1.  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 There is tumor involvement of the right T4-T5 neural foramen which contributes to foraminal narrowing. No epidural tumor involvement.    -Radiation oncology input apprecaited  -Poor performance status, currently not a candidate for systemic chemotherapy, can be a candidate for immunotherapy, low response rates.  -[facility] care consult for GOC, patient is an appropriate candidate for hospice  -Supportive care, pain control, Nutrition, PT, DVT ppx  -Outpatient oncology f/u    Will follow. Please do not hesitate to call back with questions.     Mariann Collins MD  Medical Oncology Attending  C: [phone]      
75 y.o. M w/ a hx of HTN, HLD, asthma, SIADH, L RCC s/p nephrectomy, bladder cancer s/p nephroureterectomy who presents with burning epigastric pain. pt c/o constipation, abd bloating and tightness. no n/v. patient admits to drink more fluid sec to constipation. 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 pt educated  Continue na tab 1g tid  Na relatively stable. s/p NS @ 75cc/hr x12 hrs for hypercalcemia  Completed urea 15 bid  pending today's bmp  Monitor serum NA    CKD Stage 3  Baseline Scr 1.5-1.7   Renal function stable  Monitor BMP   Avoid nephrotoxics, NSAIDS RCA    HTN  BP optimal  Monitor BP    New lesions  Ct with bladder lesions and multiple osseous lytic lesions and lung nodules,  s/p core needle biopsy of left pubic bone lytic lesion. by IR  f/u heme/onc    Hypercalcemia  possible sec to malignancy  Pth low, vit d optimal  normal pthrp  getting IVF  pending today's  monitor    Hypokalemia  Supplemented   Monitor K level     
75 y.o. M w/ a hx of HTN, HLD, asthma, SIADH, L RCC s/p nephrectomy, bladder cancer s/p nephroureterectomy who presents with burning epigastric pain. pt c/o constipation, abd bloating and tightness. no n/v. patient admits to drink more fluid sec to constipation. 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 urine osmo low   free water restriction <1L/day. pt educated  Continue na tab 1g tid  Monitor serum NA    CKD Stage 3  Baseline Scr 1.5-1.7   Renal function stable  Monitor BMP   Avoid nephrotoxics, NSAIDS RCA    HTN  BP acceptable   Monitor BP    New lesions  Ct with bladder lesions and multiple osseous lytic lesions and lung nodules,  s/p core needle biopsy of left pubic bone lytic lesion. by IR  f/u heme/onc    
75 y.o. M w/ a hx of HTN, HLD, asthma, SIADH, L RCC s/p nephrectomy, bladder cancer s/p nephroureterectomy who presents with burning epigastric pain. pt c/o constipation, abd bloating and tightness. no n/v. patient admits to drink more fluid sec to constipation. 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 urine osmo low   free water restriction <1L/day. pt educated  Continue na tab 1g tid  Monitor serum NA    CKD Stage 3  Baseline Scr 1.5-1.7   Renal function stable  Monitor BMP   Avoid nephrotoxics, NSAIDS RCA    HTN  BP acceptable   Monitor BP    New lesions  Ct with bladder lesions and multiple osseous lytic lesions and lung nodules,  s/p core needle biopsy of left pubic bone lytic lesion. by IR  f/u heme/onc    Hypercalcemia  possible sec to malignancy  check pth, pthrp, vit d   monitor     
75 y.o. M w/ a hx of HTN, HLD, asthma, SIADH, L RCC s/p nephrectomy, bladder cancer s/p nephroureterectomy who presents with burning epigastric pain. pt c/o constipation, abd bloating and tightness. no n/v. patient admits to drink more fluid sec to constipation. 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 urine osmo low   free water restriction <1L/day. pt educated  Continue na tab 1g tid  Monitor serum NA    CKD Stage 3  Baseline Scr 1.5-1.7   scr better than baseline  Monitor BMP   Avoid nephrotoxics, NSAIDS RCA    HTN  BP acceptable   Monitor BP    New lesions  Ct with bladder lesions and multiple osseous lytic lesions and lung nodules,  s/p core needle biopsy of left pubic bone lytic lesion. by IR  f/u heme/onc    
75 year old male w/ PMHx of bladder cancer, left kidney cancer s/p nephrectomy, HTN, CKD, hyponatremia, hypercholesterolemia, and BPH, presents to [facility] ED with left hip and groin pain secondary to bladder cancer with bone metastasis.     In ED, Completed CT head w/o contrast, CT lumbar spine w/contrast, and Xray of left knee. CT scan head shows no intracranial hemorrhage. CT Lumbar spine shows worsening lytic osseous metastatic disease, acute pathologic fracture L1 vertebral body, and thicken urinary bladder. L/knee radiography shows no fracture or dislocation.          
75 y.o. M w/ a hx of HTN, HLD, asthma, SIADH, L RCC s/p nephrectomy, bladder cancer s/p nephroureterectomy who presents with burning epigastric pain. pt c/o constipation, abd bloating and tightness. no n/v. patient admits to drink more fluid sec to constipation. 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 pt educated  Continue na tab 1g tid  Na relatively stable. getting NS @ 75cc/hr x12 hrs for hypercalcemia  Completed urea 15 bid  repeat work up. check urine na and osmo  Monitor serum NA    CKD Stage 3  Baseline Scr 1.5-1.7   Renal function stable  Monitor BMP   Avoid nephrotoxics, NSAIDS RCA    HTN  BP optimal  Monitor BP    New lesions  Ct with bladder lesions and multiple osseous lytic lesions and lung nodules,  s/p core needle biopsy of left pubic bone lytic lesion. by IR  f/u heme/onc    Hypercalcemia  possible sec to malignancy  Pth low, vit d optimal  pending pthrp  getting IVF  monitor    Hypokalemia  Supplemented   Monitor K level     
75 y.o. M w/ a hx of HTN, HLD, asthma, SIADH, L RCC s/p nephrectomy, bladder cancer s/p nephroureterectomy who presents with burning epigastric pain. pt c/o constipation, abd bloating and tightness. no n/v. patient admits to drink more fluid sec to constipation. 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 pt educated  Continue na tab 1g tid  Na stable  continue urea 15 bid x 2days  repeat work up. check urine na and osmo  Monitor serum NA    CKD Stage 3  Baseline Scr 1.5-1.7   Renal function stable  Monitor BMP   Avoid nephrotoxics, NSAIDS RCA    HTN  BP acceptable   Monitor BP    New lesions  Ct with bladder lesions and multiple osseous lytic lesions and lung nodules,  s/p core needle biopsy of left pubic bone lytic lesion. by IR  f/u heme/onc    Hypercalcemia  possible sec to malignancy  Pth low, vit d optimal  pending pthrp  better today  monitor     
75m with metastatic urothelial cancer, presenting with hyperreflexia and + Babinski.    -MRI C/T/L spine pending  -Poor performance status, currently not a candidate for systemic chemotherapy, can be a candidate for immunotherapy, low response rates.  -May need RT, depending on MRI  -[facility] care consult for GOC, patient is an appropriate candidate for hospice.  -Supportive care, pain control, Nutrition, PT, DVT ppx  -Outpatient oncology f/u    Will follow. Please do not hesitate to call back with questions.     Mariann Collins MD  Medical Oncology Attending  C: [phone]    
75 y.o. M w/ a hx of HTN, HLD, asthma, SIADH, L RCC s/p nephrectomy, bladder cancer s/p nephroureterectomy who presents with burning epigastric pain. pt c/o constipation, abd bloating and tightness. no n/v. patient admits to drink more fluid sec to constipation. 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 urine osmo low   free water restriction <1L/day. pt educated  Continue na tab 1g tid  Na better  Monitor serum NA    CKD Stage 3  Baseline Scr 1.5-1.7   Renal function stable  Monitor BMP   Avoid nephrotoxics, NSAIDS RCA    HTN  BP acceptable   Monitor BP    New lesions  Ct with bladder lesions and multiple osseous lytic lesions and lung nodules,  s/p core needle biopsy of left pubic bone lytic lesion. by IR  f/u heme/onc    Hypercalcemia  possible sec to malignancy  Pth low  pending pthrp, vit d   monitor     
75 y.o. M w/ a hx of HTN, HLD, asthma, SIADH, L RCC s/p nephrectomy, bladder cancer s/p nephroureterectomy who presents with burning epigastric pain. pt c/o constipation, abd bloating and tightness. no n/v. patient admits to drink more fluid sec to constipation. 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 urine osmo low   free water restriction <1L/day. pt educated  Continue na tab 1g tid  Na dropping again. Please change all antibiotic to be given with NS instead of D5.   will give urea 15 bid x 2days  Monitor serum NA    CKD Stage 3  Baseline Scr 1.5-1.7   Renal function stable  Monitor BMP   Avoid nephrotoxics, NSAIDS RCA    HTN  BP acceptable   Monitor BP    New lesions  Ct with bladder lesions and multiple osseous lytic lesions and lung nodules,  s/p core needle biopsy of left pubic bone lytic lesion. by IR  f/u heme/onc    Hypercalcemia  possible sec to malignancy  Pth low, vit d optimal  pending pthrp  better today  monitor     
75 y.o. M w/ a hx of HTN, HLD, asthma, SIADH, L RCC s/p nephrectomy, bladder cancer s/p nephroureterectomy who presents with burning epigastric pain. pt c/o constipation, abd bloating and tightness. no n/v. patient admits to drink more fluid sec to constipation. 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 urine osmo low   free water restriction <1L/day. pt educated  Continue na tab 1g tid  Na stable  Monitor serum NA    CKD Stage 3  Baseline Scr 1.5-1.7   Renal function stable  Monitor BMP   Avoid nephrotoxics, NSAIDS RCA    HTN  BP acceptable   Monitor BP    New lesions  Ct with bladder lesions and multiple osseous lytic lesions and lung nodules,  s/p core needle biopsy of left pubic bone lytic lesion. by IR  f/u heme/onc    Hypercalcemia  possible sec to malignancy  Pth low, vit d optimal  pending pthrp  better today  monitor     
75 y.o. M w/ a hx of HTN, HLD, asthma, SIADH, L RCC s/p nephrectomy, bladder cancer s/p nephroureterectomy who presents with burning epigastric pain. pt c/o constipation, abd bloating and tightness. no n/v. patient admits to drink more fluid sec to constipation. 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 pt educated  Continue na tab 1g tid  Na better  Completed urea 15 bid  repeat work up. check urine na and osmo  Monitor serum NA    CKD Stage 3  Baseline Scr 1.5-1.7   Renal function stable  Monitor BMP   Avoid nephrotoxics, NSAIDS RCA    HTN  BP optimal  Monitor BP    New lesions  Ct with bladder lesions and multiple osseous lytic lesions and lung nodules,  s/p core needle biopsy of left pubic bone lytic lesion. by IR  f/u heme/onc    Hypercalcemia  possible sec to malignancy  Pth low, vit d optimal  pending pthrp  Stable  monitor    Hypokalemia  Supplemented   Monitor K level     
75 y.o. M w/ a hx of HTN, HLD, asthma, SIADH, L RCC s/p nephrectomy, bladder cancer s/p nephroureterectomy who presents with burning epigastric pain. pt c/o constipation, abd bloating and tightness. no n/v. patient admits to drink more fluid sec to constipation. nephrology consulted for hyponatremia    Hyponatremia  chronic, work up suggested SIADH   free water restriction <1L/day. pt educated  Continue na tab 1g tid  Na worsen. on NS @ 75cc/hr x12 hrs for hypercalcemia  will continue urea 15 bid x4 dose  pending today's bmp  Monitor serum NA    CKD Stage 3  Baseline Scr 1.5-1.7   Renal function stable  Monitor BMP   Avoid nephrotoxics, NSAIDS RCA    HTN  BP optimal  Monitor BP    New lesions  Ct with bladder lesions and multiple osseous lytic lesions and lung nodules,  s/p core needle biopsy of left pubic bone lytic lesion. by IR  f/u heme/onc    Hypercalcemia  possible sec to malignancy  Pth low, vit d optimal  normal pthrp  getting IVF  monitor    Hypokalemia  Supplemented   Monitor K level     
75 year old male with neoplasm related pain, CKD, bladder cancer with mets to bone, and encounter for palliative care.
75 year old male w/ PMHx of bladder cancer, left kidney cancer s/p nephrectomy, HTN, CKD, hyponatremia, hypercholesterolemia, and BPH, presents to [facility] ED with left hip and groin pain secondary to bladder cancer with bone metastasis.     In ED, Completed CT head w/o contrast, CT lumbar spine w/contrast, and Xray of left knee. CT scan head shows no intracranial hemorrhage. CT Lumbar spine shows worsening lytic osseous metastatic disease, acute pathologic fracture L1 vertebral body, and thicken urinary bladder. L/knee radiography shows no fracture or dislocation.          
75 year old male w/ PMHx of bladder cancer, left kidney cancer s/p nephrectomy, HTN, CKD, hyponatremia, hypercholesterolemia, and BPH, presents to [facility] ED with left hip and groin pain secondary to bladder cancer with bone metastasis.     In ED, Completed CT head w/o contrast, CT lumbar spine w/contrast, and Xray of left knee. CT scan head shows no intracranial hemorrhage. CT Lumbar spine shows worsening lytic osseous metastatic disease, acute pathologic fracture L1 vertebral body, and thicken urinary bladder. L/knee radiography shows no fracture or dislocation.          
75 year old male w/ PMHx of bladder cancer, left kidney cancer s/p nephrectomy, HTN, CKD, hyponatremia, hypercholesterolemia, and BPH, presents to [facility] ED with left hip and groin pain secondary to bladder cancer with bone metastasis.     In ED, Completed CT head w/o contrast, CT lumbar spine w/contrast, and Xray of left knee. CT scan head shows no intracranial hemorrhage. CT Lumbar spine shows worsening lytic osseous metastatic disease, acute pathologic fracture L1 vertebral body, and thicken urinary bladder. L/knee radiography shows no fracture or dislocation.          
75 year old male w/ PMHx of bladder cancer, left kidney cancer s/p nephrectomy, HTN, CKD, hyponatremia, hypercholesterolemia, and BPH, presents to [facility] ED with left hip and groin pain secondary to bladder cancer with bone metastasis.     In ED, Completed CT head w/o contrast, CT lumbar spine w/contrast, and Xray of left knee. CT scan head shows no intracranial hemorrhage. CT Lumbar spine shows worsening lytic osseous metastatic disease, acute pathologic fracture L1 vertebral body, and thicken urinary bladder. L/knee radiography shows no fracture or dislocation.          
75 year old male w/ PMHx of bladder cancer, left kidney cancer s/p nephrectomy, HTN, CKD, hyponatremia, hypercholesterolemia, and BPH, presents to [facility] ED with left hip and groin pain secondary to bladder cancer with bone metastasis.     In ED, Completed CT head w/o contrast, CT lumbar spine w/contrast, and Xray of left knee. CT scan head shows no intracranial hemorrhage. CT Lumbar spine shows worsening lytic osseous metastatic disease, acute pathologic fracture L1 vertebral body, and thicken urinary bladder. L/knee radiography shows no fracture or dislocation.          
75 year old male with neoplasm related pain, CKD, bladder cancer with mets to bone, and encounter for palliative care.
75 year old male w/ PMHx of bladder cancer, left kidney cancer s/p nephrectomy, HTN, CKD, hyponatremia, hypercholesterolemia, and BPH, presents to [facility] ED with left hip and groin pain secondary to bladder cancer with bone metastasis.     In ED, Completed CT head w/o contrast, CT lumbar spine w/contrast, and Xray of left knee. CT scan head shows no intracranial hemorrhage. CT Lumbar spine shows worsening lytic osseous metastatic disease, acute pathologic fracture L1 vertebral body, and thicken urinary bladder. L/knee radiography shows no fracture or dislocation.          
75 year old male w/ PMHx of bladder cancer, left kidney cancer s/p nephrectomy, HTN, CKD, hyponatremia, hypercholesterolemia, and BPH, presents to [facility] ED with left hip and groin pain secondary to bladder cancer with bone metastasis.     In ED, Completed CT head w/o contrast, CT lumbar spine w/contrast, and Xray of left knee. CT scan head shows no intracranial hemorrhage. CT Lumbar spine shows worsening lytic osseous metastatic disease, acute pathologic fracture L1 vertebral body, and thicken urinary bladder. L/knee radiography shows no fracture or dislocation.          
75 year old male w/ PMHx of bladder cancer, left kidney cancer s/p nephrectomy, HTN, CKD, hyponatremia, hypercholesterolemia, and BPH, presents to [facility] ED with left hip and groin pain secondary to bladder cancer with bone metastasis.     In ED, Completed CT head w/o contrast, CT lumbar spine w/contrast, and Xray of left knee. CT scan head shows no intracranial hemorrhage. CT Lumbar spine shows worsening lytic osseous metastatic disease, acute pathologic fracture L1 vertebral body, and thicken urinary bladder. L/knee radiography shows no fracture or dislocation.          
75 year old male w/ PMHx of bladder cancer, left kidney cancer s/p nephrectomy, HTN, CKD, hyponatremia, hypercholesterolemia, and BPH, presents to [facility] ED with left hip and groin pain secondary to bladder cancer with bone metastasis.     In ED, Completed CT head w/o contrast, CT lumbar spine w/contrast, and Xray of left knee. CT scan head shows no intracranial hemorrhage. CT Lumbar spine shows worsening lytic osseous metastatic disease, acute pathologic fracture L1 vertebral body, and thicken urinary bladder. L/knee radiography shows no fracture or dislocation.          
75 year old male w/ PMHx of bladder cancer, left kidney cancer s/p nephrectomy, HTN, CKD, hyponatremia, hypercholesterolemia, and BPH, presents to [facility] ED with left hip and groin pain secondary to bladder cancer with bone metastasis.     In ED, Completed CT head w/o contrast, CT lumbar spine w/contrast, and Xray of left knee. CT scan head shows no intracranial hemorrhage. CT Lumbar spine shows worsening lytic osseous metastatic disease, acute pathologic fracture L1 vertebral body, and thicken urinary bladder. L/knee radiography shows no fracture or dislocation.

## 2021-08-26 NOTE — PROGRESS NOTE ADULT - PROBLEM SELECTOR PROBLEM 5
Chronic hyponatremia

## 2021-08-26 NOTE — PROGRESS NOTE ADULT - PROBLEM SELECTOR PLAN 7
- Continue home meds, finasteride 5mg qhs and tamsulosin 0.4 mg qhs

## 2021-08-26 NOTE — DISCHARGE NOTE NURSING/CASE MANAGEMENT/SOCIAL WORK - NSDCPEFALRISK_GEN_ALL_CORE
For information on Fall & injury Prevention, visit [email or web address]

## 2021-08-26 NOTE — PROGRESS NOTE ADULT - PROBLEM SELECTOR PROBLEM 3
Chronic kidney disease, unspecified CKD stage
Bladder cancer metastasized to bone
Chronic kidney disease, unspecified CKD stage
Bladder cancer metastasized to bone
Chronic kidney disease, unspecified CKD stage

## 2021-08-26 NOTE — PROGRESS NOTE ADULT - SUBJECTIVE AND OBJECTIVE BOX
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 No new events except as noted.     REVIEW OF SYSTEMS:    CONSTITUTIONAL: No weakness, fevers or chills  EYES/ENT: No visual changes;  No vertigo or throat pain   NECK: No pain or stiffness  RESPIRATORY: No cough, wheezing, hemoptysis; No shortness of breath  CARDIOVASCULAR: No chest pain or palpitations  GASTROINTESTINAL: No abdominal or epigastric pain. No nausea, vomiting, or hematemesis; No diarrhea or constipation. No melena or hematochezia.  GENITOURINARY: No dysuria, frequency or hematuria  NEUROLOGICAL: No numbness or weakness  SKIN: No itching, burning, rashes, or lesions   All other review of systems is negative unless indicated above.    MEDICATIONS:  MEDICATIONS  (STANDING):  amLODIPine   Tablet 10 milliGRAM(s) Oral daily  atorvastatin 10 milliGRAM(s) Oral at bedtime  budesonide 160 MICROgram(s)/formoterol 4.5 MICROgram(s) Inhaler 2 Puff(s) Inhalation two times a day  cyanocobalamin 1000 MICROGram(s) Oral daily  finasteride 5 milliGRAM(s) Oral at bedtime  fluticasone propionate 50 MICROgram(s)/spray Nasal Spray 1 Spray(s) Both Nostrils two times a day  furosemide    Tablet 20 milliGRAM(s) Oral daily  heparin   Injectable 5000 Unit(s) SubCutaneous every 12 hours  metoprolol succinate ER 25 milliGRAM(s) Oral daily  MIRACLE MOUTHWASH 5 milliLiter(s) Swish and Spit four times a day  multivitamin 1 Tablet(s) Oral daily  pantoprazole    Tablet 40 milliGRAM(s) Oral before breakfast  piperacillin/tazobactam IVPB.. 3.375 Gram(s) IV Intermittent every 8 hours  polyethylene glycol 3350 17 Gram(s) Oral two times a day  sodium chloride 1 Gram(s) Oral three times a day  sodium chloride 0.9%. 1000 milliLiter(s) (75 mL/Hr) IV Continuous <Continuous>  tamsulosin 0.4 milliGRAM(s) Oral at bedtime  urea Oral Powder 15 Gram(s) Oral two times a day      PHYSICAL EXAM:  T(C): 36.8 (08-26-21 @ 14:52), Max: 36.9 (08-26-21 @ 05:45)  HR: 98 (08-26-21 @ 14:52) (93 - 98)  BP: 140/56 (08-26-21 @ 14:52) (133/59 - 140/56)  RR: 17 (08-26-21 @ 14:52) (17 - 17)  SpO2: 98% (08-26-21 @ 14:52) (98% - 98%)  Wt(kg): --  I&O's Summary    25 Aug 2021 07:01  -  26 Aug 2021 07:00  --------------------------------------------------------  IN: 340 mL / OUT: 800 mL / NET: -460 mL    26 Aug 2021 07:01  -  27 Aug 2021 01:07  --------------------------------------------------------  IN: 0 mL / OUT: 1100 mL / NET: -1100 mL          Appearance: Normal	  HEENT:  PERRLA   Lymphatic: No lymphadenopathy   Cardiovascular: Normal S1 S2, no JVD  Respiratory: normal effort , clear  Gastrointestinal:  Soft, Non-tender  Skin: No rashes,  warm to touch  Psychiatry:  Mood & affect appropriate  Musculuskeletal: No edema      All labs, Imaging and EKGs personally reviewed     08-25-21 @ 07:01  -  08-26-21 @ 07:00  --------------------------------------------------------  IN: 340 mL / OUT: 800 mL / NET: -460 mL    08-26-21 @ 07:01  -  08-27-21 @ 01:07  --------------------------------------------------------  IN: 0 mL / OUT: 1100 mL / NET: -1100 mL                          8.7    10.98 )-----------( 560      ( 26 Aug 2021 07:27 )             26.4               08-26    129<L>  |  92<L>  |  17  ----------------------------<  112<H>  3.5   |  22  |  1.26    Ca    11.1<H>      26 Aug 2021 07:27  Phos  3.3     08-26  Mg     2.00     08-26    TPro  6.3  /  Alb  3.1<L>  /  TBili  0.3  /  DBili  x   /  AST  18  /  ALT  23  /  AlkPhos  220<H>  08-25

## 2021-08-26 NOTE — PROGRESS NOTE ADULT - NUTRITIONAL ASSESSMENT
This patient has been assessed with a concern for Malnutrition and has been determined to have a diagnosis/diagnoses of Severe protein-calorie malnutrition.    This patient is being managed with:   Diet Regular-  1000mL Fluid Restriction ([id number])  Nut Restricted  Soy Restricted  Gluten-Gliadin Restricted  No Chocolate  No Pork   Tube Feed Start Time: 18:00  Supplement Feeding Modality:  Oral  Ensure Compact Cans or Servings Per Day:  1       Frequency:  Three Times a day  Entered: Aug 14 2021 11:45AM    
This patient has been assessed with a concern for Malnutrition and has been determined to have a diagnosis/diagnoses of Severe protein-calorie malnutrition.    This patient is being managed with:   Diet Regular-  1000mL Fluid Restriction ([id number])  Nut Restricted  Soy Restricted  Gluten-Gliadin Restricted  No Chocolate  No Pork   Tube Feed Start Time: 18:00  Supplement Feeding Modality:  Oral  Ensure Compact Cans or Servings Per Day:  1       Frequency:  Three Times a day  Entered: Aug 14 2021 11:45AM    
This patient has been assessed with a concern for Malnutrition and has been determined to have a diagnosis/diagnoses of Severe protein-calorie malnutrition.    This patient is being managed with:   Diet Regular-  1000mL Fluid Restriction ([id number])  Nut Restricted  Soy Restricted  Gluten-Gliadin Restricted  No Chocolate  No Pork   Tube Feed Start Time: 18:00  Supplement Feeding Modality:  Oral  Ensure Compact Cans or Servings Per Day:  1       Frequency:  Three Times a day  Entered: Aug 14 2021 11:45AM    
This patient has been assessed with a concern for Malnutrition and has been determined to have a diagnosis/diagnoses of Severe protein-calorie malnutrition.    This patient is being managed with:   Diet Regular-  1000mL Fluid Restriction ([id number])  Nut Restricted  Soy Restricted  Gluten-Gliadin Restricted  No Chocolate  No Pork   Tube Feed Start Time: 18:00  Supplement Feeding Modality:  Oral  Ensure Compact Cans or Servings Per Day:  1       Frequency:  Three Times a day  Entered: Aug 14 2021 11:45AM    
This patient has been assessed with a concern for Malnutrition and has been determined to have a diagnosis/diagnoses of Severe protein-calorie malnutrition.    This patient is being managed with:   Diet Regular-  1000mL Fluid Restriction ([id number])  Nut Restricted  Soy Restricted  Gluten-Gliadin Restricted  No Chocolate  No Pork   Tube Feed Start Time: 18:00  Supplement Feeding Modality:  Oral  Ensure Compact Cans or Servings Per Day:  1       Frequency:  Three Times a day  Entered: Aug 14 2021 11:45AM    
This patient has been assessed with a concern for Malnutrition and has been determined to have a diagnosis/diagnoses of Severe protein-calorie malnutrition.    This patient is being managed with:   Diet Regular-  1000mL Fluid Restriction ([id number])  Nut Restricted  Soy Restricted  Gluten-Gliadin Restricted  No Chocolate  No Pork   Tube Feed Start Time: 18:00  Supplement Feeding Modality:  Oral  Ensure Compact Cans or Servings Per Day:  1       Frequency:  Three Times a day  Entered: Aug 14 2021 11:45AM    
This patient has been assessed with a concern for Malnutrition and has been determined to have a diagnosis/diagnoses of Severe protein-calorie malnutrition.    This patient is being managed with:   Diet Regular-  1000mL Fluid Restriction ([id number])  Nut Restricted  Soy Restricted  Gluten-Gliadin Restricted  No Chocolate  No Pork   Tube Feed Start Time: 18:00  Supplement Feeding Modality:  Oral  Ensure Compact Cans or Servings Per Day:  1       Frequency:  Three Times a day  Entered: Aug 14 2021 11:45AM    
This patient has been assessed with a concern for Malnutrition and has been determined to have a diagnosis/diagnoses of Severe protein-calorie malnutrition.    This patient is being managed with:   Diet Regular-  1000mL Fluid Restriction ([id number])  Nut Restricted  Soy Restricted  Gluten-Gliadin Restricted  No Chocolate  No Pork   Tube Feed Start Time: 18:00  Supplement Feeding Modality:  Oral  Ensure Compact Cans or Servings Per Day:  1       Frequency:  Three Times a day  Entered: Aug 14 2021 11:45AM    
This patient has been assessed with a concern for Malnutrition and has been determined to have a diagnosis/diagnoses of Severe protein-calorie malnutrition.    This patient is being managed with:   Diet Regular-  1000mL Fluid Restriction ([id number])  Nut Restricted  Soy Restricted  Gluten-Gliadin Restricted  No Chocolate  No Pork   Tube Feed Start Time: 18:00  Supplement Feeding Modality:  Oral  Ensure Compact Cans or Servings Per Day:  1       Frequency:  Three Times a day  Entered: Aug 14 2021 11:45AM    
This patient has been assessed with a concern for Malnutrition and has been determined to have a diagnosis/diagnoses of Severe protein-calorie malnutrition.    This patient is being managed with:   Diet Regular-  1000mL Fluid Restriction ([id number])  Nut Restricted  Soy Restricted  Gluten-Gliadin Restricted  No Chocolate  No Pork   Tube Feed Start Time: 18:00  Supplement Feeding Modality:  Oral  Ensure Compact Cans or Servings Per Day:  1       Frequency:  Three Times a day  Entered: Aug 14 2021 11:45AM    
This patient has been assessed with a concern for Malnutrition and has been determined to have a diagnosis/diagnoses of Severe protein-calorie malnutrition.    This patient is being managed with:   Diet Regular-  1000mL Fluid Restriction ([id number])  Nut Restricted  Soy Restricted  Gluten-Gliadin Restricted  No Chocolate  No Pork   Tube Feed Start Time: 18:00  Supplement Feeding Modality:  Oral  Ensure Compact Cans or Servings Per Day:  1       Frequency:  Three Times a day  Entered: Aug 14 2021 11:45AM    
This patient has been assessed with a concern for Malnutrition and has been determined to have a diagnosis/diagnoses of Severe protein-calorie malnutrition.    This patient is being managed with:   Diet Regular-  1000mL Fluid Restriction ([id number])  Nut Restricted  Soy Restricted  Gluten-Gliadin Restricted  No Chocolate  No Pork   Tube Feed Start Time: 18:00  Supplement Feeding Modality:  Oral  Ensure Compact Cans or Servings Per Day:  1       Frequency:  Three Times a day  Entered: Aug 14 2021 11:45AM    
This patient has been assessed with a concern for Malnutrition and has been determined to have a diagnosis/diagnoses of Severe protein-calorie malnutrition.    This patient is being managed with:   Diet Regular-  1000mL Fluid Restriction ([id number])  Nut Restricted  Soy Restricted  Gluten-Gliadin Restricted  No Chocolate  No Pork   Tube Feed Start Time: 18:00  Supplement Feeding Modality:  Oral  Ensure Compact Cans or Servings Per Day:  1       Frequency:  Three Times a day  Entered: Aug 14 2021 11:45AM    
This patient has been assessed with a concern for Malnutrition and has been determined to have a diagnosis/diagnoses of Severe protein-calorie malnutrition.    This patient is being managed with:   Diet Regular-  1000mL Fluid Restriction ([id number])  Nut Restricted  Soy Restricted  Gluten-Gliadin Restricted  No Chocolate  No Pork   Tube Feed Start Time: 18:00  Supplement Feeding Modality:  Oral  Ensure Compact Cans or Servings Per Day:  1       Frequency:  Three Times a day  Entered: Aug 14 2021 11:45AM    
This patient has been assessed with a concern for Malnutrition and has been determined to have a diagnosis/diagnoses of Severe protein-calorie malnutrition.    This patient is being managed with:   Diet Regular-  1000mL Fluid Restriction ([id number])  Nut Restricted  Soy Restricted  Gluten-Gliadin Restricted  No Chocolate  No Pork   Tube Feed Start Time: 18:00  Supplement Feeding Modality:  Oral  Ensure Compact Cans or Servings Per Day:  1       Frequency:  Three Times a day  Entered: Aug 14 2021 11:45AM    
This patient has been assessed with a concern for Malnutrition and has been determined to have a diagnosis/diagnoses of Severe protein-calorie malnutrition.    This patient is being managed with:   Diet Regular-  1000mL Fluid Restriction ([id number])  Nut Restricted  Soy Restricted  Gluten-Gliadin Restricted  No Chocolate  No Pork   Tube Feed Start Time: 18:00  Supplement Feeding Modality:  Oral  Ensure Compact Cans or Servings Per Day:  1       Frequency:  Three Times a day  Entered: Aug 14 2021 11:45AM    
This patient has been assessed with a concern for Malnutrition and has been determined to have a diagnosis/diagnoses of Severe protein-calorie malnutrition.    This patient is being managed with:   Diet Regular-  1000mL Fluid Restriction ([id number])  Nut Restricted  Soy Restricted  Gluten-Gliadin Restricted  No Chocolate  No Pork   Tube Feed Start Time: 18:00  Supplement Feeding Modality:  Oral  Ensure Compact Cans or Servings Per Day:  1       Frequency:  Three Times a day  Entered: Aug 14 2021 11:45AM    
This patient has been assessed with a concern for Malnutrition and has been determined to have a diagnosis/diagnoses of Severe protein-calorie malnutrition.    This patient is being managed with:   Diet Regular-  1000mL Fluid Restriction ([id number])  Nut Restricted  Soy Restricted  Gluten-Gliadin Restricted  No Chocolate  No Pork   Tube Feed Start Time: 18:00  Supplement Feeding Modality:  Oral  Ensure Compact Cans or Servings Per Day:  1       Frequency:  Three Times a day  Entered: Aug 14 2021 11:45AM

## 2021-08-26 NOTE — PROGRESS NOTE ADULT - REASON FOR ADMISSION
Left hip and groin pain

## 2021-08-26 NOTE — PROGRESS NOTE ADULT - TIME BILLING
A/P  discussed with daughter at bedside
A/P
assessment/plan and coordinating care
assessment/plan and coordinating care
A/P
assessment/plan and coordinating care

## 2021-08-26 NOTE — PROGRESS NOTE ADULT - PROBLEM SELECTOR PROBLEM 1
Bladder cancer metastasized to bone
Cancer associated pain
Bladder cancer metastasized to bone
Bladder cancer metastasized to bone
Cancer associated pain
Bladder cancer metastasized to bone

## 2021-08-26 NOTE — PROGRESS NOTE ADULT - SUBJECTIVE AND OBJECTIVE BOX
[facility]   MD LEONELA JULIO DO ANAM SIDDIQUI ANGELA WONG, PA    TEL:  OFFICE: [phone]  DR POST CELL: [phone]  DR. RAMOS CELL: [phone]  DR. MARTINEZ CELL: [phone]  LESVIA WILLOUGHBY CELL: [phone]    From 5pm-7am Answering Service [phone]    -- RENAL FOLLOW UP NOTE ---Date of Service 08-26-21 @ 13:38    Patient is a 75y old  Male who presents with a chief complaint of Left hip and groin pain (25 Aug 2021 12:05)      Patient seen and examined at bedside. No chest pain/sob    VITALS:  T(F): 98.4 (08-26-21 @ 05:45), Max: 98.4 (08-26-21 @ 05:45)  HR: 93 (08-26-21 @ 05:45)  BP: 133/59 (08-26-21 @ 05:45)  RR: 17 (08-26-21 @ 05:45)  SpO2: 98% (08-26-21 @ 05:45)  Wt(kg): --    08-25 @ 07:01  -  08-26 @ 07:00  --------------------------------------------------------  IN: 340 mL / OUT: 800 mL / NET: -460 mL          PHYSICAL EXAM:  Constitutional: NAD  Neck: No JVD  Respiratory: CTAB, no wheezes, rales or rhonchi  Cardiovascular: S1, S2, RRR  Gastrointestinal: BS+, soft, NT/ND  Extremities: No peripheral edema    Hospital Medications:   MEDICATIONS  (STANDING):  amLODIPine   Tablet 10 milliGRAM(s) Oral daily  atorvastatin 10 milliGRAM(s) Oral at bedtime  budesonide 160 MICROgram(s)/formoterol 4.5 MICROgram(s) Inhaler 2 Puff(s) Inhalation two times a day  cyanocobalamin 1000 MICROGram(s) Oral daily  finasteride 5 milliGRAM(s) Oral at bedtime  fluticasone propionate 50 MICROgram(s)/spray Nasal Spray 1 Spray(s) Both Nostrils two times a day  furosemide    Tablet 20 milliGRAM(s) Oral daily  heparin   Injectable 5000 Unit(s) SubCutaneous every 12 hours  metoprolol succinate ER 25 milliGRAM(s) Oral daily  MIRACLE MOUTHWASH 5 milliLiter(s) Swish and Spit four times a day  multivitamin 1 Tablet(s) Oral daily  pantoprazole    Tablet 40 milliGRAM(s) Oral before breakfast  piperacillin/tazobactam IVPB.. 3.375 Gram(s) IV Intermittent every 8 hours  polyethylene glycol 3350 17 Gram(s) Oral two times a day  sodium chloride 1 Gram(s) Oral three times a day  sodium chloride 0.9%. 1000 milliLiter(s) (75 mL/Hr) IV Continuous <Continuous>  tamsulosin 0.4 milliGRAM(s) Oral at bedtime  urea Oral Powder 15 Gram(s) Oral two times a day      LABS:  08-26    129<L>  |  92<L>  |  17  ----------------------------<  112<H>  3.5   |  22  |  1.26    Ca    11.1<H>      26 Aug 2021 07:27  Phos  3.3     08-26  Mg     2.00     08-26    TPro  6.3  /  Alb  3.1<L>  /  TBili  0.3  /  DBili      /  AST  18  /  ALT  23  /  AlkPhos  220<H>  08-25    Creatinine Trend: 1.26 <--, 1.21 <--, 1.43 <--, 1.22 <--, 1.31 <--, 1.30 <--, 1.22 <--    Phosphorus Level, Serum: 3.3 mg/dL (08-26 @ 07:27)                              8.7    10.98 )-----------( 560      ( 26 Aug 2021 07:27 )             26.4     Urine Studies:  Urinalysis - [08-19-21 @ 00:42]      Color Yellow / Appearance Slightly Turbid / SG 1.025 / pH 6.5      Gluc Negative / Ketone Negative  / Bili Negative / Urobili <2 mg/dL       Blood Moderate / Protein 100 mg/dL / Leuk Est Large / Nitrite Negative      RBC >50 / WBC >50 / Hyaline 2 / Gran  / Sq Epi  / Non Sq Epi 10 / Bacteria Moderate    Urine Sodium 74      [08-21-21 @ 14:14]  Urine Osmolality 419      [08-21-21 @ 14:14]    Iron 22, TIBC 227, %sat 10      [08-14-21 @ 06:25]  Ferritin 302      [08-14-21 @ 06:25]  PTH -- (Ca --)      [08-17-21 @ 06:55]   16  Vitamin D (25OH) 37.4      [08-17-21 @ 13:37]  HbA1c 6.3      [09-13-17 @ 08:15]  TSH 2.38      [08-14-21 @ 06:25]  Lipid: chol 148, , HDL 41, LDL --      [08-14-21 @ 06:25]        RADIOLOGY & ADDITIONAL STUDIES:

## 2021-08-26 NOTE — PROGRESS NOTE ADULT - PROBLEM SELECTOR PLAN 1
- Patient referred to [facility] ED by Dr. Holley (Oncologist) for CT and MRI to determine if cancer metastasized to spine.   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 Awaiting MRI cervical and thoracic spine.  - Oncology recommendations appreciated.  - Neurology consulted - Recs - MR c spine, t spine, l spine w/w/o contrast (when able)   - Neuro checks q6h  - PT/OT evaluation  - Fall risk protocol  - palliative eval appreciated   - outpatient radiation therapy, plan for one session radiation inpatient     D/c Planing
- Patient referred to [facility] ED by Dr. Holley (Oncologist) for CT and MRI to determine if cancer metastasized to spine.   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 Awaiting MRI cervical and thoracic spine.  - Oncology recommendations appreciated.  - Neurology consulted - Recs - MR c spine, t spine, l spine w/w/o contrast (when able)   - Neuro checks q6h  - PT/OT evaluation  - Fall risk protocol  - palliative eval appreciated   - outpatient radiation therapy    D/c Planing
- Patient referred to [facility] ED by Dr. Holley (Oncologist) for CT and MRI to determine if cancer metastasized to spine.   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 Awaiting MRI cervical and thoracic spine.  - Oncology recommendations appreciated.  - Neurology consulted - Recs - MR c spine, t spine, l spine w/w/o contrast (when able)   - Neuro checks q6h  - PT/OT evaluation  - Fall risk protocol  - palliative eval appreciated   - outpatient radiation therapy
- Patient referred to [facility] ED by Dr. Holley (Oncologist) for CT and MRI to determine if cancer metastasized to spine.   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 Awaiting MRI cervical and thoracic spine.  - Oncology recommendations appreciated.  - Neurology consulted - Recs - MR c spine, t spine, l spine w/w/o contrast (when able)   - Neuro checks q6h  - PT/OT evaluation  - Fall risk protocol  - pallaitive eval  - outpatient radiation therapy
- Patient referred to [facility] ED by Dr. Holley (Oncologist) for CT and MRI to determine if cancer metastasized to spine.   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 Awaiting MRI cervical and thoracic spine.  - Oncology recommendations appreciated.  - Neurology consulted - Recs - MR c spine, t spine, l spine w/w/o contrast (when able)   - Neuro checks q6h  - Labs in am: B12, TSH, folate, MMA, homocysteine, ESR, and CRP.  - PT/OT evaluation  - Fall risk protocol
upon exam today pt asking RN for pain medications, was hesitant to try Oxycodone 2.5mg PO because of hx of dizziness, re-educated pt that his current 5mg dose has been decreased to 2.5mg, pt amenable to trying Oxycodone 2.5mg today.   has required Tylenol 650mg PRN X3 in the last 24 hours       recommend:  - c/w Oxycodone IR 2.5mg q4h PRN for moderate to severe pain  - encourage bowel regimen with the use of opiates.
- Patient referred to [facility] ED by Dr. Holley (Oncologist) for CT and MRI to determine if cancer metastasized to spine.   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 Awaiting MRI cervical and thoracic spine.  - Oncology recommendations appreciated.  - Neurology consulted - Recs - MR c spine, t spine, l spine w/w/o contrast (when able)   - Neuro checks q6h  - PT/OT evaluation  - Fall risk protocol  - palliative eval appreciated   - outpatient radiation therapy, plan for one session radiation inpatient D/C afterward     D/c Planing
reports pain is being controlled with tylenol  states he does not want to take oxycodone that tylenol is helping   has required Tylenol 650mg PRN X2 in the last 24 hours       recommend:  - c/w tylenol 650mg q6h PRN   - c/w Oxycodone IR 2.5mg q4h PRN for moderate to severe pain  - encourage bowel regimen with the use of opiates.
- Patient referred to [facility] ED by Dr. Holley (Oncologist) for CT and MRI to determine if cancer metastasized to spine.   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 Awaiting MRI cervical and thoracic spine.  - Oncology recommendations appreciated.  - Neurology consulted - Recs - MR c spine, t spine, l spine w/w/o contrast (when able)   - Neuro checks q6h  - PT/OT evaluation  - Fall risk protocol  - palliative eval appreciated   - outpatient radiation therapy, plan for one session radiation inpatient D/C afterward   - monitor hgb level prior ot discharge, arcadio need PRBC transfusion     D/c Planing
- Patient referred to [facility] ED by Dr. Holley (Oncologist) for CT and MRI to determine if cancer metastasized to spine.   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 Awaiting MRI cervical and thoracic spine.  - Oncology recommendations appreciated.  - Neurology consulted - Recs - MR c spine, t spine, l spine w/w/o contrast (when able)   - Neuro checks q6h  - PT/OT evaluation  - Fall risk protocol  - palliative eval appreciated   - outpatient radiation therapy    D/c Planing
- Patient referred to [facility] ED by Dr. Holley (Oncologist) for CT and MRI to determine if cancer metastasized to spine.   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 Awaiting MRI cervical and thoracic spine.  - Oncology recommendations appreciated.  - Neurology consulted - Recs - MR c spine, t spine, l spine w/w/o contrast (when able)   - Neuro checks q6h  - PT/OT evaluation  - Fall risk protocol  - palliative eval appreciated   - outpatient radiation therapy, plan for one session radiation inpatient D/C afterward   - monitor hgb level prior ot discharge, arcadio need PRBC transfusion     D/c Planing
- Patient referred to [facility] ED by Dr. Holley (Oncologist) for CT and MRI to determine if cancer metastasized to spine.   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 Awaiting MRI cervical and thoracic spine.  - Oncology recommendations appreciated.  - Neurology consulted - Recs - MR c spine, t spine, l spine w/w/o contrast (when able)   - Neuro checks q6h  - PT/OT evaluation  - Fall risk protocol  - palliative eval appreciated   - outpatient radiation therapy, plan for one session radiation inpatient     D/c Planing
- Patient referred to [facility] ED by Dr. Holley (Oncologist) for CT and MRI to determine if cancer metastasized to spine.   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 Awaiting MRI cervical and thoracic spine.  - Oncology recommendations appreciated.  - Neurology consulted - Recs - MR c spine, t spine, l spine w/w/o contrast (when able)   - Neuro checks q6h  - Labs in am: B12, TSH, folate, MMA, homocysteine, ESR, and CRP.  - PT/OT evaluation  - Fall risk protocol
- Patient referred to [facility] ED by Dr. Holley (Oncologist) for CT and MRI to determine if cancer metastasized to spine.   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 Awaiting MRI cervical and thoracic spine.  - Oncology recommendations appreciated.  - Neurology consulted - Recs - MR c spine, t spine, l spine w/w/o contrast (when able)   - Neuro checks q6h  - PT/OT evaluation  - Fall risk protocol  - palliative eval appreciated   - outpatient radiation therapy, plan for one session radiation inpatient     D/c Planing
- Patient referred to [facility] ED by Dr. Holley (Oncologist) for CT and MRI to determine if cancer metastasized to spine.   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 Awaiting MRI cervical and thoracic spine.  - Oncology recommendations appreciated.  - Neurology consulted - Recs - MR c spine, t spine, l spine w/w/o contrast (when able)   - Neuro checks q6h  - PT/OT evaluation  - Fall risk protocol  - palliative eval appreciated   - outpatient radiation therapy    D/c Planing

## 2021-08-26 NOTE — PROGRESS NOTE ADULT - PROBLEM SELECTOR PROBLEM 2
Cancer associated pain
Chronic kidney disease, unspecified CKD stage
Cancer associated pain
Chronic kidney disease, unspecified CKD stage
Cancer associated pain

## 2021-08-26 NOTE — PROGRESS NOTE ADULT - PROBLEM SELECTOR PROBLEM 4
Anemia
Encounter for palliative care
Anemia
Encounter for palliative care
Anemia

## 2021-08-26 NOTE — PROGRESS NOTE ADULT - PROBLEM SELECTOR PLAN 5
- Hx/o SIADH  - , which is baseline  - Continue home med, sodium chloride 1g oral tablet TID  - free water restriction <1L/day.  - Monitor I's & O's
- Hx/o SIADH  - , which is baseline  - Continue home med, sodium chloride 1g oral tablet TID  - free water restriction <1L/day.  - Monitor I's & O's  - Labs in the am
- Hx/o SIADH  - , which is baseline  - Continue home med, sodium chloride 1g oral tablet TID  - free water restriction <1L/day.  - Monitor I's & O's  - Labs in the am
- Hx/o SIADH  - , which is baseline  - Continue home med, sodium chloride 1g oral tablet TID  - free water restriction <1L/day.  - Monitor I's & O's

## 2021-08-26 NOTE — PROGRESS NOTE ADULT - PROVIDER SPECIALTY LIST ADULT
Heme/Onc
Nephrology
Heme/Onc
Nephrology
Palliative Care
Internal Medicine
Nephrology
Nephrology
Internal Medicine
Palliative Care
Internal Medicine

## 2021-08-26 NOTE — HOSPICE CARE NOTE - CONVESATION DETAILS
[facility] RN spoke with pt's grandson and gave information about home hospice care, services and the need for consent - if a home hospice plan of care is desired. Pt's grandson stated he will speak with pt/ pt's spouse and other family members regarding pt's care going forward. Information was e-mailed to grandson and a folder with written info and consents was left at pt's bedside, per request. [facility] RNs will follow up with family regarding their decision to elect hospice care or not. 
Pt is approved for Home Hospice. Hosp bed, air mattress, bedside commode,  w/c and a walker will be delivered from [facility] this evening. Consent have been obtained.

## 2021-08-26 NOTE — DISCHARGE NOTE NURSING/CASE MANAGEMENT/SOCIAL WORK - PATIENT PORTAL LINK FT
You can access the FollowMyHealth Patient Portal offered by [facility] by registering at the following website: [email or web address]. By joining [facility]’s FollowMyHealth portal, you will also be able to view your health information using other applications (apps) compatible with our system.

## 2021-08-30 LAB — PTH RELATED PROT SERPL-MCNC: <2 PMOL/L — SIGNIFICANT CHANGE UP

## 2021-10-18 NOTE — PATIENT PROFILE ADULT - CONTRAINDICATIONS & PRECAUTIONS (SELECT ALL THAT APPLY)
Telephone call patient called to report he tested positive for covid.  He took test at [facility]  on 10/14/2021.  He is experiencing loss of taste, loss of smell and some chest discomfort, mild headache that comes and goes, runny nose,stuffy in the head and tired .  He was achy last night but gone today.  Symptoms started 10/12/2021.  Questioning if he can get the monoclonal antibodies or remdesivir.  He denies fever.Patient would like a call from the provider.  Routing to PCP   none...

## 2021-11-24 NOTE — PATIENT PROFILE ADULT. - FUNCTIONAL SCREEN CURRENT LEVEL: AMBULATION, MLM
Destruction After The Procedure: No Cryotherapy Text: The wound bed was treated with cryotherapy after the biopsy was performed. Billing Type: Third-Party Bill Additional Anesthesia Volume In Cc (Will Not Render If 0): 0 Curettage Text: The wound bed was treated with curettage after the biopsy was performed. Depth Of Biopsy: dermis Biopsy Method: Dermablade Biopsy Type: H and E Was A Bandage Applied: Yes Silver Nitrate Text: The wound bed was treated with silver nitrate after the biopsy was performed. Post-Care Instructions: I reviewed with the patient in detail post-care instructions. Patient is to keep the biopsy site dry overnight, and then apply bacitracin twice daily until healed. Patient may apply hydrogen peroxide soaks to remove any crusting. Electrodesiccation And Curettage Text: The wound bed was treated with electrodesiccation and curettage after the biopsy was performed. Anesthesia Type: 1% lidocaine with epinephrine Notification Instructions: Patient will be notified of biopsy results. However, patient instructed to call the office if not contacted within 2 weeks. Consent: Written consent was obtained and risks were reviewed including but not limited to scarring, infection, bleeding, scabbing, incomplete removal, nerve damage and allergy to anesthesia. Type Of Destruction Used: Curettage Dressing: bandage Anesthesia Volume In Cc (Will Not Render If 0): 0.5 Electrodesiccation Text: The wound bed was treated with electrodesiccation after the biopsy was performed. Hemostasis: Drysol Detail Level: Detailed Information: Selecting Yes will display possible errors in your note based on the variables you have selected. This validation is only offered as a suggestion for you. PLEASE NOTE THAT THE VALIDATION TEXT WILL BE REMOVED WHEN YOU FINALIZE YOUR NOTE. IF YOU WANT TO FAX A PRELIMINARY NOTE YOU WILL NEED TO TOGGLE THIS TO 'NO' IF YOU DO NOT WANT IT IN YOUR FAXED NOTE. Wound Care: Petrolatum (0) independent

## 2022-01-28 NOTE — ED ADULT TRIAGE NOTE - CHIEF COMPLAINT QUOTE
Pt. arrives with swelling( bump) below right eye. Pt. c/o intermittent upper tooth pain for 2 weeks, and states he woke up this morning with bump. Pt. states to have a hx. HTN. Pt. states tooth and right sided facial bump is a 4/ 10 pain level. Pt. denies any blurry vision.  Pt. appears comfortable in triage.
Opt out
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
## 2022-06-30 NOTE — PROGRESS NOTE ADULT - PROBLEM SELECTOR PROBLEM 3
Hyponatremia
Hyponatremia
R mid pleuritic back pain, Patient attributed Dyspnea from unable to take a deep breath due to the back pain, spo2 98-99% on RA, CTAP neg for central PE, but limited study due to patient's body habits, high risk given obesity, unclear etiology for pain   - check VA duplex b/l LE  - check Ct ab/P w/p cont  - UA neg, no evidence of PNA on imaging, EKG unchanged from past   Pain: lidocaine patch, tylenol PRN mild pain, oxycodone 5 mg Q6H PRN mod pain and oxycodone 10 mg Q6h PRN severe pain

## 2022-08-04 NOTE — H&P PST ADULT - PAIN CHRONIC, PROFILE
Profile:    Angela Maddox is a 80 year old presents for an opinion as to anemia on the request of Dr. Ogden.      History of Present Illness:    Angela Maddox is a 80 year old who had normal red cell indices back in February 2019.  However in January 21 her hemoglobin was 9.1.  In January 21 her serum ferritin was 104.    In July 2021 her hemoglobin dropped to 8.0.  And then prior to hip surgery she was found to be severely anemic with a hemoglobin of 6.4 and an MCV of 114.    The patient has been receiving parenteral iron infusion.  Today she presents for an evaluation of anemia.    Primary care provider Dr. Ogden, gastroenterologist Dr. Chaudhry, nephrologist Dr. Plata/      Subjective:  She admits to dark stools since being on iron therapy.  She denies any vaginal bleeding gross hematuria no shortness of breath.  She denies any fevers, unintentional weight loss, night sweats.  She states back in February 2022 she required 2 units of packed red blood cells.  During that time she saw Dr. Chaudhry had endoscopy and colonoscopy.  Has not had a follow-up with GI has been receiving parenteral iron for over a year.        Heme history:  Anemia June 2022 9.5 February 2022 6.4 with macrocytosis      Past medical History:   Atrial fibrillation, degenerative joint disease spinal stenosis      Past Surgical History:    Right hip replacement May 2022, cholecystectomy, hemorrhoid, colonoscopy, endoscopy    Social History:   No tobacco, no alcohol, she is .  She is retired hairdresser and .  She has 2 children    Family hx:     No family history of hemoglobinopathy.  Mother passed away from complications of multiple sclerosis, dad passed away from heart disease and stroke 1 brother and 4 sisters alive and healthy  Allergies:  ALLERGIES:  No Known Allergies      Medications:  Current Outpatient Medications   Medication Sig Dispense Refill   • traMADol (ULTRAM) 50 MG tablet TAKE 2 TABLETS THREE TIMES DAILY AS  NEEDED FOR PAIN 180 tablet 2   • isosorbide mononitrate (IMDUR) 30 MG 24 hr tablet TAKE 1 TABLET EVERY DAY 90 tablet 3   • polyethylene glycol (MIRALAX) 17 g packet Take 17 g by mouth daily as needed. Stir and dissolve powder in any 4 to 8 ounces of beverage, then drink.     • FERROUS SULFATE PO Take 325 mg by mouth daily.     • docusate sodium-sennosides (SENOKOT S) 50-8.6 MG per tablet Take 2 tablets by mouth daily as needed.     • rosuvastatin (CRESTOR) 40 MG tablet Take 1 tablet by mouth at bedtime. 90 tablet 3   • ergocalciferol (DRISDOL) 1.25 mg (50,000 units) capsule Take 1 capsule by mouth every 30 days. Take 1 Cap by mouth every 30 (thirty) days. - Oral 3 capsule 3   • metoPROLOL succinate (TOPROL-XL) 100 MG 24 hr tablet TAKE 1 TABLET EVERY DAY 90 tablet 3   • dilTIAZem (CARDIZEM SR) 60 MG 12 hr capsule TAKE 1 CAPSULE EVERY DAY 90 capsule 3   • lisinopril (ZESTRIL) 20 MG tablet Take 1 tablet by mouth 2 times daily. 180 tablet 3   • gabapentin (NEURONTIN) 300 MG capsule Take 1 capsule by mouth 3 times daily. 270 capsule 3   • furosemide (LASIX) 40 MG tablet Take 1 tablet by mouth daily. 90 tablet 3   • allopurinol (ZYLOPRIM) 100 MG tablet Take 2 tablets by mouth daily. 180 tablet 3   • omeprazole (PrilOSEC) 20 MG capsule Take 1 capsule by mouth daily. 90 capsule 3   • celecoxib (CeleBREX) 200 MG capsule Take 1 capsule by mouth daily. 30 capsule 0   • Eliquis 5 MG Tab TAKE 1 TABLET EVERY 12 HOURS 180 tablet 1   • Calcium Carb-Cholecalciferol 1000-800 MG-UNIT Tab Take 1 tablet by mouth daily. 60 tablet    • ascorbic acid (VITAMIN C) 500 MG tablet Take 500 mg by mouth daily. 1 TABLET DAILY - Oral      • Acetaminophen (TYLENOL EX ST ARTHRITIS PAIN PO) Take 1,000 mg by mouth every 8 hours.       No current facility-administered medications for this visit.         ROS   Complete ros reviewed, see HPI/subjective for positives.    Exam:  Visit Vitals  /68   Pulse 71   Temp 98.3 °F (36.8 °C) (Tympanic)   Resp  18   Ht 5' 6\" (1.676 m)   Wt 93.4 kg (206 lb)   SpO2 97%   BMI 33.25 kg/m²       Ecog ps 0Pain: 0    GEN-nad/ao/af  HEENT- Perrl, CN2-12, no icterus, no nystagmus, no oral lesions, no ulcerations  Neck- supple, no masses, no increase in jvd,   Lymph- no adenopathy x 6 rony stations bilateral.  Cv- RRR no murmur, no le edema  Lungs- cta bilaterally, no r,r,w, symmetric breath sounds  Abd- soft,nt,nd, no hsm, no mass, no bruit, no guarding  Extr- no c,c,e, bilateral.  Mskt- no rom deficit bilaterally  Neuro- no focal deficit  Psych- no depression on screening  Skin- no ecchymosis, no nevi, no rashes.       Data  Component      Latest Ref Rng & Units 9/15/2021 2/18/2022 2/20/2022 2/21/2022   WHITE BLOOD CELL COUNT      4.0 - 10.0 10*3/uL 7.2 10.3 (H)     RBC      3.70 - 5.20 10*6/uL 3.01 (L) 2.06 (L) 2.32 (L) 2.89 (L)   HGB      11.2 - 15.7 g/dL 8.5 (L) 6.4 (LL)     HCT      34.0 - 45.0 % 28.1 (L) 23.6 (L)     MCV      79.0 - 95.0 fL 93.4 114.6 (H) 99.5 96.1   MCH      27.0 - 34.0 pg 28.2 31.1 32.3 31.1   MCHC      32.0 - 36.0 % 30.2 (L) 27.1 (L)     PLT      150 - 400 10*3/uL 257 357 228 219   MPV      8.6 - 12.4 fL 10.3 10.7     RDW-CV      11.3 - 14.8 % 17.4 (H) 19.8 (H)     Neutrophil      34.0 - 73.5 % 71.6 86.2 (H)     Absolute Neutrophil      1.4 - 6.5 10*3/uL 5.2 8.9 (H)     LYMPH      20.5 - 51.1 % 16.0 (L) 7.4 (L)     Absolute Lymph      1.2 - 3.4 10*3/uL 1.2 0.8 (L)       Component      Latest Ref Rng & Units 4/14/2022 5/5/2022 5/6/2022 6/20/2022   WHITE BLOOD CELL COUNT      4.0 - 10.0 10*3/uL 7.0   6.9   RBC      3.70 - 5.20 10*6/uL 3.24 (L) 2.54 (L) 2.66 (L) 3.17 (L)   HGB      11.2 - 15.7 g/dL 10.1 (L)   9.5 (L)   HCT      34.0 - 45.0 % 33.6 (L)   32.7 (L)   MCV      79.0 - 95.0 fL 103.7 (H) 93.9 95.5 103.2 (H)   MCH      27.0 - 34.0 pg 31.2 31.6 30.5 30.0   MCHC      32.0 - 36.0 % 30.1 (L)   29.1 (L)   PLT      150 - 400 10*3/uL 255 164 193 255   MPV      8.6 - 12.4 fL 12.4   11.7   RDW-CV      11.3 -  14.8 % 14.8   16.1 (H)   Neutrophil      34.0 - 73.5 % 71.1   74.0 (H)   Absolute Neutrophil      1.4 - 6.5 10*3/uL 5.0   5.1   LYMPH      20.5 - 51.1 % 16.8 (L)   13.7 (L)   Absolute Lymph      1.2 - 3.4 10*3/uL 1.2   0.9 (L)       Labs:  Component      Latest Ref Rng & Units 1/18/2021 9/13/2021 4/14/2022   FERRITIN SERUM      11 - 264 ng/mL 104 23 57           Impression:  # anemia  # macrocytosis         Plan:   #1.  I was able to review the normal biology of the part of blood production in the differential for anemia.    #2.  Reviewing her last blood test from the past year she has had macrocytosis.  And her iron indices have been \"normal \"albeit dropping over the past 6 months.    3.  I would recommend repeating blood tests including looking for causes of macrocytosis as well as possible [id number] bone marrow processes.  She may need a bone marrow biopsy in the future.    4.  If she still has evidence of iron deficiency anemia I would recommend that she follows up with Dr. Chaudhry has a capsule endoscopy and also look for occult hematuria.    5.  However she has significant macrocytosis and work-up will be started as I am concerned about MDS or other process over iron deficiency.      She will return in 1 to 2 weeks after her labs have been completed and resulted.    Thank you very much for allowing me to participate in her care.  We will hold off for parental iron at this time.    A total of  60  minutes was spent on this visit reviewing previous notes, counseling the patient on ( anemia ), ordering tests , adjusting medications, and documenting the findings in the note.    Orders Placed This Encounter   • Immunofixation Electrophoresis With IgG, IgA, IgM And Inez/Lambda Free Light Chains   • Ferritin   • Folate   • Vitamin B12   • Iron   • Reticulocyte Count Automated   • Haptoglobin   • Lactate Dehydrogenase   • Vitamin D -25 Hydroxy   • COMPREHENSIVE MET PNL (RANDOM)   • CBC with Automated  Differential   • PATHOLOGIST REVIEW OF PERIPHERAL SMEAR       Patient Instructions     Hi. It was a pleasure to meet you.    ISSUES:   1) anemia- low red cells through jan 2021- however, FEB 2022- red cells , low but change shape   2) need to find cause.    Plan:   1) labs today   2) return 7-10 days later to review findings.       Prior labs:  Component      Latest Ref Rng & Units 1/18/2021 9/13/2021 4/14/2022   FERRITIN SERUM      11 - 264 ng/mL 104 23 57     PLEASE CALL OFFICE IF ANY CONCERNS [phone]     Angelabenedicto Maddox is to return to the clinic to see in Arley Velásquez DO  1 weeks for a 20 min OFV for follow-up       Labs done now:  Orders Placed This Encounter   • Immunofixation Electrophoresis With IgG, IgA, IgM And [place]/Lambda Free Light Chains     Order Specific Question:   How should test results be released to the patient's [facility] portal?     Answer:   Automatic Release   • Ferritin   • Folate   • Vitamin B12   • Iron   • Reticulocyte Count Automated   • Haptoglobin   • Lactate Dehydrogenase   • Vitamin D -25 Hydroxy   • COMPREHENSIVE MET PNL (RANDOM)     Order Specific Question:   Specimen type     Answer:   Blood+grp   • CBC with Automated Differential   • PATHOLOGIST REVIEW OF PERIPHERAL SMEAR     Order Specific Question:   Specimen type     Answer:   Blood+grp         Diagnosis    1. Abnormal blood chemistry    2. Chronic anemia        Arley Velásquez DO           no

## 2022-10-03 NOTE — H&P PST ADULT - LOCATION OF BACK PAIN
radiates to bilateral legs to calf/thoracic spine/lumbar spine [place] Counseling:  I discussed with the patient the risks of ustekinumab including but not limited to immunosuppression, malignancy, posterior leukoencephalopathy syndrome, and serious infections.  The patient understands that monitoring is required including a PPD at baseline and must alert us or the primary physician if symptoms of infection or other concerning signs are noted.

## 2023-01-03 NOTE — ED ADULT TRIAGE NOTE - BP NONINVASIVE SYSTOLIC (MM HG)
136 [address]te[address]la Pregnancy And Lactation Text: This medication is Pregnancy Category C and it isn't known if it is safe during pregnancy. It is unknown if it is excreted in breast milk.

## 2023-01-22 NOTE — PROGRESS NOTE ADULT - SUBJECTIVE AND OBJECTIVE BOX
Name of Patient : MD QURESHI  MRN: [id number]        Subjective: Patient seen and examined. No new events except as noted.   doing okay     REVIEW OF SYSTEMS:    CONSTITUTIONAL: No weakness, fevers or chills  EYES/ENT: No visual changes;  No vertigo or throat pain   NECK: No pain or stiffness  RESPIRATORY: No cough, wheezing, hemoptysis; No shortness of breath  CARDIOVASCULAR: No chest pain or palpitations  GASTROINTESTINAL: No abdominal or epigastric pain.  GENITOURINARY: No dysuria, frequency or hematuria  NEUROLOGICAL: No numbness or weakness  SKIN: No itching, burning, rashes, or lesions   All other review of systems is negative unless indicated above.    MEDICATIONS:  MEDICATIONS  (STANDING):  amLODIPine   Tablet 10 milliGRAM(s) Oral daily  atorvastatin 10 milliGRAM(s) Oral at bedtime  budesonide 160 MICROgram(s)/formoterol 4.5 MICROgram(s) Inhaler 2 Puff(s) Inhalation two times a day  cyanocobalamin 1000 MICROGram(s) Oral daily  finasteride 5 milliGRAM(s) Oral at bedtime  fluticasone propionate 50 MICROgram(s)/spray Nasal Spray 1 Spray(s) Both Nostrils two times a day  furosemide    Tablet 20 milliGRAM(s) Oral daily  heparin   Injectable 5000 Unit(s) SubCutaneous every 12 hours  metoprolol succinate ER 25 milliGRAM(s) Oral daily  MIRACLE MOUTHWASH 5 milliLiter(s) Swish and Spit four times a day  multivitamin 1 Tablet(s) Oral daily  pantoprazole    Tablet 40 milliGRAM(s) Oral before breakfast  piperacillin/tazobactam IVPB.. 3.375 Gram(s) IV Intermittent every 8 hours  polyethylene glycol 3350 17 Gram(s) Oral two times a day  sodium chloride 1 Gram(s) Oral three times a day  tamsulosin 0.4 milliGRAM(s) Oral at bedtime  urea Oral Powder 15 Gram(s) Oral two times a day      PHYSICAL EXAM:  T(C): 37 (21 @ 20:52), Max: 37 (21 @ 13:34)  HR: 86 (21 @ 20:52) (86 - 90)  BP: 122/52 (21 @ 20:52) (122/52 - 140/58)  RR: 18 (21 @ 20:52) (18 - 18)  SpO2: 98% (21 @ 20:52) (97% - 98%)  Wt(kg): --  I&O's Summary    19 Aug 2021 07:01  -  20 Aug 2021 07:00  --------------------------------------------------------  IN: 360 mL / OUT: 950 mL / NET: -590 mL    20 Aug 2021 07:01  -  21 Aug 2021 00:12  --------------------------------------------------------  IN: 0 mL / OUT: 1100 mL / NET: -1100 mL          Appearance: Normal	  HEENT:  PERRLA   Lymphatic: No lymphadenopathy   Cardiovascular: Normal S1 S2, no JVD  Respiratory: normal effort , clear  Gastrointestinal:  Soft, Non-tender  Skin: No rashes,  warm to touch  Psychiatry:  Mood & affect appropriate  Musculuskeletal: No edema      All labs, Imaging and EKGs personally reviewed     21 @ 07:01  -  21 @ 07:00  --------------------------------------------------------  IN: 360 mL / OUT: 950 mL / NET: -590 mL    21 @ 07:01  -  21 @ 00:12  --------------------------------------------------------  IN: 0 mL / OUT: 1100 mL / NET: -1100 mL                            8.5    11.04 )-----------( 515      ( 20 Aug 2021 07:29 )             26.2               08-20    130<L>  |  92<L>  |  23  ----------------------------<  113<H>  3.8   |  23  |  1.22    Ca    10.5      20 Aug 2021 07:29  Phos  3.8     08-  Mg     2.10     08-    TPro  6.3  /  Alb  3.2<L>  /  TBili  0.3  /  DBili  x   /  AST  23  /  ALT  20  /  AlkPhos  188<H>                         Urinalysis Basic - ( 19 Aug 2021 00:42 )    Color: Yellow / Appearance: Slightly Turbid / S.025 / pH: x  Gluc: x / Ketone: Negative  / Bili: Negative / Urobili: <2 mg/dL   Blood: x / Protein: 100 mg/dL / Nitrite: Negative   Leuk Esterase: Large / RBC: >50 /HPF / WBC >50 /HPF   Sq Epi: x / Non Sq Epi: 10 /HPF / Bacteria: Moderate             No

## 2023-04-06 NOTE — H&P PST ADULT - NSANTHOSAYNRD_GEN_A_CORE
No. DAYA screening performed.  STOP BANG Legend: 0-2 = LOW Risk; 3-4 = INTERMEDIATE Risk; 5-8 = HIGH Risk [place]alt[place] Counseling: I discussed with the patient the risks of ixekizumab including but not limited to immunosuppression, serious infections, worsening of inflammatory bowel disease and drug reactions.  The patient understands that monitoring is required including a PPD at baseline and must alert us or the primary physician if symptoms of infection or other concerning signs are noted.

## 2023-04-23 NOTE — PROGRESS NOTE ADULT - PROBLEM SELECTOR PLAN 8
Anesthesia Pre-Procedure Evaluation    Patient: Don Venegas   MRN: [id number] : 1949        Procedure : Procedure(s):  CYSTOSCOPY WITH LEFT STENT PLACEMENT, POSSIBLE LASER          No past medical history on file.   No past surgical history on file.   Allergies   Allergen Reactions     Gluten Meal       Social History     Tobacco Use     Smoking status: Not on file     Smokeless tobacco: Not on file   Substance Use Topics     Alcohol use: Not on file      Wt Readings from Last 1 Encounters:   23 68 kg (150 lb)        Anesthesia Evaluation            ROS/MED HX  ENT/Pulmonary:       Neurologic:       Cardiovascular:       METS/Exercise Tolerance:     Hematologic:       Musculoskeletal:       GI/Hepatic:       Renal/Genitourinary:     (+) renal disease, type: ARF, Nephrolithiasis , BPH,     Endo:       Psychiatric/Substance Use:       Infectious Disease:       Malignancy:       Other:            Physical Exam    Airway        Mallampati: I    Neck ROM: full     Respiratory Devices and Support         Dental       (+) Minor Abnormalities - some fillings, tiny chips      Cardiovascular   cardiovascular exam normal          Pulmonary   pulmonary exam normal                OUTSIDE LABS:  CBC:   Lab Results   Component Value Date    WBC 14.6 (H) 2023    WBC 19.0 (H) 2023    HGB 12.7 (L) 2023    HGB 14.1 2023    HCT 36.3 (L) 2023    HCT 41.0 2023     2023     2023     BMP:   Lab Results   Component Value Date     2023     2023    POTASSIUM 3.7 2023    POTASSIUM 4.0 2023    CHLORIDE 105 2023    CHLORIDE 100 2023    CO2 24 2023    CO2 27 2023    BUN 24.2 (H) 2023    BUN 25.9 (H) 2023    CR 1.51 (H) 2023    CR 2.03 (H) 2023     (H) 2023     (H) 2023     COAGS: No results found for: PTT, INR, FIBR  POC: No results found for: BGM, HCG, 
HCGS  HEPATIC:   Lab Results   Component Value Date    ALBUMIN 3.4 (L) 04/23/2023    PROTTOTAL 5.7 (L) 04/23/2023    ALT 14 04/23/2023    AST 28 04/23/2023    ALKPHOS 90 04/23/2023    BILITOTAL 1.0 04/23/2023     OTHER:   Lab Results   Component Value Date    PH 7.36 04/22/2023    LACT 2.0 04/22/2023    MIKY 8.6 (L) 04/23/2023       Anesthesia Plan    ASA Status:  2      Anesthesia Type: General.     - Airway: LMA   Induction: Intravenous.   Maintenance: Balanced.        Consents    Anesthesia Plan(s) and associated risks, benefits, and realistic alternatives discussed. Questions answered and patient/representative(s) expressed understanding.    - Discussed:     - Discussed with:  Patient         Postoperative Care    Pain management: IV analgesics.   PONV prophylaxis: Ondansetron (or other 5HT-3)     Comments:                DAR ANGELES MD  
- DVT prophylactic Heparin 5000 unit SQ q12h  - PT/ OT evaluation

## 2023-06-27 NOTE — PHYSICAL THERAPY INITIAL EVALUATION ADULT - LEVEL OF INDEPENDENCE: STAND/SIT, REHAB EVAL
Well Child Visit at 6 to 15 Years   AMBULATORY CARE:   A well child visit  is when your child sees a healthcare provider to prevent health problems  Well child visits are used to track your child's growth and development  It is also a time for you to ask questions and to get information on how to keep your child safe  Write down your questions so you remember to ask them  Your child should have regular well child visits from birth to 25 years  Development milestones your child may reach at 6 to 14 years:  Each child develops at his or her own pace  Your child might have already reached the following milestones, or he or she may reach them later:  Breast development (girls), testicle and penis enlargement (boys), and armpit or pubic hair    Menstruation (monthly periods) in girls    Skin changes, such as oily skin and acne    Not understanding that actions may have negative effects    Focus on appearance and a need to be accepted by others his or her own age    Help your child get the right nutrition:   Teach your child about a healthy meal plan by setting a good example  Your child still learns from your eating habits  Buy healthy foods for your family  Eat healthy meals together as a family as often as possible  Talk with your child about why it is important to choose healthy foods  Let your child decide how much to eat  Give your child small portions  Let him or her have another serving if he or she asks for one  Your child will be very hungry on some days and want to eat more  For example, your child may want to eat more on days when he or she is more active  Your child may also eat more if he or she is going through a growth spurt  There may be days when he or she eats less than usual          Encourage your child to eat regular meals and snacks, even if he or she is busy  Your child should eat 3 meals and 2 snacks each day to help meet his or her calorie needs   He or she should also eat a variety of healthy foods to get the nutrients he or she needs, and to maintain a healthy weight  You may need to help your child plan meals and snacks  Suggest healthy food choices that your child can make when he or she eats out  Your child could order a chicken sandwich instead of a large burger or choose a side salad instead of [place] fries  Praise your child's good food choices whenever you can  Provide a variety of fruits and vegetables  Half of your child's plate should contain fruits and vegetables  He or she should eat about 5 servings of fruits and vegetables each day  Buy fresh, canned, or dried fruit instead of fruit juice as often as possible  Offer more dark green, red, and orange vegetables  Dark green vegetables include broccoli, spinach, mohinder lettuce, and lance greens  Examples of orange and red vegetables are carrots, sweet potatoes, winter squash, and red peppers  Provide whole-grain foods  Half of the grains your child eats each day should be whole grains  Whole grains include brown rice, whole-wheat pasta, and whole-grain cereals and breads  Provide low-fat dairy foods  Dairy foods are a good source of calcium  Your child needs 1,300 milligrams (mg) of calcium each day  Dairy foods include milk, cheese, cottage cheese, and yogurt  Provide lean meats, poultry, fish, and other healthy protein foods  Other healthy protein foods include legumes (such as beans), soy foods (such as tofu), and peanut butter  Bake, broil, and grill meat instead of frying it to reduce the amount of fat  Use healthy fats to prepare your child's food  Unsaturated fat is a healthy fat  It is found in foods such as soybean, canola, olive, and sunflower oils  It is also found in soft tub margarine that is made with liquid vegetable oil  Limit unhealthy fats such as saturated fat, trans fat, and cholesterol  These are found in shortening, butter, margarine, and animal fat      Help your child limit his or her intake of fat, sugar, and caffeine  Foods high in fat and sugar include snack foods (potato chips, candy, and other sweets), juice, fruit drinks, and soda  If your child eats these foods too often, he or she may eat fewer healthy foods during mealtimes  He or she may also gain too much weight  Caffeine is found in soft drinks, energy drinks, tea, coffee, and some over-the-counter medicines  Your child should limit his or her intake of caffeine to 100 mg or less each day  Caffeine can cause your child to feel jittery, anxious, or dizzy  It can also cause headaches and trouble sleeping  Encourage your child to talk to you or a healthcare provider about safe weight loss, if needed  Adolescents may want to follow a fad diet they see their friends or famous people following  Fad diets usually do not have all the nutrients your child needs to grow and stay healthy  Diets may also lead to eating disorders such as anorexia and bulimia  Anorexia is refusal to eat  Bulimia is binge eating followed by vomiting, using laxative medicine, not eating at all, or heavy exercise  Help your  for his or her teeth:   Remind your child to brush his or her teeth 2 times each day  Mouth care prevents infection, plaque, bleeding gums, mouth sores, and cavities  It also freshens breath and improves appetite  Take your child to the dentist at least 2 times each year  A dentist can check for problems with your child's teeth or gums, and provide treatments to protect his or her teeth  Encourage your child to wear a mouth guard during sports  This will protect your child's teeth from injury  Make sure the mouth guard fits correctly  Ask your child's healthcare provider for more information on mouth guards  Keep your child safe:   Remind your child to always wear a seatbelt  Make sure everyone in your car wears a seatbelt  Encourage your child to do safe and healthy activities    Encourage your child to play sports or join an after school program     Store and lock all weapons  Lock ammunition in a separate place  Do not show or tell your child where you keep the key  Make sure all guns are unloaded before you store them  Encourage your child to use safety equipment  Encourage him or her to wear helmets, protective sports gear, and life jackets  Other ways to care for your child:   Talk to your child about puberty  Puberty usually starts between ages 6 to 15 in girls, but it may start earlier or later  Puberty usually ends by about age 15 in girls  Puberty usually starts between ages 8 to 15 in boys, but it may start earlier or later  Puberty usually ends by about age 13 or 12 in boys  Ask your child's healthcare provider for information about how to talk to your child about puberty, if needed  Encourage your child to get 1 hour of physical activity each day  Examples of physical activities include sports, running, walking, swimming, and riding bikes  The hour of physical activity does not need to be done all at once  It can be done in shorter blocks of time  Your child can fit in more physical activity by limiting screen time  Limit your child's screen time  Screen time is the amount of television, computer, smart phone, and video game time your child has each day  It is important to limit screen time  This helps your child get enough sleep, physical activity, and social interaction each day  Your child's pediatrician can help you create a screen time plan  The daily limit is usually 1 hour for children 2 to 5 years  The daily limit is usually 2 hours for children 6 years or older  You can also set limits on the kinds of devices your child can use, and where he or she can use them  Keep the plan where your child and anyone who takes care of him or her can see it  Create a plan for each child in your family   You can also go to "[employer]ardo ernst  org/English/media/Pages/default  aspx#planview for more help creating a plan  Praise your child for good behavior  Do this any time he or she does well in school or makes safe and healthy choices  Monitor your child's progress at school  Go to [employer] [facility]  Ask your child to let you see your child's report card  Help your child solve problems and make decisions  Ask your child about any problems or concerns he or she has  Make time to listen to your child's hopes and concerns  Find ways to help your child work through problems and make healthy decisions  Help your child find healthy ways to deal with stress  Be a good example of how to handle stress  Help your child find activities that help him or her manage stress  Examples include exercising, reading, or listening to music  Encourage your child to talk to you when he or she is feeling stressed, sad, angry, hopeless, or depressed  Encourage your child to create healthy relationships  Know your child's friends and their parents  Know where your child is and what he or she is doing at all times  Encourage your child to tell you if he or she thinks he or she is being bullied  Talk with your child about healthy dating relationships  Tell your child it is okay to say \"no\" and to respect when someone else says \"no  \"    Encourage your child not to use drugs, tobacco products, nicotine, or alcohol  By talking with your child at this age, you can help prepare him or her to make healthy choices as a teenager  Explain that these substances are dangerous and that you care about your child's health  Nicotine and other chemicals in cigarettes, cigars, and e-cigarettes can cause lung damage  Nicotine and alcohol can also affect brain development  This can lead to trouble thinking, learning, or paying attention  Help your teen understand that vaping is not safer than smoking regular cigarettes or cigars   Talk to him or " her about the importance of healthy brain and body development during the teen years  Choices during these years can help him or her become a healthy adult  Be prepared to talk your child about sex  Answer your child's questions directly  Ask your child's healthcare provider where you can get more information on how to talk to your child about sex  Vaccines and screenings your child may get during this well child visit:   Vaccines  include influenza (flu) every year  Tdap (tetanus, diphtheria, and pertussis), MMR (measles, mumps, and rubella), varicella (chickenpox), meningococcal, and HPV (human papillomavirus) vaccines are also usually given  Screening  may be needed to check for sexually transmitted infections (STIs)  Screening may also be used to check your child's lipid (cholesterol and fatty acids) level  Anxiety or depression screening may also be recommended  Your child's healthcare provider will tell you more about any screenings, follow-up tests, and treatments for your child, if needed  What you need to know about your child's next well child visit:  Your child's healthcare provider will tell you when to bring your child in again  The next well child visit is usually at 13 to 18 years  Your child may be given meningococcal, HPV, MMR, or varicella vaccines  This depends on the vaccines your child was given during this well child visit  He or she may also need lipid or STI screenings if any was not done during this visit  Information about safe sex practices may be given  These practices help prevent pregnancy and STIs  Contact your child's healthcare provider if you have questions or concerns about your child's health or care before the next visit  © Copyright Derickene Matar 2022 Information is for End User's use only and may not be sold, redistributed or otherwise used for commercial purposes  The above information is an  only   It is not intended as medical advice for contact guard individual conditions or treatments  Talk to your doctor, nurse or pharmacist before following any medical regimen to see if it is safe and effective for you

## 2023-08-07 NOTE — PROGRESS NOTE ADULT - PROBLEM SELECTOR PLAN 2
BP controlled. Continue with current anti-hypertensive medications. Monitor BP.
BP mildly elevated but acceptable in the setting of IVF. Can d/c IVF. Continue with current anti-hypertensive medications. Monitor BP.
BP mildly elevated but acceptable in the setting of IVF. Will d/c IVF once hyponatremia resolves. Continue with current anti-hypertensive medications. Monitor BP.
BP controlled. Continue with current anti-hypertensive medications. Monitor BP.
Epistaxis

## 2023-10-01 PROBLEM — Z92.3 HISTORY OF RADIATION THERAPY: Status: RESOLVED | Noted: 2020-11-11 | Resolved: 2023-10-01

## 2023-11-15 NOTE — ED CDU PROVIDER NOTE - PROGRESS NOTE3
Detail Level: Zone Initiate Treatment: Patient told to apply clindamycin lotion to inflamed areas daily. Render In Strict Bullet Format?: No Improved.

## 2024-04-15 NOTE — ED ADULT NURSE NOTE - CHPI ED NUR SYMPTOMS POS
Yes... 10xdays/CHEST CONGESTION/CHEST PAIN/COUGH/DIFFICULTY BREATHING/DYSPNEA ON EXERTION/FEVER/SHORTNESS OF BREATH

## 2024-10-02 NOTE — ASU PATIENT PROFILE, ADULT - NS PRO ABUSE SCREEN SUSPICION NEGLECT YN
"Referring Provider:    Bradford Davison Md  [address] [place] [address]  Subjective:   Patient: Robert Munoz [id number], :1963   Visit date:10/2/2024 1:04 PM    Chief Complaint:  Sinusitis (Chronic nasal congestion ongoing X 5 years but has become worse. Patient states he uses flonase but it doesn't really work. He has to use it every 4 hours if not his nose becomes real snotty and then seems like concrete. He states he believes it is environmental where he lives.)    HPI:    Prior notes reviewed by myself.  Clinical documentation obtained by nursing staff reviewed.      61-year-old gentleman presents for evaluation of chronic nasal congestion.  He has severe nasal congestion and has been using topical nasal decongestant spray every 4 hours to relieve this symptom.  He has used Flonase in the past but he did not feel that it worked anymore.  He was seen by Dr. Thao 2 years ago for the same issue and was able to discontinue the decongestant spray while on oral steroids.  He states that he had a long symptom-free.  After that was able to not use any decongestant spray for several months.  He is concerned that some of this could be related environmental allergies.      Objective:     Physical Exam:  Vitals:  Ht 5' 11" (1.803 m)   BMI 50.00 kg/m²   General appearance:  Well developed, well nourished    Ears:  Otoscopy of external auditory canals and tympanic membranes was normal, clinical speech reception thresholds grossly intact, no mass/lesion of auricle.    Nose:  No masses/lesions of external nose, nasal mucosa dry/erythematous, septum, and turbinates were within normal limits.    Mouth:  No mass/lesion of lips, teeth, gums, hard/soft palate, tongue, tonsils, or oropharynx.    Neck & Lymphatics:  No cervical lymphadenopathy, no neck mass/crepitus/ asymmetry, trachea is midline, no thyroid enlargement/tenderness/mass.        [x]  Data Reviewed:    Lab Results   Component Value Date    " WBC 14.16 (H) 05/07/2024    HGB 13.0 (L) 05/07/2024    HCT 41.0 05/07/2024    MCV 90 05/07/2024    EOSINOPHIL 2.0 05/07/2024               Assessment & Plan:   Rhinitis medicamentosa  -     methylPREDNISolone (MEDROL DOSEPACK) 4 mg tablet; use as directed  Dispense: 1 each; Refill: 0    Chronic nasal congestion  Comments:  ENT consult.  Excessive use of over-the-counter topical decongestants  Orders:  -     Ambulatory referral/consult to ENT  -     fluticasone propionate (FLONASE) 50 mcg/actuation nasal spray; 1 spray (50 mcg total) by Each Nostril route once daily.  Dispense: 48 g; Refill: 2    Chronic nasal congestion  -     Ambulatory referral/consult to ENT  -     fluticasone propionate (FLONASE) 50 mcg/actuation nasal spray; 1 spray (50 mcg total) by Each Nostril route once daily.  Dispense: 48 g; Refill: 2        I had a long discussion with the patient regarding the diagnosis of rhinitis medicamentosa.  We discussed the physiologic response of the nasal mucosa to the OTC nasal sprays, and that rhinitis medicamentosa is a condition of rebound nasal congestion as a result of extended use of topical decongestants that constrict blood vessels in the lining of the nose.  The patient expressed understand, and is ready to try and stop the use of topical nasal decongestants.  We discussed different options including the use of oral steroids to prevent rebound nasal congestion as well as serially diluting the current bottle of nasal decongestant with saline over the next two weeks to gradually wean from the medicine.      Instructions on weaning off of nasal decongestant:    Week 1    Take your current bottle of topical nasal decongestant and remove the top   If it is full, pour out half and add saline until full  Continue to use nasal spray as needed    Week 2    Take the same bottle of topical nasal decongestant spray and repeat instructions for Week 1    *Repeat this process each week for 4 weeks and then  discontinue nasal decongestant spray completely  *Use flonase and other prescription nasal sprays as directed by your physician  *Make sure that you are using a nasal spray bottle with a pump (pump spray mist) and not one that you have to squeeze the bottle itself      Jack De Leon M.D.  Department of Otolaryngology - Head & Neck Surgery  [address] [facility] [address]  P: [phone]  F: [phone]        DISCLAIMER: This note was prepared with [employer] voice recognition transcription software. Garbled syntax, mangled pronouns, and other bizarre constructions may be attributed to that software system. While efforts were made to correct any mistakes made by this voice recognition program, some errors and/or omissions may remain in the note that were missed when the note was originally created.      no

## 2025-01-09 NOTE — ED ADULT NURSE NOTE - CAS TRG GEN SKIN COLOR
Allergy Skin Test            : Wright      Is the patient on beta-blockers?: No Is the patient pregnant?: N/A   Is the patient on antihistamines?: No Is Asthma stable?: N/A          Allergy SkinTesting - Prick       ALLERGEN ROUTE REACTION WHEAL FLARE COMMENT    Histamine (+ control) Prick  9x7mm      D. Farinae Prick  0      D. Pteronyssinus Prick  0      Cat Prick  0      Dog Prick  0      American Cockroach Prick  0      Spanish Cockroach Prick  0      50% glycerine-Saline (- control) Prick  0        ALLERGEN ROUTE REACTION WHEAL FLARE COMMENT    Alternaria alternate Prick  0      Aspergillus mix Prick  0      Penicillium mix Prick  0      Cladosporium herbarum Prick  0      Helminthosporium solani Prick  0      Mucor mix Prick  0      Fusarium mix Prick  0      Botrytis cinerea Prick  0        ALLERGEN ROUTE REACTION WHEAL FLARE COMMENT    [place] Prick  0      Maple (Red & Sugar/Hard Mix) Prick  0      Birch (Red/River) Prick  0      Red [place] Prick  0      White [place] Prick  0      [place] (Eastern) Prick  0      [place] Prick  0      Ralph Prick  0        ALLERGEN ROUTE REACTION WHEAL FLARE COMMENT    Orchard Prick  0      Short Ragweed Prick  0      Common Mugwort Prick  0      Lamb's Quarter Prick  0      Rough/redroot Pigweed Prick  0      English Plantain Prick  0      Sheep/red [place] Prick  0      C[place] Prick  0            How to Use a Nasal Spray    1. Blow your nose gently before beginning.    2. Shake medication (if instructed).    3. Stand, or sit, and tip your chin down.    4. Keeping the sprayer upright, insert the tip ¼ to ½ inch into one nostril while holding the other closed with your fingers.    5. Point the tip away from the center of your nose.    6. Inhale gently through your nose while you release the spray.    -Qnasl: hold your breath while spraying-do not inhale.   -Zetonna: lean head back slightly, release spray, hold breath for a few seconds before exhaling.    7. Exhale  through your mouth.     8. Wipe any drips, but avoid blowing your nose for 15 minutes.      Normal for race

## 2025-07-17 NOTE — ED ADULT NURSE NOTE - CHIEF COMPLAINT QUOTE
Anesthesia Post-op Note    Patient: Jb Shepherd  Procedure(s) Performed: EXAM UNDER ANESTHESIA WITH EXCISION OF BUTTOCK CYST, LEFT (Anus)  EXAM UNDER ANESTHESIA WITH EXCISION OF BUTTOCK CYST, LEFT  Anesthesia type: General    Vitals Value Taken Time   Temp 36.1 °C (97 °F) 07/17/25 1524   Pulse 82 07/17/25 1545   Resp 24 07/17/25 1545   SpO2 93 % 07/17/25 1545   /79 07/17/25 1530         Patient Location: PACU Phase 1  Post-op Vital Signs:stable  Level of Consciousness: participates in exam, oriented, awake, alert and return to baseline  Respiratory Status: spontaneous ventilation and room air  Cardiovascular blood pressure returned to baseline  Hydration: euvolemic  Pain Management: well controlled  Vomiting: none  Nausea: None  Airway Patency:patent  Post-op Assessment: awake, alert, appropriately conversant, or baseline, no complications, patient tolerated procedure well, no evidence of recall, dentition, mouth, tongue, and oropharynx unchanged , moving all extremities and No Corneal Abrasion      There were no known notable events for this encounter.                       Pt c/o chest pain, epigastric pain, and abdominal pain.  Endorses nausea/vomiting, belching in triage.  PMHx:  bladder tumor, stomach ulcers, kidney cancer, asthma, hyponatremia, SIADH, BPH, UTI, HTN
